# Patient Record
Sex: FEMALE | Race: BLACK OR AFRICAN AMERICAN
[De-identification: names, ages, dates, MRNs, and addresses within clinical notes are randomized per-mention and may not be internally consistent; named-entity substitution may affect disease eponyms.]

---

## 2024-03-25 ENCOUNTER — TRANSCRIPTION ENCOUNTER (OUTPATIENT)
Age: 59
End: 2024-03-25

## 2024-03-26 ENCOUNTER — INPATIENT (INPATIENT)
Facility: HOSPITAL | Age: 59
LOS: 30 days | Discharge: NURSING, REHAB OR LONG-TERM CARE | DRG: 66 | End: 2024-04-26
Attending: NEUROLOGICAL SURGERY | Admitting: NEUROLOGICAL SURGERY
Payer: COMMERCIAL

## 2024-03-26 ENCOUNTER — RESULT REVIEW (OUTPATIENT)
Age: 59
End: 2024-03-26

## 2024-03-26 ENCOUNTER — APPOINTMENT (OUTPATIENT)
Dept: NEUROSURGERY | Facility: HOSPITAL | Age: 59
End: 2024-03-26

## 2024-03-26 VITALS
HEART RATE: 70 BPM | DIASTOLIC BLOOD PRESSURE: 92 MMHG | SYSTOLIC BLOOD PRESSURE: 118 MMHG | OXYGEN SATURATION: 100 % | RESPIRATION RATE: 20 BRPM

## 2024-03-26 DIAGNOSIS — I60.9 NONTRAUMATIC SUBARACHNOID HEMORRHAGE, UNSPECIFIED: ICD-10-CM

## 2024-03-26 DIAGNOSIS — I42.9 CARDIOMYOPATHY, UNSPECIFIED: ICD-10-CM

## 2024-03-26 LAB
A1C WITH ESTIMATED AVERAGE GLUCOSE RESULT: 5.5 % — SIGNIFICANT CHANGE UP (ref 4–5.6)
A1C WITH ESTIMATED AVERAGE GLUCOSE RESULT: 5.5 % — SIGNIFICANT CHANGE UP (ref 4–5.6)
ADD ON TEST-SPECIMEN IN LAB: SIGNIFICANT CHANGE UP
ALBUMIN SERPL ELPH-MCNC: 3.8 G/DL — SIGNIFICANT CHANGE UP (ref 3.3–5)
ALP SERPL-CCNC: 109 U/L — SIGNIFICANT CHANGE UP (ref 40–120)
ALT FLD-CCNC: 20 U/L — SIGNIFICANT CHANGE UP (ref 10–45)
ANION GAP SERPL CALC-SCNC: 13 MMOL/L — SIGNIFICANT CHANGE UP (ref 5–17)
ANION GAP SERPL CALC-SCNC: 14 MMOL/L — SIGNIFICANT CHANGE UP (ref 5–17)
ANION GAP SERPL CALC-SCNC: 14 MMOL/L — SIGNIFICANT CHANGE UP (ref 5–17)
ANION GAP SERPL CALC-SCNC: 15 MMOL/L — SIGNIFICANT CHANGE UP (ref 5–17)
APPEARANCE UR: CLEAR — SIGNIFICANT CHANGE UP
APTT BLD: 28 SEC — SIGNIFICANT CHANGE UP (ref 24.5–35.6)
AST SERPL-CCNC: 35 U/L — SIGNIFICANT CHANGE UP (ref 10–40)
BACTERIA # UR AUTO: NEGATIVE /HPF — SIGNIFICANT CHANGE UP
BILIRUB DIRECT SERPL-MCNC: <0.1 MG/DL — SIGNIFICANT CHANGE UP (ref 0–0.3)
BILIRUB INDIRECT FLD-MCNC: >0.2 MG/DL — SIGNIFICANT CHANGE UP (ref 0.2–1)
BILIRUB SERPL-MCNC: 0.3 MG/DL — SIGNIFICANT CHANGE UP (ref 0.2–1.2)
BILIRUB UR-MCNC: NEGATIVE — SIGNIFICANT CHANGE UP
BLD GP AB SCN SERPL QL: NEGATIVE — SIGNIFICANT CHANGE UP
BUN SERPL-MCNC: 10 MG/DL — SIGNIFICANT CHANGE UP (ref 7–23)
BUN SERPL-MCNC: 10 MG/DL — SIGNIFICANT CHANGE UP (ref 7–23)
BUN SERPL-MCNC: 14 MG/DL — SIGNIFICANT CHANGE UP (ref 7–23)
BUN SERPL-MCNC: 19 MG/DL — SIGNIFICANT CHANGE UP (ref 7–23)
CALCIUM SERPL-MCNC: 8.1 MG/DL — LOW (ref 8.4–10.5)
CALCIUM SERPL-MCNC: 9.1 MG/DL — SIGNIFICANT CHANGE UP (ref 8.4–10.5)
CALCIUM SERPL-MCNC: 9.3 MG/DL — SIGNIFICANT CHANGE UP (ref 8.4–10.5)
CALCIUM SERPL-MCNC: 9.8 MG/DL — SIGNIFICANT CHANGE UP (ref 8.4–10.5)
CAST: 2 /LPF — SIGNIFICANT CHANGE UP (ref 0–4)
CHLORIDE SERPL-SCNC: 102 MMOL/L — SIGNIFICANT CHANGE UP (ref 96–108)
CHLORIDE SERPL-SCNC: 106 MMOL/L — SIGNIFICANT CHANGE UP (ref 96–108)
CHLORIDE SERPL-SCNC: 109 MMOL/L — HIGH (ref 96–108)
CHLORIDE SERPL-SCNC: 111 MMOL/L — HIGH (ref 96–108)
CHOLEST SERPL-MCNC: 174 MG/DL — SIGNIFICANT CHANGE UP
CHOLEST SERPL-MCNC: 198 MG/DL — SIGNIFICANT CHANGE UP
CHOLEST SERPL-MCNC: 200 MG/DL — HIGH
CO2 SERPL-SCNC: 17 MMOL/L — LOW (ref 22–31)
CO2 SERPL-SCNC: 18 MMOL/L — LOW (ref 22–31)
COLOR SPEC: YELLOW — SIGNIFICANT CHANGE UP
CREAT SERPL-MCNC: 0.5 MG/DL — SIGNIFICANT CHANGE UP (ref 0.5–1.3)
CREAT SERPL-MCNC: 0.53 MG/DL — SIGNIFICANT CHANGE UP (ref 0.5–1.3)
CREAT SERPL-MCNC: 0.54 MG/DL — SIGNIFICANT CHANGE UP (ref 0.5–1.3)
CREAT SERPL-MCNC: 0.57 MG/DL — SIGNIFICANT CHANGE UP (ref 0.5–1.3)
DIFF PNL FLD: ABNORMAL
EGFR: 106 ML/MIN/1.73M2 — SIGNIFICANT CHANGE UP
EGFR: 107 ML/MIN/1.73M2 — SIGNIFICANT CHANGE UP
EGFR: 108 ML/MIN/1.73M2 — SIGNIFICANT CHANGE UP
EGFR: 109 ML/MIN/1.73M2 — SIGNIFICANT CHANGE UP
ESTIMATED AVERAGE GLUCOSE: 111 MG/DL — SIGNIFICANT CHANGE UP (ref 68–114)
ESTIMATED AVERAGE GLUCOSE: 111 MG/DL — SIGNIFICANT CHANGE UP (ref 68–114)
GAS PNL BLDA: SIGNIFICANT CHANGE UP
GAS PNL BLDA: SIGNIFICANT CHANGE UP
GLUCOSE BLDC GLUCOMTR-MCNC: 128 MG/DL — HIGH (ref 70–99)
GLUCOSE BLDC GLUCOMTR-MCNC: 152 MG/DL — HIGH (ref 70–99)
GLUCOSE SERPL-MCNC: 145 MG/DL — HIGH (ref 70–99)
GLUCOSE SERPL-MCNC: 173 MG/DL — HIGH (ref 70–99)
GLUCOSE SERPL-MCNC: 174 MG/DL — HIGH (ref 70–99)
GLUCOSE SERPL-MCNC: 191 MG/DL — HIGH (ref 70–99)
GLUCOSE UR QL: NEGATIVE MG/DL — SIGNIFICANT CHANGE UP
HCG SERPL-ACNC: 2.4 MIU/ML — SIGNIFICANT CHANGE UP
HCT VFR BLD CALC: 35 % — SIGNIFICANT CHANGE UP (ref 34.5–45)
HCT VFR BLD CALC: 37.2 % — SIGNIFICANT CHANGE UP (ref 34.5–45)
HDLC SERPL-MCNC: 51 MG/DL — SIGNIFICANT CHANGE UP
HDLC SERPL-MCNC: 53 MG/DL — SIGNIFICANT CHANGE UP
HDLC SERPL-MCNC: 54 MG/DL — SIGNIFICANT CHANGE UP
HGB BLD-MCNC: 11.4 G/DL — LOW (ref 11.5–15.5)
HGB BLD-MCNC: 12.3 G/DL — SIGNIFICANT CHANGE UP (ref 11.5–15.5)
INR BLD: 1.13 RATIO — SIGNIFICANT CHANGE UP (ref 0.85–1.18)
KETONES UR-MCNC: NEGATIVE MG/DL — SIGNIFICANT CHANGE UP
LEUKOCYTE ESTERASE UR-ACNC: NEGATIVE — SIGNIFICANT CHANGE UP
LIPID PNL WITH DIRECT LDL SERPL: 105 MG/DL — HIGH
LIPID PNL WITH DIRECT LDL SERPL: 120 MG/DL — HIGH
LIPID PNL WITH DIRECT LDL SERPL: 127 MG/DL — HIGH
MAGNESIUM SERPL-MCNC: 1.7 MG/DL — SIGNIFICANT CHANGE UP (ref 1.6–2.6)
MAGNESIUM SERPL-MCNC: 2 MG/DL — SIGNIFICANT CHANGE UP (ref 1.6–2.6)
MAGNESIUM SERPL-MCNC: 2.1 MG/DL — SIGNIFICANT CHANGE UP (ref 1.6–2.6)
MAGNESIUM SERPL-MCNC: 2.4 MG/DL — SIGNIFICANT CHANGE UP (ref 1.6–2.6)
MCHC RBC-ENTMCNC: 28.3 PG — SIGNIFICANT CHANGE UP (ref 27–34)
MCHC RBC-ENTMCNC: 28.6 PG — SIGNIFICANT CHANGE UP (ref 27–34)
MCHC RBC-ENTMCNC: 32.6 GM/DL — SIGNIFICANT CHANGE UP (ref 32–36)
MCHC RBC-ENTMCNC: 33.1 GM/DL — SIGNIFICANT CHANGE UP (ref 32–36)
MCV RBC AUTO: 85.5 FL — SIGNIFICANT CHANGE UP (ref 80–100)
MCV RBC AUTO: 87.9 FL — SIGNIFICANT CHANGE UP (ref 80–100)
MRSA PCR RESULT.: SIGNIFICANT CHANGE UP
NITRITE UR-MCNC: NEGATIVE — SIGNIFICANT CHANGE UP
NON HDL CHOLESTEROL: 121 MG/DL — SIGNIFICANT CHANGE UP
NON HDL CHOLESTEROL: 146 MG/DL — HIGH
NON HDL CHOLESTEROL: 147 MG/DL — HIGH
NRBC # BLD: 0 /100 WBCS — SIGNIFICANT CHANGE UP (ref 0–0)
NRBC # BLD: 0 /100 WBCS — SIGNIFICANT CHANGE UP (ref 0–0)
NT-PROBNP SERPL-SCNC: 2995 PG/ML — HIGH (ref 0–300)
PA ADP PRP-ACNC: 242 PRU — SIGNIFICANT CHANGE UP (ref 194–417)
PH UR: 5 — SIGNIFICANT CHANGE UP (ref 5–8)
PHOSPHATE SERPL-MCNC: 1.9 MG/DL — LOW (ref 2.5–4.5)
PHOSPHATE SERPL-MCNC: 1.9 MG/DL — LOW (ref 2.5–4.5)
PHOSPHATE SERPL-MCNC: 3.1 MG/DL — SIGNIFICANT CHANGE UP (ref 2.5–4.5)
PHOSPHATE SERPL-MCNC: 3.6 MG/DL — SIGNIFICANT CHANGE UP (ref 2.5–4.5)
PLATELET # BLD AUTO: 214 K/UL — SIGNIFICANT CHANGE UP (ref 150–400)
PLATELET # BLD AUTO: 275 K/UL — SIGNIFICANT CHANGE UP (ref 150–400)
PLATELET MAPPING ACTF MAX AMPLITUDE: 14.1 MM — SIGNIFICANT CHANGE UP (ref 2–19)
PLATELET MAPPING ADP MAXIMUM AMPLITUDE: 18.9 MM — LOW (ref 45–69)
PLATELET MAPPING ADP PERCENT INHIBITION: 91 % — HIGH (ref 0–17)
PLATELET MAPPING ARACHIDONIC ACID INHIBITION: 62.1 % — HIGH (ref 0–11)
PLATELET MAPPING HKH MAXIMUM AMPLITUDE: 67.4 MM — SIGNIFICANT CHANGE UP (ref 53–68)
PLATELET RESPONSE ASPIRIN RESULT: 658 ARU — SIGNIFICANT CHANGE UP
POTASSIUM SERPL-MCNC: 3.1 MMOL/L — LOW (ref 3.5–5.3)
POTASSIUM SERPL-MCNC: 3.5 MMOL/L — SIGNIFICANT CHANGE UP (ref 3.5–5.3)
POTASSIUM SERPL-MCNC: 3.7 MMOL/L — SIGNIFICANT CHANGE UP (ref 3.5–5.3)
POTASSIUM SERPL-MCNC: 4.1 MMOL/L — SIGNIFICANT CHANGE UP (ref 3.5–5.3)
POTASSIUM SERPL-SCNC: 3.1 MMOL/L — LOW (ref 3.5–5.3)
POTASSIUM SERPL-SCNC: 3.5 MMOL/L — SIGNIFICANT CHANGE UP (ref 3.5–5.3)
POTASSIUM SERPL-SCNC: 3.7 MMOL/L — SIGNIFICANT CHANGE UP (ref 3.5–5.3)
POTASSIUM SERPL-SCNC: 4.1 MMOL/L — SIGNIFICANT CHANGE UP (ref 3.5–5.3)
PROT SERPL-MCNC: 6.7 G/DL — SIGNIFICANT CHANGE UP (ref 6–8.3)
PROT UR-MCNC: NEGATIVE MG/DL — SIGNIFICANT CHANGE UP
PROTHROM AB SERPL-ACNC: 12.4 SEC — SIGNIFICANT CHANGE UP (ref 9.5–13)
RBC # BLD: 3.98 M/UL — SIGNIFICANT CHANGE UP (ref 3.8–5.2)
RBC # BLD: 4.35 M/UL — SIGNIFICANT CHANGE UP (ref 3.8–5.2)
RBC # FLD: 13.2 % — SIGNIFICANT CHANGE UP (ref 10.3–14.5)
RBC # FLD: 13.9 % — SIGNIFICANT CHANGE UP (ref 10.3–14.5)
RBC CASTS # UR COMP ASSIST: 4 /HPF — SIGNIFICANT CHANGE UP (ref 0–4)
REVIEW: SIGNIFICANT CHANGE UP
RH IG SCN BLD-IMP: POSITIVE — SIGNIFICANT CHANGE UP
S AUREUS DNA NOSE QL NAA+PROBE: SIGNIFICANT CHANGE UP
SODIUM SERPL-SCNC: 135 MMOL/L — SIGNIFICANT CHANGE UP (ref 135–145)
SODIUM SERPL-SCNC: 137 MMOL/L — SIGNIFICANT CHANGE UP (ref 135–145)
SODIUM SERPL-SCNC: 141 MMOL/L — SIGNIFICANT CHANGE UP (ref 135–145)
SODIUM SERPL-SCNC: 142 MMOL/L — SIGNIFICANT CHANGE UP (ref 135–145)
SP GR SPEC: >1.03 — HIGH (ref 1–1.03)
SQUAMOUS # UR AUTO: 3 /HPF — SIGNIFICANT CHANGE UP (ref 0–5)
T4 FREE SERPL-MCNC: 1.1 NG/DL — SIGNIFICANT CHANGE UP (ref 0.9–1.8)
TRIGL SERPL-MCNC: 109 MG/DL — SIGNIFICANT CHANGE UP
TRIGL SERPL-MCNC: 150 MG/DL — HIGH
TRIGL SERPL-MCNC: 82 MG/DL — SIGNIFICANT CHANGE UP
TROPONIN T, HIGH SENSITIVITY RESULT: 284 NG/L — HIGH (ref 0–51)
TROPONIN T, HIGH SENSITIVITY RESULT: 704 NG/L — HIGH (ref 0–51)
TSH SERPL-MCNC: 1.62 UIU/ML — SIGNIFICANT CHANGE UP (ref 0.27–4.2)
TSH SERPL-MCNC: 1.87 UIU/ML — SIGNIFICANT CHANGE UP (ref 0.27–4.2)
TSH SERPL-MCNC: 2.4 UIU/ML — SIGNIFICANT CHANGE UP (ref 0.27–4.2)
UROBILINOGEN FLD QL: 0.2 MG/DL — SIGNIFICANT CHANGE UP (ref 0.2–1)
WBC # BLD: 15.24 K/UL — HIGH (ref 3.8–10.5)
WBC # BLD: 16.1 K/UL — HIGH (ref 3.8–10.5)
WBC # FLD AUTO: 15.24 K/UL — HIGH (ref 3.8–10.5)
WBC # FLD AUTO: 16.1 K/UL — HIGH (ref 3.8–10.5)
WBC UR QL: 3 /HPF — SIGNIFICANT CHANGE UP (ref 0–5)

## 2024-03-26 PROCEDURE — 99223 1ST HOSP IP/OBS HIGH 75: CPT | Mod: 57

## 2024-03-26 PROCEDURE — 36620 INSERTION CATHETER ARTERY: CPT

## 2024-03-26 PROCEDURE — 99291 CRITICAL CARE FIRST HOUR: CPT

## 2024-03-26 PROCEDURE — 93010 ELECTROCARDIOGRAM REPORT: CPT

## 2024-03-26 PROCEDURE — 70450 CT HEAD/BRAIN W/O DYE: CPT | Mod: 26,77,59

## 2024-03-26 PROCEDURE — 92240 ICG ANGIOGRAPHY I&R UNI/BI: CPT | Mod: 26

## 2024-03-26 PROCEDURE — 76937 US GUIDE VASCULAR ACCESS: CPT | Mod: 26

## 2024-03-26 PROCEDURE — 70496 CT ANGIOGRAPHY HEAD: CPT | Mod: 26

## 2024-03-26 PROCEDURE — 99255 IP/OBS CONSLTJ NEW/EST HI 80: CPT | Mod: 57

## 2024-03-26 PROCEDURE — 70450 CT HEAD/BRAIN W/O DYE: CPT | Mod: 26

## 2024-03-26 PROCEDURE — 71045 X-RAY EXAM CHEST 1 VIEW: CPT | Mod: 26

## 2024-03-26 PROCEDURE — 61697 BRAIN ANEURYSM REPR COMPLX: CPT

## 2024-03-26 PROCEDURE — 93306 TTE W/DOPPLER COMPLETE: CPT | Mod: 26

## 2024-03-26 PROCEDURE — 70498 CT ANGIOGRAPHY NECK: CPT | Mod: 26

## 2024-03-26 PROCEDURE — 93888 INTRACRANIAL LIMITED STUDY: CPT | Mod: 26

## 2024-03-26 PROCEDURE — 70450 CT HEAD/BRAIN W/O DYE: CPT | Mod: 26,77,76

## 2024-03-26 PROCEDURE — 69990 MICROSURGERY ADD-ON: CPT

## 2024-03-26 PROCEDURE — 70450 CT HEAD/BRAIN W/O DYE: CPT | Mod: 26,77

## 2024-03-26 DEVICE — PLATE BONE MICRO 10MM 2H: Type: IMPLANTABLE DEVICE | Site: RIGHT | Status: FUNCTIONAL

## 2024-03-26 DEVICE — PLATE COVER BURRHOLE UN3 W/TAB 10MM: Type: IMPLANTABLE DEVICE | Site: RIGHT | Status: FUNCTIONAL

## 2024-03-26 DEVICE — SURGIFLO MATRIX WITH THROMBIN KIT: Type: IMPLANTABLE DEVICE | Site: RIGHT | Status: FUNCTIONAL

## 2024-03-26 DEVICE — SCREW UN3 AXS SELF DRILL 1.5X4MM: Type: IMPLANTABLE DEVICE | Site: RIGHT | Status: FUNCTIONAL

## 2024-03-26 DEVICE — PLATE UN3 W/TAB 7MM: Type: IMPLANTABLE DEVICE | Site: RIGHT | Status: FUNCTIONAL

## 2024-03-26 DEVICE — SURGIFOAM PAD 8CM X 12.5CM X 10MM (100): Type: IMPLANTABLE DEVICE | Site: RIGHT | Status: FUNCTIONAL

## 2024-03-26 DEVICE — GRAFT DURAGEN PLUS 3X3IN: Type: IMPLANTABLE DEVICE | Site: RIGHT | Status: FUNCTIONAL

## 2024-03-26 DEVICE — TACHOSIL 9.5 X 4.8CM: Type: IMPLANTABLE DEVICE | Site: RIGHT | Status: FUNCTIONAL

## 2024-03-26 DEVICE — IMPLANTABLE DEVICE: Type: IMPLANTABLE DEVICE | Site: RIGHT | Status: FUNCTIONAL

## 2024-03-26 DEVICE — CLIP ANEUR TII TEMP 10.5X10 STR: Type: IMPLANTABLE DEVICE | Site: RIGHT | Status: FUNCTIONAL

## 2024-03-26 RX ORDER — FENTANYL CITRATE 50 UG/ML
50 INJECTION INTRAVENOUS ONCE
Refills: 0 | Status: DISCONTINUED | OUTPATIENT
Start: 2024-03-26 | End: 2024-03-26

## 2024-03-26 RX ORDER — ACETAMINOPHEN 500 MG
650 TABLET ORAL EVERY 6 HOURS
Refills: 0 | Status: DISCONTINUED | OUTPATIENT
Start: 2024-03-26 | End: 2024-03-26

## 2024-03-26 RX ORDER — PROPOFOL 10 MG/ML
10 INJECTION, EMULSION INTRAVENOUS
Qty: 500 | Refills: 0 | Status: DISCONTINUED | OUTPATIENT
Start: 2024-03-26 | End: 2024-03-26

## 2024-03-26 RX ORDER — OXYCODONE HYDROCHLORIDE 5 MG/1
5 TABLET ORAL EVERY 4 HOURS
Refills: 0 | Status: DISCONTINUED | OUTPATIENT
Start: 2024-03-26 | End: 2024-03-26

## 2024-03-26 RX ORDER — SODIUM CHLORIDE 9 MG/ML
1000 INJECTION INTRAMUSCULAR; INTRAVENOUS; SUBCUTANEOUS
Refills: 0 | Status: DISCONTINUED | OUTPATIENT
Start: 2024-03-26 | End: 2024-03-26

## 2024-03-26 RX ORDER — LEVETIRACETAM 250 MG/1
500 TABLET, FILM COATED ORAL EVERY 12 HOURS
Refills: 0 | Status: DISCONTINUED | OUTPATIENT
Start: 2024-03-26 | End: 2024-03-26

## 2024-03-26 RX ORDER — CEFAZOLIN SODIUM 1 G
2000 VIAL (EA) INJECTION EVERY 8 HOURS
Refills: 0 | Status: COMPLETED | OUTPATIENT
Start: 2024-03-26 | End: 2024-03-27

## 2024-03-26 RX ORDER — POLYETHYLENE GLYCOL 3350 17 G/17G
17 POWDER, FOR SOLUTION ORAL AT BEDTIME
Refills: 0 | Status: DISCONTINUED | OUTPATIENT
Start: 2024-03-26 | End: 2024-03-26

## 2024-03-26 RX ORDER — INSULIN LISPRO 100/ML
VIAL (ML) SUBCUTANEOUS
Refills: 0 | Status: DISCONTINUED | OUTPATIENT
Start: 2024-03-26 | End: 2024-03-26

## 2024-03-26 RX ORDER — SODIUM CHLORIDE 9 MG/ML
1000 INJECTION INTRAMUSCULAR; INTRAVENOUS; SUBCUTANEOUS ONCE
Refills: 0 | Status: COMPLETED | OUTPATIENT
Start: 2024-03-26 | End: 2024-03-26

## 2024-03-26 RX ORDER — NIMODIPINE 60 MG/10ML
60 SOLUTION ORAL EVERY 4 HOURS
Refills: 0 | Status: DISCONTINUED | OUTPATIENT
Start: 2024-03-26 | End: 2024-03-26

## 2024-03-26 RX ORDER — POTASSIUM CHLORIDE 20 MEQ
40 PACKET (EA) ORAL EVERY 4 HOURS
Refills: 0 | Status: DISCONTINUED | OUTPATIENT
Start: 2024-03-26 | End: 2024-03-26

## 2024-03-26 RX ORDER — LEVETIRACETAM 250 MG/1
500 TABLET, FILM COATED ORAL
Refills: 0 | Status: DISCONTINUED | OUTPATIENT
Start: 2024-03-26 | End: 2024-03-27

## 2024-03-26 RX ORDER — SODIUM CHLORIDE 5 G/100ML
1000 INJECTION, SOLUTION INTRAVENOUS
Refills: 0 | Status: DISCONTINUED | OUTPATIENT
Start: 2024-03-26 | End: 2024-03-26

## 2024-03-26 RX ORDER — NOREPINEPHRINE BITARTRATE/D5W 8 MG/250ML
0.05 PLASTIC BAG, INJECTION (ML) INTRAVENOUS
Qty: 8 | Refills: 0 | Status: DISCONTINUED | OUTPATIENT
Start: 2024-03-26 | End: 2024-03-26

## 2024-03-26 RX ORDER — POTASSIUM CHLORIDE 20 MEQ
40 PACKET (EA) ORAL ONCE
Refills: 0 | Status: DISCONTINUED | OUTPATIENT
Start: 2024-03-26 | End: 2024-03-27

## 2024-03-26 RX ORDER — SODIUM,POTASSIUM PHOSPHATES 278-250MG
2 POWDER IN PACKET (EA) ORAL ONCE
Refills: 0 | Status: DISCONTINUED | OUTPATIENT
Start: 2024-03-26 | End: 2024-03-27

## 2024-03-26 RX ORDER — CHLORHEXIDINE GLUCONATE 213 G/1000ML
15 SOLUTION TOPICAL EVERY 12 HOURS
Refills: 0 | Status: DISCONTINUED | OUTPATIENT
Start: 2024-03-26 | End: 2024-04-03

## 2024-03-26 RX ORDER — ACETAMINOPHEN 500 MG
650 TABLET ORAL EVERY 6 HOURS
Refills: 0 | Status: DISCONTINUED | OUTPATIENT
Start: 2024-03-26 | End: 2024-04-23

## 2024-03-26 RX ORDER — MAGNESIUM SULFATE 500 MG/ML
1 VIAL (ML) INJECTION ONCE
Refills: 0 | Status: COMPLETED | OUTPATIENT
Start: 2024-03-26 | End: 2024-03-26

## 2024-03-26 RX ORDER — NIMODIPINE 60 MG/10ML
60 SOLUTION ORAL EVERY 4 HOURS
Refills: 0 | Status: DISCONTINUED | OUTPATIENT
Start: 2024-03-26 | End: 2024-03-29

## 2024-03-26 RX ORDER — POLYETHYLENE GLYCOL 3350 17 G/17G
17 POWDER, FOR SOLUTION ORAL AT BEDTIME
Refills: 0 | Status: DISCONTINUED | OUTPATIENT
Start: 2024-03-26 | End: 2024-04-01

## 2024-03-26 RX ORDER — PHENYLEPHRINE HYDROCHLORIDE 10 MG/ML
0.2 INJECTION INTRAVENOUS
Qty: 40 | Refills: 0 | Status: DISCONTINUED | OUTPATIENT
Start: 2024-03-26 | End: 2024-03-26

## 2024-03-26 RX ORDER — DEXAMETHASONE 0.5 MG/5ML
4 ELIXIR ORAL EVERY 6 HOURS
Refills: 0 | Status: DISCONTINUED | OUTPATIENT
Start: 2024-03-26 | End: 2024-03-27

## 2024-03-26 RX ORDER — OXYCODONE HYDROCHLORIDE 5 MG/1
10 TABLET ORAL EVERY 4 HOURS
Refills: 0 | Status: DISCONTINUED | OUTPATIENT
Start: 2024-03-26 | End: 2024-03-26

## 2024-03-26 RX ORDER — PANTOPRAZOLE SODIUM 20 MG/1
40 TABLET, DELAYED RELEASE ORAL DAILY
Refills: 0 | Status: DISCONTINUED | OUTPATIENT
Start: 2024-03-26 | End: 2024-04-04

## 2024-03-26 RX ORDER — POTASSIUM CHLORIDE 20 MEQ
10 PACKET (EA) ORAL
Refills: 0 | Status: DISCONTINUED | OUTPATIENT
Start: 2024-03-26 | End: 2024-03-28

## 2024-03-26 RX ORDER — DEXMEDETOMIDINE HYDROCHLORIDE IN 0.9% SODIUM CHLORIDE 4 UG/ML
0.2 INJECTION INTRAVENOUS
Qty: 200 | Refills: 0 | Status: DISCONTINUED | OUTPATIENT
Start: 2024-03-26 | End: 2024-03-27

## 2024-03-26 RX ORDER — PROPOFOL 10 MG/ML
10 INJECTION, EMULSION INTRAVENOUS
Qty: 1000 | Refills: 0 | Status: DISCONTINUED | OUTPATIENT
Start: 2024-03-26 | End: 2024-03-26

## 2024-03-26 RX ORDER — CEFAZOLIN SODIUM 1 G
2000 VIAL (EA) INJECTION ONCE
Refills: 0 | Status: COMPLETED | OUTPATIENT
Start: 2024-03-26 | End: 2024-03-26

## 2024-03-26 RX ORDER — PROPOFOL 10 MG/ML
10 INJECTION, EMULSION INTRAVENOUS
Qty: 1000 | Refills: 0 | Status: DISCONTINUED | OUTPATIENT
Start: 2024-03-26 | End: 2024-03-27

## 2024-03-26 RX ORDER — FENTANYL CITRATE 50 UG/ML
25 INJECTION INTRAVENOUS ONCE
Refills: 0 | Status: DISCONTINUED | OUTPATIENT
Start: 2024-03-26 | End: 2024-03-26

## 2024-03-26 RX ORDER — POTASSIUM PHOSPHATE, MONOBASIC POTASSIUM PHOSPHATE, DIBASIC 236; 224 MG/ML; MG/ML
30 INJECTION, SOLUTION INTRAVENOUS ONCE
Refills: 0 | Status: COMPLETED | OUTPATIENT
Start: 2024-03-26 | End: 2024-03-26

## 2024-03-26 RX ORDER — ACETAMINOPHEN 500 MG
1000 TABLET ORAL EVERY 6 HOURS
Refills: 0 | Status: COMPLETED | OUTPATIENT
Start: 2024-03-26 | End: 2024-03-30

## 2024-03-26 RX ORDER — SENNA PLUS 8.6 MG/1
2 TABLET ORAL AT BEDTIME
Refills: 0 | Status: DISCONTINUED | OUTPATIENT
Start: 2024-03-26 | End: 2024-03-26

## 2024-03-26 RX ORDER — OXYCODONE HYDROCHLORIDE 5 MG/1
5 TABLET ORAL EVERY 4 HOURS
Refills: 0 | Status: DISCONTINUED | OUTPATIENT
Start: 2024-03-26 | End: 2024-04-02

## 2024-03-26 RX ORDER — POTASSIUM CHLORIDE 20 MEQ
40 PACKET (EA) ORAL ONCE
Refills: 0 | Status: COMPLETED | OUTPATIENT
Start: 2024-03-26 | End: 2024-03-26

## 2024-03-26 RX ORDER — SODIUM,POTASSIUM PHOSPHATES 278-250MG
2 POWDER IN PACKET (EA) ORAL ONCE
Refills: 0 | Status: COMPLETED | OUTPATIENT
Start: 2024-03-26 | End: 2024-03-26

## 2024-03-26 RX ORDER — FOSAPREPITANT DIMEGLUMINE 150 MG/5ML
150 INJECTION, POWDER, LYOPHILIZED, FOR SOLUTION INTRAVENOUS ONCE
Refills: 0 | Status: DISCONTINUED | OUTPATIENT
Start: 2024-03-26 | End: 2024-03-26

## 2024-03-26 RX ORDER — NICARDIPINE HYDROCHLORIDE 30 MG/1
5 CAPSULE, EXTENDED RELEASE ORAL
Qty: 40 | Refills: 0 | Status: DISCONTINUED | OUTPATIENT
Start: 2024-03-26 | End: 2024-03-27

## 2024-03-26 RX ORDER — INSULIN LISPRO 100/ML
VIAL (ML) SUBCUTANEOUS
Refills: 0 | Status: DISCONTINUED | OUTPATIENT
Start: 2024-03-26 | End: 2024-03-27

## 2024-03-26 RX ORDER — NICARDIPINE HYDROCHLORIDE 30 MG/1
5 CAPSULE, EXTENDED RELEASE ORAL
Qty: 40 | Refills: 0 | Status: DISCONTINUED | OUTPATIENT
Start: 2024-03-26 | End: 2024-03-26

## 2024-03-26 RX ORDER — DEXMEDETOMIDINE HYDROCHLORIDE IN 0.9% SODIUM CHLORIDE 4 UG/ML
0.2 INJECTION INTRAVENOUS
Qty: 200 | Refills: 0 | Status: DISCONTINUED | OUTPATIENT
Start: 2024-03-26 | End: 2024-03-26

## 2024-03-26 RX ORDER — ONDANSETRON 8 MG/1
4 TABLET, FILM COATED ORAL EVERY 6 HOURS
Refills: 0 | Status: DISCONTINUED | OUTPATIENT
Start: 2024-03-26 | End: 2024-03-26

## 2024-03-26 RX ORDER — SENNA PLUS 8.6 MG/1
2 TABLET ORAL AT BEDTIME
Refills: 0 | Status: DISCONTINUED | OUTPATIENT
Start: 2024-03-26 | End: 2024-04-12

## 2024-03-26 RX ORDER — CHLORHEXIDINE GLUCONATE 213 G/1000ML
15 SOLUTION TOPICAL EVERY 12 HOURS
Refills: 0 | Status: DISCONTINUED | OUTPATIENT
Start: 2024-03-26 | End: 2024-03-26

## 2024-03-26 RX ADMIN — Medication 100 MILLIEQUIVALENT(S): at 05:30

## 2024-03-26 RX ADMIN — Medication 2: at 22:19

## 2024-03-26 RX ADMIN — NICARDIPINE HYDROCHLORIDE 25 MG/HR: 30 CAPSULE, EXTENDED RELEASE ORAL at 15:29

## 2024-03-26 RX ADMIN — Medication 100 MILLIGRAM(S): at 01:47

## 2024-03-26 RX ADMIN — PROPOFOL 4.27 MICROGRAM(S)/KG/MIN: 10 INJECTION, EMULSION INTRAVENOUS at 17:03

## 2024-03-26 RX ADMIN — PROPOFOL 4.27 MICROGRAM(S)/KG/MIN: 10 INJECTION, EMULSION INTRAVENOUS at 17:28

## 2024-03-26 RX ADMIN — DEXMEDETOMIDINE HYDROCHLORIDE IN 0.9% SODIUM CHLORIDE 3.56 MICROGRAM(S)/KG/HR: 4 INJECTION INTRAVENOUS at 06:14

## 2024-03-26 RX ADMIN — Medication 40 MILLIEQUIVALENT(S): at 07:45

## 2024-03-26 RX ADMIN — FENTANYL CITRATE 50 MICROGRAM(S): 50 INJECTION INTRAVENOUS at 05:45

## 2024-03-26 RX ADMIN — NIMODIPINE 60 MILLIGRAM(S): 60 SOLUTION ORAL at 22:04

## 2024-03-26 RX ADMIN — Medication 100 MILLIGRAM(S): at 22:05

## 2024-03-26 RX ADMIN — Medication 100 MILLIEQUIVALENT(S): at 06:30

## 2024-03-26 RX ADMIN — SODIUM CHLORIDE 75 MILLILITER(S): 5 INJECTION, SOLUTION INTRAVENOUS at 19:12

## 2024-03-26 RX ADMIN — SENNA PLUS 2 TABLET(S): 8.6 TABLET ORAL at 22:04

## 2024-03-26 RX ADMIN — ONDANSETRON 4 MILLIGRAM(S): 8 TABLET, FILM COATED ORAL at 05:30

## 2024-03-26 RX ADMIN — POTASSIUM PHOSPHATE, MONOBASIC POTASSIUM PHOSPHATE, DIBASIC 83.33 MILLIMOLE(S): 236; 224 INJECTION, SOLUTION INTRAVENOUS at 17:38

## 2024-03-26 RX ADMIN — PROPOFOL 4.27 MICROGRAM(S)/KG/MIN: 10 INJECTION, EMULSION INTRAVENOUS at 19:28

## 2024-03-26 RX ADMIN — LEVETIRACETAM 500 MILLIGRAM(S): 250 TABLET, FILM COATED ORAL at 06:25

## 2024-03-26 RX ADMIN — Medication 4 MILLIGRAM(S): at 17:26

## 2024-03-26 RX ADMIN — SODIUM CHLORIDE 75 MILLILITER(S): 5 INJECTION, SOLUTION INTRAVENOUS at 19:27

## 2024-03-26 RX ADMIN — POLYETHYLENE GLYCOL 3350 17 GRAM(S): 17 POWDER, FOR SOLUTION ORAL at 22:03

## 2024-03-26 RX ADMIN — FENTANYL CITRATE 50 MICROGRAM(S): 50 INJECTION INTRAVENOUS at 02:15

## 2024-03-26 RX ADMIN — Medication 2 PACKET(S): at 17:36

## 2024-03-26 RX ADMIN — Medication 102 GRAM(S): at 06:14

## 2024-03-26 RX ADMIN — FENTANYL CITRATE 50 MICROGRAM(S): 50 INJECTION INTRAVENOUS at 05:30

## 2024-03-26 RX ADMIN — FENTANYL CITRATE 50 MICROGRAM(S): 50 INJECTION INTRAVENOUS at 02:45

## 2024-03-26 RX ADMIN — SODIUM CHLORIDE 1000 MILLILITER(S): 9 INJECTION INTRAMUSCULAR; INTRAVENOUS; SUBCUTANEOUS at 03:13

## 2024-03-26 RX ADMIN — Medication 6.68 MICROGRAM(S)/KG/MIN: at 08:26

## 2024-03-26 RX ADMIN — Medication 1000 MILLIGRAM(S): at 15:20

## 2024-03-26 RX ADMIN — CHLORHEXIDINE GLUCONATE 15 MILLILITER(S): 213 SOLUTION TOPICAL at 06:28

## 2024-03-26 RX ADMIN — NICARDIPINE HYDROCHLORIDE 25 MG/HR: 30 CAPSULE, EXTENDED RELEASE ORAL at 19:28

## 2024-03-26 RX ADMIN — CHLORHEXIDINE GLUCONATE 15 MILLILITER(S): 213 SOLUTION TOPICAL at 17:27

## 2024-03-26 RX ADMIN — NIMODIPINE 60 MILLIGRAM(S): 60 SOLUTION ORAL at 17:27

## 2024-03-26 RX ADMIN — DEXMEDETOMIDINE HYDROCHLORIDE IN 0.9% SODIUM CHLORIDE 3.56 MICROGRAM(S)/KG/HR: 4 INJECTION INTRAVENOUS at 23:07

## 2024-03-26 RX ADMIN — SODIUM CHLORIDE 100 MILLILITER(S): 9 INJECTION INTRAMUSCULAR; INTRAVENOUS; SUBCUTANEOUS at 15:14

## 2024-03-26 RX ADMIN — NIMODIPINE 60 MILLIGRAM(S): 60 SOLUTION ORAL at 06:26

## 2024-03-26 RX ADMIN — Medication 40 MILLIEQUIVALENT(S): at 17:35

## 2024-03-26 RX ADMIN — PROPOFOL 4.2 MICROGRAM(S)/KG/MIN: 10 INJECTION, EMULSION INTRAVENOUS at 00:53

## 2024-03-26 RX ADMIN — FENTANYL CITRATE 50 MICROGRAM(S): 50 INJECTION INTRAVENOUS at 01:50

## 2024-03-26 RX ADMIN — Medication 400 MILLIGRAM(S): at 15:50

## 2024-03-26 RX ADMIN — LEVETIRACETAM 400 MILLIGRAM(S): 250 TABLET, FILM COATED ORAL at 17:26

## 2024-03-26 RX ADMIN — FENTANYL CITRATE 50 MICROGRAM(S): 50 INJECTION INTRAVENOUS at 02:20

## 2024-03-26 NOTE — OCCUPATIONAL THERAPY INITIAL EVALUATION ADULT - ADDITIONAL COMMENTS
Pt lives with her son in an apartment w/ ramp entrance and elevator access. Pt has a tub shower. Pt has no AE/DME; PTA was independent with all ADLs/mobility. +driving +working

## 2024-03-26 NOTE — ED PROVIDER NOTE - OBJECTIVE STATEMENT
59yo F with pmh migraine transferred from [place] for atraumatic subarachnoid head bleed. Pt arrives intubated, no ROS possible. Per hx pt was feeling unwell and was lying down with her son with her when she became unresponsive. Intubated for low mental status at [place] and found to have subarachnoid. EMS reports that pt had no falls or other specific complaints prior to event. Neurosx at bedside.

## 2024-03-26 NOTE — ED PROVIDER NOTE - PHYSICAL EXAMINATION
CONSTITUTIONAL: intubated, on propofol on arrival via R arm IV  EYES: 2mm b/l reactive to light briskly  ENT: ETT in place with hold, confirmed with glidescope thru vocal cords.   CARD: RRR  RESP: lungs CTA b/l   ABD: S/NT no R/G    Rest of neuro exam limited by pt condition - no posturing.

## 2024-03-26 NOTE — CONSULT NOTE ADULT - SUBJECTIVE AND OBJECTIVE BOX
CHIEF COMPLAINT:    HISTORY OF PRESENT ILLNESS: 58F, on ASA81 for pain relief? 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 Was initially found unresponsive at 21:00, intubated at 23:00 at OSH, then txfrd. Given ddavp at OSH. On arrival patient w/ PERRL, +cough/gag, 2/5 spontaneous movement in LUE, o/w no movement. 1 unit PLTs given and R frontal EVD placed-high pressure. PLTs/Coags wnl.     Intubated sedated in NSCU   Echo with abnormal findings of severe cardiomyopathy and RWMA    PAST MEDICAL & SURGICAL HISTORY:          MEDICATIONS:  niCARdipine Infusion 5 mG/Hr IV Continuous <Continuous>  niMODipine Oral Solution 60 milliGRAM(s) Oral every 4 hours    ceFAZolin   IVPB 2000 milliGRAM(s) IV Intermittent every 8 hours      acetaminophen     Tablet .. 650 milliGRAM(s) Oral every 6 hours PRN  acetaminophen   IVPB .. 1000 milliGRAM(s) IV Intermittent every 6 hours PRN  levETIRAcetam  IVPB 500 milliGRAM(s) IV Intermittent two times a day  oxyCODONE    IR 5 milliGRAM(s) Oral every 4 hours PRN  propofol Infusion 10 MICROgram(s)/kG/Min IV Continuous <Continuous>    pantoprazole  Injectable 40 milliGRAM(s) IV Push daily  polyethylene glycol 3350 17 Gram(s) Oral at bedtime  senna 2 Tablet(s) Oral at bedtime    dexAMETHasone     Tablet 4 milliGRAM(s) Oral every 6 hours  insulin lispro (ADMELOG) corrective regimen sliding scale   SubCutaneous Before meals and at bedtime    chlorhexidine 0.12% Liquid 15 milliLiter(s) Oral Mucosa every 12 hours  potassium chloride   Solution 40 milliEquivalent(s) Oral once  potassium chloride  10 mEq/100 mL IVPB 10 milliEquivalent(s) IV Intermittent every 1 hour  potassium phosphate / sodium phosphate Powder (PHOS-NaK) 2 Packet(s) Oral once  potassium phosphate IVPB 30 milliMole(s) IV Intermittent once  sodium chloride 2% + sodium acetate 50:50 1000 milliLiter(s) IV Continuous <Continuous>      FAMILY HISTORY:      SOCIAL HISTORY:    [ ] Non-smoker  [ ] Smoker  [ ] Alcohol    Allergies    No Known Allergies    Intolerances    	    REVIEW OF SYSTEMS:  CONSTITUTIONAL: No fever, weight loss, or fatigue  EYES: No eye pain, visual disturbances, or discharge  ENMT:  No difficulty hearing, tinnitus, vertigo; No sinus or throat pain  NECK: No pain or stiffness  RESPIRATORY: No cough, wheezing, chills or hemoptysis; No Shortness of Breath  CARDIOVASCULAR: No chest pain, palpitations, passing out, dizziness, or leg swelling  GASTROINTESTINAL: No abdominal or epigastric pain. No nausea, vomiting, or hematemesis; No diarrhea or constipation. No melena or hematochezia.  GENITOURINARY: No dysuria, frequency, hematuria, or incontinence  NEUROLOGICAL: No headaches, memory loss, loss of strength, numbness, or tremors  SKIN: No itching, burning, rashes, or lesions   LYMPH Nodes: No enlarged glands  ENDOCRINE: No heat or cold intolerance; No hair loss  MUSCULOSKELETAL: No joint pain or swelling; No muscle, back, or extremity pain  PSYCHIATRIC: No depression, anxiety, mood swings, or difficulty sleeping  HEME/LYMPH: No easy bruising, or bleeding gums  ALLERY AND IMMUNOLOGIC: No hives or eczema	    [ ] All others negative	  [ ] Unable to obtain    PHYSICAL EXAM:  T(C): 36.1 (03-26-24 @ 15:00), Max: 37.2 (03-26-24 @ 01:15)  HR: 69 (03-26-24 @ 15:15) (55 - 98)  BP: 75/47 (03-26-24 @ 07:54) (75/47 - 149/90)  RR: 14 (03-26-24 @ 14:30) (0 - 37)  SpO2: 97% (03-26-24 @ 15:15) (96% - 100%)  Wt(kg): --  I&O's Summary    25 Mar 2024 07:01  -  26 Mar 2024 07:00  --------------------------------------------------------  IN: 1666.8 mL / OUT: 427 mL / NET: 1239.8 mL    26 Mar 2024 07:01  -  26 Mar 2024 17:10  --------------------------------------------------------  IN: 102.7 mL / OUT: 15 mL / NET: 87.7 mL        Appearance: Normal	  HEENT:   Normal oral mucosa, PERRL, EOMI	  Lymphatic: No lymphadenopathy  Cardiovascular: Normal S1 S2, No JVD, No murmurs, No edema  Respiratory: Lungs clear to auscultation	  Psychiatry: A & O x 3, Mood & affect appropriate  Gastrointestinal:  Soft, Non-tender, + BS	  Skin: No rashes, No ecchymoses, No cyanosis	  Neurologic: Non-focal  Extremities: Normal range of motion, No clubbing, cyanosis or edema  Vascular: Peripheral pulses palpable 2+ bilaterally    TELEMETRY: 	    ECG:  	  RADIOLOGY:  < from: CT Head No Cont (03.26.24 @ 05:15) >    ACC: [id number] EXAM:  CT BRAIN   ORDERED BY: TOMMY HEWITT     PROCEDURE DATE:  03/26/2024          INTERPRETATION:  .    CLINICAL INFORMATION: Subarachnoid hemorrhage status post   extraventricular drain placement.    TECHNIQUE: Multiple axial CT images of the head were obtained without   contrast. Sagittal and coronal reconstructed images were acquired from   the source data.    COMPARISON: Most recent prior CT study of the head from 3/26/2024 at 3:02   AM.    FINDINGS: Again seen is a right frontal approach ventriculostomy catheter   with the catheter position unchanged in comparison to the recent CT   study. Ventricular size and configuration is also grossly unchanged and   remains mildly dilated.    There is redemonstration of diffuse subarachnoid hemorrhage within the   basal cisterns and the surrounding fissures. Intraventricular extension   of hemorrhage is again seen. The overall degree of hemorrhage appears   similar in comparison to the recent CT examination.    There is no shift of the midline structures or herniation.    The paranasal sinuses and tympanomastoid cavities are clear. The orbits   appear unremarkable.    IMPRESSION: Stable follow-up CT exam.    --- End of Report ---            KAYLEY BLAKE MD; Attending Radiologist  This document has been electronically signed. Mar 26 2024  7:44AM    < end of copied text >  < from: CT Angio Neck w/ IV Cont (03.26.24 @ 00:57) >    ACC: [id number] EXAM:  CT ANGIO BRAIN (W)AW IC   ORDERED BY:  ABHISHEK RIOJAS     ACC: [id number] EXAM:  CT ANGIO NECK (W)AW IC   ORDERED BY:  ABHISHEK RIOJAS     ACC: [id number] EXAM:  CT BRAIN   ORDERED BY:  ABHISHEK RIOJAS     PROCEDURE DATE:  03/26/2024          INTERPRETATION:  CLINICAL INFORMATION: Atraumatic subarachnoid hemorrhage   status post transfer from outside hospital..    COMPARISON: None.    CONTRAST:  IV Contrast: NONE (accession [id number]), IV contrast documented in   unlinked concurrent exam (accession [id number]), Omnipaque 300 (accession   [id number])  70 cc administered  30 cc discarded  Complications: None reported at time of study completion    TECHNIQUE:  CT BRAIN: Serial axial images were obtained from the skull base to the   vertex without the use of contrast.    CTA NECK/HEAD: After the intravenous power injection of non-ionic   contrast material, serial thin sections were obtained through the   cervical and intracranial circulation on a multislice CT scanner. Axial,   Coronal and Sagittal MIP reformatted images were obtained. 3D   reconstruction was also performed.    FINDINGS:    CT BRAIN:    VENTRICLES AND SULCI: Normal in size and configuration.  INTRA-AXIAL: No intracranial mass, acute hemorrhage, or midline shift.  EXTRA-AXIAL: Extensive subarachnoid hemorrhage throughout the basilar   cisterns, bilateral sylvian fissures, and along the bilateral medial   frontal lobes, right greater than left.    VISUALIZED SINUSES:  Clear.  TYMPANOMASTOID CAVITIES:  Clear.  VISUALIZED ORBITS: Normal.  CALVARIUM: Intact.    MISCELLANEOUS: Partially visualized endotracheal tube.      CTA NECK:    AORTIC ARCH AND VISUALIZED GREAT VESSELS: Within normal limits.    RIGHT:  COMMON CAROTID ARTERY: Normal in course and caliber tothe carotid   bifurcation.  INTERNAL CAROTID ARTERY: No significant stenosis based on NASCET   criteria. Normal course and caliber to the intracranial circulation.  VERTEBRAL ARTERY: Originates from the right subclavian artery. Normal in   course andcaliber to the intracranial circulation.    LEFT:  COMMON CAROTID ARTERY: Normal in course and caliber to the carotid   bifurcation.  INTERNAL CAROTID ARTERY: No significant stenosis based on NASCET   criteria. Normal course and caliber to the intracranial circulation.  VERTEBRAL ARTERY: Originates from the left subclavian artery. Normal in   course and caliber to the intracranial circulation.    VISUALIZED LUNGS: Partially visualized left upper and lower lobe   opacities.    MISCELLANEOUS: ET tube with tip in the trachea above the norm. 2.5 cm   heterogeneous left thyroid nodule.    CAROTID STENOSIS REFERENCE: (NASCET = 100x1-(N/D)). N=greatest narrowing.   D=normal distal diameter - MILD = <50% stenosis. - MODERATE = 50-69%   stenosis. - SEVERE = 70-89% stenosis. - HAIRLINE/CRITICAL = 90-99%   stenosis. - OCCLUDED = 100% stenosis.      CTA HEAD:    INTERNAL CAROTID ARTERIES: Bilateral petrous, precavernous, cavernous,   and supraclinoid regions are patent without significant stenosis.  Irregular outpouching projecting posterolaterally from the supraclinoid   segment of the brain ICA (3:380-387) has approximate measurements of 2.9   x 2.4 x 2.6 mm (transverse by AP by craniocaudad) with a narrow neck   measuring about 2 mm.  No additional aneurysms identified.    ANTERIOR CEREBRAL ARTERIES: No significant stenosis or occlusion. No   evidence of aneurysm.  MIDDLE CEREBRAL ARTERIES: No significant stenosis or occlusion. No   evidence of aneurysm.  POSTERIOR CEREBRAL ARTERIES: Nosignificant stenosis or occlusion. No   evidence of aneurysm.    DISTAL VERTEBRAL / BASILAR ARTERIES: No significant stenosis or occlusion.    VENOUS STRUCTURES: Visualized superficial and deep venous structures   appear patent.    MISCELLANEOUS: No other vascular abnormality is seen. No evidence of   vasospasm at this time.      IMPRESSION:    CT HEAD:  Extensive subarachnoid hemorrhage throughout the basilar cisterns,   bilateral sylvian fissures, and along the bilateral medial frontal lobes,   right greater than left. Prominent temporal horns of the lateral   ventricles.    CTA HEAD:  Irregular posterolaterally projecting 2.9 x 2.4 x 2.6 mm right   supraclinoid ICA aneurysm, likely ruptured. No additional aneurysms   identified. No vessel occlusion, proximal stenosis or evidence for   vasospasm at this time.    CTA NECK:  No evidence of significant stenosis or occlusion. No evidence of   dissection.    2.5 cm heterogeneous left thyroid nodule. This can be followed up with   nonemergentthyroid ultrasound for further characterization.    Findings were discussed by Dr. Chamberlain with PEDRO Husain on 3/26/2024 at 1:35   AM with read back confirmation.    --- End of Report ---           FIGUEROA CHAMBERLAIN MD; Resident Radiologist  This documenthas been electronically signed.  CASA HARRISON MD; Attending Radiologist  This document has been    < end of copied text >  < from: TTE W or WO Ultrasound Enhancing Agent (03.26.24 @ 06:22) >    TRANSTHORACIC ECHOCARDIOGRAM REPORT  ________________________________________________________________________________                                      _______       Pt. Name:       JOSE MCLEAN    Study Date:    3/26/2024  MRN:            [id number]        YOB: 1965  Accession #:    [id number]         Age:           58 years  Account#:       [id number]      Gender:        F  Heart Rate:     57 bpm            Height:        62.00 in (157.48 cm)  Rhythm:         sinus bradycardia Weight:        157.00 lb (71.22 kg)  Blood Pressure: 102/62 mmHg       BSA/BMI:       1.72 m² / 28.72 kg/m²  ________________________________________________________________________________________  Referring Physician:    [id number] Perlita Varela  Interpreting Physician: Roger Alford M.D.  Primary Sonographer:    Aliza Grande [facility]    CPT:                ECHO TTE WITH CON COMP W DOPP - .m;DEFINITY ECHO                      CONTRAST PER ML - .m;DEFINITY ECHO CONTRAST PER ML                     WASTED - .m  Indication(s):      Nontraumatic subarachnoid hemorrhage, unspecified - I60.9  Procedure:          Transthoracic echocardiogram with 2-D, M-mode and complete                      spectral and color flow Doppler.  [place]  [facility] [place] Status:   [place]  Contrast Injection: Verbal consent was obtained for injection of Ultrasonic                      Enhancing Agent following a discussion of risks and                      benefits.        Endocardial visualization enhanced with 2 ml of Definity                      Ultrasound enhancing agent (Lot#:[id number] Exp.Date:01/2025                      Discarded Dose:8ml).  UEA Reaction:       Patient had no adverse reaction after injection of                      Ultrasound Enhancing Agent.  Study Information:  Image quality for this study is fair.    _______________________________________________________________________________________     CONCLUSIONS:      1. Left ventricular systolic function is moderately to severely decreased with an ejection fraction of 36 % by Mcgee's method of disks. Regional wall motion abnormalities consistent with ischemic heart disease.   2. Multiple segmental abnormalities exist. See findings.   3. There is moderate (grade 2) left ventricular diastolic dysfunction, with elevated filling pressure.   4. Normal right ventricular cavity size, with normal wall thickness, and normal systolic function. Tricuspid annular plane systolic excursion (TAPSE) is 1.7 cm (normal >=1.7 cm).   5. Normal left and right atrial size.   6. No significant valvular disease.   7. Estimated pulmonary artery systolic pressure is 41 mmHg.   8. The inferior vena cava is normal in size measuring 1.98 cm in diameter, (normal <2.1cm) with abnormal inspiratory collapse (abnormal <50%) consistent with mildly elevated right atrial pressure (~8, range 5-10mmHg).   9. No pericardial effusion seen.  10. No prior echocardiogram is available for comparison.  11. There is no evidence of a left ventricular thrombus.  12. There is increased LV mass and concentric hypertrophy.    ________________________________________________________________________________________  FINDINGS:     Left Ventricle:  Left ventricular systolic function is moderately to severely decreased with a calculated ejection fraction of 36 % by the Mcgee's biplane method of disks. There are regional wall motion abnormalities consistent with ischemic heart disease. There is moderate (grade 2) left ventricular diastolic dysfunction, with elevated filling pressure. There is increased LV mass and concentric hypertrophy. There is no evidence of a left ventricular thrombus.  LV Wall Scoring: The mid anteroseptal segment is aneurysmal. The mid and apical  anterior wall, mid and apical inferior septum, mid inferolateral segment, mid  anterolateral segment, apical lateral segment, and apical inferior segment are  akinetic. The basal anteroseptal segment, basal inferoseptal segment, and mid  inferior segment are hypokinetic. All remaining scored segments are normal.            < end of copied text >    OTHER: 	  	  LABS:	 	    CARDIAC MARKERS:                                  12.3   15.24 )-----------( 275      ( 26 Mar 2024 16:20 )             37.2     03-26    141  |  109<H>  |  10  ----------------------------<  173<H>  3.7   |  18<L>  |  0.54    Ca    9.8      26 Mar 2024 16:20  Phos  1.9     03-26  Mg     2.1     03-26    TPro  6.7  /  Alb  3.8  /  TBili  0.3  /  DBili  <0.1  /  AST  35  /  ALT  20  /  AlkPhos  109  03-26    proBNP:   Lipid Profile:   HgA1c:   TSH: Thyroid Stimulating Hormone, Serum: 1.62 uIU/mL (03-26 @ 07:53)  Thyroid Stimulating Hormone, Serum: 1.87 uIU/mL (03-26 @ 04:29)  Thyroid Stimulating Hormone, Serum: 2.40 uIU/mL (03-26 @ 01:40)             CHIEF COMPLAINT:    HISTORY OF PRESENT ILLNESS: 58F, on ASA81 for pain relief? 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 Was initially found unresponsive at 21:00, intubated at 23:00 at OSH, then txfrd. Given ddavp at OSH. On arrival patient w/ PERRL, +cough/gag, 2/5 spontaneous movement in LUE, o/w no movement. 1 unit PLTs given and R frontal EVD placed-high pressure. PLTs/Coags wnl.     Intubated sedated in NSCU   Echo with abnormal findings of severe cardiomyopathy and RWMA    PAST MEDICAL & SURGICAL HISTORY:          MEDICATIONS:  niCARdipine Infusion 5 mG/Hr IV Continuous <Continuous>  niMODipine Oral Solution 60 milliGRAM(s) Oral every 4 hours    ceFAZolin   IVPB 2000 milliGRAM(s) IV Intermittent every 8 hours      acetaminophen     Tablet .. 650 milliGRAM(s) Oral every 6 hours PRN  acetaminophen   IVPB .. 1000 milliGRAM(s) IV Intermittent every 6 hours PRN  levETIRAcetam  IVPB 500 milliGRAM(s) IV Intermittent two times a day  oxyCODONE    IR 5 milliGRAM(s) Oral every 4 hours PRN  propofol Infusion 10 MICROgram(s)/kG/Min IV Continuous <Continuous>    pantoprazole  Injectable 40 milliGRAM(s) IV Push daily  polyethylene glycol 3350 17 Gram(s) Oral at bedtime  senna 2 Tablet(s) Oral at bedtime    dexAMETHasone     Tablet 4 milliGRAM(s) Oral every 6 hours  insulin lispro (ADMELOG) corrective regimen sliding scale   SubCutaneous Before meals and at bedtime    chlorhexidine 0.12% Liquid 15 milliLiter(s) Oral Mucosa every 12 hours  potassium chloride   Solution 40 milliEquivalent(s) Oral once  potassium chloride  10 mEq/100 mL IVPB 10 milliEquivalent(s) IV Intermittent every 1 hour  potassium phosphate / sodium phosphate Powder (PHOS-NaK) 2 Packet(s) Oral once  potassium phosphate IVPB 30 milliMole(s) IV Intermittent once  sodium chloride 2% + sodium acetate 50:50 1000 milliLiter(s) IV Continuous <Continuous>      FAMILY HISTORY:      SOCIAL HISTORY:    [ ] Non-smoker  [ ] Smoker  [ ] Alcohol    Allergies    No Known Allergies    Intolerances    	    REVIEW OF SYSTEMS:    [XX ] All others negative	  [ ] Unable to obtain    PHYSICAL EXAM:  T(C): 36.1 (03-26-24 @ 15:00), Max: 37.2 (03-26-24 @ 01:15)  HR: 69 (03-26-24 @ 15:15) (55 - 98)  BP: 75/47 (03-26-24 @ 07:54) (75/47 - 149/90)  RR: 14 (03-26-24 @ 14:30) (0 - 37)  SpO2: 97% (03-26-24 @ 15:15) (96% - 100%)  Wt(kg): --  I&O's Summary    25 Mar 2024 07:01  -  26 Mar 2024 07:00  --------------------------------------------------------  IN: 1666.8 mL / OUT: 427 mL / NET: 1239.8 mL    26 Mar 2024 07:01  -  26 Mar 2024 17:10  --------------------------------------------------------  IN: 102.7 mL / OUT: 15 mL / NET: 87.7 mL        Appearance: Intubated  R frontal EVD  HEENT:  dry oral mucosa, PERRL, EOMI	  Lymphatic: No lymphadenopathy  Cardiovascular: Normal S1 S2, No JVD, No murmurs, No edema  Respiratory: ventilated   Psychiatry: A & O x 0  Gastrointestinal:  Soft, Non-tender, + BS	  Skin: No rashes, No ecchymoses, No cyanosis	  Neurologic: perrl, BUE localized, LE wd bilateral   Extremities: Normal range of motion, No clubbing, cyanosis or edema  Vascular: Peripheral pulses palpable 2+ bilaterally    TELEMETRY: 	SR     ECG:  	NSR Anterolateral TWI  RADIOLOGY:  < from: CT Head No Cont (03.26.24 @ 05:15) >    ACC: [id number] EXAM:  CT BRAIN   ORDERED BY: TOMMY HEWITT     PROCEDURE DATE:  03/26/2024          INTERPRETATION:  .    CLINICAL INFORMATION: Subarachnoid hemorrhage status post   extraventricular drain placement.    TECHNIQUE: Multiple axial CT images of the head were obtained without   contrast. Sagittal and coronal reconstructed images were acquired from   the source data.    COMPARISON: Most recent prior CT study of the head from 3/26/2024 at 3:02   AM.    FINDINGS: Again seen is a right frontal approach ventriculostomy catheter   with the catheter position unchanged in comparison to the recent CT   study. Ventricular size and configuration is also grossly unchanged and   remains mildly dilated.    There is redemonstration of diffuse subarachnoid hemorrhage within the   basal cisterns and the surrounding fissures. Intraventricular extension   of hemorrhage is again seen. The overall degree of hemorrhage appears   similar in comparison to the recent CT examination.    There is no shift of the midline structures or herniation.    The paranasal sinuses and tympanomastoid cavities are clear. The orbits   appear unremarkable.    IMPRESSION: Stable follow-up CT exam.    --- End of Report ---            KAYLEY BLAKE MD; Attending Radiologist  This document has been electronically signed. Mar 26 2024  7:44AM    < end of copied text >  < from: CT Angio Neck w/ IV Cont (03.26.24 @ 00:57) >    ACC: [id number] EXAM:  CT ANGIO BRAIN (W)AW IC   ORDERED BY:  ABHISHEK RIOJAS     ACC: [id number] EXAM:  CT ANGIO NECK (W)AW IC   ORDERED BY:  ABHISHEK RIOJAS     ACC: [id number] EXAM:  CT BRAIN   ORDERED BY:  ABHISHEK RIOJAS     PROCEDURE DATE:  03/26/2024          INTERPRETATION:  CLINICAL INFORMATION: Atraumatic subarachnoid hemorrhage   status post transfer from outside hospital..    COMPARISON: None.    CONTRAST:  IV Contrast: NONE (accession [id number]), IV contrast documented in   unlinked concurrent exam (accession [id number]), Omnipaque 300 (accession   [id number])  70 cc administered  30 cc discarded  Complications: None reported at time of study completion    TECHNIQUE:  CT BRAIN: Serial axial images were obtained from the skull base to the   vertex without the use of contrast.    CTA NECK/HEAD: After the intravenous power injection of non-ionic   contrast material, serial thin sections were obtained through the   cervical and intracranial circulation on a multislice CT scanner. Axial,   Coronal and Sagittal MIP reformatted images were obtained. 3D   reconstruction was also performed.    FINDINGS:    CT BRAIN:    VENTRICLES AND SULCI: Normal in size and configuration.  INTRA-AXIAL: No intracranial mass, acute hemorrhage, or midline shift.  EXTRA-AXIAL: Extensive subarachnoid hemorrhage throughout the basilar   cisterns, bilateral sylvian fissures, and along the bilateral medial   frontal lobes, right greater than left.    VISUALIZED SINUSES:  Clear.  TYMPANOMASTOID CAVITIES:  Clear.  VISUALIZED ORBITS: Normal.  CALVARIUM: Intact.    MISCELLANEOUS: Partially visualized endotracheal tube.      CTA NECK:    AORTIC ARCH AND VISUALIZED GREAT VESSELS: Within normal limits.    RIGHT:  COMMON CAROTID ARTERY: Normal in course and caliber tothe carotid   bifurcation.  INTERNAL CAROTID ARTERY: No significant stenosis based on NASCET   criteria. Normal course and caliber to the intracranial circulation.  VERTEBRAL ARTERY: Originates from the right subclavian artery. Normal in   course andcaliber to the intracranial circulation.    LEFT:  COMMON CAROTID ARTERY: Normal in course and caliber to the carotid   bifurcation.  INTERNAL CAROTID ARTERY: No significant stenosis based on NASCET   criteria. Normal course and caliber to the intracranial circulation.  VERTEBRAL ARTERY: Originates from the left subclavian artery. Normal in   course and caliber to the intracranial circulation.    VISUALIZED LUNGS: Partially visualized left upper and lower lobe   opacities.    MISCELLANEOUS: ET tube with tip in the trachea above the norm. 2.5 cm   heterogeneous left thyroid nodule.    CAROTID STENOSIS REFERENCE: (NASCET = 100x1-(N/D)). N=greatest narrowing.   D=normal distal diameter - MILD = <50% stenosis. - MODERATE = 50-69%   stenosis. - SEVERE = 70-89% stenosis. - HAIRLINE/CRITICAL = 90-99%   stenosis. - OCCLUDED = 100% stenosis.      CTA HEAD:    INTERNAL CAROTID ARTERIES: Bilateral petrous, precavernous, cavernous,   and supraclinoid regions are patent without significant stenosis.  Irregular outpouching projecting posterolaterally from the supraclinoid   segment of the brain ICA (3:380-387) has approximate measurements of 2.9   x 2.4 x 2.6 mm (transverse by AP by craniocaudad) with a narrow neck   measuring about 2 mm.  No additional aneurysms identified.    ANTERIOR CEREBRAL ARTERIES: No significant stenosis or occlusion. No   evidence of aneurysm.  MIDDLE CEREBRAL ARTERIES: No significant stenosis or occlusion. No   evidence of aneurysm.  POSTERIOR CEREBRAL ARTERIES: Nosignificant stenosis or occlusion. No   evidence of aneurysm.    DISTAL VERTEBRAL / BASILAR ARTERIES: No significant stenosis or occlusion.    VENOUS STRUCTURES: Visualized superficial and deep venous structures   appear patent.    MISCELLANEOUS: No other vascular abnormality is seen. No evidence of   vasospasm at this time.      IMPRESSION:    CT HEAD:  Extensive subarachnoid hemorrhage throughout the basilar cisterns,   bilateral sylvian fissures, and along the bilateral medial frontal lobes,   right greater than left. Prominent temporal horns of the lateral   ventricles.    CTA HEAD:  Irregular posterolaterally projecting 2.9 x 2.4 x 2.6 mm right   supraclinoid ICA aneurysm, likely ruptured. No additional aneurysms   identified. No vessel occlusion, proximal stenosis or evidence for   vasospasm at this time.    CTA NECK:  No evidence of significant stenosis or occlusion. No evidence of   dissection.    2.5 cm heterogeneous left thyroid nodule. This can be followed up with   nonemergentthyroid ultrasound for further characterization.    Findings were discussed by Dr. Chamberlain with PEDRO Husian on 3/26/2024 at 1:35   AM with read back confirmation.    --- End of Report ---           FIGUEROA CHAMBERLAIN MD; Resident Radiologist  This documenthas been electronically signed.  CASA HARRISON MD; Attending Radiologist  This document has been    < end of copied text >  < from: TTE W or WO Ultrasound Enhancing Agent (03.26.24 @ 06:22) >    TRANSTHORACIC ECHOCARDIOGRAM REPORT  ________________________________________________________________________________                                      _______       Pt. Name:       JOSE MCLEAN    Study Date:    3/26/2024  MRN:            [id number]        YOB: 1965  Accession #:    [id number]         Age:           58 years  Account#:       [id number]      Gender:        F  Heart Rate:     57 bpm            Height:        62.00 in (157.48 cm)  Rhythm:         sinus bradycardia Weight:        157.00 lb (71.22 kg)  Blood Pressure: 102/62 mmHg       BSA/BMI:       1.72 m² / 28.72 kg/m²  ________________________________________________________________________________________  Referring Physician:    [id number] Perlita Varela  Interpreting Physician: Roger Alford M.D.  Primary Sonographer:    Aliza Grande JADA    CPT:                ECHO TTE WITH CON COMP W DOPP - .m;DEFINITY ECHO                      CONTRAST PER ML - .m;DEFINITY ECHO CONTRAST PER ML                     WASTED - .m  Indication(s):      Nontraumatic subarachnoid hemorrhage, unspecified - I60.9  Procedure:          Transthoracic echocardiogram with 2-D, M-mode and complete                      spectral and color flow Doppler.  [place]  [facility]  [place]mission Status:   Inpatient  Contrast Injection: Verbal consent was obtained for injection of Ultrasonic                      Enhancing Agent following a discussion of risks and                      benefits.        Endocardial visualization enhanced with 2 ml of Definity                      Ultrasound enhancing agent (Lot#:[id number] Exp.Date:01/2025                      Discarded Dose:8ml).  UEA Reaction:       Patient had no adverse reaction after injection of                      Ultrasound Enhancing Agent.  Study Information:  Image quality for this study is fair.    _______________________________________________________________________________________     CONCLUSIONS:      1. Left ventricular systolic function is moderately to severely decreased with an ejection fraction of 36 % by Mcgee's method of disks. Regional wall motion abnormalities consistent with ischemic heart disease.   2. Multiple segmental abnormalities exist. See findings.   3. There is moderate (grade 2) left ventricular diastolic dysfunction, with elevated filling pressure.   4. Normal right ventricular cavity size, with normal wall thickness, and normal systolic function. Tricuspid annular plane systolic excursion (TAPSE) is 1.7 cm (normal >=1.7 cm).   5. Normal left and right atrial size.   6. No significant valvular disease.   7. Estimated pulmonary artery systolic pressure is 41 mmHg.   8. The inferior vena cava is normal in size measuring 1.98 cm in diameter, (normal <2.1cm) with abnormal inspiratory collapse (abnormal <50%) consistent with mildly elevated right atrial pressure (~8, range 5-10mmHg).   9. No pericardial effusion seen.  10. No prior echocardiogram is available for comparison.  11. There is no evidence of a left ventricular thrombus.  12. There is increased LV mass and concentric hypertrophy.    ________________________________________________________________________________________  FINDINGS:     Left Ventricle:  Left ventricular systolic function is moderately to severely decreased with a calculated ejection fraction of 36 % by the Mcgee's biplane method of disks. There are regional wall motion abnormalities consistent with ischemic heart disease. There is moderate (grade 2) left ventricular diastolic dysfunction, with elevated filling pressure. There is increased LV mass and concentric hypertrophy. There is no evidence of a left ventricular thrombus.  LV Wall Scoring: The mid anteroseptal segment is aneurysmal. The mid and apical  anterior wall, mid and apical inferior septum, mid inferolateral segment, mid  anterolateral segment, apical lateral segment, and apical inferior segment are  akinetic. The basal anteroseptal segment, basal inferoseptal segment, and mid  inferior segment are hypokinetic. All remaining scored segments are normal.            < end of copied text >    OTHER: 	  	  LABS:	 	    CARDIAC MARKERS:                                  12.3   15.24 )-----------( 275      ( 26 Mar 2024 16:20 )             37.2     03-26    141  |  109<H>  |  10  ----------------------------<  173<H>  3.7   |  18<L>  |  0.54    Ca    9.8      26 Mar 2024 16:20  Phos  1.9     03-26  Mg     2.1     03-26    TPro  6.7  /  Alb  3.8  /  TBili  0.3  /  DBili  <0.1  /  AST  35  /  ALT  20  /  AlkPhos  109  03-26    proBNP:   Lipid Profile:   HgA1c:   TSH: Thyroid Stimulating Hormone, Serum: 1.62 uIU/mL (03-26 @ 07:53)  Thyroid Stimulating Hormone, Serum: 1.87 uIU/mL (03-26 @ 04:29)  Thyroid Stimulating Hormone, Serum: 2.40 uIU/mL (03-26 @ 01:40)

## 2024-03-26 NOTE — ED PROVIDER NOTE - ATTENDING CONTRIBUTION TO CARE
I, Dr. Lisa Junior, have personally performed a face to face medical and diagnostic evaluation of the patient. I have discussed with and reviewed the Resident's and/or ACP's and/or Medical/PA/NP student's note and agree with the History, ROS, Physical Exam and MDM unless otherwise indicated. A brief summary of my personal evaluation and impression can be found below.     HPI: 59yo F with pmh migraine transferred from [place] for atraumatic subarachnoid head bleed. Pt arrives intubated, no ROS possible. Per hx pt was feeling unwell and was lying down with her son with her when she became unresponsive. Intubated for low mental status at [place] and found to have subarachnoid. EMS reports that pt had no falls or other specific complaints prior to event. Neurosx at bedside.     Patient on propofol for sedation. Per EMS was given 400 mcg Fentanyl prior for unclear reason.     PE: Intubation, pupils pinpoint but reactive, sedated, bilateral breath sounds, normal heart sounds. ETT confirmed with direct visualization.     MDM: Patient with spontaneous SAH, intubated for airway protection in the setting of poor mentation. Neurosurgery at bedside, recommending repeat CTH and adding CT angio head/neck. Will take up to NSICU directly from imaging for possible EVD. Will bypass labs to expedite intervention.

## 2024-03-26 NOTE — CHART NOTE - NSCHARTNOTEFT_GEN_A_CORE
:  Zuleyka Lebron    DATE/TIME: 03/26/24. 6pm    INDICATION:    [x] Shock    [x] Acute Hypoxic Respiratory failure    [x ] Other: SAH      PROCEDURE:    [x] LIMITED ECHO    [x] LIMITED CHEST    [ ] LIMITED ABDOMINAL        FINDINGS:  Cardiac:   LV: Grossly normal LV function. Global LV hypomotily LV. LVOT VTI 23cm  RV: Normal RV size. Hyperdynamic   Pericardium: No pericardial effusion.      Pulmonary: B lines throughout L midline and basal. Scattered basal B lines on R. No pleural Effusion     IVC 1.5 cm with respiratory variation      INTERPRETATION:  Grossly Normal cardiac function with LV hypomotility   Scattered B lines mostly on L  Fluid responsive     Images stored on PACS/QPATH

## 2024-03-26 NOTE — PROGRESS NOTE ADULT - CRITICAL CARE ATTENDING COMMENT
patient reexamined off sedation--EO to voice, PERRL, AG/LC BUE no FC with son at bedside, BLE AG  plan as above

## 2024-03-26 NOTE — BRIEF OPERATIVE NOTE - NSICDXBRIEFPROCEDURE_GEN_ALL_CORE_FT
PROCEDURES:  Craniotomy with clip ligation of intracranial aneurysm by petrosal approach 26-Mar-2024 14:56:58  Kathie Box

## 2024-03-26 NOTE — H&P ADULT - HISTORY OF PRESENT ILLNESS
DeliaJie  58F, on ASA81 for pain relief? 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 Was initially found unresponsive at 21:00, intubated at 23:00 at OSH, then txfrd. Given ddavp at OSH. On arrival patient w/ PERRL, +cough/gag, 2/5 spontaneous movement in LUE, o/w no movement. 1 unit PLTs given and R frontal EVD placed-high pressure. PLTs/Coags wnl.       DeilaJie  58F, on ASA81 for pain relief? (fell a month ago), presents as txfr from [place] for diffuse SAH w/small IVH, 2/2 ruptured posterolaterally projecting 2.9x2.4x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 Was initially found unresponsive at 21:00, intubated at 23:00 at OSH, then txfrd. Given ddavp at OSH. On arrival patient w/ PERRL, +cough/gag, 2/5 spontaneous movement in LUE, o/w no movement. 1 unit PLTs given and R frontal EVD placed-high pressure. PLTs/Coags wnl.

## 2024-03-26 NOTE — BRIEF OPERATIVE NOTE - OPERATION/FINDINGS
Pt was taken for urgent clipping of R PComm aneurysm s/p rupture. There were no issues in the case. Patency of non-clipped vessels were confirmed via ICG and doppler. Pt remained intubated and [facility]

## 2024-03-26 NOTE — ED PROVIDER NOTE - CLINICAL SUMMARY MEDICAL DECISION MAKING FREE TEXT BOX
Gualberto PGY3: 59yo F with pmh migraine transferred from [place] for atraumatic subarachnoid head bleed brought on propofol after receiving etomidate/fentanyl per EMS report. Repeat HCT, admit to neurosx.
no

## 2024-03-26 NOTE — PROGRESS NOTE ADULT - SUBJECTIVE AND OBJECTIVE BOX
NSCU ATTENDING -- ADDITIONAL PROGRESS NOTE    Nighttime rounds were performed -- please refer to earlier Progress Note for HPI details.    T(C): 37.9 (03-26-24 @ 19:00), Max: 37.9 (03-26-24 @ 19:00)  HR: 99 (03-26-24 @ 21:21) (55 - 99)  BP: 75/47 (03-26-24 @ 07:54) (75/47 - 149/90)  RR: 25 (03-26-24 @ 20:45) (0 - 37)  SpO2: 98% (03-26-24 @ 21:21) (96% - 100%)  Wt(kg): --    Relevant labwork and imaging reviewed.    MEDICATIONS  (STANDING):  ceFAZolin   IVPB 2000 milliGRAM(s) IV Intermittent every 8 hours  chlorhexidine 0.12% Liquid 15 milliLiter(s) Oral Mucosa every 12 hours  dexAMETHasone     Tablet 4 milliGRAM(s) Oral every 6 hours  insulin lispro (ADMELOG) corrective regimen sliding scale   SubCutaneous Before meals and at bedtime  levETIRAcetam  IVPB 500 milliGRAM(s) IV Intermittent two times a day  niCARdipine Infusion 5 mG/Hr (25 mL/Hr) IV Continuous <Continuous>  niMODipine Oral Solution 60 milliGRAM(s) Oral every 4 hours  pantoprazole  Injectable 40 milliGRAM(s) IV Push daily  polyethylene glycol 3350 17 Gram(s) Oral at bedtime  potassium chloride  10 mEq/100 mL IVPB 10 milliEquivalent(s) IV Intermittent every 1 hour  propofol Infusion 10 MICROgram(s)/kG/Min (4.27 mL/Hr) IV Continuous <Continuous>  senna 2 Tablet(s) Oral at bedtime  sodium chloride 2% + sodium acetate 50:50 1000 milliLiter(s) (75 mL/Hr) IV Continuous <Continuous>    MEDICATIONS  (PRN):  acetaminophen     Tablet .. 650 milliGRAM(s) Oral every 6 hours PRN Temp greater or equal to 38.5C (101.3F), Mild Pain (1 - 3)  acetaminophen   IVPB .. 1000 milliGRAM(s) IV Intermittent every 6 hours PRN Severe Pain (7 - 10)  oxyCODONE    IR 5 milliGRAM(s) Oral every 4 hours PRN Moderate Pain (4 - 6)      CHIEF COMPLAINT:    [A/P] PBD 1 HH4 MF4 SAH, POD 0 craniotomy for R pcomm clip  post op exam LUE ext, LLE TF; RUE LC/AG, RLE spont  can't place vEEG fresh post op due to head wrap  cont keppra for now  CT CTA in am   EVD keep ICP<22, monitor output   -140, nicardipine 7.5  tylenol/oxy prn   transition prop to precedex  monitor for fevers--culture if febrile   d/c 2%, goal normonatremia   tube feeding  LBM PTA, cont bowel regimen   CPAP as tolerated       ATTENDING STATEMENT:    Patient is critically ill, requiring critical care services.     Attending: I have personally and independently provided 30 minutes of critical care services.  This excludes any time spent on separate procedures or teaching.   NSCU ATTENDING -- ADDITIONAL PROGRESS NOTE    Nighttime rounds were performed -- please refer to earlier Progress Note for HPI details.    T(C): 37.9 (03-26-24 @ 19:00), Max: 37.9 (03-26-24 @ 19:00)  HR: 99 (03-26-24 @ 21:21) (55 - 99)  BP: 75/47 (03-26-24 @ 07:54) (75/47 - 149/90)  RR: 25 (03-26-24 @ 20:45) (0 - 37)  SpO2: 98% (03-26-24 @ 21:21) (96% - 100%)  Wt(kg): --    Relevant labwork and imaging reviewed.    MEDICATIONS  (STANDING):  ceFAZolin   IVPB 2000 milliGRAM(s) IV Intermittent every 8 hours  chlorhexidine 0.12% Liquid 15 milliLiter(s) Oral Mucosa every 12 hours  dexAMETHasone     Tablet 4 milliGRAM(s) Oral every 6 hours  insulin lispro (ADMELOG) corrective regimen sliding scale   SubCutaneous Before meals and at bedtime  levETIRAcetam  IVPB 500 milliGRAM(s) IV Intermittent two times a day  niCARdipine Infusion 5 mG/Hr (25 mL/Hr) IV Continuous <Continuous>  niMODipine Oral Solution 60 milliGRAM(s) Oral every 4 hours  pantoprazole  Injectable 40 milliGRAM(s) IV Push daily  polyethylene glycol 3350 17 Gram(s) Oral at bedtime  potassium chloride  10 mEq/100 mL IVPB 10 milliEquivalent(s) IV Intermittent every 1 hour  propofol Infusion 10 MICROgram(s)/kG/Min (4.27 mL/Hr) IV Continuous <Continuous>  senna 2 Tablet(s) Oral at bedtime  sodium chloride 2% + sodium acetate 50:50 1000 milliLiter(s) (75 mL/Hr) IV Continuous <Continuous>    MEDICATIONS  (PRN):  acetaminophen     Tablet .. 650 milliGRAM(s) Oral every 6 hours PRN Temp greater or equal to 38.5C (101.3F), Mild Pain (1 - 3)  acetaminophen   IVPB .. 1000 milliGRAM(s) IV Intermittent every 6 hours PRN Severe Pain (7 - 10)  oxyCODONE    IR 5 milliGRAM(s) Oral every 4 hours PRN Moderate Pain (4 - 6)      CHIEF COMPLAINT:    [A/P] PBD 1 HH4 MF4 SAH, POD 0 craniotomy for R pcomm clip  post op exam LUE ext, LLE TF; RUE LC/AG, RLE spont  can't place vEEG fresh post op due to head wrap  cont keppra for now  CT CTA in am   EVD keep ICP<22, monitor output   -140, nicardipine 7.5  tylenol/oxy prn   transition prop to precedex  monitor for fevers--culture if febrile   d/c 2%, goal normonatremia   tube feeding  LBM PTA, cont bowel regimen   CPAP as tolerated   TTE  1. Left ventricular systolic function is moderately to severely decreased with an ejection fraction of 36 % by Mcgee's method of disks. Regional wall motion abnormalities consistent with ischemic heart disease.   2. Multiple segmental abnormalities exist. See findings.   3. There is moderate (grade 2) left ventricular diastolic dysfunction, with elevated filling pressure.   4. Normal right ventricular cavity size, with normal wall thickness, and normal systolic function. Tricuspid annular plane systolic excursion (TAPSE) is 1.7 cm (normal >=1.7 cm).   5. Normal left and right atrial size.   6. No significant valvular disease.   7. Estimated pulmonary artery systolic pressure is 41 mmHg.   8. The inferior vena cava is normal in size measuring 1.98 cm in diameter, (normal <2.1cm) with abnormal inspiratory collapse (abnormal <50%) consistent with mildly elevated right atrial pressure (~8, range 5-10mmHg).   9. No pericardial effusion seen.  10. No prior echocardiogram is available for comparison.  11. There is no evidence of a left ventricular thrombus.  12. There is increased LV mass and concentric hypertrophy.      ATTENDING STATEMENT:    Patient is critically ill, requiring critical care services.     Attending: I have personally and independently provided 30 minutes of critical care services.  This excludes any time spent on separate procedures or teaching.   NSCU ATTENDING -- ADDITIONAL PROGRESS NOTE    Nighttime rounds were performed -- please refer to earlier Progress Note for HPI details.    T(C): 37.9 (03-26-24 @ 19:00), Max: 37.9 (03-26-24 @ 19:00)  HR: 99 (03-26-24 @ 21:21) (55 - 99)  BP: 75/47 (03-26-24 @ 07:54) (75/47 - 149/90)  RR: 25 (03-26-24 @ 20:45) (0 - 37)  SpO2: 98% (03-26-24 @ 21:21) (96% - 100%)  Wt(kg): --    Relevant labwork and imaging reviewed.    MEDICATIONS  (STANDING):  ceFAZolin   IVPB 2000 milliGRAM(s) IV Intermittent every 8 hours  chlorhexidine 0.12% Liquid 15 milliLiter(s) Oral Mucosa every 12 hours  dexAMETHasone     Tablet 4 milliGRAM(s) Oral every 6 hours  insulin lispro (ADMELOG) corrective regimen sliding scale   SubCutaneous Before meals and at bedtime  levETIRAcetam  IVPB 500 milliGRAM(s) IV Intermittent two times a day  niCARdipine Infusion 5 mG/Hr (25 mL/Hr) IV Continuous <Continuous>  niMODipine Oral Solution 60 milliGRAM(s) Oral every 4 hours  pantoprazole  Injectable 40 milliGRAM(s) IV Push daily  polyethylene glycol 3350 17 Gram(s) Oral at bedtime  potassium chloride  10 mEq/100 mL IVPB 10 milliEquivalent(s) IV Intermittent every 1 hour  propofol Infusion 10 MICROgram(s)/kG/Min (4.27 mL/Hr) IV Continuous <Continuous>  senna 2 Tablet(s) Oral at bedtime  sodium chloride 2% + sodium acetate 50:50 1000 milliLiter(s) (75 mL/Hr) IV Continuous <Continuous>    MEDICATIONS  (PRN):  acetaminophen     Tablet .. 650 milliGRAM(s) Oral every 6 hours PRN Temp greater or equal to 38.5C (101.3F), Mild Pain (1 - 3)  acetaminophen   IVPB .. 1000 milliGRAM(s) IV Intermittent every 6 hours PRN Severe Pain (7 - 10)  oxyCODONE    IR 5 milliGRAM(s) Oral every 4 hours PRN Moderate Pain (4 - 6)      CHIEF COMPLAINT:    [A/P] PBD 1 HH4 MF4 SAH, POD 0 craniotomy for R pcomm clip  post op exam LUE ext, LLE TF; RUE LC/AG, RLE spont  can't place vEEG fresh post op due to head wrap  cont keppra for now  CT CTA in am   EVD keep ICP<22, monitor output   -140, nicardipine 7.5  tylenol/oxy prn   transition prop to precedex  monitor for fevers--culture if febrile   d/c 2%, goal normonatremia   tube feeding--glucerna   VERONICA maintain euglycemia   LBM PTA, cont bowel regimen   CPAP as tolerated   TTE  1. Left ventricular systolic function is moderately to severely decreased with an ejection fraction of 36 % by Mcgee's method of disks. Regional wall motion abnormalities consistent with ischemic heart disease.   2. Multiple segmental abnormalities exist. See findings.   3. There is moderate (grade 2) left ventricular diastolic dysfunction, with elevated filling pressure.   4. Normal right ventricular cavity size, with normal wall thickness, and normal systolic function. Tricuspid annular plane systolic excursion (TAPSE) is 1.7 cm (normal >=1.7 cm).   5. Normal left and right atrial size.   6. No significant valvular disease.   7. Estimated pulmonary artery systolic pressure is 41 mmHg.   8. The inferior vena cava is normal in size measuring 1.98 cm in diameter, (normal <2.1cm) with abnormal inspiratory collapse (abnormal <50%) consistent with mildly elevated right atrial pressure (~8, range 5-10mmHg).   9. No pericardial effusion seen.  10. No prior echocardiogram is available for comparison.  11. There is no evidence of a left ventricular thrombus.  12. There is increased LV mass and concentric hypertrophy.      ATTENDING STATEMENT:    Patient is critically ill, requiring critical care services.     Attending: I have personally and independently provided 30 minutes of critical care services.  This excludes any time spent on separate procedures or teaching.

## 2024-03-26 NOTE — PROGRESS NOTE ADULT - SUBJECTIVE AND OBJECTIVE BOX
HPI   58F, on ASA81,fell a month ago, transferred from [place] for SAH HH4 WNFS 5 mF4 ruptured posterolaterally projecting 2.9x2.4x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 Was initially found unresponsive at 21:00, intubated at 23:00 at OSH, then txfrd. Given ddavp at OSH. On arrival patient w/ PERRL, +cough/gag, 2/5 spontaneous movement in LUE, o/w no movement. 1 unit PLTs given and R frontal EVD placed-high pressure. PLTs/Coags wnl.     Admission scores:   SAH R pcomm ruptured @ HH4 WNFS 5 mF4, GCS <7    24h events   admitted to nsicu, sp evd placement, hypotensive received 2L ivf with CM and EF 30%, now at OR     Exam   intubated, perrl, BUE localized, LE wd bilateral     VITALS:   Vital Signs Last 24 Hrs  T(C): 37.2 (26 Mar 2024 07:54), Max: 37.2 (26 Mar 2024 01:15)  T(F): 98.9 (26 Mar 2024 03:00), Max: 99 (26 Mar 2024 01:15)  HR: 70 (26 Mar 2024 08:45) (55 - 98)  BP: 75/47 (26 Mar 2024 07:54) (75/47 - 149/90)  BP(mean): 57 (26 Mar 2024 07:54) (57 - 114)  RR: 20 (26 Mar 2024 08:45) (0 - 37)  SpO2: 100% (26 Mar 2024 08:45) (97% - 100%)    Parameters below as of 26 Mar 2024 01:02  Patient On (Oxygen Delivery Method): ventilator      Mode: standby,Pt went to the OR     CAPILLARY BLOOD GLUCOSE      POCT Blood Glucose.: 128 mg/dL (26 Mar 2024 06:37)    I&O's Summary    25 Mar 2024 07:01  -  26 Mar 2024 07:00  --------------------------------------------------------  IN: 1666.8 mL / OUT: 427 mL / NET: 1239.8 mL    26 Mar 2024 07:01  -  26 Mar 2024 09:22  --------------------------------------------------------  IN: 102.7 mL / OUT: 15 mL / NET: 87.7 mL        Respiratory:  Mode: standby,Pt went to the OR     ABG - ( 26 Mar 2024 04:01 )  pH, Arterial: 7.36  pH, Blood: x     /  pCO2: 39    /  pO2: 207   / HCO3: 22    / Base Excess: -3.2  /  SaO2: 99.1                LABS:                        11.4   16.10 )-----------( 214      ( 26 Mar 2024 01:34 )             35.0     03-26    137  |  106  |  14  ----------------------------<  145<H>  4.1   |  17<L>  |  0.50        MEDICATION LEVELS:     IVF FLUIDS/MEDICATIONS:   MEDICATIONS  (STANDING):  chlorhexidine 0.12% Liquid 15 milliLiter(s) Oral Mucosa every 12 hours  fosaprepitant IVPB 150 milliGRAM(s) IV Intermittent once  insulin lispro (ADMELOG) corrective regimen sliding scale   SubCutaneous Before meals and at bedtime  levETIRAcetam   Injectable 500 milliGRAM(s) IV Push every 12 hours  niMODipine Oral Solution 60 milliGRAM(s) Oral every 4 hours  norepinephrine Infusion 0.05 MICROgram(s)/kG/Min (6.68 mL/Hr) IV Continuous <Continuous>  polyethylene glycol 3350 17 Gram(s) Oral at bedtime  potassium chloride   Solution 40 milliEquivalent(s) Oral every 4 hours  potassium chloride  10 mEq/100 mL IVPB 10 milliEquivalent(s) IV Intermittent every 1 hour  propofol Infusion 10 MICROgram(s)/kG/Min (4.2 mL/Hr) IV Continuous <Continuous>  senna 2 Tablet(s) Oral at bedtime  sodium chloride 2% + sodium acetate 50:50 1000 milliLiter(s) (75 mL/Hr) IV Continuous <Continuous>    MEDICATIONS  (PRN):  acetaminophen     Tablet .. 650 milliGRAM(s) Oral every 6 hours PRN Temp greater or equal to 38.5C (101.3F), Mild Pain (1 - 3)  ondansetron Injectable 4 milliGRAM(s) IV Push every 6 hours PRN Nausea and/or Vomiting  oxyCODONE    IR 5 milliGRAM(s) Oral every 4 hours PRN Moderate Pain (4 - 6)  oxyCODONE    IR 10 milliGRAM(s) Oral every 4 hours PRN Severe Pain (7 - 10)

## 2024-03-26 NOTE — CHART NOTE - NSCHARTNOTEFT_GEN_A_CORE
:  Zuleyka Lebron    DATE/TIME: 03/26/24, 6pm    INDICATION:    [x] ICP monitoring    [x] SAH     [ ] TBI    [ ] Other:       PROCEDURE:    [x] LIMITED OPHTHALMIC     [ ] OTHER      FINDINGS:  OS ONSD (cm)  0.68  0.51  Papilledema     OD ONSD (cm)   0.65  Papilledema     INTERPRETATION:  Baseline ONSD high with bilateral papilledema.   EVD not functioning at the time of the study.     Images stored on PACS/And[facility] :  Zuleyka Lebron    DATE/TIME: 03/26/24, 6pm    INDICATION:    [x] ICP monitoring    [x] SAH     [ ] TBI    [ ] Other:       PROCEDURE:    [x] LIMITED OPHTHALMIC     [ ] OTHER      FINDINGS:  OS ONSD (cm)  0.68  0.51  mean 0.6  Papilledema     OD ONSD (cm)   0.65  Papilledema     INTERPRETATION:  Baseline ONSD high with bilateral papilledema consistent with possibility of high ICP.   EVD not functioning at the time of the study.     Images stored on PACS/[employer]

## 2024-03-26 NOTE — ED ADULT NURSE NOTE - NSFALLHARMRISKINTERV_ED_ALL_ED
Assistance OOB with selected safe patient handling equipment if applicable/Communicate risk of Fall with Harm to all staff, patient, and family/Encourage patient to sit up slowly, dangle for a short time, stand at bedside before walking/Provide visual cue: red socks, yellow wristband, yellow gown, etc/Reinforce activity limits and safety measures with patient and family/Review medications for side effects contributing to fall risk/Toileting schedule using arm’s reach rule for commode and bathroom/Bed in lowest position, wheels locked, appropriate side rails in place/Call bell, personal items and telephone in reach/Instruct patient to call for assistance before getting out of bed/chair/stretcher/Non-slip footwear applied when patient is off stretcher/[place] to call system/Physically safe environment - no spills, clutter or unnecessary equipment/Purposeful Proactive Rounding/Room/bathroom lighting operational, light cord in reach

## 2024-03-26 NOTE — PROGRESS NOTE ADULT - ASSESSMENT
ASSESSMENT/PLAN: 59y/o F S/P HH MF SAH, post-bleed day 0    NEURO:  Neuro checks q1hrs/ vitals q1 hrs  CT Head- diffuse SAH  CTA Head- Irregular posterolaterally projecting 2.9 x 2.4 x 2.6 mm right supraclinoid ICA aneurysm,  Treatment: OR vs. IR for aneurysm treatment per Dr. Marley  Seizure Prophylaxis: Levetiracetam 500 BID x 7 days  Delayed Cerebral Ischemia Management: Serial screening TCDs, euvolemia, nimodipine 60mg q 4hrs  Hydrocephalus: EVD at [id number] ICP stable  Pain: PRN analgesics  Sedation: Propofol  Activity: [] OOB as tolerated [x] Bedrest [] PT [] OT [] PMNR  Stroke Core Measures:  A1c- p  lipid- p      PULM:  16/450/60/7  Mode: AC/ CMV (Assist Control/ Continuous Mandatory Ventilation)      CV:  SBP goal 100-140   on monitor   TTE-p    RENAL:  Fluids: NS @75  Normonatremic  Euvolemic    GI:  Diet: NGT- NPO  GI prophylaxis [] not indicated [x] PPI [] other:  Bowel regimen [x] senna [x] Miralax:  Last BM: PTA    ENDO:   Goal euglycemia (-180)    HEME/ONC:  VTE prophylaxis: [x] SCDs [] chemoprophylaxis [x] hold chemoprophylaxis due to: Post bleed day 0 [] high risk of DVT/PE on admission due to:  LED-p    ID:  afebrile    MISC:    SOCIAL/FAMILY:  [] awaiting [x] updated at bedside [] family meeting    CODE STATUS:  [x] Full Code [] DNR [] DNI [] Palliative/Comfort Care    DISPOSITION:  [x] ICU [] Stroke Unit [] Floor [] EMU [] RCU [] PCU    [x] Patient is at high risk of neurologic deterioration/death due to:  ASSESSMENT/PLAN: 57y/o F S/P HH4 MF4 SAH, post-bleed day 0    NEURO:  Neuro checks q1hrs/ vitals q1 hrs  CT Head- diffuse SAH  CTA Head- Irregular posterolaterally projecting 2.9 x 2.4 x 2.6 mm right supraclinoid ICA aneurysm,  Treatment: OR vs. IR for aneurysm treatment per Dr. Varela  Seizure Prophylaxis: Levetiracetam 500 BID x 7 days  Delayed Cerebral Ischemia Management: Serial screening TCDs, euvolemia, nimodipine 60mg q 4hrs  Hydrocephalus: EVD at [id number] ICP stable  Pain: PRN analgesics  Sedation: Propofol  Activity: [] OOB as tolerated [x] Bedrest [] PT [] OT [] PMNR  Stroke Core Measures:  A1c- p  lipid- p      PULM:  16/450/40/5  Mode: AC/ CMV (Assist Control/ Continuous Mandatory Ventilation)      CV:  SBP goal 100-140   on monitor   TTE-p    RENAL:  Fluids: NS @75  Normonatremic  Euvolemic    GI:  Diet: NGT- NPO  GI prophylaxis [] not indicated [x] PPI [] other:  Bowel regimen [x] senna [x] Miralax:  Last BM: PTA    ENDO:   Goal euglycemia (-180)    HEME/ONC:  VTE prophylaxis: [x] SCDs [] chemoprophylaxis [x] hold chemoprophylaxis due to: Post bleed day 0 [] high risk of DVT/PE on admission due to:  LED-p    ID:  afebrile    MISC:    SOCIAL/FAMILY:  [] awaiting [x] updated at bedside [] family meeting    CODE STATUS:  [x] Full Code [] DNR [] DNI [] Palliative/Comfort Care    DISPOSITION:  [x] ICU [] Stroke Unit [] Floor [] EMU [] RCU [] PCU    [x] Patient is at high risk of neurologic deterioration/death due to:  ASSESSMENT/PLAN: 57y/o F S/P HH4 MF4 SAH, post-bleed day 1    NEURO:  Neuro checks q1hrs/ vitals q1 hrs  CT Head- diffuse SAH  CTA Head- Irregular posterolaterally projecting 2.9 x 2.4 x 2.6 mm right supraclinoid ICA aneurysm  Treatment: OR vs. IR for aneurysm treatment   Seizure Prophylaxis: Levetiracetam 500 BID till aneurysm treated, no seizures reported pre admission  Delayed Cerebral Ischemia Management: Serial screening TCDs, euvolemia, nimodipine 60mg q 4hrs  Hydrocephalus: EVD at [id number] ICP keep <22, monitor output  Pain: PRN analgesics  Sedation: Propofol transition to precedex  Activity: [] OOB as tolerated [x] Bedrest [] PT [] OT [] PMNR  Stroke Core Measures:  A1c- p  lipid- p      PULM:  16/450/40/5  Mode: AC/ CMV (Assist Control/ Continuous Mandatory Ventilation)      CV:  SBP goal 100-140   on monitor   EKG  TTE-p    RENAL:  Fluids: NS @75  Normonatremic  Euvolemic    GI:  Diet: NGT- NPO  GI prophylaxis [] not indicated [x] PPI [] other:  Bowel regimen [x] senna [x] Miralax:  Last BM: PTA    ENDO:   Goal euglycemia (-180)    HEME/ONC:  VTE prophylaxis: [x] SCDs [] chemoprophylaxis [x] hold chemoprophylaxis due to: ruptured aneurysm [] high risk of DVT/PE on admission due to:  LED-p    ID:  afebrile    MISC:    SOCIAL/FAMILY:  [] awaiting [x] updated at bedside [] family meeting    CODE STATUS:  [x] Full Code [] DNR [] DNI [] Palliative/Comfort Care    DISPOSITION:  [x] ICU [] Stroke Unit [] Floor [] EMU [] RCU [] PCU    [x] Patient is at high risk of neurologic deterioration/death due to: ruptured aneurysm, hydrocephalus, respiratory failure requiring intubation

## 2024-03-26 NOTE — PRE-ANESTHESIA EVALUATION ADULT - NSANTHPMHFT_GEN_ALL_CORE
58F presented unresponsive found to have SAH from R ICA aneurysm. Does not follow commands, opens eyes.   Son denies any known cardiac or pulmonary disease.  Intubated.  No official TTE at time of surgery.

## 2024-03-26 NOTE — CHART NOTE - NSCHARTNOTEFT_GEN_A_CORE
CAPRINI SCORE [CLOT] Score on Admission for     AGE RELATED RISK FACTORS                                                       MOBILITY RELATED FACTORS  [ x] Age 41-60 years                                            (1 Point)                  [ ] Bed rest                                                        (1 Point)  [ ] Age: 61-74 years                                           (2 Points)                 [ ] Plaster cast                                                   (2 Points)  [ ] Age= 75 years                                              (3 Points)                 [ ] Bed bound for more than 72 hours                 (2 Points)    DISEASE RELATED RISK FACTORS                                               GENDER SPECIFIC FACTORS  [ ] Edema in the lower extremities                       (1 Point)                  [ ] Pregnancy                                                     (1 Point)  [ ] Varicose veins                                               (1 Point)                  [ ] Post-partum < 6 weeks                                   (1 Point)             [x ] BMI > 25 Kg/m2                                            (1 Point)                  [ ] Hormonal therapy  or oral contraception          (1 Point)                 [ ] Sepsis (in the previous month)                        (1 Point)                  [ ] History of pregnancy complications                 (1 point)  [ ] Pneumonia or serious lung disease                                               [ ] Unexplained or recurrent                     (1 Point)           (in the previous month)                               (1 Point)  [ ] Abnormal pulmonary function test                     (1 Point)                 SURGERY RELATED RISK FACTORS (include planned surgeries)  [ ] Acute myocardial infarction                              (1 Point)                 [ ]  Section                                             (1 Point)  [ ] Congestive heart failure (in the previous month)  (1 Point)         [ ] Minor surgery                                                  (1 Point)   [ ] Inflammatory bowel disease                             (1 Point)                 [ ] Arthroscopic surgery                                        (2 Points)  [ ] Central venous access                                      (2 Points)                [ ] General surgery lasting more than 45 minutes   (2 Points)       [ ] Stroke (in the previous month)                          (5 Points)               [ ] Elective arthroplasty                                         (5 Points)            [ ] current or past malignancy                              (2 Points)                                                                                                       HEMATOLOGY RELATED FACTORS                                                 TRAUMA RELATED RISK FACTORS  [ ] Prior episodes of VTE                                     (3 Points)                [ ] Fracture of the hip, pelvis, or leg                       (5 Points)  [ ] Positive family history for VTE                         (3 Points)                 [ ] Acute spinal cord injury (in the previous month)  (5 Points)  [ ] Prothrombin 27033 A                                     (3 Points)                 [ ] Paralysis  (less than 1 month)                             (5 Points)  [ ] Factor V Leiden                                             (3 Points)                  [ ] Multiple Trauma within 1 month                        (5 Points)  [ ] Lupus anticoagulants                                     (3 Points)                                                           [ ] Anticardiolipin antibodies                               (3 Points)                                                       [ ] High homocysteine in the blood                      (3 Points)                                             [ ] Other congenital or acquired thrombophilia      (3 Points)                                                [ ] Heparin induced thrombocytopenia                  (3 Points)                                          Total Score [    2      ]    Risk:  Very low 0   Low 1 to 2   Moderate 3 to 4   High =5       VTE Prophylasix Recommednations:  [x ] mechanical pneumatic compression devices                                      [ ] contraindicated: _____________________  [ ] chemo prophylasix                                                                                   [x ] contraindicated _____________________    **** HIGH LIKELIHOOD DVT PRESENT ON ADMISSION  [ ] (please order LE dopplers within 24 hours of admission)

## 2024-03-26 NOTE — OCCUPATIONAL THERAPY INITIAL EVALUATION ADULT - GENERAL OBSERVATIONS, REHAB EVAL
Pt received supine in bed +ETT (orally intubated) +EVD +a line +IV +ICU monitoring +johnson +B/l wrist restraint

## 2024-03-26 NOTE — SWALLOW BEDSIDE ASSESSMENT ADULT - SWALLOW EVAL: DIAGNOSIS
Bedside swallow ordered received. Pt actively in OR for angiogram, intubated. Will continue to actively follow and assess when clinically appropriate and available.

## 2024-03-26 NOTE — ED ADULT NURSE NOTE - OBJECTIVE STATEMENT
57y/o F coming to the ED tx from [facility] d/t atraumatic SDH. Pt arrived intubated 7.5, 21@ lip with propofol @ 40mcg/her/kg. As per EMS, son states pt was feeling unwell while lying down with her son and became unresponsive. EMS states not falls or events prior to event. Neurosurgery @ bedside, pt transported to CTscan then NSCU.

## 2024-03-26 NOTE — H&P ADULT - ASSESSMENT
DeliaJie  58F, on ASA81 for pain relief? (fell a month ago), presents as txfr from [place] for diffuse SAH, 2/2 ruptured posterolaterally projecting 2.9x2.4x2.6 mm R supraclinoid ICA aneurysm (HH4, MF3). Was initially found unresponsive at 21:00, intubated at 23:00 at OSH, then txfrd. Given ddavp at OSH. On arrival patient w/ PERRL, +cough/gag, 2/5 spontaneous movement in LUE, o/w no movement. 1 unit PLTs given and R frontal EVD placed-high pressure. PLTs/Coags wnl. Exam improved: intubated, PERRL, BUE localize, b/l wd       -Pre-op for possible angio/embo vs clipping (added-on)  -SAH protocol   -EVD at 10  -ARU/PRU       DeliaJie  58F, on ASA81 for pain relief? (fell a month ago), presents as txfr from [place] for diffuse SAH w/small IVH, 2/2 ruptured posterolaterally projecting 2.9x2.4x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 Was initially found unresponsive at 21:00, intubated at 23:00 at OSH, then txfrd. Given ddavp at OSH. On arrival patient w/ PERRL, +cough/gag, 2/5 spontaneous movement in LUE, o/w no movement. 1 unit PLTs given and R frontal EVD placed-high pressure. PLTs/Coags wnl. Exam improved: intubated, PERRL, BUE localize, b/l wd       -Pre-op for possible angio/embo vs clipping (added-on)  -SAH protocol   -EVD at 10  -ARU/PRU

## 2024-03-26 NOTE — PROGRESS NOTE ADULT - SUBJECTIVE AND OBJECTIVE BOX
HPI:    HOSPITAL COURSE:      Admission Scores  GCS:   HH:   MF:   NIHSS:   RASS:    CAM-ICU:   ICH:      Allergies    No Known Allergies    Intolerances        REVIEW OF SYSTEMS: [ x] Unable to Assess due to neurologic exam   [ ] All ROS addressed below are non-contributory, except:  Neuro: [ ] Headache [ ] Back pain [ ] Numbness [ ] Weakness [ ] Ataxia [ ] Dizziness [ ] Aphasia [ ] Dysarthria [ ] Visual disturbance  Resp: [ ] Shortness of breath/dyspnea, [ ] Orthopnea [ ] Cough  CV: [ ] Chest pain [ ] Palpitation [ ] Lightheadedness [ ] Syncope  Renal: [ ] Thirst [ ] Edema  GI: [ ] Nausea [ ] Emesis [ ] Abdominal pain [ ] Constipation [ ] Diarrhea  Hem: [ ] Hematemesis [ ] bright red blood per rectum  ID: [ ] Fever [ ] Chills [ ] Dysuria  ENT: [ ] Rhinorrhea      DEVICES:   [ ] Restraints [ x] ET tube [ ] central line [x ] arterial line [ ] johnson [x ] NGT/OGT [ x] EVD [ ] LD [ ] MAK/HMV [ ] Trach [ ] PEG [ ] Chest Tube     VITALS:   Vital Signs Last 24 Hrs  T(C): 37.2 (26 Mar 2024 01:15), Max: 37.2 (26 Mar 2024 01:15)  T(F): 99 (26 Mar 2024 01:15), Max: 99 (26 Mar 2024 01:15)  HR: 79 (26 Mar 2024 03:12) (59 - 98)  BP: 112/66 (26 Mar 2024 02:30) (112/66 - 149/90)  BP(mean): 84 (26 Mar 2024 02:30) (84 - 114)  RR: 17 (26 Mar 2024 02:45) (17 - 22)  SpO2: 100% (26 Mar 2024 03:12) (99% - 100%)    Parameters below as of 26 Mar 2024 01:02  Patient On (Oxygen Delivery Method): ventilator      CAPILLARY BLOOD GLUCOSE        I&O's Summary      Respiratory:  Mode: AC/ CMV (Assist Control/ Continuous Mandatory Ventilation)  RR (machine): 16  TV (machine): 450  FiO2: 60  PEEP: 7  ITime: 1  MAP: 10  PIP: 17        LABS:                        11.4   16.10 )-----------( 214      ( 26 Mar 2024 01:34 )             35.0     03-26    135  |  102  |  19  ----------------------------<  191<H>  3.1<L>   |  18<L>  |  0.57    MEDICATIONS  (STANDING):  chlorhexidine 0.12% Liquid 15 milliLiter(s) Oral Mucosa every 12 hours  fentaNYL    Injectable 50 MICROGram(s) IV Push once  fosaprepitant IVPB 150 milliGRAM(s) IV Intermittent once  levETIRAcetam   Injectable 500 milliGRAM(s) IV Push every 12 hours  magnesium sulfate  IVPB 1 Gram(s) IV Intermittent once  niMODipine 60 milliGRAM(s) Oral every 4 hours  polyethylene glycol 3350 17 Gram(s) Oral at bedtime  potassium chloride  10 mEq/100 mL IVPB 10 milliEquivalent(s) IV Intermittent every 1 hour  propofol Infusion 10 MICROgram(s)/kG/Min (4.2 mL/Hr) IV Continuous <Continuous>  senna 2 Tablet(s) Oral at bedtime  sodium chloride 0.9% Bolus 1000 milliLiter(s) IV Bolus once  sodium chloride 0.9%. 1000 milliLiter(s) (50 mL/Hr) IV Continuous <Continuous>    MEDICATIONS  (PRN):      IMAGING: Reviewed      EXAMINATION:  PHYSICAL EXAM:    Constitutional: No Acute Distress     Neurological: Sedated, no EO, PERRL, +cough/gag/overbreathing, not FC, BUE localize, RLE TF, LLE WDs    Pulmonary: Intubated, Clear to Auscultation, No rales, No rhonchi, No wheezes     Cardiovascular: S1, S2, Regular rate and rhythm     Gastrointestinal: Soft, Non-tender, Non-distended     Extremities: No calf tenderness     Incision: EVD site c/d/i   HPI:  57 y/o F with Past medical history of Fell on ice about month ago (ASA prn pain?); transfer from [place] c/o headache at home and then became unresponsive. Intubated at [place] and CTH showed diffuse SAH (imaging unavailable from OSH). Transferred to [facility] showed   HOSPITAL COURSE:      Admission Scores  GCS:   HH:4   MF:4   NIHSS:   RASS:    CAM-ICU:   ICH:      Allergies    No Known Allergies    Intolerances        REVIEW OF SYSTEMS: [ x] Unable to Assess due to neurologic exam   [ ] All ROS addressed below are non-contributory, except:  Neuro: [ ] Headache [ ] Back pain [ ] Numbness [ ] Weakness [ ] Ataxia [ ] Dizziness [ ] Aphasia [ ] Dysarthria [ ] Visual disturbance  Resp: [ ] Shortness of breath/dyspnea, [ ] Orthopnea [ ] Cough  CV: [ ] Chest pain [ ] Palpitation [ ] Lightheadedness [ ] Syncope  Renal: [ ] Thirst [ ] Edema  GI: [ ] Nausea [ ] Emesis [ ] Abdominal pain [ ] Constipation [ ] Diarrhea  Hem: [ ] Hematemesis [ ] bright red blood per rectum  ID: [ ] Fever [ ] Chills [ ] Dysuria  ENT: [ ] Rhinorrhea      DEVICES:   [ ] Restraints [ x] ET tube [ ] central line [x ] arterial line [ ] johnson [x ] NGT/OGT [ x] EVD [ ] LD [ ] MAK/HMV [ ] Trach [ ] PEG [ ] Chest Tube     VITALS:   Vital Signs Last 24 Hrs  T(C): 37.2 (26 Mar 2024 01:15), Max: 37.2 (26 Mar 2024 01:15)  T(F): 99 (26 Mar 2024 01:15), Max: 99 (26 Mar 2024 01:15)  HR: 79 (26 Mar 2024 03:12) (59 - 98)  BP: 112/66 (26 Mar 2024 02:30) (112/66 - 149/90)  BP(mean): 84 (26 Mar 2024 02:30) (84 - 114)  RR: 17 (26 Mar 2024 02:45) (17 - 22)  SpO2: 100% (26 Mar 2024 03:12) (99% - 100%)    Parameters below as of 26 Mar 2024 01:02  Patient On (Oxygen Delivery Method): ventilator      CAPILLARY BLOOD GLUCOSE        I&O's Summary      Respiratory:  Mode: AC/ CMV (Assist Control/ Continuous Mandatory Ventilation)  RR (machine): 16  TV (machine): 450  FiO2: 60  PEEP: 7  ITime: 1  MAP: 10  PIP: 17        LABS:                        11.4   16.10 )-----------( 214      ( 26 Mar 2024 01:34 )             35.0     03-26    135  |  102  |  19  ----------------------------<  191<H>  3.1<L>   |  18<L>  |  0.57    MEDICATIONS  (STANDING):  chlorhexidine 0.12% Liquid 15 milliLiter(s) Oral Mucosa every 12 hours  fentaNYL    Injectable 50 MICROGram(s) IV Push once  fosaprepitant IVPB 150 milliGRAM(s) IV Intermittent once  levETIRAcetam   Injectable 500 milliGRAM(s) IV Push every 12 hours  magnesium sulfate  IVPB 1 Gram(s) IV Intermittent once  niMODipine 60 milliGRAM(s) Oral every 4 hours  polyethylene glycol 3350 17 Gram(s) Oral at bedtime  potassium chloride  10 mEq/100 mL IVPB 10 milliEquivalent(s) IV Intermittent every 1 hour  propofol Infusion 10 MICROgram(s)/kG/Min (4.2 mL/Hr) IV Continuous <Continuous>  senna 2 Tablet(s) Oral at bedtime  sodium chloride 0.9% Bolus 1000 milliLiter(s) IV Bolus once  sodium chloride 0.9%. 1000 milliLiter(s) (50 mL/Hr) IV Continuous <Continuous>    MEDICATIONS  (PRN):      IMAGING: Reviewed      EXAMINATION:  PHYSICAL EXAM:    Constitutional: No Acute Distress     Neurological: Sedated, no EO, PERRL, +cough/gag/overbreathing, not FC, BUE localize, RLE TF, LLE WDs    Pulmonary: Intubated, Clear to Auscultation, No rales, No rhonchi, No wheezes     Cardiovascular: S1, S2, Regular rate and rhythm     Gastrointestinal: Soft, Non-tender, Non-distended     Extremities: No calf tenderness     Incision: EVD site c/d/i   HPI:  57 y/o F with past medical history of Fell on ice about month ago (ASA prn pain?); transfer from [place] c/o headache at home and then became unresponsive. Intubated at [place] and CTH showed diffuse SAH (imaging unavailable from OSH). Transferred to [facility] CTH showed     HOSPITAL COURSE:  3/26 EVD placed and opened at [id number]    Admission Scores  GCS:   HH:4   MF:4   NIHSS:   RASS:    CAM-ICU:   ICH:      Allergies    No Known Allergies    Intolerances        REVIEW OF SYSTEMS: [ x] Unable to Assess due to neurologic exam   [ ] All ROS addressed below are non-contributory, except:  Neuro: [ ] Headache [ ] Back pain [ ] Numbness [ ] Weakness [ ] Ataxia [ ] Dizziness [ ] Aphasia [ ] Dysarthria [ ] Visual disturbance  Resp: [ ] Shortness of breath/dyspnea, [ ] Orthopnea [ ] Cough  CV: [ ] Chest pain [ ] Palpitation [ ] Lightheadedness [ ] Syncope  Renal: [ ] Thirst [ ] Edema  GI: [ ] Nausea [ ] Emesis [ ] Abdominal pain [ ] Constipation [ ] Diarrhea  Hem: [ ] Hematemesis [ ] bright red blood per rectum  ID: [ ] Fever [ ] Chills [ ] Dysuria  ENT: [ ] Rhinorrhea      DEVICES:   [ ] Restraints [ x] ET tube [ ] central line [x ] arterial line [ ] johnson [x ] NGT/OGT [ x] EVD [ ] LD [ ] MAK/HMV [ ] Trach [ ] PEG [ ] Chest Tube     VITALS:   Vital Signs Last 24 Hrs  T(C): 37.2 (26 Mar 2024 01:15), Max: 37.2 (26 Mar 2024 01:15)  T(F): 99 (26 Mar 2024 01:15), Max: 99 (26 Mar 2024 01:15)  HR: 79 (26 Mar 2024 03:12) (59 - 98)  BP: 112/66 (26 Mar 2024 02:30) (112/66 - 149/90)  BP(mean): 84 (26 Mar 2024 02:30) (84 - 114)  RR: 17 (26 Mar 2024 02:45) (17 - 22)  SpO2: 100% (26 Mar 2024 03:12) (99% - 100%)    Parameters below as of 26 Mar 2024 01:02  Patient On (Oxygen Delivery Method): ventilator      CAPILLARY BLOOD GLUCOSE        I&O's Summary      Respiratory:  Mode: AC/ CMV (Assist Control/ Continuous Mandatory Ventilation)  RR (machine): 16  TV (machine): 450  FiO2: 60  PEEP: 7  ITime: 1  MAP: 10  PIP: 17        LABS:                        11.4   16.10 )-----------( 214      ( 26 Mar 2024 01:34 )             35.0     03-26    135  |  102  |  19  ----------------------------<  191<H>  3.1<L>   |  18<L>  |  0.57    MEDICATIONS  (STANDING):  chlorhexidine 0.12% Liquid 15 milliLiter(s) Oral Mucosa every 12 hours  fentaNYL    Injectable 50 MICROGram(s) IV Push once  fosaprepitant IVPB 150 milliGRAM(s) IV Intermittent once  levETIRAcetam   Injectable 500 milliGRAM(s) IV Push every 12 hours  magnesium sulfate  IVPB 1 Gram(s) IV Intermittent once  niMODipine 60 milliGRAM(s) Oral every 4 hours  polyethylene glycol 3350 17 Gram(s) Oral at bedtime  potassium chloride  10 mEq/100 mL IVPB 10 milliEquivalent(s) IV Intermittent every 1 hour  propofol Infusion 10 MICROgram(s)/kG/Min (4.2 mL/Hr) IV Continuous <Continuous>  senna 2 Tablet(s) Oral at bedtime  sodium chloride 0.9% Bolus 1000 milliLiter(s) IV Bolus once  sodium chloride 0.9%. 1000 milliLiter(s) (50 mL/Hr) IV Continuous <Continuous>    MEDICATIONS  (PRN):      IMAGING: Reviewed      EXAMINATION:  PHYSICAL EXAM:    Constitutional: No Acute Distress     Neurological: Sedated, no EO, PERRL, +cough/gag/overbreathing, not FC, BUE localize, BLE trace WDs    Pulmonary: Intubated, Clear to Auscultation, No rales, No rhonchi, No wheezes     Cardiovascular: S1, S2, Regular rate and rhythm     Gastrointestinal: Soft, Non-tender, Non-distended     Extremities: No calf tenderness     Incision: EVD site c/d/i   HPI:  57 y/o F with past medical history of Fell on ice about month ago (ASA prn pain?); transfer from [place] c/o headache at home and then became unresponsive. Intubated at [place] and CTH showed diffuse SAH (imaging unavailable from OSH). Transferred to [facility] CTH showed     HOSPITAL COURSE:  3/26 EVD placed and opened at [id number]    Admission Scores  GCS: 9T  HH:4   MF:4   NIHSS:   RASS:    CAM-ICU:   ICH:      Allergies    No Known Allergies    Intolerances        REVIEW OF SYSTEMS: [ x] Unable to Assess due to neurologic exam   [ ] All ROS addressed below are non-contributory, except:  Neuro: [ ] Headache [ ] Back pain [ ] Numbness [ ] Weakness [ ] Ataxia [ ] Dizziness [ ] Aphasia [ ] Dysarthria [ ] Visual disturbance  Resp: [ ] Shortness of breath/dyspnea, [ ] Orthopnea [ ] Cough  CV: [ ] Chest pain [ ] Palpitation [ ] Lightheadedness [ ] Syncope  Renal: [ ] Thirst [ ] Edema  GI: [ ] Nausea [ ] Emesis [ ] Abdominal pain [ ] Constipation [ ] Diarrhea  Hem: [ ] Hematemesis [ ] bright red blood per rectum  ID: [ ] Fever [ ] Chills [ ] Dysuria  ENT: [ ] Rhinorrhea      DEVICES:   [ ] Restraints [ x] ET tube [ ] central line [x ] arterial line--R radial 3/26 [ ] johnson [x ] NGT/OGT [ x] EVD [ ] LD [ ] MAK/HMV [ ] Trach [ ] PEG [ ] Chest Tube     VITALS:   Vital Signs Last 24 Hrs  T(C): 37.2 (26 Mar 2024 01:15), Max: 37.2 (26 Mar 2024 01:15)  T(F): 99 (26 Mar 2024 01:15), Max: 99 (26 Mar 2024 01:15)  HR: 79 (26 Mar 2024 03:12) (59 - 98)  BP: 112/66 (26 Mar 2024 02:30) (112/66 - 149/90)  BP(mean): 84 (26 Mar 2024 02:30) (84 - 114)  RR: 17 (26 Mar 2024 02:45) (17 - 22)  SpO2: 100% (26 Mar 2024 03:12) (99% - 100%)    Parameters below as of 26 Mar 2024 01:02  Patient On (Oxygen Delivery Method): ventilator    CAPILLARY BLOOD GLUCOSE    I&O's Summary    Respiratory:  Mode: AC/ CMV (Assist Control/ Continuous Mandatory Ventilation)  RR (machine): 16  TV (machine): 450  FiO2: 60  PEEP: 7  ITime: 1  MAP: 10  PIP: 17      LABS:                        11.4   16.10 )-----------( 214      ( 26 Mar 2024 01:34 )             35.0     03-26    135  |  102  |  19  ----------------------------<  191<H>  3.1<L>   |  18<L>  |  0.57    MEDICATIONS  (STANDING):  chlorhexidine 0.12% Liquid 15 milliLiter(s) Oral Mucosa every 12 hours  fentaNYL    Injectable 50 MICROGram(s) IV Push once  fosaprepitant IVPB 150 milliGRAM(s) IV Intermittent once  levETIRAcetam   Injectable 500 milliGRAM(s) IV Push every 12 hours  magnesium sulfate  IVPB 1 Gram(s) IV Intermittent once  niMODipine 60 milliGRAM(s) Oral every 4 hours  polyethylene glycol 3350 17 Gram(s) Oral at bedtime  potassium chloride  10 mEq/100 mL IVPB 10 milliEquivalent(s) IV Intermittent every 1 hour  propofol Infusion 10 MICROgram(s)/kG/Min (4.2 mL/Hr) IV Continuous <Continuous>  senna 2 Tablet(s) Oral at bedtime  sodium chloride 0.9% Bolus 1000 milliLiter(s) IV Bolus once  sodium chloride 0.9%. 1000 milliLiter(s) (50 mL/Hr) IV Continuous <Continuous>    MEDICATIONS  (PRN):    IMAGING: Reviewed      PHYSICAL EXAM:    Constitutional: No Acute Distress     Neurological: Sedated, no EO, PERRL, +cough/gag/overbreathing, not FC, BUE localize, BLE trace WDs    Pulmonary: Intubated, Clear to Auscultation, No rales, No rhonchi, No wheezes     Cardiovascular: S1, S2, Regular rate and rhythm     Gastrointestinal: Soft, Non-tender, Non-distended     Extremities: No calf tenderness     Incision: EVD site c/d/i

## 2024-03-26 NOTE — PATIENT PROFILE ADULT - FUNCTIONAL SCREEN CURRENT LEVEL: COMMUNICATION, MLM
3 = unable to speak (not related to language barrier) pt is intubated/3 = unable to speak (not related to language barrier)

## 2024-03-26 NOTE — PROGRESS NOTE ADULT - ASSESSMENT
ASSESSMENT/PLAN: FRANCES  WFNS subarachnoid hemorrhage   Post-SAH day     N   Pcomm @ at OR for clip   Seizure Prophylaxis: Levetiracetam until aneurysm treated  Delayed Cerebral Ischemia Management: TCDs, euvolemia, nimodipine  Hydrocephalus: R EVD in place   Pain: PRN analgesics  Activity: [] OOB as tolerated [x] Bedrest [] PT [] OT [] PMNR    CV   SBP goal 100-140 preop and postop 100-210  TTE decreased systolic cardiac function     P   intubated   Mode: standby,Pt went to the OR    R   Goal euvolemic   I&o    GI   Diet: npo   PPI   Senna miralax  BM PTA     E   Goal euglycemia (-180)  A1c 5.5     H   SCD   Chemoppx   LED     ID   Afebrile     CODE STATUS: FULL CODE    DISPOSITION: ICU     CRITICAL

## 2024-03-26 NOTE — PATIENT PROFILE ADULT - VISION (WITH CORRECTIVE LENSES IF THE PATIENT USUALLY WEARS THEM):
unable to assess Partially impaired: cannot see medication labels or newsprint, but can see obstacles in path, and the surrounding layout; can count fingers at arm's length

## 2024-03-27 PROBLEM — Z00.00 ENCOUNTER FOR PREVENTIVE HEALTH EXAMINATION: Status: ACTIVE | Noted: 2024-03-27

## 2024-03-27 LAB
APPEARANCE CSF: ABNORMAL
APPEARANCE SPUN FLD: ABNORMAL
APPEARANCE UR: CLEAR — SIGNIFICANT CHANGE UP
BACTERIA # UR AUTO: NEGATIVE /HPF — SIGNIFICANT CHANGE UP
BILIRUB UR-MCNC: NEGATIVE — SIGNIFICANT CHANGE UP
CAST: 2 /LPF — SIGNIFICANT CHANGE UP (ref 0–4)
COLOR CSF: ABNORMAL
COLOR SPEC: YELLOW — SIGNIFICANT CHANGE UP
DIFF PNL FLD: ABNORMAL
GLUCOSE BLDC GLUCOMTR-MCNC: 122 MG/DL — HIGH (ref 70–99)
GLUCOSE BLDC GLUCOMTR-MCNC: 123 MG/DL — HIGH (ref 70–99)
GLUCOSE BLDC GLUCOMTR-MCNC: 154 MG/DL — HIGH (ref 70–99)
GLUCOSE CSF-MCNC: 88 MG/DL — HIGH (ref 40–70)
GLUCOSE UR QL: 250 MG/DL
GRAM STN FLD: SIGNIFICANT CHANGE UP
KETONES UR-MCNC: NEGATIVE MG/DL — SIGNIFICANT CHANGE UP
LACTATE CSF-MCNC: 4 MMOL/L — HIGH (ref 1.1–2.4)
LEUKOCYTE ESTERASE UR-ACNC: NEGATIVE — SIGNIFICANT CHANGE UP
LYMPHOCYTES # CSF: 37 % — LOW (ref 40–80)
MONOS+MACROS NFR CSF: 7 % — LOW (ref 15–45)
NEUTROPHILS # CSF: 56 % — HIGH (ref 0–6)
NITRITE UR-MCNC: NEGATIVE — SIGNIFICANT CHANGE UP
NRBC NFR CSF: 178 /UL — HIGH (ref 0–5)
PH UR: 5.5 — SIGNIFICANT CHANGE UP (ref 5–8)
PROT CSF-MCNC: 246 MG/DL — HIGH (ref 15–45)
PROT UR-MCNC: NEGATIVE MG/DL — SIGNIFICANT CHANGE UP
RBC # CSF: HIGH /UL (ref 0–0)
RBC CASTS # UR COMP ASSIST: 21 /HPF — HIGH (ref 0–4)
SP GR SPEC: 1.02 — SIGNIFICANT CHANGE UP (ref 1–1.03)
SPECIMEN SOURCE: SIGNIFICANT CHANGE UP
SQUAMOUS # UR AUTO: 1 /HPF — SIGNIFICANT CHANGE UP (ref 0–5)
TUBE TYPE: SIGNIFICANT CHANGE UP
UROBILINOGEN FLD QL: 0.2 MG/DL — SIGNIFICANT CHANGE UP (ref 0.2–1)
WBC UR QL: 1 /HPF — SIGNIFICANT CHANGE UP (ref 0–5)

## 2024-03-27 PROCEDURE — 99291 CRITICAL CARE FIRST HOUR: CPT

## 2024-03-27 PROCEDURE — 95720 EEG PHY/QHP EA INCR W/VEEG: CPT

## 2024-03-27 PROCEDURE — 70496 CT ANGIOGRAPHY HEAD: CPT | Mod: 26

## 2024-03-27 PROCEDURE — 70450 CT HEAD/BRAIN W/O DYE: CPT | Mod: 26,59

## 2024-03-27 PROCEDURE — 36620 INSERTION CATHETER ARTERY: CPT

## 2024-03-27 PROCEDURE — 93010 ELECTROCARDIOGRAM REPORT: CPT

## 2024-03-27 PROCEDURE — 93886 INTRACRANIAL COMPLETE STUDY: CPT | Mod: 26

## 2024-03-27 PROCEDURE — 71045 X-RAY EXAM CHEST 1 VIEW: CPT | Mod: 26,76

## 2024-03-27 RX ORDER — INSULIN LISPRO 100/ML
VIAL (ML) SUBCUTANEOUS EVERY 6 HOURS
Refills: 0 | Status: DISCONTINUED | OUTPATIENT
Start: 2024-03-27 | End: 2024-03-28

## 2024-03-27 RX ORDER — FENTANYL CITRATE 50 UG/ML
25 INJECTION INTRAVENOUS ONCE
Refills: 0 | Status: DISCONTINUED | OUTPATIENT
Start: 2024-03-27 | End: 2024-03-27

## 2024-03-27 RX ORDER — SODIUM CHLORIDE 9 MG/ML
1000 INJECTION INTRAMUSCULAR; INTRAVENOUS; SUBCUTANEOUS
Refills: 0 | Status: DISCONTINUED | OUTPATIENT
Start: 2024-03-27 | End: 2024-03-29

## 2024-03-27 RX ORDER — DEXMEDETOMIDINE HYDROCHLORIDE IN 0.9% SODIUM CHLORIDE 4 UG/ML
0.2 INJECTION INTRAVENOUS
Qty: 200 | Refills: 0 | Status: DISCONTINUED | OUTPATIENT
Start: 2024-03-27 | End: 2024-03-27

## 2024-03-27 RX ORDER — LEVETIRACETAM 250 MG/1
500 TABLET, FILM COATED ORAL
Refills: 0 | Status: DISCONTINUED | OUTPATIENT
Start: 2024-03-27 | End: 2024-04-01

## 2024-03-27 RX ORDER — ACETAMINOPHEN 500 MG
1000 TABLET ORAL ONCE
Refills: 0 | Status: COMPLETED | OUTPATIENT
Start: 2024-03-27 | End: 2024-03-27

## 2024-03-27 RX ORDER — SODIUM CHLORIDE 9 MG/ML
500 INJECTION INTRAMUSCULAR; INTRAVENOUS; SUBCUTANEOUS ONCE
Refills: 0 | Status: DISCONTINUED | OUTPATIENT
Start: 2024-03-27 | End: 2024-03-27

## 2024-03-27 RX ADMIN — Medication 100 MILLIGRAM(S): at 05:03

## 2024-03-27 RX ADMIN — NIMODIPINE 60 MILLIGRAM(S): 60 SOLUTION ORAL at 14:35

## 2024-03-27 RX ADMIN — Medication 1000 MILLIGRAM(S): at 10:10

## 2024-03-27 RX ADMIN — CHLORHEXIDINE GLUCONATE 15 MILLILITER(S): 213 SOLUTION TOPICAL at 17:14

## 2024-03-27 RX ADMIN — OXYCODONE HYDROCHLORIDE 5 MILLIGRAM(S): 5 TABLET ORAL at 14:27

## 2024-03-27 RX ADMIN — Medication 400 MILLIGRAM(S): at 09:40

## 2024-03-27 RX ADMIN — SENNA PLUS 2 TABLET(S): 8.6 TABLET ORAL at 22:35

## 2024-03-27 RX ADMIN — LEVETIRACETAM 500 MILLIGRAM(S): 250 TABLET, FILM COATED ORAL at 17:16

## 2024-03-27 RX ADMIN — SODIUM CHLORIDE 60 MILLILITER(S): 9 INJECTION INTRAMUSCULAR; INTRAVENOUS; SUBCUTANEOUS at 23:14

## 2024-03-27 RX ADMIN — NIMODIPINE 60 MILLIGRAM(S): 60 SOLUTION ORAL at 18:27

## 2024-03-27 RX ADMIN — FENTANYL CITRATE 25 MICROGRAM(S): 50 INJECTION INTRAVENOUS at 15:56

## 2024-03-27 RX ADMIN — Medication 2: at 06:06

## 2024-03-27 RX ADMIN — Medication 4 MILLIGRAM(S): at 00:35

## 2024-03-27 RX ADMIN — CHLORHEXIDINE GLUCONATE 15 MILLILITER(S): 213 SOLUTION TOPICAL at 06:03

## 2024-03-27 RX ADMIN — LEVETIRACETAM 400 MILLIGRAM(S): 250 TABLET, FILM COATED ORAL at 06:05

## 2024-03-27 RX ADMIN — POLYETHYLENE GLYCOL 3350 17 GRAM(S): 17 POWDER, FOR SOLUTION ORAL at 22:35

## 2024-03-27 RX ADMIN — PANTOPRAZOLE SODIUM 40 MILLIGRAM(S): 20 TABLET, DELAYED RELEASE ORAL at 12:22

## 2024-03-27 RX ADMIN — OXYCODONE HYDROCHLORIDE 5 MILLIGRAM(S): 5 TABLET ORAL at 13:27

## 2024-03-27 RX ADMIN — Medication 400 MILLIGRAM(S): at 00:05

## 2024-03-27 RX ADMIN — Medication 4 MILLIGRAM(S): at 06:03

## 2024-03-27 RX ADMIN — NIMODIPINE 60 MILLIGRAM(S): 60 SOLUTION ORAL at 22:35

## 2024-03-27 NOTE — CHART NOTE - NSCHARTNOTEFT_GEN_A_CORE
:  Zuleyka Lebron    DATE/TIME: 03/27/24. 7:30am    INDICATION:    [x] Shock    [x] Acute Hypoxic Respiratory failure    [x ] Other: SAH      PROCEDURE:    [x] LIMITED ECHO    [x] LIMITED CHEST    [ ] LIMITED ABDOMINAL        FINDINGS:  Cardiac:   Bradycardia 44-50bpm  LV: Grossly normal LV function. Global LV hypomotily LV. LVOT VTI 21cm  RV: Normal RV size. Hyperdynamic   D sign, protrustion septum into LV   Pericardium: No pericardial effusion.      Pulmonary: A lines throughout. Scattered basal B lines bilateral. R static bronchogram. No pleural Effusion     IVC 2cm non collapsable     INTERPRETATION:  Grossly Normal cardiac function with LV hypomotility. D sign   Normal aeration pattern of the lung with atelectasis   IVC non collapsable     Images stored on [email or web address]

## 2024-03-27 NOTE — PROGRESS NOTE ADULT - SUBJECTIVE AND OBJECTIVE BOX
Subjective: Patient seen and examined. No new events except as noted.   remains in NSCU intubated   intermittently bradycardic 44   REVIEW OF SYSTEMS:  Unable to obtain     MEDICATIONS:  MEDICATIONS  (STANDING):  chlorhexidine 0.12% Liquid 15 milliLiter(s) Oral Mucosa every 12 hours  dexAMETHasone     Tablet 4 milliGRAM(s) Oral every 6 hours  dexMEDEtomidine Infusion 0.2 MICROgram(s)/kG/Hr (3.56 mL/Hr) IV Continuous <Continuous>  insulin lispro (ADMELOG) corrective regimen sliding scale   SubCutaneous every 6 hours  levETIRAcetam  IVPB 500 milliGRAM(s) IV Intermittent two times a day  niMODipine Oral Solution 60 milliGRAM(s) Oral every 4 hours  pantoprazole  Injectable 40 milliGRAM(s) IV Push daily  polyethylene glycol 3350 17 Gram(s) Oral at bedtime  potassium chloride  10 mEq/100 mL IVPB 10 milliEquivalent(s) IV Intermittent every 1 hour  senna 2 Tablet(s) Oral at bedtime      PHYSICAL EXAM:  T(C): 36.1 (03-27-24 @ 07:00), Max: 38.3 (03-26-24 @ 23:00)  HR: 51 (03-27-24 @ 08:39) (46 - 101)  BP: 117/60 (03-27-24 @ 08:00) (115/59 - 117/60)  RR: 13 (03-27-24 @ 08:00) (12 - 26)  SpO2: 100% (03-27-24 @ 08:39) (96% - 100%)  Wt(kg): --  I&O's Summary    26 Mar 2024 07:01  -  27 Mar 2024 07:00  --------------------------------------------------------  IN: 2179.5 mL / OUT: 1482 mL / NET: 697.5 mL    27 Mar 2024 07:01  -  27 Mar 2024 10:14  --------------------------------------------------------  IN: 60 mL / OUT: 16 mL / NET: 44 mL          Appearance: Intubated  R frontal EVD  HEENT:  dry oral mucosa, PERRL, EOMI	  Lymphatic: No lymphadenopathy  Cardiovascular: Normal S1 S2, No JVD, No murmurs, No edema  Respiratory: ventilated   Psychiatry: A & O x 0  Gastrointestinal:  Soft, Non-tender, + BS	  Skin: No rashes, No ecchymoses, No cyanosis	  Neurologic: perrl, BUE localized, LE wd bilateral   Extremities: Normal range of motion, No clubbing, cyanosis or edema  Vascular: Peripheral pulses palpable 2+ bilaterally      LABS:    CARDIAC MARKERS:                                12.3   15.24 )-----------( 275      ( 26 Mar 2024 16:20 )             37.2     03-26    142  |  111<H>  |  10  ----------------------------<  174<H>  3.5   |  18<L>  |  0.53    Ca    9.3      26 Mar 2024 17:29  Phos  1.9     03-26  Mg     2.0     03-26    TPro  6.7  /  Alb  3.8  /  TBili  0.3  /  DBili  <0.1  /  AST  35  /  ALT  20  /  AlkPhos  109  03-26          TELEMETRY: 	 SB Sr    ECG:  	  RADIOLOGY: < from: CT Head No Cont (03.26.24 @ 17:04) >    ACC: [id number] EXAM:  CT BRAIN   ORDERED BY: MALCOLM SPENCER     PROCEDURE DATE:  03/26/2024          INTERPRETATION:  EXAMINATION: CT HEAD    CLINICAL INDICATION: pneumoceph  TECHNIQUE: CT images of the head were obtained without contrast. Dose   reduction techniques were utilized including but not limited to automated   exposure control (AEC), iterative reconstruction technique, and/or mA   and/or kV dose adjustments based on patient size.  COMPARISON: Head CT 3/26/2024 at 8:00 AM. Head CTA 3/26/2024.      FINDINGS:    Portable technique was used, which limits this evaluation.    A right pterional craniotomy flap has been performed, with an aneurysm   clip at the right PCOM. There is mild pneumocephalus on a postoperative   basis. There is stable subarachnoid hemorrhage in both cerebral   hemispheres, without interval rebleeding. A right frontal EVD is   unchanged, without worsening hydrocephalus. There is stable mild layering   intraventricular blood at the occipital horns.    Cerebralvolume is within normal limits. No premature white matter   disease.    No acute intracranial hemorrhage. No midline shift or herniation. No CT   evidence of acute territorial infarction, although MRI with DWI would be   more sensitive.    An ETT andright NG are noted in place. There is fluid in the nasopharynx   and oral cavity dorsally. There is no fluid in the sinus cavities or   mastoids.    Limited views of the orbits and visualized soft tissues of the neck,   face, scalp, skull base, and calvarium are otherwise unremarkable.        IMPRESSION:    1.  Postsurgical changes without interval rebleeding.        Patient Name: JOSE MCLEAN  MRN: [id number], Accession: [id number]  DOS: 03/26/24 05:04 PM    --- End of Report ---            JORGE L HAMLIN MD; Attending Radiologist  This document has been electronically signed. Mar 26 2024  7:08PM    < end of copied text >    DIAGNOSTIC TESTING:  [ ] Echocardiogram:  [ ]  Catheterization:  [ ] Stress Test:    OTHER:

## 2024-03-27 NOTE — CHART NOTE - NSCHARTNOTEFT_GEN_A_CORE
Transcranial doppler exam #1 (3/27/24) 12pm  mean velocity  cm/sec                              Left         Right  CINDY                    x            x  MCA                   x            x    PCA                    P2-21      x   VERT                   33          37  BA                             x  Technically difficult study due to poor temporal windows bilaterally.  Official report to follow.  Kristin

## 2024-03-27 NOTE — PROGRESS NOTE ADULT - SUBJECTIVE AND OBJECTIVE BOX
HPI   58F, on ASA81, transferred from [place] for SAH HH4 WNFS 5 mF4 ruptured R pcomm aneurysm. Was initially found unresponsive on 3/25/24 at 21:00, intubated at 23:00 at OSH, then txfrd. Given ddavp at OSH. Here in ED 1 unit PLTs given and R frontal EVD placed-high pressure. Went for R pcom clip on 3/26/24, postop evd not functioning then drained on the floor.     Admission scores:   SAH R pcomm ruptured @ HH4 WNFS 5 mF4, GCS <7    24h events   sinus bradycardia    Exam   intubated, perrl, BUE localized, LE wd bilateral     TTE 3/26/24   CONCLUSIONS:   1. Left ventricular systolic function is moderately to severely decreased with an ejection fraction of 36 % by Mcgee's method of disks. Regional wall motion abnormalities consistent with ischemic heart disease.   2. Multiple segmental abnormalities exist. See findings.   3. There is moderate (grade 2) left ventricular diastolic dysfunction, with elevated filling pressure.   4. Normal right ventricular cavity size, with normal wall thickness, and normal systolic function. Tricuspid annular plane systolic excursion (TAPSE) is 1.7 cm (normal >=1.7 cm).   5. Normal left and right atrial size.   6. No significant valvular disease.   7. Estimated pulmonary artery systolic pressure is 41 mmHg.   8. The inferior vena cava is normal in size measuring 1.98 cm in diameter, (normal <2.1cm) with abnormal inspiratory collapse (abnormal <50%) consistent with mildly elevated right atrial pressure (~8, range 5-10mmHg).   9. No pericardial effusion seen.  10. No prior echocardiogram is available for comparison.  11. There is no evidence of a left ventricular thrombus.  12. There is increased LV mass and concentric hypertrophy.    VITALS:   Vital Signs Last 24 Hrs  T(C): 36.1 (27 Mar 2024 07:00), Max: 38.3 (26 Mar 2024 23:00)  T(F): 97 (27 Mar 2024 07:00), Max: 100.9 (26 Mar 2024 23:00)  HR: 51 (27 Mar 2024 10:00) (46 - 101)  BP: 127/67 (27 Mar 2024 10:00) (115/59 - 127/67)  BP(mean): 91 (27 Mar 2024 10:00) (81 - 91)  RR: 14 (27 Mar 2024 10:00) (12 - 26)  SpO2: 99% (27 Mar 2024 10:00) (96% - 100%)    Parameters below as of 27 Mar 2024 07:00  Patient On (Oxygen Delivery Method): ventilator  O2 Flow (L/min): 40    CAPILLARY BLOOD GLUCOSE      POCT Blood Glucose.: 154 mg/dL (27 Mar 2024 06:04)  POCT Blood Glucose.: 152 mg/dL (26 Mar 2024 22:04)    I&O's Summary    26 Mar 2024 07:01  -  27 Mar 2024 07:00  --------------------------------------------------------  IN: 2179.5 mL / OUT: 1482 mL / NET: 697.5 mL    27 Mar 2024 07:01  -  27 Mar 2024 10:30  --------------------------------------------------------  IN: 90 mL / OUT: 18 mL / NET: 72 mL        Respiratory:  Mode: CPAP with PS  FiO2: 40  PEEP: 5  PS: 10  MAP: 8  PIP: 16    ABG - ( 26 Mar 2024 10:17 )  pH, Arterial: 7.34  pH, Blood: x     /  pCO2: 41    /  pO2: 144   / HCO3: 22    / Base Excess: -3.5  /  SaO2: 99.8                LABS:                        12.3   15.24 )-----------( 275      ( 26 Mar 2024 16:20 )             37.2     03-26    142  |  111<H>  |  10  ----------------------------<  174<H>  3.5   |  18<L>  |  0.53        MEDICATION LEVELS:     IVF FLUIDS/MEDICATIONS:   MEDICATIONS  (STANDING):  chlorhexidine 0.12% Liquid 15 milliLiter(s) Oral Mucosa every 12 hours  dexAMETHasone     Tablet 4 milliGRAM(s) Oral every 6 hours  dexMEDEtomidine Infusion 0.2 MICROgram(s)/kG/Hr (3.56 mL/Hr) IV Continuous <Continuous>  insulin lispro (ADMELOG) corrective regimen sliding scale   SubCutaneous every 6 hours  levETIRAcetam  IVPB 500 milliGRAM(s) IV Intermittent two times a day  niMODipine Oral Solution 60 milliGRAM(s) Oral every 4 hours  pantoprazole  Injectable 40 milliGRAM(s) IV Push daily  polyethylene glycol 3350 17 Gram(s) Oral at bedtime  potassium chloride  10 mEq/100 mL IVPB 10 milliEquivalent(s) IV Intermittent every 1 hour  senna 2 Tablet(s) Oral at bedtime    MEDICATIONS  (PRN):  acetaminophen     Tablet .. 650 milliGRAM(s) Oral every 6 hours PRN Temp greater or equal to 38.5C (101.3F), Mild Pain (1 - 3)  acetaminophen   IVPB .. 1000 milliGRAM(s) IV Intermittent every 6 hours PRN Severe Pain (7 - 10)  oxyCODONE    IR 5 milliGRAM(s) Oral every 4 hours PRN Moderate Pain (4 - 6)

## 2024-03-27 NOTE — PROGRESS NOTE ADULT - ASSESSMENT
DeliaJie  58F, on ASA81 for pain relief? 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 Was initially found unresponsive at 21:00, intubated at 23:00 at OSH, then txfrd. Given ddavp at OSH. On arrival patient w/ PERRL, +cough/gag, 2/5 spontaneous movement in LUE, o/w no movement. 1 unit PLTs given and R frontal EVD placed-high pressure. PLTs/Coags wnl. Exam improved: intubated, PERRL, BUE localize, b/l wd       CTH/CTA in AM then can poss liberalize  TCDs daily  LED-pavel Rodríguez consulted

## 2024-03-27 NOTE — PROGRESS NOTE ADULT - SUBJECTIVE AND OBJECTIVE BOX
Patient seen and examined at bedside.    --Anticoagulation--    T(C): 38.3 (03-26-24 @ 23:00), Max: 38.3 (03-26-24 @ 23:00)  HR: 91 (03-26-24 @ 23:45) (55 - 101)  BP: 75/47 (03-26-24 @ 07:54) (75/47 - 149/90)  RR: 22 (03-26-24 @ 23:45) (0 - 37)  SpO2: 97% (03-26-24 @ 23:45) (96% - 100%)  Wt(kg): --    Exam: Intubated, no EO, [place] sluggish, +cough/gag/OB, LUE extends, LLE TF, R side spontaneous/localizes

## 2024-03-27 NOTE — PROGRESS NOTE ADULT - PROBLEM SELECTOR PLAN 2
suspect underlying CAD/Ischemic heart disease   will need to obtain preior records   findings are NOT compatible with Takotsubo cardiomyopathy.  Atropine for sustained HR < 30

## 2024-03-27 NOTE — PROGRESS NOTE ADULT - ASSESSMENT
58F, on ASA81 for pain relief? (fell a month ago), presents as txfr from [place] for diffuse SAH w/small IVH, 2/2 ruptured posterolaterally projecting 2.9x2.4x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 Was initially found unresponsive at 21:00, intubated at 23:00 at OSH, then txfrd. Given ddavp at OSH. On arrival patient w/ PERRL, +cough/gag, 2/5 spontaneous movement in LUE, o/w no movement. 1 unit PLTs given and R frontal EVD placed-high pressure. PLTs/Coags wnl.     Intubated sedated in NSCU   Echo with abnormal findings of severe cardiomyopathy and RWMA

## 2024-03-27 NOTE — CHART NOTE - NSCHARTNOTEFT_GEN_A_CORE
:  Zuleyka Lebron    DATE/TIME:     INDICATION:    [x] ICP monitoring    [x] SAH     [ ] TBI    [ ] Other:       PROCEDURE:    [x] LIMITED OPHTHALMIC     [ ] OTHER      FINDINGS:  OS:   ONSD (mm)   0.61/0.54/0.50  mean 0.55  Papilledema     OD:   ONSD (mm)   0.46/0.48/0.42  mean 0.45  Papilledema     INTERPRETATION:  Improved ONSD consistent with normal ICP with bilateral papilledema.   Compared with yesterday'study.     Images stored on PACS/[employer]

## 2024-03-27 NOTE — PROVIDER CONTACT NOTE (OTHER) - SITUATION
Prior assessment patient had drift on LUE. 13:00 assessment patient has no effort against gravity on LUE.

## 2024-03-27 NOTE — CHART NOTE - NSCHARTNOTEFT_GEN_A_CORE
Patient febrile.  External Ventricular device was accessed under sterile conditions for collection of CSF sample.  EVD was clamped distally and approximately 3cc of CSF was sterilely withdrawn and discarded.  Another 3cc of CSF was withdrawn and sent to lab in 2 separate vials for culture and analysis.  Access port was closed with disinfecting cap then EVD was unclamped.  ICPs monitored and patient tolerated procedure well.

## 2024-03-27 NOTE — PROGRESS NOTE ADULT - ASSESSMENT
ASSESSMENT/PLAN: [facility] WFNS subarachnoid hemorrhage   Post-SAH day 3  3/26/24 evd placed  3/26/24 pcomm clip    N   # @ Pcomm ruptured, s/p clip: CTH/CTA   #Seizure Prophylaxis: Levetiracetam for 7 days - can dc EEG   #Delayed Cerebral Ischemia Management: TCDs, euvolemia, nimodipine  #Hydrocephalus: R EVD in place 10cm H2O 72cc drained last 24h - non compliant wave   #Pain: PRN tylenol oxi  Activity: [] OOB as tolerated [x] Bedrest [] PT [] OT [] PMNR    CV   SBP goal 100-160   # R heart strain: POCUS with RV strain and non collapsable IVC, dysmotility of LV   TTE decreased systolic cardiac function EF 36% with decreased diastolic function    P   intubated   PS   oral secretions no in line     R   Goal euvolemic   I&o    GI   Diet: npo   PPI   Senna miralax  BM PTA     E   Goal euglycemia (-180)  A1c 5.5   ISS     H   SCD   Chemoppx tonight   LED pending     ID   Fever   fu blood culture     lines  R a line to exchange   ETT   pivs  ngtube     CODE STATUS: FULL CODE    DISPOSITION: ICU     CRITICAL  ASSESSMENT/PLAN: [facility] ERNS subarachnoid hemorrhage   Post-SAH day 3  3/26/24 evd placed  3/26/24 pcomm clip    N   # @ Pcomm ruptured, s/p clip: CTH/CTA   #Seizure Prophylaxis: Levetiracetam for 7 days - can dc EEG   #Delayed Cerebral Ischemia Management: TCDs, euvolemia, nimodipine  #Hydrocephalus: R EVD in place 10cm H2O 72cc drained last 24h - non compliant wave   #Pain: PRN tylenol oxi  Activity: [] OOB as tolerated [x] Bedrest [] PT [] OT [] PMNR    CV   SBP goal 100-160   # R heart strain: POCUS with RV strain and non collapsable IVC, dysmotility of LV   TTE decreased systolic cardiac function EF 36% with decreased diastolic function    P   intubated   PS   oral secretions no in line     R   Goal euvolemic   I&o    GI   #REE: indirect calorimetry today   Diet: npo   PPI   Senna miralax  BM PTA     E   Goal euglycemia (-180)  A1c 5.5   ISS     H   SCD   Chemoppx tonight   LED pending     ID   Fever   fu blood culture     lines  R a line to exchange   ETT   pivs  ngtube     CODE STATUS: FULL CODE    DISPOSITION: ICU     CRITICAL

## 2024-03-28 LAB
ANION GAP SERPL CALC-SCNC: 15 MMOL/L — SIGNIFICANT CHANGE UP (ref 5–17)
ANION GAP SERPL CALC-SCNC: 9 MMOL/L — SIGNIFICANT CHANGE UP (ref 5–17)
BUN SERPL-MCNC: 12 MG/DL — SIGNIFICANT CHANGE UP (ref 7–23)
BUN SERPL-MCNC: 17 MG/DL — SIGNIFICANT CHANGE UP (ref 7–23)
CALCIUM SERPL-MCNC: 8.5 MG/DL — SIGNIFICANT CHANGE UP (ref 8.4–10.5)
CALCIUM SERPL-MCNC: 9 MG/DL — SIGNIFICANT CHANGE UP (ref 8.4–10.5)
CHLORIDE SERPL-SCNC: 106 MMOL/L — SIGNIFICANT CHANGE UP (ref 96–108)
CHLORIDE SERPL-SCNC: 110 MMOL/L — HIGH (ref 96–108)
CO2 SERPL-SCNC: 21 MMOL/L — LOW (ref 22–31)
CO2 SERPL-SCNC: 26 MMOL/L — SIGNIFICANT CHANGE UP (ref 22–31)
CREAT SERPL-MCNC: 0.41 MG/DL — LOW (ref 0.5–1.3)
CREAT SERPL-MCNC: 0.48 MG/DL — LOW (ref 0.5–1.3)
EGFR: 110 ML/MIN/1.73M2 — SIGNIFICANT CHANGE UP
EGFR: 115 ML/MIN/1.73M2 — SIGNIFICANT CHANGE UP
GLUCOSE BLDC GLUCOMTR-MCNC: 126 MG/DL — HIGH (ref 70–99)
GLUCOSE BLDC GLUCOMTR-MCNC: 127 MG/DL — HIGH (ref 70–99)
GLUCOSE SERPL-MCNC: 135 MG/DL — HIGH (ref 70–99)
GLUCOSE SERPL-MCNC: 146 MG/DL — HIGH (ref 70–99)
HCT VFR BLD CALC: 32.1 % — LOW (ref 34.5–45)
HCT VFR BLD CALC: 33.3 % — LOW (ref 34.5–45)
HGB BLD-MCNC: 10.6 G/DL — LOW (ref 11.5–15.5)
HGB BLD-MCNC: 10.9 G/DL — LOW (ref 11.5–15.5)
MAGNESIUM SERPL-MCNC: 2 MG/DL — SIGNIFICANT CHANGE UP (ref 1.6–2.6)
MAGNESIUM SERPL-MCNC: 2.2 MG/DL — SIGNIFICANT CHANGE UP (ref 1.6–2.6)
MCHC RBC-ENTMCNC: 28.7 PG — SIGNIFICANT CHANGE UP (ref 27–34)
MCHC RBC-ENTMCNC: 29 PG — SIGNIFICANT CHANGE UP (ref 27–34)
MCHC RBC-ENTMCNC: 32.7 GM/DL — SIGNIFICANT CHANGE UP (ref 32–36)
MCHC RBC-ENTMCNC: 33 GM/DL — SIGNIFICANT CHANGE UP (ref 32–36)
MCV RBC AUTO: 87.6 FL — SIGNIFICANT CHANGE UP (ref 80–100)
MCV RBC AUTO: 87.9 FL — SIGNIFICANT CHANGE UP (ref 80–100)
NRBC # BLD: 0 /100 WBCS — SIGNIFICANT CHANGE UP (ref 0–0)
NRBC # BLD: 0 /100 WBCS — SIGNIFICANT CHANGE UP (ref 0–0)
PHOSPHATE SERPL-MCNC: 2.8 MG/DL — SIGNIFICANT CHANGE UP (ref 2.5–4.5)
PHOSPHATE SERPL-MCNC: 3 MG/DL — SIGNIFICANT CHANGE UP (ref 2.5–4.5)
PLATELET # BLD AUTO: 196 K/UL — SIGNIFICANT CHANGE UP (ref 150–400)
PLATELET # BLD AUTO: 231 K/UL — SIGNIFICANT CHANGE UP (ref 150–400)
PLATELET MAPPING ACTF MAX AMPLITUDE: 19.7 MM — HIGH (ref 2–19)
PLATELET MAPPING ACTF MAX AMPLITUDE: 21.6 MM — HIGH (ref 2–19)
PLATELET MAPPING ADP MAXIMUM AMPLITUDE: 51.4 MM — SIGNIFICANT CHANGE UP (ref 45–69)
PLATELET MAPPING ADP MAXIMUM AMPLITUDE: 63.9 MM — SIGNIFICANT CHANGE UP (ref 45–69)
PLATELET MAPPING ADP PERCENT INHIBITION: 11.9 % — SIGNIFICANT CHANGE UP (ref 0–17)
PLATELET MAPPING ADP PERCENT INHIBITION: 37.4 % — HIGH (ref 0–17)
PLATELET MAPPING ARACHIDONIC ACID INHIBITION: 0 % — SIGNIFICANT CHANGE UP (ref 0–11)
PLATELET MAPPING ARACHIDONIC ACID INHIBITION: 0 % — SIGNIFICANT CHANGE UP (ref 0–11)
PLATELET MAPPING HKH MAXIMUM AMPLITUDE: 69.6 MM — HIGH (ref 53–68)
PLATELET MAPPING HKH MAXIMUM AMPLITUDE: 70.3 MM — HIGH (ref 53–68)
POTASSIUM SERPL-MCNC: 3.6 MMOL/L — SIGNIFICANT CHANGE UP (ref 3.5–5.3)
POTASSIUM SERPL-MCNC: 3.8 MMOL/L — SIGNIFICANT CHANGE UP (ref 3.5–5.3)
POTASSIUM SERPL-SCNC: 3.6 MMOL/L — SIGNIFICANT CHANGE UP (ref 3.5–5.3)
POTASSIUM SERPL-SCNC: 3.8 MMOL/L — SIGNIFICANT CHANGE UP (ref 3.5–5.3)
RBC # BLD: 3.65 M/UL — LOW (ref 3.8–5.2)
RBC # BLD: 3.8 M/UL — SIGNIFICANT CHANGE UP (ref 3.8–5.2)
RBC # FLD: 14.2 % — SIGNIFICANT CHANGE UP (ref 10.3–14.5)
RBC # FLD: 14.6 % — HIGH (ref 10.3–14.5)
SODIUM SERPL-SCNC: 142 MMOL/L — SIGNIFICANT CHANGE UP (ref 135–145)
SODIUM SERPL-SCNC: 145 MMOL/L — SIGNIFICANT CHANGE UP (ref 135–145)
WBC # BLD: 15.47 K/UL — HIGH (ref 3.8–10.5)
WBC # BLD: 21.5 K/UL — HIGH (ref 3.8–10.5)
WBC # FLD AUTO: 15.47 K/UL — HIGH (ref 3.8–10.5)
WBC # FLD AUTO: 21.5 K/UL — HIGH (ref 3.8–10.5)

## 2024-03-28 PROCEDURE — 93970 EXTREMITY STUDY: CPT | Mod: 26

## 2024-03-28 PROCEDURE — 36620 INSERTION CATHETER ARTERY: CPT

## 2024-03-28 PROCEDURE — 99291 CRITICAL CARE FIRST HOUR: CPT

## 2024-03-28 PROCEDURE — 71045 X-RAY EXAM CHEST 1 VIEW: CPT | Mod: 26

## 2024-03-28 PROCEDURE — 95720 EEG PHY/QHP EA INCR W/VEEG: CPT

## 2024-03-28 RX ORDER — FENTANYL CITRATE 50 UG/ML
50 INJECTION INTRAVENOUS ONCE
Refills: 0 | Status: DISCONTINUED | OUTPATIENT
Start: 2024-03-28 | End: 2024-03-28

## 2024-03-28 RX ORDER — ENOXAPARIN SODIUM 100 MG/ML
40 INJECTION SUBCUTANEOUS
Refills: 0 | Status: DISCONTINUED | OUTPATIENT
Start: 2024-03-28 | End: 2024-03-29

## 2024-03-28 RX ORDER — CHLORHEXIDINE GLUCONATE 213 G/1000ML
1 SOLUTION TOPICAL AT BEDTIME
Refills: 0 | Status: DISCONTINUED | OUTPATIENT
Start: 2024-03-28 | End: 2024-04-09

## 2024-03-28 RX ORDER — SODIUM,POTASSIUM PHOSPHATES 278-250MG
1 POWDER IN PACKET (EA) ORAL ONCE
Refills: 0 | Status: COMPLETED | OUTPATIENT
Start: 2024-03-28 | End: 2024-03-29

## 2024-03-28 RX ORDER — ACETAMINOPHEN 500 MG
1000 TABLET ORAL ONCE
Refills: 0 | Status: COMPLETED | OUTPATIENT
Start: 2024-03-28 | End: 2024-03-28

## 2024-03-28 RX ORDER — POTASSIUM CHLORIDE 20 MEQ
40 PACKET (EA) ORAL ONCE
Refills: 0 | Status: COMPLETED | OUTPATIENT
Start: 2024-03-28 | End: 2024-03-29

## 2024-03-28 RX ORDER — NICARDIPINE HYDROCHLORIDE 30 MG/1
5 CAPSULE, EXTENDED RELEASE ORAL
Qty: 40 | Refills: 0 | Status: DISCONTINUED | OUTPATIENT
Start: 2024-03-28 | End: 2024-03-28

## 2024-03-28 RX ORDER — SODIUM CHLORIDE 9 MG/ML
250 INJECTION INTRAMUSCULAR; INTRAVENOUS; SUBCUTANEOUS ONCE
Refills: 0 | Status: DISCONTINUED | OUTPATIENT
Start: 2024-03-28 | End: 2024-03-28

## 2024-03-28 RX ORDER — POTASSIUM CHLORIDE 20 MEQ
40 PACKET (EA) ORAL ONCE
Refills: 0 | Status: COMPLETED | OUTPATIENT
Start: 2024-03-28 | End: 2024-03-28

## 2024-03-28 RX ORDER — FENTANYL CITRATE 50 UG/ML
25 INJECTION INTRAVENOUS ONCE
Refills: 0 | Status: DISCONTINUED | OUTPATIENT
Start: 2024-03-28 | End: 2024-03-28

## 2024-03-28 RX ORDER — DEXMEDETOMIDINE HYDROCHLORIDE IN 0.9% SODIUM CHLORIDE 4 UG/ML
0.2 INJECTION INTRAVENOUS
Qty: 200 | Refills: 0 | Status: DISCONTINUED | OUTPATIENT
Start: 2024-03-28 | End: 2024-03-28

## 2024-03-28 RX ORDER — SODIUM CHLORIDE 9 MG/ML
500 INJECTION INTRAMUSCULAR; INTRAVENOUS; SUBCUTANEOUS ONCE
Refills: 0 | Status: COMPLETED | OUTPATIENT
Start: 2024-03-28 | End: 2024-03-28

## 2024-03-28 RX ORDER — LABETALOL HCL 100 MG
5 TABLET ORAL ONCE
Refills: 0 | Status: COMPLETED | OUTPATIENT
Start: 2024-03-28 | End: 2024-03-28

## 2024-03-28 RX ADMIN — SODIUM CHLORIDE 60 MILLILITER(S): 9 INJECTION INTRAMUSCULAR; INTRAVENOUS; SUBCUTANEOUS at 19:29

## 2024-03-28 RX ADMIN — FENTANYL CITRATE 50 MICROGRAM(S): 50 INJECTION INTRAVENOUS at 03:26

## 2024-03-28 RX ADMIN — FENTANYL CITRATE 50 MICROGRAM(S): 50 INJECTION INTRAVENOUS at 03:45

## 2024-03-28 RX ADMIN — ENOXAPARIN SODIUM 40 MILLIGRAM(S): 100 INJECTION SUBCUTANEOUS at 17:51

## 2024-03-28 RX ADMIN — Medication 40 MILLIEQUIVALENT(S): at 02:21

## 2024-03-28 RX ADMIN — SODIUM CHLORIDE 60 MILLILITER(S): 9 INJECTION INTRAMUSCULAR; INTRAVENOUS; SUBCUTANEOUS at 09:11

## 2024-03-28 RX ADMIN — NIMODIPINE 60 MILLIGRAM(S): 60 SOLUTION ORAL at 02:21

## 2024-03-28 RX ADMIN — NIMODIPINE 60 MILLIGRAM(S): 60 SOLUTION ORAL at 17:51

## 2024-03-28 RX ADMIN — LEVETIRACETAM 500 MILLIGRAM(S): 250 TABLET, FILM COATED ORAL at 17:51

## 2024-03-28 RX ADMIN — CHLORHEXIDINE GLUCONATE 15 MILLILITER(S): 213 SOLUTION TOPICAL at 05:26

## 2024-03-28 RX ADMIN — Medication 400 MILLIGRAM(S): at 15:48

## 2024-03-28 RX ADMIN — CHLORHEXIDINE GLUCONATE 1 APPLICATION(S): 213 SOLUTION TOPICAL at 22:00

## 2024-03-28 RX ADMIN — SODIUM CHLORIDE 60 MILLILITER(S): 9 INJECTION INTRAMUSCULAR; INTRAVENOUS; SUBCUTANEOUS at 16:30

## 2024-03-28 RX ADMIN — NIMODIPINE 60 MILLIGRAM(S): 60 SOLUTION ORAL at 14:05

## 2024-03-28 RX ADMIN — SODIUM CHLORIDE 500 MILLILITER(S): 9 INJECTION INTRAMUSCULAR; INTRAVENOUS; SUBCUTANEOUS at 11:23

## 2024-03-28 RX ADMIN — Medication 5 MILLIGRAM(S): at 16:04

## 2024-03-28 RX ADMIN — Medication 10 MILLIGRAM(S): at 22:30

## 2024-03-28 RX ADMIN — LEVETIRACETAM 500 MILLIGRAM(S): 250 TABLET, FILM COATED ORAL at 05:26

## 2024-03-28 RX ADMIN — POLYETHYLENE GLYCOL 3350 17 GRAM(S): 17 POWDER, FOR SOLUTION ORAL at 22:30

## 2024-03-28 RX ADMIN — NIMODIPINE 60 MILLIGRAM(S): 60 SOLUTION ORAL at 22:30

## 2024-03-28 RX ADMIN — FENTANYL CITRATE 25 MICROGRAM(S): 50 INJECTION INTRAVENOUS at 14:35

## 2024-03-28 RX ADMIN — PANTOPRAZOLE SODIUM 40 MILLIGRAM(S): 20 TABLET, DELAYED RELEASE ORAL at 11:58

## 2024-03-28 RX ADMIN — Medication 1000 MILLIGRAM(S): at 16:18

## 2024-03-28 RX ADMIN — NIMODIPINE 60 MILLIGRAM(S): 60 SOLUTION ORAL at 05:26

## 2024-03-28 RX ADMIN — NICARDIPINE HYDROCHLORIDE 25 MG/HR: 30 CAPSULE, EXTENDED RELEASE ORAL at 16:05

## 2024-03-28 RX ADMIN — SENNA PLUS 2 TABLET(S): 8.6 TABLET ORAL at 22:30

## 2024-03-28 RX ADMIN — CHLORHEXIDINE GLUCONATE 15 MILLILITER(S): 213 SOLUTION TOPICAL at 17:51

## 2024-03-28 RX ADMIN — FENTANYL CITRATE 25 MICROGRAM(S): 50 INJECTION INTRAVENOUS at 14:20

## 2024-03-28 RX ADMIN — DEXMEDETOMIDINE HYDROCHLORIDE IN 0.9% SODIUM CHLORIDE 3.56 MICROGRAM(S)/KG/HR: 4 INJECTION INTRAVENOUS at 03:00

## 2024-03-28 NOTE — DIETITIAN INITIAL EVALUATION ADULT - PERTINENT MEDS FT
MEDICATIONS  (STANDING):  chlorhexidine 0.12% Liquid 15 milliLiter(s) Oral Mucosa every 12 hours  dexMEDEtomidine Infusion 0.2 MICROgram(s)/kG/Hr (3.56 mL/Hr) IV Continuous <Continuous>  insulin lispro (ADMELOG) corrective regimen sliding scale   SubCutaneous every 6 hours  levETIRAcetam  Solution 500 milliGRAM(s) Oral two times a day  niMODipine Oral Solution 60 milliGRAM(s) Oral every 4 hours  pantoprazole  Injectable 40 milliGRAM(s) IV Push daily  polyethylene glycol 3350 17 Gram(s) Oral at bedtime  potassium chloride  10 mEq/100 mL IVPB 10 milliEquivalent(s) IV Intermittent every 1 hour  senna 2 Tablet(s) Oral at bedtime  sodium chloride 0.9%. 1000 milliLiter(s) (60 mL/Hr) IV Continuous <Continuous>    MEDICATIONS  (PRN):  acetaminophen     Tablet .. 650 milliGRAM(s) Oral every 6 hours PRN Temp greater or equal to 38.5C (101.3F), Mild Pain (1 - 3)  acetaminophen   IVPB .. 1000 milliGRAM(s) IV Intermittent every 6 hours PRN Severe Pain (7 - 10)  oxyCODONE    IR 5 milliGRAM(s) Oral every 4 hours PRN Moderate Pain (4 - 6)

## 2024-03-28 NOTE — PROGRESS NOTE ADULT - ASSESSMENT
DeliaJie  58F, on ASA81 for pain relief? (fell a month ago), presents as txfr from [place] for diffuse SAH w/small IVH, 2/2 ruptured posterolaterally projecting 2.9x2.4x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 Was initially found unresponsive at 21:00, intubated at 23:00 at OSH, then txfrd. Given ddavp at OSH. On arrival patient w/ PERRL, +cough/gag, 2/5 spontaneous movement in LUE, o/w no movement. 1 unit PLTs given and R frontal EVD placed-high pressure. PLTs/Coags wnl. Exam improved: intubated, PERRL, BUE localize, b/l wd       vEEG in progress  TCDs daily  LED-pavel arroyo   repeat TTE 3/31

## 2024-03-28 NOTE — PROGRESS NOTE ADULT - ASSESSMENT
58F, on ASA81 for pain relief? (fell a month ago), presents as txfr from [place] for diffuse SAH w/small IVH, 2/2 ruptured posterolaterally projecting 2.9x2.4x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 Was initially found unresponsive at 21:00, intubated at 23:00 at OSH, then txfrd. Given ddavp at OSH. On arrival patient w/ PERRL, +cough/gag, 2/5 spontaneous movement in LUE, o/w no movement. 1 unit PLTs given and R frontal EVD placed-high pressure. PLTs/Coags wnl.     Intubated sedated in NSCU   Echo with abnormal findings of severe cardiomyopathy and RWMA

## 2024-03-28 NOTE — DIETITIAN INITIAL EVALUATION ADULT - FACTORS AFF FOOD INTAKE
Pt currently NPO. Was previously prescribed Glucerna 1.2 and Jevity 1.2 on 3/27. Feeds held since 4am on 3/28. Possible plan for trial of extubation. Receiving IVF NaCl 0.9% infusing at 60ml/hr for hydration and electrolyte support.

## 2024-03-28 NOTE — DIETITIAN INITIAL EVALUATION ADULT - ORAL INTAKE PTA/DIET HISTORY
-Pt intubated/sedated; unable to participate in nutrition evaluation. Baseline tolerance to chewing/swallowing and baseline provision of energy/nutrient intake unclear. No documented food allergies. No indication of prior vitamin/supplement use PTA.

## 2024-03-28 NOTE — PROGRESS NOTE ADULT - SUBJECTIVE AND OBJECTIVE BOX
Patient seen and examined at bedside.    --Anticoagulation--    T(C): 36.6 (03-27-24 @ 19:00), Max: 37.2 (03-27-24 @ 03:00)  HR: 89 (03-27-24 @ 23:00) (46 - 89)  BP: 142/67 (03-27-24 @ 15:00) (115/59 - 170/78)  RR: 19 (03-27-24 @ 23:00) (12 - 23)  SpO2: 99% (03-27-24 @ 23:00) (98% - 100%)  Wt(kg): --    Exam: Intubated, SAVI, PERRL, +cough/gag/OB, intermittently FC, B/L UE AG (R>L), LLE WDs, RLE AG

## 2024-03-28 NOTE — DIETITIAN INITIAL EVALUATION ADULT - ADD RECOMMEND
1. Obtain subjective wt/diet history from pt/family PRN.   2. If EN feeds to continue, monitor GI tolerance. RD to remain available to adjust EN formulary, volume/rate PRN.   3. Recommend multivitamin (if no medication contraindications) to aid in prevention of micronutrient deficiencies.   4. Antihyperglycemic regimen deferred to team.   5. Monitor wt trends/labs/skin integrity/hydration status/bowel regularity.   6. Indirect Calorimetry study as able/appropriate as per request of medical team.

## 2024-03-28 NOTE — PROGRESS NOTE ADULT - SUBJECTIVE AND OBJECTIVE BOX
Subjective: Patient seen and examined. No new events except as noted.   remains in NSCU   Sinus Terrence 40s     REVIEW OF SYSTEMS:    Unable to obtain    MEDICATIONS:  MEDICATIONS  (STANDING):  chlorhexidine 0.12% Liquid 15 milliLiter(s) Oral Mucosa every 12 hours  dexMEDEtomidine Infusion 0.2 MICROgram(s)/kG/Hr (3.56 mL/Hr) IV Continuous <Continuous>  insulin lispro (ADMELOG) corrective regimen sliding scale   SubCutaneous every 6 hours  levETIRAcetam  Solution 500 milliGRAM(s) Oral two times a day  niMODipine Oral Solution 60 milliGRAM(s) Oral every 4 hours  pantoprazole  Injectable 40 milliGRAM(s) IV Push daily  polyethylene glycol 3350 17 Gram(s) Oral at bedtime  potassium chloride  10 mEq/100 mL IVPB 10 milliEquivalent(s) IV Intermittent every 1 hour  senna 2 Tablet(s) Oral at bedtime  sodium chloride 0.9%. 1000 milliLiter(s) (60 mL/Hr) IV Continuous <Continuous>      PHYSICAL EXAM:  T(C): 37.8 (24 @ 07:00), Max: 38 (24 @ 23:00)  HR: 61 (24 @ 08:20) (51 - 90)  BP: 163/75 (24 @ 07:00) (138/63 - 170/78)  RR: 20 (24 @ 08:00) (15 - 23)  SpO2: 99% (24 @ 08:20) (98% - 100%)  Wt(kg): --  I&O's Summary    27 Mar 2024 07:01  -  28 Mar 2024 07:00  --------------------------------------------------------  IN: 1754 mL / OUT: 2353 mL / NET: -599 mL        Appearance: Intubated  R frontal EVD  HEENT:  dry oral mucosa, PERRL, EOMI	  Lymphatic: No lymphadenopathy  Cardiovascular: Normal S1 S2, No JVD, No murmurs, No edema  Respiratory: ventilated   Psychiatry: A & O x 0  Gastrointestinal:  Soft, Non-tender, + BS	  Skin: No rashes, No ecchymoses, No cyanosis	  Neurologic: perrl, BUE localized, LE wd bilateral   Extremities: Normal range of motion, No clubbing, cyanosis or edema  Vascular: Peripheral pulses palpable 2+ bilaterally          LABS:    CARDIAC MARKERS:                                10.9   21.50 )-----------( 231      ( 27 Mar 2024 23:32 )             33.3     03    145  |  110<H>  |  17  ----------------------------<  146<H>  3.8   |  26  |  0.48<L>    Ca    9.0      27 Mar 2024 23:32  Phos  3.0       Mg     2.2           TELEMETRY: 	  SB 40s, SR   ECG:  	  RADIOLOGY: Chart Note-TCD Technician [Charted Location: [place] [address]] [Authored: 27-Mar-2024 12:36]- for Visit: [phone], Complete, Entered, Signed in Full, Available to Patient      · Note Type	TCD      Transcranial doppler exam #1 (3/27/24) 12pm  mean velocity  cm/sec                              Left         Right  CINDY                    x            x  MCA                   x            x    PCA                    P2-21      x   VERT                   33          37  BA                             x  Technically difficult study due to poor temporal windows bilaterally.  Official report to follow.  Kristin.      Electronic Signatures:  Pushpa Dubois (Technician)  (Signed 27-Mar-2024 12:37)  	Authored: Note Type, Note      Last Updated: 27-Mar-2024 12:37 by Pushpa Dubois (Technician)        DIAGNOSTIC TESTING:  [ ] Echocardiogram:  [ ]  Catheterization:  [ ] Stress Test:    OTHER: 	  EEG REPORT:   EEG Report:  · EEG Report	  [facility]   REPORT OF CONTINUOUS VIDEO EEG     [facility]: [address] , [address], [place] [address], Ph#: [phone]  [facility]:  [address] [place] [address], Ph#: [phone]  [facility]: [address] [place] [address], Ph#: [phone]    Patient Name: JOSE MCLEAN  Age and : 58y (67)  MRN #: [id number]  Location: [place] [address]  Referring Physician: Perlita Varela    Start Time/Date: 163 on 3-  End Time/Date: 08:00 on 24  Duration: 15H 30min    _____________________________________________________________  STUDY INFORMATION    EEG Recording Technique:  The patient underwent continuous Video-EEG monitoring, using Telemetry System hardware on the XLTek Digital System. EEG and video data were stored on a computer hard drive with important events saved in digital archive files. The material was reviewed by a physician (electroencephalographer / epileptologist) on a daily basis. Vitor and seizure detection algorithms were utilized and reviewed. An EEG Technician attended to the patient, and was available throughout daytime work hours.  The epilepsy center neurologist was available in person or on call 24-hours per day.    EEG Placement and Labeling of Electrodes:  The EEG was performed utilizing 20 channel referential EEG connections (coronal over temporal over parasagittal montage) using all standard 10-20 electrode placements with EKG, with additional electrodes placed in the inferior temporal region using the modified 10-10 montage electrode placements for elective admissions, or if deemed necessary. Recording was at a sampling rate of 256 samples per second per channel. Time synchronized digital video recording was done simultaneously with EEG recording. A low light infrared camera was used for low light recording.     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 Intubated at outside hospital and transferred to [facility]. R frontal EVD placed. S/P PCOMM clip 3/26. Remains intubated. Noted with stress-induced cardiomyopathy. Cardiology following.      (28 Mar 2024 07:14)      PERTINENT MEDICATION:  MEDICATIONS  (STANDING):  chlorhexidine 0.12% Liquid 15 milliLiter(s) Oral Mucosa every 12 hours  dexMEDEtomidine Infusion 0.2 MICROgram(s)/kG/Hr (3.56 mL/Hr) IV Continuous <Continuous>  insulin lispro (ADMELOG) corrective regimen sliding scale   SubCutaneous every 6 hours  levETIRAcetam  Solution 500 milliGRAM(s) Oral two times a day  niMODipine Oral Solution 60 milliGRAM(s) Oral every 4 hours  pantoprazole  Injectable 40 milliGRAM(s) IV Push daily  polyethylene glycol 3350 17 Gram(s) Oral at bedtime  potassium chloride  10 mEq/100 mL IVPB 10 milliEquivalent(s) IV Intermittent every 1 hour  senna 2 Tablet(s) Oral at bedtime  sodium chloride 0.9%. 1000 milliLiter(s) (60 mL/Hr) IV Continuous <Continuous>    _____________________________________________________________  STUDY INTERPRETATION    Findings: The background was continuous, spontaneously variable and reactive.   Background predominantly consisted of theta, delta and faster activities, sharply contoured.    Focal Slowing:   Abundant bilateral frontal intermittent theta/delta slowing, left greater than right    Sleep Background:  Drowsiness and stage II sleep transients were not recorded.    Other Non-Epileptiform Findings:  Generalized periodic sharply contoured activity with triphasic appearance.    Interictal Epileptiform Activity:   Occasional to frequent intermixed right and left frontal sharp-wave discharges.    Events:  Clinical events: None recorded.  Seizures: None recorded.    Activation Procedures:   Hyperventilation was not performed.    Photic stimulation was not performed.     Artifacts:  Intermittent myogenic and movement artifacts were noted.    _____________________________________________________________  EEG SUMMARY/CLASSIFICATION

## 2024-03-28 NOTE — DIETITIAN INITIAL EVALUATION ADULT - NS FNS DIET ORDER
Diet, NPO:   NPO for Procedure/Test     NPO Start Date: 28-Mar-2024,   NPO Start Time: 04:00 (03-27-24 @ 16:27)  Diet, NPO with Tube Feed:   Tube Feeding Modality: Nasogastric  Jevity 1.2 Les (JEVITY1.2RTH)  Total Volume for 24 Hours (mL): 1200  Continuous  Until Goal Tube Feed Rate (mL per Hour): 50  Tube Feed Duration (in Hours): 24  Tube Feed Start Time: 23:00 (03-27-24 @ 16:27)

## 2024-03-28 NOTE — DIETITIAN INITIAL EVALUATION ADULT - NSFNSGIIOFT_GEN_A_CORE
-No documented BM's recorded thus far. On bowel regimen (senna, Miralax). Prescribed IV Protonix.   -Pt sedated; prescribed Precedex, infusing at 3.5ml/hr.   -Team trending electrolytes and providing repletion PRN. Currently prescribed KCl.   -Prescribed insulin lispro sliding scale to aid in management of BG. A1c 5.5%.

## 2024-03-28 NOTE — PROGRESS NOTE ADULT - PROBLEM SELECTOR PLAN 2
suspect underlying CAD/Ischemic heart disease   will need to obtain prior records   findings are NOT compatible with Takotsubo cardiomyopathy.  Atropine for sustained HR < 30

## 2024-03-28 NOTE — DIETITIAN INITIAL EVALUATION ADULT - REASON FOR ADMISSION
Pt is a 59 yo F transferred from [facility]SH for SAH HH4 mF4 ruptured R PCOMM aneurysm. Intubated at outside hospital and transferred to [facility]. R frontal EVD placed. S/P PCOMM clip 3/26. Remains intubated. Noted with stress-induced cardiomyopathy. Cardiology following.

## 2024-03-28 NOTE — DIETITIAN INITIAL EVALUATION ADULT - PERTINENT LABORATORY DATA
03-27    145  |  110<H>  |  17  ----------------------------<  146<H>  3.8   |  26  |  0.48<L>    Ca    9.0      27 Mar 2024 23:32  Phos  3.0     03-27  Mg     2.2     03-27    POCT Blood Glucose.: 127 mg/dL (03-28-24 @ 05:31)  A1C with Estimated Average Glucose Result: 5.5 % (03-26-24 @ 04:27)  A1C with Estimated Average Glucose Result: 5.5 % (03-26-24 @ 01:34)

## 2024-03-28 NOTE — PROGRESS NOTE ADULT - ATTENDING COMMENTS
59 yo woman with no PMH, admitted with unresponsiveness, found to have a HH4 mF4 SAH (bleed day 3/25) from R PComm aneurysm s/p clipping and R frontal EVD.   TTE with EF 37% with regional wall motion abnormalities.     Interval events:  VEEG with no seizures.   Afebrile.   EVD at 10cm H2O, output 153cc  Is and Os net neg 600cc.     On exam, opens eyes to voice, follows some simple commands including showing 2 fingers and thumbs up on the R, able to look R but not L, R arm AG, L arm AG to elbow, moves R leg spontaneously in plane of bed, minimal L leg withdrawal     PBD 3.     keppra 500 mg BID for 7 days from craniotomy   Nimodipine 60 mg q4h  TCDs  euvolemia   CPAP 5/5, 40%, get cuff leak, potential extubation later today   NPO for potential extubation, LBM PTA   500cc bolus   PPI    Patient seen and examined by attending on 3/28/2023.    Patient is critically ill due to SAH and at high risk for neurological deterioration or death due to: hydrocephalus requiring an EVD for CSF diversion, intubated for airway protection.

## 2024-03-28 NOTE — PROGRESS NOTE ADULT - ASSESSMENT
ASSESSMENT/PLAN: HH 4 mF 4 WFNS subarachnoid hemorrhage   3/26/24 evd placed  3/26/24 pcomm clip    N   # @ Pcomm ruptured, s/p clip: CTH/CTA   #Seizure Prophylaxis: Levetiracetam for 7 days - can dc EEG   #Delayed Cerebral Ischemia Management: TCDs, euvolemia, nimodipine  #Hydrocephalus: R EVD in place 10cm H2O 72cc drained last 24h - non compliant wave   #Pain: PRN tylenol oxi  Activity: [] OOB as tolerated [x] Bedrest [] PT [] OT [] PMNR    CV   SBP goal 100-160   Stress induced cardiaomyopathy  Follow up with cardiology  Cautious hydration  Maintain euvolemia    P   intubated   PS as of 4 AM for extubation trial  oral secretions no in line     R   Goal euvolemic   I&o    GI   Diet: Glucerna - NPO as of 4 AM  PPI   Senna miralax  BM PTA     E   Goal euglycemia (-180)  A1c 5.5   ISS     H   SCD   Chemoppx tonight   LED pending     ID   Fever, off Abx  fu blood culture

## 2024-03-28 NOTE — PROVIDER CONTACT NOTE (OTHER) - ACTION/TREATMENT ORDERED:
As per PEDRO Hercules hold 10 am nimodipine, no further interventions at this time as per PEDRO Hercules

## 2024-03-28 NOTE — PROGRESS NOTE ADULT - ASSESSMENT
[facility]   [facility] 3, SAH 3/25/2024  3/26/24 evd placed  3/26/24 pcomm clip    N   # @ Pcomm ruptured, s/p clip: CTH/CTA   #Seizure Prophylaxis: Levetiracetam for 7 days - can dc EEG   #Delayed Cerebral Ischemia Management: TCDs with poor temporal windows, follow up occipital windows, euvolemia, nimodipine/ net neg give a bolus 500cc NS  #Hydrocephalus: R EVD in place 10cm H2O 72cc drained last 24h - non compliant wave   #Pain: PRN tylenol oxi  Activity: [] OOB as tolerated [x] Bedrest [] PT [] OT [] PMNR    CV   SBP goal 100-160   # R heart strain: POCUS with RV strain and non collapsable IVC, dysmotility of LV   TTE decreased systolic cardiac function EF 36% with decreased diastolic function    P   intubated   AC 16/450/5/40% sat 100%   oral secretions, no in line     R   I&o    GI   #REE: D3 baseline indirect calorimetry today 3/28/24 11am  Diet: npo for extubation   PPI   Senna miralax  BM PTA     E   Goal euglycemia (-180)  A1c 5.5   ISS     H   SCD   Chemoppx   LED pending     ID   Fever   fu blood culture no growth     lines  R a line to exchange   ETT   pivs  ngtube     CODE STATUS: FULL CODE    DISPOSITION: ICU     CRITICAL

## 2024-03-28 NOTE — CHART NOTE - NSCHARTNOTEFT_GEN_A_CORE
Nutrition Note - Chart Note    Indirect Calorimetry Study completed on 3/28/24. Results as follows:     Time (min): 8:19  FiO2 (%): 40.09  REE (Kcal/day): 2175kcal  Pred (Kcal/day): 1371kcal   REE/Pred (%): 159%  RQ: 0.80    Estimated Energy Needs (per RD predictive equation), based on dosing wt 71.2kg:  (27-32kcal/kg): 0882-4584 kcal  (1.2-1.4g protein/kg): 85-100g protein     Per discussion with family at bedside,  lbs/70.5kg.     Pt currently NPO for possible plan for extubation. As able, consider EN feeds: Jevity 1.5 at GOAL rate 60ml/hr x 24 hrs. Based on dosing wt 71.2kg, provides: 1440ml, 2160kcal (30.3kcal/kg) and 92g protein (1.29g protein/kg). Results discussed with Zuleyka Lebron MD.     Marely Montoya RD, available via TEAMS

## 2024-03-28 NOTE — PROCEDURE NOTE - ADDITIONAL PROCEDURE DETAILS
unable to pass wire initially.  procedure completed without complication with pulsatile flow on pressurized line.  prior left radial johnny placed yesterday removed.
kasey's test performed prior to procedure with good flow to goyal arch noted.

## 2024-03-28 NOTE — PROGRESS NOTE ADULT - SUBJECTIVE AND OBJECTIVE BOX
HPI   58F, on ASA81, transferred from [place] for SAH HH4 WNFS 5 mF4 ruptured R pcomm aneurysm. Was initially found unresponsive on 3/25/24 at 21:00, intubated at 23:00 at OSH, then txfrd. Given ddavp at OSH. Here in ED 1 unit PLTs given and R frontal EVD placed-high pressure. Went for R pcom clip on 3/26/24, postop evd not functioning then drained on the floor.     Admission scores:   SAH R pcomm ruptured @ HH4 WNFS 5 mF4, GCS <7    24h events   sinus bradycardia    Exam   intubated, MILLI, not following commands, BUE localized R>L , LE wd bilateral     TTE 3/26/24   CONCLUSIONS:   1. Left ventricular systolic function is moderately to severely decreased with an ejection fraction of 36 % by Mcgee's method of disks. Regional wall motion abnormalities consistent with ischemic heart disease.   2. Multiple segmental abnormalities exist. See findings.   3. There is moderate (grade 2) left ventricular diastolic dysfunction, with elevated filling pressure.   4. Normal right ventricular cavity size, with normal wall thickness, and normal systolic function. Tricuspid annular plane systolic excursion (TAPSE) is 1.7 cm (normal >=1.7 cm).   5. Normal left and right atrial size.   6. No significant valvular disease.   7. Estimated pulmonary artery systolic pressure is 41 mmHg.   8. The inferior vena cava is normal in size measuring 1.98 cm in diameter, (normal <2.1cm) with abnormal inspiratory collapse (abnormal <50%) consistent with mildly elevated right atrial pressure (~8, range 5-10mmHg).   9. No pericardial effusion seen.  10. No prior echocardiogram is available for comparison.  11. There is no evidence of a left ventricular thrombus.  12. There is increased LV mass and concentric hypertrophy.    T(C): 38 (03-27-24 @ 23:00), Max: 38 (03-27-24 @ 23:00)  HR: 85 (03-28-24 @ 02:45) (46 - 90)  BP: 142/67 (03-27-24 @ 15:00) (115/59 - 170/78)  BP(mean): 97 (03-27-24 @ 15:00) (81 - 112)  RR: 21 (03-28-24 @ 02:00) (12 - 23)  SpO2: 99% (03-28-24 @ 02:45) (98% - 100%)    acetaminophen     Tablet .. 650 milliGRAM(s) Oral every 6 hours PRN  acetaminophen   IVPB .. 1000 milliGRAM(s) IV Intermittent every 6 hours PRN  chlorhexidine 0.12% Liquid 15 milliLiter(s) Oral Mucosa every 12 hours  insulin lispro (ADMELOG) corrective regimen sliding scale   SubCutaneous every 6 hours  levETIRAcetam  Solution 500 milliGRAM(s) Oral two times a day  niMODipine Oral Solution 60 milliGRAM(s) Oral every 4 hours  oxyCODONE    IR 5 milliGRAM(s) Oral every 4 hours PRN  pantoprazole  Injectable 40 milliGRAM(s) IV Push daily  polyethylene glycol 3350 17 Gram(s) Oral at bedtime  potassium chloride  10 mEq/100 mL IVPB 10 milliEquivalent(s) IV Intermittent every 1 hour  senna 2 Tablet(s) Oral at bedtime  sodium chloride 0.9%. 1000 milliLiter(s) IV Continuous <Continuous>        03-26-24 @ 07:01  -  03-27-24 @ 07:00  --------------------------------------------------------  IN: 2179.5 mL / OUT: 1482 mL / NET: 697.5 mL    03-27-24 @ 07:01  -  03-28-24 @ 02:48  --------------------------------------------------------  IN: 1180 mL / OUT: 1823 mL / NET: -643 mL                            10.9   21.50 )-----------( 231      ( 27 Mar 2024 23:32 )             33.3   03-27    145  |  110<H>  |  17  ----------------------------<  146<H>  3.8   |  26  |  0.48<L>

## 2024-03-28 NOTE — EEG REPORT - NS EEG TEXT BOX
[facility]   COMPREHENSIVE EPILEPSY CENTER   REPORT OF CONTINUOUS VIDEO EEG     [facility]: [address], [place] [address], Ph#: [phone]  [facility]:  [address] [place] [address], Ph#: [phone]  [facility]: [address] [place] [address], Ph#: [phone]    Patient Name: JOSE MCLEAN  Age and : 58y (67)  MRN #: [id number]  Location: [place] [address]  Referring Physician: Perlita Varela    Start Time/Date:  on 3-  End Time/Date: 08:00 on 24  Duration: 15H 30min    _____________________________________________________________  STUDY INFORMATION    EEG Recording Technique:  The patient underwent continuous Video-EEG monitoring, using Telemetry System hardware on the XLTek Digital System. EEG and video data were stored on a computer hard drive with important events saved in digital archive files. The material was reviewed by a physician (electroencephalographer / epileptologist) on a daily basis. Vitor and seizure detection algorithms were utilized and reviewed. An EEG Technician attended to the patient, and was available throughout daytime work hours.  The epilepsy center neurologist was available in person or on call 24-hours per day.    EEG Placement and Labeling of Electrodes:  The EEG was performed utilizing 20 channel referential EEG connections (coronal over temporal over parasagittal montage) using all standard 10-20 electrode placements with EKG, with additional electrodes placed in the inferior temporal region using the modified 10-10 montage electrode placements for elective admissions, or if deemed necessary. Recording was at a sampling rate of 256 samples per second per channel. Time synchronized digital video recording was done simultaneously with EEG recording. A low light infrared camera was used for low light recording.     _____________________________________________________________  HISTORY    Patient is a 58y old  Female who presents with a chief complaint of Pt is a 57 yo F transferred from OSH for SAH HH4 mF4 ruptured R PCOMM aneurysm. Intubated at outside hospital and transferred to [facility]. R frontal EVD placed. S/P PCOMM clip 3/26. Remains intubated. Noted with stress-induced cardiomyopathy. Cardiology following.      (28 Mar 2024 07:14)      PERTINENT MEDICATION:  MEDICATIONS  (STANDING):  chlorhexidine 0.12% Liquid 15 milliLiter(s) Oral Mucosa every 12 hours  dexMEDEtomidine Infusion 0.2 MICROgram(s)/kG/Hr (3.56 mL/Hr) IV Continuous <Continuous>  insulin lispro (ADMELOG) corrective regimen sliding scale   SubCutaneous every 6 hours  levETIRAcetam  Solution 500 milliGRAM(s) Oral two times a day  niMODipine Oral Solution 60 milliGRAM(s) Oral every 4 hours  pantoprazole  Injectable 40 milliGRAM(s) IV Push daily  polyethylene glycol 3350 17 Gram(s) Oral at bedtime  potassium chloride  10 mEq/100 mL IVPB 10 milliEquivalent(s) IV Intermittent every 1 hour  senna 2 Tablet(s) Oral at bedtime  sodium chloride 0.9%. 1000 milliLiter(s) (60 mL/Hr) IV Continuous <Continuous>    _____________________________________________________________  STUDY INTERPRETATION    Findings: The background was continuous, spontaneously variable and reactive.   Background predominantly consisted of theta, delta and faster activities, sharply contoured.    Focal Slowing:   Abundant bilateral frontal intermittent theta/delta slowing, left greater than right    Sleep Background:  Drowsiness and stage II sleep transients were not recorded.    Other Non-Epileptiform Findings:  Generalized periodic sharply contoured activity with triphasic appearance.    Interictal Epileptiform Activity:   Occasional to frequent intermixed right and left frontal sharp-wave discharges.    Events:  Clinical events: None recorded.  Seizures: None recorded.    Activation Procedures:   Hyperventilation was not performed.    Photic stimulation was not performed.     Artifacts:  Intermittent myogenic and movement artifacts were noted.    _____________________________________________________________  EEG SUMMARY/CLASSIFICATION    Abnormal EEG  - Independent bilateral frontal sharp-wave discharges  - GPDs with triphasic morphology.   - Bifrontal slowing, left greater than right.  - Moderate generalized slowing.    _____________________________________________________________  EEG IMPRESSION/CLINICAL CORRELATE    Abnormal EEG study.  Potential independent epileptogenic foci in the bilateral frontal regions.  Structural or functional abnormality in the bilateral frontal head regions, left greater than right.  Moderate nonspecific diffuse or multifocal cerebral dysfunction.   _____________________________________________________________    Pipo Tinoco MD   of Neurology  Epilepsy/EEG Attending

## 2024-03-28 NOTE — PROGRESS NOTE ADULT - SUBJECTIVE AND OBJECTIVE BOX
HPI   58F, on ASA81, transferred from [place] for SAH HH4 WNFS 5 mF4 ruptured R pcomm aneurysm. Was initially found unresponsive on 3/25/24 at 21:00, intubated at 23:00 at OSH, then txfrd. Given ddavp at OSH. Here in ED 1 unit PLTs given and R frontal EVD placed-high pressure. Went for R pcom clip on 3/26/24, postop evd not functioning then drained on the floor.     Admission scores:   SAH R pcomm ruptured @ HH4 WNFS 5 mF4, GCS <7    24h events   naeon     Exam   intubated, eyes opens to verbal stimulation briefly, gaze midline, perrl, RUE localized and AG, RUE AG at the elbow, in plan at the shoulder, RLE localized and trace localization LLE     EEG 3/28/24  EEG SUMMARY/CLASSIFICATION    Abnormal EEG  - Independent bilateral frontal sharp-wave discharges  - GPDs with triphasic morphology.   - Bifrontal slowing, left greater than right.  - Moderate generalized slowing.    _____________________________________________________________  EEG IMPRESSION/CLINICAL CORRELATE    Abnormal EEG study.  Potential independent epileptogenic foci in the bilateral frontal regions.  Structural or functional abnormality in the bilateral frontal head regions, left greater than right.  Moderate nonspecific diffuse or multifocal cerebral dysfunction.     TTE 3/26/24   CONCLUSIONS:   1. Left ventricular systolic function is moderately to severely decreased with an ejection fraction of 36 % by Mcgee's method of disks. Regional wall motion abnormalities consistent with ischemic heart disease.   2. Multiple segmental abnormalities exist. See findings.   3. There is moderate (grade 2) left ventricular diastolic dysfunction, with elevated filling pressure.   4. Normal right ventricular cavity size, with normal wall thickness, and normal systolic function. Tricuspid annular plane systolic excursion (TAPSE) is 1.7 cm (normal >=1.7 cm).   5. Normal left and right atrial size.   6. No significant valvular disease.   7. Estimated pulmonary artery systolic pressure is 41 mmHg.   8. The inferior vena cava is normal in size measuring 1.98 cm in diameter, (normal <2.1cm) with abnormal inspiratory collapse (abnormal <50%) consistent with mildly elevated right atrial pressure (~8, range 5-10mmHg).   9. No pericardial effusion seen.  10. No prior echocardiogram is available for comparison.  11. There is no evidence of a left ventricular thrombus.  12. There is increased LV mass and concentric hypertrophy.    VITALS:   Vital Signs Last 24 Hrs  T(C): 37.8 (28 Mar 2024 07:00), Max: 38 (27 Mar 2024 23:00)  T(F): 100 (28 Mar 2024 07:00), Max: 100.4 (27 Mar 2024 23:00)  HR: 53 (28 Mar 2024 10:00) (51 - 90)  BP: 160/72 (28 Mar 2024 10:00) (138/63 - 170/78)  BP(mean): 104 (28 Mar 2024 10:00) (94 - 112)  RR: 17 (28 Mar 2024 10:00) (15 - 23)  SpO2: 100% (28 Mar 2024 10:00) (98% - 100%)    Parameters below as of 28 Mar 2024 07:00  Patient On (Oxygen Delivery Method): ventilator  O2 Flow (L/min): 40    CAPILLARY BLOOD GLUCOSE      POCT Blood Glucose.: 127 mg/dL (28 Mar 2024 05:31)  POCT Blood Glucose.: 126 mg/dL (28 Mar 2024 00:12)  POCT Blood Glucose.: 122 mg/dL (27 Mar 2024 17:12)  POCT Blood Glucose.: 123 mg/dL (27 Mar 2024 12:15)    I&O's Summary    27 Mar 2024 07:01  -  28 Mar 2024 07:00  --------------------------------------------------------  IN: 1754 mL / OUT: 2353 mL / NET: -599 mL    28 Mar 2024 07:01  -  28 Mar 2024 10:49  --------------------------------------------------------  IN: 180 mL / OUT: 19 mL / NET: 161 mL        Respiratory:  Mode: AC/ CMV (Assist Control/ Continuous Mandatory Ventilation)  RR (machine): 16  TV (machine): 450  FiO2: 40  PEEP: 5  ITime: 1  MAP: 8  PIP: 14        LABS:                        10.9   21.50 )-----------( 231      ( 27 Mar 2024 23:32 )             33.3     03-27    145  |  110<H>  |  17  ----------------------------<  146<H>  3.8   |  26  |  0.48<L>        MEDICATION LEVELS:     IVF FLUIDS/MEDICATIONS:   MEDICATIONS  (STANDING):  bisacodyl Suppository 10 milliGRAM(s) Rectal at bedtime  chlorhexidine 0.12% Liquid 15 milliLiter(s) Oral Mucosa every 12 hours  chlorhexidine 4% Liquid 1 Application(s) Topical at bedtime  levETIRAcetam  Solution 500 milliGRAM(s) Oral two times a day  niMODipine Oral Solution 60 milliGRAM(s) Oral every 4 hours  pantoprazole  Injectable 40 milliGRAM(s) IV Push daily  polyethylene glycol 3350 17 Gram(s) Oral at bedtime  potassium chloride  10 mEq/100 mL IVPB 10 milliEquivalent(s) IV Intermittent every 1 hour  senna 2 Tablet(s) Oral at bedtime  sodium chloride 0.9% Bolus 500 milliLiter(s) IV Bolus once  sodium chloride 0.9%. 1000 milliLiter(s) (60 mL/Hr) IV Continuous <Continuous>    MEDICATIONS  (PRN):  acetaminophen     Tablet .. 650 milliGRAM(s) Oral every 6 hours PRN Temp greater or equal to 38.5C (101.3F), Mild Pain (1 - 3)  acetaminophen   IVPB .. 1000 milliGRAM(s) IV Intermittent every 6 hours PRN Severe Pain (7 - 10)  oxyCODONE    IR 5 milliGRAM(s) Oral every 4 hours PRN Moderate Pain (4 - 6)

## 2024-03-28 NOTE — DIETITIAN INITIAL EVALUATION ADULT - ENTERAL
As able, consider EN feeds: Jevity 1.5 at GOAL rate 55ml/hr x 24 hrs + No Carb Prosource TF Free 1x daily (+90kcal, +15g protein). Based on dosing wt 71.2kg, provides 1320ml, 2070kcal (29kcal/kg) and 99g protein (1.39g protein/kg).

## 2024-03-29 LAB
ANION GAP SERPL CALC-SCNC: 12 MMOL/L — SIGNIFICANT CHANGE UP (ref 5–17)
APPEARANCE CSF: ABNORMAL
APPEARANCE SPUN FLD: ABNORMAL
APPEARANCE UR: CLEAR — SIGNIFICANT CHANGE UP
BACTERIA # UR AUTO: NEGATIVE /HPF — SIGNIFICANT CHANGE UP
BILIRUB UR-MCNC: NEGATIVE — SIGNIFICANT CHANGE UP
BUN SERPL-MCNC: 15 MG/DL — SIGNIFICANT CHANGE UP (ref 7–23)
C NEOFORM RRNA SPEC NAA+PROBE-ACNC: SIGNIFICANT CHANGE UP
CALCIUM SERPL-MCNC: 8.8 MG/DL — SIGNIFICANT CHANGE UP (ref 8.4–10.5)
CAST: 1 /LPF — SIGNIFICANT CHANGE UP (ref 0–4)
CHLORIDE SERPL-SCNC: 109 MMOL/L — HIGH (ref 96–108)
CMV DNA CSF QL NAA+PROBE: SIGNIFICANT CHANGE UP
CO2 SERPL-SCNC: 24 MMOL/L — SIGNIFICANT CHANGE UP (ref 22–31)
COLOR CSF: ABNORMAL
COLOR SPEC: YELLOW — SIGNIFICANT CHANGE UP
CREAT SERPL-MCNC: 0.38 MG/DL — LOW (ref 0.5–1.3)
CSF PCR RESULT: SIGNIFICANT CHANGE UP
DIFF PNL FLD: ABNORMAL
E COLI K1 DNA CSF QL NAA+NON-PROBE: SIGNIFICANT CHANGE UP
EGFR: 117 ML/MIN/1.73M2 — SIGNIFICANT CHANGE UP
ESCHERICHIA COLI K1: SIGNIFICANT CHANGE UP
EV RNA CSF QL NAA+PROBE: SIGNIFICANT CHANGE UP
GLUCOSE CSF-MCNC: 65 MG/DL — SIGNIFICANT CHANGE UP (ref 40–70)
GLUCOSE SERPL-MCNC: 148 MG/DL — HIGH (ref 70–99)
GLUCOSE UR QL: NEGATIVE MG/DL — SIGNIFICANT CHANGE UP
GP B STREP DNA SPEC QL NAA+PROBE: SIGNIFICANT CHANGE UP
GRAM STN FLD: SIGNIFICANT CHANGE UP
GRAM STN FLD: SIGNIFICANT CHANGE UP
HAEM INFLU DNA SPEC QL NAA+PROBE: SIGNIFICANT CHANGE UP
HHV6 DNA CSF QL NAA+PROBE: SIGNIFICANT CHANGE UP
HSV1 DNA CSF QL NAA+PROBE: SIGNIFICANT CHANGE UP
HSV2 DNA CSF QL NAA+PROBE: SIGNIFICANT CHANGE UP
KETONES UR-MCNC: NEGATIVE MG/DL — SIGNIFICANT CHANGE UP
L MONOCYTOG DNA SPEC QL NAA+PROBE: SIGNIFICANT CHANGE UP
LACTATE CSF-MCNC: 2.9 MMOL/L — HIGH (ref 1.1–2.4)
LEUKOCYTE ESTERASE UR-ACNC: ABNORMAL
LYMPHOCYTES # CSF: 8 % — LOW (ref 40–80)
MAGNESIUM SERPL-MCNC: 2.2 MG/DL — SIGNIFICANT CHANGE UP (ref 1.6–2.6)
MONOS+MACROS NFR CSF: 1 % — LOW (ref 15–45)
N MEN DNA SPEC QL NAA+PROBE: SIGNIFICANT CHANGE UP
NEUTROPHILS # CSF: 91 % — HIGH (ref 0–6)
NITRITE UR-MCNC: NEGATIVE — SIGNIFICANT CHANGE UP
NRBC NFR CSF: 122 /UL — HIGH (ref 0–5)
OSMOLALITY SERPL: 300 MOSMOL/KG — SIGNIFICANT CHANGE UP (ref 275–300)
PARECHOVIRUS A RNA SPEC QL NAA+PROBE: SIGNIFICANT CHANGE UP
PH UR: 7 — SIGNIFICANT CHANGE UP (ref 5–8)
PHOSPHATE SERPL-MCNC: 3.8 MG/DL — SIGNIFICANT CHANGE UP (ref 2.5–4.5)
POTASSIUM SERPL-MCNC: 4.2 MMOL/L — SIGNIFICANT CHANGE UP (ref 3.5–5.3)
POTASSIUM SERPL-SCNC: 4.2 MMOL/L — SIGNIFICANT CHANGE UP (ref 3.5–5.3)
PROT CSF-MCNC: 174 MG/DL — HIGH (ref 15–45)
PROT UR-MCNC: NEGATIVE MG/DL — SIGNIFICANT CHANGE UP
RBC # CSF: 9900 /UL — HIGH (ref 0–0)
RBC CASTS # UR COMP ASSIST: 50 /HPF — HIGH (ref 0–4)
S PNEUM DNA SPEC QL NAA+PROBE: SIGNIFICANT CHANGE UP
SODIUM SERPL-SCNC: 145 MMOL/L — SIGNIFICANT CHANGE UP (ref 135–145)
SP GR SPEC: 1.01 — SIGNIFICANT CHANGE UP (ref 1–1.03)
SPECIMEN SOURCE: SIGNIFICANT CHANGE UP
SPECIMEN SOURCE: SIGNIFICANT CHANGE UP
SQUAMOUS # UR AUTO: 1 /HPF — SIGNIFICANT CHANGE UP (ref 0–5)
TUBE TYPE: SIGNIFICANT CHANGE UP
UROBILINOGEN FLD QL: 1 MG/DL — SIGNIFICANT CHANGE UP (ref 0.2–1)
VZV DNA CSF QL NAA+PROBE: SIGNIFICANT CHANGE UP
WBC UR QL: 5 /HPF — SIGNIFICANT CHANGE UP (ref 0–5)

## 2024-03-29 PROCEDURE — 70496 CT ANGIOGRAPHY HEAD: CPT | Mod: 26

## 2024-03-29 PROCEDURE — 70450 CT HEAD/BRAIN W/O DYE: CPT | Mod: 26,77

## 2024-03-29 PROCEDURE — 71045 X-RAY EXAM CHEST 1 VIEW: CPT | Mod: 26

## 2024-03-29 PROCEDURE — 95720 EEG PHY/QHP EA INCR W/VEEG: CPT

## 2024-03-29 PROCEDURE — 70450 CT HEAD/BRAIN W/O DYE: CPT | Mod: 26,59

## 2024-03-29 PROCEDURE — 99291 CRITICAL CARE FIRST HOUR: CPT

## 2024-03-29 RX ORDER — MANNITOL
35 POWDER (GRAM) MISCELLANEOUS ONCE
Refills: 0 | Status: COMPLETED | OUTPATIENT
Start: 2024-03-29 | End: 2024-03-29

## 2024-03-29 RX ORDER — NIMODIPINE 60 MG/10ML
30 SOLUTION ORAL
Refills: 0 | Status: DISCONTINUED | OUTPATIENT
Start: 2024-03-29 | End: 2024-03-31

## 2024-03-29 RX ORDER — DEXMEDETOMIDINE HYDROCHLORIDE IN 0.9% SODIUM CHLORIDE 4 UG/ML
0.2 INJECTION INTRAVENOUS
Qty: 200 | Refills: 0 | Status: DISCONTINUED | OUTPATIENT
Start: 2024-03-29 | End: 2024-03-30

## 2024-03-29 RX ORDER — ACETAMINOPHEN 500 MG
1000 TABLET ORAL ONCE
Refills: 0 | Status: COMPLETED | OUTPATIENT
Start: 2024-03-29 | End: 2024-03-29

## 2024-03-29 RX ORDER — CEFAZOLIN SODIUM 1 G
2000 VIAL (EA) INJECTION ONCE
Refills: 0 | Status: DISCONTINUED | OUTPATIENT
Start: 2024-03-29 | End: 2024-03-29

## 2024-03-29 RX ORDER — NICARDIPINE HYDROCHLORIDE 30 MG/1
5 CAPSULE, EXTENDED RELEASE ORAL
Qty: 40 | Refills: 0 | Status: DISCONTINUED | OUTPATIENT
Start: 2024-03-29 | End: 2024-03-29

## 2024-03-29 RX ORDER — DEXAMETHASONE 0.5 MG/5ML
8 ELIXIR ORAL EVERY 8 HOURS
Refills: 0 | Status: COMPLETED | OUTPATIENT
Start: 2024-03-29 | End: 2024-03-29

## 2024-03-29 RX ORDER — SODIUM CHLORIDE 9 MG/ML
250 INJECTION INTRAMUSCULAR; INTRAVENOUS; SUBCUTANEOUS ONCE
Refills: 0 | Status: COMPLETED | OUTPATIENT
Start: 2024-03-29 | End: 2024-03-29

## 2024-03-29 RX ORDER — FENTANYL CITRATE 50 UG/ML
50 INJECTION INTRAVENOUS
Refills: 0 | Status: DISCONTINUED | OUTPATIENT
Start: 2024-03-29 | End: 2024-03-30

## 2024-03-29 RX ORDER — SODIUM CHLORIDE 5 G/100ML
1000 INJECTION, SOLUTION INTRAVENOUS
Refills: 0 | Status: DISCONTINUED | OUTPATIENT
Start: 2024-03-29 | End: 2024-03-30

## 2024-03-29 RX ORDER — FENTANYL CITRATE 50 UG/ML
25 INJECTION INTRAVENOUS
Refills: 0 | Status: DISCONTINUED | OUTPATIENT
Start: 2024-03-29 | End: 2024-03-29

## 2024-03-29 RX ORDER — FENTANYL CITRATE 50 UG/ML
100 INJECTION INTRAVENOUS ONCE
Refills: 0 | Status: DISCONTINUED | OUTPATIENT
Start: 2024-03-29 | End: 2024-03-29

## 2024-03-29 RX ADMIN — CHLORHEXIDINE GLUCONATE 15 MILLILITER(S): 213 SOLUTION TOPICAL at 06:09

## 2024-03-29 RX ADMIN — PANTOPRAZOLE SODIUM 40 MILLIGRAM(S): 20 TABLET, DELAYED RELEASE ORAL at 17:24

## 2024-03-29 RX ADMIN — Medication 101.6 MILLIGRAM(S): at 10:35

## 2024-03-29 RX ADMIN — LEVETIRACETAM 500 MILLIGRAM(S): 250 TABLET, FILM COATED ORAL at 17:21

## 2024-03-29 RX ADMIN — Medication 10 MILLIGRAM(S): at 22:54

## 2024-03-29 RX ADMIN — NIMODIPINE 60 MILLIGRAM(S): 60 SOLUTION ORAL at 06:09

## 2024-03-29 RX ADMIN — SODIUM CHLORIDE 60 MILLILITER(S): 9 INJECTION INTRAMUSCULAR; INTRAVENOUS; SUBCUTANEOUS at 00:59

## 2024-03-29 RX ADMIN — SODIUM CHLORIDE 250 MILLILITER(S): 9 INJECTION INTRAMUSCULAR; INTRAVENOUS; SUBCUTANEOUS at 01:01

## 2024-03-29 RX ADMIN — FENTANYL CITRATE 25 MICROGRAM(S): 50 INJECTION INTRAVENOUS at 21:11

## 2024-03-29 RX ADMIN — SENNA PLUS 2 TABLET(S): 8.6 TABLET ORAL at 22:54

## 2024-03-29 RX ADMIN — FENTANYL CITRATE 100 MICROGRAM(S): 50 INJECTION INTRAVENOUS at 03:45

## 2024-03-29 RX ADMIN — FENTANYL CITRATE 25 MICROGRAM(S): 50 INJECTION INTRAVENOUS at 21:26

## 2024-03-29 RX ADMIN — FENTANYL CITRATE 25 MICROGRAM(S): 50 INJECTION INTRAVENOUS at 08:00

## 2024-03-29 RX ADMIN — Medication 700 GRAM(S): at 11:20

## 2024-03-29 RX ADMIN — FENTANYL CITRATE 25 MICROGRAM(S): 50 INJECTION INTRAVENOUS at 12:00

## 2024-03-29 RX ADMIN — FENTANYL CITRATE 25 MICROGRAM(S): 50 INJECTION INTRAVENOUS at 05:50

## 2024-03-29 RX ADMIN — LEVETIRACETAM 500 MILLIGRAM(S): 250 TABLET, FILM COATED ORAL at 06:09

## 2024-03-29 RX ADMIN — Medication 400 MILLIGRAM(S): at 00:58

## 2024-03-29 RX ADMIN — Medication 400 MILLIGRAM(S): at 08:00

## 2024-03-29 RX ADMIN — FENTANYL CITRATE 25 MICROGRAM(S): 50 INJECTION INTRAVENOUS at 19:25

## 2024-03-29 RX ADMIN — Medication 1000 MILLIGRAM(S): at 01:28

## 2024-03-29 RX ADMIN — FENTANYL CITRATE 25 MICROGRAM(S): 50 INJECTION INTRAVENOUS at 17:10

## 2024-03-29 RX ADMIN — Medication 1 PACKET(S): at 06:41

## 2024-03-29 RX ADMIN — Medication 101.6 MILLIGRAM(S): at 22:53

## 2024-03-29 RX ADMIN — NIMODIPINE 60 MILLIGRAM(S): 60 SOLUTION ORAL at 01:46

## 2024-03-29 RX ADMIN — FENTANYL CITRATE 25 MICROGRAM(S): 50 INJECTION INTRAVENOUS at 19:10

## 2024-03-29 RX ADMIN — POLYETHYLENE GLYCOL 3350 17 GRAM(S): 17 POWDER, FOR SOLUTION ORAL at 22:53

## 2024-03-29 RX ADMIN — CHLORHEXIDINE GLUCONATE 15 MILLILITER(S): 213 SOLUTION TOPICAL at 17:21

## 2024-03-29 RX ADMIN — FENTANYL CITRATE 25 MICROGRAM(S): 50 INJECTION INTRAVENOUS at 08:30

## 2024-03-29 RX ADMIN — Medication 101.6 MILLIGRAM(S): at 17:25

## 2024-03-29 RX ADMIN — Medication 40 MILLIEQUIVALENT(S): at 06:41

## 2024-03-29 RX ADMIN — NICARDIPINE HYDROCHLORIDE 25 MG/HR: 30 CAPSULE, EXTENDED RELEASE ORAL at 06:45

## 2024-03-29 RX ADMIN — Medication 1000 MILLIGRAM(S): at 08:30

## 2024-03-29 RX ADMIN — FENTANYL CITRATE 25 MICROGRAM(S): 50 INJECTION INTRAVENOUS at 17:40

## 2024-03-29 RX ADMIN — FENTANYL CITRATE 25 MICROGRAM(S): 50 INJECTION INTRAVENOUS at 05:20

## 2024-03-29 RX ADMIN — FENTANYL CITRATE 50 MICROGRAM(S): 50 INJECTION INTRAVENOUS at 23:04

## 2024-03-29 RX ADMIN — FENTANYL CITRATE 50 MICROGRAM(S): 50 INJECTION INTRAVENOUS at 23:19

## 2024-03-29 RX ADMIN — FENTANYL CITRATE 100 MICROGRAM(S): 50 INJECTION INTRAVENOUS at 03:15

## 2024-03-29 RX ADMIN — FENTANYL CITRATE 25 MICROGRAM(S): 50 INJECTION INTRAVENOUS at 12:30

## 2024-03-29 NOTE — PROGRESS NOTE ADULT - SUBJECTIVE AND OBJECTIVE BOX
Patient seen and examined at bedside.    --Anticoagulation--  enoxaparin Injectable 40 milliGRAM(s) SubCutaneous <User Schedule>    T(C): 36.6 (03-28-24 @ 15:00), Max: 38 (03-28-24 @ 03:00)  HR: 60 (03-28-24 @ 21:31) (53 - 87)  BP: 210/98 (03-28-24 @ 14:00) (138/63 - 210/98)  RR: 22 (03-28-24 @ 21:00) (16 - 22)  SpO2: 100% (03-28-24 @ 21:31) (96% - 100%)  Wt(kg): --    Exam: Intubated, SAVI, right periorbital edema, PERRL, +cough/gag/OB, FC, Right side spont AG, LUE wd, LLE wds

## 2024-03-29 NOTE — PROGRESS NOTE ADULT - ASSESSMENT
DeliaJie  58F, on ASA81 for pain relief? 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 Was initially found unresponsive at 21:00, intubated at 23:00 at OSH, then txfrd. Given ddavp at OSH. On arrival patient w/ PERRL, +cough/gag, 2/5 spontaneous movement in LUE, o/w no movement. 1 unit PLTs given and R frontal EVD placed-high pressure. PLTs/Coags wnl. Exam improved: intubated, PERRL, BUE localize, b/l wd       wean to extubate to bipap  d/c eeg  TCDs daily  repeat LED 4/2  repeat TTE 3/31  OT splints - p

## 2024-03-29 NOTE — EEG REPORT - NS EEG TEXT BOX
[facility]   COMPREHENSIVE EPILEPSY CENTER   REPORT OF CONTINUOUS VIDEO EEG     [facility]: [address] [place] [address] [address], [place] [address], Ph#: [phone]  [facility]:  [address] [place] [address], Ph#: [phone]  [facility]: [address] [place] [address], Ph#: [phone]    Patient Name: JOSE MCLEAN  Age and : 58y (67)  MRN #: [id number]  Location: [place] [address]  Referring Physician: Perlita Varela    Start Time/Date: 0800 on 3-  End Time/Date: 08:00 on 24  Duration: 24H   _____________________________________________________________  STUDY INFORMATION    EEG Recording Technique:  The patient underwent continuous Video-EEG monitoring, using Telemetry System hardware on the XLTek Digital System. EEG and video data were stored on a computer hard drive with important events saved in digital archive files. The material was reviewed by a physician (electroencephalographer / epileptologist) on a daily basis. Vitor and seizure detection algorithms were utilized and reviewed. An EEG Technician attended to the patient, and was available throughout daytime work hours.  The epilepsy center neurologist was available in person or on call 24-hours per day.    EEG Placement and Labeling of Electrodes:  The EEG was performed utilizing 20 channel referential EEG connections (coronal over temporal over parasagittal montage) using all standard 10-20 electrode placements with EKG, with additional electrodes placed in the inferior temporal region using the modified 10-10 montage electrode placements for elective admissions, or if deemed necessary. Recording was at a sampling rate of 256 samples per second per channel. Time synchronized digital video recording was done simultaneously with EEG recording. A low light infrared camera was used for low light recording.     _____________________________________________________________  HISTORY    Patient is a 58y old  Female who presents with a chief complaint of Pt is a 57 yo F transferred from OSH for SAH HH4 mF4 ruptured R PCOMM aneurysm. Intubated at outside hospital and transferred to [facility]. R frontal EVD placed. S/P PCOMM clip 3/26. Remains intubated. Noted with stress-induced cardiomyopathy. Cardiology following.      (28 Mar 2024 07:14)      PERTINENT MEDICATION:  MEDICATIONS  (STANDING):  chlorhexidine 0.12% Liquid 15 milliLiter(s) Oral Mucosa every 12 hours  dexMEDEtomidine Infusion 0.2 MICROgram(s)/kG/Hr (3.56 mL/Hr) IV Continuous <Continuous>  insulin lispro (ADMELOG) corrective regimen sliding scale   SubCutaneous every 6 hours  levETIRAcetam  Solution 500 milliGRAM(s) Oral two times a day  niMODipine Oral Solution 60 milliGRAM(s) Oral every 4 hours  pantoprazole  Injectable 40 milliGRAM(s) IV Push daily  polyethylene glycol 3350 17 Gram(s) Oral at bedtime  potassium chloride  10 mEq/100 mL IVPB 10 milliEquivalent(s) IV Intermittent every 1 hour  senna 2 Tablet(s) Oral at bedtime  sodium chloride 0.9%. 1000 milliLiter(s) (60 mL/Hr) IV Continuous <Continuous>    _____________________________________________________________  STUDY INTERPRETATION    Findings: The background was continuous, spontaneously variable and reactive.   Background predominantly consisted of theta, delta and faster activities, sharply contoured.    Focal Slowing:   Abundant bilateral frontal intermittent theta/delta slowing, left greater than right    Sleep Background:  Drowsiness and stage II sleep transients were not recorded.    Other Non-Epileptiform Findings:  Generalized periodic sharply contoured activity with triphasic appearance.    Interictal Epileptiform Activity:   Occasional to frequent intermixed right and left frontal sharp-wave discharges.    Events:  Clinical events: None recorded.  Seizures: None recorded.    Activation Procedures:   Hyperventilation was not performed.    Photic stimulation was not performed.     Artifacts:  Intermittent myogenic and movement artifacts were noted.    _____________________________________________________________  EEG SUMMARY/CLASSIFICATION    Abnormal EEG  - Independent bilateral frontal sharp-wave discharges  - GPDs with triphasic morphology.   - Bifrontal slowing, left greater than right.  - Moderate generalized slowing.    _____________________________________________________________  EEG IMPRESSION/CLINICAL CORRELATE    Abnormal EEG study.  Potential independent epileptogenic foci in the bilateral frontal regions.  Structural or functional abnormality in the bilateral frontal head regions, left greater than right.  Moderate nonspecific diffuse or multifocal cerebral dysfunction.     **In absence of additional clinical concerns, recommend consideration for discontinuation of current EEG study with reconnection in future if warranted.  _____________________________________________________________    Pipo Tinoco MD   of Neurology  Epilepsy/EEG Attending   [facility]   COMPREHENSIVE EPILEPSY CENTER   REPORT OF CONTINUOUS VIDEO EEG     [facility]: [address], [place] [address], Ph#: [phone]  [facility]: [id number], [place] [address], Ph#: [phone]  [facility]: [address] [place] [address], Ph#: [phone]    Patient Name: JOSE MCLEAN  Age and : 58y (67)  MRN #: [id number]  Location: [place] [address]  Referring Physician: Perlita Varela    Start Time/Date: 0800 on 3-  End Time/Date: 12:05 on 24  Duration: 28H   _____________________________________________________________  STUDY INFORMATION    EEG Recording Technique:  The patient underwent continuous Video-EEG monitoring, using Telemetry System hardware on the XLTek Digital System. EEG and video data were stored on a computer hard drive with important events saved in digital archive files. The material was reviewed by a physician (electroencephalographer / epileptologist) on a daily basis. Vitor and seizure detection algorithms were utilized and reviewed. An EEG Technician attended to the patient, and was available throughout daytime work hours.  The epilepsy center neurologist was available in person or on call 24-hours per day.    EEG Placement and Labeling of Electrodes:  The EEG was performed utilizing 20 channel referential EEG connections (coronal over temporal over parasagittal montage) using all standard 10-20 electrode placements with EKG, with additional electrodes placed in the inferior temporal region using the modified 10-10 montage electrode placements for elective admissions, or if deemed necessary. Recording was at a sampling rate of 256 samples per second per channel. Time synchronized digital video recording was done simultaneously with EEG recording. A low light infrared camera was used for low light recording.     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 Intubated at outside hospital and transferred to [facility]. R frontal EVD placed. S/P PCOMM clip 3/26. Remains intubated. Noted with stress-induced cardiomyopathy. Cardiology following.      (28 Mar 2024 07:14)      PERTINENT MEDICATION:  MEDICATIONS  (STANDING):  chlorhexidine 0.12% Liquid 15 milliLiter(s) Oral Mucosa every 12 hours  dexMEDEtomidine Infusion 0.2 MICROgram(s)/kG/Hr (3.56 mL/Hr) IV Continuous <Continuous>  insulin lispro (ADMELOG) corrective regimen sliding scale   SubCutaneous every 6 hours  levETIRAcetam  Solution 500 milliGRAM(s) Oral two times a day  niMODipine Oral Solution 60 milliGRAM(s) Oral every 4 hours  pantoprazole  Injectable 40 milliGRAM(s) IV Push daily  polyethylene glycol 3350 17 Gram(s) Oral at bedtime  potassium chloride  10 mEq/100 mL IVPB 10 milliEquivalent(s) IV Intermittent every 1 hour  senna 2 Tablet(s) Oral at bedtime  sodium chloride 0.9%. 1000 milliLiter(s) (60 mL/Hr) IV Continuous <Continuous>    _____________________________________________________________  STUDY INTERPRETATION    Findings: The background was continuous, spontaneously variable and reactive.   Background predominantly consisted of theta, delta and faster activities, sharply contoured.    Focal Slowing:   Abundant bilateral frontal intermittent theta/delta slowing, left greater than right    Sleep Background:  Drowsiness and stage II sleep transients were not recorded.    Other Non-Epileptiform Findings:  Generalized periodic sharply contoured activity with triphasic appearance.    Interictal Epileptiform Activity:   Occasional to frequent intermixed right and left frontal sharp-wave discharges.    Events:  Clinical events: None recorded.  Seizures: None recorded.    Activation Procedures:   Hyperventilation was not performed.    Photic stimulation was not performed.     Artifacts:  Intermittent myogenic and movement artifacts were noted.    _____________________________________________________________  EEG SUMMARY/CLASSIFICATION    Abnormal EEG  - Independent bilateral frontal sharp-wave discharges  - GPDs with triphasic morphology.   - Bifrontal slowing, left greater than right.  - Moderate generalized slowing.    _____________________________________________________________  EEG IMPRESSION/CLINICAL CORRELATE    Abnormal EEG study.  Potential independent epileptogenic foci in the bilateral frontal regions.  Structural or functional abnormality in the bilateral frontal head regions, left greater than right.  Moderate nonspecific diffuse or multifocal cerebral dysfunction.     **In absence of additional clinical concerns, recommend consideration for discontinuation of current EEG study with reconnection in future if warranted.  _____________________________________________________________    Pipo Tinoco MD   of Neurology  Epilepsy/EEG Attending

## 2024-03-29 NOTE — PROGRESS NOTE ADULT - ATTENDING COMMENTS
57 yo woman with no PMH, admitted with unresponsiveness, found to have a HH4 mF4 SAH (bleed day 3/25) from R PComm aneurysm s/p clipping and R frontal EVD.   TTE with EF 37% with regional wall motion abnormalities.     Interval events:  Overnight EVD stopped working, repositioned, repeat CTH with a tract hemorrhage in the R BG/corona radiata, also with concern for b/l uncal herniation.    Febrile to 38.5.   EVD at 10cm H2O, output 136cc, dampened wave form   Is and Os net pos 500cc.     On exam, opens eyes briefly to voice, does not attend, follows some simple commands (showing 2 fingers on the R), R pupil 2mm larger than L, R arm AG and localizing, L arm AG to elbow, moves R leg spontaneously in plane of bed, minimal L leg withdrawal     PBD 4.     CTH/CTA for anisocoria and patient not attending on exam  mannitol for anisocoria and CTH concerning for uncal herniation   minimize sedation, on low dose Precedex   keppra 500 mg BID for 7 days from craniotomy   Nimodipine 60 mg q4h  TCDs  euvolemia (of note with low EF 37%)  ACVC, no cuff leak, start dex 10 mg q8h for 3 doses   SBP goal 120-160, with elevated QTc   Na 145-155, start 3% HTS at 50cc/hr  NPO until CTH, LBM PTA   PPI  hold Lov ppx for new tract hemorrhage, with L soleal DVT    Patient seen and examined by attending on 3/29/2023.    Patient is critically ill due to SAH and at high risk for neurological deterioration or death due to: hydrocephalus requiring an EVD for CSF diversion, intubated for airway protection.

## 2024-03-29 NOTE — CHART NOTE - NSCHARTNOTEFT_GEN_A_CORE
: Zuleyka Lebron     DATE/TIME: 03/29/24, 8am    Complete []   Incomplete [x]     PROCEDURE:   TCCD [x]   TCD []   ROBOTIC TCD []     INDICATIONS:   Estimation for high risk ICP [x]   Perfusion limiting/driven ICP crisis []   SAH [x]   TBI []   Neuromonitoring post cardiac arrest, liver failure, ECMO []  Cerebral autoregulation []     DATA:                         10.6   15.47 )-----------( 196      ( 28 Mar 2024 22:51 )             32.1       FINDINGS:     Depth (cm)/Mean flow velocity (cm/s)/ Pulsality Index     Occipital windows     RIGHT:   VA  mid: 6/-26±5/1.58    LEFT:   VA   prox: 5/-22±4/1.59   mid: 6/-26±5/1.46   dist: 7/-23±5/1.43     Basilar 7/-33±7/1.18     Normal PI range 0.6-1.2    INTERPRETATION:   High resistance with sharp upstroke, low EDV, normal bilateral vertebral arteries and basilar artery velocities, high PI.     Images stored on PACS/[facility]

## 2024-03-29 NOTE — CHART NOTE - NSCHARTNOTEFT_GEN_A_CORE
:  Zuleyka Lebron    DATE/TIME: 03/28/24, 10:45AM    INDICATION:    [x] ICP monitoring    [x] SAH     [ ] TBI    [ ] Other:       PROCEDURE:    [x] LIMITED OPHTHALMIC     [ ] OTHER      FINDINGS:  OS:   ONSD 0.66/0.62 (mm)   Papilledema     OD:   ONSD 0.50 (mm)   Papilledema     INTERPRETATION:   Increased ONSD with bilateral papilledema.   Consistent with repeat CTH of this morning     Images stored on PACS/QPATH :  Zuleyka Lebron    DATE/TIME: 03/28/24, 10:45AM    INDICATION:    [x] ICP monitoring    [x] SAH     [ ] TBI    [ ] Other:       PROCEDURE:    [x] LIMITED OPHTHALMIC     [ ] OTHER      FINDINGS:  OS:   ONSD 0.66/0.62 (mm)   Papilledema     OD:   ONSD 0.50/0.52 (mm)   Papilledema     INTERPRETATION:   Increased ONSD with bilateral papilledema.   Consistent with repeat CTH of this morning     Images stored on PACS/QPATH

## 2024-03-29 NOTE — PROGRESS NOTE ADULT - ASSESSMENT
[facility]   Post-SAH day 3, SAH 3/25/2024  3/26/24 evd placed  3/26/24 pcomm clip    N   # @ Pcomm ruptured, s/p clip: CTH/CTA   #EVD tract acute hemorrhage   #Hydrocephalus/ICP crisis: R EVD in place 10cm H2O 136 last 24h - no readable wave - mannitol 0.5 before CTH/CTA   #Delayed Cerebral Ischemia Management: TCDs with poor temporal windows, follow up occipital windows, euvolemia, nimodipine/ net neg give a bolus 500cc NS  #Seizure Prophylaxis: Levetiracetam for 7 days  #Pain: PRN tylenol oxi  #Sedation precedex   Activity: [] OOB as tolerated [x] Bedrest [] PT [] OT [] PMNR    CV   SBP goal 120-160 if vasospasm on CTA can liberalize - started cardene   #R heart strain: POCUS with RV strain and non collapsable IVC, dysmotility of LV   TTE decreased systolic cardiac function EF 36% with decreased diastolic function    P   #respiratory failure  intubated   AC 16/450/5/40% sat 100%   oral secretions, no in line   dexamethasone     R   I&o    GI   #REE: IC  Diet: npo   PPI   Senna miralax  BM PTA     E   Goal euglycemia (-180)  A1c 5.5   ISS     H   SCD   Chemoppx held for ICH  LED R below knee evd    ID   Fever   fu blood culture no growth     lines  R evd  a line  ETT   pivs  ngtube     CODE STATUS: FULL CODE    DISPOSITION: ICU     [facility]   Post-SAH day 3, SAH 3/25/2024  3/26/24 evd placed  3/26/24 pcomm clip    N   # @ Pcomm ruptured, s/p clip: CTH/CTA   #EVD tract acute hemorrhage   #Hydrocephalus/ICP crisis: R EVD in place 10cm H2O 136 last 24h - no readable wave - mannitol 0.5 before CTH/CTA, added 3% 50cc/h Na goal 145-155   #Delayed Cerebral Ischemia Management: TCDs with poor temporal windows, follow up occipital windows, euvolemia, nimodipine/ net neg give a bolus 500cc NS  #Seizure Prophylaxis: Levetiracetam for 7 days  #Pain: PRN tylenol oxi  #Sedation precedex   Activity: [] OOB as tolerated [x] Bedrest [] PT [] OT [] PMNR    CV   SBP goal 120-160 if vasospasm on CTA can liberalize - started cardene   #R heart strain: POCUS with RV strain and non collapsable IVC, dysmotility of LV   TTE decreased systolic cardiac function EF 36% with decreased diastolic function    P   #respiratory failure  intubated   AC 16/450/5/40% sat 100%   oral secretions, no in line   dexamethasone     R   I&o    GI   #REE: IC  Diet: npo   PPI   Senna miralax  BM PTA     E   Goal euglycemia (-180)  A1c 5.5   ISS     H   SCD   Chemoppx held for ICH  LED R below knee evd    ID   Fever   fu blood culture no growth     lines  R evd  a line  ETT   pivs  ngtube     CODE STATUS: FULL CODE    DISPOSITION: ICU     CRITICAL

## 2024-03-29 NOTE — PROGRESS NOTE ADULT - ASSESSMENT
58F, on ASA81 for pain relief? 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 Was initially found unresponsive at 21:00, intubated at 23:00 at OSH, then txfrd. Given ddavp at OSH. On arrival patient w/ PERRL, +cough/gag, 2/5 spontaneous movement in LUE, o/w no movement. 1 unit PLTs given and R frontal EVD placed-high pressure. PLTs/Coags wnl.     Intubated sedated in NSCU   Echo with abnormal findings of severe cardiomyopathy and RWMA

## 2024-03-29 NOTE — PROGRESS NOTE ADULT - ASSESSMENT
[facility]   Post-SAH day 3, SAH 3/25/2024  3/26/24 evd placed  3/26/24 pcomm clip    N   # @ Pcomm ruptured, s/p clip: CTH/CTA   #Seizure Prophylaxis: Levetiracetam for 7 days - can dc EEG   #Delayed Cerebral Ischemia Management: TCDs with poor temporal windows, follow up occipital windows, euvolemia, nimodipine/ net neg give a bolus 250cc NS  #Hydrocephalus: R EVD in place 10cm H2O 72cc drained last 24h - non compliant wave   #Pain: PRN tylenol oxi  Activity: [] OOB as tolerated [x] Bedrest [] PT [] OT [] PMNR    CV   SBP goal 100-160   # R heart strain: POCUS with RV strain and non collapsable IVC, dysmotility of LV   TTE decreased systolic cardiac function EF 36% with decreased diastolic function    P   intubated   AC 16/450/5/40% sat 100%   oral secretions, no in line   CPAP trial as of 4 AM    R   I&o  normonatremic - keep euvolemic    GI   Diet: Jevity 1.2. npo at 4AM for extubation trial  PPI   Senna miralax  BM PTA     E   Goal euglycemia (-180)  A1c 5.5   ISS     H   SCD   Chemoppx   LED pending     ID   Low grade fever   fu blood culture no growth     lines  R axillary line  ETT   pivs  ngtube

## 2024-03-29 NOTE — PROGRESS NOTE ADULT - SUBJECTIVE AND OBJECTIVE BOX
HPI   58F, on ASA81, transferred from [place] for SAH HH4 WNFS 5 mF4 ruptured R pcomm aneurysm. Was initially found unresponsive on 3/25/24 at 21:00, intubated at 23:00 at OSH, then txfrd. Given ddavp at OSH. Here in ED 1 unit PLTs given and R frontal EVD placed-high pressure. Went for R pcom clip on 3/26/24, postop evd not functioning then drained on the floor.     Admission scores:   SAH R pcomm ruptured @ HH4 WNFS 5 mF4, GCS <7    24h events   overnight evd displaced and replaced in situ, complicated with IPH and stroke at the previous location of the evd  fever  started cardene for sbp goal 120-140     Now worsening ICP, evd not reliable but draining, right eye reactive midriasis    Exam   intubated, eyes opens to verbal stimulation briefly, gaze midline, anisocoria, OS reactive to lightt 2mm, OD 4mm reactive to light, RUE localized and AG, RUE AG at the elbow, in plan at the shoulder, RLE localized and trace localization LLE     LED 3/28   IMPRESSION:  Acute left lower extremity DVT below the knee, involving the left soleal   vein. Edema of the superficial soft tissues of the left calf. Correlate   clinically.  No acute DVT of the right lower extremity.    EEG 3/28/24  EEG SUMMARY/CLASSIFICATION    Abnormal EEG  - Independent bilateral frontal sharp-wave discharges  - GPDs with triphasic morphology.   - Bifrontal slowing, left greater than right.  - Moderate generalized slowing.    _____________________________________________________________  EEG IMPRESSION/CLINICAL CORRELATE    Abnormal EEG study.  Potential independent epileptogenic foci in the bilateral frontal regions.  Structural or functional abnormality in the bilateral frontal head regions, left greater than right.  Moderate nonspecific diffuse or multifocal cerebral dysfunction.     TTE 3/26/24   CONCLUSIONS:   1. Left ventricular systolic function is moderately to severely decreased with an ejection fraction of 36 % by Mcgee's method of disks. Regional wall motion abnormalities consistent with ischemic heart disease.   2. Multiple segmental abnormalities exist. See findings.   3. There is moderate (grade 2) left ventricular diastolic dysfunction, with elevated filling pressure.   4. Normal right ventricular cavity size, with normal wall thickness, and normal systolic function. Tricuspid annular plane systolic excursion (TAPSE) is 1.7 cm (normal >=1.7 cm).   5. Normal left and right atrial size.   6. No significant valvular disease.   7. Estimated pulmonary artery systolic pressure is 41 mmHg.   8. The inferior vena cava is normal in size measuring 1.98 cm in diameter, (normal <2.1cm) with abnormal inspiratory collapse (abnormal <50%) consistent with mildly elevated right atrial pressure (~8, range 5-10mmHg).   9. No pericardial effusion seen.  10. No prior echocardiogram is available for comparison.  11. There is no evidence of a left ventricular thrombus.  12. There is increased LV mass and concentric hypertrophy.    VITALS:   Vital Signs Last 24 Hrs  T(C): 37.8 (28 Mar 2024 07:00), Max: 38 (27 Mar 2024 23:00)  T(F): 100 (28 Mar 2024 07:00), Max: 100.4 (27 Mar 2024 23:00)  HR: 53 (28 Mar 2024 10:00) (51 - 90)  BP: 160/72 (28 Mar 2024 10:00) (138/63 - 170/78)  BP(mean): 104 (28 Mar 2024 10:00) (94 - 112)  RR: 17 (28 Mar 2024 10:00) (15 - 23)  SpO2: 100% (28 Mar 2024 10:00) (98% - 100%)    Parameters below as of 28 Mar 2024 07:00  Patient On (Oxygen Delivery Method): ventilator  O2 Flow (L/min): 40    CAPILLARY BLOOD GLUCOSE      POCT Blood Glucose.: 127 mg/dL (28 Mar 2024 05:31)  POCT Blood Glucose.: 126 mg/dL (28 Mar 2024 00:12)  POCT Blood Glucose.: 122 mg/dL (27 Mar 2024 17:12)  POCT Blood Glucose.: 123 mg/dL (27 Mar 2024 12:15)    I&O's Summary    27 Mar 2024 07:01  -  28 Mar 2024 07:00  --------------------------------------------------------  IN: 1754 mL / OUT: 2353 mL / NET: -599 mL    28 Mar 2024 07:01  -  28 Mar 2024 10:49  --------------------------------------------------------  IN: 180 mL / OUT: 19 mL / NET: 161 mL        Respiratory:  Mode: AC/ CMV (Assist Control/ Continuous Mandatory Ventilation)  RR (machine): 16  TV (machine): 450  FiO2: 40  PEEP: 5  ITime: 1  MAP: 8  PIP: 14        LABS:                        10.9   21.50 )-----------( 231      ( 27 Mar 2024 23:32 )             33.3     03-27    145  |  110<H>  |  17  ----------------------------<  146<H>  3.8   |  26  |  0.48<L>        MEDICATION LEVELS:     IVF FLUIDS/MEDICATIONS:   MEDICATIONS  (STANDING):  bisacodyl Suppository 10 milliGRAM(s) Rectal at bedtime  chlorhexidine 0.12% Liquid 15 milliLiter(s) Oral Mucosa every 12 hours  chlorhexidine 4% Liquid 1 Application(s) Topical at bedtime  levETIRAcetam  Solution 500 milliGRAM(s) Oral two times a day  niMODipine Oral Solution 60 milliGRAM(s) Oral every 4 hours  pantoprazole  Injectable 40 milliGRAM(s) IV Push daily  polyethylene glycol 3350 17 Gram(s) Oral at bedtime  potassium chloride  10 mEq/100 mL IVPB 10 milliEquivalent(s) IV Intermittent every 1 hour  senna 2 Tablet(s) Oral at bedtime  sodium chloride 0.9% Bolus 500 milliLiter(s) IV Bolus once  sodium chloride 0.9%. 1000 milliLiter(s) (60 mL/Hr) IV Continuous <Continuous>    MEDICATIONS  (PRN):  acetaminophen     Tablet .. 650 milliGRAM(s) Oral every 6 hours PRN Temp greater or equal to 38.5C (101.3F), Mild Pain (1 - 3)  acetaminophen   IVPB .. 1000 milliGRAM(s) IV Intermittent every 6 hours PRN Severe Pain (7 - 10)  oxyCODONE    IR 5 milliGRAM(s) Oral every 4 hours PRN Moderate Pain (4 - 6)

## 2024-03-29 NOTE — PROGRESS NOTE ADULT - SUBJECTIVE AND OBJECTIVE BOX
Subjective: Patient seen and examined. No new events except as noted.   remains in NSCU       REVIEW OF SYSTEMS:    CONSTITUTIONAL: + weakness, fevers or chills  EYES/ENT: No visual changes;  No vertigo or throat pain   NECK: No pain or stiffness  RESPIRATORY: No cough, wheezing, hemoptysis; No shortness of breath  CARDIOVASCULAR: No chest pain or palpitations  GASTROINTESTINAL: No abdominal or epigastric pain. No nausea, vomiting, or hematemesis; No diarrhea or constipation. No melena or hematochezia.  GENITOURINARY: No dysuria, frequency or hematuria  NEUROLOGICAL: No numbness or weakness  SKIN: No itching, burning, rashes, or lesions   All other review of systems is negative unless indicated above.    MEDICATIONS:  MEDICATIONS  (STANDING):  bisacodyl Suppository 10 milliGRAM(s) Rectal at bedtime  chlorhexidine 0.12% Liquid 15 milliLiter(s) Oral Mucosa every 12 hours  chlorhexidine 4% Liquid 1 Application(s) Topical at bedtime  dexAMETHasone  IVPB 8 milliGRAM(s) IV Intermittent every 8 hours  dexMEDEtomidine Infusion 0.2 MICROgram(s)/kG/Hr (3.56 mL/Hr) IV Continuous <Continuous>  levETIRAcetam  Solution 500 milliGRAM(s) Oral two times a day  niCARdipine Infusion 5 mG/Hr (25 mL/Hr) IV Continuous <Continuous>  niMODipine Oral Solution 60 milliGRAM(s) Oral every 4 hours  pantoprazole  Injectable 40 milliGRAM(s) IV Push daily  polyethylene glycol 3350 17 Gram(s) Oral at bedtime  senna 2 Tablet(s) Oral at bedtime  sodium chloride 0.9%. 1000 milliLiter(s) (60 mL/Hr) IV Continuous <Continuous>      PHYSICAL EXAM:  T(C): 38.5 (24 @ 08:00), Max: 38.5 (24 @ 08:00)  HR: 60 (24 @ 09:30) (57 - 92)  BP: 210/98 (24 @ 14:00) (159/73 - 210/98)  RR: 24 (24 @ 09:30) (16 - 26)  SpO2: 100% (24 @ 09:30) (96% - 100%)  Wt(kg): --  I&O's Summary    28 Mar 2024 07:01  -  29 Mar 2024 07:00  --------------------------------------------------------  IN: 2957.5 mL / OUT: 2436 mL / NET: 521.5 mL              Appearance: Intubated  R frontal EVD  HEENT:  dry oral mucosa, PERRL, EOMI	  Lymphatic: No lymphadenopathy  Cardiovascular: Normal S1 S2, No JVD, No murmurs, No edema  Respiratory: ventilated   Psychiatry: A & O x 0  Gastrointestinal:  Soft, Non-tender, + BS	  Skin: No rashes, No ecchymoses, No cyanosis	  Neurologic: perrl, BUE localized, LE wd bilateral   Extremities: Normal range of motion, No clubbing, cyanosis or edema  Vascular: Peripheral pulses palpable 2+ bilaterally            LABS:    CARDIAC MARKERS:                                10.6   15.47 )-----------( 196      ( 28 Mar 2024 22:51 )             32.1         142  |  106  |  12  ----------------------------<  135<H>  3.6   |  21<L>  |  0.41<L>    Ca    8.5      28 Mar 2024 22:51  Phos  2.8       Mg     2.0         TELEMETRY: 	SR    ECG:  	  RADIOLOGY: Chart Note-TCCD Fellow [Charted Location: [place] [address]] [Authored: 29-Mar-2024 09:25]- for Visit: [id number], Complete, Entered, Signed in Full, Available to Patient      · Note Type	TCCD      : Zuleyka Lebron     DATE/TIME: 24, 8am    Complete []   Incomplete [x]     PROCEDURE:   TCCD [x]   TCD []   ROBOTIC TCD []     INDICATIONS:   Estimation for high risk ICP [x]   Perfusion limiting/driven ICP crisis []   SAH [x]   TBI []   Neuromonitoring post cardiac arrest, liver failure, ECMO []  Cerebral autoregulation []     DATA:                         10.6   15.47 )-----------( 196      ( 28 Mar 2024 22:51 )             32.1       FINDINGS:     Depth (cm)/Mean flow velocity (cm/s)/ Pulsality Index     Occipital windows     RIGHT:   VA  mid: 6/-26±5/1.58    LEFT:   VA   prox: 5/-22±4/1.59   mid: 6/-26±5/1.46   dist: 7/-23±5/1.43     Basilar 7/-33±7/1.18     Normal PI range 0.6-1.2    INTERPRETATION:   High resistance with sharp upstroke, low EDV, normal bilateral vertebral arteries and basilar artery velocities, high PI.     Images stored on PACS/QPATH.      Electronic Signatures:  Zuleyka Lebron)  (Signed 29-Mar-2024 09:41)  	Authored: Note Type, Note      Last Updated: 29-Mar-2024 09:41 by uZleyka Lebron (MD)      DIAGNOSTIC TESTING:  [ ] Echocardiogram:  [ ]  Catheterization:  [ ] Stress Test:    OTHER: 	    eeEEG REPORT:   EEG Report:  · EEG Report	  [facility] EPILEPSY [place]   REPORT OF CONTINUOUS VIDEO EEG     [facility]: [address] [place] [address], [address] [address], Ph#: [phone]  [facility]: [address] [place] [address], Ph#: [phone]  [facility]: [address] [place] [address], Ph#: [phone]    Patient Name: JOSE MCLEAN  Age and : 58y (67)  MRN #: [id number]  Location: [place] [address]  Referring Physician: Perlita Varela    Start Time/Date: 0800 on 3-  End Time/Date: 08:00 on 24  Duration: 24H   _____________________________________________________________  STUDY INFORMATION    EEG Recording Technique:  The patient underwent continuous Video-EEG monitoring, using Telemetry System hardware on the XLTek Digital System. EEG and video data were stored on a computer hard drive with important events saved in digital archive files. The material was reviewed by a physician (electroencephalographer / epileptologist) on a daily basis. Vitor and seizure detection algorithms were utilized and reviewed. An EEG Technician attended to the patient, and was available throughout daytime work hours.  The epilepsy center neurologist was available in person or on call 24-hours per day.    EEG Placement and Labeling of Electrodes:  The EEG was performed utilizing 20 channel referential EEG connections (coronal over temporal over parasagittal montage) using all standard 10-20 electrode placements with EKG, with additional electrodes placed in the inferior temporal region using the modified 10-10 montage electrode placements for elective admissions, or if deemed necessary. Recording was at a sampling rate of 256 samples per second per channel. Time synchronized digital video recording was done simultaneously with EEG recording. A low light infrared camera was used for low light recording.     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 Intubated at outside hospital and transferred to [facility]. R frontal EVD placed. S/P PCOMM clip 3/26. Remains intubated. Noted with stress-induced cardiomyopathy. Cardiology following.      (28 Mar 2024 07:14)      PERTINENT MEDICATION:  MEDICATIONS  (STANDING):  chlorhexidine 0.12% Liquid 15 milliLiter(s) Oral Mucosa every 12 hours  dexMEDEtomidine Infusion 0.2 MICROgram(s)/kG/Hr (3.56 mL/Hr) IV Continuous <Continuous>  insulin lispro (ADMELOG) corrective regimen sliding scale   SubCutaneous every 6 hours  levETIRAcetam  Solution 500 milliGRAM(s) Oral two times a day  niMODipine Oral Solution 60 milliGRAM(s) Oral every 4 hours  pantoprazole  Injectable 40 milliGRAM(s) IV Push daily  polyethylene glycol 3350 17 Gram(s) Oral at bedtime  potassium chloride  10 mEq/100 mL IVPB 10 milliEquivalent(s) IV Intermittent every 1 hour  senna 2 Tablet(s) Oral at bedtime  sodium chloride 0.9%. 1000 milliLiter(s) (60 mL/Hr) IV Continuous <Continuous>    _____________________________________________________________  STUDY INTERPRETATION    Findings: The background was continuous, spontaneously variable and reactive.   Background predominantly consisted of theta, delta and faster activities, sharply contoured.    Focal Slowing:   Abundant bilateral frontal intermittent theta/delta slowing, left greater than right    Sleep Background:  Drowsiness and stage II sleep transients were not recorded.    Other Non-Epileptiform Findings:  Generalized periodic sharply contoured activity with triphasic appearance.    Interictal Epileptiform Activity:   Occasional to frequent intermixed right and left frontal sharp-wave discharges.    Events:  Clinical events: None recorded.  Seizures: None recorded.    Activation Procedures:   Hyperventilation was not performed.    Photic stimulation was not performed.     Artifacts:  Intermittent myogenic and movement artifacts were noted.    _____________________________________________________________  EEG SUMMARY/CLASSIFICATION    Abnormal EEG  - Independent bilateral frontal sharp-wave discharges  - GPDs with triphasic morphology.   - Bifrontal slowing, left greater than right.  - Moderate generalized slowing.    _____________________________________________________________  EEG IMPRESSION/CLINICAL CORRELATE    Abnormal EEG study.  Potential independent epileptogenic foci in the bilateral frontal regions.  Structural or functional abnormality in the bilateral frontal head regions, left greater than right.  Moderate nonspecific diffuse or multifocal cerebral dysfunction.     **In absence of additional clinical concerns, recommend consideration for discontinuation of current EEG study with reconnection in future if warranted.  _____________________________________________________________    Pipo Tinoco MD   of Neurology  Epilepsy/EEG Attending          Electronic Signatures:  Pipo Tinoco)  (Signed 29-Mar-2024 08:49)  	Authored: EEG REPORT      Last Updated: 29-Mar-2024 08:49 by Pipo Tinoco)

## 2024-03-29 NOTE — PROGRESS NOTE ADULT - SUBJECTIVE AND OBJECTIVE BOX
HPI   58F, on ASA81, transferred from [place] for SAH HH4 WNFS 5 mF4 ruptured R pcomm aneurysm. Was initially found unresponsive on 3/25/24 at 21:00, intubated at 23:00 at OSH, then txfrd. Given ddavp at OSH. Here in ED 1 unit PLTs given and R frontal EVD placed-high pressure. Went for R pcom clip on 3/26/24, postop evd not functioning then drained on the floor.     Admission scores:   SAH R pcomm ruptured @ HH4 WNFS 5 mF4, GCS <7      Exam   intubated, eyes opens to verbal stimulation briefly, gaze midline, perrl, RUE AG and follows simple commands, RLE localized, moves LLE spontaneously in the plain of bed     EEG 3/28/24  EEG SUMMARY/CLASSIFICATION    Abnormal EEG  - Independent bilateral frontal sharp-wave discharges  - GPDs with triphasic morphology.   - Bifrontal slowing, left greater than right.  - Moderate generalized slowing.    _____________________________________________________________  EEG IMPRESSION/CLINICAL CORRELATE    Abnormal EEG study.  Potential independent epileptogenic foci in the bilateral frontal regions.  Structural or functional abnormality in the bilateral frontal head regions, left greater than right.  Moderate nonspecific diffuse or multifocal cerebral dysfunction.     T(C): 36.6 (03-28-24 @ 15:00), Max: 38 (03-28-24 @ 03:00)  HR: 60 (03-28-24 @ 21:31) (53 - 87)  BP: 210/98 (03-28-24 @ 14:00) (138/63 - 210/98)  BP(mean): 141 (03-28-24 @ 14:00) (94 - 141)  RR: 22 (03-28-24 @ 21:00) (16 - 22)  SpO2: 100% (03-28-24 @ 21:31) (96% - 100%)    acetaminophen     Tablet .. 650 milliGRAM(s) Oral every 6 hours PRN  acetaminophen   IVPB .. 1000 milliGRAM(s) IV Intermittent every 6 hours PRN  bisacodyl Suppository 10 milliGRAM(s) Rectal at bedtime  chlorhexidine 0.12% Liquid 15 milliLiter(s) Oral Mucosa every 12 hours  chlorhexidine 4% Liquid 1 Application(s) Topical at bedtime  enoxaparin Injectable 40 milliGRAM(s) SubCutaneous <User Schedule>  levETIRAcetam  Solution 500 milliGRAM(s) Oral two times a day  niMODipine Oral Solution 60 milliGRAM(s) Oral every 4 hours  oxyCODONE    IR 5 milliGRAM(s) Oral every 4 hours PRN  pantoprazole  Injectable 40 milliGRAM(s) IV Push daily  polyethylene glycol 3350 17 Gram(s) Oral at bedtime  potassium chloride   Solution 40 milliEquivalent(s) Oral once  potassium phosphate / sodium phosphate Powder (PHOS-NaK) 1 Packet(s) Oral once  senna 2 Tablet(s) Oral at bedtime  sodium chloride 0.9%. 1000 milliLiter(s) IV Continuous <Continuous>        03-27-24 @ 07:01  -  03-28-24 @ 07:00  --------------------------------------------------------  IN: 1754 mL / OUT: 2353 mL / NET: -599 mL    03-28-24 @ 07:01  - 03-29-24 @ 00:28  --------------------------------------------------------  IN: 1360 mL / OUT: 1277 mL / NET: 83 mL                            10.6   15.47 )-----------( 196      ( 28 Mar 2024 22:51 )             32.1   03-28    142  |  106  |  12  ----------------------------<  135<H>  3.6   |  21<L>  |  0.41<L>

## 2024-03-30 DIAGNOSIS — J96.90 RESPIRATORY FAILURE, UNSPECIFIED, UNSPECIFIED WHETHER WITH HYPOXIA OR HYPERCAPNIA: ICD-10-CM

## 2024-03-30 LAB
ANION GAP SERPL CALC-SCNC: 10 MMOL/L — SIGNIFICANT CHANGE UP (ref 5–17)
ANION GAP SERPL CALC-SCNC: 13 MMOL/L — SIGNIFICANT CHANGE UP (ref 5–17)
ANION GAP SERPL CALC-SCNC: 15 MMOL/L — SIGNIFICANT CHANGE UP (ref 5–17)
BUN SERPL-MCNC: 17 MG/DL — SIGNIFICANT CHANGE UP (ref 7–23)
BUN SERPL-MCNC: 24 MG/DL — HIGH (ref 7–23)
BUN SERPL-MCNC: 29 MG/DL — HIGH (ref 7–23)
CALCIUM SERPL-MCNC: 9.1 MG/DL — SIGNIFICANT CHANGE UP (ref 8.4–10.5)
CALCIUM SERPL-MCNC: 9.2 MG/DL — SIGNIFICANT CHANGE UP (ref 8.4–10.5)
CALCIUM SERPL-MCNC: 9.2 MG/DL — SIGNIFICANT CHANGE UP (ref 8.4–10.5)
CHLORIDE SERPL-SCNC: 109 MMOL/L — HIGH (ref 96–108)
CHLORIDE SERPL-SCNC: 111 MMOL/L — HIGH (ref 96–108)
CHLORIDE SERPL-SCNC: 114 MMOL/L — HIGH (ref 96–108)
CO2 SERPL-SCNC: 24 MMOL/L — SIGNIFICANT CHANGE UP (ref 22–31)
CO2 SERPL-SCNC: 25 MMOL/L — SIGNIFICANT CHANGE UP (ref 22–31)
CO2 SERPL-SCNC: 29 MMOL/L — SIGNIFICANT CHANGE UP (ref 22–31)
CREAT SERPL-MCNC: 0.34 MG/DL — LOW (ref 0.5–1.3)
CREAT SERPL-MCNC: 0.38 MG/DL — LOW (ref 0.5–1.3)
CREAT SERPL-MCNC: 0.46 MG/DL — LOW (ref 0.5–1.3)
CULTURE RESULTS: SIGNIFICANT CHANGE UP
EGFR: 112 ML/MIN/1.73M2 — SIGNIFICANT CHANGE UP
EGFR: 117 ML/MIN/1.73M2 — SIGNIFICANT CHANGE UP
EGFR: 120 ML/MIN/1.73M2 — SIGNIFICANT CHANGE UP
GLUCOSE SERPL-MCNC: 128 MG/DL — HIGH (ref 70–99)
GLUCOSE SERPL-MCNC: 147 MG/DL — HIGH (ref 70–99)
GLUCOSE SERPL-MCNC: 194 MG/DL — HIGH (ref 70–99)
HCT VFR BLD CALC: 32.6 % — LOW (ref 34.5–45)
HGB BLD-MCNC: 10.4 G/DL — LOW (ref 11.5–15.5)
MAGNESIUM SERPL-MCNC: 2.3 MG/DL — SIGNIFICANT CHANGE UP (ref 1.6–2.6)
MAGNESIUM SERPL-MCNC: 2.3 MG/DL — SIGNIFICANT CHANGE UP (ref 1.6–2.6)
MCHC RBC-ENTMCNC: 28.3 PG — SIGNIFICANT CHANGE UP (ref 27–34)
MCHC RBC-ENTMCNC: 31.9 GM/DL — LOW (ref 32–36)
MCV RBC AUTO: 88.6 FL — SIGNIFICANT CHANGE UP (ref 80–100)
NRBC # BLD: 0 /100 WBCS — SIGNIFICANT CHANGE UP (ref 0–0)
PHOSPHATE SERPL-MCNC: 3.3 MG/DL — SIGNIFICANT CHANGE UP (ref 2.5–4.5)
PHOSPHATE SERPL-MCNC: 3.4 MG/DL — SIGNIFICANT CHANGE UP (ref 2.5–4.5)
PLATELET # BLD AUTO: 215 K/UL — SIGNIFICANT CHANGE UP (ref 150–400)
POTASSIUM SERPL-MCNC: 3.5 MMOL/L — SIGNIFICANT CHANGE UP (ref 3.5–5.3)
POTASSIUM SERPL-MCNC: 3.8 MMOL/L — SIGNIFICANT CHANGE UP (ref 3.5–5.3)
POTASSIUM SERPL-MCNC: 4.1 MMOL/L — SIGNIFICANT CHANGE UP (ref 3.5–5.3)
POTASSIUM SERPL-SCNC: 3.5 MMOL/L — SIGNIFICANT CHANGE UP (ref 3.5–5.3)
POTASSIUM SERPL-SCNC: 3.8 MMOL/L — SIGNIFICANT CHANGE UP (ref 3.5–5.3)
POTASSIUM SERPL-SCNC: 4.1 MMOL/L — SIGNIFICANT CHANGE UP (ref 3.5–5.3)
RBC # BLD: 3.68 M/UL — LOW (ref 3.8–5.2)
RBC # FLD: 13.8 % — SIGNIFICANT CHANGE UP (ref 10.3–14.5)
SODIUM SERPL-SCNC: 148 MMOL/L — HIGH (ref 135–145)
SODIUM SERPL-SCNC: 149 MMOL/L — HIGH (ref 135–145)
SODIUM SERPL-SCNC: 153 MMOL/L — HIGH (ref 135–145)
SPECIMEN SOURCE: SIGNIFICANT CHANGE UP
TROPONIN T, HIGH SENSITIVITY RESULT: 35 NG/L — SIGNIFICANT CHANGE UP (ref 0–51)
WBC # BLD: 11.29 K/UL — HIGH (ref 3.8–10.5)
WBC # FLD AUTO: 11.29 K/UL — HIGH (ref 3.8–10.5)

## 2024-03-30 PROCEDURE — 71045 X-RAY EXAM CHEST 1 VIEW: CPT | Mod: 26

## 2024-03-30 PROCEDURE — 93010 ELECTROCARDIOGRAM REPORT: CPT

## 2024-03-30 PROCEDURE — 95720 EEG PHY/QHP EA INCR W/VEEG: CPT

## 2024-03-30 PROCEDURE — 99291 CRITICAL CARE FIRST HOUR: CPT

## 2024-03-30 PROCEDURE — 70450 CT HEAD/BRAIN W/O DYE: CPT | Mod: 26

## 2024-03-30 RX ORDER — ENOXAPARIN SODIUM 100 MG/ML
40 INJECTION SUBCUTANEOUS
Refills: 0 | Status: DISCONTINUED | OUTPATIENT
Start: 2024-03-30 | End: 2024-04-15

## 2024-03-30 RX ORDER — FENTANYL CITRATE 50 UG/ML
25 INJECTION INTRAVENOUS ONCE
Refills: 0 | Status: DISCONTINUED | OUTPATIENT
Start: 2024-03-30 | End: 2024-03-30

## 2024-03-30 RX ORDER — POTASSIUM CHLORIDE 20 MEQ
40 PACKET (EA) ORAL ONCE
Refills: 0 | Status: DISCONTINUED | OUTPATIENT
Start: 2024-03-30 | End: 2024-03-30

## 2024-03-30 RX ORDER — DEXAMETHASONE 0.5 MG/5ML
8 ELIXIR ORAL EVERY 8 HOURS
Refills: 0 | Status: COMPLETED | OUTPATIENT
Start: 2024-03-30 | End: 2024-03-31

## 2024-03-30 RX ORDER — EPINEPHRINE 11.25MG/ML
0.5 SOLUTION, NON-ORAL INHALATION ONCE
Refills: 0 | Status: DISCONTINUED | OUTPATIENT
Start: 2024-03-30 | End: 2024-03-30

## 2024-03-30 RX ORDER — OXYCODONE HYDROCHLORIDE 5 MG/1
10 TABLET ORAL EVERY 4 HOURS
Refills: 0 | Status: DISCONTINUED | OUTPATIENT
Start: 2024-03-30 | End: 2024-04-02

## 2024-03-30 RX ORDER — POTASSIUM CHLORIDE 20 MEQ
20 PACKET (EA) ORAL
Refills: 0 | Status: COMPLETED | OUTPATIENT
Start: 2024-03-30 | End: 2024-03-31

## 2024-03-30 RX ORDER — FENTANYL CITRATE 50 UG/ML
25 INJECTION INTRAVENOUS
Refills: 0 | Status: DISCONTINUED | OUTPATIENT
Start: 2024-03-30 | End: 2024-03-31

## 2024-03-30 RX ADMIN — OXYCODONE HYDROCHLORIDE 5 MILLIGRAM(S): 5 TABLET ORAL at 17:06

## 2024-03-30 RX ADMIN — CHLORHEXIDINE GLUCONATE 15 MILLILITER(S): 213 SOLUTION TOPICAL at 05:44

## 2024-03-30 RX ADMIN — Medication 101.6 MILLIGRAM(S): at 22:14

## 2024-03-30 RX ADMIN — Medication 20 MILLIEQUIVALENT(S): at 17:06

## 2024-03-30 RX ADMIN — FENTANYL CITRATE 50 MICROGRAM(S): 50 INJECTION INTRAVENOUS at 05:41

## 2024-03-30 RX ADMIN — OXYCODONE HYDROCHLORIDE 10 MILLIGRAM(S): 5 TABLET ORAL at 21:59

## 2024-03-30 RX ADMIN — FENTANYL CITRATE 50 MICROGRAM(S): 50 INJECTION INTRAVENOUS at 03:46

## 2024-03-30 RX ADMIN — DEXMEDETOMIDINE HYDROCHLORIDE IN 0.9% SODIUM CHLORIDE 3.56 MICROGRAM(S)/KG/HR: 4 INJECTION INTRAVENOUS at 23:00

## 2024-03-30 RX ADMIN — LEVETIRACETAM 500 MILLIGRAM(S): 250 TABLET, FILM COATED ORAL at 17:06

## 2024-03-30 RX ADMIN — FENTANYL CITRATE 25 MICROGRAM(S): 50 INJECTION INTRAVENOUS at 19:20

## 2024-03-30 RX ADMIN — PANTOPRAZOLE SODIUM 40 MILLIGRAM(S): 20 TABLET, DELAYED RELEASE ORAL at 13:57

## 2024-03-30 RX ADMIN — LEVETIRACETAM 500 MILLIGRAM(S): 250 TABLET, FILM COATED ORAL at 05:44

## 2024-03-30 RX ADMIN — CHLORHEXIDINE GLUCONATE 1 APPLICATION(S): 213 SOLUTION TOPICAL at 05:45

## 2024-03-30 RX ADMIN — OXYCODONE HYDROCHLORIDE 5 MILLIGRAM(S): 5 TABLET ORAL at 11:05

## 2024-03-30 RX ADMIN — Medication 20 MILLIEQUIVALENT(S): at 21:28

## 2024-03-30 RX ADMIN — FENTANYL CITRATE 50 MICROGRAM(S): 50 INJECTION INTRAVENOUS at 06:00

## 2024-03-30 RX ADMIN — OXYCODONE HYDROCHLORIDE 10 MILLIGRAM(S): 5 TABLET ORAL at 21:29

## 2024-03-30 RX ADMIN — CHLORHEXIDINE GLUCONATE 1 APPLICATION(S): 213 SOLUTION TOPICAL at 21:30

## 2024-03-30 RX ADMIN — FENTANYL CITRATE 50 MICROGRAM(S): 50 INJECTION INTRAVENOUS at 07:45

## 2024-03-30 RX ADMIN — FENTANYL CITRATE 50 MICROGRAM(S): 50 INJECTION INTRAVENOUS at 03:31

## 2024-03-30 RX ADMIN — FENTANYL CITRATE 50 MICROGRAM(S): 50 INJECTION INTRAVENOUS at 07:15

## 2024-03-30 RX ADMIN — Medication 1000 MILLIGRAM(S): at 18:00

## 2024-03-30 RX ADMIN — SENNA PLUS 2 TABLET(S): 8.6 TABLET ORAL at 21:36

## 2024-03-30 RX ADMIN — OXYCODONE HYDROCHLORIDE 5 MILLIGRAM(S): 5 TABLET ORAL at 11:30

## 2024-03-30 RX ADMIN — Medication 400 MILLIGRAM(S): at 17:35

## 2024-03-30 RX ADMIN — OXYCODONE HYDROCHLORIDE 5 MILLIGRAM(S): 5 TABLET ORAL at 17:30

## 2024-03-30 RX ADMIN — ENOXAPARIN SODIUM 40 MILLIGRAM(S): 100 INJECTION SUBCUTANEOUS at 17:36

## 2024-03-30 RX ADMIN — FENTANYL CITRATE 50 MICROGRAM(S): 50 INJECTION INTRAVENOUS at 01:31

## 2024-03-30 RX ADMIN — Medication 101.6 MILLIGRAM(S): at 16:22

## 2024-03-30 RX ADMIN — FENTANYL CITRATE 50 MICROGRAM(S): 50 INJECTION INTRAVENOUS at 15:28

## 2024-03-30 RX ADMIN — POLYETHYLENE GLYCOL 3350 17 GRAM(S): 17 POWDER, FOR SOLUTION ORAL at 21:29

## 2024-03-30 RX ADMIN — CHLORHEXIDINE GLUCONATE 15 MILLILITER(S): 213 SOLUTION TOPICAL at 17:06

## 2024-03-30 RX ADMIN — FENTANYL CITRATE 50 MICROGRAM(S): 50 INJECTION INTRAVENOUS at 02:00

## 2024-03-30 RX ADMIN — FENTANYL CITRATE 25 MICROGRAM(S): 50 INJECTION INTRAVENOUS at 19:35

## 2024-03-30 NOTE — PROGRESS NOTE ADULT - ASSESSMENT
58F, on ASA81 for pain relief? 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 Was initially found unresponsive at 21:00, intubated at 23:00 at OSH, then txfrd. Given ddavp at OSH. On arrival patient w/ PERRL, +cough/gag, 2/5 spontaneous movement in LUE, o/w no movement. 1 unit PLTs given and R frontal EVD placed-high pressure. PLTs/Coags wnl.     Intubated sedated in NSCU   Echo with abnormal findings of severe cardiomyopathy and RWMA

## 2024-03-30 NOTE — PROGRESS NOTE ADULT - SUBJECTIVE AND OBJECTIVE BOX
Subjective: Patient seen and examined. No new events except as noted.   remains in NSCU     REVIEW OF SYSTEMS:    CONSTITUTIONAL: No weakness, fevers or chills  EYES/ENT: No visual changes;  No vertigo or throat pain   NECK: No pain or stiffness  RESPIRATORY: No cough, wheezing, hemoptysis; No shortness of breath  CARDIOVASCULAR: No chest pain or palpitations  GASTROINTESTINAL: No abdominal or epigastric pain. No nausea, vomiting, or hematemesis; No diarrhea or constipation. No melena or hematochezia.  GENITOURINARY: No dysuria, frequency or hematuria  NEUROLOGICAL: No numbness or weakness  SKIN: No itching, burning, rashes, or lesions   All other review of systems is negative unless indicated above.    MEDICATIONS:  MEDICATIONS  (STANDING):  bisacodyl Suppository 10 milliGRAM(s) Rectal at bedtime  chlorhexidine 0.12% Liquid 15 milliLiter(s) Oral Mucosa every 12 hours  chlorhexidine 4% Liquid 1 Application(s) Topical at bedtime  dexAMETHasone  IVPB 8 milliGRAM(s) IV Intermittent every 8 hours  enoxaparin Injectable 40 milliGRAM(s) SubCutaneous <User Schedule>  levETIRAcetam  Solution 500 milliGRAM(s) Oral two times a day  niMODipine Oral Solution 30 milliGRAM(s) Oral every 2 hours  pantoprazole  Injectable 40 milliGRAM(s) IV Push daily  polyethylene glycol 3350 17 Gram(s) Oral at bedtime  potassium chloride   Solution 20 milliEquivalent(s) Oral every 2 hours  senna 2 Tablet(s) Oral at bedtime      PHYSICAL EXAM:  T(C): 37.6 (03-30-24 @ 19:00), Max: 38.3 (03-30-24 @ 17:00)  HR: 48 (03-30-24 @ 20:00) (46 - 85)  BP: --  RR: 21 (03-30-24 @ 20:00) (15 - 30)  SpO2: 100% (03-30-24 @ 20:00) (94% - 100%)  Wt(kg): --  I&O's Summary    29 Mar 2024 07:01  -  30 Mar 2024 07:00  --------------------------------------------------------  IN: 2675.1 mL / OUT: 1369 mL / NET: 1306.1 mL    30 Mar 2024 07:01  -  30 Mar 2024 20:56  --------------------------------------------------------  IN: 893.8 mL / OUT: 468 mL / NET: 425.8 mL              Appearance: Intubated   HEENT:  dry oral mucosa, PERRL, EOMI	  Lymphatic: No lymphadenopathy  Cardiovascular: Normal S1 S2, No JVD, No murmurs, No edema  Respiratory: ventilated   Psychiatry: A & O x 0  Gastrointestinal:  Soft, Non-tender, + BS	  Skin: No rashes, No ecchymoses, No cyanosis	  Neurologic: perrl, BUE localized, LE wd bilateral   Extremities: Normal range of motion, No clubbing, cyanosis or edema  Vascular: Peripheral pulses palpable 2+ bilaterally      LABS:    CARDIAC MARKERS:                                10.4   11.29 )-----------( 215      ( 30 Mar 2024 00:24 )             32.6     03-30    153<H>  |  114<H>  |  29<H>  ----------------------------<  128<H>  3.5   |  29  |  0.46<L>    Ca    9.1      30 Mar 2024 15:23  Phos  3.4     03-30  Mg     2.3     03-30        TELEMETRY: 	SR     ECG:  	  RADIOLOGY:  < from: CT Head No Cont (03.30.24 @ 10:06) >    ACC: [id number] EXAM:  CT BRAIN   ORDERED BY: ARASH CASTILLO     PROCEDURE DATE:  03/30/2024          INTERPRETATION:  CLINICAL INFORMATION: Follow-up intracranial hemorrhage    TECHNIQUE: Portable CT head.    COMPARISON: CT head 3/29/2024    FINDINGS:    A right pterional craniotomy is noted, with an aneurysm clip in the right   paraclinoid region. There is mild extra-axial lucency and increased   density beneath the flap, likely sealant material. There is scattered   subarachnoid blood in both cerebral hemispheres, collecting   preferentially at the right insula and interhemispheric fissure. All of   these findings appear stable, without interval rebleeding.    There is a right frontal EVD in place, terminating near the left foramen   of [place].There is mild pneumocephalus at the right frontal horn on a   postoperative basis, which has not progressed. There is a parenchymal   hematoma along the trajectory of the drain, not significantly changed.   There is edema surrounding the clot and extending inferiorly, down to the   level of the right hypothalamus. There is mild intraventricular blood in   the right lateral ventricle. These findings also appear unchanged,   accounting for differences in technique. There is no hydrocephalus.    Cerebral volume is within normal limits. No premature white matter   disease.    No midline shift or herniation. No CT evidence of acute territorial   infarction, although MRI with DWI would be more sensitive.    The visualized sinuses and mastoids are clear.    A right NG and ETT are noted in place. Soft tissue swelling is noted   overlying the craniotomy flap. Limited views of the orbits and visualized   soft tissues of the neck, face, scalp, skull base, and calvarium are   otherwise unremarkable.        IMPRESSION:    1.  No significant change, without interval rebleeding.    --- End of Report ---           SNEHAL CASTELLANO MD; Resident Radiologist  This document has been electronically signed.  JORGE L HAMLIN MD; Attending Radiologist  This document has been electronically signed. Mar 30 2024 11:00AM    < end of copied text >    DIAGNOSTIC TESTING:  [ ] Echocardiogram:  [ ]  Catheterization:  [ ] Stress Test:    OTHER:

## 2024-03-30 NOTE — PROGRESS NOTE ADULT - ATTENDING COMMENTS
57 yo woman with no PMH, admitted with unresponsiveness, found to have a HH4 mF4 SAH (bleed day 3/25) from R PComm aneurysm s/p clipping and R frontal EVD.   TTE with EF 37% with regional wall motion abnormalities.     Interval events:  Received fentanyl pushes overnight.   CTH stable this AM.   Afebrile.   EVD at 10cm H2O, output 69cc, dampened wave form   Is and Os net pos 1.3L.   Minimal secretions. No cuff leak s/p 3 doses of dex 10 mg.     On exam, opens eyes briefly to voice, does not attend, follows some simple commands (showing 2 fingers on the R), PERRL, able to look R and slightly L, R arm AG and localizing, L arm no movement, moves R leg spontaneously in plane of bed, L leg withdraws to noxious     PBD 5. Monitoring for worsening brain edema and vasospasm.     on low dose Precedex   keppra 500 mg BID for 7 days from craniotomy   Nimodipine 60 mg q4h  TCDs  euvolemia (of note with low EF 37%)  CPAP 5/5, no cuff leak, c/w dex 10 mg q8h for another day, call ENT to scope for concern for airway edema   SBP goal 100-180, with elevated QTc   Na 145-155, decrease 3% HTS to 30cc/hr  restart tube feeds, LBM 3/30  PPI  restart Lov ppx, with L soleal DVT    straight caths     Patient seen and examined by attending on 3/30/2023.    Patient is critically ill due to SAH and at high risk for neurological deterioration or death due to: hydrocephalus requiring an EVD for CSF diversion, intubated for airway protection.

## 2024-03-30 NOTE — EEG REPORT - NS EEG TEXT BOX
JOSE MCLEAN [facility]-[id number]     Study Date: 	03-29-24 1423 0800	03-30-24  Duration x Hours: 17 h 36 m  --------------------------------------------------------------------------------------------------  History:  CC/ HPI Patient is a 58y old  Female who presents with a chief complaint of SAH (30 Mar 2024 03:57)    MEDICATIONS  (STANDING):  bisacodyl Suppository 10 milliGRAM(s) Rectal at bedtime  chlorhexidine 0.12% Liquid 15 milliLiter(s) Oral Mucosa every 12 hours  chlorhexidine 4% Liquid 1 Application(s) Topical at bedtime  dexMEDEtomidine Infusion 0.2 MICROgram(s)/kG/Hr (3.56 mL/Hr) IV Continuous <Continuous>  dexMEDEtomidine Infusion 0.2 MICROgram(s)/kG/Hr (3.56 mL/Hr) IV Continuous <Continuous>  levETIRAcetam  Solution 500 milliGRAM(s) Oral two times a day  niMODipine Oral Solution 30 milliGRAM(s) Oral every 2 hours  pantoprazole  Injectable 40 milliGRAM(s) IV Push daily  polyethylene glycol 3350 17 Gram(s) Oral at bedtime  senna 2 Tablet(s) Oral at bedtime  sodium chloride 3% + sodium acetate 50:50 1000 milliLiter(s) (50 mL/Hr) IV Continuous <Continuous>    --------------------------------------------------------------------------------------------------  Study Interpretation:    [[[Abbreviation Key:  PDR=alpha rhythm/posterior dominant rhythm. A-P=anterior posterior.  Amplitude: ‘very low’:<20; ‘low’:20-49; ‘medium’:; ‘high’:>150uV.  Persistence for periodic/rhythmic patterns (% of epoch) ‘rare’:<1%; ‘occasional’:1-10%; ‘frequent’:10-50%; ‘abundant’:50-90%; ‘continuous’:>90%.  Persistence for sporadic discharges: ‘rare’:<1/hr; ‘occasional’:1/min-1/hr; ‘frequent’:>1/min; ‘abundant’:>1/10 sec.  RPP=rhythmic and periodic patterns; GRDA=generalized rhythmic delta activity; FIRDA=frontal intermittent GRDA; LRDA=lateralized rhythmic delta activity; TIRDA=temporal intermittent rhythmic delta activity;  LPD=PLED=lateralized periodic discharges; GPD=generalized periodic discharges; BIPDs =bilateral independent periodic discharges; Mf=multifocal; SIRPDs=stimulus induced rhythmic, periodic, or ictal appearing discharges; BIRDs=brief potentially ictal rhythmic discharges >4 Hz, lasting .5-10s; PFA (paroxysmal bursts >13 Hz or =8 Hz <10s).  Modifiers: +F=with fast component; +S=with spike component; +R=with rhythmic component.  S-B=burst suppression pattern.  Max=maximal. N1-drowsy; N2-stage II sleep; N3-slow wave sleep. SSS/BETS=small sharp spikes/benign epileptiform transients of sleep. HV=hyperventilation; PS=photic stimulation]]]    Daily EEG Visual Analysis    FINDINGS:      Background:  Continuity: continuous  Symmetry: asymmetric  PDR: none  Reactivity: present  Voltage: normal, mostly 20-150uV  Anterior Posterior Gradient: absent  Other background findings: none  Breach: Higher amplitude, sharply contoured activity  in the right frontal region.    Background Slowing:  Generalized slowing: Diffuse polymorphic delta/theta activity  Focal slowing: Right frontal max focal slowing.     State Changes:   -Drowsiness noted with increased slowing, attenuation of fast activity.  -N2 sleep transients were not recorded.    Sporadic Epileptiform Discharges:    None    Rhythmic and Periodic Patterns (RPPs):  Right frontal sharp waves, F4 maximum, LPDs at 1hz frequency.   LRDA left frontal max at 1.5hz    Electrographic and Electroclinical seizures:  None    Other Clinical Events:  None    Activation Procedures:   -Hyperventilation was not performed.    -Photic stimulation was not performed.    Artifacts:  Intermittent myogenic and movement artifacts were noted.    ECG:  The heart rate on single channel ECG was predominantly between 60 to 80 BPM.    EEG Classification / Summary:  Abnormal  EEG in the awake / drowsy / asleep state(s).  - Right frontal sharp waves, LPDs at 1hz frequency.   - LRDA left frontal max at 1.5hz  - Right frontal max focal slowing.   - Right frontal breach artifact.   - Moderate diffuse focal slowing.     Clinical Impression:  - Risk for focal seizures from the right frontal region.   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    This is fellow preliminary read, pending attending review.  -------------------------------------------------------------------------------------------------------  [facility] EEG Reading Room Ph#: [phone]  Epilepsy Answering Service after 5PM and before 8:30AM: Ph#: [phone]    WM Chan  Epilepsy Fellow   JOSE MCLEAN [facility]-[id number]     Study Date: 	03-29-24 1423 0800	03-30-24  Duration x Hours: 17 h 36 m  --------------------------------------------------------------------------------------------------  History:  CC/ HPI Patient is a 58y old  Female who presents with a chief complaint of SAH (30 Mar 2024 03:57)    MEDICATIONS  (STANDING):  bisacodyl Suppository 10 milliGRAM(s) Rectal at bedtime  chlorhexidine 0.12% Liquid 15 milliLiter(s) Oral Mucosa every 12 hours  chlorhexidine 4% Liquid 1 Application(s) Topical at bedtime  dexMEDEtomidine Infusion 0.2 MICROgram(s)/kG/Hr (3.56 mL/Hr) IV Continuous <Continuous>  dexMEDEtomidine Infusion 0.2 MICROgram(s)/kG/Hr (3.56 mL/Hr) IV Continuous <Continuous>  levETIRAcetam  Solution 500 milliGRAM(s) Oral two times a day  niMODipine Oral Solution 30 milliGRAM(s) Oral every 2 hours  pantoprazole  Injectable 40 milliGRAM(s) IV Push daily  polyethylene glycol 3350 17 Gram(s) Oral at bedtime  senna 2 Tablet(s) Oral at bedtime  sodium chloride 3% + sodium acetate 50:50 1000 milliLiter(s) (50 mL/Hr) IV Continuous <Continuous>    --------------------------------------------------------------------------------------------------  Study Interpretation:    [[[Abbreviation Key:  PDR=alpha rhythm/posterior dominant rhythm. A-P=anterior posterior.  Amplitude: ‘very low’:<20; ‘low’:20-49; ‘medium’:; ‘high’:>150uV.  Persistence for periodic/rhythmic patterns (% of epoch) ‘rare’:<1%; ‘occasional’:1-10%; ‘frequent’:10-50%; ‘abundant’:50-90%; ‘continuous’:>90%.  Persistence for sporadic discharges: ‘rare’:<1/hr; ‘occasional’:1/min-1/hr; ‘frequent’:>1/min; ‘abundant’:>1/10 sec.  RPP=rhythmic and periodic patterns; GRDA=generalized rhythmic delta activity; FIRDA=frontal intermittent GRDA; LRDA=lateralized rhythmic delta activity; TIRDA=temporal intermittent rhythmic delta activity;  LPD=PLED=lateralized periodic discharges; GPD=generalized periodic discharges; BIPDs =bilateral independent periodic discharges; Mf=multifocal; SIRPDs=stimulus induced rhythmic, periodic, or ictal appearing discharges; BIRDs=brief potentially ictal rhythmic discharges >4 Hz, lasting .5-10s; PFA (paroxysmal bursts >13 Hz or =8 Hz <10s).  Modifiers: +F=with fast component; +S=with spike component; +R=with rhythmic component.  S-B=burst suppression pattern.  Max=maximal. N1-drowsy; N2-stage II sleep; N3-slow wave sleep. SSS/BETS=small sharp spikes/benign epileptiform transients of sleep. HV=hyperventilation; PS=photic stimulation]]]    Daily EEG Visual Analysis    FINDINGS:      Background:  Continuity: continuous  Symmetry: asymmetric  PDR: none  Reactivity: present  Voltage: normal, mostly 20-150uV  Anterior Posterior Gradient: absent  Other background findings: none  Breach: Higher amplitude, sharply contoured activity  in the right frontal region.    Background Slowing:  Generalized slowing: Diffuse polymorphic delta/theta activity  Focal slowing: Abundant bilateral frontal intermittent theta/delta slowing,    State Changes:   -Drowsiness noted with increased slowing, attenuation of fast activity.  -N2 sleep transients were not recorded.    Sporadic Epileptiform Discharges:    None    Rhythmic and Periodic Patterns (RPPs):  Right frontal sharp waves, F4 maximum, LPDs at 1hz frequency.   LRDA left frontal max at 1.5hz    Electrographic and Electroclinical seizures:  None    Other Clinical Events:  None    Activation Procedures:   -Hyperventilation was not performed.    -Photic stimulation was not performed.    Artifacts:  Intermittent myogenic and movement artifacts were noted.    ECG:  The heart rate on single channel ECG was predominantly between 60 to 80 BPM.    EEG Classification / Summary:  Abnormal  EEG in the awake / drowsy / asleep state(s).  - Right frontal sharp waves, LPDs at 1hz frequency.   - LRDA left frontal max at 1.5hz  - bilateral frontal independent focal slowing   - Right frontal breach artifact.   - Moderate diffuse focal slowing.     Clinical Impression:  - Independent risk for focal seizures from the bilateral frontal region  - Independent bilateral frontal focal cerebral dysfunction.   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    This is fellow preliminary read, pending attending review.  -------------------------------------------------------------------------------------------------------  [facility] EEG Reading Room Ph#: [phone]  Epilepsy Answering Service after 5PM and before 8:30AM: Ph#: [phone]    WM Chan  Epilepsy Fellow   JOSE MCLEAN [facility]-[id number]     Study Date: 	03-29-24 1423 0800	03-30-24  Duration x Hours: 17 h 36 m  --------------------------------------------------------------------------------------------------  History:  CC/ HPI Patient is a 58y old  Female who presents with a chief complaint of SAH (30 Mar 2024 03:57)    MEDICATIONS  (STANDING):  bisacodyl Suppository 10 milliGRAM(s) Rectal at bedtime  chlorhexidine 0.12% Liquid 15 milliLiter(s) Oral Mucosa every 12 hours  chlorhexidine 4% Liquid 1 Application(s) Topical at bedtime  dexMEDEtomidine Infusion 0.2 MICROgram(s)/kG/Hr (3.56 mL/Hr) IV Continuous <Continuous>  dexMEDEtomidine Infusion 0.2 MICROgram(s)/kG/Hr (3.56 mL/Hr) IV Continuous <Continuous>  levETIRAcetam  Solution 500 milliGRAM(s) Oral two times a day  niMODipine Oral Solution 30 milliGRAM(s) Oral every 2 hours  pantoprazole  Injectable 40 milliGRAM(s) IV Push daily  polyethylene glycol 3350 17 Gram(s) Oral at bedtime  senna 2 Tablet(s) Oral at bedtime  sodium chloride 3% + sodium acetate 50:50 1000 milliLiter(s) (50 mL/Hr) IV Continuous <Continuous>    --------------------------------------------------------------------------------------------------  Study Interpretation:    [[[Abbreviation Key:  PDR=alpha rhythm/posterior dominant rhythm. A-P=anterior posterior.  Amplitude: ‘very low’:<20; ‘low’:20-49; ‘medium’:; ‘high’:>150uV.  Persistence for periodic/rhythmic patterns (% of epoch) ‘rare’:<1%; ‘occasional’:1-10%; ‘frequent’:10-50%; ‘abundant’:50-90%; ‘continuous’:>90%.  Persistence for sporadic discharges: ‘rare’:<1/hr; ‘occasional’:1/min-1/hr; ‘frequent’:>1/min; ‘abundant’:>1/10 sec.  RPP=rhythmic and periodic patterns; GRDA=generalized rhythmic delta activity; FIRDA=frontal intermittent GRDA; LRDA=lateralized rhythmic delta activity; TIRDA=temporal intermittent rhythmic delta activity;  LPD=PLED=lateralized periodic discharges; GPD=generalized periodic discharges; BIPDs =bilateral independent periodic discharges; Mf=multifocal; SIRPDs=stimulus induced rhythmic, periodic, or ictal appearing discharges; BIRDs=brief potentially ictal rhythmic discharges >4 Hz, lasting .5-10s; PFA (paroxysmal bursts >13 Hz or =8 Hz <10s).  Modifiers: +F=with fast component; +S=with spike component; +R=with rhythmic component.  S-B=burst suppression pattern.  Max=maximal. N1-drowsy; N2-stage II sleep; N3-slow wave sleep. SSS/BETS=small sharp spikes/benign epileptiform transients of sleep. HV=hyperventilation; PS=photic stimulation]]]    Daily EEG Visual Analysis    FINDINGS:      Background:  Continuity: continuous  Symmetry: asymmetric  PDR: none  Reactivity: present  Voltage: normal, mostly 20-150uV  Anterior Posterior Gradient: absent  Other background findings: none  Breach: Higher amplitude, sharply contoured activity  in the right frontal region.    Background Slowing:  Generalized slowing: Diffuse polymorphic delta/theta activity  Focal slowing: Abundant bilateral frontal intermittent theta/delta slowing,    State Changes:   -Drowsiness noted with increased slowing, attenuation of fast activity.  -N2 sleep transients were not recorded.    Sporadic Epileptiform Discharges:    None    Rhythmic and Periodic Patterns (RPPs):  Right frontal sharp waves, F4 maximum, LPDs at 1hz frequency.   LRDA left frontal max at 1.5hz    Electrographic and Electroclinical seizures:  None    Other Clinical Events:  None    Activation Procedures:   -Hyperventilation was not performed.    -Photic stimulation was not performed.    Artifacts:  Intermittent myogenic and movement artifacts were noted.    ECG:  The heart rate on single channel ECG was predominantly between 60 to 80 BPM.    EEG Classification / Summary:  Abnormal  EEG in the awake / drowsy / asleep state(s).  - Right frontal sharp waves, LPDs at 1hz frequency.   - LRDA left frontal max at 1.5hz  - bilateral frontal independent focal slowing   - Right frontal breach artifact.   - Moderate diffuse focal slowing.     Clinical Impression:  - Independent risk for focal seizures from the bilateral frontal region independently.  - Independent bilateral frontal focal cerebral dysfunction.   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  No seizures    -------------------------------------------------------------------------------------------------------  [facility] EEG Reading Room Ph#: [phone]  Epilepsy Answering Service after 5PM and before 8:30AM: Ph#: [phone]    WM Chan  Epilepsy Fellow   JOSE MCLEAN [facility]-[id number]     Study Date: 	03-29-24 1423 0800	03-30-24  Duration x Hours: 17 h 36 m  --------------------------------------------------------------------------------------------------  History:  CC/ HPI Patient is a 58y old  Female who presents with a chief complaint of SAH (30 Mar 2024 03:57)    MEDICATIONS  (STANDING):  bisacodyl Suppository 10 milliGRAM(s) Rectal at bedtime  chlorhexidine 0.12% Liquid 15 milliLiter(s) Oral Mucosa every 12 hours  chlorhexidine 4% Liquid 1 Application(s) Topical at bedtime  dexMEDEtomidine Infusion 0.2 MICROgram(s)/kG/Hr (3.56 mL/Hr) IV Continuous <Continuous>  dexMEDEtomidine Infusion 0.2 MICROgram(s)/kG/Hr (3.56 mL/Hr) IV Continuous <Continuous>  levETIRAcetam  Solution 500 milliGRAM(s) Oral two times a day  niMODipine Oral Solution 30 milliGRAM(s) Oral every 2 hours  pantoprazole  Injectable 40 milliGRAM(s) IV Push daily  polyethylene glycol 3350 17 Gram(s) Oral at bedtime  senna 2 Tablet(s) Oral at bedtime  sodium chloride 3% + sodium acetate 50:50 1000 milliLiter(s) (50 mL/Hr) IV Continuous <Continuous>    --------------------------------------------------------------------------------------------------  Study Interpretation:    [[[Abbreviation Key:  PDR=alpha rhythm/posterior dominant rhythm. A-P=anterior posterior.  Amplitude: ‘very low’:<20; ‘low’:20-49; ‘medium’:; ‘high’:>150uV.  Persistence for periodic/rhythmic patterns (% of epoch) ‘rare’:<1%; ‘occasional’:1-10%; ‘frequent’:10-50%; ‘abundant’:50-90%; ‘continuous’:>90%.  Persistence for sporadic discharges: ‘rare’:<1/hr; ‘occasional’:1/min-1/hr; ‘frequent’:>1/min; ‘abundant’:>1/10 sec.  RPP=rhythmic and periodic patterns; GRDA=generalized rhythmic delta activity; FIRDA=frontal intermittent GRDA; LRDA=lateralized rhythmic delta activity; TIRDA=temporal intermittent rhythmic delta activity;  LPD=PLED=lateralized periodic discharges; GPD=generalized periodic discharges; BIPDs =bilateral independent periodic discharges; Mf=multifocal; SIRPDs=stimulus induced rhythmic, periodic, or ictal appearing discharges; BIRDs=brief potentially ictal rhythmic discharges >4 Hz, lasting .5-10s; PFA (paroxysmal bursts >13 Hz or =8 Hz <10s).  Modifiers: +F=with fast component; +S=with spike component; +R=with rhythmic component.  S-B=burst suppression pattern.  Max=maximal. N1-drowsy; N2-stage II sleep; N3-slow wave sleep. SSS/BETS=small sharp spikes/benign epileptiform transients of sleep. HV=hyperventilation; PS=photic stimulation]]]    Daily EEG Visual Analysis    FINDINGS:      Background:  Continuity: continuous  Symmetry: asymmetric  PDR: none  Reactivity: present  Voltage: normal, mostly 20-150uV  Anterior Posterior Gradient: absent  Other background findings: none  Breach: Higher amplitude, sharply contoured activity  in the right frontal region.    Background Slowing:  Generalized slowing: Diffuse polymorphic delta/theta activity  Focal slowing: Abundant bilateral frontal intermittent theta/delta slowing,    State Changes:   -Drowsiness noted with increased slowing, attenuation of fast activity.  -N2 sleep transients were not recorded.    Sporadic Epileptiform Discharges:    None    Rhythmic and Periodic Patterns (RPPs):  Right frontal sharp waves, F4 maximum, LPDs at 1hz frequency.   LRDA left frontal max at 1.5hz    Electrographic and Electroclinical seizures:  None    Other Clinical Events:  None    Activation Procedures:   -Hyperventilation was not performed.    -Photic stimulation was not performed.    Artifacts:  Intermittent myogenic and movement artifacts were noted.    ECG:  The heart rate on single channel ECG was predominantly between 50 to 70 BPM.    EEG Classification / Summary:  Abnormal  EEG in the awake / drowsy / asleep state(s).  - Right frontal sharp waves, LPDs at 1hz frequency.   - LRDA left frontal max at 1.5hz  - bilateral frontal independent focal slowing   - Right frontal breach artifact.   - Moderate diffuse focal slowing.     Clinical Impression:  - Independent risk for focal seizures from the bilateral frontal region independently.  - Independent bilateral frontal focal cerebral dysfunction.   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  No seizures    -------------------------------------------------------------------------------------------------------  [facility] EEG Reading Room Ph#: [phone]  Epilepsy Answering Service after 5PM and before 8:30AM: Ph#: [phone]    WM Chan  Epilepsy Fellow

## 2024-03-30 NOTE — PROGRESS NOTE ADULT - SUBJECTIVE AND OBJECTIVE BOX
HPI   58F, on ASA81, transferred from [place] for SAH HH4 WNFS 5 mF4 ruptured R pcomm aneurysm. Was initially found unresponsive on 3/25/24 at 21:00, intubated at 23:00 at OSH, then txfrd. Given ddavp at OSH. Here in ED 1 unit PLTs given and R frontal EVD placed-high pressure. Went for R pcom clip on 3/26/24, postop evd not functioning then drained on the floor.     Admission scores:   SAH R pcomm ruptured @ HH4 WNFS 5 mF4, GCS <7    Exam   intubated, eyes opens to verbal stimulation briefly, gaze midline, anisocoria, bilaterally reactive to lightt 2mm, RUE localized and AG, RUE AG at the elbow, in plan at the shoulder, RLE localized and trace localization LLE     LED 3/28   IMPRESSION:  Acute left lower extremity DVT below the knee, involving the left soleal   vein. Edema of the superficial soft tissues of the left calf. Correlate   clinically.  No acute DVT of the right lower extremity.    EEG 3/28/24  EEG SUMMARY/CLASSIFICATION    Abnormal EEG  - Independent bilateral frontal sharp-wave discharges  - GPDs with triphasic morphology.   - Bifrontal slowing, left greater than right.  - Moderate generalized slowing.    _____________________________________________________________  EEG IMPRESSION/CLINICAL CORRELATE    Abnormal EEG study.  Potential independent epileptogenic foci in the bilateral frontal regions.  Structural or functional abnormality in the bilateral frontal head regions, left greater than right.  Moderate nonspecific diffuse or multifocal cerebral dysfunction.     TTE 3/26/24   CONCLUSIONS:   1. Left ventricular systolic function is moderately to severely decreased with an ejection fraction of 36 % by Mcgee's method of disks. Regional wall motion abnormalities consistent with ischemic heart disease.   2. Multiple segmental abnormalities exist. See findings.   3. There is moderate (grade 2) left ventricular diastolic dysfunction, with elevated filling pressure.   4. Normal right ventricular cavity size, with normal wall thickness, and normal systolic function. Tricuspid annular plane systolic excursion (TAPSE) is 1.7 cm (normal >=1.7 cm).   5. Normal left and right atrial size.   6. No significant valvular disease.   7. Estimated pulmonary artery systolic pressure is 41 mmHg.   8. The inferior vena cava is normal in size measuring 1.98 cm in diameter, (normal <2.1cm) with abnormal inspiratory collapse (abnormal <50%) consistent with mildly elevated right atrial pressure (~8, range 5-10mmHg).   9. No pericardial effusion seen.  10. No prior echocardiogram is available for comparison.  11. There is no evidence of a left ventricular thrombus.  12. There is increased LV mass and concentric hypertrophy.    T(C): 37.2 (03-30-24 @ 02:00), Max: 38.5 (03-29-24 @ 08:00)  HR: 51 (03-30-24 @ 02:00) (40 - 92)  BP: --  BP(mean): --  RR: 18 (03-30-24 @ 02:00) (16 - 38)  SpO2: 100% (03-30-24 @ 02:00) (97% - 100%)    acetaminophen     Tablet .. 650 milliGRAM(s) Oral every 6 hours PRN  acetaminophen   IVPB .. 1000 milliGRAM(s) IV Intermittent every 6 hours PRN  bisacodyl Suppository 10 milliGRAM(s) Rectal at bedtime  chlorhexidine 0.12% Liquid 15 milliLiter(s) Oral Mucosa every 12 hours  chlorhexidine 4% Liquid 1 Application(s) Topical at bedtime  dexMEDEtomidine Infusion 0.2 MICROgram(s)/kG/Hr IV Continuous <Continuous>  dexMEDEtomidine Infusion 0.2 MICROgram(s)/kG/Hr IV Continuous <Continuous>  fentaNYL    Injectable 50 MICROGram(s) IV Push every 2 hours PRN  levETIRAcetam  Solution 500 milliGRAM(s) Oral two times a day  niMODipine Oral Solution 30 milliGRAM(s) Oral every 2 hours  oxyCODONE    IR 5 milliGRAM(s) Oral every 4 hours PRN  pantoprazole  Injectable 40 milliGRAM(s) IV Push daily  polyethylene glycol 3350 17 Gram(s) Oral at bedtime  senna 2 Tablet(s) Oral at bedtime  sodium chloride 3% + sodium acetate 50:50 1000 milliLiter(s) IV Continuous <Continuous>        03-28-24 @ 07:01  -  03-29-24 @ 07:00  --------------------------------------------------------  IN: 2957.5 mL / OUT: 2436 mL / NET: 521.5 mL    03-29-24 @ 07:01  -  03-30-24 @ 03:58  --------------------------------------------------------  IN: 2137.1 mL / OUT: 856 mL / NET: 1281.1 mL                          10.4   11.29 )-----------( 215      ( 30 Mar 2024 00:24 )             32.6   03-30    149<H>  |  109<H>  |  17  ----------------------------<  147<H>  4.1   |  25  |  0.38<L>

## 2024-03-30 NOTE — PROGRESS NOTE ADULT - SUBJECTIVE AND OBJECTIVE BOX
HPI   58F, on ASA81, transferred from [place] for SAH HH4 WNFS 5 mF4 ruptured R pcomm aneurysm. Was initially found unresponsive on 3/25/24 at 21:00, intubated at 23:00 at OSH, then txfrd. Given ddavp at OSH. Here in ED 1 unit PLTs given and R frontal EVD placed-high pressure. Went for R pcom clip on 3/26/24, postop evd not functioning then drained on the floor.     Admission scores:   SAH R pcomm ruptured @ HH4 WNFS 5 mF4, GCS <7    24h events   PS 5/5     Exam   intubated, eyes opens to verbal stimulation briefly, gaze midline,peerl, RUE localized and AG follows simple commands, LUE plegic, RLE AG, moves toes     LED 3/28   IMPRESSION:  Acute left lower extremity DVT below the knee, involving the left soleal   vein. Edema of the superficial soft tissues of the left calf. Correlate   clinically.  No acute DVT of the right lower extremity.    EEG 3/28/24  EEG SUMMARY/CLASSIFICATION    Abnormal EEG  - Independent bilateral frontal sharp-wave discharges  - GPDs with triphasic morphology.   - Bifrontal slowing, left greater than right.  - Moderate generalized slowing.    _____________________________________________________________  EEG IMPRESSION/CLINICAL CORRELATE    Abnormal EEG study.  Potential independent epileptogenic foci in the bilateral frontal regions.  Structural or functional abnormality in the bilateral frontal head regions, left greater than right.  Moderate nonspecific diffuse or multifocal cerebral dysfunction.     TTE 3/26/24   CONCLUSIONS:   1. Left ventricular systolic function is moderately to severely decreased with an ejection fraction of 36 % by Mcgee's method of disks. Regional wall motion abnormalities consistent with ischemic heart disease.   2. Multiple segmental abnormalities exist. See findings.   3. There is moderate (grade 2) left ventricular diastolic dysfunction, with elevated filling pressure.   4. Normal right ventricular cavity size, with normal wall thickness, and normal systolic function. Tricuspid annular plane systolic excursion (TAPSE) is 1.7 cm (normal >=1.7 cm).   5. Normal left and right atrial size.   6. No significant valvular disease.   7. Estimated pulmonary artery systolic pressure is 41 mmHg.   8. The inferior vena cava is normal in size measuring 1.98 cm in diameter, (normal <2.1cm) with abnormal inspiratory collapse (abnormal <50%) consistent with mildly elevated right atrial pressure (~8, range 5-10mmHg).   9. No pericardial effusion seen.  10. No prior echocardiogram is available for comparison.  11. There is no evidence of a left ventricular thrombus.  12. There is increased LV mass and concentric hypertrophy.    VITALS:   Vital Signs Last 24 Hrs  T(C): 37.4 (30 Mar 2024 07:00), Max: 38.1 (29 Mar 2024 16:00)  T(F): 99.3 (30 Mar 2024 07:00), Max: 100.6 (29 Mar 2024 16:00)  HR: 48 (30 Mar 2024 09:15) (40 - 85)  BP: --  BP(mean): --  RR: 16 (30 Mar 2024 09:00) (16 - 50)  SpO2: 100% (30 Mar 2024 09:15) (97% - 100%)    Parameters below as of 29 Mar 2024 19:00  Patient On (Oxygen Delivery Method): ventilator    O2 Concentration (%): 40  CAPILLARY BLOOD GLUCOSE        I&O's Summary    29 Mar 2024 07:01  -  30 Mar 2024 07:00  --------------------------------------------------------  IN: 2675.1 mL / OUT: 1369 mL / NET: 1306.1 mL    30 Mar 2024 07:01  -  30 Mar 2024 11:33  --------------------------------------------------------  IN: 190.8 mL / OUT: 3 mL / NET: 187.8 mL        Respiratory:  Mode: CPAP with PS  FiO2: 40  PEEP: 5  PS: 5  MAP: 7  PIP: 11        LABS:                        10.4   11.29 )-----------( 215      ( 30 Mar 2024 00:24 )             32.6     03-30    148<H>  |  111<H>  |  24<H>  ----------------------------<  194<H>  3.8   |  24  |  0.34<L>        MEDICATION LEVELS:     IVF FLUIDS/MEDICATIONS:   MEDICATIONS  (STANDING):  bisacodyl Suppository 10 milliGRAM(s) Rectal at bedtime  chlorhexidine 0.12% Liquid 15 milliLiter(s) Oral Mucosa every 12 hours  chlorhexidine 4% Liquid 1 Application(s) Topical at bedtime  dexMEDEtomidine Infusion 0.2 MICROgram(s)/kG/Hr (3.56 mL/Hr) IV Continuous <Continuous>  dexMEDEtomidine Infusion 0.2 MICROgram(s)/kG/Hr (3.56 mL/Hr) IV Continuous <Continuous>  levETIRAcetam  Solution 500 milliGRAM(s) Oral two times a day  niMODipine Oral Solution 30 milliGRAM(s) Oral every 2 hours  pantoprazole  Injectable 40 milliGRAM(s) IV Push daily  polyethylene glycol 3350 17 Gram(s) Oral at bedtime  potassium chloride   Solution 40 milliEquivalent(s) Oral once  racepinephrine  2.25% Inhalation 0.5 milliLiter(s) Inhalation once  senna 2 Tablet(s) Oral at bedtime  sodium chloride 3% + sodium acetate 50:50 1000 milliLiter(s) (30 mL/Hr) IV Continuous <Continuous>    MEDICATIONS  (PRN):  acetaminophen     Tablet .. 650 milliGRAM(s) Oral every 6 hours PRN Temp greater or equal to 38.5C (101.3F), Mild Pain (1 - 3)  acetaminophen   IVPB .. 1000 milliGRAM(s) IV Intermittent every 6 hours PRN Severe Pain (7 - 10)  fentaNYL    Injectable 50 MICROGram(s) IV Push every 2 hours PRN Severe Pain (7 - 10)  oxyCODONE    IR 5 milliGRAM(s) Oral every 4 hours PRN Moderate Pain (4 - 6)

## 2024-03-30 NOTE — CHART NOTE - NSCHARTNOTEFT_GEN_A_CORE
: Zuleyka Lebron     DATE/TIME: 03/30/24, 8am     Complete []   Incomplete [x]     PROCEDURE:   TCCD [x]   TCD []   ROBOTIC TCD []     INDICATIONS:   Estimation for high risk ICP []   Perfusion limiting/driven ICP crisis []   SAH [x]   TBI []   Neuromonitoring post cardiac arrest, liver failure, ECMO []  Cerebral autoregulation []     FINDINGS:     Depth (cm)/Mean flow velocity (cm/s)/ Pulsality Index     Left occipital window    RIGHT:   VA     LEFT:   VA     Basilar artery     Normal PI range 0.6-1.2    INTERPRETATION:   Post-stenotic with decreased upslope, low peak velocity (tardus parvus), decreased PI.       Images stored on PACS/[employer] : Zuleyka Lebron     DATE/TIME: 03/30/24, 8am     Complete []   Incomplete [x]     PROCEDURE:   TCCD [x]   TCD []   ROBOTIC TCD []     INDICATIONS:   Estimation for high risk ICP []   Perfusion limiting/driven ICP crisis []   SAH [x]   TBI []   Neuromonitoring post cardiac arrest, liver failure, ECMO []  Cerebral autoregulation []     FINDINGS:     Depth (cm)/Mean flow velocity (cm/s)/ Pulsality Index     Left occipital window    RIGHT:   VA Prox 6/-33/1.21   VA 6/-32/1.06    LEFT:   VA 6/-42/0.88     Basilar artery 8/-28/0.88     Normal PI range 0.6-1.2    INTERPRETATION:   Post-stenotic with decreased upslope, low peak velocity (tardus parvus), normal MFV of the vertebral arteries, R dominant vertebral artery, normal MFV in the basilar artery with normal PIs consistent with normal ICP and ONSD.     Images stored on PACS/[employer]

## 2024-03-30 NOTE — PROGRESS NOTE ADULT - ASSESSMENT
DeliaJie  58F, on ASA81 for pain relief? 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 Was initially found unresponsive at 21:00, intubated at 23:00 at OSH, then txfrd. Given ddavp at OSH. On arrival patient w/ PERRL, +cough/gag, 2/5 spontaneous movement in LUE, o/w no movement. 1 unit PLTs given and R frontal EVD placed-high pressure. PLTs/Coags wnl. Exam improved: intubated, PERRL, BUE localize, b/l wd       Possible repeat CT in AM  CPAP as tolerated, check for cuff leak in AM   NPO @ 4AM  TCDs daily  Repeat LED 4/2  Repeat TTE 3/31

## 2024-03-30 NOTE — PROGRESS NOTE ADULT - ASSESSMENT
[facility]   Post-SAH day 3, SAH 3/25/2024  3/26/24 evd placed  3/26/24 pcomm clip    N   # @ Pcomm ruptured, s/p clip: CTH/CTA   #EVD tract acute hemorrhage   #Hydrocephalus/ICP crisis: R EVD in place 10cm H2O 136 last 24h - no readable wave - mannitol 0.5 before CTH/CTA, added 3% 50cc/h Na goal 145-155   #Delayed Cerebral Ischemia Management: TCDs with poor temporal windows, follow up occipital windows, euvolemia, nimodipine/ net neg give a bolus 500cc NS  #Seizure Prophylaxis: Levetiracetam for 7 days  #Pain: PRN tylenol oxi  #Sedation precedex   Activity: [] OOB as tolerated [x] Bedrest [] PT [] OT [] PMNR    CV   SBP goal 120-160 if vasospasm on CTA can liberalize - started cardene   #R heart strain: POCUS with RV strain and non collapsable IVC, dysmotility of LV   TTE decreased systolic cardiac function EF 36% with decreased diastolic function    P   intubated   AC 16/450/5/40% sat 100%   oral secretions, no in line   dexamethasone for negative cuff leak  Tolerated cpap 5/5 yesterday    R   I&o    GI   #REE: IC  Diet: npo after 4 am for possible extubaiton  PPI   Senna miralax  BM PTA     E   Goal euglycemia (-180)  A1c 5.5   ISS     H   SCD   Chemoppx held for ICH  LED R below knee evd    ID   Fever   fu blood culture no growth    [facility]   Post-SAH day 3, SAH 3/25/2024  3/26/24 evd placed  3/26/24 pcomm clip    N   # @ Pcomm ruptured, s/p clip: CTH/CTA   #EVD tract acute hemorrhage   #Hydrocephalus/ICP crisis: R EVD in place 10cm H2O 136 last 24h - no readable wave - mannitol 0.5 before CTH/CTA, added 3% 50cc/h Na goal 145-155   #Delayed Cerebral Ischemia Management: TCDs with poor temporal windows, follow up occipital windows, euvolemia, nimodipine/ net neg give a bolus 500cc NS  #Seizure Prophylaxis: Levetiracetam for 7 days  #Pain: PRN tylenol oxi  #Sedation precedex   Activity: [] OOB as tolerated [x] Bedrest [] PT [] OT [] PMNR    CV   SBP goal 120-160 if vasospasm on CTA can liberalize - started cardene   #R heart strain: POCUS with RV strain and non collapsable IVC, dysmotility of LV   TTE decreased systolic cardiac function EF 36% with decreased diastolic function    P   intubated   AC 16/450/5/40% sat 100%   oral secretions, no in line   dexamethasone for negative cuff leak  Tolerated cpap 5/5 yesterday    R   I&o  on 3% @ 50  Na target 145 - 155    GI   #REE: IC  Diet: npo after 4 am for possible extubaiton  PPI   Senna miralax  BM PTA     E   Goal euglycemia (-180)  A1c 5.5   ISS     H   SCD   Chemoppx held for ICH  LED R below knee evd    ID   Fever   fu blood culture no growth

## 2024-03-30 NOTE — PROGRESS NOTE ADULT - ASSESSMENT
ASSESSMENT/PLAN: [facility]   Post-SAH day 3, SAH 3/25/2024  3/26/24 evd placed  3/26/24 pcomm clip    N   # @ Pcomm ruptured, s/p clip: CTH/CTA   #EVD tract acute hemorrhage   #Hydrocephalus/ICP crisis: R EVD in place 10cm H2O 69 last 24h - no readable wave - 3% 50cc/h Na goal 145-155 - ONDS normal this morning   #Delayed Cerebral Ischemia Management: TCDs with poor temporal windows, follow up occipital windows low vertebral and basilar MFV, euvolemia, nimodipine/ net neg give a bolus 500cc NS  #Seizure Prophylaxis: Levetiracetam for 7 days  #Pain: PRN tylenol oxi  #Sedation precedex   Activity: [] OOB as tolerated [x] Bedrest [] PT [] OT [] PMNR    CV   SBP goal 120-160 if vasospasm on CTA can liberalize - started cardene   #R heart strain: POCUS with RV strain and non collapsable IVC, dysmotility of LV   TTE decreased systolic cardiac function EF 36% with decreased diastolic function    P   #respiratory failure  intubated   PS 5/5  anesthesia to extubate   oral secretions, no in line   sp dexamethasone     R   I&o    GI   #REE: IC  Diet: npo   PPI   Senna miralax  BM PTA     E   Goal euglycemia (-180)  A1c 5.5   ISS     H   SCD   Chemoppx held for ICH  LED R below knee evd    ID   Fever   fu blood culture no growth     lines  R evd  a line  ETT   pivs  ngtube     CODE STATUS: FULL CODE    DISPOSITION: ICU     CRITICAL  ADMIT

## 2024-03-30 NOTE — CONSULT NOTE ADULT - SUBJECTIVE AND OBJECTIVE BOX
CC: airway eval    HPI: 58F, on ASA81, transferred from [place] for SAH HH4 WNFS 5 mF4 ruptured R pcomm aneurysm. Was initially found unresponsive on 3/25/24 at 21:00, intubated at 23:00 at OSH, then txfrd. Given ddavp at OSH. Here in ED 1 unit PLTs given and R frontal EVD placed-high pressure. Went for R pcom clip on 3/26/24, postop evd not functioning then drained on the floor. ENT called for airway eval given lack of cuff leak. Unable to obtain further history due to pts clinical condition.        PAST MEDICAL & SURGICAL HISTORY:    Allergies    No Known Allergies    Intolerances      MEDICATIONS  (STANDING):  bisacodyl Suppository 10 milliGRAM(s) Rectal at bedtime  chlorhexidine 0.12% Liquid 15 milliLiter(s) Oral Mucosa every 12 hours  chlorhexidine 4% Liquid 1 Application(s) Topical at bedtime  dexAMETHasone  IVPB 8 milliGRAM(s) IV Intermittent every 8 hours  dexMEDEtomidine Infusion 0.2 MICROgram(s)/kG/Hr (3.56 mL/Hr) IV Continuous <Continuous>  dexMEDEtomidine Infusion 0.2 MICROgram(s)/kG/Hr (3.56 mL/Hr) IV Continuous <Continuous>  enoxaparin Injectable 40 milliGRAM(s) SubCutaneous <User Schedule>  levETIRAcetam  Solution 500 milliGRAM(s) Oral two times a day  niMODipine Oral Solution 30 milliGRAM(s) Oral every 2 hours  pantoprazole  Injectable 40 milliGRAM(s) IV Push daily  polyethylene glycol 3350 17 Gram(s) Oral at bedtime  potassium chloride   Solution 40 milliEquivalent(s) Oral once  racepinephrine  2.25% Inhalation 0.5 milliLiter(s) Inhalation once  senna 2 Tablet(s) Oral at bedtime  sodium chloride 3% + sodium acetate 50:50 1000 milliLiter(s) (30 mL/Hr) IV Continuous <Continuous>    MEDICATIONS  (PRN):  acetaminophen     Tablet .. 650 milliGRAM(s) Oral every 6 hours PRN Temp greater or equal to 38.5C (101.3F), Mild Pain (1 - 3)  acetaminophen   IVPB .. 1000 milliGRAM(s) IV Intermittent every 6 hours PRN Severe Pain (7 - 10)  fentaNYL    Injectable 50 MICROGram(s) IV Push every 2 hours PRN Severe Pain (7 - 10)  oxyCODONE    IR 5 milliGRAM(s) Oral every 4 hours PRN Moderate Pain (4 - 6)      Social History unable to obtain due to pts clinical condition     Family history: unable to obtain due to pts clinical condition     ROS:   unable to obtain due to pts clinical condition     Vital Signs Last 24 Hrs  T(C): 37.4 (30 Mar 2024 07:00), Max: 38.1 (29 Mar 2024 16:00)  T(F): 99.3 (30 Mar 2024 07:00), Max: 100.6 (29 Mar 2024 16:00)  HR: 46 (30 Mar 2024 13:29) (40 - 85)  BP: --  BP(mean): --  RR: 29 (30 Mar 2024 13:29) (16 - 50)  SpO2: 100% (30 Mar 2024 13:29) (97% - 100%)    Parameters below as of 29 Mar 2024 19:00  Patient On (Oxygen Delivery Method): ventilator    O2 Concentration (%): 40                          10.4   11.29 )-----------( 215      ( 30 Mar 2024 00:24 )             32.6    03-30    148<H>  |  111<H>  |  24<H>  ----------------------------<  194<H>  3.8   |  24  |  0.34<L>    Ca    9.2      30 Mar 2024 04:40  Phos  3.3     03-30  Mg     2.3     03-30         PHYSICAL EXAM:  Gen: sedated   Skin: No rashes, bruises, or lesions  Head: Normocephalic, Atraumatic  Face: no edema, erythema, or fluctuance. Parotid glands soft without mass  Eyes: no scleral injection  Nose: Nares bilaterally patent, no discharge  Mouth: ETT in place. Mucosa moist, tongue/uvula midline, oropharynx clear  Neck: Flat, supple, no lymphadenopathy, trachea midline, no masses  Lymphatic: No lymphadenopathy  Resp: on vent   CV: no peripheral edema/cyanosis  GI: nondistended   Peripheral vascular: no JVD or edema  Neuro: unable to evaluate due to pts clinical condition       Fiberoptic Indirect laryngoscopy:  (Scope #2 used)  Reason for Laryngoscopy: airway eval     Patient was unable to cooperate with mirror.  Unable to identify most anatomy 2/2 ET tube, mild b/l arytenoid edema noted, pooling of secretions at ET tube, no cuff leak visualized.

## 2024-03-30 NOTE — CHART NOTE - NSCHARTNOTEFT_GEN_A_CORE
:  Zuleyka Lebron    DATE/TIME: 03/30/24, 8am     INDICATION:    [x] ICP monitoring    [x] SAH     [ ] TBI    [ ] Other:       PROCEDURE:    [x] LIMITED OPHTHALMIC     [ ] OTHER      FINDINGS:  OS:   ONSD (mm)   Papilledema     OD:   ONSD (mm)   0.48/0.47/0.48  Papilledema     INTERPRETATION:  Baseline ONSD with bilateral papilledema.     Images stored on PACS/[employer] :  Zuleyka Lebron    DATE/TIME: 03/30/24, 8am     INDICATION:    [x] ICP monitoring    [x] SAH     [ ] TBI    [ ] Other:       PROCEDURE:    [x] LIMITED OPHTHALMIC     [ ] OTHER      FINDINGS:  OS:   ONSD 0.53/0.54/0.50 (mm)   Papilledema     OD:   ONSD (mm)   0.48/0.47  Decreased papilledema     INTERPRETATION:  Decreased ONSD improvement or right papilledema, left still present, consistent with improved ICP.      Images stored on [email or web address]

## 2024-03-31 ENCOUNTER — RESULT REVIEW (OUTPATIENT)
Age: 59
End: 2024-03-31

## 2024-03-31 LAB
ANION GAP SERPL CALC-SCNC: 11 MMOL/L — SIGNIFICANT CHANGE UP (ref 5–17)
APTT BLD: 26.5 SEC — SIGNIFICANT CHANGE UP (ref 24.5–35.6)
BLD GP AB SCN SERPL QL: NEGATIVE — SIGNIFICANT CHANGE UP
BUN SERPL-MCNC: 31 MG/DL — HIGH (ref 7–23)
CALCIUM SERPL-MCNC: 9.4 MG/DL — SIGNIFICANT CHANGE UP (ref 8.4–10.5)
CHLORIDE SERPL-SCNC: 115 MMOL/L — HIGH (ref 96–108)
CO2 SERPL-SCNC: 25 MMOL/L — SIGNIFICANT CHANGE UP (ref 22–31)
CREAT SERPL-MCNC: 0.44 MG/DL — LOW (ref 0.5–1.3)
EGFR: 113 ML/MIN/1.73M2 — SIGNIFICANT CHANGE UP
GLUCOSE SERPL-MCNC: 166 MG/DL — HIGH (ref 70–99)
HCT VFR BLD CALC: 29.1 % — LOW (ref 34.5–45)
HCT VFR BLD CALC: 29.9 % — LOW (ref 34.5–45)
HGB BLD-MCNC: 9.3 G/DL — LOW (ref 11.5–15.5)
HGB BLD-MCNC: 9.3 G/DL — LOW (ref 11.5–15.5)
INR BLD: 1.1 RATIO — SIGNIFICANT CHANGE UP (ref 0.85–1.18)
MAGNESIUM SERPL-MCNC: 2.5 MG/DL — SIGNIFICANT CHANGE UP (ref 1.6–2.6)
MCHC RBC-ENTMCNC: 28.8 PG — SIGNIFICANT CHANGE UP (ref 27–34)
MCHC RBC-ENTMCNC: 28.9 PG — SIGNIFICANT CHANGE UP (ref 27–34)
MCHC RBC-ENTMCNC: 31.1 GM/DL — LOW (ref 32–36)
MCHC RBC-ENTMCNC: 32 GM/DL — SIGNIFICANT CHANGE UP (ref 32–36)
MCV RBC AUTO: 90.4 FL — SIGNIFICANT CHANGE UP (ref 80–100)
MCV RBC AUTO: 92.6 FL — SIGNIFICANT CHANGE UP (ref 80–100)
NRBC # BLD: 0 /100 WBCS — SIGNIFICANT CHANGE UP (ref 0–0)
NRBC # BLD: 0 /100 WBCS — SIGNIFICANT CHANGE UP (ref 0–0)
PHOSPHATE SERPL-MCNC: 3.6 MG/DL — SIGNIFICANT CHANGE UP (ref 2.5–4.5)
PLATELET # BLD AUTO: 184 K/UL — SIGNIFICANT CHANGE UP (ref 150–400)
PLATELET # BLD AUTO: 193 K/UL — SIGNIFICANT CHANGE UP (ref 150–400)
POTASSIUM SERPL-MCNC: 4.8 MMOL/L — SIGNIFICANT CHANGE UP (ref 3.5–5.3)
POTASSIUM SERPL-SCNC: 4.8 MMOL/L — SIGNIFICANT CHANGE UP (ref 3.5–5.3)
PROTHROM AB SERPL-ACNC: 12.1 SEC — SIGNIFICANT CHANGE UP (ref 9.5–13)
RBC # BLD: 3.22 M/UL — LOW (ref 3.8–5.2)
RBC # BLD: 3.23 M/UL — LOW (ref 3.8–5.2)
RBC # FLD: 13.8 % — SIGNIFICANT CHANGE UP (ref 10.3–14.5)
RBC # FLD: 14 % — SIGNIFICANT CHANGE UP (ref 10.3–14.5)
RH IG SCN BLD-IMP: POSITIVE — SIGNIFICANT CHANGE UP
SODIUM SERPL-SCNC: 151 MMOL/L — HIGH (ref 135–145)
WBC # BLD: 10.58 K/UL — HIGH (ref 3.8–10.5)
WBC # BLD: 12.75 K/UL — HIGH (ref 3.8–10.5)
WBC # FLD AUTO: 10.58 K/UL — HIGH (ref 3.8–10.5)
WBC # FLD AUTO: 12.75 K/UL — HIGH (ref 3.8–10.5)

## 2024-03-31 PROCEDURE — 95720 EEG PHY/QHP EA INCR W/VEEG: CPT

## 2024-03-31 PROCEDURE — 93308 TTE F-UP OR LMTD: CPT | Mod: 26

## 2024-03-31 PROCEDURE — 71045 X-RAY EXAM CHEST 1 VIEW: CPT | Mod: 26

## 2024-03-31 PROCEDURE — 93321 DOPPLER ECHO F-UP/LMTD STD: CPT | Mod: 26

## 2024-03-31 PROCEDURE — 99291 CRITICAL CARE FIRST HOUR: CPT

## 2024-03-31 RX ORDER — ATROPINE SULFATE 0.1 MG/ML
0.5 SYRINGE (ML) INJECTION ONCE
Refills: 0 | Status: COMPLETED | OUTPATIENT
Start: 2024-03-31 | End: 2024-03-31

## 2024-03-31 RX ORDER — MANNITOL
70 POWDER (GRAM) MISCELLANEOUS ONCE
Refills: 0 | Status: DISCONTINUED | OUTPATIENT
Start: 2024-03-31 | End: 2024-03-31

## 2024-03-31 RX ORDER — FENTANYL CITRATE 50 UG/ML
0.5 INJECTION INTRAVENOUS
Qty: 2500 | Refills: 0 | Status: DISCONTINUED | OUTPATIENT
Start: 2024-03-31 | End: 2024-03-31

## 2024-03-31 RX ORDER — PROPOFOL 10 MG/ML
5 INJECTION, EMULSION INTRAVENOUS
Qty: 1000 | Refills: 0 | Status: DISCONTINUED | OUTPATIENT
Start: 2024-03-31 | End: 2024-04-03

## 2024-03-31 RX ORDER — FENTANYL CITRATE 50 UG/ML
0.5 INJECTION INTRAVENOUS
Qty: 5000 | Refills: 0 | Status: DISCONTINUED | OUTPATIENT
Start: 2024-03-31 | End: 2024-04-03

## 2024-03-31 RX ORDER — SODIUM CHLORIDE 9 MG/ML
1000 INJECTION, SOLUTION INTRAVENOUS
Refills: 0 | Status: DISCONTINUED | OUTPATIENT
Start: 2024-03-31 | End: 2024-04-02

## 2024-03-31 RX ORDER — FENTANYL CITRATE 50 UG/ML
50 INJECTION INTRAVENOUS ONCE
Refills: 0 | Status: DISCONTINUED | OUTPATIENT
Start: 2024-03-31 | End: 2024-03-31

## 2024-03-31 RX ORDER — FENTANYL CITRATE 50 UG/ML
25 INJECTION INTRAVENOUS ONCE
Refills: 0 | Status: DISCONTINUED | OUTPATIENT
Start: 2024-03-31 | End: 2024-03-31

## 2024-03-31 RX ORDER — PROPOFOL 10 MG/ML
5 INJECTION, EMULSION INTRAVENOUS
Qty: 500 | Refills: 0 | Status: DISCONTINUED | OUTPATIENT
Start: 2024-03-31 | End: 2024-03-31

## 2024-03-31 RX ORDER — THEOPHYLLINE ANHYDROUS 200 MG
100 TABLET, EXTENDED RELEASE 12 HR ORAL DAILY
Refills: 0 | Status: DISCONTINUED | OUTPATIENT
Start: 2024-03-31 | End: 2024-03-31

## 2024-03-31 RX ORDER — THEOPHYLLINE ANHYDROUS 200 MG
50 TABLET, EXTENDED RELEASE 12 HR ORAL EVERY 12 HOURS
Refills: 0 | Status: DISCONTINUED | OUTPATIENT
Start: 2024-03-31 | End: 2024-04-13

## 2024-03-31 RX ADMIN — OXYCODONE HYDROCHLORIDE 10 MILLIGRAM(S): 5 TABLET ORAL at 11:06

## 2024-03-31 RX ADMIN — FENTANYL CITRATE 25 MICROGRAM(S): 50 INJECTION INTRAVENOUS at 18:25

## 2024-03-31 RX ADMIN — FENTANYL CITRATE 1.78 MICROGRAM(S)/KG/HR: 50 INJECTION INTRAVENOUS at 18:00

## 2024-03-31 RX ADMIN — Medication 0.5 MILLIGRAM(S): at 06:40

## 2024-03-31 RX ADMIN — OXYCODONE HYDROCHLORIDE 10 MILLIGRAM(S): 5 TABLET ORAL at 12:00

## 2024-03-31 RX ADMIN — FENTANYL CITRATE 25 MICROGRAM(S): 50 INJECTION INTRAVENOUS at 01:00

## 2024-03-31 RX ADMIN — Medication 101.6 MILLIGRAM(S): at 06:57

## 2024-03-31 RX ADMIN — OXYCODONE HYDROCHLORIDE 10 MILLIGRAM(S): 5 TABLET ORAL at 16:18

## 2024-03-31 RX ADMIN — CHLORHEXIDINE GLUCONATE 15 MILLILITER(S): 213 SOLUTION TOPICAL at 17:41

## 2024-03-31 RX ADMIN — FENTANYL CITRATE 50 MICROGRAM(S): 50 INJECTION INTRAVENOUS at 07:15

## 2024-03-31 RX ADMIN — FENTANYL CITRATE 25 MICROGRAM(S): 50 INJECTION INTRAVENOUS at 05:15

## 2024-03-31 RX ADMIN — Medication 10 MILLIGRAM(S): at 21:08

## 2024-03-31 RX ADMIN — LEVETIRACETAM 500 MILLIGRAM(S): 250 TABLET, FILM COATED ORAL at 05:39

## 2024-03-31 RX ADMIN — FENTANYL CITRATE 25 MICROGRAM(S): 50 INJECTION INTRAVENOUS at 14:28

## 2024-03-31 RX ADMIN — FENTANYL CITRATE 50 MICROGRAM(S): 50 INJECTION INTRAVENOUS at 07:30

## 2024-03-31 RX ADMIN — FENTANYL CITRATE 25 MICROGRAM(S): 50 INJECTION INTRAVENOUS at 12:28

## 2024-03-31 RX ADMIN — POLYETHYLENE GLYCOL 3350 17 GRAM(S): 17 POWDER, FOR SOLUTION ORAL at 21:09

## 2024-03-31 RX ADMIN — Medication 20 MILLIEQUIVALENT(S): at 01:02

## 2024-03-31 RX ADMIN — FENTANYL CITRATE 25 MICROGRAM(S): 50 INJECTION INTRAVENOUS at 05:30

## 2024-03-31 RX ADMIN — OXYCODONE HYDROCHLORIDE 10 MILLIGRAM(S): 5 TABLET ORAL at 15:18

## 2024-03-31 RX ADMIN — FENTANYL CITRATE 50 MICROGRAM(S): 50 INJECTION INTRAVENOUS at 22:25

## 2024-03-31 RX ADMIN — FENTANYL CITRATE 25 MICROGRAM(S): 50 INJECTION INTRAVENOUS at 12:13

## 2024-03-31 RX ADMIN — SENNA PLUS 2 TABLET(S): 8.6 TABLET ORAL at 21:08

## 2024-03-31 RX ADMIN — FENTANYL CITRATE 1.78 MICROGRAM(S)/KG/HR: 50 INJECTION INTRAVENOUS at 19:28

## 2024-03-31 RX ADMIN — ENOXAPARIN SODIUM 40 MILLIGRAM(S): 100 INJECTION SUBCUTANEOUS at 17:40

## 2024-03-31 RX ADMIN — FENTANYL CITRATE 25 MICROGRAM(S): 50 INJECTION INTRAVENOUS at 10:03

## 2024-03-31 RX ADMIN — FENTANYL CITRATE 25 MICROGRAM(S): 50 INJECTION INTRAVENOUS at 15:33

## 2024-03-31 RX ADMIN — LEVETIRACETAM 500 MILLIGRAM(S): 250 TABLET, FILM COATED ORAL at 17:40

## 2024-03-31 RX ADMIN — FENTANYL CITRATE 25 MICROGRAM(S): 50 INJECTION INTRAVENOUS at 01:30

## 2024-03-31 RX ADMIN — Medication 0.5 MILLIGRAM(S): at 06:50

## 2024-03-31 RX ADMIN — FENTANYL CITRATE 25 MICROGRAM(S): 50 INJECTION INTRAVENOUS at 14:13

## 2024-03-31 RX ADMIN — NIMODIPINE 30 MILLIGRAM(S): 60 SOLUTION ORAL at 11:06

## 2024-03-31 RX ADMIN — OXYCODONE HYDROCHLORIDE 10 MILLIGRAM(S): 5 TABLET ORAL at 03:40

## 2024-03-31 RX ADMIN — FENTANYL CITRATE 50 MICROGRAM(S): 50 INJECTION INTRAVENOUS at 22:10

## 2024-03-31 RX ADMIN — CHLORHEXIDINE GLUCONATE 1 APPLICATION(S): 213 SOLUTION TOPICAL at 21:16

## 2024-03-31 RX ADMIN — OXYCODONE HYDROCHLORIDE 10 MILLIGRAM(S): 5 TABLET ORAL at 03:10

## 2024-03-31 RX ADMIN — PROPOFOL 2.14 MICROGRAM(S)/KG/MIN: 10 INJECTION, EMULSION INTRAVENOUS at 23:56

## 2024-03-31 RX ADMIN — FENTANYL CITRATE 25 MICROGRAM(S): 50 INJECTION INTRAVENOUS at 18:40

## 2024-03-31 RX ADMIN — PANTOPRAZOLE SODIUM 40 MILLIGRAM(S): 20 TABLET, DELAYED RELEASE ORAL at 11:46

## 2024-03-31 RX ADMIN — FENTANYL CITRATE 25 MICROGRAM(S): 50 INJECTION INTRAVENOUS at 15:18

## 2024-03-31 RX ADMIN — Medication 50 MILLIGRAM(S): at 17:41

## 2024-03-31 RX ADMIN — FENTANYL CITRATE 25 MICROGRAM(S): 50 INJECTION INTRAVENOUS at 10:18

## 2024-03-31 RX ADMIN — CHLORHEXIDINE GLUCONATE 15 MILLILITER(S): 213 SOLUTION TOPICAL at 05:39

## 2024-03-31 NOTE — PROVIDER CONTACT NOTE (OTHER) - SITUATION
Patient's HR noted Sinus Terrence 40's - 50's . Lowest noted 38 , not sustaining. Patient's HR noted Sinus Terrence 40's - 50's . Lowest noted 38 , not sustaining. Due for nimodipine at 8 pm

## 2024-03-31 NOTE — PROGRESS NOTE ADULT - SUBJECTIVE AND OBJECTIVE BOX
Patient seen and examined at bedside.    --Anticoagulation--  enoxaparin Injectable 40 milliGRAM(s) SubCutaneous <User Schedule>    T(C): 37.6 (03-31-24 @ 03:00), Max: 38.3 (03-30-24 @ 17:00)  HR: 45 (03-31-24 @ 04:00) (41 - 75)  BP: --  RR: 23 (03-31-24 @ 04:00) (15 - 30)  SpO2: 100% (03-31-24 @ 04:00) (94% - 100%)  Wt(kg): --    Exam:    Exam: Intubated, SAVI, right periorbital edema improving, PERRL, +cough/gag/OB, FC, Right side spont AG, SEANE wd, MARCELLAE wds

## 2024-03-31 NOTE — PROGRESS NOTE ADULT - CRITICAL CARE ATTENDING COMMENT
I have personally provided the above noted minutes of critical care time including review of laboratory values, imaging, interdisciplinary care coordination, and frequent monitoring for decompensation.    Based on my personal evaluation, this patient has a high probability of imminent or life-threatening deterioration due to the presence of: SAH, cerebral edema, aneurysm s/p clipping, respiratory failure, airway edema -   which required my direct attention, intervention, and personal management. Other billable procedures, if performed, are documented separately. I have personally provided the above noted minutes of critical care time including review of laboratory values, imaging, interdisciplinary care coordination, and frequent monitoring for decompensation.    Based on my personal evaluation, this patient has a high probability of imminent or life-threatening deterioration due to the presence of: SAH, cerebral edema, aneurysm s/p clipping, bradycardia, respiratory failure, airway edema -   which required my direct attention, intervention, and personal management. Other billable procedures, if performed, are documented separately.

## 2024-03-31 NOTE — PROGRESS NOTE ADULT - SUBJECTIVE AND OBJECTIVE BOX
NSICU Night Intensivist Note    Historical Review:   58F, on ASA81 for pain relief? 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 Was initially found unresponsive at 21:00, intubated at 23:00 at OSH, then txfrd. Given ddavp at OSH. On arrival patient w/ PERRL, +cough/gag, 2/5 spontaneous movement in LUE, o/w no movement. 1 unit PLTs given and R frontal EVD placed-high pressure. PLTs/Coags wnl.    (26 Mar 2024 04:21)    12hr EVENTS:  - EF improved to 55%  - remains intubated for airway swelling    ROS: [x]  Unable to assess due to mental status/intubation     ----------------------------------------------------------------------------------------------------  PHYSICAL EXAM:  General: intubated  HEENT: MMM  Neuro:  -Mental status- eyes open to voice  -CN- PERRL 3mm, gaze midline, face symmetric  -SensoriMotor- RUE localizes antigravity, RLE spont in plane of bed, LUE plegic, LLE wd    CV: regular rate  Pulm: no accessory muscle use  Abd: soft, nontender, nondistended, []TF  Skin: warm, dry, [] incontinence associated skin breakdown    -----------------------------------------------------------------------------------------------------  ICU Vital Signs Last 24 Hrs  T(C): 37.6 (31 Mar 2024 17:00), Max: 37.6 (31 Mar 2024 03:00)  T(F): 99.7 (31 Mar 2024 17:00), Max: 99.7 (31 Mar 2024 03:00)  HR: 59 (31 Mar 2024 18:15) (34 - 95)  BP: --  BP(mean): --  ABP: 197/87 (31 Mar 2024 18:15) (137/81 - 214/99)  ABP(mean): 124 (31 Mar 2024 18:15) (102 - 158)  RR: 30 (31 Mar 2024 18:15) (11 - 54)  SpO2: 100% (31 Mar 2024 18:15) (100% - 100%)    O2 Parameters below as of 31 Mar 2024 15:00  Patient On (Oxygen Delivery Method): ventilator    O2 Concentration (%): 40        I&O's Summary    30 Mar 2024 07:01  -  31 Mar 2024 07:00  --------------------------------------------------------  IN: 1473.8 mL / OUT: 1080 mL / NET: 393.8 mL    31 Mar 2024 07:01  -  31 Mar 2024 19:04  --------------------------------------------------------  IN: 391.8 mL / OUT: 365 mL / NET: 26.8 mL        MEDICATIONS  (STANDING):  bisacodyl Suppository 10 milliGRAM(s) Rectal at bedtime  chlorhexidine 0.12% Liquid 15 milliLiter(s) Oral Mucosa every 12 hours  chlorhexidine 4% Liquid 1 Application(s) Topical at bedtime  enoxaparin Injectable 40 milliGRAM(s) SubCutaneous <User Schedule>  fentaNYL    Injectable 50 MICROGram(s) IV Push once  fentaNYL   Infusion... 0.5 MICROgram(s)/kG/Hr (1.78 mL/Hr) IV Continuous <Continuous>  lactated ringers. 1000 milliLiter(s) (50 mL/Hr) IV Continuous <Continuous>  levETIRAcetam  Solution 500 milliGRAM(s) Oral two times a day  niMODipine Oral Solution 30 milliGRAM(s) Oral every 2 hours  pantoprazole  Injectable 40 milliGRAM(s) IV Push daily  polyethylene glycol 3350 17 Gram(s) Oral at bedtime  senna 2 Tablet(s) Oral at bedtime  theophylline Syrup 50 milliGRAM(s) Oral every 12 hours      RESPIRATORY:  Mode: CPAP with PS  FiO2: 40  PEEP: 5  PS: 5  MAP: 6  PIP: 10        IMAGING:   Recent imaging studies were reviewed.    LAB RESULTS:                          9.3    10.58 )-----------( 193      ( 31 Mar 2024 03:30 )             29.9           03-31    151<H>  |  115<H>  |  31<H>  ----------------------------<  166<H>  4.8   |  25  |  0.44<L>    Ca    9.4      31 Mar 2024 03:30  Phos  3.6     03-31  Mg     2.5     03-31      -----------------------------------------------------------------------------------------------------------------------------------------------------------------------------------               NSICU Night Intensivist Note    Historical Review:   58F, on ASA81 for pain relief? (fell a month ago), presents as txfr from [place] for diffuse SAH w/small IVH, 2/2 ruptured posterolaterally projecting 2.9x2.4x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 Was initially found unresponsive at 21:00, intubated at 23:00 at OSH, then txfrd. Given ddavp at OSH. On arrival patient w/ PERRL, +cough/gag, 2/5 spontaneous movement in LUE, o/w no movement. 1 unit PLTs given and R frontal EVD placed-high pressure. PLTs/Coags wnl.    (26 Mar 2024 04:21)    12hr EVENTS:  - given atropine in AM for bradycardia -> theophylline started per cardiology  - EF improved to 55%  - remains intubated for airway swelling  - agitated, started on fentanyl requiring uptitration, precedex limited by bradycardia -> propofol    ROS: [x]  Unable to assess due to mental status/intubation     ----------------------------------------------------------------------------------------------------  fentanyl @2  propofol uptitrating    PHYSICAL EXAM:  General: intubated  HEENT: MMM  Neuro:  -Mental status- eyes open to voice  -CN- PERRL 3mm, EOMI, face symmetric  -SensoriMotor- RUE localizes antigravity, RLE spont in plane of bed, LUE plegic, LLE wd    CV: regular rate and rhythm  Pulm: no accessory muscle use  Abd: soft, nontender, nondistended, +TF  Skin: warm, dry    -----------------------------------------------------------------------------------------------------  ICU Vital Signs Last 24 Hrs  T(C): 37.6 (31 Mar 2024 17:00), Max: 37.6 (31 Mar 2024 03:00)  T(F): 99.7 (31 Mar 2024 17:00), Max: 99.7 (31 Mar 2024 03:00)  HR: 59 (31 Mar 2024 18:15) (34 - 95)  ABP: 197/87 (31 Mar 2024 18:15) (137/81 - 214/99)  ABP(mean): 124 (31 Mar 2024 18:15) (102 - 158)  RR: 30 (31 Mar 2024 18:15) (11 - 54)  SpO2: 100% (31 Mar 2024 18:15) (100% - 100%)    O2 Parameters below as of 31 Mar 2024 15:00  Patient On (Oxygen Delivery Method): ventilator    O2 Concentration (%): 40        I&O's Summary    30 Mar 2024 07:01  -  31 Mar 2024 07:00  --------------------------------------------------------  IN: 1473.8 mL / OUT: 1080 mL / NET: 393.8 mL    31 Mar 2024 07:01  -  31 Mar 2024 19:04  --------------------------------------------------------  IN: 391.8 mL / OUT: 365 mL / NET: 26.8 mL        MEDICATIONS  (STANDING):  bisacodyl Suppository 10 milliGRAM(s) Rectal at bedtime  chlorhexidine 0.12% Liquid 15 milliLiter(s) Oral Mucosa every 12 hours  chlorhexidine 4% Liquid 1 Application(s) Topical at bedtime  enoxaparin Injectable 40 milliGRAM(s) SubCutaneous <User Schedule>  fentaNYL    Injectable 50 MICROGram(s) IV Push once  fentaNYL   Infusion... 0.5 MICROgram(s)/kG/Hr (1.78 mL/Hr) IV Continuous <Continuous>  lactated ringers. 1000 milliLiter(s) (50 mL/Hr) IV Continuous <Continuous>  levETIRAcetam  Solution 500 milliGRAM(s) Oral two times a day  niMODipine Oral Solution 30 milliGRAM(s) Oral every 2 hours  pantoprazole  Injectable 40 milliGRAM(s) IV Push daily  polyethylene glycol 3350 17 Gram(s) Oral at bedtime  senna 2 Tablet(s) Oral at bedtime  theophylline Syrup 50 milliGRAM(s) Oral every 12 hours      RESPIRATORY:  Mode: CPAP with PS  FiO2: 40  PEEP: 5, PS: 5    IMAGING:   Recent imaging studies were reviewed.    LAB RESULTS:                   9.3    10.58 )-----------( 193      ( 31 Mar 2024 03:30 )             29.9     03-31    151<H>  |  115<H>  |  31<H>  ----------------------------<  166<H>  4.8   |  25  |  0.44<L>    Ca    9.4      31 Mar 2024 03:30  Phos  3.6     03-31  Mg     2.5     03-31      -----------------------------------------------------------------------------------------------------------------------------------------------------------------------------------

## 2024-03-31 NOTE — EEG REPORT - NS EEG TEXT BOX
JOSE MCLEAN [facility]-[id number]     Study Date: 	03-30-24 0800  0800	03-31-24  Duration x Hours: 24 h    --------------------------------------------------------------------------------------------------  History:  CC/ HPI Patient is a 58y old  Female who presents with a chief complaint of SAH (30 Mar 2024 03:57)    MEDICATIONS  (STANDING):  bisacodyl Suppository 10 milliGRAM(s) Rectal at bedtime  chlorhexidine 0.12% Liquid 15 milliLiter(s) Oral Mucosa every 12 hours  chlorhexidine 4% Liquid 1 Application(s) Topical at bedtime  dexMEDEtomidine Infusion 0.2 MICROgram(s)/kG/Hr (3.56 mL/Hr) IV Continuous <Continuous>  dexMEDEtomidine Infusion 0.2 MICROgram(s)/kG/Hr (3.56 mL/Hr) IV Continuous <Continuous>  levETIRAcetam  Solution 500 milliGRAM(s) Oral two times a day  niMODipine Oral Solution 30 milliGRAM(s) Oral every 2 hours  pantoprazole  Injectable 40 milliGRAM(s) IV Push daily  polyethylene glycol 3350 17 Gram(s) Oral at bedtime  senna 2 Tablet(s) Oral at bedtime  sodium chloride 3% + sodium acetate 50:50 1000 milliLiter(s) (50 mL/Hr) IV Continuous <Continuous>    --------------------------------------------------------------------------------------------------  Study Interpretation:    [[[Abbreviation Key:  PDR=alpha rhythm/posterior dominant rhythm. A-P=anterior posterior.  Amplitude: ‘very low’:<20; ‘low’:20-49; ‘medium’:; ‘high’:>150uV.  Persistence for periodic/rhythmic patterns (% of epoch) ‘rare’:<1%; ‘occasional’:1-10%; ‘frequent’:10-50%; ‘abundant’:50-90%; ‘continuous’:>90%.  Persistence for sporadic discharges: ‘rare’:<1/hr; ‘occasional’:1/min-1/hr; ‘frequent’:>1/min; ‘abundant’:>1/10 sec.  RPP=rhythmic and periodic patterns; GRDA=generalized rhythmic delta activity; FIRDA=frontal intermittent GRDA; LRDA=lateralized rhythmic delta activity; TIRDA=temporal intermittent rhythmic delta activity;  LPD=PLED=lateralized periodic discharges; GPD=generalized periodic discharges; BIPDs =bilateral independent periodic discharges; Mf=multifocal; SIRPDs=stimulus induced rhythmic, periodic, or ictal appearing discharges; BIRDs=brief potentially ictal rhythmic discharges >4 Hz, lasting .5-10s; PFA (paroxysmal bursts >13 Hz or =8 Hz <10s).  Modifiers: +F=with fast component; +S=with spike component; +R=with rhythmic component.  S-B=burst suppression pattern.  Max=maximal. N1-drowsy; N2-stage II sleep; N3-slow wave sleep. SSS/BETS=small sharp spikes/benign epileptiform transients of sleep. HV=hyperventilation; PS=photic stimulation]]]    Daily EEG Visual Analysis    FINDINGS:      Background:  Continuity: continuous  Symmetry: asymmetric  PDR: none  Reactivity: present  Voltage: normal, mostly 20-150uV  Anterior Posterior Gradient: absent  Other background findings: none  Breach: Higher amplitude, sharply contoured activity  in the right frontal region.    Background Slowing:  Generalized slowing: Diffuse polymorphic delta/theta activity  Focal slowing: Abundant bilateral frontal intermittent theta/delta slowing,    State Changes:   -Drowsiness noted with increased slowing, attenuation of fast activity.  -N2 sleep transients were not recorded.    Sporadic Epileptiform Discharges:    None    Rhythmic and Periodic Patterns (RPPs):  Occasional Right frontal sharp waves, F4 maximum, at times periodic at 1hz frequency.   Occasional left frontal sharp waves Fp1/F3 max  LRDA left frontal max at 1.5hz    Electrographic and Electroclinical seizures:  None    Other Clinical Events:  None    Activation Procedures:   -Hyperventilation was not performed.    -Photic stimulation was not performed.    Artifacts:  Intermittent myogenic and movement artifacts were noted.    ECG:  The heart rate on single channel ECG was predominantly between 50 to 70 BPM.    EEG Classification / Summary:  Abnormal  EEG in the awake / drowsy / asleep state(s).  - Occasional Right frontal sharp waves at times periodic at 1hz frequency.   - Occasional left frontal sharp waves   - LRDA left frontal max at 1.5hz  - bilateral frontal independent focal slowing   - Right frontal breach artifact.   - Moderate diffuse focal slowing.     Clinical Impression:  - Independent risk for focal seizures from the bilateral frontal region independently.  - Independent bilateral frontal focal cerebral dysfunction.   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  No seizures x 4days.    This is fellow preliminary read, pending attending review.    -------------------------------------------------------------------------------------------------------  [facility] EEG Reading Room Ph#: [phone]  Epilepsy Answering Service after 5PM and before 8:30AM: Ph#: [phone]    WM Chan  Epilepsy Fellow   JOSE MCLEAN [facility]-[id number]     Study Date: 	03-30-24 0800  0800	03-31-24  Duration x Hours: 24 h    --------------------------------------------------------------------------------------------------  History:  CC/ HPI Patient is a 58y old  Female who presents with a chief complaint of SAH (30 Mar 2024 03:57)    MEDICATIONS  (STANDING):  bisacodyl Suppository 10 milliGRAM(s) Rectal at bedtime  chlorhexidine 0.12% Liquid 15 milliLiter(s) Oral Mucosa every 12 hours  chlorhexidine 4% Liquid 1 Application(s) Topical at bedtime  dexMEDEtomidine Infusion 0.2 MICROgram(s)/kG/Hr (3.56 mL/Hr) IV Continuous <Continuous>  dexMEDEtomidine Infusion 0.2 MICROgram(s)/kG/Hr (3.56 mL/Hr) IV Continuous <Continuous>  levETIRAcetam  Solution 500 milliGRAM(s) Oral two times a day  niMODipine Oral Solution 30 milliGRAM(s) Oral every 2 hours  pantoprazole  Injectable 40 milliGRAM(s) IV Push daily  polyethylene glycol 3350 17 Gram(s) Oral at bedtime  senna 2 Tablet(s) Oral at bedtime  sodium chloride 3% + sodium acetate 50:50 1000 milliLiter(s) (50 mL/Hr) IV Continuous <Continuous>    --------------------------------------------------------------------------------------------------  Study Interpretation:    [[[Abbreviation Key:  PDR=alpha rhythm/posterior dominant rhythm. A-P=anterior posterior.  Amplitude: ‘very low’:<20; ‘low’:20-49; ‘medium’:; ‘high’:>150uV.  Persistence for periodic/rhythmic patterns (% of epoch) ‘rare’:<1%; ‘occasional’:1-10%; ‘frequent’:10-50%; ‘abundant’:50-90%; ‘continuous’:>90%.  Persistence for sporadic discharges: ‘rare’:<1/hr; ‘occasional’:1/min-1/hr; ‘frequent’:>1/min; ‘abundant’:>1/10 sec.  RPP=rhythmic and periodic patterns; GRDA=generalized rhythmic delta activity; FIRDA=frontal intermittent GRDA; LRDA=lateralized rhythmic delta activity; TIRDA=temporal intermittent rhythmic delta activity;  LPD=PLED=lateralized periodic discharges; GPD=generalized periodic discharges; BIPDs =bilateral independent periodic discharges; Mf=multifocal; SIRPDs=stimulus induced rhythmic, periodic, or ictal appearing discharges; BIRDs=brief potentially ictal rhythmic discharges >4 Hz, lasting .5-10s; PFA (paroxysmal bursts >13 Hz or =8 Hz <10s).  Modifiers: +F=with fast component; +S=with spike component; +R=with rhythmic component.  S-B=burst suppression pattern.  Max=maximal. N1-drowsy; N2-stage II sleep; N3-slow wave sleep. SSS/BETS=small sharp spikes/benign epileptiform transients of sleep. HV=hyperventilation; PS=photic stimulation]]]    Daily EEG Visual Analysis    FINDINGS:      Background:  Continuity: continuous  Symmetry: asymmetric  PDR: none  Reactivity: present  Voltage: normal, mostly 20-150uV  Anterior Posterior Gradient: absent  Other background findings: none  Breach: Higher amplitude, sharply contoured activity  in the right frontal region.    Background Slowing:  Generalized slowing: Diffuse polymorphic delta/theta activity  Focal slowing: Abundant bilateral frontal intermittent theta/delta slowing,    State Changes:   -Drowsiness noted with increased slowing, attenuation of fast activity.  -N2 sleep transients were not recorded.    Sporadic Epileptiform Discharges:    None    Rhythmic and Periodic Patterns (RPPs):  Occasional right frontal sharp waves, F4 maximum, at times periodic at 1hz frequency.   Occasional left frontal sharp waves Fp1/F3 max  Intermittent LRDA left frontal max at 1.5hz    Electrographic and Electroclinical seizures:  None    Other Clinical Events:  None    Activation Procedures:   -Hyperventilation was not performed.    -Photic stimulation was not performed.    Artifacts:  Intermittent myogenic and movement artifacts were noted.    ECG:  The heart rate on single channel ECG was predominantly between 50 to 70 BPM.    EEG Classification / Summary:  Abnormal  EEG in the awake / drowsy / asleep state(s).  - Occasional Right frontal sharp waves at times periodic near 1hz frequency.   - Occasional Left frontal sharp waves   - Intermittent LRDA left frontal max at 1.5hz  - bilateral frontal independent focal slowing   - Right frontal breach artifact.   - Moderate diffuse focal slowing.     Clinical Impression:  - Independent risk for focal seizures from the bilateral frontal regions independently.  - Independent bilateral frontal focal cerebral dysfunction.   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  - No seizures x 4days.  - Bradycardia noted    -------------------------------------------------------------------------------------------------------  [facility] EEG Reading Room Ph#: [phone]  Epilepsy Answering Service after 5PM and before 8:30AM: Ph#: [phone]    WM Chan  Epilepsy Fellow

## 2024-03-31 NOTE — PROVIDER CONTACT NOTE (OTHER) - RECOMMENDATIONS
Continue to monitor patient. Keep Atropine at bedside. No new interventions at this time. Continue to monitor patient. Keep Atropine at bedside. Hold nimodipine.  No new interventions at this time.

## 2024-03-31 NOTE — PROGRESS NOTE ADULT - ASSESSMENT
DeliaJie  58F, on ASA81 for pain relief? (fell a month ago), presents as txfr from [place] for diffuse SAH w/small IVH, 2/2 ruptured posterolaterally projecting 2.9x2.4x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 Was initially found unresponsive at 21:00, intubated at 23:00 at OSH, then txfrd. Given ddavp at OSH. On arrival patient w/ PERRL, +cough/gag, 2/5 spontaneous movement in LUE, o/w no movement. 1 unit PLTs given and R frontal EVD placed-high pressure. PLTs/Coags wnl. Exam improved: intubated, PERRL, BUE localize, b/l wd       ENT will re-scope on Monday    CPAP as tolerated    TCDs daily    Repeat LED 4/2    Repeat TTE 3/31

## 2024-03-31 NOTE — PROGRESS NOTE ADULT - ASSESSMENT
ASSESSMENT/PLAN: [facility] WFNS subarachnoid hemorrhage   Post-SAH day 3, SAH 3/25/2024  3/26/24 evd placed  3/26/24 pcomm clip    N   # @ Pcomm ruptured, s/p clip: CTH/CTA   #EVD tract acute hemorrhage   #Hydrocephalus/ICP crisis: R EVD in place 10cm H2O 69 last 24h - no readable wave - 3% 50cc/h Na goal 145-155 - ONDS normal this morning   #Delayed Cerebral Ischemia Management: TCDs with poor temporal windows, follow up occipital windows low vertebral and basilar MFV, euvolemia, nimodipine/ net neg give a bolus 500cc NS  #Seizure Prophylaxis: Levetiracetam for 7 days  #Pain: PRN tylenol oxi  #Sedation precedex   Activity: [] OOB as tolerated [x] Bedrest [] PT [] OT [] PMNR    CV   SBP goal 120-180   #R heart strain: POCUS with RV strain and non collapsable IVC, dysmotility of LV   TTE decreased systolic cardiac function EF 36% with decreased diastolic function    P   #respiratory failure  intubated   PS 5/5  anesthesia to extubate   oral secretions, no in line   On dexamethasone for cuff leak    R   I&o    GI   #REE: IC  Diet: npo   PPI   Senna miralax  BM PTA     E   Goal euglycemia (-180)  A1c 5.5   ISS     H   SCD   Chemoppx held for ICH  LED R below knee evd    ID   Fever   fu blood culture no growth     lines  R evd  a line  ETT   pivs  ngtube     CODE STATUS: FULL CODE    DISPOSITION: ICU     CRITICAL  ASSESSMENT/PLAN: [facility] WFNS subarachnoid hemorrhage   Post-SAH day 3, SAH 3/25/2024  3/26/24 evd placed  3/26/24 pcomm clip    N   # @ Pcomm ruptured, s/p clip: CTH/CTA   #EVD tract acute hemorrhage   #Hydrocephalus/ICP crisis: R EVD in place 10cm H2O 69 last 24h - no readable wave - 3% 50cc/h Na goal 145-155 - ONDS normal this morning   #Delayed Cerebral Ischemia Management: TCDs with poor temporal windows, follow up occipital windows low vertebral and basilar MFV, euvolemia, nimodipine/ net neg give a bolus 500cc NS  #Seizure Prophylaxis: Levetiracetam for 7 days  #Pain: PRN tylenol oxi  #Sedation precedex   Activity: [] OOB as tolerated [x] Bedrest [] PT [] OT [] PMNR    CV   SBP goal 120-180   #R heart strain: POCUS with RV strain and non collapsable IVC, dysmotility of LV   Repeat TTE improved systolic cardiac function   Bradycardic, down to 33 this morning, good response to 1mg of atropin    P   #respiratory failure  intubated   PS 5/5  anesthesia to extubate   oral secretions, no in line   On dexamethasone for cuff leak    R   I&o    GI   #REE: IC  Diet: npo   PPI   Senna miralax  BM PTA     E   Goal euglycemia (-180)  A1c 5.5   ISS     H   SCD   Chemoppx held for ICH  LED R below knee evd    ID   Fever   fu blood culture no growth     lines  R evd  a line  ETT   pivs  ngtube     CODE STATUS: FULL CODE    DISPOSITION: ICU     CRITICAL  ASSESSMENT/PLAN: [facility] WFNS subarachnoid hemorrhage   Post-SAH day 3, SAH 3/25/2024  3/26/24 evd placed  3/26/24 pcomm clip    N   # @ Pcomm ruptured, s/p clip: CTH/CTA   #EVD tract acute hemorrhage   #Hydrocephalus/ICP crisis: R EVD in place 10cm H2O 69 last 24h - no readable wave - 3% 50cc/h Na goal 145-155 - ONDS normal this morning   #Delayed Cerebral Ischemia Management: TCDs with poor temporal windows, follow up occipital windows low vertebral and basilar MFV, euvolemia, nimodipine/ net neg give a bolus 500cc NS  #Seizure Prophylaxis: Levetiracetam for 7 days  #Pain: PRN tylenol oxi  #Sedation precedex   Activity: [] OOB as tolerated [x] Bedrest [] PT [] OT [] PMNR    CV   SBP goal 120-180   #R heart strain: POCUS with RV strain and non collapsable IVC, dysmotility of LV   Repeat TTE improved systolic cardiac function   Bradycardic, down to 33 this morning, good response to 1mg of atropin    P   #respiratory failure  intubated   PS 5/5  anesthesia to extubate   oral secretions, no in line   On dexamethasone for cuff leak    R   I&o    GI   #REE: IC  Diet: npo   PPI   Senna miralax  BM PTA     E   Goal euglycemia (-180)  A1c 5.5   ISS     H   SCD   Chemoppx resumed after ICH  LED R below knee evd    ID   Fever   fu blood culture no growth     lines  R evd  a line  ETT   pivs  ngtube     CODE STATUS: FULL CODE    DISPOSITION: ICU     CRITICAL

## 2024-03-31 NOTE — PROGRESS NOTE ADULT - SUBJECTIVE AND OBJECTIVE BOX
HPI   58F, on ASA81, transferred from [place] for SAH HH4 WNFS 5 mF4 ruptured R pcomm aneurysm. Was initially found unresponsive on 3/25/24 at 21:00, intubated at 23:00 at OSH, then txfrd. Given ddavp at OSH. Here in ED 1 unit PLTs given and R frontal EVD placed-high pressure. Went for R pcom clip on 3/26/24, postop evd not functioning then drained on the floor.     Admission scores:   SAH R pcomm ruptured @ HH4 WNFS 5 mF4, GCS <7    24h events   PS 5/5     Exam   intubated, eyes opens to verbal stimulation briefly, gaze midline, alexia, RUE localized and AG follows simple commands, LUE plegic, RLE AG, moves toes     LED 3/28   IMPRESSION:  Acute left lower extremity DVT below the knee, involving the left soleal   vein. Edema of the superficial soft tissues of the left calf. Correlate   clinically.  No acute DVT of the right lower extremity.    EEG 3/28/24  EEG SUMMARY/CLASSIFICATION    Abnormal EEG  - Independent bilateral frontal sharp-wave discharges  - GPDs with triphasic morphology.   - Bifrontal slowing, left greater than right.  - Moderate generalized slowing.    _____________________________________________________________  EEG IMPRESSION/CLINICAL CORRELATE    Abnormal EEG study.  Potential independent epileptogenic foci in the bilateral frontal regions.  Structural or functional abnormality in the bilateral frontal head regions, left greater than right.  Moderate nonspecific diffuse or multifocal cerebral dysfunction.     TTE 3/26/24   CONCLUSIONS:   1. Left ventricular systolic function is moderately to severely decreased with an ejection fraction of 36 % by Mcgee's method of disks. Regional wall motion abnormalities consistent with ischemic heart disease.   2. Multiple segmental abnormalities exist. See findings.   3. There is moderate (grade 2) left ventricular diastolic dysfunction, with elevated filling pressure.   4. Normal right ventricular cavity size, with normal wall thickness, and normal systolic function. Tricuspid annular plane systolic excursion (TAPSE) is 1.7 cm (normal >=1.7 cm).   5. Normal left and right atrial size.   6. No significant valvular disease.   7. Estimated pulmonary artery systolic pressure is 41 mmHg.   8. The inferior vena cava is normal in size measuring 1.98 cm in diameter, (normal <2.1cm) with abnormal inspiratory collapse (abnormal <50%) consistent with mildly elevated right atrial pressure (~8, range 5-10mmHg).   9. No pericardial effusion seen.  10. No prior echocardiogram is available for comparison.  11. There is no evidence of a left ventricular thrombus.  12. There is increased LV mass and concentric hypertrophy.    T(C): 37 (03-30-24 @ 23:00), Max: 38.3 (03-30-24 @ 17:00)  HR: 41 (03-30-24 @ 23:00) (41 - 75)  BP: --  BP(mean): --  RR: 16 (03-30-24 @ 23:00) (15 - 30)  SpO2: 100% (03-30-24 @ 23:00) (94% - 100%)    acetaminophen     Tablet .. 650 milliGRAM(s) Oral every 6 hours PRN  bisacodyl Suppository 10 milliGRAM(s) Rectal at bedtime  chlorhexidine 0.12% Liquid 15 milliLiter(s) Oral Mucosa every 12 hours  chlorhexidine 4% Liquid 1 Application(s) Topical at bedtime  dexAMETHasone  IVPB 8 milliGRAM(s) IV Intermittent every 8 hours  enoxaparin Injectable 40 milliGRAM(s) SubCutaneous <User Schedule>  fentaNYL    Injectable 25 MICROGram(s) IV Push every 2 hours PRN  levETIRAcetam  Solution 500 milliGRAM(s) Oral two times a day  niMODipine Oral Solution 30 milliGRAM(s) Oral every 2 hours  oxyCODONE    IR 5 milliGRAM(s) Oral every 4 hours PRN  oxyCODONE    IR 10 milliGRAM(s) Oral every 4 hours PRN  pantoprazole  Injectable 40 milliGRAM(s) IV Push daily  polyethylene glycol 3350 17 Gram(s) Oral at bedtime  senna 2 Tablet(s) Oral at bedtime        03-29-24 @ 07:01  -  03-30-24 @ 07:00  --------------------------------------------------------  IN: 2675.1 mL / OUT: 1369 mL / NET: 1306.1 mL    03-30-24 @ 07:01  -  03-31-24 @ 02:23  --------------------------------------------------------  IN: 1103.8 mL / OUT: 478 mL / NET: 625.8 mL                            10.4   11.29 )-----------( 215      ( 30 Mar 2024 00:24 )             32.6     03-30    153<H>  |  114<H>  |  29<H>  ----------------------------<  128<H>  3.5   |  29  |  0.46<L>

## 2024-03-31 NOTE — PROGRESS NOTE ADULT - ASSESSMENT
58F, on ASA81 for pain relief? 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 Was initially found unresponsive at 21:00, intubated at 23:00 at OSH, then txfrd. Given ddavp at OSH. On arrival patient w/ PERRL, +cough/gag, 2/5 spontaneous movement in LUE, o/w no movement. 1 unit PLTs given and R frontal EVD placed-high pressure. PLTs/Coags wnl.     Intubated sedated in NSCU   Echo with abnormal findings of severe cardiomyopathy and RWMA

## 2024-03-31 NOTE — PROGRESS NOTE ADULT - ATTENDING COMMENTS
59 yo woman with no PMH, admitted with unresponsiveness, found to have a HH4 mF4 SAH (bleed day 3/25) from R PComm aneurysm s/p clipping and R frontal EVD. Course c/b EVD tract hemorrhage and R BG stroke.   TTE with EF 37% with regional wall motion abnormalities.     Interval events:  With bradycardia overnight to 30s, requiring atropine x2.   Febrile to 38.3.   Received fentanyl pushes overnight.   EVD at 10cm H2O, output 55cc, dampened wave form   Is and Os net pos 400cc.     On exam, opens eyes briefly to voice, does not attend, follows some simple commands (showing 2 fingers on the R), PERRL, able to look R and slightly L, R arm AG and localizing, L arm no movement, moves R leg spontaneously in plane of bed, L leg withdraws to noxious     PBD 6. Monitoring for worsening brain edema and vasospasm.     possible angiogram tomorrow   oxycodone and fentanyl pushes PRN, unable to use Precedex or propofol for bradycardia   keppra 500 mg BID for 7 days from craniotomy   Nimodipine 60 mg q4h  TCDs  euvolemia (of note with low EF 37%), f/u repeat TTE   CPAP 5/5, s/p 48 hours of dex, ENT to rescope on Monday for airway edema   SBP goal 100-200, with elevated QTc   Na 145-155, IVL  restart tube feeds, NPO after MN, LBM 3/30  PPI  Lov ppx, with L soleal DVT    straight caths     Patient seen and examined by attending on 3/31/2023.    Patient is critically ill due to SAH and at high risk for neurological deterioration or death due to: hydrocephalus requiring an EVD for CSF diversion, intubated for airway protection.

## 2024-03-31 NOTE — PROVIDER CONTACT NOTE (OTHER) - ACTION/TREATMENT ORDERED:
Atropine at bedside. Plan of care continued as per order. Atropine at bedside. Hold nimodipine if HR below ordered parameters.  Plan of care continued as per order.

## 2024-03-31 NOTE — PROVIDER CONTACT NOTE (OTHER) - SITUATION
Patient's HR 40's - 50's Sinus Terrence. Neuro checks unchanged. Pacer pads on. Defibrillator in room.

## 2024-03-31 NOTE — PROGRESS NOTE ADULT - ASSESSMENT
58F with SAH HH4 mF4 from R PComm aneurysm s/p clipping with R frontal EVD.  #EVD tract hemorrhage  #R BG stroke  #HF improving    Plan:  angio tmrw for possible vasospasm  analagosedation: oxycodone, fentanyl PRN  keppra 500mg x7d  nimodipine, euvolemia  PS as tolerated  SBP liberalized 120-200  Na goal 145-155  NPO @MN, PPI, bowel regimen  SCDs, SQL    See day attending note for add'l details 58F with SAH HH4 mF4 from R PComm aneurysm s/p clipping with R frontal EVD.  #EVD tract hemorrhage  #R BG stroke  #HF improving  #bradycardia    Plan:  angio tmrw for possible vasospasm  sedation: propofol, fentanyl  keppra 500mg x7d  euvolemia, holding nimodipine for bradycardia  PS as tolerated  SBP liberalized 120-200  theophylline for bradycardia  Na goal 145-155  NPO @MN, PPI, bowel regimen  SCDs, SQL    See day attending note for add'l details

## 2024-03-31 NOTE — PROGRESS NOTE ADULT - SUBJECTIVE AND OBJECTIVE BOX
Subjective: Patient seen and examined. No new events except as noted.   remains in NSCU       REVIEW OF SYSTEMS:    Unable to obtain       MEDICATIONS:  MEDICATIONS  (STANDING):  bisacodyl Suppository 10 milliGRAM(s) Rectal at bedtime  chlorhexidine 0.12% Liquid 15 milliLiter(s) Oral Mucosa every 12 hours  chlorhexidine 4% Liquid 1 Application(s) Topical at bedtime  enoxaparin Injectable 40 milliGRAM(s) SubCutaneous <User Schedule>  levETIRAcetam  Solution 500 milliGRAM(s) Oral two times a day  niMODipine Oral Solution 30 milliGRAM(s) Oral every 2 hours  pantoprazole  Injectable 40 milliGRAM(s) IV Push daily  polyethylene glycol 3350 17 Gram(s) Oral at bedtime  senna 2 Tablet(s) Oral at bedtime      PHYSICAL EXAM:  T(C): 36.9 (03-31-24 @ 09:00), Max: 38.3 (03-30-24 @ 17:00)  HR: 48 (03-31-24 @ 09:00) (34 - 95)  BP: --  RR: 16 (03-31-24 @ 09:00) (15 - 54)  SpO2: 100% (03-31-24 @ 09:00) (94% - 100%)  Wt(kg): --  I&O's Summary    30 Mar 2024 07:01  -  31 Mar 2024 07:00  --------------------------------------------------------  IN: 1473.8 mL / OUT: 1080 mL / NET: 393.8 mL    31 Mar 2024 07:01  -  31 Mar 2024 09:08  --------------------------------------------------------  IN: 0 mL / OUT: 5 mL / NET: -5 mL          Appearance: Intubated   HEENT:  dry oral mucosa, PERRL, EOMI	  Lymphatic: No lymphadenopathy  Cardiovascular: Normal S1 S2, No JVD, No murmurs, No edema  Respiratory: ventilated   Psychiatry: A & O x 0  Gastrointestinal:  Soft, Non-tender, + BS	  Skin: No rashes, No ecchymoses, No cyanosis	  Neurologic: perrl, BUE localized, LE wd bilateral   Extremities: Normal range of motion, No clubbing, cyanosis or edema  Vascular: Peripheral pulses palpable 2+ bilaterally          LABS:    CARDIAC MARKERS:                                9.3    10.58 )-----------( 193      ( 31 Mar 2024 03:30 )             29.9     03-31    151<H>  |  115<H>  |  31<H>  ----------------------------<  166<H>  4.8   |  25  |  0.44<L>    Ca    9.4      31 Mar 2024 03:30  Phos  3.6     03-31  Mg     2.5     03-3      TELEMETRY: SR	    ECG:  	  RADIOLOGY:   DIAGNOSTIC TESTING:  [ ] Echocardiogram:  [ ]  Catheterization:  [ ] Stress Test:    OTHER: 	    < from: CT Head No Cont (03.30.24 @ 10:06) >  ACC: [id number] EXAM:  CT BRAIN   ORDERED BY: ARASH CASTILLO     PROCEDURE DATE:  03/30/2024          INTERPRETATION:  CLINICAL INFORMATION: Follow-up intracranial hemorrhage    TECHNIQUE: Portable CT head.    COMPARISON: CT head 3/29/2024    FINDINGS:    A right pterional craniotomy is noted, with an aneurysm clip in the right   paraclinoid region. There is mild extra-axial lucency and increased   density beneath the flap, likely sealant material. There is scattered   subarachnoid blood in both cerebral hemispheres, collecting   preferentially at the right insula and interhemispheric fissure. All of   these findings appear stable, without interval rebleeding.    There is a right frontal EVD in place, terminating near the left foramen   of [place].There is mild pneumocephalus at the right frontal horn on a   postoperative basis, which has not progressed. There is a parenchymal   hematoma along the trajectory of the drain, not significantly changed.   There is edema surrounding the clot and extending inferiorly, down to the   level of the right hypothalamus. There is mild intraventricular blood in   the right lateral ventricle. These findings also appear unchanged,   accounting for differences in technique. There is no hydrocephalus.    Cerebral volume is within normal limits. No premature white matter   disease.    No midline shift or herniation. No CT evidence of acute territorial   infarction, although MRI with DWI would be more sensitive.    The visualized sinuses and mastoids are clear.    A right NG and ETT are noted in place. Soft tissue swelling is noted   overlying the craniotomy flap. Limited views of the orbits and visualized   soft tissues of the neck, face, scalp, skull base, and calvarium are   otherwise unremarkable.        IMPRESSION:    1.  No significant change, without interval rebleeding.    --- End of Report ---           SNEHAL CASTELLANO MD; Resident Radiologist  This document has been electronically signed.  JORGE L HAMLIN MD; Attending Radiologist  This document has been electronically signed. Mar 30 2024 11:00AM    < end of copied text >

## 2024-03-31 NOTE — PROVIDER CONTACT NOTE (OTHER) - ACTION/TREATMENT ORDERED:
Atropine 0.5 mg given twice as per order. Patient placed on paced pads. Defibrillator at bedside. Plan of care continued as per order.

## 2024-04-01 ENCOUNTER — APPOINTMENT (OUTPATIENT)
Dept: NEUROSURGERY | Facility: HOSPITAL | Age: 59
End: 2024-04-01

## 2024-04-01 DIAGNOSIS — R00.1 BRADYCARDIA, UNSPECIFIED: ICD-10-CM

## 2024-04-01 DIAGNOSIS — I82.409 ACUTE EMBOLISM AND THROMBOSIS OF UNSPECIFIED DEEP VEINS OF UNSPECIFIED LOWER EXTREMITY: ICD-10-CM

## 2024-04-01 LAB
ANION GAP SERPL CALC-SCNC: 11 MMOL/L — SIGNIFICANT CHANGE UP (ref 5–17)
ANION GAP SERPL CALC-SCNC: 14 MMOL/L — SIGNIFICANT CHANGE UP (ref 5–17)
BUN SERPL-MCNC: 24 MG/DL — HIGH (ref 7–23)
BUN SERPL-MCNC: 29 MG/DL — HIGH (ref 7–23)
CALCIUM SERPL-MCNC: 9 MG/DL — SIGNIFICANT CHANGE UP (ref 8.4–10.5)
CALCIUM SERPL-MCNC: 9 MG/DL — SIGNIFICANT CHANGE UP (ref 8.4–10.5)
CHLORIDE SERPL-SCNC: 108 MMOL/L — SIGNIFICANT CHANGE UP (ref 96–108)
CHLORIDE SERPL-SCNC: 110 MMOL/L — HIGH (ref 96–108)
CO2 SERPL-SCNC: 25 MMOL/L — SIGNIFICANT CHANGE UP (ref 22–31)
CO2 SERPL-SCNC: 26 MMOL/L — SIGNIFICANT CHANGE UP (ref 22–31)
CREAT SERPL-MCNC: 0.45 MG/DL — LOW (ref 0.5–1.3)
CREAT SERPL-MCNC: 0.47 MG/DL — LOW (ref 0.5–1.3)
CULTURE RESULTS: SIGNIFICANT CHANGE UP
CULTURE RESULTS: SIGNIFICANT CHANGE UP
EGFR: 111 ML/MIN/1.73M2 — SIGNIFICANT CHANGE UP
EGFR: 112 ML/MIN/1.73M2 — SIGNIFICANT CHANGE UP
GLUCOSE SERPL-MCNC: 118 MG/DL — HIGH (ref 70–99)
GLUCOSE SERPL-MCNC: 151 MG/DL — HIGH (ref 70–99)
MAGNESIUM SERPL-MCNC: 2.1 MG/DL — SIGNIFICANT CHANGE UP (ref 1.6–2.6)
MAGNESIUM SERPL-MCNC: 2.4 MG/DL — SIGNIFICANT CHANGE UP (ref 1.6–2.6)
PHOSPHATE SERPL-MCNC: 3.1 MG/DL — SIGNIFICANT CHANGE UP (ref 2.5–4.5)
PHOSPHATE SERPL-MCNC: 3.2 MG/DL — SIGNIFICANT CHANGE UP (ref 2.5–4.5)
POTASSIUM SERPL-MCNC: 3.6 MMOL/L — SIGNIFICANT CHANGE UP (ref 3.5–5.3)
POTASSIUM SERPL-MCNC: 4.1 MMOL/L — SIGNIFICANT CHANGE UP (ref 3.5–5.3)
POTASSIUM SERPL-SCNC: 3.6 MMOL/L — SIGNIFICANT CHANGE UP (ref 3.5–5.3)
POTASSIUM SERPL-SCNC: 4.1 MMOL/L — SIGNIFICANT CHANGE UP (ref 3.5–5.3)
SODIUM SERPL-SCNC: 145 MMOL/L — SIGNIFICANT CHANGE UP (ref 135–145)
SODIUM SERPL-SCNC: 149 MMOL/L — HIGH (ref 135–145)
SPECIMEN SOURCE: SIGNIFICANT CHANGE UP
SPECIMEN SOURCE: SIGNIFICANT CHANGE UP

## 2024-04-01 PROCEDURE — 36224 PLACE CATH CAROTD ART: CPT | Mod: 50,58

## 2024-04-01 PROCEDURE — 93888 INTRACRANIAL LIMITED STUDY: CPT | Mod: 26

## 2024-04-01 PROCEDURE — 95718 EEG PHYS/QHP 2-12 HR W/VEEG: CPT

## 2024-04-01 PROCEDURE — 36226 PLACE CATH VERTEBRAL ART: CPT | Mod: 50,58

## 2024-04-01 PROCEDURE — 71045 X-RAY EXAM CHEST 1 VIEW: CPT | Mod: 26

## 2024-04-01 PROCEDURE — 31575 DIAGNOSTIC LARYNGOSCOPY: CPT

## 2024-04-01 PROCEDURE — 99223 1ST HOSP IP/OBS HIGH 75: CPT

## 2024-04-01 PROCEDURE — 93971 EXTREMITY STUDY: CPT | Mod: 26,LT

## 2024-04-01 PROCEDURE — 99291 CRITICAL CARE FIRST HOUR: CPT

## 2024-04-01 RX ORDER — DEXAMETHASONE 0.5 MG/5ML
8 ELIXIR ORAL EVERY 8 HOURS
Refills: 0 | Status: COMPLETED | OUTPATIENT
Start: 2024-04-01 | End: 2024-04-02

## 2024-04-01 RX ORDER — POLYETHYLENE GLYCOL 3350 17 G/17G
17 POWDER, FOR SOLUTION ORAL EVERY 12 HOURS
Refills: 0 | Status: DISCONTINUED | OUTPATIENT
Start: 2024-04-01 | End: 2024-04-11

## 2024-04-01 RX ORDER — DEXAMETHASONE 0.5 MG/5ML
8 ELIXIR ORAL EVERY 8 HOURS
Refills: 0 | Status: DISCONTINUED | OUTPATIENT
Start: 2024-04-01 | End: 2024-04-01

## 2024-04-01 RX ORDER — PROPOFOL 10 MG/ML
50 INJECTION, EMULSION INTRAVENOUS ONCE
Refills: 0 | Status: COMPLETED | OUTPATIENT
Start: 2024-04-01 | End: 2024-04-01

## 2024-04-01 RX ORDER — SODIUM CHLORIDE 9 MG/ML
500 INJECTION INTRAMUSCULAR; INTRAVENOUS; SUBCUTANEOUS ONCE
Refills: 0 | Status: COMPLETED | OUTPATIENT
Start: 2024-04-01 | End: 2024-04-01

## 2024-04-01 RX ORDER — NALOXEGOL OXALATE 12.5 MG/1
25 TABLET, FILM COATED ORAL DAILY
Refills: 0 | Status: DISCONTINUED | OUTPATIENT
Start: 2024-04-01 | End: 2024-04-04

## 2024-04-01 RX ADMIN — SENNA PLUS 2 TABLET(S): 8.6 TABLET ORAL at 21:32

## 2024-04-01 RX ADMIN — PROPOFOL 2.14 MICROGRAM(S)/KG/MIN: 10 INJECTION, EMULSION INTRAVENOUS at 07:28

## 2024-04-01 RX ADMIN — FENTANYL CITRATE 1.78 MICROGRAM(S)/KG/HR: 50 INJECTION INTRAVENOUS at 19:30

## 2024-04-01 RX ADMIN — PROPOFOL 2.14 MICROGRAM(S)/KG/MIN: 10 INJECTION, EMULSION INTRAVENOUS at 15:20

## 2024-04-01 RX ADMIN — ENOXAPARIN SODIUM 40 MILLIGRAM(S): 100 INJECTION SUBCUTANEOUS at 18:07

## 2024-04-01 RX ADMIN — Medication 101.6 MILLIGRAM(S): at 18:06

## 2024-04-01 RX ADMIN — PROPOFOL 2.14 MICROGRAM(S)/KG/MIN: 10 INJECTION, EMULSION INTRAVENOUS at 19:30

## 2024-04-01 RX ADMIN — SODIUM CHLORIDE 50 MILLILITER(S): 9 INJECTION, SOLUTION INTRAVENOUS at 00:53

## 2024-04-01 RX ADMIN — SODIUM CHLORIDE 50 MILLILITER(S): 9 INJECTION, SOLUTION INTRAVENOUS at 07:29

## 2024-04-01 RX ADMIN — CHLORHEXIDINE GLUCONATE 1 APPLICATION(S): 213 SOLUTION TOPICAL at 21:31

## 2024-04-01 RX ADMIN — CHLORHEXIDINE GLUCONATE 15 MILLILITER(S): 213 SOLUTION TOPICAL at 18:07

## 2024-04-01 RX ADMIN — PANTOPRAZOLE SODIUM 40 MILLIGRAM(S): 20 TABLET, DELAYED RELEASE ORAL at 11:54

## 2024-04-01 RX ADMIN — POLYETHYLENE GLYCOL 3350 17 GRAM(S): 17 POWDER, FOR SOLUTION ORAL at 21:31

## 2024-04-01 RX ADMIN — PROPOFOL 50 MILLIGRAM(S): 10 INJECTION, EMULSION INTRAVENOUS at 14:47

## 2024-04-01 RX ADMIN — LEVETIRACETAM 500 MILLIGRAM(S): 250 TABLET, FILM COATED ORAL at 05:40

## 2024-04-01 RX ADMIN — SODIUM CHLORIDE 50 MILLILITER(S): 9 INJECTION, SOLUTION INTRAVENOUS at 19:31

## 2024-04-01 RX ADMIN — Medication 50 MILLIGRAM(S): at 18:19

## 2024-04-01 RX ADMIN — CHLORHEXIDINE GLUCONATE 15 MILLILITER(S): 213 SOLUTION TOPICAL at 05:40

## 2024-04-01 RX ADMIN — Medication 50 MILLIGRAM(S): at 06:24

## 2024-04-01 RX ADMIN — Medication 101.6 MILLIGRAM(S): at 11:54

## 2024-04-01 RX ADMIN — SODIUM CHLORIDE 500 MILLILITER(S): 9 INJECTION INTRAMUSCULAR; INTRAVENOUS; SUBCUTANEOUS at 18:50

## 2024-04-01 RX ADMIN — NALOXEGOL OXALATE 25 MILLIGRAM(S): 12.5 TABLET, FILM COATED ORAL at 11:55

## 2024-04-01 RX ADMIN — FENTANYL CITRATE 1.78 MICROGRAM(S)/KG/HR: 50 INJECTION INTRAVENOUS at 07:28

## 2024-04-01 NOTE — CHART NOTE - NSCHARTNOTEFT_GEN_A_CORE
Nutrition Note - Chart Note    Indirect Calorimetry Study completed on 4/1/24. Results as follows:     Time (min): 12:03  FiO2 (%): 39.10  REE (Kcal/day): 2253kcal  Pred (Kcal/day): 1371kcal  REE/Pred (%): 164%  RQ: 0.85    To note, pt not in steady state throughout duration of IC study, with son present at bedside holding hand. On 4.3ml/hr of Propofol. Pending angio this afternoon. Noted with laryngeal edema, on Decadron. See RD note 4/1 for additional details.     Recommendations: Jevity 1.5 at GOAL rate 60ml/hr x 24 hrs. Based on dosing wt 71.2kg, provides: 1440ml, 2160kcal (30.3kcal/kg) and 92g protein (1.29g protein/kg). Results discussed with medical team.     Marely Montoya RD, available via TEAMS

## 2024-04-01 NOTE — ADVANCED PRACTICE NURSE CONSULT - ASSESSMENT
Midline Catheter Insertion Note    Catheter type: 4F  : Bard  Power injectable: Yes  LOT#  [id number]                                                                                                                                                                                                                     Procedure assisted by: NICHOLE Echevarria RN  Time out was preformed, confirming the patient's first and last name, date of birth, procedure, and correct site prior to state of procedure.    Patient was placed with HOB 30 degrees. Patient placement site was prepped with chlorhexidine solution, then draped using maximum sterile barrier protection. Using the Bard Site Rite 8, the catheter was placed using the Modified Seldinger Technique. Strict adherence to outline aseptic technique including handwashing, glove and gown, utilizing mask and cap, plus draping the patient with a sterile drape was observed. Upon completion of line placement, the insertion site was covered with a sterile occlusive CHG dressing. Pt tolerated procedure well.     All materials used for catheter insertion, including the intact guide wires, were accounted for at the end of the procedure.  Number of attempts: 1  Complications/Comments: None    Emergency Placement: No  Site: New  Anatomical Site of insertion: Right  Basilic  Catheter size/length: 4F, 20cm  US guided Bard single lumen power midline placed

## 2024-04-01 NOTE — PROGRESS NOTE ADULT - ASSESSMENT
ASSESSMENT/PLAN: [facility] subarachnoid hemorrhage   Post-SAH day 3, SAH 3/25/2024  3/26/24 evd placed  3/26/24 pcomm clip    N   # @ Pcomm ruptured, s/p clip: CTH/CTA   #EVD tract acute hemorrhage   #Hydrocephalus/ICP crisis: R EVD in place 10cm H2O 69 last 24h - no readable wave  #Delayed Cerebral Ischemia Management: TCDs with poor temporal windows, follow up occipital windows low vertebral and basilar MFV, euvolemia, nimodipine/ net neg give a bolus 500cc NS  #Seizure Prophylaxis: Levetiracetam for 7 days  #Pain: PRN tylenol oxi fentanyl   #Sedation prop  Activity: [] OOB as tolerated [x] Bedrest [] PT [] OT [] PMNR    CV   SBP goal 120-200 if vasospasm   #R heart strain: POCUS with RV strain and non collapsable IVC, dysmotility of LV   TTE decreased systolic cardiac function EF 36% with decreased diastolic function -rTTE EF 55%   #bradycardia theophyllin     P   #respiratory failure  intubated   oral secretions, no in line   #laryngeal edema dexamethasone     R   I&o    GI   #REE: IC  Diet: npo   PPI   Senna miralax  BM 3/30      E   Goal euglycemia (-180)  A1c 5.5   ISS     H   SCD   Chemoppx   LED R below knee dvt     ID   Fever   fu blood culture no growth     lines  R evd  L axillary a line  ETT   pivs  ngtube     CODE STATUS: FULL CODE    DISPOSITION: ICU     CRITICAL

## 2024-04-01 NOTE — EEG REPORT - NS EEG TEXT BOX
JOSE MCLEAN [facility]-[id number]     Study Date: 	03-31-24 0800  0800	04-1-24  Duration x Hours: 24 h    --------------------------------------------------------------------------------------------------  History:  CC/ HPI Patient is a 58y old  Female who presents with a chief complaint of SAH (30 Mar 2024 03:57)    --------------------------------------------------------------------------------------------------  Study Interpretation:    [[[Abbreviation Key:  PDR=alpha rhythm/posterior dominant rhythm. A-P=anterior posterior.  Amplitude: ‘very low’:<20; ‘low’:20-49; ‘medium’:; ‘high’:>150uV.  Persistence for periodic/rhythmic patterns (% of epoch) ‘rare’:<1%; ‘occasional’:1-10%; ‘frequent’:10-50%; ‘abundant’:50-90%; ‘continuous’:>90%.  Persistence for sporadic discharges: ‘rare’:<1/hr; ‘occasional’:1/min-1/hr; ‘frequent’:>1/min; ‘abundant’:>1/10 sec.  RPP=rhythmic and periodic patterns; GRDA=generalized rhythmic delta activity; FIRDA=frontal intermittent GRDA; LRDA=lateralized rhythmic delta activity; TIRDA=temporal intermittent rhythmic delta activity;  LPD=PLED=lateralized periodic discharges; GPD=generalized periodic discharges; BIPDs =bilateral independent periodic discharges; Mf=multifocal; SIRPDs=stimulus induced rhythmic, periodic, or ictal appearing discharges; BIRDs=brief potentially ictal rhythmic discharges >4 Hz, lasting .5-10s; PFA (paroxysmal bursts >13 Hz or =8 Hz <10s).  Modifiers: +F=with fast component; +S=with spike component; +R=with rhythmic component.  S-B=burst suppression pattern.  Max=maximal. N1-drowsy; N2-stage II sleep; N3-slow wave sleep. SSS/BETS=small sharp spikes/benign epileptiform transients of sleep. HV=hyperventilation; PS=photic stimulation]]]    Daily EEG Visual Analysis    FINDINGS:      Background:  Continuity: continuous  Symmetry: asymmetric  PDR: none  Reactivity: present  Voltage: normal, mostly 20-150uV  Anterior Posterior Gradient: absent  Other background findings: none  Breach: Higher amplitude, sharply contoured activity  in the right frontal region.    Background Slowing:  Generalized slowing: Diffuse polymorphic delta/theta activity  Focal slowing: Abundant bilateral frontal intermittent theta/delta slowing,    State Changes:   -Drowsiness noted with increased slowing, attenuation of fast activity.  -N2 sleep transients were not recorded.    Sporadic Epileptiform Discharges:    None    Rhythmic and Periodic Patterns (RPPs):  Occasional right frontal sharp waves, F4 maximum.   Occasional left frontal sharp waves Fp1/F3 max  Intermittent LRDA left frontal max at 1.5hz    Electrographic and Electroclinical seizures:  None    Other Clinical Events:  None    Activation Procedures:   -Hyperventilation was not performed.    -Photic stimulation was not performed.    Artifacts:  Intermittent myogenic and movement artifacts were noted.    ECG:  The heart rate on single channel ECG was predominantly between 50 to 70 BPM.    EEG Classification / Summary:  Abnormal  EEG in the awake / drowsy / asleep state(s).  - Occasional Right frontal sharp waves.   - Occasional Left frontal sharp waves   - Intermittent LRDA left frontal max at 1.5hz  - bilateral frontal independent focal slowing   - Right frontal breach artifact.   - Moderate diffuse focal slowing.     Clinical Impression:  - Independent risk for focal seizures from the bilateral frontal regions independently.  - Independent bilateral frontal focal cerebral dysfunction.   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    Pipo Tinoco MD  EEG/Epilepsy Attending  JOSE MCLEAN [id number]     Study Date: 	03-31-24 0800  1430	04-1-24  Duration x Hours: 31 h    --------------------------------------------------------------------------------------------------  History:  CC/ HPI Patient is a 58y old  Female who presents with a chief complaint of SAH (30 Mar 2024 03:57)    --------------------------------------------------------------------------------------------------  Study Interpretation:    [[[Abbreviation Key:  PDR=alpha rhythm/posterior dominant rhythm. A-P=anterior posterior.  Amplitude: ‘very low’:<20; ‘low’:20-49; ‘medium’:; ‘high’:>150uV.  Persistence for periodic/rhythmic patterns (% of epoch) ‘rare’:<1%; ‘occasional’:1-10%; ‘frequent’:10-50%; ‘abundant’:50-90%; ‘continuous’:>90%.  Persistence for sporadic discharges: ‘rare’:<1/hr; ‘occasional’:1/min-1/hr; ‘frequent’:>1/min; ‘abundant’:>1/10 sec.  RPP=rhythmic and periodic patterns; GRDA=generalized rhythmic delta activity; FIRDA=frontal intermittent GRDA; LRDA=lateralized rhythmic delta activity; TIRDA=temporal intermittent rhythmic delta activity;  LPD=PLED=lateralized periodic discharges; GPD=generalized periodic discharges; BIPDs =bilateral independent periodic discharges; Mf=multifocal; SIRPDs=stimulus induced rhythmic, periodic, or ictal appearing discharges; BIRDs=brief potentially ictal rhythmic discharges >4 Hz, lasting .5-10s; PFA (paroxysmal bursts >13 Hz or =8 Hz <10s).  Modifiers: +F=with fast component; +S=with spike component; +R=with rhythmic component.  S-B=burst suppression pattern.  Max=maximal. N1-drowsy; N2-stage II sleep; N3-slow wave sleep. SSS/BETS=small sharp spikes/benign epileptiform transients of sleep. HV=hyperventilation; PS=photic stimulation]]]    Daily EEG Visual Analysis    FINDINGS:      Background:  Continuity: continuous  Symmetry: asymmetric  PDR: none  Reactivity: present  Voltage: normal, mostly 20-150uV  Anterior Posterior Gradient: absent  Other background findings: none  Breach: Higher amplitude, sharply contoured activity  in the right frontal region.    Background Slowing:  Generalized slowing: Diffuse polymorphic delta/theta activity  Focal slowing: Abundant bilateral frontal intermittent theta/delta slowing,    State Changes:   -Drowsiness noted with increased slowing, attenuation of fast activity.  -N2 sleep transients were not recorded.    Sporadic Epileptiform Discharges:    None    Rhythmic and Periodic Patterns (RPPs):  Occasional right frontal sharp waves, F4 maximum.   Occasional left frontal sharp waves Fp1/F3 max  Intermittent LRDA left frontal max at 1.5hz    Electrographic and Electroclinical seizures:  None    Other Clinical Events:  None    Activation Procedures:   -Hyperventilation was not performed.    -Photic stimulation was not performed.    Artifacts:  Intermittent myogenic and movement artifacts were noted.    ECG:  The heart rate on single channel ECG was predominantly between 50 to 70 BPM.    EEG Classification / Summary:  Abnormal  EEG in the awake / drowsy / asleep state(s).  - Occasional Right frontal sharp waves.   - Occasional Left frontal sharp waves   - Intermittent LRDA left frontal max at 1.5hz  - bilateral frontal independent focal slowing   - Right frontal breach artifact.   - Moderate diffuse focal slowing.     Clinical Impression:  - Independent risk for focal seizures from the bilateral frontal regions independently.  - Independent bilateral frontal focal cerebral dysfunction.   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    Pipo Tinoco MD  EEG/Epilepsy Attending

## 2024-04-01 NOTE — PROGRESS NOTE ADULT - SUBJECTIVE AND OBJECTIVE BOX
ENT ISSUE/POD: airway eval    HPI: 59 y/o F, on ASA81, transferred from [place] for SAH HH4 WNFS 5 mF4 ruptured R pcomm aneurysm. Was initially found unresponsive on 3/25/24 at 21:00, intubated at 23:00 at OSH, then transferred. Given ddavp at OSH. Here in ED, 1 unit PLTs given and R frontal EVD placed-high pressure. Went for R pcom clip on 3/26/24, postop evd not functioning then drained on the floor. ENT called for airway eval given lack of cuff leak. Unable to obtain further history due to pts clinical condition.          PAST MEDICAL & SURGICAL HISTORY:    Allergies    No Known Allergies    Intolerances      MEDICATIONS  (STANDING):  bisacodyl Suppository 10 milliGRAM(s) Rectal at bedtime  chlorhexidine 0.12% Liquid 15 milliLiter(s) Oral Mucosa every 12 hours  chlorhexidine 4% Liquid 1 Application(s) Topical at bedtime  dexAMETHasone  IVPB 8 milliGRAM(s) IV Intermittent every 8 hours  enoxaparin Injectable 40 milliGRAM(s) SubCutaneous <User Schedule>  fentaNYL   Infusion... 0.5 MICROgram(s)/kG/Hr (1.78 mL/Hr) IV Continuous <Continuous>  lactated ringers. 1000 milliLiter(s) (50 mL/Hr) IV Continuous <Continuous>  naloxegol 25 milliGRAM(s) Oral daily  pantoprazole  Injectable 40 milliGRAM(s) IV Push daily  polyethylene glycol 3350 17 Gram(s) Oral at bedtime  propofol Infusion 5 MICROgram(s)/kG/Min (2.14 mL/Hr) IV Continuous <Continuous>  senna 2 Tablet(s) Oral at bedtime  theophylline Syrup 50 milliGRAM(s) Oral every 12 hours    MEDICATIONS  (PRN):  acetaminophen     Tablet .. 650 milliGRAM(s) Oral every 6 hours PRN Temp greater or equal to 38.5C (101.3F), Mild Pain (1 - 3)  oxyCODONE    IR 5 milliGRAM(s) Oral every 4 hours PRN Moderate Pain (4 - 6)  oxyCODONE    IR 10 milliGRAM(s) Oral every 4 hours PRN Severe Pain (7 - 10)      Social History: see consult    Family history: see consult    ROS:   Unable to obtain further history due to pts clinical condition.       Vital Signs Last 24 Hrs  T(C): 37 (01 Apr 2024 07:00), Max: 37.7 (31 Mar 2024 19:00)  T(F): 98.6 (01 Apr 2024 07:00), Max: 99.9 (31 Mar 2024 19:00)  HR: 47 (01 Apr 2024 10:00) (43 - 59)  BP: --  BP(mean): --  RR: 16 (01 Apr 2024 10:00) (11 - 35)  SpO2: 100% (01 Apr 2024 10:00) (99% - 100%)    Parameters below as of 01 Apr 2024 07:00  Patient On (Oxygen Delivery Method): ventilator    O2 Concentration (%): 40                          9.3    12.75 )-----------( 184      ( 31 Mar 2024 19:19 )             29.1    04-01    149<H>  |  110<H>  |  29<H>  ----------------------------<  151<H>  4.1   |  25  |  0.45<L>    Ca    9.0      01 Apr 2024 00:11  Phos  3.2     04-01  Mg     2.4     04-01     PT/INR - ( 31 Mar 2024 19:19 )   PT: 12.1 sec;   INR: 1.10 ratio         PTT - ( 31 Mar 2024 19:19 )  PTT:26.5 sec    PHYSICAL EXAM:  Gen: sedated   Skin: No rashes, bruises, or lesions  Head: Normocephalic, Atraumatic  Face: no edema, erythema, or fluctuance. Parotid glands soft without mass  Eyes: no scleral injection  Nose: Nares bilaterally patent, no discharge  Mouth: ETT in place. Mucosa moist, tongue/uvula midline, oropharynx clear  Neck: Flat, supple, no lymphadenopathy, trachea midline, no masses  Lymphatic: No lymphadenopathy  Resp: on vent   Neuro: unable to evaluate due to pts clinical condition       Fiberoptic Indirect laryngoscopy:  (Scope #2 used)  Reason for Laryngoscopy: airway eval     Patient was unable to cooperate with mirror.  Unable to identify most anatomy 2/2 ET tube, mild b/l aryepiglottic fold and false VC edema noted, pooling of secretions at ET tube, no cuff leak visualized.

## 2024-04-01 NOTE — DIETITIAN NUTRITION RISK NOTIFICATION - MALNUTRITION EVALUATION AS DEMONSTRATED BY (ADULTS > 20 YEARS OF AGE)
Inadequate energy intake.../Fluid accumulation... PHYSICAL EXAM/DISPOSITION/HISTORY OF PRESENT ILLNESS/REVIEW OF SYSTEMS/PAST MEDICAL/SURGICAL/SOCIAL HISTORY

## 2024-04-01 NOTE — PROGRESS NOTE ADULT - NS ATTEND AMEND GEN_ALL_CORE FT
Slight improvement in edema compared to scope from 2 days prior.  C/w decadron. There was a slight cuff leak on the ventilator upon attempt at the end of the day.    endotracheal tube is quite large for her size (7.5) so may not be getting full cuff leak even once swelling has resolved  will recheck tomorrow.

## 2024-04-01 NOTE — CHART NOTE - NSCHARTNOTEFT_GEN_A_CORE
Nutrition Follow Up Note  Patient seen for: Nutrition Follow Up     Chart reviewed, events noted. Pt is a 57 yo F; presented as transfer from [facility]SH for diffuse SAH with small IVH 2/2 ruptured posterolaterally projecting 2.9x2.4x2.6cmm R supraclinoid ICA aneurysm (HH4, MF4). Intubated. EVD placed. Pending angio for possible vasospasm and treatment.     Source: [] Patient       [x] EMR        [x] RN        [] Family at bedside       [x] Other: interdisciplinary medical team    -If unable to interview patient: [x] Trach/Vent/BiPAP  [x] Disoriented/confused/inappropriate to interview    Diet Order:   Diet, NPO after Midnight:      NPO Start Date: 31-Mar-2024,   NPO Start Time: 23:59  Except Medications (03-31-24)  Diet, NPO with Tube Feed:   Tube Feeding Modality: Nasogastric  Jevity 1.2 Les (JEVITY1.2RTH)  Total Volume for 24 Hours (mL): 1200  Continuous  Until Goal Tube Feed Rate (mL per Hour): 50  Tube Feed Duration (in Hours): 24  Tube Feed Start Time: 23:00 (03-27-24)    EN Order Provides: 1200ml, 1440kcal and 67g protein     EN Provision (per nursing flow sheet):   (4/1): feeds held for pending angio   (3/31): 63% of goal; feeds held midday  (3/30): 42% of goal; feeds held midday  (3/29): 38% of goal; feeds held midday    Is current diet order appropriate/adequate? see below for updated diet recommendations    Nutrition-related concerns:  -Pt intermittently NPO with feeds held with possible trial of extubation, pending angio. Pt has now received on average <75% of calories/protein x 7 days. Currently NPO for pending angio planned for later this afternoon per medical team, RN.   -S/P indirect calorimetry on 3/28. REE: 2175kcal. Planned for repeat study this AM with RT.   -Last  BM (3/30): x 1. On bowel regimen (Dulcolax, senna, Miralax). Prescribed IV Protonix.   -Continues on LR infusing at 50ml/hr at time of RD visit for hydration.   -Prescribed Propofol, infusing at 4.3ml/hr. If to continue x 24 hrs, will provide ~114kcal daily.   -Prescribed IV Decadron; at risk for elevated FSBG. Not on apparent antihyperglycemic regimen. A1c 5.5%.   -Sodium goal (determined by neurosurgical team): 145-155. Current Na 149 (4/1).   -EVD remains in place. See nursing flow sheet for output documentation.     Weights:   Dosing wt (3/26): 71.2kg.   UBW: 70.5kg per family  BMI 28.7    MEDICATIONS  (STANDING):  bisacodyl Suppository  dexAMETHasone  Injectable  lactated ringers.  pantoprazole  Injectable  polyethylene glycol 3350  senna    Pertinent Labs: 04-01 @ 00:11: Na 149<H>, BUN 29<H>, Cr 0.45<L>, <H>, K+ 4.1, Phos 3.2, Mg 2.4, Alk Phos --, ALT/SGPT --, AST/SGOT --, HbA1c --    A1C with Estimated Average Glucose Result: 5.5 % (03-26-24 @ 04:27)  A1C with Estimated Average Glucose Result: 5.5 % (03-26-24 @ 01:34)    Finger Sticks:    Triglycerides, Serum: 82 mg/dL (03-26-24 @ 07:53)  Triglycerides, Serum: 109 mg/dL (03-26-24 @ 04:32)  Triglycerides, Serum: 150 mg/dL (03-26-24 @ 01:34)    Skin per nursing documentation: no documented pressure injuries noted   Edema: 3+ generalized; left arm; right arm    Estimated Energy Needs (per RD predictive equation), based on dosing wt 71.2kg:  (27-32kcal/kg): 4751-8128 kcal  (1.2-1.4g protein/kg): 85-100g protein   IC study (3/28): 2175 kcal     Previous Nutrition Diagnosis: Increased nutrient needs  Nutrition Diagnosis is: [x] ongoing  [] resolved [] not applicable     New Nutrition Diagnosis: Acute severe protein calorie malnutrition   Related To: Inadequate energy protein intake in setting of complex clinical course  As Evidenced By: Energy intake <50% x >/=5 days, 3+ edema     Nutrition Care Plan:  [x] In Progress  [] Achieved  [] Not applicable    Nutrition Interventions:     Education Provided:       [] Yes:  [x] No:        Recommendations:      1. As able, recommend change tube feeds to Jevity 1.5 at GOAL rate 60ml/hr x 24 hrs. Based on dosing wt 71.2kg, provides: 1440ml, 2160kcal (30.3kcal/kg) and 92g protein (1.29g protein/kg).   2. Plan for repeat IC study at 11am 4/1/24.   3. Monitor GI tolerance. RD to remain available to adjust EN formulary, volume/rate PRN. Trend provision of Propofol.   4. Recommend multivitamin (if no medication contraindications) to aid in prevention of micronutrient deficiencies.   5. Monitor wt trends/labs/skin integrity/hydration status/bowel regularity.   6. Malnutrition sticker placed.     Monitoring and Evaluation:   Continue to monitor nutritional intake, tolerance to diet prescription, weights, labs, skin integrity    RD remains available upon request and will follow up per protocol    Marely Montoya RD, available via TEAMS

## 2024-04-01 NOTE — CONSULT NOTE ADULT - SUBJECTIVE AND OBJECTIVE BOX
Vascular Cardiology Consult Note     DIRECT PROVIDER NUMBER: [phone]  / Available on TEAMS    CC:  SAH    HPI:  57 y/o F transferred from [facility] for SAH with ruptured Posterior Communicating Artery Aneurysm, s/p R frontal EVD, s/p R Posterior Communicating Artery Aneurysm Clip on 3/26. LE venous duplex 3/28 showed left soleal vein DVT. TTE 3/26 showed normal RV size and function. LVEF improved from 36% to 55% on repeat TTE. No LE edema. Her son is at bedside. No noted prior history of VTE. Vascular Cardiology consulted.     Allergies  No Known Allergies  	  MEDICATIONS:  enoxaparin Injectable 40 milliGRAM(s) SubCutaneous <User Schedule>  theophylline Syrup 50 milliGRAM(s) Oral every 12 hours  acetaminophen     Tablet .. 650 milliGRAM(s) Oral every 6 hours PRN  fentaNYL   Infusion... 0.5 MICROgram(s)/kG/Hr IV Continuous <Continuous>  oxyCODONE    IR 5 milliGRAM(s) Oral every 4 hours PRN  oxyCODONE    IR 10 milliGRAM(s) Oral every 4 hours PRN  propofol Infusion 5 MICROgram(s)/kG/Min IV Continuous <Continuous>  bisacodyl Suppository 10 milliGRAM(s) Rectal at bedtime  naloxegol 25 milliGRAM(s) Oral daily  pantoprazole  Injectable 40 milliGRAM(s) IV Push daily  polyethylene glycol 3350 17 Gram(s) Oral at bedtime  senna 2 Tablet(s) Oral at bedtime  dexAMETHasone  IVPB 8 milliGRAM(s) IV Intermittent every 8 hours  chlorhexidine 0.12% Liquid 15 milliLiter(s) Oral Mucosa every 12 hours  chlorhexidine 4% Liquid 1 Application(s) Topical at bedtime  lactated ringers. 1000 milliLiter(s) IV Continuous <Continuous>    PAST MEDICAL & SURGICAL HISTORY: see HPI    FAMILY HISTORY: see HPI    SOCIAL HISTORY:  unchanged    REVIEW OF SYSTEMS:  [X] Unable to obtain    PHYSICAL EXAM:  T(C): 37.9 (04-01-24 @ 15:00), Max: 37.9 (04-01-24 @ 15:00)  HR: 64 (04-01-24 @ 15:00) (43 - 64)  BP: --  RR: 25 (04-01-24 @ 15:00) (11 - 34)  SpO2: 99% (04-01-24 @ 15:00) (99% - 100%)  Wt(kg): --  I&O's Summary    31 Mar 2024 07:01  -  01 Apr 2024 07:00  --------------------------------------------------------  IN: 1236.6 mL / OUT: 1067 mL / NET: 169.6 mL    01 Apr 2024 07:01  -  01 Apr 2024 15:08  --------------------------------------------------------  IN: 581.1 mL / OUT: 280 mL / NET: 301.1 mL    Appearance: Intunated  	  Cardiovascular: RRR, S1 and S2  Respiratory: CTA B/L  Gastrointestinal:  Soft, Non-tender, + BS	  Skin: No cyanosis	  Extremities: No LE edema, bilateral calves soft  Vascular Pulse Exam: Palpable DP / PT bilaterally  Foot Exam: No foot wounds    LABS:	 	    CBC Full  -  ( 31 Mar 2024 19:19 )  WBC Count : 12.75 K/uL  Hemoglobin : 9.3 g/dL  Hematocrit : 29.1 %  Platelet Count - Automated : 184 K/uL  Mean Cell Volume : 90.4 fl  Mean Cell Hemoglobin : 28.9 pg  Mean Cell Hemoglobin Concentration : 32.0 gm/dL  Auto Neutrophil # : x  Auto Lymphocyte # : x  Auto Monocyte # : x  Auto Eosinophil # : x  Auto Basophil # : x  Auto Neutrophil % : x  Auto Lymphocyte % : x  Auto Monocyte % : x  Auto Eosinophil % : x  Auto Basophil % : x    04-01    145  |  108  |  24<H>  ----------------------------<  118<H>  3.6   |  26  |  0.47<L>  04-01    149<H>  |  110<H>  |  29<H>  ----------------------------<  151<H>  4.1   |  25  |  0.45<L>    Ca    9.0      01 Apr 2024 14:20  Ca    9.0      01 Apr 2024 00:11  Phos  3.1     04-01  Phos  3.2     04-01  Mg     2.1     04-01  Mg     2.4     04-01      Assessment:  1. L Soleal Below the Knee DVT  2. 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 Reduced EF, Improved on Last TTE       Plan:  1. Recommend to continue Lovenox 40 mg daily for below the knee DVT in setting of recent SAH and EVD / aneurysm clipping / cerebral angiogram.  2. Repeat surveillance LE venous duplex on 4/2.  3. Pneumatic compression to right lower extremity.   4. Appreciate General Cardiology.  5. Appreciate excellent Neurosurgical care.        Thank you  PEDRO Cm, MS, [facility]  Vascular Cardiology Service    Please call with any questions:   DIRECT SERVICE NUMBER: [phone] / Available on TEAMS Vascular Cardiology Consult Note     DIRECT PROVIDER NUMBER: [phone]  / Available on TEAMS    CC:  SAH    HPI:  59 y/o F transferred from [facility] for SAH with ruptured Posterior Communicating Artery Aneurysm, s/p R frontal EVD, s/p R Posterior Communicating Artery Aneurysm Clip on 3/26. LE venous duplex 3/28 showed left soleal vein DVT. TTE 3/26 showed normal RV size and function. LVEF improved from 36% to 55% on repeat TTE. No LE edema. Her son is at bedside. No noted prior history of VTE. Vascular Cardiology consulted.     Allergies  No Known Allergies  	  MEDICATIONS:  enoxaparin Injectable 40 milliGRAM(s) SubCutaneous <User Schedule>  theophylline Syrup 50 milliGRAM(s) Oral every 12 hours  acetaminophen     Tablet .. 650 milliGRAM(s) Oral every 6 hours PRN  fentaNYL   Infusion... 0.5 MICROgram(s)/kG/Hr IV Continuous <Continuous>  oxyCODONE    IR 5 milliGRAM(s) Oral every 4 hours PRN  oxyCODONE    IR 10 milliGRAM(s) Oral every 4 hours PRN  propofol Infusion 5 MICROgram(s)/kG/Min IV Continuous <Continuous>  bisacodyl Suppository 10 milliGRAM(s) Rectal at bedtime  naloxegol 25 milliGRAM(s) Oral daily  pantoprazole  Injectable 40 milliGRAM(s) IV Push daily  polyethylene glycol 3350 17 Gram(s) Oral at bedtime  senna 2 Tablet(s) Oral at bedtime  dexAMETHasone  IVPB 8 milliGRAM(s) IV Intermittent every 8 hours  chlorhexidine 0.12% Liquid 15 milliLiter(s) Oral Mucosa every 12 hours  chlorhexidine 4% Liquid 1 Application(s) Topical at bedtime  lactated ringers. 1000 milliLiter(s) IV Continuous <Continuous>    PAST MEDICAL & SURGICAL HISTORY: see HPI    FAMILY HISTORY: see HPI    SOCIAL HISTORY:  unchanged    REVIEW OF SYSTEMS:  [X] Unable to obtain    PHYSICAL EXAM:  T(C): 37.9 (04-01-24 @ 15:00), Max: 37.9 (04-01-24 @ 15:00)  HR: 64 (04-01-24 @ 15:00) (43 - 64)  BP: --  RR: 25 (04-01-24 @ 15:00) (11 - 34)  SpO2: 99% (04-01-24 @ 15:00) (99% - 100%)  Wt(kg): --  I&O's Summary    31 Mar 2024 07:01  -  01 Apr 2024 07:00  --------------------------------------------------------  IN: 1236.6 mL / OUT: 1067 mL / NET: 169.6 mL    01 Apr 2024 07:01  -  01 Apr 2024 15:08  --------------------------------------------------------  IN: 581.1 mL / OUT: 280 mL / NET: 301.1 mL    Appearance: Intubated  	  Cardiovascular: RRR, S1 and S2  Respiratory: Mechanical BS  Gastrointestinal:  Soft, Non-tender, + BS	  Skin: No cyanosis	  Extremities: No LE edema, bilateral calves soft  Vascular Pulse Exam: Palpable DP / PT bilaterally  Foot Exam: No foot wounds    LABS:	 	    CBC Full  -  ( 31 Mar 2024 19:19 )  WBC Count : 12.75 K/uL  Hemoglobin : 9.3 g/dL  Hematocrit : 29.1 %  Platelet Count - Automated : 184 K/uL  Mean Cell Volume : 90.4 fl  Mean Cell Hemoglobin : 28.9 pg  Mean Cell Hemoglobin Concentration : 32.0 gm/dL  Auto Neutrophil # : x  Auto Lymphocyte # : x  Auto Monocyte # : x  Auto Eosinophil # : x  Auto Basophil # : x  Auto Neutrophil % : x  Auto Lymphocyte % : x  Auto Monocyte % : x  Auto Eosinophil % : x  Auto Basophil % : x    04-01    145  |  108  |  24<H>  ----------------------------<  118<H>  3.6   |  26  |  0.47<L>  04-01    149<H>  |  110<H>  |  29<H>  ----------------------------<  151<H>  4.1   |  25  |  0.45<L>    Ca    9.0      01 Apr 2024 14:20  Ca    9.0      01 Apr 2024 00:11  Phos  3.1     04-01  Phos  3.2     04-01  Mg     2.1     04-01  Mg     2.4     04-01      Assessment:  1. L Soleal Below the Knee DVT  2. 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 Reduced EF, Improved on Last TTE       Plan:  1. Recommend to continue Lovenox 40 mg daily for below the knee DVT in setting of recent SAH and EVD / aneurysm clipping / cerebral angiogram.  2. Repeat surveillance LE venous duplex on 4/2.  3. Pneumatic compression to right lower extremity.   4. Appreciate General Cardiology.  5. Appreciate excellent Neurosurgical care.        Thank you  PEDRO Cm, MS, [facility]  Vascular Cardiology Service    Please call with any questions:   DIRECT SERVICE NUMBER: [phone] / Available on TEAMS

## 2024-04-01 NOTE — CHART NOTE - NSCHARTNOTEFT_GEN_A_CORE
Interventional Neuro- Radiology   Procedure Note      Procedure: Selective Catheter cerebral angiography   Pre- Procedure Diagnosis: suspected vasospasm,  s/p raniotomy and clipping of ruptured posterior communicating artery aneurysm on 3/26/2024  Post- Procedure Diagnosis: no vasospasm was appreciated    : Dr. Nelda Jim  Fellow: Dr. Kristal Quevedo, Dr. Edinson Dillon  Physician Assistant: Beka Meza PA-C  Resident: Dr. Nick Ramirez     RN: Jae Stoddard   Tech: Garrett Santana    Anesthesia: (general anesthesia) Dr. Elizabeth Guo     I/Os:  Fluids: 300 cc   [place]: DTV  Contrast: 42 cc  Estimated Blood Loss: <10cc    Preliminary Report:  Under general anesthesia, using a 5 Fr short sheath to the right femoral artery examination of left vertebral artery/ left internal carotid artery/ right subclavian artery/ right internal carotid artery via selective cerebral angiography demonstrates no vasospasm bilaterally. The posterior communicating artery aneurysm is fully secure. Dictated report to follow.    Patient tolerated procedure well, vital signs stable, hemodynamically stable, no change in neurological status compared to baseline. Results discussed with neurosurgery. Groin sheath d/c'ed, manual compression held to hemostasis, no active bleeding, no hematoma, Vascade device applied, quick clot and safeguard balloon dressing applied at 17:00 hours. Patient transferred to NSCU for further care/ monitoring.

## 2024-04-01 NOTE — DIETITIAN NUTRITION RISK NOTIFICATION - TREATMENT: THE FOLLOWING DIET HAS BEEN RECOMMENDED
Diet, NPO after Midnight:      NPO Start Date: 31-Mar-2024,   NPO Start Time: 23:59  Except Medications (03-31-24 @ 18:36) [Active]  Diet, NPO with Tube Feed:   Tube Feeding Modality: Nasogastric  Jevity 1.2 Les (JEVITY1.2RTH)  Total Volume for 24 Hours (mL): 1200  Continuous  Until Goal Tube Feed Rate (mL per Hour): 50  Tube Feed Duration (in Hours): 24  Tube Feed Start Time: 23:00 (03-27-24 @ 16:27) [Active]

## 2024-04-01 NOTE — CONSULT NOTE ADULT - NS ATTEND AMEND GEN_ALL_CORE FT
Events that transpired over the last 24 hours were reviewed.  He is currently sitting at the edge of his bed.  The patient denies any cardiopulmonary complaints.  Denies any chest pain/tightness or discomfort, shortness of breath, lighted sensation, dizzy or palpitations.  Denies any pain in his legs.  Agree with 14 point review of systems and physical examination as noted above.    Explained to the patient's son Pierre who was called on the phone and son Wilfredo who is at the patient's bedside findings on ultrasound imaging that demonstrated left soleal deep venous thrombosis.  Explained what is a deep venous thrombosis.  The potential associated signs/symptoms of a deep venous thrombosis and complications were gone over.      The patient is status post subarachnoid hemorrhage with ruptured posterior communicating artery aneurysm status post right frontal EVD and right posterior communicating artery aneurysm clip on 3/26.  The need for close surveillance of his deep venous thrombosis was gone over.    Recommend repeat lower extremity repeat venous duplex study to be performed on 4/2/2024.  Continue Lovenox 40 mg subcu daily.  Pneumatic compression to right lower extremity.      Continue telemetry monitoring.    All questions and concerns of the Pierre and Wilfredo the patient's sons were addressed.    Findings and plan were discussed with neurosurgery team.     Time-based billing (NON-critical care).   65 minutes spent on total encounter. The necessity of the time spent during the encounter on this date of service was due to: education, assessment and coordination of care. Events that transpired over the last 24 hours were reviewed.  Agree with 14 point review of systems and physical examination as noted above.    Explained to the patient's son Pierre who was called on the phone and son Wilfredo who is at the patient's bedside findings on ultrasound imaging that demonstrated left soleal deep venous thrombosis.  Explained what is a deep venous thrombosis.  The potential associated signs/symptoms of a deep venous thrombosis and complications were gone over.      The patient is status post subarachnoid hemorrhage with ruptured posterior communicating artery aneurysm status post right frontal EVD and right posterior communicating artery aneurysm clip on 3/26.  The need for close surveillance of his deep venous thrombosis was gone over.    Recommend repeat lower extremity repeat venous duplex study to be performed on 4/2/2024.  Continue Lovenox 40 mg subcu daily.  Pneumatic compression to right lower extremity.      Continue telemetry monitoring.    All questions and concerns of the Pierre and Wilfredo the patient's sons were addressed.    Findings and plan were discussed with neurosurgery team.     Time-based billing (NON-critical care).   65 minutes spent on total encounter. The necessity of the time spent during the encounter on this date of service was due to: education, assessment and coordination of care.

## 2024-04-01 NOTE — PROGRESS NOTE ADULT - ASSESSMENT
58F, on ASA81 for pain relief? (fell a month ago), presents as txfr from [place] for diffuse SAH w/small IVH, 2/2 ruptured posterolaterally projecting 2.9x2.4x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 Was initially found unresponsive at 21:00, intubated at 23:00 at OSH, then txfrd. Given ddavp at OSH. On arrival patient w/ PERRL, +cough/gag, 2/5 spontaneous movement in LUE, o/w no movement. 1 unit PLTs given and R frontal EVD placed-high pressure. PLTs/Coags wnl.     Intubated sedated in NSCU   Echo with abnormal findings of severe cardiomyopathy and RWMA

## 2024-04-01 NOTE — PROGRESS NOTE ADULT - ATTENDING COMMENTS
58-year-old woman post-bleed day 6 from HH4mF4 subarachnoid hemorrhage status post right craniotomy for clipping of p-comm aneurysm on 3/26/24. Course notable for airway edema status post steroids.     ENT re-scoped and noted persistent edema without cuff leak.     Improving exam, follows commands briskly on the right in preferred language of [place].     TCDs  EVD in place  d/c levetiracetam - no seizures, +agitation at times  Theophylline for bradycardia  Nimodipine held due to bradycardia  Continue steroids for airway edema  Add Movantiq as on fentanyl gtt  4/2 repeat lower ext dopplers  LUE doppler due to edema; d/c left arm a-line and replace on right    At risk of death/neuro decline due to: delayed cerebral ischemia, acute resp distress/airway edema, hydrocephalus     Updated son at bedside

## 2024-04-01 NOTE — PROGRESS NOTE ADULT - SUBJECTIVE AND OBJECTIVE BOX
NSICU Night Intensivist Note    Historical Review:   58F, on ASA81 for pain relief? 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 Was initially found unresponsive at 21:00, intubated at 23:00 at OSH, then txfrd. Given ddavp at OSH. On arrival patient w/ PERRL, +cough/gag, 2/5 spontaneous movement in LUE, o/w no movement. 1 unit PLTs given and R frontal EVD placed-high pressure. PLTs/Coags wnl.    (26 Mar 2024 04:21)    12hr EVENTS:  - s/p angiogram, no vasospasm  - briefly elevated ICP  - less responsive post-angio, improved with holding sedation  - continued airway edema, on steroids    ROS: [x]  Unable to assess due to mental status/intubation     ----------------------------------------------------------------------------------------------------  fentanyl @1  propofol @10    PHYSICAL EXAM: (sedation briefly held)  General: intubated  HEENT: MMM  Neuro:  -Mental status- eyes open to voice, intermittent following simple commands  -CN- PERRL 3mm, EOMI, face symmetric  -SensoriMotor- RUE localizes antigravity, RLE spont in plane of bed, LUE plegic, LLE wd    CV: regular rate and rhythm  Pulm: no accessory muscle use  Abd: soft, nontender, nondistended, +TF  Skin: warm, dry    -----------------------------------------------------------------------------------------------------  ICU Vital Signs Last 24 Hrs  T(C): 38 (01 Apr 2024 23:00), Max: 38 (01 Apr 2024 23:00)  T(F): 100.4 (01 Apr 2024 23:00), Max: 100.4 (01 Apr 2024 23:00)  HR: 50 (02 Apr 2024 01:00) (43 - 73)  BP: 132/65 (01 Apr 2024 15:08) (132/65 - 132/65)  BP(mean): 141 (01 Apr 2024 13:45) (141 - 141)  ABP: 110/49 (02 Apr 2024 01:00) (101/44 - 248/118)  ABP(mean): 69 (02 Apr 2024 01:00) (63 - 173)  RR: 16 (02 Apr 2024 01:00) (16 - 32)  SpO2: 100% (02 Apr 2024 01:00) (91% - 100%)    O2 Parameters below as of 01 Apr 2024 19:00  Patient On (Oxygen Delivery Method): ventilator    O2 Concentration (%): 40        I&O's Summary    31 Mar 2024 07:01  -  01 Apr 2024 07:00  --------------------------------------------------------  IN: 1236.6 mL / OUT: 1067 mL / NET: 169.6 mL    01 Apr 2024 07:01  -  02 Apr 2024 01:16  --------------------------------------------------------  IN: 1722.2 mL / OUT: 1524 mL / NET: 198.2 mL        MEDICATIONS  (STANDING):  bisacodyl 5 milliGRAM(s) Oral at bedtime  chlorhexidine 0.12% Liquid 15 milliLiter(s) Oral Mucosa every 12 hours  chlorhexidine 4% Liquid 1 Application(s) Topical at bedtime  dexAMETHasone  IVPB 8 milliGRAM(s) IV Intermittent every 8 hours  enoxaparin Injectable 40 milliGRAM(s) SubCutaneous <User Schedule>  fentaNYL   Infusion... 0.5 MICROgram(s)/kG/Hr (1.78 mL/Hr) IV Continuous <Continuous>  lactated ringers. 1000 milliLiter(s) (50 mL/Hr) IV Continuous <Continuous>  naloxegol 25 milliGRAM(s) Oral daily  pantoprazole  Injectable 40 milliGRAM(s) IV Push daily  polyethylene glycol 3350 17 Gram(s) Oral every 12 hours  propofol Infusion 5 MICROgram(s)/kG/Min (2.14 mL/Hr) IV Continuous <Continuous>  senna 2 Tablet(s) Oral at bedtime  theophylline Syrup 50 milliGRAM(s) Oral every 12 hours      RESPIRATORY:  Mode: AC/ CMV (Assist Control/ Continuous Mandatory Ventilation)  RR (machine): 16  TV (machine): 450  FiO2: 40, PEEP: 5          IMAGING:   Recent imaging studies were reviewed.    LAB RESULTS:                          9.3    11.85 )-----------( 193      ( 02 Apr 2024 00:14 )             28.8       PT/INR - ( 31 Mar 2024 19:19 )   PT: 12.1 sec;   INR: 1.10 ratio         PTT - ( 31 Mar 2024 19:19 )  PTT:26.5 sec    04-02    142  |  105  |  19  ----------------------------<  129<H>  4.0   |  22  |  0.42<L>    Ca    8.7      02 Apr 2024 00:14  Phos  3.6     04-02  Mg     2.1     04-02    -----------------------------------------------------------------------------------------------------------------------------------------------------------------------------------

## 2024-04-01 NOTE — DIETITIAN NUTRITION RISK NOTIFICATION - OTHER RISK FACTORS
Not applicable
Eyes with no visual disturbances.  Ears clean and dry and no hearing difficulties. Nose with pink mucosa and no drainage.  Mouth mucous membranes moist and pink.  No tenderness or swelling to throat or neck.
Eyes with no visual disturbances.  Ears clean and dry and no hearing difficulties. Nose with pink mucosa and no drainage.  Mouth mucous membranes moist and pink.  No tenderness or swelling to throat or neck.

## 2024-04-01 NOTE — CHART NOTE - NSCHARTNOTEFT_GEN_A_CORE
Interventional Neuro Radiology  Pre-Procedure Note    HPI:  Jie iLn  57 y/o F, on ASA81 for pain relief? (fell a month ago), presents as txfr from [place] for diffuse SAH w/small IVH, 2/2 ruptured posterolaterally projecting 2.9 x 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 Pt is s/p Right pterional craniotomy and clipping of complex ruptured posterior communicating artery aneurysm on 3/26/2024. Pt now with vasospasm and is planned for cerebral angiogram with vasospasm treatment with NEURO IR.    Neuro Exam: intubated    Allergies: No Known Allergies    Current Medications: acetaminophen     Tablet .. 650 milliGRAM(s) Oral every 6 hours PRN  bisacodyl Suppository 10 milliGRAM(s) Rectal at bedtime  chlorhexidine 0.12% Liquid 15 milliLiter(s) Oral Mucosa every 12 hours  chlorhexidine 4% Liquid 1 Application(s) Topical at bedtime  dexAMETHasone  IVPB 8 milliGRAM(s) IV Intermittent every 8 hours  enoxaparin Injectable 40 milliGRAM(s) SubCutaneous <User Schedule>  fentaNYL   Infusion... 0.5 MICROgram(s)/kG/Hr IV Continuous <Continuous>  lactated ringers. 1000 milliLiter(s) IV Continuous <Continuous>  naloxegol 25 milliGRAM(s) Oral daily  oxyCODONE    IR 5 milliGRAM(s) Oral every 4 hours PRN  oxyCODONE    IR 10 milliGRAM(s) Oral every 4 hours PRN  pantoprazole  Injectable 40 milliGRAM(s) IV Push daily  polyethylene glycol 3350 17 Gram(s) Oral at bedtime  propofol Infusion 5 MICROgram(s)/kG/Min IV Continuous <Continuous>  senna 2 Tablet(s) Oral at bedtime  theophylline Syrup 50 milliGRAM(s) Oral every 12 hours      Labs:                         9.3    12.75 )-----------( 184      ( 31 Mar 2024 19:19 )             29.1       04-01    149<H>  |  110<H>  |  29<H>  ----------------------------<  151<H>  4.1   |  25  |  0.45<L>    Ca    9.0      01 Apr 2024 00:11  Phos  3.2     04-01  Mg     2.4     04-01    TPro  6.7  /  Alb  3.8  /  TBili  0.3  /  DBili  <0.1  /  AST  35  /  ALT  20  /  AlkPhos  109  03-26  Blood Bank: 03-31-24  O  --  Positive      Assessment/Plan:   This is a 57 y/o Female  presents with vasospasm. Patient presents to neuro-IR for selective cerebral angiography. Procedure/ risks/ benefits/ goals/ alternatives were explained. Risks include but are not limited to stroke/ vessel injury/ hemorrhage/ groin hematoma. All questions answered. Informed consent obtained and placed in chart. Interventional Neuro Radiology  Pre-Procedure Note    HPI:  Jie Lin  59 y/o F, on ASA81 for pain relief? (fell a month ago), presents as txfr from [place] for diffuse SAH w/small IVH, 2/2 ruptured posterolaterally projecting 2.9 x 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 Pt is s/p Right pterional craniotomy and clipping of complex ruptured posterior communicating artery aneurysm on 3/26/2024. Pt now with vasospasm and is planned for cerebral angiogram with vasospasm treatment with NEURO IR.    Neuro Exam: intubated, sedated    Allergies: No Known Allergies    Current Medications: acetaminophen     Tablet .. 650 milliGRAM(s) Oral every 6 hours PRN  bisacodyl Suppository 10 milliGRAM(s) Rectal at bedtime  chlorhexidine 0.12% Liquid 15 milliLiter(s) Oral Mucosa every 12 hours  chlorhexidine 4% Liquid 1 Application(s) Topical at bedtime  dexAMETHasone  IVPB 8 milliGRAM(s) IV Intermittent every 8 hours  enoxaparin Injectable 40 milliGRAM(s) SubCutaneous <User Schedule>  fentaNYL   Infusion... 0.5 MICROgram(s)/kG/Hr IV Continuous <Continuous>  lactated ringers. 1000 milliLiter(s) IV Continuous <Continuous>  naloxegol 25 milliGRAM(s) Oral daily  oxyCODONE    IR 5 milliGRAM(s) Oral every 4 hours PRN  oxyCODONE    IR 10 milliGRAM(s) Oral every 4 hours PRN  pantoprazole  Injectable 40 milliGRAM(s) IV Push daily  polyethylene glycol 3350 17 Gram(s) Oral at bedtime  propofol Infusion 5 MICROgram(s)/kG/Min IV Continuous <Continuous>  senna 2 Tablet(s) Oral at bedtime  theophylline Syrup 50 milliGRAM(s) Oral every 12 hours      Labs:                         9.3    12.75 )-----------( 184      ( 31 Mar 2024 19:19 )             29.1       04-01    149<H>  |  110<H>  |  29<H>  ----------------------------<  151<H>  4.1   |  25  |  0.45<L>    Ca    9.0      01 Apr 2024 00:11  Phos  3.2     04-01  Mg     2.4     04-01    TPro  6.7  /  Alb  3.8  /  TBili  0.3  /  DBili  <0.1  /  AST  35  /  ALT  20  /  AlkPhos  109  03-26  Blood Bank: 03-31-24  O  --  Positive      Assessment/Plan:   This is a 59 y/o Female  presents with vasospasm. Patient presents to neuro-IR for selective cerebral angiography. Procedure/ risks/ benefits/ goals/ alternatives were explained. Risks include but are not limited to stroke/ vessel injury/ hemorrhage/ groin hematoma. All questions answered. Informed consent obtained and placed in chart. Interventional Neuro Radiology  Pre-Procedure Note    HPI:  59 y/o F, on ASA81 for pain relief? (fell a month ago), presents as txfr from [place] for diffuse SAH w/small IVH, 2/2 ruptured posterolaterally projecting 2.9 x 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 Pt is s/p Right pterional craniotomy and clipping of complex ruptured posterior communicating artery aneurysm on 3/26/2024. Pt now with suspected vasospasm and is planned for cerebral angiogram with vasospasm treatment with NEURO IR.    Neuro Exam: intubated, sedated    Allergies: No Known Allergies    Current Medications: acetaminophen     Tablet .. 650 milliGRAM(s) Oral every 6 hours PRN  bisacodyl Suppository 10 milliGRAM(s) Rectal at bedtime  chlorhexidine 0.12% Liquid 15 milliLiter(s) Oral Mucosa every 12 hours  chlorhexidine 4% Liquid 1 Application(s) Topical at bedtime  dexAMETHasone  IVPB 8 milliGRAM(s) IV Intermittent every 8 hours  enoxaparin Injectable 40 milliGRAM(s) SubCutaneous <User Schedule>  fentaNYL   Infusion... 0.5 MICROgram(s)/kG/Hr IV Continuous <Continuous>  lactated ringers. 1000 milliLiter(s) IV Continuous <Continuous>  naloxegol 25 milliGRAM(s) Oral daily  oxyCODONE    IR 5 milliGRAM(s) Oral every 4 hours PRN  oxyCODONE    IR 10 milliGRAM(s) Oral every 4 hours PRN  pantoprazole  Injectable 40 milliGRAM(s) IV Push daily  polyethylene glycol 3350 17 Gram(s) Oral at bedtime  propofol Infusion 5 MICROgram(s)/kG/Min IV Continuous <Continuous>  senna 2 Tablet(s) Oral at bedtime  theophylline Syrup 50 milliGRAM(s) Oral every 12 hours      Labs:                         9.3    12.75 )-----------( 184      ( 31 Mar 2024 19:19 )             29.1       04-01    149<H>  |  110<H>  |  29<H>  ----------------------------<  151<H>  4.1   |  25  |  0.45<L>    Ca    9.0      01 Apr 2024 00:11  Phos  3.2     04-01  Mg     2.4     04-01    TPro  6.7  /  Alb  3.8  /  TBili  0.3  /  DBili  <0.1  /  AST  35  /  ALT  20  /  AlkPhos  109  03-26  Blood Bank: 03-31-24  O  --  Positive      Assessment/Plan:   This is a 59 y/o Female  presents with suspected vasospasm. Patient presents to neuro-IR for catheter cerebral angiography and vasospasm treatment. Procedure/ risks/ benefits/ goals/ alternatives were explained. Risks include but are not limited to stroke/ vessel injury/ hemorrhage/ groin hematoma. All questions answered. Informed consent obtained and placed in chart.

## 2024-04-01 NOTE — PROGRESS NOTE ADULT - ASSESSMENT
58F with SAH HH4 mF4 from R PComm aneurysm s/p clipping with R frontal EVD.  #EVD tract hemorrhage  #R BG stroke  #HF improving  #bradycardia    Plan:  EVD @10, monitor ICP  sedation: wean propofol, fentanyl gtt as able  keppra 500mg x7d  euvolemia, holding nimodipine for bradycardia  PS as tolerated  SBP liberalized 100-200  theophylline for bradycardia  Na goal 145-155  TF until 4AM -> then hold for possible extubation, PPI, bowel regimen  SCDs, SQL    See day attending note for add'l details

## 2024-04-01 NOTE — PROGRESS NOTE ADULT - ASSESSMENT
58F w/ SAH, ruptured R pcomm aneurysm, currently intubated. ENT called for airway eval given lack of cuff leak. On exam, no cuff leak heard upon deflating cuff. Larynx suctioned w/ flexible suction catheter and indirect laryngoscopy performed, showed mild b/l aryepiglottic fold and false VC edema noted, pooling of secretions at ET tube, no cuff leak visualized. Repeat indirect laryngoscopy showed improvement in edema compared to initial scope results.

## 2024-04-01 NOTE — PROGRESS NOTE ADULT - CRITICAL CARE ATTENDING COMMENT
I have personally provided the above noted minutes of critical care time including review of laboratory values, imaging, interdisciplinary care coordination, and frequent monitoring for decompensation.    Based on my personal evaluation, this patient has a high probability of imminent or life-threatening deterioration due to the presence of: SAH, cerebral edema, aneurysm s/p clipping, bradycardia, respiratory failure, airway edema -   which required my direct attention, intervention, and personal management. Other billable procedures, if performed, are documented separately.

## 2024-04-01 NOTE — CHART NOTE - NSCHARTNOTEFT_GEN_A_CORE
Transcranial doppler exam #1 (3/27/24) 12pm  mean velocity  cm/sec                              Left         Right  CINDY                    x            x  MCA                   x            x    PCA                    P2-21      x   VERT                   33          37  BA                             x  Technically difficult study due to poor temporal windows bilaterally.  Official report to follow.  Kristin    Transcranial doppler exam #2 (4/1/24) 12pm  mean velocity  cm/sec                              Left         Right  CINDY                    x            x  MCA                   x            x    PCA                    x             x   VERT                   28          18  BA                             x  Technically difficult study due to poor temporal windows bilaterally.  Official report to follow.  Kristin

## 2024-04-01 NOTE — PROGRESS NOTE ADULT - SUBJECTIVE AND OBJECTIVE BOX
Subjective: Patient seen and examined. No new events except as noted.   remains in NSCU   preop for cerebral angiogram for spasm checkREVIEW OF SYSTEMS:  Unable to obtain       MEDICATIONS:  MEDICATIONS  (STANDING):  bisacodyl Suppository 10 milliGRAM(s) Rectal at bedtime  chlorhexidine 0.12% Liquid 15 milliLiter(s) Oral Mucosa every 12 hours  chlorhexidine 4% Liquid 1 Application(s) Topical at bedtime  dexAMETHasone  IVPB 8 milliGRAM(s) IV Intermittent every 8 hours  enoxaparin Injectable 40 milliGRAM(s) SubCutaneous <User Schedule>  fentaNYL   Infusion... 0.5 MICROgram(s)/kG/Hr (1.78 mL/Hr) IV Continuous <Continuous>  lactated ringers. 1000 milliLiter(s) (50 mL/Hr) IV Continuous <Continuous>  naloxegol 25 milliGRAM(s) Oral daily  pantoprazole  Injectable 40 milliGRAM(s) IV Push daily  polyethylene glycol 3350 17 Gram(s) Oral at bedtime  propofol Infusion 5 MICROgram(s)/kG/Min (2.14 mL/Hr) IV Continuous <Continuous>  senna 2 Tablet(s) Oral at bedtime  sodium chloride 0.9% Bolus 500 milliLiter(s) IV Bolus once  theophylline Syrup 50 milliGRAM(s) Oral every 12 hours      PHYSICAL EXAM:  T(C): 37.9 (04-01-24 @ 15:08), Max: 37.9 (04-01-24 @ 15:00)  HR: 48 (04-01-24 @ 18:30) (43 - 73)  BP: 132/65 (04-01-24 @ 15:08) (132/65 - 132/65)  RR: 23 (04-01-24 @ 18:30) (16 - 32)  SpO2: 99% (04-01-24 @ 18:30) (91% - 100%)  Wt(kg): --  I&O's Summary    31 Mar 2024 07:01  -  01 Apr 2024 07:00  --------------------------------------------------------  IN: 1236.6 mL / OUT: 1067 mL / NET: 169.6 mL    01 Apr 2024 07:01  -  01 Apr 2024 19:11  --------------------------------------------------------  IN: 581.1 mL / OUT: 280 mL / NET: 301.1 mL      Height (cm): 157.5 (04-01 @ 15:08)  Weight (kg): 71.2 (04-01 @ 15:08)  BMI (kg/m2): 28.7 (04-01 @ 15:08)  BSA (m2): 1.72 (04-01 @ 15:08)      Appearance: Intubated   HEENT:  dry oral mucosa, PERRL, EOMI	  Lymphatic: No lymphadenopathy  Cardiovascular: Normal S1 S2, No JVD, No murmurs, No edema  Respiratory: ventilated   Psychiatry: A & O x 0  Gastrointestinal:  Soft, Non-tender, + BS	  Skin: No rashes, No ecchymoses, No cyanosis	  Neurologic: perrl, BUE localized, LE wd bilateral   Extremities: Normal range of motion, No clubbing, cyanosis or edema  Vascular: Peripheral pulses palpable 2+ bilaterally          LABS:    CARDIAC MARKERS:                          9.3    12.75 )-----------( 184      ( 31 Mar 2024 19:19 )             29.1     04-01    145  |  108  |  24<H>  ----------------------------<  118<H>  3.6   |  26  |  0.47<L>    Ca    9.0      01 Apr 2024 14:20  Phos  3.1     04-01  Mg     2.1     04-01    TELEMETRY:  SR SB  	    ECG:  	  RADIOLOGY: < from: CT Head No Cont (03.30.24 @ 10:06) >    ACC: [id number] EXAM:  CT BRAIN   ORDERED BY: ARASH CASTILLO     PROCEDURE DATE:  03/30/2024          INTERPRETATION:  CLINICAL INFORMATION: Follow-up intracranial hemorrhage    TECHNIQUE: Portable CT head.    COMPARISON: CT head 3/29/2024    FINDINGS:    A right pterional craniotomy is noted, with an aneurysm clip in the right   paraclinoid region. There is mild extra-axial lucency and increased   density beneath the flap, likely sealant material. There is scattered   subarachnoid blood in both cerebral hemispheres, collecting   preferentially at the right insula and interhemispheric fissure. All of   these findings appear stable, without interval rebleeding.    There is a right frontal EVD in place, terminating near the left foramen   of [place].There is mild pneumocephalus at the right frontal horn on a   postoperative basis, which has not progressed. There is a parenchymal   hematoma along the trajectory of the drain, not significantly changed.   There is edema surrounding the clot and extending inferiorly, down to the   level of the right hypothalamus. There is mild intraventricular blood in   the right lateral ventricle. These findings also appear unchanged,   accounting for differences in technique. There is no hydrocephalus.    Cerebral volume is within normal limits. No premature white matter   disease.    No midline shift or herniation. No CT evidence of acute territorial   infarction, although MRI with DWI would be more sensitive.    The visualized sinuses and mastoids are clear.    A right NG and ETT are noted in place. Soft tissue swelling is noted   overlying the craniotomy flap. Limited views of the orbits and visualized   soft tissues of the neck, face, scalp, skull base, and calvarium are   otherwise unremarkable.        IMPRESSION:    1.  No significant change, without interval rebleeding.    --- End of Report ---           SNEHAL CASTELLANO MD; Resident Radiologist  This document has been electronically signed.  JORGE L HAMLIN MD; Attending Radiologist  This document has been electronically si    < end of copied text >  < from: [facility] Duplex Upper Ext Vein Scan, Left (04.01.24 @ 12:01) >    ACC: [id number] EXAM:  DUPLEX EXT VEINS UPPER LT   ORDERED BY: CHACORTA GURROLA     PROCEDURE DATE:  04/01/2024          INTERPRETATION:  CLINICAL INFORMATION: Left upper extremity swelling    COMPARISON: None available.    TECHNIQUE: Duplex sonography of the LEFT UPPER extremity veins with color   and spectral Doppler, with and without compression.    FINDINGS: There is edema within the superficial soft tissues of the left   upper extremity.    The left internal jugular, subclavian, axillary and brachial veins are   patent and free of clot.    The left basilic vein,a superficial vein, was not diagnostically imaged.    The left cephalic vein, another superficial vein, is patent and free of   clot.    IMPRESSION: No left upper extremity DVT is identified.    DIAGNOSTIC TESTING:  [ ] Echocardiogram:  [ ]  Catheterization:  [ ] Stress Test:    OTHER: 	    TECH INFORMATION:   This is a Continuous Video EEG.     Recording Technique: The patient underwent continuous video-EEG monitoring, using Telemetry System hardware on the [employer].  EEG and video data were stored on a computer hard drive with important events saved in digital archive files. The material was reviewed by a physician (electroencephalographer/epileptologist) on a daily basis.  Vitor and seizure detection algorithms were utilized and reviewed.  An EEG Technician attended to the patient for 8 to 10 hours per day. The patient was observed by the epilepsy nursing staff 24 hours per day. The epilepsy center neurologist was available in person or on call 24 hours per day..     Placement and Labeling of Electrodes: The EEG was performed utilizing at least 20 channel referential EEG connections (coronal over temporal over parasagittal montage) with inferior temporal electrodes when indicting and using all standard 10-20 electrode placements with EKG, with additional electrodes placed in the inferior temporal region using the modified 10-10 montage electrode placements for elective admissions, or if deemed necessary.  Recording was at  a sampling rate of 256 samples per second per channel. Time synchronized digital video recording was done simultaneously with EEG recording.  A low light infrared camera was used for low light recording..    EEG REPORT:   EEG Report:  · EEG Report	  JOSE MCLEAN [id number]     Study Date: 	03-31-24 0800  0800	04-1-24  Duration x Hours: 24 h    --------------------------------------------------------------------------------------------------  History:  CC/ HPI Patient is a 58y old  Female who presents with a chief complaint of SAH (30 Mar 2024 03:57)    --------------------------------------------------------------------------------------------------  Study Interpretation:    [[[Abbreviation Key:  PDR=alpha rhythm/posterior dominant rhythm. A-P=anterior posterior.  Amplitude: ‘very low’:<20; ‘low’:20-49; ‘medium’:; ‘high’:>150uV.  Persistence for periodic/rhythmic patterns (% of epoch) ‘rare’:<1%; ‘occasional’:1-10%; ‘frequent’:10-50%; ‘abundant’:50-90%; ‘continuous’:>90%.  Persistence for sporadic discharges: ‘rare’:<1/hr; ‘occasional’:1/min-1/hr; ‘frequent’:>1/min; ‘abundant’:>1/10 sec.  RPP=rhythmic and periodic patterns; GRDA=generalized rhythmic delta activity; FIRDA=frontal intermittent GRDA; LRDA=lateralized rhythmic delta activity; TIRDA=temporal intermittent rhythmic delta activity;  LPD=PLED=lateralized periodic discharges; GPD=generalized periodic discharges; BIPDs =bilateral independent periodic discharges; Mf=multifocal; SIRPDs=stimulus induced rhythmic, periodic, or ictal appearing discharges; BIRDs=brief potentially ictal rhythmic discharges >4 Hz, lasting .5-10s; PFA (paroxysmal bursts >13 Hz or =8 Hz <10s).  Modifiers: +F=with fast component; +S=with spike component; +R=with rhythmic component.  S-B=burst suppression pattern.  Max=maximal. N1-drowsy; N2-stage II sleep; N3-slow wave sleep. SSS/BETS=small sharp spikes/benign epileptiform transients of sleep. HV=hyperventilation; PS=photic stimulation]]]    Daily EEG Visual Analysis    FINDINGS:      Background:  Continuity: continuous  Symmetry: asymmetric  PDR: none  Reactivity: present  Voltage: normal, mostly 20-150uV  Anterior Posterior Gradient: absent  Other background findings: none  Breach: Higher amplitude, sharply contoured activity  in the right frontal region.    Background Slowing:  Generalized slowing: Diffuse polymorphic delta/theta activity  Focal slowing: Abundant bilateral frontal intermittent theta/delta slowing,    State Changes:   -Drowsiness noted with increased slowing, attenuation of fast activity.  -N2 sleep transients were not recorded.    Sporadic Epileptiform Discharges:    None    Rhythmic and Periodic Patterns (RPPs):  Occasional right frontal sharp waves, F4 maximum.   Occasional left frontal sharp waves Fp1/F3 max  Intermittent LRDA left frontal max at 1.5hz    Electrographic and Electroclinical seizures:  None    Other Clinical Events:  None    Activation Procedures:   -Hyperventilation was not performed.    -Photic stimulation was not performed.    Artifacts:  Intermittent myogenic and movement artifacts were noted.    ECG:  The heart rate on single channel ECG was predominantly between 50 to 70 BPM.    EEG Classification / Summary:  Abnormal  EEG in the awake / drowsy / asleep state(s).  - Occasional Right frontal sharp waves.   - Occasional Left frontal sharp waves   - Intermittent LRDA left frontal max at 1.5hz  - bilateral frontal independent focal slowing   - Right frontal breach artifact.   - Moderate diffuse focal slowing.     Clinical Impression:  - Independent risk for focal seizures from the bilateral frontal regions independently.  - Independent bilateral frontal focal cerebral dysfunction.   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    Pipo Tinoco MD  EEG/Epilepsy Attending         Electronic Signatures:  Pipo Tinoco)  (Signed 01-Apr-2024 08:25)  	Authored: TECH INFORMATION, EEG REPORT      Last Updated: 01-Apr-2024 08:25 by Pipo Tinoco)

## 2024-04-01 NOTE — PROGRESS NOTE ADULT - SUBJECTIVE AND OBJECTIVE BOX
HPI   58F, on ASA81, transferred from [place] for SAH HH4 WNFS 5 mF4 ruptured R pcomm aneurysm. Was initially found unresponsive on 3/25/24 at 21:00, intubated at 23:00 at OSH, then txfrd. Given ddavp at OSH. Here in ED 1 unit PLTs given and R frontal EVD placed-high pressure. Went for R pcom clip on 3/26/24, postop evd not functioning then drained on the floor.     Admission scores:   SAH R pcomm ruptured @ HH4 WNFS 5 mF4, GCS <7    24h events   PS 5/5     Exam   intubated, eyes opens to verbal stimulation briefly, gaze midline,peerl, RUE localized and AG follows simple commands, LUE plegic, RLE AG, moves toes     LED 3/28   IMPRESSION:  Acute left lower extremity DVT below the knee, involving the left soleal   vein. Edema of the superficial soft tissues of the left calf. Correlate   clinically.  No acute DVT of the right lower extremity.    EEG 3/28/24  EEG SUMMARY/CLASSIFICATION    Abnormal EEG  - Independent bilateral frontal sharp-wave discharges  - GPDs with triphasic morphology.   - Bifrontal slowing, left greater than right.  - Moderate generalized slowing.    _____________________________________________________________  EEG IMPRESSION/CLINICAL CORRELATE    Abnormal EEG study.  Potential independent epileptogenic foci in the bilateral frontal regions.  Structural or functional abnormality in the bilateral frontal head regions, left greater than right.  Moderate nonspecific diffuse or multifocal cerebral dysfunction.     TTE 3/26/24   CONCLUSIONS:   1. Left ventricular systolic function is moderately to severely decreased with an ejection fraction of 36 % by Mcgee's method of disks. Regional wall motion abnormalities consistent with ischemic heart disease.   2. Multiple segmental abnormalities exist. See findings.   3. There is moderate (grade 2) left ventricular diastolic dysfunction, with elevated filling pressure.   4. Normal right ventricular cavity size, with normal wall thickness, and normal systolic function. Tricuspid annular plane systolic excursion (TAPSE) is 1.7 cm (normal >=1.7 cm).   5. Normal left and right atrial size.   6. No significant valvular disease.   7. Estimated pulmonary artery systolic pressure is 41 mmHg.   8. The inferior vena cava is normal in size measuring 1.98 cm in diameter, (normal <2.1cm) with abnormal inspiratory collapse (abnormal <50%) consistent with mildly elevated right atrial pressure (~8, range 5-10mmHg).   9. No pericardial effusion seen.  10. No prior echocardiogram is available for comparison.  11. There is no evidence of a left ventricular thrombus.  12. There is increased LV mass and concentric hypertrophy.    VITALS:   Vital Signs Last 24 Hrs  T(C): 37 (01 Apr 2024 07:00), Max: 37.7 (31 Mar 2024 19:00)  T(F): 98.6 (01 Apr 2024 07:00), Max: 99.9 (31 Mar 2024 19:00)  HR: 48 (01 Apr 2024 09:39) (43 - 61)  BP: --  BP(mean): --  RR: 16 (01 Apr 2024 07:00) (11 - 35)  SpO2: 100% (01 Apr 2024 09:39) (99% - 100%)    Parameters below as of 01 Apr 2024 07:00  Patient On (Oxygen Delivery Method): ventilator    O2 Concentration (%): 40  CAPILLARY BLOOD GLUCOSE        I&O's Summary    31 Mar 2024 07:01  -  01 Apr 2024 07:00  --------------------------------------------------------  IN: 1236.6 mL / OUT: 1067 mL / NET: 169.6 mL    01 Apr 2024 07:01  -  01 Apr 2024 10:00  --------------------------------------------------------  IN: 111.5 mL / OUT: 0 mL / NET: 111.5 mL        Respiratory:  Mode: AC/ CMV (Assist Control/ Continuous Mandatory Ventilation)  RR (machine): 16  TV (machine): 450  FiO2: 40  PEEP: 5  ITime: 1  MAP: 6  PIP: 16        LABS:                        9.3    12.75 )-----------( 184      ( 31 Mar 2024 19:19 )             29.1     04-01    149<H>  |  110<H>  |  29<H>  ----------------------------<  151<H>  4.1   |  25  |  0.45<L>        MEDICATION LEVELS:     IVF FLUIDS/MEDICATIONS:   MEDICATIONS  (STANDING):  bisacodyl Suppository 10 milliGRAM(s) Rectal at bedtime  chlorhexidine 0.12% Liquid 15 milliLiter(s) Oral Mucosa every 12 hours  chlorhexidine 4% Liquid 1 Application(s) Topical at bedtime  dexAMETHasone  IVPB 8 milliGRAM(s) IV Intermittent every 8 hours  enoxaparin Injectable 40 milliGRAM(s) SubCutaneous <User Schedule>  fentaNYL   Infusion... 0.5 MICROgram(s)/kG/Hr (1.78 mL/Hr) IV Continuous <Continuous>  lactated ringers. 1000 milliLiter(s) (50 mL/Hr) IV Continuous <Continuous>  levETIRAcetam  Solution 500 milliGRAM(s) Oral two times a day  pantoprazole  Injectable 40 milliGRAM(s) IV Push daily  polyethylene glycol 3350 17 Gram(s) Oral at bedtime  propofol Infusion 5 MICROgram(s)/kG/Min (2.14 mL/Hr) IV Continuous <Continuous>  senna 2 Tablet(s) Oral at bedtime  theophylline Syrup 50 milliGRAM(s) Oral every 12 hours    MEDICATIONS  (PRN):  acetaminophen     Tablet .. 650 milliGRAM(s) Oral every 6 hours PRN Temp greater or equal to 38.5C (101.3F), Mild Pain (1 - 3)  oxyCODONE    IR 5 milliGRAM(s) Oral every 4 hours PRN Moderate Pain (4 - 6)  oxyCODONE    IR 10 milliGRAM(s) Oral every 4 hours PRN Severe Pain (7 - 10)

## 2024-04-02 LAB
ANION GAP SERPL CALC-SCNC: 13 MMOL/L — SIGNIFICANT CHANGE UP (ref 5–17)
ANION GAP SERPL CALC-SCNC: 15 MMOL/L — SIGNIFICANT CHANGE UP (ref 5–17)
BUN SERPL-MCNC: 19 MG/DL — SIGNIFICANT CHANGE UP (ref 7–23)
BUN SERPL-MCNC: 20 MG/DL — SIGNIFICANT CHANGE UP (ref 7–23)
CALCIUM SERPL-MCNC: 8.7 MG/DL — SIGNIFICANT CHANGE UP (ref 8.4–10.5)
CALCIUM SERPL-MCNC: 9.1 MG/DL — SIGNIFICANT CHANGE UP (ref 8.4–10.5)
CHLORIDE SERPL-SCNC: 105 MMOL/L — SIGNIFICANT CHANGE UP (ref 96–108)
CHLORIDE SERPL-SCNC: 105 MMOL/L — SIGNIFICANT CHANGE UP (ref 96–108)
CO2 SERPL-SCNC: 22 MMOL/L — SIGNIFICANT CHANGE UP (ref 22–31)
CO2 SERPL-SCNC: 24 MMOL/L — SIGNIFICANT CHANGE UP (ref 22–31)
CREAT SERPL-MCNC: 0.42 MG/DL — LOW (ref 0.5–1.3)
CREAT SERPL-MCNC: 0.43 MG/DL — LOW (ref 0.5–1.3)
EGFR: 113 ML/MIN/1.73M2 — SIGNIFICANT CHANGE UP
EGFR: 114 ML/MIN/1.73M2 — SIGNIFICANT CHANGE UP
GLUCOSE SERPL-MCNC: 110 MG/DL — HIGH (ref 70–99)
GLUCOSE SERPL-MCNC: 129 MG/DL — HIGH (ref 70–99)
HCT VFR BLD CALC: 28.8 % — LOW (ref 34.5–45)
HGB BLD-MCNC: 9.3 G/DL — LOW (ref 11.5–15.5)
MAGNESIUM SERPL-MCNC: 2.1 MG/DL — SIGNIFICANT CHANGE UP (ref 1.6–2.6)
MAGNESIUM SERPL-MCNC: 2.4 MG/DL — SIGNIFICANT CHANGE UP (ref 1.6–2.6)
MCHC RBC-ENTMCNC: 29.1 PG — SIGNIFICANT CHANGE UP (ref 27–34)
MCHC RBC-ENTMCNC: 32.3 GM/DL — SIGNIFICANT CHANGE UP (ref 32–36)
MCV RBC AUTO: 90 FL — SIGNIFICANT CHANGE UP (ref 80–100)
NRBC # BLD: 0 /100 WBCS — SIGNIFICANT CHANGE UP (ref 0–0)
PHOSPHATE SERPL-MCNC: 3.4 MG/DL — SIGNIFICANT CHANGE UP (ref 2.5–4.5)
PHOSPHATE SERPL-MCNC: 3.6 MG/DL — SIGNIFICANT CHANGE UP (ref 2.5–4.5)
PLATELET # BLD AUTO: 193 K/UL — SIGNIFICANT CHANGE UP (ref 150–400)
POTASSIUM SERPL-MCNC: 3.9 MMOL/L — SIGNIFICANT CHANGE UP (ref 3.5–5.3)
POTASSIUM SERPL-MCNC: 4 MMOL/L — SIGNIFICANT CHANGE UP (ref 3.5–5.3)
POTASSIUM SERPL-SCNC: 3.9 MMOL/L — SIGNIFICANT CHANGE UP (ref 3.5–5.3)
POTASSIUM SERPL-SCNC: 4 MMOL/L — SIGNIFICANT CHANGE UP (ref 3.5–5.3)
RBC # BLD: 3.2 M/UL — LOW (ref 3.8–5.2)
RBC # FLD: 13.4 % — SIGNIFICANT CHANGE UP (ref 10.3–14.5)
SODIUM SERPL-SCNC: 142 MMOL/L — SIGNIFICANT CHANGE UP (ref 135–145)
SODIUM SERPL-SCNC: 142 MMOL/L — SIGNIFICANT CHANGE UP (ref 135–145)
WBC # BLD: 11.85 K/UL — HIGH (ref 3.8–10.5)
WBC # FLD AUTO: 11.85 K/UL — HIGH (ref 3.8–10.5)

## 2024-04-02 PROCEDURE — 31575 DIAGNOSTIC LARYNGOSCOPY: CPT

## 2024-04-02 PROCEDURE — 99291 CRITICAL CARE FIRST HOUR: CPT

## 2024-04-02 PROCEDURE — 71045 X-RAY EXAM CHEST 1 VIEW: CPT | Mod: 26

## 2024-04-02 PROCEDURE — 70450 CT HEAD/BRAIN W/O DYE: CPT | Mod: 26

## 2024-04-02 RX ORDER — OXYCODONE HYDROCHLORIDE 5 MG/1
10 TABLET ORAL EVERY 4 HOURS
Refills: 0 | Status: DISCONTINUED | OUTPATIENT
Start: 2024-04-02 | End: 2024-04-07

## 2024-04-02 RX ORDER — MULTIVIT WITH MIN/MFOLATE/K2 340-15/3 G
296 POWDER (GRAM) ORAL ONCE
Refills: 0 | Status: COMPLETED | OUTPATIENT
Start: 2024-04-02 | End: 2024-04-02

## 2024-04-02 RX ORDER — POTASSIUM CHLORIDE 20 MEQ
20 PACKET (EA) ORAL ONCE
Refills: 0 | Status: COMPLETED | OUTPATIENT
Start: 2024-04-02 | End: 2024-04-02

## 2024-04-02 RX ORDER — ACETAMINOPHEN 500 MG
1000 TABLET ORAL ONCE
Refills: 0 | Status: COMPLETED | OUTPATIENT
Start: 2024-04-02 | End: 2024-04-02

## 2024-04-02 RX ORDER — OXYCODONE HYDROCHLORIDE 5 MG/1
5 TABLET ORAL EVERY 4 HOURS
Refills: 0 | Status: DISCONTINUED | OUTPATIENT
Start: 2024-04-02 | End: 2024-04-02

## 2024-04-02 RX ADMIN — NALOXEGOL OXALATE 25 MILLIGRAM(S): 12.5 TABLET, FILM COATED ORAL at 12:10

## 2024-04-02 RX ADMIN — PROPOFOL 2.14 MICROGRAM(S)/KG/MIN: 10 INJECTION, EMULSION INTRAVENOUS at 07:20

## 2024-04-02 RX ADMIN — FENTANYL CITRATE 1.78 MICROGRAM(S)/KG/HR: 50 INJECTION INTRAVENOUS at 07:21

## 2024-04-02 RX ADMIN — CHLORHEXIDINE GLUCONATE 15 MILLILITER(S): 213 SOLUTION TOPICAL at 17:46

## 2024-04-02 RX ADMIN — PANTOPRAZOLE SODIUM 40 MILLIGRAM(S): 20 TABLET, DELAYED RELEASE ORAL at 12:02

## 2024-04-02 RX ADMIN — FENTANYL CITRATE 1.78 MICROGRAM(S)/KG/HR: 50 INJECTION INTRAVENOUS at 19:35

## 2024-04-02 RX ADMIN — PROPOFOL 2.14 MICROGRAM(S)/KG/MIN: 10 INJECTION, EMULSION INTRAVENOUS at 19:36

## 2024-04-02 RX ADMIN — ENOXAPARIN SODIUM 40 MILLIGRAM(S): 100 INJECTION SUBCUTANEOUS at 17:46

## 2024-04-02 RX ADMIN — Medication 400 MILLIGRAM(S): at 20:32

## 2024-04-02 RX ADMIN — Medication 296 MILLILITER(S): at 12:03

## 2024-04-02 RX ADMIN — Medication 20 MILLIEQUIVALENT(S): at 18:28

## 2024-04-02 RX ADMIN — Medication 1000 MILLIGRAM(S): at 20:46

## 2024-04-02 RX ADMIN — POLYETHYLENE GLYCOL 3350 17 GRAM(S): 17 POWDER, FOR SOLUTION ORAL at 05:22

## 2024-04-02 RX ADMIN — Medication 50 MILLIGRAM(S): at 19:45

## 2024-04-02 RX ADMIN — SENNA PLUS 2 TABLET(S): 8.6 TABLET ORAL at 22:26

## 2024-04-02 RX ADMIN — POLYETHYLENE GLYCOL 3350 17 GRAM(S): 17 POWDER, FOR SOLUTION ORAL at 17:46

## 2024-04-02 RX ADMIN — Medication 50 MILLIGRAM(S): at 05:22

## 2024-04-02 RX ADMIN — CHLORHEXIDINE GLUCONATE 15 MILLILITER(S): 213 SOLUTION TOPICAL at 05:22

## 2024-04-02 RX ADMIN — Medication 101.6 MILLIGRAM(S): at 02:35

## 2024-04-02 RX ADMIN — CHLORHEXIDINE GLUCONATE 1 APPLICATION(S): 213 SOLUTION TOPICAL at 22:26

## 2024-04-02 NOTE — PROGRESS NOTE ADULT - SUBJECTIVE AND OBJECTIVE BOX
Patient seen and examined at bedside.    --Anticoagulation--  enoxaparin Injectable 40 milliGRAM(s) SubCutaneous <User Schedule>    T(C): 37.6 (03-31-24 @ 03:00), Max: 38.3 (03-30-24 @ 17:00)  HR: 45 (03-31-24 @ 04:00) (41 - 75)  BP: --  RR: 23 (03-31-24 @ 04:00) (15 - 30)  SpO2: 100% (03-31-24 @ 04:00) (94% - 100%)  Wt(kg): --    Exam: Intubated, SAVI, right periorbital edema improving, PERRL, +cough/gag/OB, int FC, Right side spont AG, LUE LOC, LLE wds

## 2024-04-02 NOTE — PROGRESS NOTE ADULT - ASSESSMENT
DeliaJie  58F, on ASA81 for pain relief? (fell a month ago), presents as txfr from [place] for diffuse SAH w/small IVH, 2/2 ruptured posterolaterally projecting 2.9x2.4x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 Was initially found unresponsive at 21:00, intubated at 23:00 at OSH, then txfrd. Given ddavp at OSH. On arrival patient w/ PERRL, +cough/gag, 2/5 spontaneous movement in LUE, o/w no movement. 1 unit PLTs given and R frontal EVD placed-high pressure. PLTs/Coags wnl. Exam improved: intubated, PERRL, BUE localize, b/l wd       CPAP as gurdeep  Poss EVD challenge in AM  TCDs/SAH protocol  ENT fiberoptic scope in AM

## 2024-04-02 NOTE — PROGRESS NOTE ADULT - ASSESSMENT
58F with SAH HH4 mF4 from R PComm aneurysm s/p clipping with R frontal EVD.  #EVD tract hemorrhage  #R BG stroke  #HF improving  #bradycardia    Plan: ***  EVD @10, monitor ICP  sedation: wean propofol, fentanyl gtt as able  keppra 500mg x7d  euvolemia, holding nimodipine for bradycardia  PS as tolerated  SBP liberalized 100-200  theophylline for bradycardia  Na goal 145-155  TF until 4AM -> then hold for possible extubation, PPI, bowel regimen  SCDs, SQL    See day attending note for add'l details 58F with SAH HH4 mF4 from R PComm aneurysm s/p clipping with R frontal EVD.  #EVD tract hemorrhage  #R BG stroke  #HF improving  #bradycardia    Plan:   neurochecks q1h  EVD @15, monitor ICP  sedation: propofol, fentanyl gtt   euvolemia, holding nimodipine for bradycardia  PS as tolerated  SBP liberalized 100-200, theophylline for bradycardia  TF until 4AM -> then hold for possible extubation, PPI, bowel regimen  SCDs, SQL    See day attending note for add'l details

## 2024-04-02 NOTE — PROGRESS NOTE ADULT - SUBJECTIVE AND OBJECTIVE BOX
HPI   58F, on ASA81, transferred from [place] for SAH HH4 WNFS 5 mF4 ruptured R pcomm aneurysm. Was initially found unresponsive on 3/25/24 at 21:00, intubated at 23:00 at OSH, then txfrd. Given ddavp at OSH. Here in ED 1 unit PLTs given and R frontal EVD placed-high pressure. Went for R pcom clip on 3/26/24, postop evd not functioning then drained on the floor.     Admission scores:   SAH R pcomm ruptured @ HH4 WNFS 5 mF4, GCS <7    24h events   PS 5/5     Exam   intubated, eyes opens to verbal stimulation briefly, gaze midline,peerl, RUE localized and AG follows simple commands, LUE plegic, RLE AG, moves toes     LED 3/28   IMPRESSION:  Acute left lower extremity DVT below the knee, involving the left soleal   vein. Edema of the superficial soft tissues of the left calf. Correlate   clinically.  No acute DVT of the right lower extremity.    EEG 3/28/24  EEG SUMMARY/CLASSIFICATION    Abnormal EEG  - Independent bilateral frontal sharp-wave discharges  - GPDs with triphasic morphology.   - Bifrontal slowing, left greater than right.  - Moderate generalized slowing.    _____________________________________________________________  EEG IMPRESSION/CLINICAL CORRELATE    Abnormal EEG study.  Potential independent epileptogenic foci in the bilateral frontal regions.  Structural or functional abnormality in the bilateral frontal head regions, left greater than right.  Moderate nonspecific diffuse or multifocal cerebral dysfunction.     TTE 3/26/24   CONCLUSIONS:   1. Left ventricular systolic function is moderately to severely decreased with an ejection fraction of 36 % by Mcgee's method of disks. Regional wall motion abnormalities consistent with ischemic heart disease.   2. Multiple segmental abnormalities exist. See findings.   3. There is moderate (grade 2) left ventricular diastolic dysfunction, with elevated filling pressure.   4. Normal right ventricular cavity size, with normal wall thickness, and normal systolic function. Tricuspid annular plane systolic excursion (TAPSE) is 1.7 cm (normal >=1.7 cm).   5. Normal left and right atrial size.   6. No significant valvular disease.   7. Estimated pulmonary artery systolic pressure is 41 mmHg.   8. The inferior vena cava is normal in size measuring 1.98 cm in diameter, (normal <2.1cm) with abnormal inspiratory collapse (abnormal <50%) consistent with mildly elevated right atrial pressure (~8, range 5-10mmHg).   9. No pericardial effusion seen.  10. No prior echocardiogram is available for comparison.  11. There is no evidence of a left ventricular thrombus.  12. There is increased LV mass and concentric hypertrophy.    VITALS:   Vital Signs Last 24 Hrs  T(C): 37 (01 Apr 2024 07:00), Max: 37.7 (31 Mar 2024 19:00)  T(F): 98.6 (01 Apr 2024 07:00), Max: 99.9 (31 Mar 2024 19:00)  HR: 48 (01 Apr 2024 09:39) (43 - 61)  BP: --  BP(mean): --  RR: 16 (01 Apr 2024 07:00) (11 - 35)  SpO2: 100% (01 Apr 2024 09:39) (99% - 100%)    Parameters below as of 01 Apr 2024 07:00  Patient On (Oxygen Delivery Method): ventilator    O2 Concentration (%): 40  CAPILLARY BLOOD GLUCOSE        I&O's Summary    31 Mar 2024 07:01  -  01 Apr 2024 07:00  --------------------------------------------------------  IN: 1236.6 mL / OUT: 1067 mL / NET: 169.6 mL    01 Apr 2024 07:01  -  01 Apr 2024 10:00  --------------------------------------------------------  IN: 111.5 mL / OUT: 0 mL / NET: 111.5 mL        Respiratory:  Mode: AC/ CMV (Assist Control/ Continuous Mandatory Ventilation)  RR (machine): 16  TV (machine): 450  FiO2: 40  PEEP: 5  ITime: 1  MAP: 6  PIP: 16        LABS:                        9.3    12.75 )-----------( 184      ( 31 Mar 2024 19:19 )             29.1     04-01    149<H>  |  110<H>  |  29<H>  ----------------------------<  151<H>  4.1   |  25  |  0.45<L>        MEDICATION LEVELS:     IVF FLUIDS/MEDICATIONS:   MEDICATIONS  (STANDING):  bisacodyl Suppository 10 milliGRAM(s) Rectal at bedtime  chlorhexidine 0.12% Liquid 15 milliLiter(s) Oral Mucosa every 12 hours  chlorhexidine 4% Liquid 1 Application(s) Topical at bedtime  dexAMETHasone  IVPB 8 milliGRAM(s) IV Intermittent every 8 hours  enoxaparin Injectable 40 milliGRAM(s) SubCutaneous <User Schedule>  fentaNYL   Infusion... 0.5 MICROgram(s)/kG/Hr (1.78 mL/Hr) IV Continuous <Continuous>  lactated ringers. 1000 milliLiter(s) (50 mL/Hr) IV Continuous <Continuous>  levETIRAcetam  Solution 500 milliGRAM(s) Oral two times a day  pantoprazole  Injectable 40 milliGRAM(s) IV Push daily  polyethylene glycol 3350 17 Gram(s) Oral at bedtime  propofol Infusion 5 MICROgram(s)/kG/Min (2.14 mL/Hr) IV Continuous <Continuous>  senna 2 Tablet(s) Oral at bedtime  theophylline Syrup 50 milliGRAM(s) Oral every 12 hours    MEDICATIONS  (PRN):  acetaminophen     Tablet .. 650 milliGRAM(s) Oral every 6 hours PRN Temp greater or equal to 38.5C (101.3F), Mild Pain (1 - 3)  oxyCODONE    IR 5 milliGRAM(s) Oral every 4 hours PRN Moderate Pain (4 - 6)  oxyCODONE    IR 10 milliGRAM(s) Oral every 4 hours PRN Severe Pain (7 - 10)

## 2024-04-02 NOTE — PROGRESS NOTE ADULT - SUBJECTIVE AND OBJECTIVE BOX
NSICU Night Intensivist Note    Historical Review:   58F, on ASA81 for pain relief? 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 Was initially found unresponsive at 21:00, intubated at 23:00 at OSH, then txfrd. Given ddavp at OSH. On arrival patient w/ PERRL, +cough/gag, 2/5 spontaneous movement in LUE, o/w no movement. 1 unit PLTs given and R frontal EVD placed-high pressure. PLTs/Coags wnl.    (26 Mar 2024 04:21)    12hr EVENTS:  - ***    ROS: [x]  Unable to assess due to mental status/intubation     ----------------------------------------------------------------------------------------------------  fentanyl @1  propofol @10    PHYSICAL EXAM: (sedation briefly held)  General: intubated  HEENT: MMM  Neuro:  -Mental status- eyes open to voice, intermittent following simple commands  -CN- PERRL 3mm, EOMI, face symmetric  -SensoriMotor- RUE localizes antigravity, RLE spont in plane of bed, LUE plegic, LLE wd    CV: regular rate and rhythm  Pulm: no accessory muscle use  Abd: soft, nontender, nondistended, +TF  Skin: warm, dry     NSICU Night Intensivist Note    Historical Review:   58F, on ASA81 for pain relief? 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 Was initially found unresponsive at 21:00, intubated at 23:00 at OSH, then txfrd. Given ddavp at OSH. On arrival patient w/ PERRL, +cough/gag, 2/5 spontaneous movement in LUE, o/w no movement. 1 unit PLTs given and R frontal EVD placed-high pressure. PLTs/Coags wnl.    (26 Mar 2024 04:21)    12hr EVENTS:  - reporting pain -> gave IV tylenol  - minimal output from EVD  - improved airway edema, +cuff leak -> plan for extubation in AM     ROS: [x]  Unable to assess due to mental status/intubation     ----------------------------------------------------------------------------------------------------  fentanyl @1  propofol @10    PHYSICAL EXAM: (sedation briefly held)  General: intubated  HEENT: MMM  Neuro: EVD @15  -Mental status- eyes open to voice, intermittent following simple commands  -CN- PERRL 3mm, EOMI, face symmetric  -SensoriMotor- RUE localizes antigravity, RLE spont in plane of bed, LUE plegic, LLE wd    CV: regular rate and rhythm  Pulm: no accessory muscle use, thin clear secretions  Abd: soft, nontender, nondistended, +TF  Skin: warm, dry    -----------------------------------------------------------------------------------------------------  ICU Vital Signs Last 24 Hrs  T(C): 36.4 (03 Apr 2024 00:00), Max: 37.5 (02 Apr 2024 03:00)  T(F): 97.6 (03 Apr 2024 00:00), Max: 99.5 (02 Apr 2024 03:00)  HR: 59 (03 Apr 2024 00:00) (42 - 60)  BP: 106/55 (03 Apr 2024 00:00) (106/55 - 193/81)  BP(mean): 79 (03 Apr 2024 00:00) (79 - 116)  ABP: 115/61 (02 Apr 2024 12:00) (104/53 - 153/57)  ABP(mean): 79 (02 Apr 2024 12:00) (69 - 89)  RR: 20 (03 Apr 2024 00:00) (12 - 26)  SpO2: 99% (03 Apr 2024 00:00) (99% - 100%)    O2 Parameters below as of 02 Apr 2024 19:00  Patient On (Oxygen Delivery Method): ventilator    O2 Concentration (%): 40        I&O's Summary    01 Apr 2024 07:01  -  02 Apr 2024 07:00  --------------------------------------------------------  IN: 2311.7 mL / OUT: 1543 mL / NET: 768.7 mL    02 Apr 2024 07:01  -  03 Apr 2024 00:36  --------------------------------------------------------  IN: 1064.3 mL / OUT: 805 mL / NET: 259.3 mL        MEDICATIONS  (STANDING):  chlorhexidine 0.12% Liquid 15 milliLiter(s) Oral Mucosa every 12 hours  chlorhexidine 4% Liquid 1 Application(s) Topical at bedtime  enoxaparin Injectable 40 milliGRAM(s) SubCutaneous <User Schedule>  fentaNYL   Infusion... 0.5 MICROgram(s)/kG/Hr (1.78 mL/Hr) IV Continuous <Continuous>  naloxegol 25 milliGRAM(s) Oral daily  pantoprazole  Injectable 40 milliGRAM(s) IV Push daily  polyethylene glycol 3350 17 Gram(s) Oral every 12 hours  propofol Infusion 5 MICROgram(s)/kG/Min (2.14 mL/Hr) IV Continuous <Continuous>  senna 2 Tablet(s) Oral at bedtime  theophylline Syrup 50 milliGRAM(s) Oral every 12 hours      RESPIRATORY:  Mode: AC/ CMV (Assist Control/ Continuous Mandatory Ventilation)  RR (machine): 16  TV (machine): 450  FiO2: 40, PEEP: 5        IMAGING:   Recent imaging studies were reviewed.    LAB RESULTS:                          9.3    11.85 )-----------( 193      ( 02 Apr 2024 00:14 )             28.8     04-02    142  |  105  |  20  ----------------------------<  110<H>  3.9   |  24  |  0.43<L>    Ca    9.1      02 Apr 2024 14:59  Phos  3.4     04-02  Mg     2.4     04-02      -----------------------------------------------------------------------------------------------------------------------------------------------------------------------------------

## 2024-04-02 NOTE — PROGRESS NOTE ADULT - CRITICAL CARE ATTENDING COMMENT
I have personally provided the above noted minutes of critical care time including review of laboratory values, imaging, interdisciplinary care coordination, and frequent monitoring for decompensation.    Based on my personal evaluation, this patient has a high probability of imminent or life-threatening deterioration due to the presence of: SAH, cerebral edema, aneurysm s/p clipping, bradycardia, respiratory failure, airway edema -   which required my direct attention, intervention, and personal management. Other billable procedures, if performed, are documented separately. I have personally provided the above noted minutes of critical care time including review of laboratory values, imaging, interdisciplinary care coordination, and frequent monitoring for decompensation.    Based on my personal evaluation, this patient has a high probability of imminent or life-threatening deterioration due to the presence of: SAH, cerebral edema, aneurysm s/p clipping, hydrocephalus, bradycardia, respiratory failure -   which required my direct attention, intervention, and personal management. Other billable procedures, if performed, are documented separately.

## 2024-04-02 NOTE — PROGRESS NOTE ADULT - NS ATTEND AMEND GEN_ALL_CORE FT
I have made amendments to the documentation where necessary. Additional comments:     Events that transpired over the last 24 hours were reviewed.  The patient underwent cerebral angiogram yesterday.  Agree with 14 point review of systems and physical examination as noted above.    Explained to the patient's son Pierre previously who was called on the phone and son Wilfredo who is at the patient's bedside findings on ultrasound imaging that demonstrated left soleal deep venous thrombosis.  Explained what is a deep venous thrombosis.  The potential associated signs/symptoms of a deep venous thrombosis and complications were gone over.      The patient is status post subarachnoid hemorrhage with ruptured posterior communicating artery aneurysm status post right frontal EVD and right posterior communicating artery aneurysm clip on 3/26.  The need for close surveillance of his deep venous thrombosis was gone over.    The patient's son Wilfredo was at the patient's bedside.  Recommend repeat lower extremity repeat venous duplex study to be performed today.  Continue Lovenox 40 mg subcu daily.  Pneumatic compression to right lower extremity.      Continue telemetry monitoring.    All questions and concerns of the the patient's son were addressed.    Findings and plan were discussed with neurosurgery team.     Time-based billing (NON-critical care).   35 minutes spent on total encounter. The necessity of the time spent during the encounter on this date of service was due to: education, assessment and coordination of care.

## 2024-04-02 NOTE — PROGRESS NOTE ADULT - ATTENDING COMMENTS
58-year-old woman post-bleed day 6 from HH4mF4 subarachnoid hemorrhage status post right craniotomy for clipping of p-comm aneurysm on 3/26/24. Course notable for airway edema status post steroids.     ENT re-scoped - now with cuff leak.     Improving exam, follows commands briskly on the right in preferred language of [place]oese.     TCDs  EVD in place  Theophylline for bradycardia  Nimodipine held due to bradycardia  Wean to extubate  Aggressive bowel regimen as on fentayl gtt    At risk of death/neuro decline due to: delayed cerebral ischemia, acute resp distress/airway edema, hydrocephalus     Updated son at bedside

## 2024-04-02 NOTE — PROGRESS NOTE ADULT - ASSESSMENT
ASSESSMENT/PLAN: [facility] subarachnoid hemorrhage   Post-SAH day 3, SAH 3/25/2024  3/26/24 evd placed  3/26/24 pcomm clip    N   # @ Pcomm ruptured, s/p clip: CTH/CTA   #EVD tract acute hemorrhage   #Hydrocephalus/ICP crisis: R EVD in place 10cm H2O up to 15 78 last 24h - no readable wave  #Delayed Cerebral Ischemia Management: TCDs with poor temporal windows, follow up occipital windows low vertebral and basilar MFV, euvolemia, nimodipine/ net neg give a bolus 500cc NS  #04/01/24 angio no spasme  #Pain: PRN tylenol oxi fentanyl   #Sedation prop  Activity: [] OOB as tolerated [x] Bedrest [] PT [] OT [] PMNR    CV   SBP goal liberalize 100-200  #R heart strain: POCUS with RV strain and non collapsable IVC, dysmotility of LV   TTE decreased systolic cardiac function EF 36% with decreased diastolic function -rTTE EF 55%   #bradycardia theophyllin     P   #respiratory failure  intubated   oral secretions, no in line   #laryngeal edema dexamethasone     R   I&o    GI   #REE: indirect calorimetry daily  Diet: npo pending ENT scope  PPI   Senna miralax  BM 3/30    E   Goal euglycemia (-180)  A1c 5.5   ISS     H   SCD   Chemoppx   LED R below knee dvt     ID   fu blood culture no growth     lines  R evd  L axillary a line  ETT   pivs  ngtube     CODE STATUS: FULL CODE    DISPOSITION: ICU     CRITICAL

## 2024-04-02 NOTE — PROGRESS NOTE ADULT - SUBJECTIVE AND OBJECTIVE BOX
ENT ISSUE/POD: airway eval    HPI: 59 y/o F, on ASA81, transferred from [place] for SAH HH4 WNFS 5 mF4 ruptured R pcomm aneurysm. Was initially found unresponsive on 3/25/24 at 21:00, intubated at 23:00 at OSH, then transferred. Given ddavp at OSH. Here in ED, 1 unit PLTs given and R frontal EVD placed-high pressure. Went for R pcom clip on 3/26/24, postop evd not functioning then drained on the floor. ENT called for airway eval given lack of cuff leak. Unable to obtain further history due to pts clinical condition.          PAST MEDICAL & SURGICAL HISTORY:    Allergies    No Known Allergies    Intolerances      MEDICATIONS  (STANDING):  chlorhexidine 0.12% Liquid 15 milliLiter(s) Oral Mucosa every 12 hours  chlorhexidine 4% Liquid 1 Application(s) Topical at bedtime  enoxaparin Injectable 40 milliGRAM(s) SubCutaneous <User Schedule>  fentaNYL   Infusion... 0.5 MICROgram(s)/kG/Hr (1.78 mL/Hr) IV Continuous <Continuous>  naloxegol 25 milliGRAM(s) Oral daily  pantoprazole  Injectable 40 milliGRAM(s) IV Push daily  polyethylene glycol 3350 17 Gram(s) Oral every 12 hours  propofol Infusion 5 MICROgram(s)/kG/Min (2.14 mL/Hr) IV Continuous <Continuous>  senna 2 Tablet(s) Oral at bedtime  theophylline Syrup 50 milliGRAM(s) Oral every 12 hours    MEDICATIONS  (PRN):  acetaminophen     Tablet .. 650 milliGRAM(s) Oral every 6 hours PRN Temp greater or equal to 38.5C (101.3F), Mild Pain (1 - 3)  oxyCODONE    IR 10 milliGRAM(s) Oral every 4 hours PRN Severe Pain (7 - 10)  oxyCODONE    IR 5 milliGRAM(s) Oral every 4 hours PRN Moderate Pain (4 - 6)      Social History: see consult    Family history: see consult    ROS:   Unable to obtain further history due to pts clinical condition.       Vital Signs Last 24 Hrs  T(C): 36.8 (02 Apr 2024 11:00), Max: 38 (01 Apr 2024 23:00)  T(F): 98.2 (02 Apr 2024 11:00), Max: 100.4 (01 Apr 2024 23:00)  HR: 46 (02 Apr 2024 12:00) (42 - 73)  BP: 132/65 (01 Apr 2024 15:08) (132/65 - 132/65)  BP(mean): 141 (01 Apr 2024 13:45) (141 - 141)  RR: 12 (02 Apr 2024 12:00) (12 - 32)  SpO2: 99% (02 Apr 2024 12:00) (91% - 100%)    Parameters below as of 02 Apr 2024 07:00  Patient On (Oxygen Delivery Method): ventilator                              9.3    11.85 )-----------( 193      ( 02 Apr 2024 00:14 )             28.8    04-02    142  |  105  |  19  ----------------------------<  129<H>  4.0   |  22  |  0.42<L>    Ca    8.7      02 Apr 2024 00:14  Phos  3.6     04-02  Mg     2.1     04-02     PT/INR - ( 31 Mar 2024 19:19 )   PT: 12.1 sec;   INR: 1.10 ratio         PTT - ( 31 Mar 2024 19:19 )  PTT:26.5 sec    PHYSICAL EXAM:  Gen: sedated   Skin: No rashes, bruises, or lesions  Head: Normocephalic, Atraumatic  Face: no edema, erythema, or fluctuance. Parotid glands soft without mass  Eyes: no scleral injection  Nose: Nares bilaterally patent, no discharge  Mouth: ETT in place. Mucosa moist, tongue/uvula midline, oropharynx clear  Neck: Flat, supple, no lymphadenopathy, trachea midline, no masses  Lymphatic: No lymphadenopathy  Resp: on vent   Neuro: unable to evaluate due to pts clinical condition     Fiberoptic Indirect laryngoscopy:  (Scope #2 used)  Reason for Laryngoscopy: airway eval     Patient was unable to cooperate with mirror.  Unable to identify most anatomy 2/2 ET tube, mild b/l aryepiglottic fold and false VC edema noted, pooling of secretions at ET tube.        ENT ISSUE/POD: airway eval    HPI: 59 y/o F, on ASA81, transferred from [place] for SAH HH4 WNFS 5 mF4 ruptured R pcomm aneurysm. Was initially found unresponsive on 3/25/24 at 21:00, intubated at 23:00 at OSH, then transferred. Given ddavp at OSH. Here in ED, 1 unit PLTs given and R frontal EVD placed-high pressure. Went for R pcom clip on 3/26/24, postop evd not functioning then drained on the floor. ENT called for airway eval given lack of cuff leak. Unable to obtain further history due to pts clinical condition.          PAST MEDICAL & SURGICAL HISTORY:    Allergies    No Known Allergies    Intolerances      MEDICATIONS  (STANDING):  chlorhexidine 0.12% Liquid 15 milliLiter(s) Oral Mucosa every 12 hours  chlorhexidine 4% Liquid 1 Application(s) Topical at bedtime  enoxaparin Injectable 40 milliGRAM(s) SubCutaneous <User Schedule>  fentaNYL   Infusion... 0.5 MICROgram(s)/kG/Hr (1.78 mL/Hr) IV Continuous <Continuous>  naloxegol 25 milliGRAM(s) Oral daily  pantoprazole  Injectable 40 milliGRAM(s) IV Push daily  polyethylene glycol 3350 17 Gram(s) Oral every 12 hours  propofol Infusion 5 MICROgram(s)/kG/Min (2.14 mL/Hr) IV Continuous <Continuous>  senna 2 Tablet(s) Oral at bedtime  theophylline Syrup 50 milliGRAM(s) Oral every 12 hours    MEDICATIONS  (PRN):  acetaminophen     Tablet .. 650 milliGRAM(s) Oral every 6 hours PRN Temp greater or equal to 38.5C (101.3F), Mild Pain (1 - 3)  oxyCODONE    IR 10 milliGRAM(s) Oral every 4 hours PRN Severe Pain (7 - 10)  oxyCODONE    IR 5 milliGRAM(s) Oral every 4 hours PRN Moderate Pain (4 - 6)      Social History: see consult    Family history: see consult    ROS:   Unable to obtain further history due to pts clinical condition.       Vital Signs Last 24 Hrs  T(C): 36.8 (02 Apr 2024 11:00), Max: 38 (01 Apr 2024 23:00)  T(F): 98.2 (02 Apr 2024 11:00), Max: 100.4 (01 Apr 2024 23:00)  HR: 46 (02 Apr 2024 12:00) (42 - 73)  BP: 132/65 (01 Apr 2024 15:08) (132/65 - 132/65)  BP(mean): 141 (01 Apr 2024 13:45) (141 - 141)  RR: 12 (02 Apr 2024 12:00) (12 - 32)  SpO2: 99% (02 Apr 2024 12:00) (91% - 100%)    Parameters below as of 02 Apr 2024 07:00  Patient On (Oxygen Delivery Method): ventilator                              9.3    11.85 )-----------( 193      ( 02 Apr 2024 00:14 )             28.8    04-02    142  |  105  |  19  ----------------------------<  129<H>  4.0   |  22  |  0.42<L>    Ca    8.7      02 Apr 2024 00:14  Phos  3.6     04-02  Mg     2.1     04-02     PT/INR - ( 31 Mar 2024 19:19 )   PT: 12.1 sec;   INR: 1.10 ratio         PTT - ( 31 Mar 2024 19:19 )  PTT:26.5 sec    PHYSICAL EXAM:  Gen: sedated   Skin: No rashes, bruises, or lesions  Head: Normocephalic, Atraumatic  Face: no edema, erythema, or fluctuance. Parotid glands soft without mass  Eyes: no scleral injection  Nose: Nares bilaterally patent, no discharge  Mouth: ETT in place. Mucosa moist, tongue/uvula midline, oropharynx clear  Neck: Flat, supple, no lymphadenopathy, trachea midline, no masses  Lymphatic: No lymphadenopathy  Resp: on vent   Neuro: unable to evaluate due to pts clinical condition     Fiberoptic Indirect laryngoscopy:  (Scope #2 used)  Reason for Laryngoscopy: airway eval     Patient was unable to cooperate with mirror.  Unable to identify most anatomy 2/2 ET tube, mild b/l aryepiglottic fold and false VC edema noted, pooling of secretions at ET tube. Cuff leak noted when balloon was deflated.       ENT ISSUE/POD: airway eval    HPI: 57 y/o F, on ASA81, transferred from [place] for SAH HH4 WNFS 5 mF4 ruptured R pcomm aneurysm. Was initially found unresponsive on 3/25/24 at 21:00, intubated at 23:00 at OSH, then transferred. Given ddavp at OSH. Here in ED, 1 unit PLTs given and R frontal EVD placed-high pressure. Went for R pcom clip on 3/26/24, postop evd not functioning then drained on the floor. ENT called for airway eval given lack of cuff leak. Unable to obtain further history due to pts clinical condition.          PAST MEDICAL & SURGICAL HISTORY:    Allergies    No Known Allergies    Intolerances      MEDICATIONS  (STANDING):  chlorhexidine 0.12% Liquid 15 milliLiter(s) Oral Mucosa every 12 hours  chlorhexidine 4% Liquid 1 Application(s) Topical at bedtime  enoxaparin Injectable 40 milliGRAM(s) SubCutaneous <User Schedule>  fentaNYL   Infusion... 0.5 MICROgram(s)/kG/Hr (1.78 mL/Hr) IV Continuous <Continuous>  naloxegol 25 milliGRAM(s) Oral daily  pantoprazole  Injectable 40 milliGRAM(s) IV Push daily  polyethylene glycol 3350 17 Gram(s) Oral every 12 hours  propofol Infusion 5 MICROgram(s)/kG/Min (2.14 mL/Hr) IV Continuous <Continuous>  senna 2 Tablet(s) Oral at bedtime  theophylline Syrup 50 milliGRAM(s) Oral every 12 hours    MEDICATIONS  (PRN):  acetaminophen     Tablet .. 650 milliGRAM(s) Oral every 6 hours PRN Temp greater or equal to 38.5C (101.3F), Mild Pain (1 - 3)  oxyCODONE    IR 10 milliGRAM(s) Oral every 4 hours PRN Severe Pain (7 - 10)  oxyCODONE    IR 5 milliGRAM(s) Oral every 4 hours PRN Moderate Pain (4 - 6)      Social History: see consult    Family history: see consult    ROS:   Unable to obtain further history due to pts clinical condition.       Vital Signs Last 24 Hrs  T(C): 36.8 (02 Apr 2024 11:00), Max: 38 (01 Apr 2024 23:00)  T(F): 98.2 (02 Apr 2024 11:00), Max: 100.4 (01 Apr 2024 23:00)  HR: 46 (02 Apr 2024 12:00) (42 - 73)  BP: 132/65 (01 Apr 2024 15:08) (132/65 - 132/65)  BP(mean): 141 (01 Apr 2024 13:45) (141 - 141)  RR: 12 (02 Apr 2024 12:00) (12 - 32)  SpO2: 99% (02 Apr 2024 12:00) (91% - 100%)    Parameters below as of 02 Apr 2024 07:00  Patient On (Oxygen Delivery Method): ventilator                              9.3    11.85 )-----------( 193      ( 02 Apr 2024 00:14 )             28.8    04-02    142  |  105  |  19  ----------------------------<  129<H>  4.0   |  22  |  0.42<L>    Ca    8.7      02 Apr 2024 00:14  Phos  3.6     04-02  Mg     2.1     04-02     PT/INR - ( 31 Mar 2024 19:19 )   PT: 12.1 sec;   INR: 1.10 ratio         PTT - ( 31 Mar 2024 19:19 )  PTT:26.5 sec    PHYSICAL EXAM:  Gen: sedated   Skin: No rashes, bruises, or lesions  Head: Normocephalic, Atraumatic  Face: no edema, erythema, or fluctuance. Parotid glands soft without mass  Eyes: no scleral injection  Nose: Nares bilaterally patent, no discharge  Mouth: ETT in place. Mucosa moist, tongue/uvula midline, oropharynx clear  Neck: Flat, supple, no lymphadenopathy, trachea midline, no masses  Lymphatic: No lymphadenopathy  Resp: on vent   Neuro: unable to evaluate due to pts clinical condition       Fiberoptic Indirect laryngoscopy:  (Scope #2 used)  Reason for Laryngoscopy: airway eval     Patient was unable to cooperate with mirror.  Unable to identify most anatomy 2/2 ET tube, mild b/l aryepiglottic fold and false VC edema noted, pooling of secretions at ET tube. Cuff leak noted when balloon was deflated.       ENT ISSUE/POD: airway eval    HPI: 57 y/o F, on ASA81, transferred from [place] for SAH HH4 WNFS 5 mF4 ruptured R pcomm aneurysm. Was initially found unresponsive on 3/25/24 at 21:00, intubated at 23:00 at OSH, then transferred. Given ddavp at OSH. Here in ED, 1 unit PLTs given and R frontal EVD placed-high pressure. Went for R pcom clip on 3/26/24, postop evd not functioning then drained on the floor. ENT called for airway eval given lack of cuff leak. Unable to obtain further history due to pts clinical condition.          PAST MEDICAL & SURGICAL HISTORY:    Allergies    No Known Allergies    Intolerances      MEDICATIONS  (STANDING):  chlorhexidine 0.12% Liquid 15 milliLiter(s) Oral Mucosa every 12 hours  chlorhexidine 4% Liquid 1 Application(s) Topical at bedtime  enoxaparin Injectable 40 milliGRAM(s) SubCutaneous <User Schedule>  fentaNYL   Infusion... 0.5 MICROgram(s)/kG/Hr (1.78 mL/Hr) IV Continuous <Continuous>  naloxegol 25 milliGRAM(s) Oral daily  pantoprazole  Injectable 40 milliGRAM(s) IV Push daily  polyethylene glycol 3350 17 Gram(s) Oral every 12 hours  propofol Infusion 5 MICROgram(s)/kG/Min (2.14 mL/Hr) IV Continuous <Continuous>  senna 2 Tablet(s) Oral at bedtime  theophylline Syrup 50 milliGRAM(s) Oral every 12 hours    MEDICATIONS  (PRN):  acetaminophen     Tablet .. 650 milliGRAM(s) Oral every 6 hours PRN Temp greater or equal to 38.5C (101.3F), Mild Pain (1 - 3)  oxyCODONE    IR 10 milliGRAM(s) Oral every 4 hours PRN Severe Pain (7 - 10)  oxyCODONE    IR 5 milliGRAM(s) Oral every 4 hours PRN Moderate Pain (4 - 6)      Social History: see consult    Family history: see consult    ROS:   Unable to obtain further history due to pts clinical condition.       Vital Signs Last 24 Hrs  T(C): 36.8 (02 Apr 2024 11:00), Max: 38 (01 Apr 2024 23:00)  T(F): 98.2 (02 Apr 2024 11:00), Max: 100.4 (01 Apr 2024 23:00)  HR: 46 (02 Apr 2024 12:00) (42 - 73)  BP: 132/65 (01 Apr 2024 15:08) (132/65 - 132/65)  BP(mean): 141 (01 Apr 2024 13:45) (141 - 141)  RR: 12 (02 Apr 2024 12:00) (12 - 32)  SpO2: 99% (02 Apr 2024 12:00) (91% - 100%)    Parameters below as of 02 Apr 2024 07:00  Patient On (Oxygen Delivery Method): ventilator                              9.3    11.85 )-----------( 193      ( 02 Apr 2024 00:14 )             28.8    04-02    142  |  105  |  19  ----------------------------<  129<H>  4.0   |  22  |  0.42<L>    Ca    8.7      02 Apr 2024 00:14  Phos  3.6     04-02  Mg     2.1     04-02     PT/INR - ( 31 Mar 2024 19:19 )   PT: 12.1 sec;   INR: 1.10 ratio         PTT - ( 31 Mar 2024 19:19 )  PTT:26.5 sec    PHYSICAL EXAM:  Gen: sedated   Skin: No rashes, bruises, or lesions  Head: Normocephalic, Atraumatic  Face: no edema, erythema, or fluctuance. Parotid glands soft without mass  Eyes: no scleral injection  Nose: Nares bilaterally patent, no discharge  Mouth: ETT in place. Mucosa moist, tongue/uvula midline, oropharynx clear  Neck: Flat, supple, no lymphadenopathy, trachea midline, no masses  Lymphatic: No lymphadenopathy  Resp: on vent   Neuro: unable to evaluate due to pts clinical condition       Fiberoptic Indirect laryngoscopy:  (Scope #2 used)  Reason for Laryngoscopy: airway eval     Patient was unable to cooperate with mirror.  Unable to identify most anatomy 2/2 ET tube, mild b/l aryepiglottic fold and false VC edema noted improved from yesterday, pooling of secretions at ET tube. Cuff leak noted when balloon was deflated.

## 2024-04-02 NOTE — PROGRESS NOTE ADULT - NS ATTEND AMEND GEN_ALL_CORE FT
discrete improvement in scope from yesterday,  cuff leak present today   cleared for extubation from airway standpoint, defer to team when cleared from pulmonary standpoint   call with any  questions or concerns, should be reintubatable in the event she fails extubation discrete improvement in scope from yesterday,  cuff leak present today   cleared for extubation from airway standpoint, defer to team when cleared from pulmonary standpoint   call with any  questions or concerns, should be reintubatable in the event she fails extubation, but would recommend with smaller tube as 7.5 appears quite large for her and may have been the initial source of the edema.

## 2024-04-02 NOTE — PROGRESS NOTE ADULT - SUBJECTIVE AND OBJECTIVE BOX
Vascular Cardiology Consult Note     DIRECT PROVIDER NUMBER: [phone]  / Available on TEAMS    CC:  SAH    Interval Events:  No LE edema.   L UE venous duplex yesterday showed no DVT.  S/p cerebral angiogram yesterday.    Allergies  No Known Allergies  	  MEDICATIONS  (STANDING):  chlorhexidine 0.12% Liquid 15 milliLiter(s) Oral Mucosa every 12 hours  chlorhexidine 4% Liquid 1 Application(s) Topical at bedtime  enoxaparin Injectable 40 milliGRAM(s) SubCutaneous <User Schedule>  fentaNYL   Infusion... 0.5 MICROgram(s)/kG/Hr (1.78 mL/Hr) IV Continuous <Continuous>  magnesium citrate Oral Solution 296 milliLiter(s) Oral once  naloxegol 25 milliGRAM(s) Oral daily  pantoprazole  Injectable 40 milliGRAM(s) IV Push daily  polyethylene glycol 3350 17 Gram(s) Oral every 12 hours  propofol Infusion 5 MICROgram(s)/kG/Min (2.14 mL/Hr) IV Continuous <Continuous>  senna 2 Tablet(s) Oral at bedtime  theophylline Syrup 50 milliGRAM(s) Oral every 12 hours    PAST MEDICAL & SURGICAL HISTORY: see HPI    FAMILY HISTORY: see HPI    SOCIAL HISTORY:  unchanged    REVIEW OF SYSTEMS:  [X] Unable to obtain    PHYSICAL EXAM:  Vital Signs Last 24 Hrs  T(C): 36.7 (02 Apr 2024 07:00), Max: 38 (01 Apr 2024 23:00)  T(F): 98.1 (02 Apr 2024 07:00), Max: 100.4 (01 Apr 2024 23:00)  HR: 45 (02 Apr 2024 10:00) (42 - 73)  BP: 132/65 (01 Apr 2024 15:08) (132/65 - 132/65)  BP(mean): 141 (01 Apr 2024 13:45) (141 - 141)  RR: 21 (02 Apr 2024 10:00) (12 - 32)  SpO2: 100% (02 Apr 2024 10:00) (91% - 100%)    Parameters below as of 02 Apr 2024 07:00  Patient On (Oxygen Delivery Method): ventilator        Appearance: Intubated  	  Cardiovascular: Bradycardia, S1 and S2  Respiratory: Mechanical BS  Gastrointestinal:  Soft, Non-tender, + BS	  Skin: No cyanosis	  Extremities: No LE edema, bilateral calves soft  Vascular Pulse Exam: Palpable DP / PT bilaterally  Foot Exam: No foot wounds    LABS:	 	                        9.3    11.85 )-----------( 193      ( 02 Apr 2024 00:14 )             28.8     04-02    142  |  105  |  19  ----------------------------<  129<H>  4.0   |  22  |  0.42<L>    Ca    8.7      02 Apr 2024 00:14  Phos  3.6     04-02  Mg     2.1     04-02      PT/INR - ( 31 Mar 2024 19:19 )   PT: 12.1 sec;   INR: 1.10 ratio         PTT - ( 31 Mar 2024 19:19 )  PTT:26.5 sec      Urinalysis Basic - ( 02 Apr 2024 00:14 )    Color: x / Appearance: x / SG: x / pH: x  Gluc: 129 mg/dL / Ketone: x  / Bili: x / Urobili: x   Blood: x / Protein: x / Nitrite: x   Leuk Esterase: x / RBC: x / WBC x   Sq Epi: x / Non Sq Epi: x / Bacteria: x        Assessment:  1. L Soleal Below the Knee DVT      Normal RV size and function   2. 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 Reduced EF, Improved on Last TTE  4. Bradycardia     Plan:  1. Recommend to continue Lovenox 40 mg daily for below the knee DVT in setting of recent SAH and EVD / aneurysm clipping / cerebral angiogram.  2. Repeat surveillance LE venous duplex on 4/2.  3. Pneumatic compression to right lower extremity.   4. Appreciate General Cardiology.  5. Appreciate excellent Neurosurgical Care.        Thank you  PEDRO Cm, MS, [facility]  Vascular Cardiology Service    Please call with any questions:   DIRECT SERVICE NUMBER: [phone] / Available on TEAMS

## 2024-04-02 NOTE — PROGRESS NOTE ADULT - ASSESSMENT
58F w/ SAH, ruptured R pcomm aneurysm, currently intubated. ENT called for airway eval given lack of cuff leak.  58F w/ SAH, ruptured R pcomm aneurysm, currently intubated. ENT called for airway eval given lack of cuff leak. Physical exam and indirect fiberoptic laryngoscopy showed mild b/l aryepiglottic fold and false VC edema noted, pooling of secretions at ET tube. Cuff leak noted when balloon was deflated. 58F w/ SAH, ruptured R pcomm aneurysm, currently intubated. ENT called for airway eval given lack of cuff leak. Physical exam and indirect fiberoptic laryngoscopy showed mild b/l aryepiglottic fold and false VC edema noted, pooling of secretions at ET tube. Cuff leak noted when balloon was deflated.

## 2024-04-02 NOTE — PROGRESS NOTE ADULT - SUBJECTIVE AND OBJECTIVE BOX
Subjective: Patient seen and examined. No new events except as noted.   remains in NSCU   Sinus priti 40s     REVIEW OF SYSTEMS:  Unable to obtain   MEDICATIONS:  MEDICATIONS  (STANDING):  chlorhexidine 0.12% Liquid 15 milliLiter(s) Oral Mucosa every 12 hours  chlorhexidine 4% Liquid 1 Application(s) Topical at bedtime  enoxaparin Injectable 40 milliGRAM(s) SubCutaneous <User Schedule>  fentaNYL   Infusion... 0.5 MICROgram(s)/kG/Hr (1.78 mL/Hr) IV Continuous <Continuous>  lactated ringers. 1000 milliLiter(s) (50 mL/Hr) IV Continuous <Continuous>  naloxegol 25 milliGRAM(s) Oral daily  pantoprazole  Injectable 40 milliGRAM(s) IV Push daily  polyethylene glycol 3350 17 Gram(s) Oral every 12 hours  propofol Infusion 5 MICROgram(s)/kG/Min (2.14 mL/Hr) IV Continuous <Continuous>  senna 2 Tablet(s) Oral at bedtime  theophylline Syrup 50 milliGRAM(s) Oral every 12 hours      PHYSICAL EXAM:  T(C): 36.7 (04-02-24 @ 07:00), Max: 38 (04-01-24 @ 23:00)  HR: 46 (04-02-24 @ 08:00) (42 - 73)  BP: 132/65 (04-01-24 @ 15:08) (132/65 - 132/65)  RR: 20 (04-02-24 @ 08:00) (12 - 32)  SpO2: 99% (04-02-24 @ 08:00) (91% - 100%)  Wt(kg): --  I&O's Summary    01 Apr 2024 07:01  -  02 Apr 2024 07:00  --------------------------------------------------------  IN: 2311.7 mL / OUT: 1538 mL / NET: 773.7 mL      Height (cm): 157.5 (04-01 @ 15:08)  Weight (kg): 71.2 (04-01 @ 15:08)  BMI (kg/m2): 28.7 (04-01 @ 15:08)  BSA (m2): 1.72 (04-01 @ 15:08)        Appearance: Intubated   HEENT:  dry oral mucosa, PERRL, EOMI	  Lymphatic: No lymphadenopathy  Cardiovascular: Normal S1 S2, No JVD, No murmurs, No edema  Respiratory: ventilated   Psychiatry: A & O x 0  Gastrointestinal:  Soft, Non-tender, + BS	  Skin: No rashes, No ecchymoses, No cyanosis	  Neurologic: perrl, BUE localized, LE wd bilateral   Extremities: Normal range of motion, No clubbing, cyanosis or edema  Vascular: Peripheral pulses palpable 2+ bilaterally          LABS:    CARDIAC MARKERS:                                9.3    11.85 )-----------( 193      ( 02 Apr 2024 00:14 )             28.8     04-02    142  |  105  |  19  ----------------------------<  129<H>  4.0   |  22  |  0.42<L>    Ca    8.7      02 Apr 2024 00:14  Phos  3.6     04-02  Mg     2.1     04-02            TELEMETRY: SR SB 40s  	    ECG:  	  RADIOLOGY:   DIAGNOSTIC TESTING:  [ ] Echocardiogram:  [ ]  Catheterization:  [ ] Stress Test:    OTHER:

## 2024-04-02 NOTE — PROGRESS NOTE ADULT - ASSESSMENT
58F, on ASA81 for pain relief? 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 Was initially found unresponsive at 21:00, intubated at 23:00 at OSH, then txfrd. Given ddavp at OSH. On arrival patient w/ PERRL, +cough/gag, 2/5 spontaneous movement in LUE, o/w no movement. 1 unit PLTs given and R frontal EVD placed-high pressure. PLTs/Coags wnl.     Intubated sedated in NSCU   Echo with abnormal findings of severe cardiomyopathy and RWMA

## 2024-04-03 LAB
ANION GAP SERPL CALC-SCNC: 14 MMOL/L — SIGNIFICANT CHANGE UP (ref 5–17)
ANION GAP SERPL CALC-SCNC: 9 MMOL/L — SIGNIFICANT CHANGE UP (ref 5–17)
BUN SERPL-MCNC: 17 MG/DL — SIGNIFICANT CHANGE UP (ref 7–23)
BUN SERPL-MCNC: 25 MG/DL — HIGH (ref 7–23)
CALCIUM SERPL-MCNC: 8.4 MG/DL — SIGNIFICANT CHANGE UP (ref 8.4–10.5)
CALCIUM SERPL-MCNC: 8.5 MG/DL — SIGNIFICANT CHANGE UP (ref 8.4–10.5)
CHLORIDE SERPL-SCNC: 102 MMOL/L — SIGNIFICANT CHANGE UP (ref 96–108)
CHLORIDE SERPL-SCNC: 105 MMOL/L — SIGNIFICANT CHANGE UP (ref 96–108)
CO2 SERPL-SCNC: 26 MMOL/L — SIGNIFICANT CHANGE UP (ref 22–31)
CO2 SERPL-SCNC: 29 MMOL/L — SIGNIFICANT CHANGE UP (ref 22–31)
CREAT SERPL-MCNC: 0.46 MG/DL — LOW (ref 0.5–1.3)
CREAT SERPL-MCNC: 0.5 MG/DL — SIGNIFICANT CHANGE UP (ref 0.5–1.3)
CULTURE RESULTS: SIGNIFICANT CHANGE UP
CULTURE RESULTS: SIGNIFICANT CHANGE UP
EGFR: 109 ML/MIN/1.73M2 — SIGNIFICANT CHANGE UP
EGFR: 112 ML/MIN/1.73M2 — SIGNIFICANT CHANGE UP
GLUCOSE SERPL-MCNC: 103 MG/DL — HIGH (ref 70–99)
GLUCOSE SERPL-MCNC: 93 MG/DL — SIGNIFICANT CHANGE UP (ref 70–99)
HCT VFR BLD CALC: 31.1 % — LOW (ref 34.5–45)
HGB BLD-MCNC: 10 G/DL — LOW (ref 11.5–15.5)
MAGNESIUM SERPL-MCNC: 2.2 MG/DL — SIGNIFICANT CHANGE UP (ref 1.6–2.6)
MAGNESIUM SERPL-MCNC: 2.4 MG/DL — SIGNIFICANT CHANGE UP (ref 1.6–2.6)
MCHC RBC-ENTMCNC: 28.8 PG — SIGNIFICANT CHANGE UP (ref 27–34)
MCHC RBC-ENTMCNC: 32.2 GM/DL — SIGNIFICANT CHANGE UP (ref 32–36)
MCV RBC AUTO: 89.6 FL — SIGNIFICANT CHANGE UP (ref 80–100)
NRBC # BLD: 0 /100 WBCS — SIGNIFICANT CHANGE UP (ref 0–0)
PHOSPHATE SERPL-MCNC: 2.6 MG/DL — SIGNIFICANT CHANGE UP (ref 2.5–4.5)
PHOSPHATE SERPL-MCNC: 2.7 MG/DL — SIGNIFICANT CHANGE UP (ref 2.5–4.5)
PLATELET # BLD AUTO: 213 K/UL — SIGNIFICANT CHANGE UP (ref 150–400)
POTASSIUM SERPL-MCNC: 3.4 MMOL/L — LOW (ref 3.5–5.3)
POTASSIUM SERPL-MCNC: 3.5 MMOL/L — SIGNIFICANT CHANGE UP (ref 3.5–5.3)
POTASSIUM SERPL-SCNC: 3.4 MMOL/L — LOW (ref 3.5–5.3)
POTASSIUM SERPL-SCNC: 3.5 MMOL/L — SIGNIFICANT CHANGE UP (ref 3.5–5.3)
RBC # BLD: 3.47 M/UL — LOW (ref 3.8–5.2)
RBC # FLD: 13 % — SIGNIFICANT CHANGE UP (ref 10.3–14.5)
SODIUM SERPL-SCNC: 140 MMOL/L — SIGNIFICANT CHANGE UP (ref 135–145)
SODIUM SERPL-SCNC: 145 MMOL/L — SIGNIFICANT CHANGE UP (ref 135–145)
SPECIMEN SOURCE: SIGNIFICANT CHANGE UP
SPECIMEN SOURCE: SIGNIFICANT CHANGE UP
WBC # BLD: 10.3 K/UL — SIGNIFICANT CHANGE UP (ref 3.8–10.5)
WBC # FLD AUTO: 10.3 K/UL — SIGNIFICANT CHANGE UP (ref 3.8–10.5)

## 2024-04-03 PROCEDURE — 71045 X-RAY EXAM CHEST 1 VIEW: CPT | Mod: 26

## 2024-04-03 PROCEDURE — 99291 CRITICAL CARE FIRST HOUR: CPT

## 2024-04-03 PROCEDURE — 93970 EXTREMITY STUDY: CPT | Mod: 26

## 2024-04-03 PROCEDURE — 93971 EXTREMITY STUDY: CPT | Mod: 26,RT,59

## 2024-04-03 RX ORDER — EPINEPHRINE 11.25MG/ML
0.5 SOLUTION, NON-ORAL INHALATION ONCE
Refills: 0 | Status: COMPLETED | OUTPATIENT
Start: 2024-04-03 | End: 2024-04-03

## 2024-04-03 RX ORDER — QUETIAPINE FUMARATE 200 MG/1
12.5 TABLET, FILM COATED ORAL EVERY 12 HOURS
Refills: 0 | Status: DISCONTINUED | OUTPATIENT
Start: 2024-04-03 | End: 2024-04-04

## 2024-04-03 RX ORDER — POTASSIUM CHLORIDE 20 MEQ
40 PACKET (EA) ORAL EVERY 4 HOURS
Refills: 0 | Status: COMPLETED | OUTPATIENT
Start: 2024-04-03 | End: 2024-04-04

## 2024-04-03 RX ORDER — SODIUM,POTASSIUM PHOSPHATES 278-250MG
1 POWDER IN PACKET (EA) ORAL ONCE
Refills: 0 | Status: COMPLETED | OUTPATIENT
Start: 2024-04-03 | End: 2024-04-03

## 2024-04-03 RX ADMIN — QUETIAPINE FUMARATE 12.5 MILLIGRAM(S): 200 TABLET, FILM COATED ORAL at 15:54

## 2024-04-03 RX ADMIN — CHLORHEXIDINE GLUCONATE 1 APPLICATION(S): 213 SOLUTION TOPICAL at 21:00

## 2024-04-03 RX ADMIN — POLYETHYLENE GLYCOL 3350 17 GRAM(S): 17 POWDER, FOR SOLUTION ORAL at 17:08

## 2024-04-03 RX ADMIN — Medication 40 MILLIEQUIVALENT(S): at 21:09

## 2024-04-03 RX ADMIN — ENOXAPARIN SODIUM 40 MILLIGRAM(S): 100 INJECTION SUBCUTANEOUS at 17:05

## 2024-04-03 RX ADMIN — SENNA PLUS 2 TABLET(S): 8.6 TABLET ORAL at 21:09

## 2024-04-03 RX ADMIN — Medication 1 PACKET(S): at 18:27

## 2024-04-03 RX ADMIN — POLYETHYLENE GLYCOL 3350 17 GRAM(S): 17 POWDER, FOR SOLUTION ORAL at 05:59

## 2024-04-03 RX ADMIN — NALOXEGOL OXALATE 25 MILLIGRAM(S): 12.5 TABLET, FILM COATED ORAL at 11:06

## 2024-04-03 RX ADMIN — Medication 650 MILLIGRAM(S): at 09:49

## 2024-04-03 RX ADMIN — PANTOPRAZOLE SODIUM 40 MILLIGRAM(S): 20 TABLET, DELAYED RELEASE ORAL at 11:06

## 2024-04-03 RX ADMIN — Medication 650 MILLIGRAM(S): at 10:39

## 2024-04-03 RX ADMIN — Medication 50 MILLIGRAM(S): at 06:00

## 2024-04-03 RX ADMIN — Medication 0.5 MILLILITER(S): at 13:14

## 2024-04-03 RX ADMIN — FENTANYL CITRATE 1.78 MICROGRAM(S)/KG/HR: 50 INJECTION INTRAVENOUS at 07:23

## 2024-04-03 RX ADMIN — CHLORHEXIDINE GLUCONATE 15 MILLILITER(S): 213 SOLUTION TOPICAL at 05:59

## 2024-04-03 RX ADMIN — Medication 50 MILLIGRAM(S): at 17:05

## 2024-04-03 RX ADMIN — PROPOFOL 2.14 MICROGRAM(S)/KG/MIN: 10 INJECTION, EMULSION INTRAVENOUS at 07:17

## 2024-04-03 NOTE — PROGRESS NOTE ADULT - ASSESSMENT
58F, on ASA81 for pain relief? 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 Was initially found unresponsive at 21:00, intubated at 23:00 at OSH, then txfrd. Given ddavp at OSH. On arrival patient w/ PERRL, +cough/gag, 2/5 spontaneous movement in LUE, o/w no movement. 1 unit PLTs given and R frontal EVD placed-high pressure. PLTs/Coags wnl.     Intubated sedated in NSCU   Echo with abnormal findings of severe cardiomyopathy and RWMA

## 2024-04-03 NOTE — PROGRESS NOTE ADULT - SUBJECTIVE AND OBJECTIVE BOX
HPI   58F, on ASA81, transferred from [place] for SAH HH4 WNFS 5 mF4 ruptured R pcomm aneurysm. Was initially found unresponsive on 3/25/24 at 21:00, intubated at 23:00 at OSH, then txfrd. Given ddavp at OSH. Here in ED 1 unit PLTs given and R frontal EVD placed-high pressure. Went for R pcom clip on 3/26/24, postop evd not functioning then drained on the floor.     Admission scores:   SAH R pcomm ruptured @ HH4 WNFS 5 mF4, GCS <7    24h events   PS 5/5     Exam   intubated, eyes opens to verbal stimulation briefly, gaze midline,peerl, RUE localized and AG follows simple commands, LUE plegic, RLE AG, moves toes     LED 3/28   IMPRESSION:  Acute left lower extremity DVT below the knee, involving the left soleal   vein. Edema of the superficial soft tissues of the left calf. Correlate   clinically.  No acute DVT of the right lower extremity.    EEG 3/28/24  EEG SUMMARY/CLASSIFICATION    Abnormal EEG  - Independent bilateral frontal sharp-wave discharges  - GPDs with triphasic morphology.   - Bifrontal slowing, left greater than right.  - Moderate generalized slowing.    _____________________________________________________________  EEG IMPRESSION/CLINICAL CORRELATE    Abnormal EEG study.  Potential independent epileptogenic foci in the bilateral frontal regions.  Structural or functional abnormality in the bilateral frontal head regions, left greater than right.  Moderate nonspecific diffuse or multifocal cerebral dysfunction.     TTE 3/26/24   CONCLUSIONS:   1. Left ventricular systolic function is moderately to severely decreased with an ejection fraction of 36 % by Mcgee's method of disks. Regional wall motion abnormalities consistent with ischemic heart disease.   2. Multiple segmental abnormalities exist. See findings.   3. There is moderate (grade 2) left ventricular diastolic dysfunction, with elevated filling pressure.   4. Normal right ventricular cavity size, with normal wall thickness, and normal systolic function. Tricuspid annular plane systolic excursion (TAPSE) is 1.7 cm (normal >=1.7 cm).   5. Normal left and right atrial size.   6. No significant valvular disease.   7. Estimated pulmonary artery systolic pressure is 41 mmHg.   8. The inferior vena cava is normal in size measuring 1.98 cm in diameter, (normal <2.1cm) with abnormal inspiratory collapse (abnormal <50%) consistent with mildly elevated right atrial pressure (~8, range 5-10mmHg).   9. No pericardial effusion seen.  10. No prior echocardiogram is available for comparison.  11. There is no evidence of a left ventricular thrombus.  12. There is increased LV mass and concentric hypertrophy.    VITALS:   Vital Signs Last 24 Hrs  T(C): 37.1 (03 Apr 2024 07:00), Max: 37.1 (03 Apr 2024 07:00)  T(F): 98.7 (03 Apr 2024 07:00), Max: 98.7 (03 Apr 2024 07:00)  HR: 55 (03 Apr 2024 09:00) (45 - 67)  BP: 168/70 (03 Apr 2024 09:00) (106/55 - 193/81)  BP(mean): 100 (03 Apr 2024 09:00) (79 - 116)  RR: 12 (03 Apr 2024 09:00) (12 - 26)  SpO2: 100% (03 Apr 2024 09:00) (99% - 100%)    Parameters below as of 03 Apr 2024 07:00  Patient On (Oxygen Delivery Method): ventilator      CAPILLARY BLOOD GLUCOSE        I&O's Summary    02 Apr 2024 07:01  -  03 Apr 2024 07:00  --------------------------------------------------------  IN: 1299.6 mL / OUT: 829 mL / NET: 470.6 mL    03 Apr 2024 07:01  -  03 Apr 2024 10:00  --------------------------------------------------------  IN: 15.8 mL / OUT: 8 mL / NET: 7.8 mL        Respiratory:  Mode: CPAP with PS  FiO2: 40  PEEP: 5  PS: 5  MAP: 6  PIP: 10        LABS:                        10.0   10.30 )-----------( 213      ( 03 Apr 2024 09:15 )             31.1     04-03    145  |  105  |  25<H>  ----------------------------<  93  3.5   |  26  |  0.46<L>        MEDICATION LEVELS:     IVF FLUIDS/MEDICATIONS:   MEDICATIONS  (STANDING):  chlorhexidine 0.12% Liquid 15 milliLiter(s) Oral Mucosa every 12 hours  chlorhexidine 4% Liquid 1 Application(s) Topical at bedtime  enoxaparin Injectable 40 milliGRAM(s) SubCutaneous <User Schedule>  fentaNYL   Infusion... 0.5 MICROgram(s)/kG/Hr (1.78 mL/Hr) IV Continuous <Continuous>  naloxegol 25 milliGRAM(s) Oral daily  pantoprazole  Injectable 40 milliGRAM(s) IV Push daily  polyethylene glycol 3350 17 Gram(s) Oral every 12 hours  propofol Infusion 5 MICROgram(s)/kG/Min (2.14 mL/Hr) IV Continuous <Continuous>  senna 2 Tablet(s) Oral at bedtime  theophylline Syrup 50 milliGRAM(s) Oral every 12 hours    MEDICATIONS  (PRN):  acetaminophen     Tablet .. 650 milliGRAM(s) Oral every 6 hours PRN Temp greater or equal to 38.5C (101.3F), Mild Pain (1 - 3)  oxyCODONE    IR 5 milliGRAM(s) Oral every 4 hours PRN Moderate Pain (4 - 6)  oxyCODONE    IR 10 milliGRAM(s) Oral every 4 hours PRN Severe Pain (7 - 10)

## 2024-04-03 NOTE — PROGRESS NOTE ADULT - SUBJECTIVE AND OBJECTIVE BOX
Patient seen and examined at bedside.    --Anticoagulation--  enoxaparin Injectable 40 milliGRAM(s) SubCutaneous <User Schedule>    T(C): 37.6 (03-31-24 @ 03:00), Max: 38.3 (03-30-24 @ 17:00)  HR: 45 (03-31-24 @ 04:00) (41 - 75)  BP: --  RR: 23 (03-31-24 @ 04:00) (15 - 30)  SpO2: 100% (03-31-24 @ 04:00) (94% - 100%)  Wt(kg): --    Exam: Intubated, EOV, right periorbital edema improving, PERRL, +cough/gag/OB, FC on R, R side AG, LUE LOC, LLE wds

## 2024-04-03 NOTE — PROGRESS NOTE ADULT - ASSESSMENT
DeliaJie  58F, on ASA81 for pain relief? 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 Was initially found unresponsive at 21:00, intubated at 23:00 at OSH, then txfrd. Given ddavp at OSH. On arrival patient w/ PERRL, +cough/gag, 2/5 spontaneous movement in LUE, o/w no movement. 1 unit PLTs given and R frontal EVD placed-high pressure. PLTs/Coags wnl. Exam improved: intubated, PERRL, BUE localize, b/l wd       CPAP as gurdeep  EVD@15 further challenge in AM  TCDs/SAH protocol  ENT fiberoptic scope in AM

## 2024-04-03 NOTE — PHYSICAL THERAPY INITIAL EVALUATION ADULT - PERTINENT HX OF CURRENT PROBLEM, REHAB EVAL
58F, on ASA81 for pain relief? (fell a month ago), presents as txfr from [place] for diffuse SAH w/small IVH, 2/2 ruptured posterolaterally projecting 2.9x2.4x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 Was initially found unresponsive at 21:00, intubated at 23:00 at OSH, then txfrd. Given ddavp at OSH. On arrival patient w/ PERRL, +cough/gag, 2/5 spontaneous movement in LUE, o/w no movement. 1 unit PLTs given and R frontal EVD placed-high pressure. PLTs/Coags wnl.  CT Head 3/26: Interval placement of a right frontal approach ventriculostomy catheter without significant change in size or configuration of the ventricular system. Similar-appearing diffuse subarachnoid hemorrhage, as discussed.  CT Angio HEAD:Extensive subarachnoid hemorrhage throughout the basilar cisterns, bilateral sylvian fissures, and along the bilateral medial frontal lobes, right greater than left. Prominent temporal horns of the lateral ventricles. CTA HEAD:Irregular posterolaterally projecting 2.9 x 2.4 x 2.6 mm right supraclinoid ICA aneurysm, likely ruptured. No additional aneurysms identified. No vessel occlusion, proximal stenosis or evidence for vasospasm at this time. CTA NECK:No evidence of significant stenosis or occlusion. No evidence of dissection. 2.5 cm heterogeneous left thyroid nodule. This can be followed up with nonemergent thyroid ultrasound for further characterization.

## 2024-04-03 NOTE — PROGRESS NOTE ADULT - ASSESSMENT
[facility]   Post-SAH day 3, SAH 3/25/2024  3/26/24 evd placed  3/26/24 pcomm clip    N   # @ Pcomm ruptured, s/p clip: CTH/CTA   #EVD tract acute hemorrhage   #Hydrocephalus/ICP crisis: R EVD in place 15cm H2O 79 last 24h   #Delayed Cerebral Ischemia Management: TCDs with poor temporal windows, follow up occipital windows low vertebral and basilar MFV, euvolemia, nimodipine/ net neg give a bolus 500cc NS  #04/01/24 angio no spasme  #Pain: PRN tylenol oxi fentanyl   # prop   Activity: [] OOB as tolerated [x] Bedrest [] PT [] OT [] PMNR    CV   SBP goal liberalize 100-200  #R heart strain: POCUS with RV strain and non collapsable IVC, dysmotility of LV   TTE decreased systolic cardiac function EF 36% with decreased diastolic function -rTTE EF 55%   #bradycardia theophyllin     P   #respiratory failure  intubated PS   oral secretions, no in line   #laryngeal edema dexamethasone - improved per ENT     R   I&o    GI   #REE: indirect calorimetry daily  Diet: npo   PPI   Senna miralax  BM 3/30    E   Goal euglycemia (-180)  A1c 5.5     H   SCD   Chemoppx   LED R below knee dvt     ID   afebrile    lines  R evd  L axillary a line  ETT   pivs  ngtube     CODE STATUS: FULL CODE    DISPOSITION: ICU     CRITICAL

## 2024-04-03 NOTE — PROGRESS NOTE ADULT - ATTENDING COMMENTS
58-year-old woman post-bleed day 6 from HH4mF4 subarachnoid hemorrhage status post right craniotomy for clipping of p-comm aneurysm on 3/26/24. Course notable for airway edema status post steroids.     ENT re-scoped 4/2/24 - now with cuff leak.   Extubated with post-extubation stridor status post racemic epi. Moving air well.     Agitated, but follows commands briskly on the right in preferred language of [place].     TCDs  EVD in place  Theophylline for bradycardia  Nimodipine held due to bradycardia  Monitor for need for re-intubation - tolerating for now  Aggressive bowel regimen - had BM after enema today  Agitation: unable to use Precedex due to braycardia; trial of low dose quetiapine; check Qtc    At risk of death/neuro decline due to: delayed cerebral ischemia, acute resp distress/airway edema, hydrocephalus     Updated son at bedside

## 2024-04-03 NOTE — PROGRESS NOTE ADULT - SUBJECTIVE AND OBJECTIVE BOX
Subjective: Patient seen and examined. No new events except as noted.   [facility]       REVIEW OF SYSTEMS:    Unable to obtain         MEDICATIONS:  MEDICATIONS  (STANDING):  chlorhexidine 4% Liquid 1 Application(s) Topical at bedtime  enoxaparin Injectable 40 milliGRAM(s) SubCutaneous <User Schedule>  naloxegol 25 milliGRAM(s) Oral daily  pantoprazole  Injectable 40 milliGRAM(s) IV Push daily  polyethylene glycol 3350 17 Gram(s) Oral every 12 hours  potassium chloride   Solution 40 milliEquivalent(s) Oral every 4 hours  QUEtiapine 12.5 milliGRAM(s) Oral every 12 hours  senna 2 Tablet(s) Oral at bedtime  theophylline Syrup 50 milliGRAM(s) Oral every 12 hours      PHYSICAL EXAM:  T(C): 37.2 (04-03-24 @ 11:00), Max: 37.2 (04-03-24 @ 11:00)  HR: 65 (04-03-24 @ 17:00) (52 - 73)  BP: 169/72 (04-03-24 @ 17:00) (106/55 - 195/79)  RR: 16 (04-03-24 @ 17:00) (12 - 44)  SpO2: 100% (04-03-24 @ 17:00) (99% - 100%)  Wt(kg): --  I&O's Summary    02 Apr 2024 07:01  -  03 Apr 2024 07:00  --------------------------------------------------------  IN: 1299.6 mL / OUT: 829 mL / NET: 470.6 mL    03 Apr 2024 07:01  -  03 Apr 2024 18:33  --------------------------------------------------------  IN: 227.4 mL / OUT: 392 mL / NET: -164.6 mL            Appearance: Intubated   HEENT:  dry oral mucosa, PERRL, EOMI	  Lymphatic: No lymphadenopathy  Cardiovascular: Normal S1 S2, No JVD, No murmurs, No edema  Respiratory: ventilated   Psychiatry: A & O x 0  Gastrointestinal:  Soft, Non-tender, + BS	  Skin: No rashes, No ecchymoses, No cyanosis	  Neurologic: perrl, BUE localized, LE wd bilateral   Extremities: Normal range of motion, No clubbing, cyanosis or edema  Vascular: Peripheral pulses palpable 2+ bilaterally            LABS:    CARDIAC MARKERS:                                10.0   10.30 )-----------( 213      ( 03 Apr 2024 09:15 )             31.1     04-03    140  |  102  |  17  ----------------------------<  103<H>  3.4<L>   |  29  |  0.50    Ca    8.5      03 Apr 2024 17:29  Phos  2.7     04-03  Mg     2.2     04-03        TELEMETRY: 	  SR  ECG:  	  RADIOLOGY:   DIAGNOSTIC TESTING:  [ ] Echocardiogram:  [ ]  Catheterization:  [ ] Stress Test:    OTHER:

## 2024-04-04 LAB
APPEARANCE UR: CLEAR — SIGNIFICANT CHANGE UP
BACTERIA # UR AUTO: ABNORMAL /HPF
BILIRUB UR-MCNC: NEGATIVE — SIGNIFICANT CHANGE UP
CAST: 2 /LPF — SIGNIFICANT CHANGE UP (ref 0–4)
COLOR SPEC: YELLOW — SIGNIFICANT CHANGE UP
DIFF PNL FLD: ABNORMAL
GLUCOSE UR QL: NEGATIVE MG/DL — SIGNIFICANT CHANGE UP
KETONES UR-MCNC: NEGATIVE MG/DL — SIGNIFICANT CHANGE UP
LEUKOCYTE ESTERASE UR-ACNC: ABNORMAL
LMWH PPP CHRO-ACNC: 0.32 IU/ML — LOW (ref 0.5–1.1)
NITRITE UR-MCNC: NEGATIVE — SIGNIFICANT CHANGE UP
PH UR: 7 — SIGNIFICANT CHANGE UP (ref 5–8)
PROT UR-MCNC: NEGATIVE MG/DL — SIGNIFICANT CHANGE UP
RBC CASTS # UR COMP ASSIST: 11 /HPF — HIGH (ref 0–4)
SP GR SPEC: 1.01 — SIGNIFICANT CHANGE UP (ref 1–1.03)
SQUAMOUS # UR AUTO: 2 /HPF — SIGNIFICANT CHANGE UP (ref 0–5)
UROBILINOGEN FLD QL: 2 MG/DL (ref 0.2–1)
WBC UR QL: 72 /HPF — HIGH (ref 0–5)

## 2024-04-04 PROCEDURE — 71045 X-RAY EXAM CHEST 1 VIEW: CPT | Mod: 26

## 2024-04-04 PROCEDURE — 99291 CRITICAL CARE FIRST HOUR: CPT

## 2024-04-04 RX ORDER — QUETIAPINE FUMARATE 200 MG/1
25 TABLET, FILM COATED ORAL
Refills: 0 | Status: DISCONTINUED | OUTPATIENT
Start: 2024-04-04 | End: 2024-04-06

## 2024-04-04 RX ORDER — SODIUM CHLORIDE 9 MG/ML
500 INJECTION INTRAMUSCULAR; INTRAVENOUS; SUBCUTANEOUS ONCE
Refills: 0 | Status: COMPLETED | OUTPATIENT
Start: 2024-04-04 | End: 2024-04-04

## 2024-04-04 RX ORDER — CEFTRIAXONE 500 MG/1
1000 INJECTION, POWDER, FOR SOLUTION INTRAMUSCULAR; INTRAVENOUS EVERY 24 HOURS
Refills: 0 | Status: COMPLETED | OUTPATIENT
Start: 2024-04-04 | End: 2024-04-07

## 2024-04-04 RX ORDER — QUETIAPINE FUMARATE 200 MG/1
25 TABLET, FILM COATED ORAL AT BEDTIME
Refills: 0 | Status: DISCONTINUED | OUTPATIENT
Start: 2024-04-04 | End: 2024-04-08

## 2024-04-04 RX ORDER — ACETAMINOPHEN 500 MG
1000 TABLET ORAL ONCE
Refills: 0 | Status: COMPLETED | OUTPATIENT
Start: 2024-04-04 | End: 2024-04-04

## 2024-04-04 RX ORDER — SODIUM CHLORIDE 9 MG/ML
1000 INJECTION INTRAMUSCULAR; INTRAVENOUS; SUBCUTANEOUS ONCE
Refills: 0 | Status: DISCONTINUED | OUTPATIENT
Start: 2024-04-04 | End: 2024-04-04

## 2024-04-04 RX ORDER — QUETIAPINE FUMARATE 200 MG/1
12.5 TABLET, FILM COATED ORAL ONCE
Refills: 0 | Status: COMPLETED | OUTPATIENT
Start: 2024-04-04 | End: 2024-04-04

## 2024-04-04 RX ORDER — POTASSIUM PHOSPHATE, MONOBASIC POTASSIUM PHOSPHATE, DIBASIC 236; 224 MG/ML; MG/ML
30 INJECTION, SOLUTION INTRAVENOUS ONCE
Refills: 0 | Status: COMPLETED | OUTPATIENT
Start: 2024-04-04 | End: 2024-04-04

## 2024-04-04 RX ADMIN — CHLORHEXIDINE GLUCONATE 1 APPLICATION(S): 213 SOLUTION TOPICAL at 21:47

## 2024-04-04 RX ADMIN — Medication 50 MILLIGRAM(S): at 18:03

## 2024-04-04 RX ADMIN — ENOXAPARIN SODIUM 40 MILLIGRAM(S): 100 INJECTION SUBCUTANEOUS at 18:03

## 2024-04-04 RX ADMIN — SENNA PLUS 2 TABLET(S): 8.6 TABLET ORAL at 21:46

## 2024-04-04 RX ADMIN — QUETIAPINE FUMARATE 25 MILLIGRAM(S): 200 TABLET, FILM COATED ORAL at 21:46

## 2024-04-04 RX ADMIN — POLYETHYLENE GLYCOL 3350 17 GRAM(S): 17 POWDER, FOR SOLUTION ORAL at 05:34

## 2024-04-04 RX ADMIN — OXYCODONE HYDROCHLORIDE 10 MILLIGRAM(S): 5 TABLET ORAL at 18:32

## 2024-04-04 RX ADMIN — Medication 1000 MILLIGRAM(S): at 22:15

## 2024-04-04 RX ADMIN — SODIUM CHLORIDE 500 MILLILITER(S): 9 INJECTION INTRAMUSCULAR; INTRAVENOUS; SUBCUTANEOUS at 12:50

## 2024-04-04 RX ADMIN — Medication 40 MILLIEQUIVALENT(S): at 01:01

## 2024-04-04 RX ADMIN — OXYCODONE HYDROCHLORIDE 10 MILLIGRAM(S): 5 TABLET ORAL at 18:02

## 2024-04-04 RX ADMIN — QUETIAPINE FUMARATE 12.5 MILLIGRAM(S): 200 TABLET, FILM COATED ORAL at 05:35

## 2024-04-04 RX ADMIN — Medication 50 MILLIGRAM(S): at 05:35

## 2024-04-04 RX ADMIN — QUETIAPINE FUMARATE 25 MILLIGRAM(S): 200 TABLET, FILM COATED ORAL at 18:03

## 2024-04-04 RX ADMIN — POLYETHYLENE GLYCOL 3350 17 GRAM(S): 17 POWDER, FOR SOLUTION ORAL at 18:03

## 2024-04-04 RX ADMIN — POTASSIUM PHOSPHATE, MONOBASIC POTASSIUM PHOSPHATE, DIBASIC 83.33 MILLIMOLE(S): 236; 224 INJECTION, SOLUTION INTRAVENOUS at 13:01

## 2024-04-04 RX ADMIN — Medication 400 MILLIGRAM(S): at 21:46

## 2024-04-04 RX ADMIN — QUETIAPINE FUMARATE 12.5 MILLIGRAM(S): 200 TABLET, FILM COATED ORAL at 01:00

## 2024-04-04 NOTE — PROGRESS NOTE ADULT - SUBJECTIVE AND OBJECTIVE BOX
Subjective: Patient seen and examined. No new events except as noted.   remains in NSCU       REVIEW OF SYSTEMS:  Unable to obtain       MEDICATIONS:  MEDICATIONS  (STANDING):  acetaminophen   IVPB .. 1000 milliGRAM(s) IV Intermittent once  chlorhexidine 4% Liquid 1 Application(s) Topical at bedtime  enoxaparin Injectable 40 milliGRAM(s) SubCutaneous <User Schedule>  polyethylene glycol 3350 17 Gram(s) Oral every 12 hours  QUEtiapine 25 milliGRAM(s) Oral at bedtime  QUEtiapine 25 milliGRAM(s) Oral two times a day  senna 2 Tablet(s) Oral at bedtime  theophylline Syrup 50 milliGRAM(s) Oral every 12 hours      PHYSICAL EXAM:  T(C): 37.2 (04-04-24 @ 15:00), Max: 37.2 (04-04-24 @ 04:00)  HR: 70 (04-04-24 @ 18:00) (58 - 82)  BP: 169/101 (04-04-24 @ 18:00) (146/70 - 197/82)  RR: 30 (04-04-24 @ 18:00) (19 - 44)  SpO2: 97% (04-04-24 @ 18:00) (97% - 100%)  Wt(kg): --  I&O's Summary    03 Apr 2024 07:01  -  04 Apr 2024 07:00  --------------------------------------------------------  IN: 1067.4 mL / OUT: 1983 mL / NET: -915.6 mL    04 Apr 2024 07:01  -  04 Apr 2024 19:12  --------------------------------------------------------  IN: 1719.8 mL / OUT: 933 mL / NET: 786.8 mL          Appearance:Extubated,   HEENT:  dry oral mucosa, PERRL, EOMI	  Lymphatic: No lymphadenopathy  Cardiovascular: Normal S1 S2, No JVD, No murmurs, No edema  Respiratory: decreased bs    Psychiatry: A & O x 0  Gastrointestinal:  Soft, Non-tender, + BS	  Skin: No rashes, No ecchymoses, No cyanosis	  Neurologic: Extubated, eyes opens spontaneously, gaze midline, alexia, RUE spont AG follows simple commands, LUE localizes, RLE AG, moves toes   Extremities: Normal range of motion, No clubbing, cyanosis or edema  Vascular: Peripheral pulses palpable 2+ bilaterally              LABS:    CARDIAC MARKERS:                                10.0   10.30 )-----------( 213      ( 03 Apr 2024 09:15 )             31.1     04-03    140  |  102  |  17  ----------------------------<  103<H>  3.4<L>   |  29  |  0.50    Ca    8.5      03 Apr 2024 17:29  Phos  2.7     04-03  Mg     2.2     04-03            TELEMETRY: 	 SR   ECG:  	  RADIOLOGY:   DIAGNOSTIC TESTING:  [ ] Echocardiogram:  [ ]  Catheterization:  [ ] Stress Test:    OTHER:

## 2024-04-04 NOTE — PROGRESS NOTE ADULT - SUBJECTIVE AND OBJECTIVE BOX
HPI   58F, on ASA81, transferred from [place] for SAH HH4 WNFS 5 mF4 ruptured R pcomm aneurysm. Was initially found unresponsive on 3/25/24 at 21:00, intubated at 23:00 at OSH, then txfrd. Given ddavp at OSH. Here in ED 1 unit PLTs given and R frontal EVD placed-high pressure. Went for R pcom clip on 3/26/24, postop evd not functioning then drained on the floor.     Admission scores:   SAH R pcomm ruptured @ HH4 WNFS 5 mF4, GCS <7    24h events   PS 5/5     Exam   Extubated, eyes opens spontaneously, gaze midline, alexia, RUE spont AG follows simple commands, LUE localizes, RLE AG, moves toes     VITALS:   T(C): 36.7 (04-03-24 @ 23:00), Max: 37.2 (04-03-24 @ 11:00)  HR: 71 (04-04-24 @ 00:00) (55 - 73)  BP: 186/71 (04-04-24 @ 00:00) (114/58 - 195/79)  BP(mean): 102 (04-04-24 @ 00:00) (82 - 116)  RR: 44 (04-04-24 @ 00:00) (12 - 44)  SpO2: 100% (04-04-24 @ 00:00) (99% - 100%)    acetaminophen     Tablet .. 650 milliGRAM(s) Oral every 6 hours PRN  chlorhexidine 4% Liquid 1 Application(s) Topical at bedtime  enoxaparin Injectable 40 milliGRAM(s) SubCutaneous <User Schedule>  naloxegol 25 milliGRAM(s) Oral daily  oxyCODONE    IR 5 milliGRAM(s) Oral every 4 hours PRN  oxyCODONE    IR 10 milliGRAM(s) Oral every 4 hours PRN  pantoprazole  Injectable 40 milliGRAM(s) IV Push daily  polyethylene glycol 3350 17 Gram(s) Oral every 12 hours  potassium chloride   Solution 40 milliEquivalent(s) Oral every 4 hours  QUEtiapine 12.5 milliGRAM(s) Oral every 12 hours  senna 2 Tablet(s) Oral at bedtime  theophylline Syrup 50 milliGRAM(s) Oral every 12 hours        04-02-24 @ 07:01  -  04-03-24 @ 07:00  --------------------------------------------------------  IN: 1299.6 mL / OUT: 829 mL / NET: 470.6 mL    04-03-24 @ 07:01  -  04-04-24 @ 00:40  --------------------------------------------------------  IN: 647.4 mL / OUT: 716 mL / NET: -68.6 mL                            10.0   10.30 )-----------( 213 ( 03 Apr 2024 09:15 )             31.1       04-03    140  |  102  |  17  ----------------------------<  103<H>  3.4<L>   |  29  |  0.50

## 2024-04-04 NOTE — CHART NOTE - NSCHARTNOTEFT_GEN_A_CORE
Indirect Calorimetry study no longer indicated as pt now extubated. RD/RT to remain available to conduct study when/if applicable as requested by medical team.     Marely Montoya RD, available via TEAMS

## 2024-04-04 NOTE — PROGRESS NOTE ADULT - SUBJECTIVE AND OBJECTIVE BOX
HPI   58F, on ASA81, transferred from [place] for SAH HH4 WNFS 5 mF4 ruptured R pcomm aneurysm. Was initially found unresponsive on 3/25/24 at 21:00, intubated at 23:00 at OSH, then txfrd. Given ddavp at OSH. Here in ED 1 unit PLTs given and R frontal EVD placed-high pressure. Went for R pcom clip on 3/26/24, postop evd not functioning then drained on the floor.     Admission scores:   SAH R pcomm ruptured @ HH4 WNFS 5 mF4, GCS <7    24h events   naeon    Exam   alert awake, tracts, follows simple commands, gaze midline, peerl, RUE spontaneous AG,  LUE plegic, RLE AG, moves toes     LED 3/28   IMPRESSION:  Acute left lower extremity DVT below the knee, involving the left soleal   vein. Edema of the superficial soft tissues of the left calf. Correlate   clinically.  No acute DVT of the right lower extremity.    EEG 3/28/24  EEG SUMMARY/CLASSIFICATION    Abnormal EEG  - Independent bilateral frontal sharp-wave discharges  - GPDs with triphasic morphology.   - Bifrontal slowing, left greater than right.  - Moderate generalized slowing.    _____________________________________________________________  EEG IMPRESSION/CLINICAL CORRELATE    Abnormal EEG study.  Potential independent epileptogenic foci in the bilateral frontal regions.  Structural or functional abnormality in the bilateral frontal head regions, left greater than right.  Moderate nonspecific diffuse or multifocal cerebral dysfunction.     TTE 3/26/24   CONCLUSIONS:   1. Left ventricular systolic function is moderately to severely decreased with an ejection fraction of 36 % by Mcgee's method of disks. Regional wall motion abnormalities consistent with ischemic heart disease.   2. Multiple segmental abnormalities exist. See findings.   3. There is moderate (grade 2) left ventricular diastolic dysfunction, with elevated filling pressure.   4. Normal right ventricular cavity size, with normal wall thickness, and normal systolic function. Tricuspid annular plane systolic excursion (TAPSE) is 1.7 cm (normal >=1.7 cm).   5. Normal left and right atrial size.   6. No significant valvular disease.   7. Estimated pulmonary artery systolic pressure is 41 mmHg.   8. The inferior vena cava is normal in size measuring 1.98 cm in diameter, (normal <2.1cm) with abnormal inspiratory collapse (abnormal <50%) consistent with mildly elevated right atrial pressure (~8, range 5-10mmHg).   9. No pericardial effusion seen.  10. No prior echocardiogram is available for comparison.  11. There is no evidence of a left ventricular thrombus.  12. There is increased LV mass and concentric hypertrophy.    VITALS:   Vital Signs Last 24 Hrs  T(C): 37.1 (04 Apr 2024 07:00), Max: 37.2 (03 Apr 2024 11:00)  T(F): 98.8 (04 Apr 2024 07:00), Max: 98.9 (03 Apr 2024 11:00)  HR: 77 (04 Apr 2024 10:00) (55 - 80)  BP: 167/84 (04 Apr 2024 10:00) (136/64 - 197/82)  BP(mean): 109 (04 Apr 2024 10:00) (92 - 118)  RR: 32 (04 Apr 2024 10:00) (14 - 44)  SpO2: 97% (04 Apr 2024 10:00) (97% - 100%)    Parameters below as of 04 Apr 2024 07:00  Patient On (Oxygen Delivery Method): nasal cannula  O2 Flow (L/min): 3    CAPILLARY BLOOD GLUCOSE        I&O's Summary    03 Apr 2024 07:01  -  04 Apr 2024 07:00  --------------------------------------------------------  IN: 1067.4 mL / OUT: 1983 mL / NET: -915.6 mL    04 Apr 2024 07:01  -  04 Apr 2024 10:34  --------------------------------------------------------  IN: 180 mL / OUT: 9 mL / NET: 171 mL        Respiratory:        LABS:                        10.0   10.30 )-----------( 213      ( 03 Apr 2024 09:15 )             31.1     04-03    140  |  102  |  17  ----------------------------<  103<H>  3.4<L>   |  29  |  0.50        MEDICATION LEVELS:     IVF FLUIDS/MEDICATIONS:   MEDICATIONS  (STANDING):  chlorhexidine 4% Liquid 1 Application(s) Topical at bedtime  enoxaparin Injectable 40 milliGRAM(s) SubCutaneous <User Schedule>  naloxegol 25 milliGRAM(s) Oral daily  pantoprazole  Injectable 40 milliGRAM(s) IV Push daily  polyethylene glycol 3350 17 Gram(s) Oral every 12 hours  QUEtiapine 12.5 milliGRAM(s) Oral every 12 hours  senna 2 Tablet(s) Oral at bedtime  theophylline Syrup 50 milliGRAM(s) Oral every 12 hours    MEDICATIONS  (PRN):  acetaminophen     Tablet .. 650 milliGRAM(s) Oral every 6 hours PRN Temp greater or equal to 38.5C (101.3F), Mild Pain (1 - 3)  oxyCODONE    IR 5 milliGRAM(s) Oral every 4 hours PRN Moderate Pain (4 - 6)  oxyCODONE    IR 10 milliGRAM(s) Oral every 4 hours PRN Severe Pain (7 - 10)

## 2024-04-04 NOTE — SWALLOW BEDSIDE ASSESSMENT ADULT - SWALLOW EVAL: DIAGNOSIS
Pt p/w oropharyngeal dysphagia, likely acute i/s/o CVA and prolonged intubation. Of note, pt vocal quality significantly dysphonic to mostly aphonic, suggestive of vocal cord involvement. Swallow characterized by reduced oral management, delayed initiation of swallow, and delayed throat clearing on one trial of 1/4 teaspoon of moderately thick liquids. Pt is at increased risk of aspiration with p.o.. Recommend objective swallow testing to further determine least restrictive diet when pt mentation is optimized. 58F, transferred from [place] for SAH i/s/o ruptured R pcomm aneurysm s/p R pcom clip w/ R EVD, extubated on 4/3 (9 days). Pt p/w oropharyngeal dysphagia, likely acute i/s/o CVA and prolonged intubation. Of note, pt vocal quality significantly dysphonic to mostly aphonic, suggestive of vocal cord involvement. Swallow characterized by reduced oral management, delayed initiation of swallow, and delayed throat clearing on one trial of 1/4 teaspoon of moderately thick liquids. Pt is at increased risk of aspiration with p.o.. Recommend objective swallow testing to further determine least restrictive diet when pt mentation is optimized.

## 2024-04-04 NOTE — PROGRESS NOTE ADULT - ASSESSMENT
58F, on ASA81 for pain relief? (fell a month ago), presents as txfr from [place] for diffuse SAH w/small IVH, 2/2 ruptured posterolaterally projecting 2.9x2.4x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 Was initially found unresponsive at 21:00, intubated at 23:00 at OSH, then txfrd. Given ddavp at OSH. On arrival patient w/ PERRL, +cough/gag, 2/5 spontaneous movement in LUE, o/w no movement. 1 unit PLTs given and R frontal EVD placed-high pressure. PLTs/Coags wnl.     Intubated sedated in NSCU   Echo with abnormal findings of severe cardiomyopathy and RWMA

## 2024-04-04 NOTE — SWALLOW BEDSIDE ASSESSMENT ADULT - ADDITIONAL RECOMMENDATIONS
Swallow 1. pt will participate in objective swallow testing Swallow 1. pt will participate in objective swallow testing    Would recommend official speech language pathology exam; Primary team to place order.

## 2024-04-04 NOTE — PROGRESS NOTE ADULT - ASSESSMENT
ASSESSMENT/PLAN: [facility] WFNS subarachnoid hemorrhage   Post-SAH day 3, SAH 3/25/2024  3/26/24 evd placed  3/26/24 pcomm clip    N   # @ Pcomm ruptured, s/p clip   #EVD tract acute hemorrhage   #Hydrocephalus/ICP crisis: R EVD in place 15cm H2O 83 last 24h   #Delayed Cerebral Ischemia Management: TCDs with poor temporal windows, follow up occipital windows low vertebral and basilar MFV, euvolemia, nimodipine  #04/01/24 angio no spasme  #Pain: PRN tylenol oxi fentanyl   #agitations: precedex   Activity: [] OOB as tolerated [x] Bedrest [] PT [] OT [] PMNR    CV   SBP goal liberalize 100-200  #R heart strain: POCUS with RV strain and non collapsable IVC, dysmotility of LV   TTE decreased systolic cardiac function EF 36% with decreased diastolic function -rTTE EF 55%   #bradycardia theophyllin     P   #respiratory failure  intubated PS   oral secretions, no in line   #laryngeal edema dexamethasone - improved per ENT     R   I&o    GI   #REE: indirect calorimetry daily  Diet: npo   PPI   Senna miralax  BM 3/30    E   Goal euglycemia (-180)  A1c 5.5     H   SCD   Chemoppx   LED R below knee dvt     ID   afebrile    lines  R evd  L axillary a line  ETT   pivs  ngtube     CODE STATUS: FULL CODE    DISPOSITION: ICU     CRITICAL

## 2024-04-04 NOTE — PROGRESS NOTE ADULT - SUBJECTIVE AND OBJECTIVE BOX
Patient seen and examined at bedside.    --Anticoagulation--  enoxaparin Injectable 40 milliGRAM(s) SubCutaneous <User Schedule>    T(C): 37.6 (03-31-24 @ 03:00), Max: 38.3 (03-30-24 @ 17:00)  HR: 45 (03-31-24 @ 04:00) (41 - 75)  BP: --  RR: 23 (03-31-24 @ 04:00) (15 - 30)  SpO2: 100% (03-31-24 @ 04:00) (94% - 100%)  Wt(kg): --    Exam: Extubated, EOV, hypophonic, right periorbital edema improving, PERRL,  FC on R, R side AG, LUE LOC, LLE wds

## 2024-04-04 NOTE — CHART NOTE - NSCHARTNOTEFT_GEN_A_CORE
Nutrition Follow Up Note  Patient seen for: Nutrition Follow Up     Chart reviewed, events noted. Pt is 57 yo F with PMH: SAH HH4, mF4 ruptured R PCOMM aneurysm. Unresponsive on 3/25/24, intubated at OSH. R frontal EVD placed. S/P PCOM clip on 3/26/24. Extubated .     Source: [] Patient       [x] EMR        [x] RN        [x] Family at bedside       [x] Other: interdisciplinary medical team    -If unable to interview patient:   [x] Disoriented/confused/inappropriate to interview    Diet Order:   Diet, NPO with Tube Feed:   Tube Feeding Modality: Nasogastric  Jevity 1.5 Les (JEVITY1.5RTH)  Total Volume for 24 Hours (mL): 1440  Continuous  Until Goal Tube Feed Rate (mL per Hour): 60  Tube Feed Duration (in Hours): 24  Tube Feed Start Time: 23:00 (24)    EN Order Provides: 1440ml, 2160kcal and 92g protein     Nutrition-related concerns:  -Feeds infusing at goal rate. Intermittently held for procedures/extubation. Diagnosed with severe protein calorie malnutrition on  in setting of inadequate energy intake.   -S/P swallow evaluation ; recommendations for pt to continue NPO status, pending FEES.   -Last BM (4/3): x 1. Continue on Miralax and Senna.     Weights:   Daily Weight in k.4 () Dosing wt (3/26): 71.2kg.   UBW: 70.5kg per family  BMI 28.7    MEDICATIONS  (STANDING):  polyethylene glycol 3350  senna    Pertinent Labs:  @ 17:29: Na 140, BUN 17, Cr 0.50, <H>, K+ 3.4<L>, Phos 2.7, Mg 2.2, Alk Phos --, ALT/SGPT --, AST/SGOT --, HbA1c --    A1C with Estimated Average Glucose Result: 5.5 % (24 @ 04:27)  A1C with Estimated Average Glucose Result: 5.5 % (24 @ 01:34)    Finger Sticks:    Triglycerides, Serum: 82 mg/dL (24 @ 07:53)  Triglycerides, Serum: 109 mg/dL (24 @ 04:32)  Triglycerides, Serum: 150 mg/dL (24 @ 01:34)    Skin per nursing documentation: no documented pressure injuries   Edema: 1+ generalized     Estimated Energy Needs (per RD predictive equation), based on dosing wt 71.2kg:  (27-32kcal/kg): 3113-6391 kcal  (1.2-1.4g protein/kg): 85-100g protein   IC study (3/28): 2175 kcal     Previous Nutrition Diagnosis: Increased nutrient needs; Acute severe protein calorie malnutrition  Nutrition Diagnosis is: [x] ongoing  [] resolved [] not applicable     New Nutrition Diagnosis: [x] Not applicable    Nutrition Care Plan:  [x] In Progress  [] Achieved  [] Not applicable       Recommendations:      1. Continue Jevity 1.5 at GOAL rate 60ml/hr x 24 hrs. Based on dosing wt 71.2kg, provides: 1440ml, 2160kcal (30.3kcal/kg) and 92g protein (1.29g protein/kg).   2. Monitor GI tolerance. RD to remain available to adjust EN formulary, volume/rate PRN. Trend provision of Propofol.   3. Recommend multivitamin (if no medication contraindications) to aid in prevention of micronutrient deficiencies.   4. Monitor wt trends/labs/skin integrity/hydration status/bowel regularity.     Monitoring and Evaluation:   Continue to monitor nutritional intake, tolerance to diet prescription, weights, labs, skin integrity    RD remains available upon request and will follow up per protocol    Marely Montoya RD, available via TEAMS

## 2024-04-04 NOTE — PROGRESS NOTE ADULT - ASSESSMENT
DeliaJie  58F, on ASA81 for pain relief? (fell a month ago), presents as txfr from [place] for diffuse SAH w/small IVH, 2/2 ruptured posterolaterally projecting 2.9x2.4x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 Was initially found unresponsive at 21:00, intubated at 23:00 at OSH, then txfrd. Given ddavp at OSH. On arrival patient w/ PERRL, +cough/gag, 2/5 spontaneous movement in LUE, o/w no movement. 1 unit PLTs given and R frontal EVD placed-high pressure. PLTs/Coags wnl. Exam improved: intubated, PERRL, BUE localize, b/l wd         EVD@15 further challenge in AM  TCDs/SAH protocol

## 2024-04-04 NOTE — PROGRESS NOTE ADULT - ASSESSMENT
[facility]   [facility] day 3, SAH 3/25/2024  3/26/24 evd placed  3/26/24 pcomm clip    N   # @ Pcomm ruptured, s/p clip: CTH/CTA   #EVD tract acute hemorrhage   #Hydrocephalus/ICP crisis: R EVD in place 15cm H2O  #Delayed Cerebral Ischemia Management: TCDs with poor temporal windows, follow up occipital windows low vertebral and basilar MFV, euvolemia, nimodipine/ net neg give a bolus 500cc NS  #04/01/24 angio no spasme  #Pain: PRN tylenol oxi fentanyl   # prop   Activity: [] OOB as tolerated [x] Bedrest [] PT [] OT [] PMNR    CV   SBP goal liberalize 100-200  #R heart strain: POCUS with RV strain and non collapsable IVC, dysmotility of LV   TTE decreased systolic cardiac function EF 36% with decreased diastolic function -rTTE EF 55%   #bradycardia theophyllin     P   #respiratory failure  intubated PS   oral secretions, no in line   #laryngeal edema dexamethasone - improved per ENT     R   I&o    GI   #REE: indirect calorimetry daily  Diet: npo   PPI   Senna miralax  BM 3/30    E   Goal euglycemia (-180)  A1c 5.5     H   SCD   Chemoppx   LED R below knee dvt     ID   afebrile    lines  R evd  L axillary a line  ETT   pivs  ngtube     CODE STATUS: FULL CODE    DISPOSITION: ICU     CRITICAL  ASSESSMENT/PLAN: [facility] subarachnoid hemorrhage   Post-SAH day 3, SAH 3/25/2024  3/26/24 evd placed  3/26/24 pcomm clip    N   # @ Pcomm ruptured, s/p clip: CTH/CTA   #EVD tract acute hemorrhage   #Hydrocephalus/ICP crisis: R EVD in place 15cm H2O  #Delayed Cerebral Ischemia Management: TCDs with poor temporal windows, follow up occipital windows low vertebral and basilar MFV, euvolemia, nimodipine/ net neg give a bolus 500cc NS  #04/01/24 angio no spasme  #Pain: PRN tylenol oxi fentanyl   # prop   Activity: [] OOB as tolerated [x] Bedrest [] PT [] OT [] PMNR    CV   SBP goal liberalize 100-200  #R heart strain: POCUS with RV strain and non collapsable IVC, dysmotility of LV   TTE decreased systolic cardiac function EF 36% with decreased diastolic function -rTTE EF 55%   #bradycardia theophyllin     P   extubated   chest physiotherapy    R   I&o    GI   #REE: indirect calorimetry daily  Diet: npo   PPI   Senna miralax  BM 3/30    E   Goal euglycemia (-180)  A1c 5.5     H   SCD   Chemoppx   LED R below knee dvt     ID   afebrile    lines  R evd  L axillary a line  ETT   pivs  ngtube     CODE STATUS: FULL CODE    DISPOSITION: ICU     CRITICAL

## 2024-04-05 LAB
ANION GAP SERPL CALC-SCNC: 13 MMOL/L — SIGNIFICANT CHANGE UP (ref 5–17)
BUN SERPL-MCNC: 12 MG/DL — SIGNIFICANT CHANGE UP (ref 7–23)
CALCIUM SERPL-MCNC: 9 MG/DL — SIGNIFICANT CHANGE UP (ref 8.4–10.5)
CHLORIDE SERPL-SCNC: 102 MMOL/L — SIGNIFICANT CHANGE UP (ref 96–108)
CO2 SERPL-SCNC: 22 MMOL/L — SIGNIFICANT CHANGE UP (ref 22–31)
CREAT SERPL-MCNC: 0.42 MG/DL — LOW (ref 0.5–1.3)
EGFR: 114 ML/MIN/1.73M2 — SIGNIFICANT CHANGE UP
GLUCOSE SERPL-MCNC: 109 MG/DL — HIGH (ref 70–99)
HCT VFR BLD CALC: 34.2 % — LOW (ref 34.5–45)
HGB BLD-MCNC: 11 G/DL — LOW (ref 11.5–15.5)
MAGNESIUM SERPL-MCNC: 2.1 MG/DL — SIGNIFICANT CHANGE UP (ref 1.6–2.6)
MCHC RBC-ENTMCNC: 28.8 PG — SIGNIFICANT CHANGE UP (ref 27–34)
MCHC RBC-ENTMCNC: 32.2 GM/DL — SIGNIFICANT CHANGE UP (ref 32–36)
MCV RBC AUTO: 89.5 FL — SIGNIFICANT CHANGE UP (ref 80–100)
NRBC # BLD: 0 /100 WBCS — SIGNIFICANT CHANGE UP (ref 0–0)
PHOSPHATE SERPL-MCNC: 3.9 MG/DL — SIGNIFICANT CHANGE UP (ref 2.5–4.5)
PLATELET # BLD AUTO: 246 K/UL — SIGNIFICANT CHANGE UP (ref 150–400)
POTASSIUM SERPL-MCNC: 4.2 MMOL/L — SIGNIFICANT CHANGE UP (ref 3.5–5.3)
POTASSIUM SERPL-SCNC: 4.2 MMOL/L — SIGNIFICANT CHANGE UP (ref 3.5–5.3)
RBC # BLD: 3.82 M/UL — SIGNIFICANT CHANGE UP (ref 3.8–5.2)
RBC # FLD: 13.2 % — SIGNIFICANT CHANGE UP (ref 10.3–14.5)
SODIUM SERPL-SCNC: 137 MMOL/L — SIGNIFICANT CHANGE UP (ref 135–145)
WBC # BLD: 14.59 K/UL — HIGH (ref 3.8–10.5)
WBC # FLD AUTO: 14.59 K/UL — HIGH (ref 3.8–10.5)

## 2024-04-05 PROCEDURE — 99291 CRITICAL CARE FIRST HOUR: CPT

## 2024-04-05 RX ADMIN — OXYCODONE HYDROCHLORIDE 10 MILLIGRAM(S): 5 TABLET ORAL at 05:50

## 2024-04-05 RX ADMIN — QUETIAPINE FUMARATE 25 MILLIGRAM(S): 200 TABLET, FILM COATED ORAL at 17:56

## 2024-04-05 RX ADMIN — Medication 650 MILLIGRAM(S): at 22:20

## 2024-04-05 RX ADMIN — POLYETHYLENE GLYCOL 3350 17 GRAM(S): 17 POWDER, FOR SOLUTION ORAL at 17:57

## 2024-04-05 RX ADMIN — OXYCODONE HYDROCHLORIDE 10 MILLIGRAM(S): 5 TABLET ORAL at 02:30

## 2024-04-05 RX ADMIN — OXYCODONE HYDROCHLORIDE 10 MILLIGRAM(S): 5 TABLET ORAL at 06:20

## 2024-04-05 RX ADMIN — Medication 50 MILLIGRAM(S): at 17:57

## 2024-04-05 RX ADMIN — CEFTRIAXONE 100 MILLIGRAM(S): 500 INJECTION, POWDER, FOR SOLUTION INTRAMUSCULAR; INTRAVENOUS at 02:04

## 2024-04-05 RX ADMIN — ENOXAPARIN SODIUM 40 MILLIGRAM(S): 100 INJECTION SUBCUTANEOUS at 17:55

## 2024-04-05 RX ADMIN — SENNA PLUS 2 TABLET(S): 8.6 TABLET ORAL at 22:18

## 2024-04-05 RX ADMIN — Medication 650 MILLIGRAM(S): at 22:50

## 2024-04-05 RX ADMIN — CHLORHEXIDINE GLUCONATE 1 APPLICATION(S): 213 SOLUTION TOPICAL at 22:19

## 2024-04-05 RX ADMIN — QUETIAPINE FUMARATE 25 MILLIGRAM(S): 200 TABLET, FILM COATED ORAL at 22:18

## 2024-04-05 RX ADMIN — OXYCODONE HYDROCHLORIDE 10 MILLIGRAM(S): 5 TABLET ORAL at 02:00

## 2024-04-05 RX ADMIN — POLYETHYLENE GLYCOL 3350 17 GRAM(S): 17 POWDER, FOR SOLUTION ORAL at 05:54

## 2024-04-05 RX ADMIN — QUETIAPINE FUMARATE 25 MILLIGRAM(S): 200 TABLET, FILM COATED ORAL at 05:53

## 2024-04-05 NOTE — PROGRESS NOTE ADULT - SUBJECTIVE AND OBJECTIVE BOX
HPI   58F, on ASA81, transferred from [place] for SAH HH4 WNFS 5 mF4 ruptured R pcomm aneurysm. Was initially found unresponsive on 3/25/24 at 21:00, intubated at 23:00 at OSH, then txfrd. Given ddavp at OSH. Here in ED 1 unit PLTs given and R frontal EVD placed-high pressure. Went for R pcom clip on 3/26/24, postop evd not functioning then drained on the floor.     Admission scores:   SAH R pcomm ruptured @ HH4 WNFS 5 mF4, GCS<7    24h events   naeon    Exam   alert awake, tracts, follows simple commands, gaze midline, peerl, RUE spontaneous AG,  LUE plegic, RLE AG, moves toes     LED 3/28   IMPRESSION:  Acute left lower extremity DVT below the knee, involving the left soleal   vein. Edema of the superficial soft tissues of the left calf. Correlate   clinically.  No acute DVT of the right lower extremity.    EEG 3/28/24  EEG SUMMARY/CLASSIFICATION    Abnormal EEG  - Independent bilateral frontal sharp-wave discharges  - GPDs with triphasic morphology.   - Bifrontal slowing, left greater than right.  - Moderate generalized slowing.    _____________________________________________________________  EEG IMPRESSION/CLINICAL CORRELATE    Abnormal EEG study.  Potential independent epileptogenic foci in the bilateral frontal regions.  Structural or functional abnormality in the bilateral frontal head regions, left greater than right.  Moderate nonspecific diffuse or multifocal cerebral dysfunction.     TTE 3/26/24   CONCLUSIONS:   1. Left ventricular systolic function is moderately to severely decreased with an ejection fraction of 36 % by Mcgee's method of disks. Regional wall motion abnormalities consistent with ischemic heart disease.   2. Multiple segmental abnormalities exist. See findings.   3. There is moderate (grade 2) left ventricular diastolic dysfunction, with elevated filling pressure.   4. Normal right ventricular cavity size, with normal wall thickness, and normal systolic function. Tricuspid annular plane systolic excursion (TAPSE) is 1.7 cm (normal >=1.7 cm).   5. Normal left and right atrial size.   6. No significant valvular disease.   7. Estimated pulmonary artery systolic pressure is 41 mmHg.   8. The inferior vena cava is normal in size measuring 1.98 cm in diameter, (normal <2.1cm) with abnormal inspiratory collapse (abnormal <50%) consistent with mildly elevated right atrial pressure (~8, range 5-10mmHg).   9. No pericardial effusion seen.  10. No prior echocardiogram is available for comparison.  11. There is no evidence of a left ventricular thrombus.  12. There is increased LV mass and concentric hypertrophy.    VITALS:   Vital Signs Last 24 Hrs  T(C): 37 (05 Apr 2024 09:00), Max: 38.8 (04 Apr 2024 19:00)  T(F): 39.2 (05 Apr 2024 09:00), Max: 101.8 (04 Apr 2024 19:00)  HR: 71 (05 Apr 2024 09:00) (61 - 84)  BP: 124/80 (05 Apr 2024 09:00) (120/85 - 175/74)  BP(mean): 94 (05 Apr 2024 09:00) (91 - 133)  RR: 20 (05 Apr 2024 09:00) (15 - 42)  SpO2: 100% (05 Apr 2024 09:00) (94% - 100%)    Parameters below as of 05 Apr 2024 07:00  Patient On (Oxygen Delivery Method): nasal cannula  O2 Flow (L/min): 3    CAPILLARY BLOOD GLUCOSE        I&O's Summary    04 Apr 2024 07:01  -  05 Apr 2024 07:00  --------------------------------------------------------  IN: 2609.8 mL / OUT: 1760 mL / NET: 849.8 mL    05 Apr 2024 07:01  -  05 Apr 2024 10:13  --------------------------------------------------------  IN: 180 mL / OUT: 17 mL / NET: 163 mL        Respiratory:        LABS:                        11.0   14.59 )-----------( 246      ( 05 Apr 2024 04:25 )             34.2     04-05    137  |  102  |  12  ----------------------------<  109<H>  4.2   |  22  |  0.42<L>        MEDICATION LEVELS:     IVF FLUIDS/MEDICATIONS:   MEDICATIONS  (STANDING):  cefTRIAXone   IVPB 1000 milliGRAM(s) IV Intermittent every 24 hours  chlorhexidine 4% Liquid 1 Application(s) Topical at bedtime  enoxaparin Injectable 40 milliGRAM(s) SubCutaneous <User Schedule>  polyethylene glycol 3350 17 Gram(s) Oral every 12 hours  QUEtiapine 25 milliGRAM(s) Oral at bedtime  QUEtiapine 25 milliGRAM(s) Oral two times a day  senna 2 Tablet(s) Oral at bedtime  theophylline Syrup 50 milliGRAM(s) Oral every 12 hours    MEDICATIONS  (PRN):  acetaminophen     Tablet .. 650 milliGRAM(s) Oral every 6 hours PRN Temp greater or equal to 38.5C (101.3F), Mild Pain (1 - 3)  oxyCODONE    IR 10 milliGRAM(s) Oral every 4 hours PRN Severe Pain (7 - 10)  oxyCODONE    IR 5 milliGRAM(s) Oral every 4 hours PRN Moderate Pain (4 - 6)       HPI   58F, on ASA81, transferred from [place] for SAH HH4 WNFS 5 mF4 ruptured R pcomm aneurysm. Was initially found unresponsive on 3/25/24 at 21:00, intubated at 23:00 at OSH, then txfrd. Given ddavp at OSH. Here in ED 1 unit PLTs given and R frontal EVD placed-high pressure. Went for R pcom clip on 3/26/24, postop evd not functioning then drained on the floor.     Admission scores:   SAH R pcomm ruptured @ HH4 WNFS 5 mF4, GCS<7    24h events   naeon    Exam   alert awake, tracts, follows simple commands, gaze midline, peerl, RUE spontaneous AG,  LUE withdraws, RLE AG, moves toes     VITALS:   T(C): 37 (04-05-24 @ 20:00), Max: 42 (04-05-24 @ 12:00)  HR: 92 (04-05-24 @ 21:00) (61 - 92)  BP: 159/68 (04-05-24 @ 21:00) (120/85 - 174/78)  BP(mean): 98 (04-05-24 @ 21:00) (88 - 133)  RR: 17 (04-05-24 @ 21:00) (15 - 42)  SpO2: 100% (04-05-24 @ 21:00) (94% - 100%)    acetaminophen     Tablet .. 650 milliGRAM(s) Oral every 6 hours PRN  cefTRIAXone   IVPB 1000 milliGRAM(s) IV Intermittent every 24 hours  chlorhexidine 4% Liquid 1 Application(s) Topical at bedtime  enoxaparin Injectable 40 milliGRAM(s) SubCutaneous <User Schedule>  oxyCODONE    IR 5 milliGRAM(s) Oral every 4 hours PRN  oxyCODONE    IR 10 milliGRAM(s) Oral every 4 hours PRN  polyethylene glycol 3350 17 Gram(s) Oral every 12 hours  QUEtiapine 25 milliGRAM(s) Oral at bedtime  QUEtiapine 25 milliGRAM(s) Oral two times a day  senna 2 Tablet(s) Oral at bedtime  theophylline Syrup 50 milliGRAM(s) Oral every 12 hours        04-04-24 @ 07:01  -  04-05-24 @ 07:00  --------------------------------------------------------  IN: 2609.8 mL / OUT: 1760 mL / NET: 849.8 mL    04-05-24 @ 07:01  -  04-05-24 @ 21:52  --------------------------------------------------------  IN: 900 mL / OUT: 390 mL / NET: 510 mL                            11.0   14.59 )-----------( 246 ( 05 Apr 2024 04:25 )             34.2   04-05    137  |  102  |  12  ----------------------------<  109<H>  4.2   |  22  |  0.42<L>

## 2024-04-05 NOTE — PROGRESS NOTE ADULT - ASSESSMENT
ASSESSMENT/PLAN: [facility] WFNS subarachnoid hemorrhage   Post-SAH day 3, SAH 3/25/2024  3/26/24 evd placed  3/26/24 pcomm clip  4/3/24 extubated    N   # @ Pcomm ruptured, s/p clip   #EVD tract acute hemorrhage   #Hydrocephalus/ICP crisis: R EVD in place 15cm   #Delayed Cerebral Ischemia Management: TCDs with poor temporal windows, follow up occipital windows low vertebral and basilar MFV, euvolemia, nimodipine  #04/01/24 angio no spasme  #Pain: PRN tylenol oxi fentanyl   #agitations: precedex   Activity: [] OOB as tolerated [x] Bedrest [] PT [] OT [] PMNR    CV   SBP goal liberalize 100-200  rTTE EF 55%   #bradycardia theophyllin     P   #respiratory failure  extubated   oral secretions, no in line     R   I&o    GI   #REE: indirect calorimetry daily  Diet: Jevity  PPI   Senna miralax  BM 3/30    E   Goal euglycemia (-180)  A1c 5.5     H   SCD   Chemoppx   LED R below knee dvt     ID   afebrile    lines  R evd  L axillary a line  ETT   pivs  ngtube

## 2024-04-05 NOTE — PROGRESS NOTE ADULT - ASSESSMENT
58F, on ASA81 for pain relief? 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 Was initially found unresponsive at 21:00, intubated at 23:00 at OSH, then txfrd. Given ddavp at OSH. On arrival patient w/ PERRL, +cough/gag, 2/5 spontaneous movement in LUE, o/w no movement. 1 unit PLTs given and R frontal EVD placed-high pressure. PLTs/Coags wnl.     Intubated sedated in NSCU   Echo with abnormal findings of severe cardiomyopathy and RWMA

## 2024-04-05 NOTE — PROGRESS NOTE ADULT - SUBJECTIVE AND OBJECTIVE BOX
Subjective: Patient seen and examined. No new events except as noted.   remains in NSCU       REVIEW OF SYSTEMS:    CONSTITUTIONAL: + weakness, fevers or chills  EYES/ENT: No visual changes;  No vertigo or throat pain   NECK: No pain or stiffness  RESPIRATORY: No cough, wheezing, hemoptysis; No shortness of breath  CARDIOVASCULAR: No chest pain or palpitations  GASTROINTESTINAL: No abdominal or epigastric pain. No nausea, vomiting, or hematemesis; No diarrhea or constipation. No melena or hematochezia.  GENITOURINARY: No dysuria, frequency or hematuria  NEUROLOGICAL: No numbness or weakness  SKIN: No itching, burning, rashes, or lesions   All other review of systems is negative unless indicated above.    MEDICATIONS:  MEDICATIONS  (STANDING):  cefTRIAXone   IVPB 1000 milliGRAM(s) IV Intermittent every 24 hours  chlorhexidine 4% Liquid 1 Application(s) Topical at bedtime  enoxaparin Injectable 40 milliGRAM(s) SubCutaneous <User Schedule>  polyethylene glycol 3350 17 Gram(s) Oral every 12 hours  QUEtiapine 25 milliGRAM(s) Oral at bedtime  QUEtiapine 25 milliGRAM(s) Oral two times a day  senna 2 Tablet(s) Oral at bedtime  theophylline Syrup 50 milliGRAM(s) Oral every 12 hours      PHYSICAL EXAM:  T(C): 37 (04-05-24 @ 09:00), Max: 38.8 (04-04-24 @ 19:00)  HR: 71 (04-05-24 @ 09:00) (61 - 84)  BP: 124/80 (04-05-24 @ 09:00) (120/85 - 175/74)  RR: 20 (04-05-24 @ 09:00) (15 - 42)  SpO2: 100% (04-05-24 @ 09:00) (94% - 100%)  Wt(kg): --  I&O's Summary    04 Apr 2024 07:01  -  05 Apr 2024 07:00  --------------------------------------------------------  IN: 2609.8 mL / OUT: 1760 mL / NET: 849.8 mL    05 Apr 2024 07:01  -  05 Apr 2024 10:44  --------------------------------------------------------  IN: 180 mL / OUT: 17 mL / NET: 163 mL              Appearance: NAD   HEENT:  dry oral mucosa, PERRL, EOMI	  Lymphatic: No lymphadenopathy  Cardiovascular: Normal S1 S2, No JVD, No murmurs, No edema  Respiratory: decreased bs    Psychiatry: A & O x 0  Gastrointestinal:  Soft, Non-tender, + BS	  Skin: No rashes, No ecchymoses, No cyanosis	  Neurologic: Extubated, eyes opens spontaneously, gaze midline, alexia, RUE spont AG follows simple commands, LUE localizes, RLE AG, moves toes   Extremities: Normal range of motion, No clubbing, cyanosis or edema  Vascular: Peripheral pulses palpable 2+ bilaterally        LABS:    CARDIAC MARKERS:                                11.0   14.59 )-----------( 246      ( 05 Apr 2024 04:25 )             34.2     04-05    137  |  102  |  12  ----------------------------<  109<H>  4.2   |  22  |  0.42<L>    Ca    9.0      05 Apr 2024 04:25  Phos  3.9     04-05  Mg     2.1     04-05          TELEMETRY: 	SR     ECG:  	  RADIOLOGY:   DIAGNOSTIC TESTING:  [ ] Echocardiogram:  [ ]  Catheterization:  [ ] Stress Test:    OTHER:

## 2024-04-05 NOTE — PROGRESS NOTE ADULT - ASSESSMENT
ASSESSMENT/PLAN: [facility] WFNS subarachnoid hemorrhage   Post-SAH day 3, SAH 3/25/2024  3/26/24 evd placed  3/26/24 pcomm clip    N   # @ Pcomm ruptured, s/p clip   #EVD tract acute hemorrhage   #Hydrocephalus/ICP crisis: R EVD in place 15cm H2O 60cc last 24h   #Delayed Cerebral Ischemia Management: TCDs with poor temporal windows, follow up occipital windows low vertebral and basilar MFV, euvolemia, nimodipine  #04/01/24 angio no spasme  #Pain: PRN tylenol oxi fentanyl   #agitations: precedex   Activity: [x] OOB as tolerated    CV   SBP goal liberalize 100-200  #R heart strain: POCUS with RV strain and non collapsable IVC, dysmotility of LV   TTE decreased systolic cardiac function EF 36% with decreased diastolic function -rTTE EF 55%   #bradycardia theophyllin     P   RA  incentive spirometry    R   I&o    GI   Diet: fees   PPI   Senna miralax  BM 3/30    E   Goal euglycemia (-180)  A1c 5.5     H   SCD   Chemoppx   LED R below knee dvt     ID   afebrile    lines  R evd  L axillary a line  ETT   pivs  ngtube     CODE STATUS: FULL CODE    DISPOSITION: ICU     CRITICAL  ASSESSMENT/PLAN: [facility] WFNS subarachnoid hemorrhage   Post-SAH day 3, SAH 3/25/2024  3/26/24 evd placed  3/26/24 pcomm clip  4/5 raise EVD to 20    N   # @ Pcomm ruptured, s/p clip   #EVD @ 20  #Delayed Cerebral Ischemia Management: TCDs with poor temporal windows, follow up occipital windows low vertebral and basilar MFV, euvolemia, nimodipine  #04/01/24 angio no spasm  #Pain: PRN tylenol oxi fentanyl   #agitations: precedex   Activity: [x] OOB as tolerated    CV   SBP goal liberalize 100-200  #R heart strain: POCUS with RV strain and non collapsable IVC, dysmotility of LV   TTE decreased systolic cardiac function EF 36% with decreased diastolic function -rTTE EF 55%   #bradycardia theophyllin     P   RA  incentive spirometry    R   I&o    GI   Diet: fees Monday   NGtube Jevity @ 60  PPI   Senna miralax  BM 3/30    E   Goal euglycemia (-180)  A1c 5.5     H   SCD   Chemoppx   LED R below knee dvt     ID   Low grade fever    lines  R evd  L axillary a line  ETT   pivs  ngtube

## 2024-04-05 NOTE — PROGRESS NOTE ADULT - ASSESSMENT
ASSESSMENT/PLAN: [facility] WFNS subarachnoid hemorrhage   Post-SAH day 3, SAH 3/25/2024  3/26/24 evd placed  3/26/24 pcomm clip    N   # @ Pcomm ruptured, s/p clip   #EVD tract acute hemorrhage   #Hydrocephalus/ICP crisis: R EVD in place 15cm H2O 60cc last 24h   #Delayed Cerebral Ischemia Management: TCDs with poor temporal windows, follow up occipital windows low vertebral and basilar MFV, euvolemia, nimodipine  #04/01/24 angio no spasme  #Pain: PRN tylenol oxi fentanyl   #agitations: precedex   Activity: [x] OOB as tolerated    CV   SBP goal liberalize 100-200  #R heart strain: POCUS with RV strain and non collapsable IVC, dysmotility of LV   TTE decreased systolic cardiac function EF 36% with decreased diastolic function -rTTE EF 55%   #bradycardia theophyllin     P   RA  incentive spirometry    R   I&o    GI   Diet: fees   PPI   Senna miralax  BM 3/30    E   Goal euglycemia (-180)  A1c 5.5     H   SCD   Chemoppx   LED R below knee dvt     ID   afebrile    lines  R evd  L axillary a line  ETT   pivs  ngtube     CODE STATUS: FULL CODE    DISPOSITION: ICU     CRITICAL

## 2024-04-05 NOTE — PROGRESS NOTE ADULT - SUBJECTIVE AND OBJECTIVE BOX
HPI   58F, on ASA81, transferred from [place] for SAH HH4 WNFS 5 mF4 ruptured R pcomm aneurysm. Was initially found unresponsive on 3/25/24 at 21:00, intubated at 23:00 at OSH, then txfrd. Given ddavp at OSH. Here in ED 1 unit PLTs given and R frontal EVD placed-high pressure. Went for R pcom clip on 3/26/24, postop evd not functioning then drained on the floor.     Admission scores:   SAH R pcomm ruptured @ HH4 WNFS 5 mF4, GCS<7    24h events   naeon    Exam   alert awake, tracts, follows simple commands, gaze midline, peerl, RUE spontaneous AG,  LUE plegic, RLE AG, moves toes     LED 3/28   IMPRESSION:  Acute left lower extremity DVT below the knee, involving the left soleal   vein. Edema of the superficial soft tissues of the left calf. Correlate   clinically.  No acute DVT of the right lower extremity.    EEG 3/28/24  EEG SUMMARY/CLASSIFICATION    Abnormal EEG  - Independent bilateral frontal sharp-wave discharges  - GPDs with triphasic morphology.   - Bifrontal slowing, left greater than right.  - Moderate generalized slowing.    _____________________________________________________________  EEG IMPRESSION/CLINICAL CORRELATE    Abnormal EEG study.  Potential independent epileptogenic foci in the bilateral frontal regions.  Structural or functional abnormality in the bilateral frontal head regions, left greater than right.  Moderate nonspecific diffuse or multifocal cerebral dysfunction.     TTE 3/26/24   CONCLUSIONS:   1. Left ventricular systolic function is moderately to severely decreased with an ejection fraction of 36 % by Mcgee's method of disks. Regional wall motion abnormalities consistent with ischemic heart disease.   2. Multiple segmental abnormalities exist. See findings.   3. There is moderate (grade 2) left ventricular diastolic dysfunction, with elevated filling pressure.   4. Normal right ventricular cavity size, with normal wall thickness, and normal systolic function. Tricuspid annular plane systolic excursion (TAPSE) is 1.7 cm (normal >=1.7 cm).   5. Normal left and right atrial size.   6. No significant valvular disease.   7. Estimated pulmonary artery systolic pressure is 41 mmHg.   8. The inferior vena cava is normal in size measuring 1.98 cm in diameter, (normal <2.1cm) with abnormal inspiratory collapse (abnormal <50%) consistent with mildly elevated right atrial pressure (~8, range 5-10mmHg).   9. No pericardial effusion seen.  10. No prior echocardiogram is available for comparison.  11. There is no evidence of a left ventricular thrombus.  12. There is increased LV mass and concentric hypertrophy.    VITALS:   Vital Signs Last 24 Hrs  T(C): 37 (05 Apr 2024 09:00), Max: 38.8 (04 Apr 2024 19:00)  T(F): 39.2 (05 Apr 2024 09:00), Max: 101.8 (04 Apr 2024 19:00)  HR: 71 (05 Apr 2024 09:00) (61 - 84)  BP: 124/80 (05 Apr 2024 09:00) (120/85 - 175/74)  BP(mean): 94 (05 Apr 2024 09:00) (91 - 133)  RR: 20 (05 Apr 2024 09:00) (15 - 42)  SpO2: 100% (05 Apr 2024 09:00) (94% - 100%)    Parameters below as of 05 Apr 2024 07:00  Patient On (Oxygen Delivery Method): nasal cannula  O2 Flow (L/min): 3    CAPILLARY BLOOD GLUCOSE        I&O's Summary    04 Apr 2024 07:01  -  05 Apr 2024 07:00  --------------------------------------------------------  IN: 2609.8 mL / OUT: 1760 mL / NET: 849.8 mL    05 Apr 2024 07:01  -  05 Apr 2024 10:13  --------------------------------------------------------  IN: 180 mL / OUT: 17 mL / NET: 163 mL        Respiratory:        LABS:                        11.0   14.59 )-----------( 246      ( 05 Apr 2024 04:25 )             34.2     04-05    137  |  102  |  12  ----------------------------<  109<H>  4.2   |  22  |  0.42<L>        MEDICATION LEVELS:     IVF FLUIDS/MEDICATIONS:   MEDICATIONS  (STANDING):  cefTRIAXone   IVPB 1000 milliGRAM(s) IV Intermittent every 24 hours  chlorhexidine 4% Liquid 1 Application(s) Topical at bedtime  enoxaparin Injectable 40 milliGRAM(s) SubCutaneous <User Schedule>  polyethylene glycol 3350 17 Gram(s) Oral every 12 hours  QUEtiapine 25 milliGRAM(s) Oral at bedtime  QUEtiapine 25 milliGRAM(s) Oral two times a day  senna 2 Tablet(s) Oral at bedtime  theophylline Syrup 50 milliGRAM(s) Oral every 12 hours    MEDICATIONS  (PRN):  acetaminophen     Tablet .. 650 milliGRAM(s) Oral every 6 hours PRN Temp greater or equal to 38.5C (101.3F), Mild Pain (1 - 3)  oxyCODONE    IR 10 milliGRAM(s) Oral every 4 hours PRN Severe Pain (7 - 10)  oxyCODONE    IR 5 milliGRAM(s) Oral every 4 hours PRN Moderate Pain (4 - 6)

## 2024-04-05 NOTE — PROGRESS NOTE ADULT - SUBJECTIVE AND OBJECTIVE BOX
HPI   58F, on ASA81, transferred from [place] for SAH HH4 WNFS 5 mF4 ruptured R pcomm aneurysm. Was initially found unresponsive on 3/25/24 at 21:00, intubated at 23:00 at OSH, then txfrd. Given ddavp at OSH. Here in ED 1 unit PLTs given and R frontal EVD placed-high pressure. Went for R pcom clip on 3/26/24, postop evd not functioning then drained on the floor.     Admission scores:   SAH R pcomm ruptured @ HH4 WNFS 5 mF4, GCS <7    24h events   naeon    Exam   alert awake, tracts, follows simple commands, gaze midline, peerl, RUE spontaneous AG,  LUE plegic, RLE AG, moves toes       VITALS:   T(C): 37.9 (04-04-24 @ 23:00), Max: 38.8 (04-04-24 @ 19:00)  HR: 77 (04-04-24 @ 22:00) (70 - 82)  BP: 149/66 (04-04-24 @ 22:00) (135/63 - 197/82)  BP(mean): 95 (04-04-24 @ 22:00) (91 - 124)  RR: 19 (04-04-24 @ 22:00) (19 - 37)  SpO2: 99% (04-04-24 @ 22:00) (97% - 100%)    acetaminophen     Tablet .. 650 milliGRAM(s) Oral every 6 hours PRN  cefTRIAXone   IVPB 1000 milliGRAM(s) IV Intermittent every 24 hours  chlorhexidine 4% Liquid 1 Application(s) Topical at bedtime  enoxaparin Injectable 40 milliGRAM(s) SubCutaneous <User Schedule>  oxyCODONE    IR 5 milliGRAM(s) Oral every 4 hours PRN  oxyCODONE    IR 10 milliGRAM(s) Oral every 4 hours PRN  polyethylene glycol 3350 17 Gram(s) Oral every 12 hours  QUEtiapine 25 milliGRAM(s) Oral at bedtime  QUEtiapine 25 milliGRAM(s) Oral two times a day  senna 2 Tablet(s) Oral at bedtime  theophylline Syrup 50 milliGRAM(s) Oral every 12 hours        04-03-24 @ 07:01  -  04-04-24 @ 07:00  --------------------------------------------------------  IN: 1067.4 mL / OUT: 1983 mL / NET: -915.6 mL    04-04-24 @ 07:01  -  04-05-24 @ 02:33  --------------------------------------------------------  IN: 2079.8 mL / OUT: 952 mL / NET: 1127.8 mL                            10.0   10.30 )-----------( 213 ( 03 Apr 2024 09:15 )             31.1     04-03    140  |  102  |  17  ----------------------------<  103<H>  3.4<L>   |  29  |  0.50

## 2024-04-05 NOTE — PROGRESS NOTE ADULT - ASSESSMENT
DeliaJie  58F, on ASA81 for pain relief? (fell a month ago), presents as txfr from [place] for diffuse SAH w/small IVH, 2/2 ruptured posterolaterally projecting 2.9x2.4x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 Was initially found unresponsive at 21:00, intubated at 23:00 at OSH, then txfrd. Given ddavp at OSH. On arrival patient w/ PERRL, +cough/gag, 2/5 spontaneous movement in LUE, o/w no movement. 1 unit PLTs given and R frontal EVD placed-high pressure. PLTs/Coags wnl. Exam improved: intubated, PERRL, BUE localize, b/l wd         EVD@15 further challenge in AM  TCDs/SAH protocol

## 2024-04-06 ENCOUNTER — APPOINTMENT (OUTPATIENT)
Dept: NEUROSURGERY | Facility: HOSPITAL | Age: 59
End: 2024-04-06

## 2024-04-06 LAB
ANION GAP SERPL CALC-SCNC: 13 MMOL/L — SIGNIFICANT CHANGE UP (ref 5–17)
APPEARANCE CSF: CLEAR — SIGNIFICANT CHANGE UP
BUN SERPL-MCNC: 18 MG/DL — SIGNIFICANT CHANGE UP (ref 7–23)
CALCIUM SERPL-MCNC: 8.9 MG/DL — SIGNIFICANT CHANGE UP (ref 8.4–10.5)
CHLORIDE SERPL-SCNC: 98 MMOL/L — SIGNIFICANT CHANGE UP (ref 96–108)
CO2 SERPL-SCNC: 24 MMOL/L — SIGNIFICANT CHANGE UP (ref 22–31)
COLOR CSF: ABNORMAL
CREAT SERPL-MCNC: 0.52 MG/DL — SIGNIFICANT CHANGE UP (ref 0.5–1.3)
EGFR: 108 ML/MIN/1.73M2 — SIGNIFICANT CHANGE UP
GLUCOSE CSF-MCNC: 72 MG/DL — HIGH (ref 40–70)
GLUCOSE SERPL-MCNC: 133 MG/DL — HIGH (ref 70–99)
GRAM STN FLD: SIGNIFICANT CHANGE UP
HCT VFR BLD CALC: 33.1 % — LOW (ref 34.5–45)
HGB BLD-MCNC: 10.8 G/DL — LOW (ref 11.5–15.5)
LACTATE CSF-MCNC: 2.7 MMOL/L — HIGH (ref 1.1–2.4)
LYMPHOCYTES # CSF: 85 % — HIGH (ref 40–80)
MAGNESIUM SERPL-MCNC: 2 MG/DL — SIGNIFICANT CHANGE UP (ref 1.6–2.6)
MCHC RBC-ENTMCNC: 29.2 PG — SIGNIFICANT CHANGE UP (ref 27–34)
MCHC RBC-ENTMCNC: 32.6 GM/DL — SIGNIFICANT CHANGE UP (ref 32–36)
MCV RBC AUTO: 89.5 FL — SIGNIFICANT CHANGE UP (ref 80–100)
MONOS+MACROS NFR CSF: 4 % — LOW (ref 15–45)
NEUTROPHILS # CSF: 11 % — HIGH (ref 0–6)
NRBC # BLD: 0 /100 WBCS — SIGNIFICANT CHANGE UP (ref 0–0)
NRBC NFR CSF: 3 /UL — SIGNIFICANT CHANGE UP (ref 0–5)
PHOSPHATE SERPL-MCNC: 3.9 MG/DL — SIGNIFICANT CHANGE UP (ref 2.5–4.5)
PLATELET # BLD AUTO: 253 K/UL — SIGNIFICANT CHANGE UP (ref 150–400)
POTASSIUM SERPL-MCNC: 3.9 MMOL/L — SIGNIFICANT CHANGE UP (ref 3.5–5.3)
POTASSIUM SERPL-SCNC: 3.9 MMOL/L — SIGNIFICANT CHANGE UP (ref 3.5–5.3)
PROT CSF-MCNC: 18 MG/DL — SIGNIFICANT CHANGE UP (ref 15–45)
RBC # BLD: 3.7 M/UL — LOW (ref 3.8–5.2)
RBC # CSF: 910 /UL — HIGH (ref 0–0)
RBC # FLD: 13.2 % — SIGNIFICANT CHANGE UP (ref 10.3–14.5)
SODIUM SERPL-SCNC: 135 MMOL/L — SIGNIFICANT CHANGE UP (ref 135–145)
SPECIMEN SOURCE: SIGNIFICANT CHANGE UP
TUBE TYPE: SIGNIFICANT CHANGE UP
WBC # BLD: 17.13 K/UL — HIGH (ref 3.8–10.5)
WBC # FLD AUTO: 17.13 K/UL — HIGH (ref 3.8–10.5)

## 2024-04-06 PROCEDURE — 61650 EVASC PRLNG ADMN RX AGNT 1ST: CPT | Mod: 58

## 2024-04-06 PROCEDURE — 70450 CT HEAD/BRAIN W/O DYE: CPT | Mod: 26

## 2024-04-06 PROCEDURE — 95813 EEG EXTND MNTR 61-119 MIN: CPT | Mod: 26

## 2024-04-06 PROCEDURE — 0042T: CPT

## 2024-04-06 PROCEDURE — 70496 CT ANGIOGRAPHY HEAD: CPT | Mod: 26

## 2024-04-06 PROCEDURE — 99291 CRITICAL CARE FIRST HOUR: CPT

## 2024-04-06 PROCEDURE — 70498 CT ANGIOGRAPHY NECK: CPT | Mod: 26

## 2024-04-06 PROCEDURE — 61651 EVASC PRLNG ADMN RX AGNT ADD: CPT | Mod: 58

## 2024-04-06 PROCEDURE — 71045 X-RAY EXAM CHEST 1 VIEW: CPT | Mod: 26

## 2024-04-06 PROCEDURE — 36620 INSERTION CATHETER ARTERY: CPT

## 2024-04-06 RX ORDER — PHENYLEPHRINE HYDROCHLORIDE 10 MG/ML
0.1 INJECTION INTRAVENOUS
Qty: 40 | Refills: 0 | Status: DISCONTINUED | OUTPATIENT
Start: 2024-04-06 | End: 2024-04-07

## 2024-04-06 RX ORDER — SODIUM CHLORIDE 9 MG/ML
1000 INJECTION, SOLUTION INTRAVENOUS
Refills: 0 | Status: DISCONTINUED | OUTPATIENT
Start: 2024-04-06 | End: 2024-04-07

## 2024-04-06 RX ORDER — FENTANYL CITRATE 50 UG/ML
25 INJECTION INTRAVENOUS ONCE
Refills: 0 | Status: DISCONTINUED | OUTPATIENT
Start: 2024-04-06 | End: 2024-04-06

## 2024-04-06 RX ORDER — POTASSIUM CHLORIDE 20 MEQ
40 PACKET (EA) ORAL ONCE
Refills: 0 | Status: COMPLETED | OUTPATIENT
Start: 2024-04-06 | End: 2024-04-06

## 2024-04-06 RX ORDER — SODIUM CHLORIDE 9 MG/ML
500 INJECTION, SOLUTION INTRAVENOUS ONCE
Refills: 0 | Status: COMPLETED | OUTPATIENT
Start: 2024-04-06 | End: 2024-04-06

## 2024-04-06 RX ORDER — DEXMEDETOMIDINE HYDROCHLORIDE IN 0.9% SODIUM CHLORIDE 4 UG/ML
0.2 INJECTION INTRAVENOUS
Qty: 200 | Refills: 0 | Status: DISCONTINUED | OUTPATIENT
Start: 2024-04-06 | End: 2024-04-13

## 2024-04-06 RX ORDER — QUETIAPINE FUMARATE 200 MG/1
12.5 TABLET, FILM COATED ORAL
Refills: 0 | Status: DISCONTINUED | OUTPATIENT
Start: 2024-04-06 | End: 2024-04-08

## 2024-04-06 RX ADMIN — SODIUM CHLORIDE 500 MILLILITER(S): 9 INJECTION, SOLUTION INTRAVENOUS at 01:13

## 2024-04-06 RX ADMIN — FENTANYL CITRATE 25 MICROGRAM(S): 50 INJECTION INTRAVENOUS at 08:12

## 2024-04-06 RX ADMIN — Medication 50 MILLIGRAM(S): at 22:11

## 2024-04-06 RX ADMIN — FENTANYL CITRATE 25 MICROGRAM(S): 50 INJECTION INTRAVENOUS at 08:30

## 2024-04-06 RX ADMIN — CEFTRIAXONE 100 MILLIGRAM(S): 500 INJECTION, POWDER, FOR SOLUTION INTRAMUSCULAR; INTRAVENOUS at 02:27

## 2024-04-06 RX ADMIN — CHLORHEXIDINE GLUCONATE 1 APPLICATION(S): 213 SOLUTION TOPICAL at 22:11

## 2024-04-06 RX ADMIN — Medication 50 MILLIGRAM(S): at 05:49

## 2024-04-06 RX ADMIN — POLYETHYLENE GLYCOL 3350 17 GRAM(S): 17 POWDER, FOR SOLUTION ORAL at 05:48

## 2024-04-06 RX ADMIN — QUETIAPINE FUMARATE 25 MILLIGRAM(S): 200 TABLET, FILM COATED ORAL at 22:11

## 2024-04-06 RX ADMIN — SODIUM CHLORIDE 75 MILLILITER(S): 9 INJECTION, SOLUTION INTRAVENOUS at 19:28

## 2024-04-06 RX ADMIN — ENOXAPARIN SODIUM 40 MILLIGRAM(S): 100 INJECTION SUBCUTANEOUS at 18:19

## 2024-04-06 RX ADMIN — PHENYLEPHRINE HYDROCHLORIDE 2.67 MICROGRAM(S)/KG/MIN: 10 INJECTION INTRAVENOUS at 19:28

## 2024-04-06 RX ADMIN — Medication 40 MILLIEQUIVALENT(S): at 05:48

## 2024-04-06 NOTE — PROGRESS NOTE ADULT - ASSESSMENT
DeliaJie  58F, on ASA81 for pain relief? (fell a month ago), presents as txfr from [place] for diffuse SAH w/small IVH, 2/2 ruptured posterolaterally projecting 2.9x2.4x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 Was initially found unresponsive at 21:00, intubated at 23:00 at OSH, then txfrd. Given ddavp at OSH. On arrival patient w/ PERRL, +cough/gag, 2/5 spontaneous movement in LUE, o/w no movement. 1 unit PLTs given and R frontal EVD placed-high pressure. PLTs/Coags wnl. Exam improved: intubated, PERRL, BUE localize, b/l wd         EVD@20 further challenge in AM  TCDs/SAH protocol

## 2024-04-06 NOTE — CHART NOTE - NSCHARTNOTEFT_GEN_A_CORE
EEG preliminary read (not final) on the initial recording = x 50 mins     No seizures recorded.  Occasional left frontal sharp waves.  Moderate slowing noted, nonspecific.    Final report to follow tomorrow morning after completion of study.    [facility] EEG Reading Room Ph#: [phone]  Epilepsy Answering Service after 5PM and before 8:30AM: Ph#: [phone]

## 2024-04-06 NOTE — PROGRESS NOTE ADULT - SUBJECTIVE AND OBJECTIVE BOX
HPI   58F, on ASA81, transferred from [place] for SAH HH4 WNFS 5 mF4 ruptured R pcomm aneurysm. Was initially found unresponsive on 3/25/24 at 21:00, intubated at 23:00 at OSH, then txfrd. Given ddavp at OSH. Here in ED 1 unit PLTs given and R frontal EVD placed-high pressure. Went for R pcom clip on 3/26/24, postop evd not functioning then drained on the floor.     Admission scores:   SAH R pcomm ruptured @ HH4 WNFS 5 mF4, GCS<7    24h events   evd was clamped complicated with patient becoming more sleepy, evd reopened     Exam   alert awake, tracts, follows simple commands, gaze midline, peerl, RUE spontaneous AG,  LUE plegic, RLE AG, moves toes     LED 3/28   IMPRESSION:  Acute left lower extremity DVT below the knee, involving the left soleal   vein. Edema of the superficial soft tissues of the left calf. Correlate   clinically.  No acute DVT of the right lower extremity.    EEG 3/28/24  EEG SUMMARY/CLASSIFICATION    Abnormal EEG  - Independent bilateral frontal sharp-wave discharges  - GPDs with triphasic morphology.   - Bifrontal slowing, left greater than right.  - Moderate generalized slowing.    _____________________________________________________________  EEG IMPRESSION/CLINICAL CORRELATE    Abnormal EEG study.  Potential independent epileptogenic foci in the bilateral frontal regions.  Structural or functional abnormality in the bilateral frontal head regions, left greater than right.  Moderate nonspecific diffuse or multifocal cerebral dysfunction.     TTE 3/26/24   CONCLUSIONS:   1. Left ventricular systolic function is moderately to severely decreased with an ejection fraction of 36 % by Mcgee's method of disks. Regional wall motion abnormalities consistent with ischemic heart disease.   2. Multiple segmental abnormalities exist. See findings.   3. There is moderate (grade 2) left ventricular diastolic dysfunction, with elevated filling pressure.   4. Normal right ventricular cavity size, with normal wall thickness, and normal systolic function. Tricuspid annular plane systolic excursion (TAPSE) is 1.7 cm (normal >=1.7 cm).   5. Normal left and right atrial size.   6. No significant valvular disease.   7. Estimated pulmonary artery systolic pressure is 41 mmHg.   8. The inferior vena cava is normal in size measuring 1.98 cm in diameter, (normal <2.1cm) with abnormal inspiratory collapse (abnormal <50%) consistent with mildly elevated right atrial pressure (~8, range 5-10mmHg).   9. No pericardial effusion seen.  10. No prior echocardiogram is available for comparison.  11. There is no evidence of a left ventricular thrombus.  12. There is increased LV mass and concentric hypertrophy.    VITALS:   Vital Signs Last 24 Hrs  T(C): 37.3 (06 Apr 2024 07:00), Max: 42 (05 Apr 2024 12:00)  T(F): 99.1 (06 Apr 2024 07:00), Max: 107.6 (05 Apr 2024 12:00)  HR: 94 (06 Apr 2024 10:00) (69 - 99)  BP: 141/69 (06 Apr 2024 10:00) (93/50 - 185/74)  BP(mean): 94 (06 Apr 2024 10:00) (61 - 117)  RR: 20 (06 Apr 2024 10:00) (16 - 30)  SpO2: 99% (06 Apr 2024 10:00) (94% - 100%)    Parameters below as of 05 Apr 2024 19:00  Patient On (Oxygen Delivery Method): nasal cannula  O2 Flow (L/min): 3    CAPILLARY BLOOD GLUCOSE        I&O's Summary    05 Apr 2024 07:01  -  06 Apr 2024 07:00  --------------------------------------------------------  IN: 1640 mL / OUT: 1101 mL / NET: 539 mL    06 Apr 2024 07:01  -  06 Apr 2024 10:47  --------------------------------------------------------  IN: 120 mL / OUT: 0 mL / NET: 120 mL        Respiratory:        LABS:                        10.8   17.13 )-----------( 253      ( 06 Apr 2024 01:18 )             33.1     04-06    135  |  98  |  18  ----------------------------<  133<H>  3.9   |  24  |  0.52        MEDICATION LEVELS:     IVF FLUIDS/MEDICATIONS:   MEDICATIONS  (STANDING):  cefTRIAXone   IVPB 1000 milliGRAM(s) IV Intermittent every 24 hours  chlorhexidine 4% Liquid 1 Application(s) Topical at bedtime  enoxaparin Injectable 40 milliGRAM(s) SubCutaneous <User Schedule>  phenylephrine    Infusion 0.1 MICROgram(s)/kG/Min (2.67 mL/Hr) IV Continuous <Continuous>  polyethylene glycol 3350 17 Gram(s) Oral every 12 hours  QUEtiapine 12.5 milliGRAM(s) Oral <User Schedule>  QUEtiapine 25 milliGRAM(s) Oral at bedtime  senna 2 Tablet(s) Oral at bedtime  theophylline Syrup 50 milliGRAM(s) Oral every 12 hours    MEDICATIONS  (PRN):  acetaminophen     Tablet .. 650 milliGRAM(s) Oral every 6 hours PRN Temp greater or equal to 38.5C (101.3F), Mild Pain (1 - 3)  oxyCODONE    IR 10 milliGRAM(s) Oral every 4 hours PRN Severe Pain (7 - 10)  oxyCODONE    IR 5 milliGRAM(s) Oral every 4 hours PRN Moderate Pain (4 - 6)

## 2024-04-06 NOTE — PROGRESS NOTE ADULT - ASSESSMENT
ASSESSMENT/PLAN: HH4 WNFS 5 mF4 subarachnoid hemorrhage   Post-SAH day 12, SAH 3/25/2024  3/26/24 evd placed  3/26/24 pcomm clip    N   # @ Pcomm ruptured, s/p clip   #EVD tract acute hemorrhage/not tolerating clamp - CTA/CTP to follow up, hypertensive goal 160-200   #Hydrocephalus/ICP crisis: R EVD in place 15cm H2O 51cc last 24h   #Delayed Cerebral Ischemia Management: TCDs with poor temporal windows, follow up occipital windows low vertebral and basilar MFV, euvolemia, nimodipine  #04/01/24 angio no spasme  #Pain: PRN tylenol oxi fentanyl   #agitations: precedex   Activity: [x] OOB as tolerated    CV   SBP goal 160-200 (not tolerating clamp trial)   #R heart strain: TTE decreased systolic cardiac function EF 36% with decreased diastolic function -rTTE EF 55%   #bradycardia theophyllin     P   RA  incentive spirometry    R   I&o    GI   Diet: fees   PPI   Senna miralax  BM 3/30    E   Goal euglycemia (-180)  A1c 5.5     H   SCD   Chemoppx   LED R below knee dvt     ID   afebrile    lines  R evd  L axillary a line  ETT   pivs  ngtube     CODE STATUS: FULL CODE    DISPOSITION: ICU     CRITICAL

## 2024-04-06 NOTE — CHART NOTE - NSCHARTNOTEFT_GEN_A_CORE
Interventional Neuro Radiology  Pre-Procedure Note    This is a 58F, on ASA81 for pain relief? 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 Was initially found unresponsive at 21:00, intubated at 23:00 at OSH, then txfrd. Given ddavp at OSH. On arrival patient w/ PERRL, +cough/gag, 2/5 spontaneous movement in LUE, o/w no movement. 1 unit PLTs given and R frontal EVD placed-high pressure. Post-SAH day 12, SAH 3/25/2024, 3/26/24 evd placed, 3/26/24 right pcomm clip.     Neuro Exam: alert awake, tracts, follows simple commands, gaze midline, peerl, RUE spontaneous AG,  LUE plegic, RLE AG, moves toes     Social History:   Denies tobacco use    FAMILY HISTORY:  No pertinent family history    Allergies:   No Known Allergies      Current Medications:   acetaminophen     Tablet .. 650 milliGRAM(s) Oral every 6 hours PRN  cefTRIAXone   IVPB 1000 milliGRAM(s) IV Intermittent every 24 hours  chlorhexidine 4% Liquid 1 Application(s) Topical at bedtime  dexMEDEtomidine Infusion 0.2 MICROgram(s)/kG/Hr IV Continuous <Continuous>  enoxaparin Injectable 40 milliGRAM(s) SubCutaneous <User Schedule>  multiple electrolytes Injection Type 1 1000 milliLiter(s) IV Continuous <Continuous>  oxyCODONE    IR 5 milliGRAM(s) Oral every 4 hours PRN  oxyCODONE    IR 10 milliGRAM(s) Oral every 4 hours PRN  phenylephrine    Infusion 0.1 MICROgram(s)/kG/Min IV Continuous <Continuous>  polyethylene glycol 3350 17 Gram(s) Oral every 12 hours  QUEtiapine 12.5 milliGRAM(s) Oral <User Schedule>  QUEtiapine 25 milliGRAM(s) Oral at bedtime  senna 2 Tablet(s) Oral at bedtime  theophylline Syrup 50 milliGRAM(s) Oral every 12 hours      Labs:                         10.8   17.13 )-----------( 253      ( 06 Apr 2024 01:18 )             33.1       04-06    135  |  98  |  18  ----------------------------<  133<H>  3.9   |  24  |  0.52    Ca    8.9      06 Apr 2024 01:18  Phos  3.9     04-06  Mg     2.0     04-06        Assessment/Plan:   This is a 57yo female  presents with SAH, s/p right pcomm artery aneurysm s/p clip, now with possible vasospasm. Patient presents to neuro-IR for selective cerebral angiography and vasospasm treatment . Procedure/ risks/ benefits/ goals/ alternatives were explained. Risks include but are not limited to stroke/ vessel injury/ hemorrhage/ groin hematoma. All questions answered. Informed content obtained from patient's son. Consent placed in chart.    Tamiko Jarrell PA-C  [phone]

## 2024-04-06 NOTE — CHART NOTE - NSCHARTNOTEFT_GEN_A_CORE
Interventional Neuro- Radiology   Procedure Note    Procedure: Selective Cerebral Angiography and vasospasm tx  Pre- Procedure Diagnosis: SAH, right pcomm aneurysm, spasm   Post- Procedure Diagnosis:    : Dr. Lowell MD  Fellow: Dr. Andrés MD   Physician Assistant: Tamiko Jarrell PA-C    RN: Sandeep Lee: Armin/ Garrett     Anesthesia: Dr. Yeboah (MAC)      I/Os:  Fluids:  [place]: DTV   Contrast:  Estimated Blood Loss: <10cc      Preliminary Report:  Under MAC, using a ___Fr short/long sheath to the right femoral artery examination of left vertebral artery/ left internal carotid artery/ left external carotid artery/ right vertebral artery/ right internal carotid artery/ right external carotid artery via selective cerebral angiography demonstrates ________. ( Official note to follow).    Patient tolerated procedure well, vital signs stable, hemodynamically stable, no change in neurological status compared to baseline. Results discussed with patient and their family. Groin sheath d/c'ed, manual compression held to hemostasis, no active bleeding, no hematoma, Vascade applied, quick clot and safeguard balloon dressing applied at _____h. Patient transferred to NSCU for further care/ monitoring. Interventional Neuro- Radiology   Procedure Note    Procedure: Selective Cerebral Angiography and vasospasm tx  Pre- Procedure Diagnosis: SAH, right pcomm aneurysm, spasm   Post- Procedure Diagnosis: vasospasm s/p IA treatment     : Dr. Lowell MD  Fellow: Dr. Andrés MD   Physician Assistant: Tamiko Jarrell PA-C    RN: Sandepe  Tech: Armin/ Garrett     Anesthesia: Dr. Yeboah (MAC)      I/Os:  Fluids: 700cc   [place]: DTV   Contrast: 62cc  EVD: 20cc   Estimated Blood Loss: <10cc      Preliminary Report:  Under MAC, using a 5Fr short sheath to the right femoral artery examination of left vertebral artery/ left internal carotid artery/ right vertebral artery/ right internal carotid artery via selective cerebral angiography demonstrates vasospasm: right ICA 4mg milrinone and 2mg verapamil, left ICA 6mg milrinone and 2mg verapamil and right vert. 3mg milrinone all with angiographic improvement, . ( Official note to follow).    Patient tolerated procedure well, vital signs stable, hemodynamically stable, no change in neurological status compared to baseline. Results discussed with patient's family. Groin sheath d/c'ed, manual compression held to hemostasis, no active bleeding, no hematoma, Vascade applied, quick clot and safeguard balloon dressing applied at 1745h. Patient transferred to NSCU for further care/ monitoring.

## 2024-04-06 NOTE — CHART NOTE - NSCHARTNOTEFT_GEN_A_CORE
Nutrition Note - Chart Note    Indirect Calorimetry study no longer indicated as pt now extubated. RD/RT to remain available to conduct study when/if applicable as requested by medical team.     Marely Montoya RD, available via TEAMS.

## 2024-04-07 LAB
ANION GAP SERPL CALC-SCNC: 13 MMOL/L — SIGNIFICANT CHANGE UP (ref 5–17)
APPEARANCE UR: CLEAR — SIGNIFICANT CHANGE UP
BACTERIA # UR AUTO: NEGATIVE /HPF — SIGNIFICANT CHANGE UP
BILIRUB UR-MCNC: NEGATIVE — SIGNIFICANT CHANGE UP
BUN SERPL-MCNC: 11 MG/DL — SIGNIFICANT CHANGE UP (ref 7–23)
CALCIUM SERPL-MCNC: 8.8 MG/DL — SIGNIFICANT CHANGE UP (ref 8.4–10.5)
CAST: 1 /LPF — SIGNIFICANT CHANGE UP (ref 0–4)
CHLORIDE SERPL-SCNC: 102 MMOL/L — SIGNIFICANT CHANGE UP (ref 96–108)
CO2 SERPL-SCNC: 23 MMOL/L — SIGNIFICANT CHANGE UP (ref 22–31)
COLOR SPEC: YELLOW — SIGNIFICANT CHANGE UP
CREAT SERPL-MCNC: 0.38 MG/DL — LOW (ref 0.5–1.3)
DIFF PNL FLD: ABNORMAL
EGFR: 117 ML/MIN/1.73M2 — SIGNIFICANT CHANGE UP
GLUCOSE SERPL-MCNC: 105 MG/DL — HIGH (ref 70–99)
GLUCOSE UR QL: NEGATIVE MG/DL — SIGNIFICANT CHANGE UP
HCT VFR BLD CALC: 30.5 % — LOW (ref 34.5–45)
HGB BLD-MCNC: 10.1 G/DL — LOW (ref 11.5–15.5)
KETONES UR-MCNC: NEGATIVE MG/DL — SIGNIFICANT CHANGE UP
LEUKOCYTE ESTERASE UR-ACNC: ABNORMAL
MAGNESIUM SERPL-MCNC: 2 MG/DL — SIGNIFICANT CHANGE UP (ref 1.6–2.6)
MCHC RBC-ENTMCNC: 29 PG — SIGNIFICANT CHANGE UP (ref 27–34)
MCHC RBC-ENTMCNC: 33.1 GM/DL — SIGNIFICANT CHANGE UP (ref 32–36)
MCV RBC AUTO: 87.6 FL — SIGNIFICANT CHANGE UP (ref 80–100)
NITRITE UR-MCNC: NEGATIVE — SIGNIFICANT CHANGE UP
NRBC # BLD: 0 /100 WBCS — SIGNIFICANT CHANGE UP (ref 0–0)
PH UR: 7.5 — SIGNIFICANT CHANGE UP (ref 5–8)
PHOSPHATE SERPL-MCNC: 2.6 MG/DL — SIGNIFICANT CHANGE UP (ref 2.5–4.5)
PLATELET # BLD AUTO: 295 K/UL — SIGNIFICANT CHANGE UP (ref 150–400)
POTASSIUM SERPL-MCNC: 3.8 MMOL/L — SIGNIFICANT CHANGE UP (ref 3.5–5.3)
POTASSIUM SERPL-SCNC: 3.8 MMOL/L — SIGNIFICANT CHANGE UP (ref 3.5–5.3)
PROT UR-MCNC: SIGNIFICANT CHANGE UP MG/DL
RBC # BLD: 3.48 M/UL — LOW (ref 3.8–5.2)
RBC # FLD: 13.3 % — SIGNIFICANT CHANGE UP (ref 10.3–14.5)
RBC CASTS # UR COMP ASSIST: 28 /HPF — HIGH (ref 0–4)
SODIUM SERPL-SCNC: 138 MMOL/L — SIGNIFICANT CHANGE UP (ref 135–145)
SP GR SPEC: 1.01 — SIGNIFICANT CHANGE UP (ref 1–1.03)
SQUAMOUS # UR AUTO: 3 /HPF — SIGNIFICANT CHANGE UP (ref 0–5)
UROBILINOGEN FLD QL: 2 MG/DL (ref 0.2–1)
WBC # BLD: 15.59 K/UL — HIGH (ref 3.8–10.5)
WBC # FLD AUTO: 15.59 K/UL — HIGH (ref 3.8–10.5)
WBC UR QL: 20 /HPF — HIGH (ref 0–5)

## 2024-04-07 PROCEDURE — 95720 EEG PHY/QHP EA INCR W/VEEG: CPT

## 2024-04-07 PROCEDURE — 99291 CRITICAL CARE FIRST HOUR: CPT

## 2024-04-07 PROCEDURE — 70450 CT HEAD/BRAIN W/O DYE: CPT | Mod: 26

## 2024-04-07 RX ORDER — SODIUM,POTASSIUM PHOSPHATES 278-250MG
2 POWDER IN PACKET (EA) ORAL ONCE
Refills: 0 | Status: COMPLETED | OUTPATIENT
Start: 2024-04-07 | End: 2024-04-07

## 2024-04-07 RX ORDER — ACETAMINOPHEN 500 MG
1000 TABLET ORAL ONCE
Refills: 0 | Status: COMPLETED | OUTPATIENT
Start: 2024-04-07 | End: 2024-04-07

## 2024-04-07 RX ORDER — PHENYLEPHRINE HYDROCHLORIDE 10 MG/ML
0.1 INJECTION INTRAVENOUS
Qty: 40 | Refills: 0 | Status: DISCONTINUED | OUTPATIENT
Start: 2024-04-07 | End: 2024-04-08

## 2024-04-07 RX ORDER — POTASSIUM CHLORIDE 20 MEQ
20 PACKET (EA) ORAL ONCE
Refills: 0 | Status: COMPLETED | OUTPATIENT
Start: 2024-04-07 | End: 2024-04-07

## 2024-04-07 RX ORDER — NOREPINEPHRINE BITARTRATE/D5W 8 MG/250ML
0.1 PLASTIC BAG, INJECTION (ML) INTRAVENOUS
Qty: 8 | Refills: 0 | Status: DISCONTINUED | OUTPATIENT
Start: 2024-04-07 | End: 2024-04-11

## 2024-04-07 RX ORDER — BROMOCRIPTINE MESYLATE 5 MG/1
5 CAPSULE ORAL EVERY 8 HOURS
Refills: 0 | Status: DISCONTINUED | OUTPATIENT
Start: 2024-04-07 | End: 2024-04-07

## 2024-04-07 RX ORDER — SODIUM CHLORIDE 9 MG/ML
1000 INJECTION INTRAMUSCULAR; INTRAVENOUS; SUBCUTANEOUS ONCE
Refills: 0 | Status: COMPLETED | OUTPATIENT
Start: 2024-04-07 | End: 2024-04-07

## 2024-04-07 RX ADMIN — Medication 400 MILLIGRAM(S): at 21:12

## 2024-04-07 RX ADMIN — Medication 2 PACKET(S): at 05:47

## 2024-04-07 RX ADMIN — ENOXAPARIN SODIUM 40 MILLIGRAM(S): 100 INJECTION SUBCUTANEOUS at 18:03

## 2024-04-07 RX ADMIN — Medication 20 MILLIEQUIVALENT(S): at 05:47

## 2024-04-07 RX ADMIN — POLYETHYLENE GLYCOL 3350 17 GRAM(S): 17 POWDER, FOR SOLUTION ORAL at 05:47

## 2024-04-07 RX ADMIN — QUETIAPINE FUMARATE 12.5 MILLIGRAM(S): 200 TABLET, FILM COATED ORAL at 06:25

## 2024-04-07 RX ADMIN — OXYCODONE HYDROCHLORIDE 10 MILLIGRAM(S): 5 TABLET ORAL at 02:24

## 2024-04-07 RX ADMIN — Medication 50 MILLIGRAM(S): at 18:47

## 2024-04-07 RX ADMIN — Medication 50 MILLIGRAM(S): at 05:48

## 2024-04-07 RX ADMIN — PHENYLEPHRINE HYDROCHLORIDE 2.67 MICROGRAM(S)/KG/MIN: 10 INJECTION INTRAVENOUS at 19:01

## 2024-04-07 RX ADMIN — SENNA PLUS 2 TABLET(S): 8.6 TABLET ORAL at 21:13

## 2024-04-07 RX ADMIN — Medication 13.4 MICROGRAM(S)/KG/MIN: at 22:00

## 2024-04-07 RX ADMIN — CEFTRIAXONE 100 MILLIGRAM(S): 500 INJECTION, POWDER, FOR SOLUTION INTRAMUSCULAR; INTRAVENOUS at 02:24

## 2024-04-07 RX ADMIN — OXYCODONE HYDROCHLORIDE 10 MILLIGRAM(S): 5 TABLET ORAL at 03:24

## 2024-04-07 RX ADMIN — SODIUM CHLORIDE 1000 MILLILITER(S): 9 INJECTION INTRAMUSCULAR; INTRAVENOUS; SUBCUTANEOUS at 22:00

## 2024-04-07 RX ADMIN — CHLORHEXIDINE GLUCONATE 1 APPLICATION(S): 213 SOLUTION TOPICAL at 21:13

## 2024-04-07 RX ADMIN — QUETIAPINE FUMARATE 25 MILLIGRAM(S): 200 TABLET, FILM COATED ORAL at 21:13

## 2024-04-07 RX ADMIN — POLYETHYLENE GLYCOL 3350 17 GRAM(S): 17 POWDER, FOR SOLUTION ORAL at 18:09

## 2024-04-07 NOTE — PROGRESS NOTE ADULT - SUBJECTIVE AND OBJECTIVE BOX
Patient seen and examined at bedside.    --Anticoagulation--    T(C): 36.9 (04-07-24 @ 03:00), Max: 37.7 (04-06-24 @ 18:00)  HR: 60 (04-07-24 @ 03:00) (56 - 96)  BP: 175/70 (04-06-24 @ 15:30) (107/67 - 185/74)  RR: 19 (04-07-24 @ 03:00) (16 - 48)  SpO2: 96% (04-07-24 @ 03:00) (90% - 100%)  Wt(kg): --    Exam:  Ox0, nonverbal, EOS, PERRL, no FC, R side spontaneous strong, L side 3/5

## 2024-04-07 NOTE — PROGRESS NOTE ADULT - SUBJECTIVE AND OBJECTIVE BOX
EVENING ATTENDING PROGRESS NOTE    58F transferred from [place] for SAH HH4 WNFS 5 mF4 ruptured R pcomm aneurysm sp RF EVD & R pcom clip     EVENING EVENTS  - Had been pressing pt 180 - 220, sudden drop of SBP on previously stable dose of Philip. POCUS with collapsed IVC and mildly decreased EF    Exam   Not following commands  gaze midline, peerl,   RUE spontaneous AG,    Labs, Imaging, Vitals reviewed

## 2024-04-07 NOTE — PROGRESS NOTE ADULT - ASSESSMENT
ASSESSMENT/PLAN: HH4 WNFS 5 mF4 subarachnoid hemorrhage   Post-SAH day 12, SAH 3/25/2024  3/26/24 evd placed  3/26/24 pcomm clip  4/6 Intra arterial treatment for vasospasm    N   # @ Pcomm ruptured, s/p clip   #EVD tract acute hemorrhage/not tolerating clamp - CTA/CTP to follow up, hypertensive goal 160-200   #Hydrocephalus/ICP crisis: R EVD in place 15cm H2O 51cc last 24h   #Delayed Cerebral Ischemia Management: angiogram with intraarterial treatment -  - 220, euvolemia, nimodipine  #Pain: PRN tylenol oxi fentanyl   #agitations: precedex   Activity: [x] OOB as tolerated    CV   SBP goal 180 - 220  #R heart strain: TTE decreased systolic cardiac function EF 36% with decreased diastolic function -rTTE EF 55%   #bradycardia theophyllin   Philip for BP augmentation    P   RA  incentive spirometry    R   I&o    GI   Diet: fees   PPI   Senna miralax  BM 3/30    E   Goal euglycemia (-180)  A1c 5.5     H   SCD   Chemoppx   LED R below knee dvt     ID   afebrile    lines  R evd  L axillary a line  ETT   pivs  ngtube  ASSESSMENT/PLAN: HH4 WNFS 5 mF4 subarachnoid hemorrhage   Post-SAH day 12, SAH 3/25/2024  3/26/24 evd placed  3/26/24 pcomm clip  4/6 Intra arterial treatment for vasospasm    N   # @ Pcomm ruptured, s/p clip   #EVD tract acute hemorrhage/not tolerating clamp - CTA/CTP to follow up, hypertensive goal 160-200   #Hydrocephalus/ICP crisis: R EVD in place 15cm H2O 51cc last 24h   #Delayed Cerebral Ischemia Management: angiogram with intraarterial treatment -  - 220, euvolemia, nimodipine  #Pain: PRN tylenol oxi fentanyl   #agitations: precedex   Activity: [x] OOB as tolerated    CV   SBP goal 180 - 220  #R heart strain: TTE decreased systolic cardiac function EF 36% with decreased diastolic function -rTTE EF 55%   #bradycardia theophyllin   Philip for BP augmentation    P   RA  incentive spirometry    R   I&o    GI   Diet: fees   PPI   Senna miralax  BM 3/30    E   Goal euglycemia (-180)  A1c 5.5     H   SCD   Chemoppx   LED R below knee dvt     ID   afebrile  E.Coli UTI s/p 3 days course of ceftriaxone    lines  R evd  L axillary a line  ETT   pivs  ngtube

## 2024-04-07 NOTE — PROGRESS NOTE ADULT - ASSESSMENT
58F, on ASA81 for pain relief? 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 Was initially found unresponsive at 21:00, intubated at 23:00 at OSH, then txfrd. Given ddavp at OSH. On arrival patient w/ PERRL, +cough/gag, 2/5 spontaneous movement in LUE, o/w no movement. 1 unit PLTs given and R frontal EVD placed-high pressure. PLTs/Coags wnl.     Intubated sedated in NSCU   Echo with abnormal findings of severe cardiomyopathy and RWMA

## 2024-04-07 NOTE — PROGRESS NOTE ADULT - SUBJECTIVE AND OBJECTIVE BOX
HPI   58F, on ASA81, transferred from [place] for SAH HH4 WNFS 5 mF4 ruptured R pcomm aneurysm. Was initially found unresponsive on 3/25/24 at 21:00, intubated at 23:00 at OSH, then txfrd. Given ddavp at OSH. Here in ED 1 unit PLTs given and R frontal EVD placed-high pressure. Went for R pcom clip on 3/26/24, postop evd not functioning then drained on the floor.     Admission scores:   SAH R pcomm ruptured @ HH4 WNFS 5 mF4, GCS<7    24h events   evd was clamped complicated with patient becoming more sleepy, evd reopened     Exam   alert awake, tracts, follows simple commands, gaze midline, peerl, RUE spontaneous AG,  LUE plegic, RLE AG, moves toes     VITALS:   T(C): 36.9 (04-07-24 @ 03:00), Max: 37.7 (04-06-24 @ 18:00)  HR: 60 (04-07-24 @ 03:00) (56 - 96)  BP: 175/70 (04-06-24 @ 15:30) (107/67 - 185/74)  BP(mean): 112 (04-06-24 @ 15:30) (80 - 112)  RR: 19 (04-07-24 @ 03:00) (16 - 48)  SpO2: 96% (04-07-24 @ 03:00) (90% - 100%)    acetaminophen     Tablet .. 650 milliGRAM(s) Oral every 6 hours PRN  chlorhexidine 4% Liquid 1 Application(s) Topical at bedtime  dexMEDEtomidine Infusion 0.2 MICROgram(s)/kG/Hr IV Continuous <Continuous>  enoxaparin Injectable 40 milliGRAM(s) SubCutaneous <User Schedule>  multiple electrolytes Injection Type 1 1000 milliLiter(s) IV Continuous <Continuous>  oxyCODONE    IR 5 milliGRAM(s) Oral every 4 hours PRN  oxyCODONE    IR 10 milliGRAM(s) Oral every 4 hours PRN  phenylephrine    Infusion 0.1 MICROgram(s)/kG/Min IV Continuous <Continuous>  polyethylene glycol 3350 17 Gram(s) Oral every 12 hours  potassium chloride   Solution 20 milliEquivalent(s) Oral once  potassium phosphate / sodium phosphate Powder (PHOS-NaK) 2 Packet(s) Oral once  QUEtiapine 12.5 milliGRAM(s) Oral <User Schedule>  QUEtiapine 25 milliGRAM(s) Oral at bedtime  senna 2 Tablet(s) Oral at bedtime  theophylline Syrup 50 milliGRAM(s) Oral every 12 hours        04-05-24 @ 07:01  -  04-06-24 @ 07:00  --------------------------------------------------------  IN: 1640 mL / OUT: 1101 mL / NET: 539 mL    04-06-24 @ 07:01  -  04-07-24 @ 03:49  --------------------------------------------------------  IN: 1823.3 mL / OUT: 1495 mL / NET: 328.3 mL                            10.1   15.59 )-----------( 295      ( 06 Apr 2024 23:50 )             30.5   04-06    138  |  102  |  11  ----------------------------<  105<H>  3.8   |  23  |  0.38<L>

## 2024-04-07 NOTE — PROGRESS NOTE ADULT - ASSESSMENT
keep SBP>160  groin check  cont SAH protocol  EVD at 20  monitor for spasm, possible angio again this week

## 2024-04-07 NOTE — PROGRESS NOTE ADULT - ASSESSMENT
ASSESSMENT/PLAN: HH4 WNFS 5 mF4 subarachnoid hemorrhage c/b vasospasm    N   - DSA tomorrow  -  - 220 given severe vasospasm  - May benefit from increased preload & iontropy, Philip -> Levo  - euvolemia to positive, nimodipine  - theophyllin   - RA SpO2 > 94%  - Diet: fees   - DVT Ppx: SCD, Chemoppx   - L&R below knee dvt    My full attention was spent providing medically necessary critical care to the patient with details documented in my note above.   Critical care time spent examining patient, reviewing vitals, labs, medications, imaging and discussing with the team goals of care   The combined critical care time provided to the patient was 60 minutes  This time does not include bedside procedures that are documented separately.

## 2024-04-07 NOTE — PROGRESS NOTE ADULT - SUBJECTIVE AND OBJECTIVE BOX
Subjective: Patient seen and examined. No new events except as noted.   remains in NSCU       REVIEW OF SYSTEMS:    CONSTITUTIONAL: + weakness, fevers or chills  EYES/ENT: No visual changes;  No vertigo or throat pain   NECK: No pain or stiffness  RESPIRATORY: No cough, wheezing, hemoptysis; No shortness of breath  CARDIOVASCULAR: No chest pain or palpitations  GASTROINTESTINAL: No abdominal or epigastric pain. No nausea, vomiting, or hematemesis; No diarrhea or constipation. No melena or hematochezia.  GENITOURINARY: No dysuria, frequency or hematuria  NEUROLOGICAL: No numbness or weakness  SKIN: No itching, burning, rashes, or lesions   All other review of systems is negative unless indicated above.    MEDICATIONS:  MEDICATIONS  (STANDING):  chlorhexidine 4% Liquid 1 Application(s) Topical at bedtime  dexMEDEtomidine Infusion 0.2 MICROgram(s)/kG/Hr (3.56 mL/Hr) IV Continuous <Continuous>  enoxaparin Injectable 40 milliGRAM(s) SubCutaneous <User Schedule>  phenylephrine    Infusion 0.1 MICROgram(s)/kG/Min (2.67 mL/Hr) IV Continuous <Continuous>  polyethylene glycol 3350 17 Gram(s) Oral every 12 hours  QUEtiapine 12.5 milliGRAM(s) Oral <User Schedule>  QUEtiapine 25 milliGRAM(s) Oral at bedtime  senna 2 Tablet(s) Oral at bedtime  theophylline Syrup 50 milliGRAM(s) Oral every 12 hours      PHYSICAL EXAM:  T(C): 37.1 (04-07-24 @ 07:00), Max: 37.7 (04-06-24 @ 18:00)  HR: 48 (04-07-24 @ 09:15) (48 - 95)  BP: 175/70 (04-06-24 @ 15:30) (141/69 - 185/74)  RR: 20 (04-07-24 @ 09:15) (16 - 48)  SpO2: 96% (04-07-24 @ 09:15) (90% - 100%)  Wt(kg): --  I&O's Summary    06 Apr 2024 07:01  -  07 Apr 2024 07:00  --------------------------------------------------------  IN: 2725.3 mL / OUT: 2503 mL / NET: 222.3 mL    07 Apr 2024 07:01  -  07 Apr 2024 09:41  --------------------------------------------------------  IN: 135.7 mL / OUT: 0 mL / NET: 135.7 mL      Height (cm): 157.5 (04-06 @ 15:30)  Weight (kg): 71.2 (04-06 @ 15:30)  BMI (kg/m2): 28.7 (04-06 @ 15:30)  BSA (m2): 1.72 (04-06 @ 15:30)        Appearance: NAD   HEENT:  dry oral mucosa, PERRL, EOMI	  Lymphatic: No lymphadenopathy  Cardiovascular: Normal S1 S2, No JVD, No murmurs, No edema  Respiratory: decreased bs    Psychiatry: A & O x 0  Gastrointestinal:  Soft, Non-tender, + BS	  Skin: No rashes, No ecchymoses, No cyanosis	  Neurologic: Extubated, eyes opens spontaneously, gaze midline, alexia, RUE spont AG follows simple commands, LUE localizes, RLE AG, moves toes   Extremities: Normal range of motion, No clubbing, cyanosis or edema  Vascular: Peripheral pulses palpable 2+ bilaterally        LABS:    CARDIAC MARKERS:                                10.1   15.59 )-----------( 295      ( 06 Apr 2024 23:50 )             30.5     04-06    138  |  102  |  11  ----------------------------<  105<H>  3.8   |  23  |  0.38<L>    Ca    8.8      06 Apr 2024 23:50  Phos  2.6     04-06  Mg     2.0     04-06      proBNP:   Lipid Profile:   HgA1c:   TSH:     Negative          TELEMETRY: 	    ECG:  	  RADIOLOGY:   DIAGNOSTIC TESTING:  [ ] Echocardiogram:  [ ]  Catheterization:  [ ] Stress Test:    OTHER:

## 2024-04-07 NOTE — EEG REPORT - NS EEG TEXT BOX
[facility]   COMPREHENSIVE EPILEPSY CENTER   REPORT OF CONTINUOUS VIDEO EEG     [facility]: [address], [place] [address], Ph#: [phone]  [facility]:  [address], [place] [address], Ph#: [phone]  [facility]: [address] [place] [address], Ph#: [phone]    Patient Name: JOSE MCLEAN  Age and : 58y (67)  MRN #: [id number]  Location: [place] [address]  Referring Physician: Perlita Varela    Start Time/Date: 2024  Duration: 2H 30min    _____________________________________________________________  STUDY INFORMATION    EEG Recording Technique:  The patient underwent continuous Video-EEG monitoring, using Telemetry System hardware on the XLTek Digital System. EEG and video data were stored on a computer hard drive with important events saved in digital archive files. The material was reviewed by a physician (electroencephalographer / epileptologist) on a daily basis. Vitor and seizure detection algorithms were utilized and reviewed. An EEG Technician attended to the patient, and was available throughout daytime work hours.  The epilepsy center neurologist was available in person or on call 24-hours per day.    EEG Placement and Labeling of Electrodes:  The EEG was performed utilizing 20 channel referential EEG connections (coronal over temporal over parasagittal montage) using all standard 10-20 electrode placements with EKG, with additional electrodes placed in the inferior temporal region using the modified 10-10 montage electrode placements for elective admissions, or if deemed necessary. Recording was at a sampling rate of 256 samples per second per channel. Time synchronized digital video recording was done simultaneously with EEG recording. A low light infrared camera was used for low light recording.     _____________________________________________________________  HISTORY    Patient is a 58y old  Female who presents with a chief complaint of SAH (2024 09:41)      PERTINENT MEDICATION:  MEDICATIONS  (STANDING):  chlorhexidine 4% Liquid 1 Application(s) Topical at bedtime  dexMEDEtomidine Infusion 0.2 MICROgram(s)/kG/Hr (3.56 mL/Hr) IV Continuous <Continuous>  enoxaparin Injectable 40 milliGRAM(s) SubCutaneous <User Schedule>  phenylephrine    Infusion 0.1 MICROgram(s)/kG/Min (2.67 mL/Hr) IV Continuous <Continuous>  polyethylene glycol 3350 17 Gram(s) Oral every 12 hours  QUEtiapine 12.5 milliGRAM(s) Oral <User Schedule>  QUEtiapine 25 milliGRAM(s) Oral at bedtime  senna 2 Tablet(s) Oral at bedtime  theophylline Syrup 50 milliGRAM(s) Oral every 12 hours    _____________________________________________________________  STUDY INTERPRETATION    Findings: The background was continuous, spontaneously variable and reactive.   No posterior dominant rhythm seen.  Background predominantly consisted of theta, delta and faster activities.    Focal Slowing:   Paucity of faster frequencies over the right anterior head region.    Sleep Background:  Drowsiness and stage II sleep transients were not recorded.    Other Non-Epileptiform Findings:  None were present.    Interictal Epileptiform Activity:   Occasional left frontal sharp-wave discharges (max Fp1 F3).    Events:  Clinical events: None recorded.  Seizures: None recorded.    Activation Procedures:   Hyperventilation was not performed.    Photic stimulation was not performed.     Artifacts:  Intermittent myogenic and movement artifacts were noted.    _____________________________________________________________  EEG SUMMARY/CLASSIFICATION    Abnormal EEG  - Occasional left frontal sharp-wave discharges  - Right anterior slowing.  - Moderate generalized slowing.    _____________________________________________________________  EEG IMPRESSION/CLINICAL CORRELATE    Abnormal EEG study.  Potential epileptogenic focus in the left frontal region.  Structural or functional abnormality in the right anterior head region.  Moderate nonspecific diffuse or multifocal cerebral dysfunction.   No seizure seen.    Pipo Tinoco MD   of Neurology  Epilepsy/EEG Attending

## 2024-04-08 ENCOUNTER — APPOINTMENT (OUTPATIENT)
Dept: NEUROSURGERY | Facility: HOSPITAL | Age: 59
End: 2024-04-08

## 2024-04-08 LAB
-  AMOXICILLIN/CLAVULANIC ACID: SIGNIFICANT CHANGE UP
-  AMPICILLIN/SULBACTAM: SIGNIFICANT CHANGE UP
-  AMPICILLIN: SIGNIFICANT CHANGE UP
-  AZTREONAM: SIGNIFICANT CHANGE UP
-  CEFAZOLIN: SIGNIFICANT CHANGE UP
-  CEFEPIME: SIGNIFICANT CHANGE UP
-  CEFOXITIN: SIGNIFICANT CHANGE UP
-  CEFTRIAXONE: SIGNIFICANT CHANGE UP
-  CEFUROXIME: SIGNIFICANT CHANGE UP
-  CIPROFLOXACIN: SIGNIFICANT CHANGE UP
-  ERTAPENEM: SIGNIFICANT CHANGE UP
-  GENTAMICIN: SIGNIFICANT CHANGE UP
-  IMIPENEM: SIGNIFICANT CHANGE UP
-  LEVOFLOXACIN: SIGNIFICANT CHANGE UP
-  MEROPENEM: SIGNIFICANT CHANGE UP
-  NITROFURANTOIN: SIGNIFICANT CHANGE UP
-  PIPERACILLIN/TAZOBACTAM: SIGNIFICANT CHANGE UP
-  TOBRAMYCIN: SIGNIFICANT CHANGE UP
-  TRIMETHOPRIM/SULFAMETHOXAZOLE: SIGNIFICANT CHANGE UP
ANION GAP SERPL CALC-SCNC: 17 MMOL/L — SIGNIFICANT CHANGE UP (ref 5–17)
APTT BLD: 35.3 SEC — SIGNIFICANT CHANGE UP (ref 24.5–35.6)
BASOPHILS # BLD AUTO: 0 K/UL — SIGNIFICANT CHANGE UP (ref 0–0.2)
BASOPHILS # BLD AUTO: 0.02 K/UL — SIGNIFICANT CHANGE UP (ref 0–0.2)
BASOPHILS NFR BLD AUTO: 0 % — SIGNIFICANT CHANGE UP (ref 0–2)
BASOPHILS NFR BLD AUTO: 0.1 % — SIGNIFICANT CHANGE UP (ref 0–2)
BUN SERPL-MCNC: 12 MG/DL — SIGNIFICANT CHANGE UP (ref 7–23)
CALCIUM SERPL-MCNC: 9 MG/DL — SIGNIFICANT CHANGE UP (ref 8.4–10.5)
CHLORIDE SERPL-SCNC: 104 MMOL/L — SIGNIFICANT CHANGE UP (ref 96–108)
CO2 SERPL-SCNC: 18 MMOL/L — LOW (ref 22–31)
CREAT SERPL-MCNC: 0.53 MG/DL — SIGNIFICANT CHANGE UP (ref 0.5–1.3)
CULTURE RESULTS: ABNORMAL
EGFR: 108 ML/MIN/1.73M2 — SIGNIFICANT CHANGE UP
EOSINOPHIL # BLD AUTO: 0 K/UL — SIGNIFICANT CHANGE UP (ref 0–0.5)
EOSINOPHIL # BLD AUTO: 0.15 K/UL — SIGNIFICANT CHANGE UP (ref 0–0.5)
EOSINOPHIL NFR BLD AUTO: 0 % — SIGNIFICANT CHANGE UP (ref 0–6)
EOSINOPHIL NFR BLD AUTO: 0.8 % — SIGNIFICANT CHANGE UP (ref 0–6)
GIANT PLATELETS BLD QL SMEAR: PRESENT — SIGNIFICANT CHANGE UP
GLUCOSE SERPL-MCNC: 154 MG/DL — HIGH (ref 70–99)
HCT VFR BLD CALC: 30.3 % — LOW (ref 34.5–45)
HCT VFR BLD CALC: 33.2 % — LOW (ref 34.5–45)
HGB BLD-MCNC: 10.1 G/DL — LOW (ref 11.5–15.5)
HGB BLD-MCNC: 10.7 G/DL — LOW (ref 11.5–15.5)
IMM GRANULOCYTES NFR BLD AUTO: 1 % — HIGH (ref 0–0.9)
INR BLD: 1.22 RATIO — HIGH (ref 0.85–1.18)
LYMPHOCYTES # BLD AUTO: 0.33 K/UL — LOW (ref 1–3.3)
LYMPHOCYTES # BLD AUTO: 1.9 % — LOW (ref 13–44)
LYMPHOCYTES # BLD AUTO: 12.1 % — LOW (ref 13–44)
LYMPHOCYTES # BLD AUTO: 2.23 K/UL — SIGNIFICANT CHANGE UP (ref 1–3.3)
MAGNESIUM SERPL-MCNC: 1.9 MG/DL — SIGNIFICANT CHANGE UP (ref 1.6–2.6)
MANUAL SMEAR VERIFICATION: SIGNIFICANT CHANGE UP
MCHC RBC-ENTMCNC: 28.2 PG — SIGNIFICANT CHANGE UP (ref 27–34)
MCHC RBC-ENTMCNC: 29.3 PG — SIGNIFICANT CHANGE UP (ref 27–34)
MCHC RBC-ENTMCNC: 32.2 GM/DL — SIGNIFICANT CHANGE UP (ref 32–36)
MCHC RBC-ENTMCNC: 33.3 GM/DL — SIGNIFICANT CHANGE UP (ref 32–36)
MCV RBC AUTO: 87.6 FL — SIGNIFICANT CHANGE UP (ref 80–100)
MCV RBC AUTO: 87.8 FL — SIGNIFICANT CHANGE UP (ref 80–100)
METHOD TYPE: SIGNIFICANT CHANGE UP
MICROCYTES BLD QL: SLIGHT — SIGNIFICANT CHANGE UP
MONOCYTES # BLD AUTO: 0.17 K/UL — SIGNIFICANT CHANGE UP (ref 0–0.9)
MONOCYTES # BLD AUTO: 0.79 K/UL — SIGNIFICANT CHANGE UP (ref 0–0.9)
MONOCYTES NFR BLD AUTO: 1 % — LOW (ref 2–14)
MONOCYTES NFR BLD AUTO: 4.3 % — SIGNIFICANT CHANGE UP (ref 2–14)
NEUTROPHILS # BLD AUTO: 14.32 K/UL — HIGH (ref 1.8–7.4)
NEUTROPHILS # BLD AUTO: 16.85 K/UL — HIGH (ref 1.8–7.4)
NEUTROPHILS NFR BLD AUTO: 77.6 % — HIGH (ref 43–77)
NEUTROPHILS NFR BLD AUTO: 96 % — HIGH (ref 43–77)
NRBC # BLD: 0 /100 WBCS — SIGNIFICANT CHANGE UP (ref 0–0)
ORGANISM # SPEC MICROSCOPIC CNT: ABNORMAL
ORGANISM # SPEC MICROSCOPIC CNT: ABNORMAL
OVALOCYTES BLD QL SMEAR: SLIGHT — SIGNIFICANT CHANGE UP
PHOSPHATE SERPL-MCNC: 3.5 MG/DL — SIGNIFICANT CHANGE UP (ref 2.5–4.5)
PLAT MORPH BLD: NORMAL — SIGNIFICANT CHANGE UP
PLATELET # BLD AUTO: 314 K/UL — SIGNIFICANT CHANGE UP (ref 150–400)
PLATELET # BLD AUTO: 344 K/UL — SIGNIFICANT CHANGE UP (ref 150–400)
PLATELET COUNT - ESTIMATE: NORMAL — SIGNIFICANT CHANGE UP
POTASSIUM SERPL-MCNC: 3.3 MMOL/L — LOW (ref 3.5–5.3)
POTASSIUM SERPL-SCNC: 3.3 MMOL/L — LOW (ref 3.5–5.3)
PROTHROM AB SERPL-ACNC: 13.3 SEC — HIGH (ref 9.5–13)
RBC # BLD: 3.45 M/UL — LOW (ref 3.8–5.2)
RBC # BLD: 3.79 M/UL — LOW (ref 3.8–5.2)
RBC # FLD: 13.9 % — SIGNIFICANT CHANGE UP (ref 10.3–14.5)
RBC # FLD: 14.1 % — SIGNIFICANT CHANGE UP (ref 10.3–14.5)
RBC BLD AUTO: ABNORMAL
SODIUM SERPL-SCNC: 139 MMOL/L — SIGNIFICANT CHANGE UP (ref 135–145)
SPECIMEN SOURCE: SIGNIFICANT CHANGE UP
SPHEROCYTES BLD QL SMEAR: SLIGHT — SIGNIFICANT CHANGE UP
VARIANT LYMPHS # BLD: 5.2 % — SIGNIFICANT CHANGE UP (ref 0–6)
WBC # BLD: 17.54 K/UL — HIGH (ref 3.8–10.5)
WBC # BLD: 18.45 K/UL — HIGH (ref 3.8–10.5)
WBC # FLD AUTO: 17.54 K/UL — HIGH (ref 3.8–10.5)
WBC # FLD AUTO: 18.45 K/UL — HIGH (ref 3.8–10.5)

## 2024-04-08 PROCEDURE — 61651 EVASC PRLNG ADMN RX AGNT ADD: CPT | Mod: 58

## 2024-04-08 PROCEDURE — 71045 X-RAY EXAM CHEST 1 VIEW: CPT | Mod: 26

## 2024-04-08 PROCEDURE — 99291 CRITICAL CARE FIRST HOUR: CPT

## 2024-04-08 PROCEDURE — 70551 MRI BRAIN STEM W/O DYE: CPT | Mod: 26

## 2024-04-08 PROCEDURE — 36226 PLACE CATH VERTEBRAL ART: CPT | Mod: 58,59,LT

## 2024-04-08 PROCEDURE — 95720 EEG PHY/QHP EA INCR W/VEEG: CPT

## 2024-04-08 PROCEDURE — 61650 EVASC PRLNG ADMN RX AGNT 1ST: CPT | Mod: 58

## 2024-04-08 PROCEDURE — 36556 INSERT NON-TUNNEL CV CATH: CPT

## 2024-04-08 RX ORDER — SODIUM CHLORIDE 9 MG/ML
10 INJECTION INTRAMUSCULAR; INTRAVENOUS; SUBCUTANEOUS
Refills: 0 | Status: DISCONTINUED | OUTPATIENT
Start: 2024-04-08 | End: 2024-04-08

## 2024-04-08 RX ORDER — MAGNESIUM SULFATE 500 MG/ML
1 VIAL (ML) INJECTION ONCE
Refills: 0 | Status: COMPLETED | OUTPATIENT
Start: 2024-04-08 | End: 2024-04-08

## 2024-04-08 RX ORDER — POTASSIUM CHLORIDE 20 MEQ
40 PACKET (EA) ORAL EVERY 4 HOURS
Refills: 0 | Status: COMPLETED | OUTPATIENT
Start: 2024-04-08 | End: 2024-04-08

## 2024-04-08 RX ORDER — CHLORHEXIDINE GLUCONATE 213 G/1000ML
15 SOLUTION TOPICAL EVERY 12 HOURS
Refills: 0 | Status: DISCONTINUED | OUTPATIENT
Start: 2024-04-08 | End: 2024-04-09

## 2024-04-08 RX ORDER — CHLORHEXIDINE GLUCONATE 213 G/1000ML
1 SOLUTION TOPICAL
Refills: 0 | Status: DISCONTINUED | OUTPATIENT
Start: 2024-04-08 | End: 2024-04-26

## 2024-04-08 RX ORDER — MULTIVIT-MIN/FERROUS GLUCONATE 9 MG/15 ML
15 LIQUID (ML) ORAL DAILY
Refills: 0 | Status: DISCONTINUED | OUTPATIENT
Start: 2024-04-08 | End: 2024-04-23

## 2024-04-08 RX ORDER — FENTANYL CITRATE 50 UG/ML
25 INJECTION INTRAVENOUS
Refills: 0 | Status: DISCONTINUED | OUTPATIENT
Start: 2024-04-08 | End: 2024-04-10

## 2024-04-08 RX ADMIN — ENOXAPARIN SODIUM 40 MILLIGRAM(S): 100 INJECTION SUBCUTANEOUS at 18:35

## 2024-04-08 RX ADMIN — QUETIAPINE FUMARATE 12.5 MILLIGRAM(S): 200 TABLET, FILM COATED ORAL at 06:15

## 2024-04-08 RX ADMIN — Medication 50 MILLIGRAM(S): at 20:53

## 2024-04-08 RX ADMIN — Medication 100 GRAM(S): at 05:46

## 2024-04-08 RX ADMIN — DEXMEDETOMIDINE HYDROCHLORIDE IN 0.9% SODIUM CHLORIDE 3.56 MICROGRAM(S)/KG/HR: 4 INJECTION INTRAVENOUS at 08:47

## 2024-04-08 RX ADMIN — CHLORHEXIDINE GLUCONATE 15 MILLILITER(S): 213 SOLUTION TOPICAL at 18:37

## 2024-04-08 RX ADMIN — Medication 40 MILLIEQUIVALENT(S): at 12:42

## 2024-04-08 RX ADMIN — CHLORHEXIDINE GLUCONATE 1 APPLICATION(S): 213 SOLUTION TOPICAL at 21:39

## 2024-04-08 RX ADMIN — DEXMEDETOMIDINE HYDROCHLORIDE IN 0.9% SODIUM CHLORIDE 3.56 MICROGRAM(S)/KG/HR: 4 INJECTION INTRAVENOUS at 19:25

## 2024-04-08 RX ADMIN — Medication 13.4 MICROGRAM(S)/KG/MIN: at 19:25

## 2024-04-08 RX ADMIN — Medication 13.4 MICROGRAM(S)/KG/MIN: at 08:48

## 2024-04-08 RX ADMIN — Medication 15 MILLILITER(S): at 18:34

## 2024-04-08 RX ADMIN — Medication 50 MILLIGRAM(S): at 05:24

## 2024-04-08 RX ADMIN — FENTANYL CITRATE 25 MICROGRAM(S): 50 INJECTION INTRAVENOUS at 18:30

## 2024-04-08 RX ADMIN — Medication 40 MILLIEQUIVALENT(S): at 05:23

## 2024-04-08 RX ADMIN — FENTANYL CITRATE 25 MICROGRAM(S): 50 INJECTION INTRAVENOUS at 18:00

## 2024-04-08 RX ADMIN — POLYETHYLENE GLYCOL 3350 17 GRAM(S): 17 POWDER, FOR SOLUTION ORAL at 05:24

## 2024-04-08 RX ADMIN — QUETIAPINE FUMARATE 12.5 MILLIGRAM(S): 200 TABLET, FILM COATED ORAL at 13:04

## 2024-04-08 NOTE — PROGRESS NOTE ADULT - ASSESSMENT
58F, on ASA81 for pain relief? (fell a month ago), presents as txfr from [place] for diffuse SAH w/small IVH, 2/2 ruptured posterolaterally projecting 2.9x2.4x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 Was initially found unresponsive at 21:00, intubated at 23:00 at OSH, then txfrd. Given ddavp at OSH. On arrival patient w/ PERRL, +cough/gag, 2/5 spontaneous movement in LUE, o/w no movement. 1 unit PLTs given and R frontal EVD placed-high pressure. PLTs/Coags wnl.     Intubated sedated in NSCU   Echo with abnormal findings of severe cardiomyopathy and RWMA

## 2024-04-08 NOTE — PRE-ANESTHESIA EVALUATION ADULT - NSANTHPMHFT_GEN_ALL_CORE
Medical History discussed with patient's son. All concerns were addressed. 58F, on ASA81 for pain relief? 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 Was initially found unresponsive at 21:00, intubated at 23:00 at OSH, then txfrd. Given ddavp at OSH. On arrival patient w/ PERRL, +cough/gag, 2/5 spontaneous movement in LUE, o/w no movement. 1 unit PLTs given and R frontal EVD placed-high pressure.   SAH 3/25/2024, 3/26/24 evd placed, 3/26/24 right pcomm clip.  Pt with previously vasospasm tx by IR on 4/6/24 now with recurrent vasospasm.

## 2024-04-08 NOTE — AIRWAY PLACEMENT NOTE ADULT - AIRWAY COMMENTS:
Pt. arrived from the ER intubated.
pt received intubated by anesthesia
pt transferred from [facility] intubated

## 2024-04-08 NOTE — PROGRESS NOTE ADULT - SUBJECTIVE AND OBJECTIVE BOX
EVENING ATTENDING PROGRESS NOTE    58F transferred from [place] for SAH HH4 WNFS 5 mF4 ruptured R pcomm aneurysm sp RF EVD & R pcom clip     24h EVENTS  - MRI with DCI  - Intubated for airway protection  - DSA with moderate vasospasm bl ICA sp treatment  - L IJ CVC placed    Exam   Not following commands  gaze midline, peerl,   LUE spontaneous AG,    Labs, Imaging, Vitals reviewed

## 2024-04-08 NOTE — PROGRESS NOTE ADULT - ASSESSMENT
ASSESSMENT/PLAN: HH4 WNFS 5 mF4 subarachnoid hemorrhage c/b vasospasm    N   - RF EVD at 20  -  - 220 given severe vasospasm with Levo  - euvolemia to positive, nimodipine  - theophyllin   - CPAP as tolerated   - Diet: fees   - DVT Ppx: SCD, Chemoppx   - L&R below knee dvt    My full attention was spent providing medically necessary critical care to the patient with details documented in my note above.   Critical care time spent examining patient, reviewing vitals, labs, medications, imaging and discussing with the team goals of care   The combined critical care time provided to the patient was 60 minutes  This time does not include bedside procedures that are documented separately.

## 2024-04-08 NOTE — PROCEDURE NOTE - NSPROCDETAILS_GEN_ALL_CORE
location identified, draped/prepped, sterile technique used, needle inserted/introduced/positive blood return obtained via catheter/connected to a pressurized flush line/sutured in place/hemostasis with direct pressure, dressing applied/Seldinger technique/all materials/supplies accounted for at end of procedure
guidewire recovered/lumen(s) aspirated and flushed/sterile dressing applied/sterile technique, catheter placed/ultrasound guidance with use of sterile gel and probe cove
location identified, draped/prepped, sterile technique used, needle inserted/introduced/positive blood return obtained via catheter/connected to a pressurized flush line/sutured in place/hemostasis with direct pressure, dressing applied/Seldinger technique/all materials/supplies accounted for at end of procedure
location identified, draped/prepped, sterile technique used, needle inserted/introduced/positive blood return obtained via catheter/connected to a pressurized flush line/sutured in place/hemostasis with direct pressure, dressing applied/Seldinger technique/all materials/supplies accounted for at end of procedure

## 2024-04-08 NOTE — CHART NOTE - NSCHARTNOTEFT_GEN_A_CORE
Nutrition Follow Up Note  Patient seen for: follow up  Chart reviewed, events noted. Pt is 57 yo F with PMH: SAH HH4, mF4 ruptured R PCOMM aneurysm. Unresponsive on 3/25/24, intubated at OSH. R frontal EVD placed. S/P PCOM clip on 3/26/24. now extubated.     Source: [] Patient       [x] Medical Record        [x] Nursing        [] Family at bedside       [x] Other: team during interdisciplinary rounds     -If unable to interview patient: [] Trach/Vent/BiPAP  [x] Disoriented/confused/inappropriate to interview    Diet Order:   Diet, NPO with Tube Feed:   Tube Feeding Modality: Nasogastric  Jevity 1.5 Les (JEVITY1.5RTH)  Total Volume for 24 Hours (mL): 1440  Continuous  Until Goal Tube Feed Rate (mL per Hour): 60  Tube Feed Duration (in Hours): 24  Tube Feed Start Time: 23:00 (24)    EN order providing at 100% provision: 2160kcal (30kcal/kg) and 92g protein (1.3g/kg)    - Is current order appropriate/adequate? see below for recommendations     Nutrition-related concerns:  -4/4 Swallow Bedside Assessment Adult Recommendations: NPO, with non-oral nutrition/hydration/medications.  -Potassium low this morning, being replenished   -Levophed ordered  -Precedex ordered   -EVD, see flow sheets for output     GI:  Last BM 4/3 per flow sheets. Bowel Regimen? [x] Yes   [] No    Weights:   UBW: 70.5kg per family  Dosing weight 71.2kg   Daily Weight in k.4 ()  RD will continue to monitor trends.     Nutritionally Pertinent MEDICATIONS  (STANDING):  norepinephrine Infusion  polyethylene glycol 3350  potassium chloride   Solution  senna    Pertinent Labs:  @ 03:23: Na 139, BUN 12, Cr 0.53, <H>, K+ 3.3<L>, Phos 3.5, Mg 1.9, Alk Phos --, ALT/SGPT --, AST/SGOT --, HbA1c --    A1C with Estimated Average Glucose Result: 5.5 % (24 @ 04:27)  A1C with Estimated Average Glucose Result: 5.5 % (24 @ 01:34)    Skin per nursing documentation: EVD site, right head surgical incision, left side of head pin site   Edema per nursing documentation: none noted     Estimated Energy Needs (per RD predictive equation), based on dosing wt 71.2kg:  (27-32kcal/kg): 3042-0208 kcal  (1.2-1.4g protein/kg): 85-100g protein   IC study (3/28): 2175 kcal   Defer fluid needs to team     Previous Nutrition Diagnosis: Increased nutrient needs; Acute severe protein calorie malnutrition  Nutrition Diagnosis is: [x] ongoing  [] resolved [] not applicable     Nutrition Care Plan:  [x] In Progress  [] Achieved  [] Not applicable    New Nutrition Diagnosis: [x] Not applicable    Nutrition Interventions:     Education Provided   [] Yes:  [x] No:     Recommendations:      -Continue current EN regimen. See above for what it is providing pt with.   -Defer free water flushes to team   -Recommend multivitamin (if no medication contraindications) to aid in prevention of micronutrient deficiencies and incision healing   -Monitor wt trends/labs/skin integrity/hydration status/bowel regularity.     Monitoring and Evaluation:   Continue to monitor nutritional intake, tolerance to diet prescription, weights, labs, skin integrity    RD remains available upon request and will follow up per protocol  Maricel Meyers, MS, RD, CDN / Teams

## 2024-04-08 NOTE — CHART NOTE - NSCHARTNOTEFT_GEN_A_CORE
Interventional Neuro Radiology  Pre-Procedure Note    HPI:  58F, on ASA81 for pain relief? 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 Was initially found unresponsive at 21:00, intubated at 23:00 at OSH, then txfrd. Given ddavp at OSH. On arrival patient w/ PERRL, +cough/gag, 2/5 spontaneous movement in LUE, o/w no movement. 1 unit PLTs given and R frontal EVD placed-high pressure.   SAH 3/25/2024, 3/26/24 evd placed, 3/26/24 right pcomm clip.    Pt with previously vasospasm tx by IR on 4/6/24 now with recurrent vasospasm.     Allergies: No Known Allergies    Current Medications: acetaminophen     Tablet .. 650 milliGRAM(s) Oral every 6 hours PRN  chlorhexidine 4% Liquid 1 Application(s) Topical at bedtime  dexMEDEtomidine Infusion 0.2 MICROgram(s)/kG/Hr IV Continuous <Continuous>  enoxaparin Injectable 40 milliGRAM(s) SubCutaneous <User Schedule>  norepinephrine Infusion 0.1 MICROgram(s)/kG/Min IV Continuous <Continuous>  oxyCODONE    IR 10 milliGRAM(s) Oral every 4 hours PRN  oxyCODONE    IR 5 milliGRAM(s) Oral every 4 hours PRN  polyethylene glycol 3350 17 Gram(s) Oral every 12 hours  QUEtiapine 12.5 milliGRAM(s) Oral <User Schedule>  QUEtiapine 25 milliGRAM(s) Oral at bedtime  senna 2 Tablet(s) Oral at bedtime  theophylline Syrup 50 milliGRAM(s) Oral every 12 hours      Labs:                         10.7   18.45 )-----------( 344      ( 08 Apr 2024 03:23 )             33.2       04-08    139  |  104  |  12  ----------------------------<  154<H>  3.3<L>   |  18<L>  |  0.53    Ca    9.0      08 Apr 2024 03:23  Phos  3.5     04-08  Mg     1.9     04-08    Activated Partial Thromboplastin Time in AM (04.08.24 @ 03:23)    Activated Partial Thromboplastin Time: 35.3:   Prothrombin Time and INR, Plasma in AM (04.08.24 @ 03:23)    Prothrombin Time, Plasma: 13.3 sec    INR: 1.22:     Blood Bank: 04-08-24  O  --  Positive      Assessment/Plan:   This is a 58y Female  presents with vasospasm. Patient presents to neuro-IR for selective cerebral angiography and vasospasm treatment. Procedure/ risks/ benefits/ goals/ alternatives were explained. Risks include but are not limited to stroke/ vessel injury/ hemorrhage/ groin hematoma. All questions answered. Informed consent obtained and placed in chart.

## 2024-04-08 NOTE — PROCEDURE NOTE - NSINFORMCONSENT_GEN_A_CORE
This was an emergent procedure.
Benefits, risks, and possible complications of procedure explained to patient/caregiver who verbalized understanding and gave written consent.
Benefits, risks, and possible complications of procedure explained to patient/caregiver who verbalized understanding and gave written consent.
This was an emergent procedure.
Benefits, risks, and possible complications of procedure explained to patient/caregiver who verbalized understanding and gave written consent.
Benefits, risks, and possible complications of procedure explained to patient/caregiver who verbalized understanding and gave written consent.

## 2024-04-08 NOTE — PROCEDURE NOTE - NSPOSTCAREGUIDE_GEN_A_CORE
Verbal/written post procedure instructions were given to patient/caregiver/Instructed patient/caregiver to follow-up with primary care physician/Instructed patient/caregiver regarding signs and symptoms of infection/Keep the cast/splint/dressing clean and dry/Care for catheter as per unit/ICU protocols
Verbal/written post procedure instructions were given to patient/caregiver/Instructed patient/caregiver to follow-up with primary care physician/Instructed patient/caregiver regarding signs and symptoms of infection/Keep the cast/splint/dressing clean and dry/Care for catheter as per unit/ICU protocols
Care for catheter as per unit/ICU protocols
Verbal/written post procedure instructions were given to patient/caregiver/Instructed patient/caregiver to follow-up with primary care physician/Instructed patient/caregiver regarding signs and symptoms of infection/Keep the cast/splint/dressing clean and dry/Care for catheter as per unit/ICU protocols

## 2024-04-08 NOTE — PROGRESS NOTE ADULT - SUBJECTIVE AND OBJECTIVE BOX
HPI   58F, on ASA81, transferred from [place] for SAH HH4 WNFS 5 mF4 ruptured R pcomm aneurysm. Was initially found unresponsive on 3/25/24 at 21:00, intubated at 23:00 at OSH, then txfrd. Given ddavp at OSH. Here in ED 1 unit PLTs given and R frontal EVD placed-high pressure. Went for R pcom clip on 3/26/24, postop evd not functioning then drained on the floor.     Admission scores:   SAH R pcomm ruptured @ HH4 WNFS 5 mF4, GCS<7    24h events   evd was clamped complicated with patient becoming more sleepy, evd reopened     Exam   alert awake, tracts, follows simple commands, gaze midline, peerl, RUE spontaneous AG,  LUE plegic, RLE AG, moves toes     VITALS:   T(C): 36.9 (04-07-24 @ 03:00), Max: 37.7 (04-06-24 @ 18:00)  HR: 60 (04-07-24 @ 03:00) (56 - 96)  BP: 175/70 (04-06-24 @ 15:30) (107/67 - 185/74)  BP(mean): 112 (04-06-24 @ 15:30) (80 - 112)  RR: 19 (04-07-24 @ 03:00) (16 - 48)  SpO2: 96% (04-07-24 @ 03:00) (90% - 100%)    acetaminophen     Tablet .. 650 milliGRAM(s) Oral every 6 hours PRN  chlorhexidine 4% Liquid 1 Application(s) Topical at bedtime  dexMEDEtomidine Infusion 0.2 MICROgram(s)/kG/Hr IV Continuous <Continuous>  enoxaparin Injectable 40 milliGRAM(s) SubCutaneous <User Schedule>  multiple electrolytes Injection Type 1 1000 milliLiter(s) IV Continuous <Continuous>  oxyCODONE    IR 5 milliGRAM(s) Oral every 4 hours PRN  oxyCODONE    IR 10 milliGRAM(s) Oral every 4 hours PRN  phenylephrine    Infusion 0.1 MICROgram(s)/kG/Min IV Continuous <Continuous>  polyethylene glycol 3350 17 Gram(s) Oral every 12 hours  potassium chloride   Solution 20 milliEquivalent(s) Oral once  potassium phosphate / sodium phosphate Powder (PHOS-NaK) 2 Packet(s) Oral once  QUEtiapine 12.5 milliGRAM(s) Oral <User Schedule>  QUEtiapine 25 milliGRAM(s) Oral at bedtime  senna 2 Tablet(s) Oral at bedtime  theophylline Syrup 50 milliGRAM(s) Oral every 12 hours        04-05-24 @ 07:01  -  04-06-24 @ 07:00  --------------------------------------------------------  IN: 1640 mL / OUT: 1101 mL / NET: 539 mL    04-06-24 @ 07:01  -  04-07-24 @ 03:49  --------------------------------------------------------  IN: 1823.3 mL / OUT: 1495 mL / NET: 328.3 mL                            10.1   15.59 )-----------( 295      ( 06 Apr 2024 23:50 )             30.5   04-06    138  |  102  |  11  ----------------------------<  105<H>  3.8   |  23  |  0.38<L>                         HPI   58F, on ASA81, transferred from [place] for SAH HH4 WNFS 5 mF4 ruptured R pcomm aneurysm. Was initially found unresponsive on 3/25/24 at 21:00, intubated at 23:00 at OSH, then txfrd. Given ddavp at OSH. Here in ED 1 unit PLTs given and R frontal EVD placed-high pressure. Went for R pcom clip on 3/26/24, postop evd not functioning then drained on the floor.     Admission scores:   SAH R pcomm ruptured @ HH4 WNFS 5 mF4, GCS<7    24h events   evd was clamped complicated with patient becoming more sleepy, evd reopened   04/08: MRI with widespread ischemic stroke b/l. As per neuro IR no further intervention.    Exam   Ox0, nonverbal, EOS, PERRL, no FC, R side spontaneous strong, L side 3/5    VITALS:   T(C): 36.9 (04-07-24 @ 03:00), Max: 37.7 (04-06-24 @ 18:00)  HR: 60 (04-07-24 @ 03:00) (56 - 96)  BP: 175/70 (04-06-24 @ 15:30) (107/67 - 185/74)  BP(mean): 112 (04-06-24 @ 15:30) (80 - 112)  RR: 19 (04-07-24 @ 03:00) (16 - 48)  SpO2: 96% (04-07-24 @ 03:00) (90% - 100%)    acetaminophen     Tablet .. 650 milliGRAM(s) Oral every 6 hours PRN  chlorhexidine 4% Liquid 1 Application(s) Topical at bedtime  dexMEDEtomidine Infusion 0.2 MICROgram(s)/kG/Hr IV Continuous <Continuous>  enoxaparin Injectable 40 milliGRAM(s) SubCutaneous <User Schedule>  multiple electrolytes Injection Type 1 1000 milliLiter(s) IV Continuous <Continuous>  oxyCODONE    IR 5 milliGRAM(s) Oral every 4 hours PRN  oxyCODONE    IR 10 milliGRAM(s) Oral every 4 hours PRN  phenylephrine    Infusion 0.1 MICROgram(s)/kG/Min IV Continuous <Continuous>  polyethylene glycol 3350 17 Gram(s) Oral every 12 hours  potassium chloride   Solution 20 milliEquivalent(s) Oral once  potassium phosphate / sodium phosphate Powder (PHOS-NaK) 2 Packet(s) Oral once  QUEtiapine 12.5 milliGRAM(s) Oral <User Schedule>  QUEtiapine 25 milliGRAM(s) Oral at bedtime  senna 2 Tablet(s) Oral at bedtime  theophylline Syrup 50 milliGRAM(s) Oral every 12 hours        04-05-24 @ 07:01  -  04-06-24 @ 07:00  --------------------------------------------------------  IN: 1640 mL / OUT: 1101 mL / NET: 539 mL    04-06-24 @ 07:01  -  04-07-24 @ 03:49  --------------------------------------------------------  IN: 1823.3 mL / OUT: 1495 mL / NET: 328.3 mL                            10.1   15.59 )-----------( 295      ( 06 Apr 2024 23:50 )             30.5   04-06    138  |  102  |  11  ----------------------------<  105<H>  3.8   |  23  |  0.38<L>

## 2024-04-08 NOTE — PROCEDURE NOTE - NSPROCNAME_GEN_A_CORE
Arterial Puncture/Cannulation
Central Line Insertion
Arterial Puncture/Cannulation
External Ventricular Drain Insertion
Arterial Puncture/Cannulation

## 2024-04-08 NOTE — EEG REPORT - NS EEG TEXT BOX
EEG Report [Charted Location: [place] [address]] [Authored: 2024 10:42]- for Visit: [id number], Complete, Entered, Signed in Full, Available to Patient    EEG REPORT:    EEG Report:  · EEG Report	  [facility] EPILEPSY [place]   REPORT OF CONTINUOUS VIDEO EEG     [facility]: [address], [place] [address], Ph#: [phone]  [facility]: [id number] [place] [address], Ph#: [phone]  [facility]: [address] [place] [address], Ph#: [phone]    Patient Name: JOSE MCLEAN  Age and : 58y (67)  MRN #: [id number]  Location: [place] [address]  Referring Physician: Perlita Varela    Start Time/Date: 2024 10:11-08:00 2024  Duration: 21H 40min    _____________________________________________________________  STUDY INFORMATION    EEG Recording Technique:  The patient underwent continuous Video-EEG monitoring, using Telemetry System hardware on the XLTek Digital System. EEG and video data were stored on a computer hard drive with important events saved in digital archive files. The material was reviewed by a physician (electroencephalographer / epileptologist) on a daily basis. Vitor and seizure detection algorithms were utilized and reviewed. An EEG Technician attended to the patient, and was available throughout daytime work hours.  The epilepsy center neurologist was available in person or on call 24-hours per day.    EEG Placement and Labeling of Electrodes:  The EEG was performed utilizing 20 channel referential EEG connections (coronal over temporal over parasagittal montage) using all standard 10-20 electrode placements with EKG, with additional electrodes placed in the inferior temporal region using the modified 10-10 montage electrode placements for elective admissions, or if deemed necessary. Recording was at a sampling rate of 256 samples per second per channel. Time synchronized digital video recording was done simultaneously with EEG recording. A low light infrared camera was used for low light recording.     _____________________________________________________________  HISTORY    Patient is a 58y old  Female who presents with a chief complaint of SAH (2024 09:41)      PERTINENT MEDICATION:  MEDICATIONS  (STANDING):  chlorhexidine 4% Liquid 1 Application(s) Topical at bedtime  dexMEDEtomidine Infusion 0.2 MICROgram(s)/kG/Hr (3.56 mL/Hr) IV Continuous <Continuous>  enoxaparin Injectable 40 milliGRAM(s) SubCutaneous <User Schedule>  phenylephrine    Infusion 0.1 MICROgram(s)/kG/Min (2.67 mL/Hr) IV Continuous <Continuous>  polyethylene glycol 3350 17 Gram(s) Oral every 12 hours  QUEtiapine 12.5 milliGRAM(s) Oral <User Schedule>  QUEtiapine 25 milliGRAM(s) Oral at bedtime  senna 2 Tablet(s) Oral at bedtime  theophylline Syrup 50 milliGRAM(s) Oral every 12 hours    _____________________________________________________________  STUDY INTERPRETATION    Findings: The background was continuous, spontaneously variable and reactive.   No posterior dominant rhythm seen.  Background predominantly consisted of theta, delta and faster activities.    Focal Slowing:   Paucity of faster frequencies over the right anterior head region.    Sleep Background:  Drowsiness and stage II sleep transients were not recorded.    Other Non-Epileptiform Findings:  None were present.    Interictal Epileptiform Activity:   None during this recording    Events:  Clinical events: None recorded.  Seizures: None recorded.    Activation Procedures:   Hyperventilation was not performed.    Photic stimulation was not performed.     Artifacts:  Intermittent myogenic and movement artifacts were noted in addition to intermittent electrode artifact particularly in right anterior electrodes    _____________________________________________________________  EEG SUMMARY/CLASSIFICATION    Abnormal EEG  - Right anterior slowing.  - Moderate generalized slowing.    _____________________________________________________________  EEG IMPRESSION/CLINICAL CORRELATE    Abnormal EEG study.    Structural or functional abnormality in the right anterior head region.  Moderate nonspecific diffuse or multifocal cerebral dysfunction.     No clear epileptic discharges seen during this recording  No seizure seen.    WM Serrano  Attending Physician, [facility]ile[facility] [place]    ------------------------------------  EEG Reading Room: [phone]  On Call Service After Hours: [phone]

## 2024-04-08 NOTE — PROCEDURE NOTE - PRACTITIONER PERFORMING THE TIME OUT
Continue to dose Tylenol regularly as well as Tamiflu.  Follow-up with pediatrician today for repeat evaluation.  Return to the ER if patient has difficulty feeding, decreased urine output, not producing tears when she cries, difficult control vomiting, shortness of breath or per your best judgment.  
arian farley pa-c
Rodrigue PEARSON
rn
me

## 2024-04-08 NOTE — PRE-ANESTHESIA EVALUATION ADULT - NSANTHADDINFOFT_GEN_ALL_CORE
All r/b/a discussed with patient's son, all questions answered
Risks and benefits of anesthesia discussed with patient's son- including nausea/vomiting, sore throat, dental injury, and cardiopulmonary complications. All questions were answered. Informed of risk of aspiration given tube feeds and need for possible post operative ventilation.

## 2024-04-08 NOTE — PROGRESS NOTE ADULT - ASSESSMENT
keep -220  groin check  cont SAH protocol  EVD at 20  monitor for spasm, possible angio again this week  MRI-p

## 2024-04-08 NOTE — PROCEDURE NOTE - NSINDICATIONS_GEN_A_CORE
critical illness/emergency venous access/hypertonic/irritant infusion/venous access/volume resuscitation
critical patient
arterial puncture to obtain ABG's/blood sampling/cannulation purposes/critical patient/monitoring purposes
arterial puncture to obtain ABG's/blood sampling/cannulation purposes/critical patient/monitoring purposes
critical patient
arterial puncture to obtain ABG's/blood sampling/cannulation purposes/critical patient/monitoring purposes

## 2024-04-08 NOTE — PROGRESS NOTE ADULT - SUBJECTIVE AND OBJECTIVE BOX
Subjective: Patient seen and examined. No new events except as noted.   remains in NSCU   seen this am       REVIEW OF SYSTEMS:    Unable to obtain       MEDICATIONS:  MEDICATIONS  (STANDING):  chlorhexidine 0.12% Liquid 15 milliLiter(s) Oral Mucosa every 12 hours  chlorhexidine 4% Liquid 1 Application(s) Topical at bedtime  dexMEDEtomidine Infusion 0.2 MICROgram(s)/kG/Hr (3.56 mL/Hr) IV Continuous <Continuous>  enoxaparin Injectable 40 milliGRAM(s) SubCutaneous <User Schedule>  multivitamin/minerals/iron Oral Solution (CENTRUM) 15 milliLiter(s) Oral daily  norepinephrine Infusion 0.1 MICROgram(s)/kG/Min (13.4 mL/Hr) IV Continuous <Continuous>  polyethylene glycol 3350 17 Gram(s) Oral every 12 hours  QUEtiapine 12.5 milliGRAM(s) Oral <User Schedule>  QUEtiapine 25 milliGRAM(s) Oral at bedtime  senna 2 Tablet(s) Oral at bedtime  theophylline Syrup 50 milliGRAM(s) Oral every 12 hours      PHYSICAL EXAM:  T(C): 36.5 (24 @ 14:20), Max: 38 (24 @ 19:00)  HR: 72 (24 @ 17:45) (59 - 117)  BP: 175/70 (24 @ 14:20) (175/70 - 175/70)  RR: 14 (24 @ 17:45) (12 - 48)  SpO2: 100% (24 @ 17:45) (95% - 100%)  Wt(kg): --  I&O's Summary    2024 07:  -  2024 07:00  --------------------------------------------------------  IN: 3897.2 mL / OUT: 2751 mL / NET: 1146.2 mL    2024 07:01  -  2024 17:55  --------------------------------------------------------  IN: 610.8 mL / OUT: 635 mL / NET: -24.2 mL      Height (cm): 157.5 ( @ 14:20)  Weight (kg): 71.2 ( @ 14:20)  BMI (kg/m2): 28.7 ( @ 14:20)  BSA (m2): 1.72 ( @ :20)        Appearance: NAD   HEENT:  dry oral mucosa, PERRL, EOMI	  Lymphatic: No lymphadenopathy  Cardiovascular: Normal S1 S2, No JVD, No murmurs, No edema  Respiratory: decreased bs    Psychiatry: A & O x 0  Gastrointestinal:  Soft, Non-tender, + BS	  Skin: No rashes, No ecchymoses, No cyanosis	  Neurologic: Extubated, eyes opens spontaneously, gaze midline, alexia, RUE spont AG follows simple commands, LUE localizes, RLE AG, moves toes   Extremities: Normal range of motion, No clubbing, cyanosis or edema  Vascular: Peripheral pulses palpable 2+ bilaterally      LABS:    CARDIAC MARKERS:                                10.7   18.45 )-----------( 344      ( 2024 03:23 )             33.2     04-08    139  |  104  |  12  ----------------------------<  154<H>  3.3<L>   |  18<L>  |  0.53    Ca    9.0      2024 03:23  Phos  3.5     04-08  Mg     1.9     -08        TELEMETRY: 	  SR  ECG:  	  RADIOLOGY:   DIAGNOSTIC TESTING:  [ ] Echocardiogram:  [ ]  Catheterization:  [ ] Stress Test:    OTHER: 	  1TECH INFORMATION:   History:  This is a 58 year old Female patient with a history of language regression (e.g. Landau-Kleffner).    EEG REPORT:   EEG Report:  · EEG Report	    EEG Report [Charted Location: [address]] [Authored: 2024 10:42]- for Visit: [id number], Complete, Entered, Signed in Full, Available to Patient    EEG REPORT:    EEG Report:  · EEG Report	  [facility]PILEPS[facility] [place]   REPORT OF CONTINUOUS VIDEO EEG     [facility]: [address] [place] [address] [address], [place] [address], Ph#: [phone]  [facility]: [address], [place] [address], Ph#: [phone]  [facility]: [address] [place] [address], Ph#: [phone]    Patient Name: JOSE MCLEAN  Age and : 58y ([birth year])  MRN #: [id number]  Location: [place] [address]  Referring Physician: Perlita Varela    Start Time/Date: 2024 10:11-08:00 2024  Duration: 21H 40min    _____________________________________________________________  STUDY INFORMATION    EEG Recording Technique:  The patient underwent continuous Video-EEG monitoring, using Telemetry System hardware on the XLTek Digital System. EEG and video data were stored on a computer hard drive with important events saved in digital archive files. The material was reviewed by a physician (electroencephalographer / epileptologist) on a daily basis. Vitor and seizure detection algorithms were utilized and reviewed. An EEG Technician attended to the patient, and was available throughout daytime work hours.  The epilepsy center neurologist was available in person or on call 24-hours per day.    EEG Placement and Labeling of Electrodes:  The EEG was performed utilizing 20 channel referential EEG connections (coronal over temporal over parasagittal montage) using all standard 10-20 electrode placements with EKG, with additional electrodes placed in the inferior temporal region using the modified 10-10 montage electrode placements for elective admissions, or if deemed necessary. Recording was at a sampling rate of 256 samples per second per channel. Time synchronized digital video recording was done simultaneously with EEG recording. A low light infrared camera was used for low light recording.     _____________________________________________________________  HISTORY    Patient is a 58y old  Female who presents with a chief complaint of SAH (2024 09:41)      PERTINENT MEDICATION:  MEDICATIONS  (STANDING):  chlorhexidine 4% Liquid 1 Application(s) Topical at bedtime  dexMEDEtomidine Infusion 0.2 MICROgram(s)/kG/Hr (3.56 mL/Hr) IV Continuous <Continuous>  enoxaparin Injectable 40 milliGRAM(s) SubCutaneous <User Schedule>  phenylephrine    Infusion 0.1 MICROgram(s)/kG/Min (2.67 mL/Hr) IV Continuous <Continuous>  polyethylene glycol 3350 17 Gram(s) Oral every 12 hours  QUEtiapine 12.5 milliGRAM(s) Oral <User Schedule>  QUEtiapine 25 milliGRAM(s) Oral at bedtime  senna 2 Tablet(s) Oral at bedtime  theophylline Syrup 50 milliGRAM(s) Oral every 12 hours    _____________________________________________________________  STUDY INTERPRETATION    Findings: The background was continuous, spontaneously variable and reactive.   No posterior dominant rhythm seen.  Background predominantly consisted of theta, delta and faster activities.    Focal Slowing:   Paucity of faster frequencies over the right anterior head region.    Sleep Background:  Drowsiness and stage II sleep transients were not recorded.    Other Non-Epileptiform Findings:  None were present.    Interictal Epileptiform Activity:   None during this recording    Events:  Clinical events: None recorded.  Seizures: None recorded.    Activation Procedures:   Hyperventilation was not performed.    Photic stimulation was not performed.     Artifacts:  Intermittent myogenic and movement artifacts were noted in addition to intermittent electrode artifact particularly in right anterior electrodes    _____________________________________________________________  EEG SUMMARY/CLASSIFICATION    Abnormal EEG  - Right anterior slowing.  - Moderate generalized slowing.    _____________________________________________________________  EEG IMPRESSION/CLINICAL CORRELATE    Abnormal EEG study.    Structural or functional abnormality in the right anterior head region.  Moderate nonspecific diffuse or multifocal cerebral dysfunction.     No clear epileptic discharges seen during this recording  No seizure seen.    WM Serrano  Attending Physician, [facility] [place]    ------------------------------------  EEG Reading Room: [phone]  On Call Service After Hours: [phone]                Electronic Signatures:  Ada Patel)  (Signed 2024 09:20)  	Authored: TECH INFORMATION, EEG REPORT      Last Updated: 2024 09:20 by Ada Patel)

## 2024-04-08 NOTE — CHART NOTE - NSCHARTNOTEFT_GEN_A_CORE
Interventional Neuro- Radiology   Procedure Note    Procedure: Selective Cerebral Angiography with vasospasm treatment  Pre- Procedure Diagnosis: Vasospasm  Post- Procedure Diagnosis: Vasospasm s/p IA milrinone and verapamil    : Dr. Ag Etienne  Fellow: Dr. Kristal Quevedo  Physician Assistant: Beka Meza PA-C  Resident: Dr. Nick Ramirez    RN: Ag Contreras  Tech: Kevin Antonio Peter Denning    Anesthesia: (general anesthesia) Dr. Ellis, Dr. Beltran    I/Os:  Fluids: 250 cc  [place]: 450 cc  Contrast: 48 cc  EVD: 10 cc total drained  Estimated Blood Loss: <10cc      Preliminary Report:  Under general anesthesia, using a 5 Lao short sheath to the left femoral artery examination of left vertebral artery/ left internal carotid artery/ left external carotid artery/ right internal carotid artery/ right external carotid artery via selective cerebral angiography demonstrates moderate spasm to bilateral anterior cerebral circulation. IA milrinone and verapamil was given as described below.    Right ICA - Milrinone 4 mg and Verapamil 2 mg intra-arterially.  Left ICA - Milrinone 4 mg and Verapamil 2 mg intra-arterially    Official dictated report to follow.    Patient tolerated procedure well, vital signs stable, hemodynamically stable. Following the procedure it was decided by Anesthesia team to keep the pt intubated because the pt was not back to her baseline after anesthesia meds were held. Results discussed with NSCU team and the patient's family. Groin sheath d/c'ed, manual compression held to hemostasis, no active bleeding, no hematoma, Vascade applied, quick clot and safeguard balloon dressing applied at 16:10 hours. Patient transferred to NSCU for further care/ monitoring.

## 2024-04-09 ENCOUNTER — TRANSCRIPTION ENCOUNTER (OUTPATIENT)
Age: 59
End: 2024-04-09

## 2024-04-09 ENCOUNTER — RESULT REVIEW (OUTPATIENT)
Age: 59
End: 2024-04-09

## 2024-04-09 LAB
ANION GAP SERPL CALC-SCNC: 14 MMOL/L — SIGNIFICANT CHANGE UP (ref 5–17)
BUN SERPL-MCNC: 9 MG/DL — SIGNIFICANT CHANGE UP (ref 7–23)
CALCIUM SERPL-MCNC: 9.4 MG/DL — SIGNIFICANT CHANGE UP (ref 8.4–10.5)
CHLORIDE SERPL-SCNC: 106 MMOL/L — SIGNIFICANT CHANGE UP (ref 96–108)
CO2 SERPL-SCNC: 20 MMOL/L — LOW (ref 22–31)
CREAT SERPL-MCNC: 0.4 MG/DL — LOW (ref 0.5–1.3)
EGFR: 115 ML/MIN/1.73M2 — SIGNIFICANT CHANGE UP
GAS PNL BLDA: SIGNIFICANT CHANGE UP
GLUCOSE SERPL-MCNC: 167 MG/DL — HIGH (ref 70–99)
MAGNESIUM SERPL-MCNC: 2 MG/DL — SIGNIFICANT CHANGE UP (ref 1.6–2.6)
PHOSPHATE SERPL-MCNC: 3.8 MG/DL — SIGNIFICANT CHANGE UP (ref 2.5–4.5)
POTASSIUM SERPL-MCNC: 4.5 MMOL/L — SIGNIFICANT CHANGE UP (ref 3.5–5.3)
POTASSIUM SERPL-SCNC: 4.5 MMOL/L — SIGNIFICANT CHANGE UP (ref 3.5–5.3)
SODIUM SERPL-SCNC: 140 MMOL/L — SIGNIFICANT CHANGE UP (ref 135–145)
TROPONIN T, HIGH SENSITIVITY RESULT: 39 NG/L — SIGNIFICANT CHANGE UP (ref 0–51)

## 2024-04-09 PROCEDURE — 99291 CRITICAL CARE FIRST HOUR: CPT

## 2024-04-09 PROCEDURE — 95718 EEG PHYS/QHP 2-12 HR W/VEEG: CPT

## 2024-04-09 PROCEDURE — 71045 X-RAY EXAM CHEST 1 VIEW: CPT | Mod: 26

## 2024-04-09 PROCEDURE — 93308 TTE F-UP OR LMTD: CPT | Mod: 26

## 2024-04-09 PROCEDURE — 93321 DOPPLER ECHO F-UP/LMTD STD: CPT | Mod: 26

## 2024-04-09 PROCEDURE — 93010 ELECTROCARDIOGRAM REPORT: CPT

## 2024-04-09 RX ORDER — SODIUM CHLORIDE 9 MG/ML
500 INJECTION INTRAMUSCULAR; INTRAVENOUS; SUBCUTANEOUS ONCE
Refills: 0 | Status: COMPLETED | OUTPATIENT
Start: 2024-04-09 | End: 2024-04-09

## 2024-04-09 RX ORDER — QUETIAPINE FUMARATE 200 MG/1
12.5 TABLET, FILM COATED ORAL ONCE
Refills: 0 | Status: DISCONTINUED | OUTPATIENT
Start: 2024-04-09 | End: 2024-04-10

## 2024-04-09 RX ORDER — IPRATROPIUM/ALBUTEROL SULFATE 18-103MCG
3 AEROSOL WITH ADAPTER (GRAM) INHALATION ONCE
Refills: 0 | Status: COMPLETED | OUTPATIENT
Start: 2024-04-09 | End: 2024-04-09

## 2024-04-09 RX ADMIN — ENOXAPARIN SODIUM 40 MILLIGRAM(S): 100 INJECTION SUBCUTANEOUS at 17:13

## 2024-04-09 RX ADMIN — POLYETHYLENE GLYCOL 3350 17 GRAM(S): 17 POWDER, FOR SOLUTION ORAL at 17:13

## 2024-04-09 RX ADMIN — CHLORHEXIDINE GLUCONATE 1 APPLICATION(S): 213 SOLUTION TOPICAL at 05:45

## 2024-04-09 RX ADMIN — Medication 15 MILLILITER(S): at 11:05

## 2024-04-09 RX ADMIN — POLYETHYLENE GLYCOL 3350 17 GRAM(S): 17 POWDER, FOR SOLUTION ORAL at 05:42

## 2024-04-09 RX ADMIN — SENNA PLUS 2 TABLET(S): 8.6 TABLET ORAL at 22:01

## 2024-04-09 RX ADMIN — DEXMEDETOMIDINE HYDROCHLORIDE IN 0.9% SODIUM CHLORIDE 3.56 MICROGRAM(S)/KG/HR: 4 INJECTION INTRAVENOUS at 19:24

## 2024-04-09 RX ADMIN — Medication 50 MILLIGRAM(S): at 17:13

## 2024-04-09 RX ADMIN — Medication 50 MILLIGRAM(S): at 05:41

## 2024-04-09 RX ADMIN — CHLORHEXIDINE GLUCONATE 15 MILLILITER(S): 213 SOLUTION TOPICAL at 05:41

## 2024-04-09 RX ADMIN — Medication 3 MILLILITER(S): at 14:27

## 2024-04-09 RX ADMIN — Medication 13.4 MICROGRAM(S)/KG/MIN: at 19:26

## 2024-04-09 RX ADMIN — SODIUM CHLORIDE 500 MILLILITER(S): 9 INJECTION INTRAMUSCULAR; INTRAVENOUS; SUBCUTANEOUS at 01:30

## 2024-04-09 NOTE — PROGRESS NOTE ADULT - ASSESSMENT
58F, on ASA81 for pain relief? 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 Was initially found unresponsive at 21:00, intubated at 23:00 at OSH, then txfrd. Given ddavp at OSH. On arrival patient w/ PERRL, +cough/gag, 2/5 spontaneous movement in LUE, o/w no movement. 1 unit PLTs given and R frontal EVD placed-high pressure. PLTs/Coags wnl.     Intubated sedated in NSCU   Echo with abnormal findings of severe cardiomyopathy and RWMA

## 2024-04-09 NOTE — PROGRESS NOTE ADULT - ASSESSMENT
ASSESSMENT/PLAN: HH4 WNFS 5 mF4 subarachnoid hemorrhage     Post-SAH day 13, SAH 3/25/2024  3/26/24 evd placed  3/26/24 pcomm clip  4/6 Intra arterial treatment for vasospasm    N   # @ Pcomm ruptured, s/p clip   #EVD tract acute hemorrhage/not tolerating clamp - CTA/CTP to follow up, hypertensive goal 160-200   #Hydrocephalus/ICP crisis: R EVD in place 20cm H2O 51cc last 24h   #Delayed Cerebral Ischemia Management: angiogram with intraarterial treatment -  - 220, euvolemia, nimodipine  #Pain: PRN tylenol oxi fentanyl   #agitations: precedex   MRI: Multifocal acute to subacute infarction. This extensively involves each MCA posterior distribution, the right basal ganglia, the anterior parasagittal vertex CINDY distribution and an additional smaller lesion in the right cerebellum  No further intervenation from Neuro IR  Activity: [x] OOB as tolerated    CV   SBP goal 180 - 220  #R heart strain: TTE decreased systolic cardiac function EF 36% with decreased diastolic function -rTTE EF 55%   #bradycardia theophyllin   Philip for BP augmentation    P   RA  incentive spirometry    R   I&o    GI   Diet: fees   PPI   Senna miralax  BM 3/30    E   Goal euglycemia (-180)  A1c 5.5     H   SCD   Chemoppx   LED R below knee dvt     ID   afebrile  E.Coli UTI s/p 3 days course of ceftriaxone     ASSESSMENT/PLAN: HH4 WNFS 5 mF4 subarachnoid hemorrhage     Post-SAH day 13, SAH 3/25/2024  3/26/24 evd placed  3/26/24 pcomm clip  4/6 Intra arterial treatment for vasospasm    N   # @ Pcomm ruptured, s/p clip   #EVD tract acute hemorrhage/not tolerating clamp - CTA/CTP to follow up, hypertensive goal 160-200   #Hydrocephalus/ICP crisis: R EVD in place 20cm H2O 51cc last 24h   #Delayed Cerebral Ischemia Management: angiogram with intraarterial treatment -  - 220, euvolemia, nimodipine  #Pain: PRN tylenol oxi fentanyl   #agitations: precedex   MRI: Multifocal acute to subacute infarction. This extensively involves each MCA posterior distribution, the right basal ganglia, the anterior parasagittal vertex CINDY distribution and an additional smaller lesion in the right cerebellum  s/p angio for tx  Neurochecks Q 1 vitals Q1  Activity: [x] OOB as tolerated    CV   SBP goal 180 - 220  #R heart strain: TTE decreased systolic cardiac function EF 36% with decreased diastolic function -rTTE EF 55%   #bradycardia theophyllin   Philip for BP augmentation    P   Intubated   CPAP trial   NPO for extubation in the evening      R   I&o    GI   Diet: fees   PPI   Senna miralax  BM 08/08    E   Goal euglycemia (-180)  A1c 5.5     H   SCD   Chemoppx: lovenox 40 mg QD  LED R below knee dvt     ID   afebrile  E.Coli UTI s/p 3 days course of ceftriaxone

## 2024-04-09 NOTE — AIRWAY REMOVAL NOTE  ADULT & PEDS - ARTIFICAL AIRWAY REMOVAL COMMENTS
patient extubated using two patient identifiers per provider order. Lucero MCPHERSON present and aware. No resp distress noted.

## 2024-04-09 NOTE — EEG REPORT - NS EEG TEXT BOX
EEG REPORT:     [facility] EPILEPSY CENTER   REPORT OF CONTINUOUS VIDEO EEG     [facility]: [address] [place] [address] [address], [place] [address], Ph#: [phone]  [facility]:  [address] [place] [address], Ph#: [phone]  [facility]: [address] [place] [address], Ph#: [phone]    Patient Name: JOSE MCLEAN  Age and : 58y ([birth year])  MRN #: [id number]  Location: [place] [address]  Referring Physician: Perlita Varela    Start Time/Date: 2024 10:11-08:00 2024. EEG is off for a duration of 32  Duration: 24H     _____________________________________________________________  STUDY INFORMATION    EEG Recording Technique:  The patient underwent continuous Video-EEG monitoring, using Telemetry System hardware on the XLTek Digital System. EEG and video data were stored on a computer hard drive with important events saved in digital archive files. The material was reviewed by a physician (electroencephalographer / epileptologist) on a daily basis. Vitor and seizure detection algorithms were utilized and reviewed. An EEG Technician attended to the patient, and was available throughout daytime work hours.  The epilepsy center neurologist was available in person or on call 24-hours per day.    EEG Placement and Labeling of Electrodes:  The EEG was performed utilizing 20 channel referential EEG connections (coronal over temporal over parasagittal montage) using all standard 10-20 electrode placements with EKG, with additional electrodes placed in the inferior temporal region using the modified 10-10 montage electrode placements for elective admissions, or if deemed necessary. Recording was at a sampling rate of 256 samples per second per channel. Time synchronized digital video recording was done simultaneously with EEG recording. A low light infrared camera was used for low light recording.     _____________________________________________________________  HISTORY    Patient is a 58y old  Female who presents with a chief complaint of SAH (2024 09:41)      PERTINENT MEDICATION:  MEDICATIONS  (STANDING):  chlorhexidine 4% Liquid 1 Application(s) Topical at bedtime  dexMEDEtomidine Infusion 0.2 MICROgram(s)/kG/Hr (3.56 mL/Hr) IV Continuous <Continuous>  enoxaparin Injectable 40 milliGRAM(s) SubCutaneous <User Schedule>  phenylephrine    Infusion 0.1 MICROgram(s)/kG/Min (2.67 mL/Hr) IV Continuous <Continuous>  polyethylene glycol 3350 17 Gram(s) Oral every 12 hours  QUEtiapine 12.5 milliGRAM(s) Oral <User Schedule>  QUEtiapine 25 milliGRAM(s) Oral at bedtime  senna 2 Tablet(s) Oral at bedtime  theophylline Syrup 50 milliGRAM(s) Oral every 12 hours    _____________________________________________________________  STUDY INTERPRETATION    Findings: The background was continuous, spontaneously variable and reactive.   No posterior dominant rhythm seen.  Background predominantly consisted of theta, delta and faster activities.    Focal Slowing:   Paucity of faster frequencies over the right anterior head region.    Sleep Background:  Drowsiness and stage II sleep transients were not recorded.    Other Non-Epileptiform Findings:  None were present.    Interictal Epileptiform Activity:   None during this recording    Events:  Clinical events: None recorded.  Seizures: None recorded.    Activation Procedures:   Hyperventilation was not performed.    Photic stimulation was not performed.     Artifacts:  Intermittent myogenic and movement artifacts were noted in addition to intermittent electrode artifact particularly in right anterior electrodes    _____________________________________________________________  EEG SUMMARY/CLASSIFICATION    Abnormal EEG  - Right hemisphere slowing more prominent anteriorly.  - Mild to moderate generalized slowing.  - Triphasic waves    _____________________________________________________________  EEG IMPRESSION/CLINICAL CORRELATE    Abnormal EEG study.    1- Structural or functional abnormality in the right anterior head region.  2- Mild to moderate nonspecific diffuse or multifocal cerebral dysfunction. Noted worsening of background toward the end of the recording with the appearance of triphasic waves  3- Triphasic waves typically seen in toxic/metabolic encephalopathy      No epileptic discharges seen during this recording  No seizure seen.    WM Serrano  Attending Physician, [facility] Epilepsy [place]    ------------------------------------  EEG Reading Room: [phone]  On Call Service After Hours: [phone]                           EEG REPORT:     [facility] EPILEPSY CENTER   REPORT OF CONTINUOUS VIDEO EEG     [facility]: [address] [place] [address] [address], [place] [address], Ph#: [phone]  [facility]:  [address] [place] [address], Ph#: [phone]  [facility]: [address] [place] [address], Ph#: [phone]    Patient Name: JOSE MCLEAN  Age and : 58y (67)  MRN #: [id number]  Location: [place] [address]  Referring Physician: Perlita Varela    Start Time/Date: 2024 10:11-08:00 2024. EEG is off for a duration of 32  Duration: 24H     _____________________________________________________________  STUDY INFORMATION    EEG Recording Technique:  The patient underwent continuous Video-EEG monitoring, using Telemetry System hardware on the XLTek Digital System. EEG and video data were stored on a computer hard drive with important events saved in digital archive files. The material was reviewed by a physician (electroencephalographer / epileptologist) on a daily basis. Vitor and seizure detection algorithms were utilized and reviewed. An EEG Technician attended to the patient, and was available throughout daytime work hours.  The epilepsy center neurologist was available in person or on call 24-hours per day.    EEG Placement and Labeling of Electrodes:  The EEG was performed utilizing 20 channel referential EEG connections (coronal over temporal over parasagittal montage) using all standard 10-20 electrode placements with EKG, with additional electrodes placed in the inferior temporal region using the modified 10-10 montage electrode placements for elective admissions, or if deemed necessary. Recording was at a sampling rate of 256 samples per second per channel. Time synchronized digital video recording was done simultaneously with EEG recording. A low light infrared camera was used for low light recording.     _____________________________________________________________  HISTORY    Patient is a 58y old  Female who presents with a chief complaint of SAH (2024 09:41)      PERTINENT MEDICATION:  MEDICATIONS  (STANDING):  chlorhexidine 4% Liquid 1 Application(s) Topical at bedtime  dexMEDEtomidine Infusion 0.2 MICROgram(s)/kG/Hr (3.56 mL/Hr) IV Continuous <Continuous>  enoxaparin Injectable 40 milliGRAM(s) SubCutaneous <User Schedule>  phenylephrine    Infusion 0.1 MICROgram(s)/kG/Min (2.67 mL/Hr) IV Continuous <Continuous>  polyethylene glycol 3350 17 Gram(s) Oral every 12 hours  QUEtiapine 12.5 milliGRAM(s) Oral <User Schedule>  QUEtiapine 25 milliGRAM(s) Oral at bedtime  senna 2 Tablet(s) Oral at bedtime  theophylline Syrup 50 milliGRAM(s) Oral every 12 hours    _____________________________________________________________  STUDY INTERPRETATION    Findings: The background was continuous, spontaneously variable and reactive.   No posterior dominant rhythm seen.  Background predominantly consisted of theta, delta and faster activities.    Focal Slowing:   Paucity of faster frequencies over the right anterior head region.    Sleep Background:  Drowsiness and stage II sleep transients were not recorded.    Other Non-Epileptiform Findings:  None were present.    Interictal Epileptiform Activity:   None during this recording    Events:  Clinical events: None recorded.  Seizures: None recorded.    Activation Procedures:   Hyperventilation was not performed.    Photic stimulation was not performed.     Artifacts:  Intermittent myogenic and movement artifacts were noted in addition to intermittent electrode artifact particularly in right anterior electrodes    _____________________________________________________________  EEG SUMMARY/CLASSIFICATION    Abnormal EEG  - Right hemisphere slowing more prominent anteriorly.  - Mild to moderate generalized slowing.  - Triphasic waves    _____________________________________________________________  EEG IMPRESSION/CLINICAL CORRELATE    Abnormal EEG study.    1- Structural or functional abnormality in the right anterior head region.  2- Mild to moderate nonspecific diffuse or multifocal cerebral dysfunction. Noted worsening of background toward the end of the recording with the appearance of triphasic waves  3- Triphasic waves typically seen in toxic/metabolic encephalopathy      No epileptic discharges seen during this recording  No seizure seen.    In absence of additional concerns, recommend consideration for discontinuation of current EEG study with reconnection in future if warranted.      WM Serrano  Attending Physician, [facility] Epilepsy [place]    ------------------------------------  EEG Reading Room: [phone]  On Call Service After Hours: [phone]                           EEG REPORT:     [facility]   COMPREHENSIVE EPILEPSY CENTER   REPORT OF CONTINUOUS VIDEO EEG     [facility]: [address] [place] [address] [address], [place] [address], Ph#: [phone]  [facility]:  [address] [place] [address], Ph#: [phone]  [facility]: [address] [place] [address], Ph#: [phone]    Patient Name: JOSE MCLEAN  Age and : 58y (67)  MRN #: [id number]  Location: [place] [address]  Referring Physician: Perlita Varela    Start Time/Date: 2024 10::19 2024. EEG is off for a duration of 32  Duration: 30 H 19 m    _____________________________________________________________  STUDY INFORMATION    EEG Recording Technique:  The patient underwent continuous Video-EEG monitoring, using Telemetry System hardware on the XLTek Digital System. EEG and video data were stored on a computer hard drive with important events saved in digital archive files. The material was reviewed by a physician (electroencephalographer / epileptologist) on a daily basis. Vitor and seizure detection algorithms were utilized and reviewed. An EEG Technician attended to the patient, and was available throughout daytime work hours.  The epilepsy center neurologist was available in person or on call 24-hours per day.    EEG Placement and Labeling of Electrodes:  The EEG was performed utilizing 20 channel referential EEG connections (coronal over temporal over parasagittal montage) using all standard 10-20 electrode placements with EKG, with additional electrodes placed in the inferior temporal region using the modified 10-10 montage electrode placements for elective admissions, or if deemed necessary. Recording was at a sampling rate of 256 samples per second per channel. Time synchronized digital video recording was done simultaneously with EEG recording. A low light infrared camera was used for low light recording.     _____________________________________________________________  HISTORY    Patient is a 58y old  Female who presents with a chief complaint of SAH (2024 09:41)      PERTINENT MEDICATION:  MEDICATIONS  (STANDING):  chlorhexidine 4% Liquid 1 Application(s) Topical at bedtime  dexMEDEtomidine Infusion 0.2 MICROgram(s)/kG/Hr (3.56 mL/Hr) IV Continuous <Continuous>  enoxaparin Injectable 40 milliGRAM(s) SubCutaneous <User Schedule>  phenylephrine    Infusion 0.1 MICROgram(s)/kG/Min (2.67 mL/Hr) IV Continuous <Continuous>  polyethylene glycol 3350 17 Gram(s) Oral every 12 hours  QUEtiapine 12.5 milliGRAM(s) Oral <User Schedule>  QUEtiapine 25 milliGRAM(s) Oral at bedtime  senna 2 Tablet(s) Oral at bedtime  theophylline Syrup 50 milliGRAM(s) Oral every 12 hours    _____________________________________________________________  STUDY INTERPRETATION    Findings: The background was continuous, spontaneously variable and reactive.   No posterior dominant rhythm seen.  Background predominantly consisted of theta, delta and faster activities.    Focal Slowing:   Paucity of faster frequencies over the right anterior head region.    Sleep Background:  Drowsiness and stage II sleep transients were not recorded.    Other Non-Epileptiform Findings:  None were present.    Interictal Epileptiform Activity:   None during this recording    Events:  Clinical events: None recorded.  Seizures: None recorded.    Activation Procedures:   Hyperventilation was not performed.    Photic stimulation was not performed.     Artifacts:  Intermittent myogenic and movement artifacts were noted in addition to intermittent electrode artifact particularly in right anterior electrodes    _____________________________________________________________  EEG SUMMARY/CLASSIFICATION    Abnormal EEG  - Right hemisphere slowing more prominent anteriorly.  - Mild to moderate generalized slowing.  - Triphasic waves    _____________________________________________________________  EEG IMPRESSION/CLINICAL CORRELATE    Abnormal EEG study.    1- Structural or functional abnormality in the right anterior head region.  2- Mild to moderate nonspecific diffuse or multifocal cerebral dysfunction. Noted worsening of background toward the end of the recording with the appearance of triphasic waves  3- Triphasic waves typically seen in toxic/metabolic encephalopathy      No epileptic discharges seen during this recording  No seizure seen.    In absence of additional concerns, recommend consideration for discontinuation of current EEG study with reconnection in future if warranted.      WM Serrano  Attending Physician, [facility] Epilepsy [place]    ------------------------------------  EEG Reading Room: [phone]  On Call Service After Hours: [phone]

## 2024-04-09 NOTE — PROGRESS NOTE ADULT - SUBJECTIVE AND OBJECTIVE BOX
Subjective: Patient seen and examined. No new events except as noted.   remains in NSCU   New Bilateral CVA      REVIEW OF SYSTEMS:  Unable to obtain   MEDICATIONS:  MEDICATIONS  (STANDING):  chlorhexidine 0.12% Liquid 15 milliLiter(s) Oral Mucosa every 12 hours  chlorhexidine 4% Liquid 1 Application(s) Topical <User Schedule>  dexMEDEtomidine Infusion 0.2 MICROgram(s)/kG/Hr (3.56 mL/Hr) IV Continuous <Continuous>  enoxaparin Injectable 40 milliGRAM(s) SubCutaneous <User Schedule>  multivitamin/minerals/iron Oral Solution (CENTRUM) 15 milliLiter(s) Oral daily  norepinephrine Infusion 0.1 MICROgram(s)/kG/Min (13.4 mL/Hr) IV Continuous <Continuous>  polyethylene glycol 3350 17 Gram(s) Oral every 12 hours  senna 2 Tablet(s) Oral at bedtime  theophylline Syrup 50 milliGRAM(s) Oral every 12 hours      PHYSICAL EXAM:  T(C): 36.5 (04-09-24 @ 07:00), Max: 36.9 (04-09-24 @ 03:00)  HR: 58 (04-09-24 @ 08:30) (47 - 117)  BP: 175/70 (04-08-24 @ 14:20) (175/70 - 175/70)  RR: 16 (04-09-24 @ 08:30) (14 - 34)  SpO2: 100% (04-09-24 @ 08:30) (97% - 100%)  Wt(kg): --  I&O's Summary    08 Apr 2024 07:01  -  09 Apr 2024 07:00  --------------------------------------------------------  IN: 2508.2 mL / OUT: 2145 mL / NET: 363.2 mL    09 Apr 2024 07:01  -  09 Apr 2024 09:09  --------------------------------------------------------  IN: 148.5 mL / OUT: 2 mL / NET: 146.5 mL      Height (cm): 157.5 (04-08 @ 14:20)  Weight (kg): 71.2 (04-08 @ 14:20)  BMI (kg/m2): 28.7 (04-08 @ 14:20)  BSA (m2): 1.72 (04-08 @ 14:20)      Appearance: NAD Intubated sedated   HEENT:  dry oral mucosa, PERRL, EOMI	  Lymphatic: No lymphadenopathy  Cardiovascular: Normal S1 S2, No JVD, No murmurs, No edema  Respiratory: Ventilated   Psychiatry: sedated   Gastrointestinal:  Soft, Non-tender, + BS	  Skin: No rashes, No ecchymoses, No cyanosis	  Neurologic: Intubated EOS, PERRL, no FC, R side spontaneous strong, L side 3/5  Extremities: decreased  range of motion, No clubbing, cyanosis or edema  Vascular: Peripheral pulses palpable 2+ bilaterally        LABS:    CARDIAC MARKERS:                                10.1   17.54 )-----------( 314      ( 08 Apr 2024 22:46 )             30.3     04-08    140  |  106  |  9   ----------------------------<  167<H>  4.5   |  20<L>  |  0.40<L>    Ca    9.4      08 Apr 2024 22:46  Phos  3.8     04-08  Mg     2.0     04-08        TELEMETRY: 	SR    ECG:  	  RADIOLOGY: < from: CT Head No Cont (04.07.24 @ 10:24) >    ACC: [id number] EXAM:  CT BRAIN   ORDERED BY:  RACH WATKINS     PROCEDURE DATE:  04/07/2024          INTERPRETATION:  CLINICAL INFORMATION: Interval ct scan. follow up   subarachnoid hemorrhage    COMPARISON: CT head 4/6/2024    CONTRAST:  IV Contrast: NONE  Complications: None reported at time of study completion    TECHNIQUE:  Serial axial images were obtained from the skull base to the   vertex using multi-slice helical technique on portable CT [facility].    FINDINGS:    VENTRICLES AND SULCI: Right frontotemporal approach ventriculostomy   catheter terminating in the lateral ventricles. No hydrocephalus.. Right   parasellar aneurysmal clip.  INTRA-AXIAL: Slight decrease in size of right thalamic capsular   parenchymal hematoma, now measuring 1.0 cm (1:21). Decreased conspicuity   of subarachnoid hemorrhage within the right sylvian fissure No midline   shift.  There are periventricular and subcortical white matter   hypodensities, consistent with microvascular type changes.  EXTRA-AXIAL: No mass or fluid collection. Basal cisterns are normal in   appearance.    VISUALIZED SINUSES:  Clear.  TYMPANOMASTOID CAVITIES:  Clear.  VISUALIZED ORBITS: Normal.  CALVARIUM: Intact.        IMPRESSION:    Slight decrease in size of right thalamic capsular parenchymal hematoma.    --- End of Report ---          NELY REESE DO; Resident Radiologist  This document has been electronically signed.  ANJU CAPPS MD; Attending Radiologist  This document has been electronically signed. Apr 7 2024 12:12PM    < end of copied text >  < from: CT Angio Neck w/ IV Cont (04.06.24 @ 12:45) >    ACC: [id number] EXAM:  CT ANGIO NECK (W)AW IC   ORDERED BY: ELIZABETH WILLIAMSON     ACC: [id number] EXAM:  CT PERFUSION W MAPS IC   ORDERED BY: ELIZABETH WILLIAMSON     ACC: [id number] EXAM:  CT ANGIO BRAIN (W)AW IC   ORDERED BY: ELIZABETH WILLIAMSON    PROCEDURE DATE:  04/06/2024          INTERPRETATION:  CLINICAL INFORMATION: Status post right P-comm aneurysm   clipping, subarachnoid hemorrhage. Evaluate for vasospasm    TECHNIQUE: A CT angiogram study of the neck and head was performed with   axial thin section images with volume rendered 2-D and 3-D maximum   intensity projection MIP reformatted series. CT Perfusion analysis was   performed with RAPID software.    70 cc intravenous Omnipaque 300 contrast was administered, 0 cc contrast   was discarded.    COMPARISON: CTA head and neck 3/29/2024 and 3/26/2024    CT HEAD:    Right frontotemporal craniotomy.  Right frontal approach ventriculostomy catheter terminates in the lateral   ventricles, unchanged in position. The ventricles areunchanged in size.   No hydrocephalus.    Right parasellar aneurysm clip.    1.2 cm thalamocapsular parenchymal hematoma not significantly changed in   size from prior exam. No evidence of acute cerebral ischemia.    Partially visualized right nasal approach enteric tube.    CTA NECK:    There is no internal carotid artery stenosis by NASCET criteria. 0%   stenosis is noted. No carotid artery dissection.  There is no evidence for vertebral artery stenosis or dissection.  Normal aortic arch morphology. Vertebral artery origins are unremarkable.  Enlarged heterogeneous thyroid gland. Right lung dependent atelectasis.    CTA HEAD:    There is diffuse attenuation of the cerebral vasculature about the [place]. For instance, the right [id number] measures approximately 1.1   cm, where as it was previously 2.0 cm. The right A1 segment is not well   visualized.    The vasculature about the [place] is patent without major vessel   occlusion. Status post right P-comm aneurysm clipping. Narrowing of the   distal basilar artery.    CT PERFUSION:    RAPID values are as follows:    CBF < 30%: 0cc  Tmax > 6.0s: 0 cc  Mismatch Volume: 0 cc    IMPRESSION:    CT HEAD: Unchanged right thalamic capsular parenchymal hematoma. No new   hemorrhage or evidence of acute cerebral ischemia.    CTA NECK: No evidence of hemodynamically significant stenosis using   NASCET criteria. No evidence of arterial dissection.    CTA HEAD: Diffusely decreased caliber of vasculature about the [place], possibly related to vasospasm. No evidence of major vessel   occlusion. Origin of the distal basilar artery.    CT PERFUSION: No perfusion defects.    If the patient remains symptomatic, consider brain MRI imaging follow-up.    CAROTID STENOSIS REFERENCE: (NASCET = 100x1-(N/D)). N=greatest narrowing.   D=normal distal diameter - MILD = <50% stenosis. - MODERATE = 50-69%   stenosis. - SEVERE = 70-89% stenosis. - HAIRLINE/CRITICAL = 90-99%   stenosis. - OCCLUDED = 100% stenosis.    --- End of Report ---           SAUL TYSON MD; Resident Radiologist  This document has been electronically signed.  MELONIE RICHARD MD; Attending Radiologist  This document has been electronically signed. Apr 6 2024  1:28PM    < end of copied text >    DIAGNOSTIC TESTING:  [ ] Echocardiogram:  [ ]  Catheterization:  [ ] Stress Test:    OTHER:

## 2024-04-09 NOTE — PROGRESS NOTE ADULT - PROBLEM SELECTOR PLAN 2
suspect underlying CAD/Ischemic heart disease   will need to obtain prior records   Bedside echo with Definity contrast performed this am shows improved LV function, no LV thrombus visualized  Atropine for sustained HR < 30

## 2024-04-09 NOTE — PROGRESS NOTE ADULT - SUBJECTIVE AND OBJECTIVE BOX
Patient seen and examined at bedside.    --Anticoagulation--    T(C): 36.9 (04-07-24 @ 03:00), Max: 37.7 (04-06-24 @ 18:00)  HR: 60 (04-07-24 @ 03:00) (56 - 96)  BP: 175/70 (04-06-24 @ 15:30) (107/67 - 185/74)  RR: 19 (04-07-24 @ 03:00) (16 - 48)  SpO2: 96% (04-07-24 @ 03:00) (90% - 100%)  Wt(kg): --    Exam: Intubated, EOS, PERRL, BUE loc R>L, BLE wd

## 2024-04-09 NOTE — PROGRESS NOTE ADULT - SUBJECTIVE AND OBJECTIVE BOX
HPI   58F, on ASA81, transferred from [place] for SAH HH4 WNFS 5 mF4 ruptured R pcomm aneurysm. Was initially found unresponsive on 3/25/24 at 21:00, intubated at 23:00 at OSH, then txfrd. Given ddavp at OSH. Here in ED 1 unit PLTs given and R frontal EVD placed-high pressure. Went for R pcom clip on 3/26/24, postop evd not functioning then drained on the floor.     Admission scores:   SAH R pcomm ruptured @ HH4 WNFS 5 mF4, GCS<7    24h events   evd was clamped complicated with patient becoming more sleepy, evd reopened   04/08: MRI with widespread ischemic stroke b/l. As per neuro IR no further intervention.    Exam   Ox0, nonverbal, EOS, PERRL, no FC, R side spontaneous strong, L side 3/5    VITALS:   T(C): 36.9 (04-07-24 @ 03:00), Max: 37.7 (04-06-24 @ 18:00)  HR: 60 (04-07-24 @ 03:00) (56 - 96)  BP: 175/70 (04-06-24 @ 15:30) (107/67 - 185/74)  BP(mean): 112 (04-06-24 @ 15:30) (80 - 112)  RR: 19 (04-07-24 @ 03:00) (16 - 48)  SpO2: 96% (04-07-24 @ 03:00) (90% - 100%)    acetaminophen     Tablet .. 650 milliGRAM(s) Oral every 6 hours PRN  chlorhexidine 4% Liquid 1 Application(s) Topical at bedtime  dexMEDEtomidine Infusion 0.2 MICROgram(s)/kG/Hr IV Continuous <Continuous>  enoxaparin Injectable 40 milliGRAM(s) SubCutaneous <User Schedule>  multiple electrolytes Injection Type 1 1000 milliLiter(s) IV Continuous <Continuous>  oxyCODONE    IR 5 milliGRAM(s) Oral every 4 hours PRN  oxyCODONE    IR 10 milliGRAM(s) Oral every 4 hours PRN  phenylephrine    Infusion 0.1 MICROgram(s)/kG/Min IV Continuous <Continuous>  polyethylene glycol 3350 17 Gram(s) Oral every 12 hours  potassium chloride   Solution 20 milliEquivalent(s) Oral once  potassium phosphate / sodium phosphate Powder (PHOS-NaK) 2 Packet(s) Oral once  QUEtiapine 12.5 milliGRAM(s) Oral <User Schedule>  QUEtiapine 25 milliGRAM(s) Oral at bedtime  senna 2 Tablet(s) Oral at bedtime  theophylline Syrup 50 milliGRAM(s) Oral every 12 hours        04-05-24 @ 07:01  -  04-06-24 @ 07:00  --------------------------------------------------------  IN: 1640 mL / OUT: 1101 mL / NET: 539 mL    04-06-24 @ 07:01  -  04-07-24 @ 03:49  --------------------------------------------------------  IN: 1823.3 mL / OUT: 1495 mL / NET: 328.3 mL                            10.1   15.59 )-----------( 295      ( 06 Apr 2024 23:50 )             30.5   04-06    138  |  102  |  11  ----------------------------<  105<H>  3.8   |  23  |  0.38<L>

## 2024-04-09 NOTE — DISCHARGE NOTE NURSING/CASE MANAGEMENT/SOCIAL WORK - PATIENT PORTAL LINK FT
You can access the FollowMyHealth Patient Portal offered by [facility] by registering at the following website: [email or web address]. By joining [employer]’s FollowMyHealth portal, you will also be able to view your health information using other applications (apps) compatible with our system.

## 2024-04-10 LAB
ANION GAP SERPL CALC-SCNC: 13 MMOL/L — SIGNIFICANT CHANGE UP (ref 5–17)
BUN SERPL-MCNC: 9 MG/DL — SIGNIFICANT CHANGE UP (ref 7–23)
CALCIUM SERPL-MCNC: 9.2 MG/DL — SIGNIFICANT CHANGE UP (ref 8.4–10.5)
CHLORIDE SERPL-SCNC: 103 MMOL/L — SIGNIFICANT CHANGE UP (ref 96–108)
CO2 SERPL-SCNC: 24 MMOL/L — SIGNIFICANT CHANGE UP (ref 22–31)
CREAT SERPL-MCNC: 0.45 MG/DL — LOW (ref 0.5–1.3)
CULTURE RESULTS: SIGNIFICANT CHANGE UP
EGFR: 112 ML/MIN/1.73M2 — SIGNIFICANT CHANGE UP
GLUCOSE SERPL-MCNC: 122 MG/DL — HIGH (ref 70–99)
HCT VFR BLD CALC: 26.5 % — LOW (ref 34.5–45)
HGB BLD-MCNC: 8.8 G/DL — LOW (ref 11.5–15.5)
MAGNESIUM SERPL-MCNC: 2 MG/DL — SIGNIFICANT CHANGE UP (ref 1.6–2.6)
MCHC RBC-ENTMCNC: 29.5 PG — SIGNIFICANT CHANGE UP (ref 27–34)
MCHC RBC-ENTMCNC: 33.2 GM/DL — SIGNIFICANT CHANGE UP (ref 32–36)
MCV RBC AUTO: 88.9 FL — SIGNIFICANT CHANGE UP (ref 80–100)
NRBC # BLD: 0 /100 WBCS — SIGNIFICANT CHANGE UP (ref 0–0)
PHOSPHATE SERPL-MCNC: 3.3 MG/DL — SIGNIFICANT CHANGE UP (ref 2.5–4.5)
PLATELET # BLD AUTO: 287 K/UL — SIGNIFICANT CHANGE UP (ref 150–400)
POTASSIUM SERPL-MCNC: 3.4 MMOL/L — LOW (ref 3.5–5.3)
POTASSIUM SERPL-SCNC: 3.4 MMOL/L — LOW (ref 3.5–5.3)
RBC # BLD: 2.98 M/UL — LOW (ref 3.8–5.2)
RBC # FLD: 14.4 % — SIGNIFICANT CHANGE UP (ref 10.3–14.5)
SODIUM SERPL-SCNC: 140 MMOL/L — SIGNIFICANT CHANGE UP (ref 135–145)
SPECIMEN SOURCE: SIGNIFICANT CHANGE UP
TROPONIN T, HIGH SENSITIVITY RESULT: 30 NG/L — SIGNIFICANT CHANGE UP (ref 0–51)
WBC # BLD: 11.44 K/UL — HIGH (ref 3.8–10.5)
WBC # FLD AUTO: 11.44 K/UL — HIGH (ref 3.8–10.5)

## 2024-04-10 PROCEDURE — 71045 X-RAY EXAM CHEST 1 VIEW: CPT | Mod: 26

## 2024-04-10 PROCEDURE — 93970 EXTREMITY STUDY: CPT | Mod: 26

## 2024-04-10 PROCEDURE — 99232 SBSQ HOSP IP/OBS MODERATE 35: CPT

## 2024-04-10 PROCEDURE — 99291 CRITICAL CARE FIRST HOUR: CPT

## 2024-04-10 RX ORDER — POTASSIUM CHLORIDE 20 MEQ
40 PACKET (EA) ORAL EVERY 4 HOURS
Refills: 0 | Status: COMPLETED | OUTPATIENT
Start: 2024-04-10 | End: 2024-04-10

## 2024-04-10 RX ADMIN — DEXMEDETOMIDINE HYDROCHLORIDE IN 0.9% SODIUM CHLORIDE 3.56 MICROGRAM(S)/KG/HR: 4 INJECTION INTRAVENOUS at 19:40

## 2024-04-10 RX ADMIN — Medication 15 MILLILITER(S): at 11:31

## 2024-04-10 RX ADMIN — Medication 40 MILLIEQUIVALENT(S): at 06:12

## 2024-04-10 RX ADMIN — Medication 40 MILLIEQUIVALENT(S): at 09:21

## 2024-04-10 RX ADMIN — POLYETHYLENE GLYCOL 3350 17 GRAM(S): 17 POWDER, FOR SOLUTION ORAL at 17:32

## 2024-04-10 RX ADMIN — Medication 50 MILLIGRAM(S): at 17:31

## 2024-04-10 RX ADMIN — CHLORHEXIDINE GLUCONATE 1 APPLICATION(S): 213 SOLUTION TOPICAL at 06:16

## 2024-04-10 RX ADMIN — POLYETHYLENE GLYCOL 3350 17 GRAM(S): 17 POWDER, FOR SOLUTION ORAL at 06:13

## 2024-04-10 RX ADMIN — ENOXAPARIN SODIUM 40 MILLIGRAM(S): 100 INJECTION SUBCUTANEOUS at 17:31

## 2024-04-10 RX ADMIN — Medication 50 MILLIGRAM(S): at 06:13

## 2024-04-10 RX ADMIN — Medication 13.4 MICROGRAM(S)/KG/MIN: at 19:39

## 2024-04-10 NOTE — SWALLOW BEDSIDE ASSESSMENT ADULT - SWALLOW EVAL: DIAGNOSIS
Pt p/w a mentation does not support oral feeding at this time. Will continue to follow closely for improvement in sustained mentation and ability to follow directives. 58F, transferred from [place] for SAH i/s/o ruptured R pcomm aneurysm s/p R pcom clip w/ R EVD, extubated on 4/3 (9 days), reintubated 4/8 for vasospasm, extubated 4/9. Pt p/w a mentation does not support oral feeding at this time. Will continue to follow closely for improvement in sustained mentation and ability to follow directives.

## 2024-04-10 NOTE — PROGRESS NOTE ADULT - ASSESSMENT
ASSESSMENT/PLAN: HH4 WNFS 5 mF4 subarachnoid hemorrhage     Post-SAH day 13, SAH 3/25/2024  3/26/24 evd placed  3/26/24 pcomm clip  4/6 Intra arterial treatment for vasospasm    N   # @ Pcomm ruptured, s/p clip   #EVD tract acute hemorrhage/not tolerating clamp - CTA/CTP to follow up, hypertensive goal 160-200   #Hydrocephalus/ICP crisis: R EVD in place 20cm H2O 51cc last 24h   #Delayed Cerebral Ischemia Management: angiogram with intraarterial treatment -  - 220, euvolemia, nimodipine  #Pain: PRN tylenol oxi fentanyl   #agitations: precedex   MRI: Multifocal acute to subacute infarction. This extensively involves each MCA posterior distribution, the right basal ganglia, the anterior parasagittal vertex CINDY distribution and an additional smaller lesion in the right cerebellum  s/p angio for tx  Neurochecks Q 1 vitals Q1  Activity: [x] OOB as tolerated    CV   SBP goal 180 - 220  #R heart strain: TTE decreased systolic cardiac function EF 36% with decreased diastolic function -rTTE EF 55%   #bradycardia theophyllin   Philip for BP augmentation    P   Intubated   CPAP trial   NPO for extubation in the evening      R   I&o    GI   Diet: fees   PPI   Senna miralax  BM 08/08    E   Goal euglycemia (-180)  A1c 5.5     H   SCD   Chemoppx: lovenox 40 mg QD  LED R below knee dvt     ID   afebrile  E.Coli UTI s/p 3 days course of ceftriaxone     ASSESSMENT/PLAN: HH4 WNFS 5 mF4 subarachnoid hemorrhage     Post-SAH day 13, SAH 3/25/2024  3/26/24 evd placed  3/26/24 pcomm clip  4/6 Intra arterial treatment for vasospasm    N   #Pcomm ruptured, s/p clip   #EVD tract acute hemorrhage/not tolerating clamp  #Hydrocephalus/ICP crisis: R EVD in place 20cm H2O   #Delayed Cerebral Ischemia Management: angiogram with intraarterial treatment -  - 220, euvolemia, nimodipine  #Pain: PRN tylenol oxi fentanyl   #agitations: precedex   MRI: Multifocal acute to subacute infarction. This extensively involves each MCA posterior distribution, the right basal ganglia, the anterior parasagittal vertex CINDY distribution and an additional smaller lesion in the right cerebellum  s/p angio for tx  Neurochecks Q 2 vitals Q2  Will request neurosgx for an angio to assess for vasospasm and if negative can consider to clamp drain.  Will wean off vasospasm tx and consider clamping the drain  Activity: [x] OOB as tolerated    CV   SBP goal 180 - 220  #R heart strain: TTE decreased systolic cardiac function EF 36% with decreased diastolic function -rTTE EF 55%   #bradycardia theophyllin   Philip for BP augmentation    P   Intubated   CPAP trial   NPO for extubation in the evening      R   I&o    GI   Diet: NPO for potential angiogram  PPI   Senna miralax  BM 08/08  F/U SLP    E   Goal euglycemia (-180)  A1c 5.5     H   SCD   Chemoppx: lovenox 40 mg QD  LED R below knee dvt     ID   afebrile  E.Coli UTI s/p 3 days course of ceftriaxone

## 2024-04-10 NOTE — SWALLOW BEDSIDE ASSESSMENT ADULT - NS ASR SWALLOW FINDINGS DISCUS
d/w crystal Elise; [place]; PEDRO Duckworth d/w crystal Elise; [place] Barker; PEDRO Duckworth/Nursing/Patient/Family

## 2024-04-10 NOTE — SWALLOW BEDSIDE ASSESSMENT ADULT - ASR SWALLOW RECOMMEND DIAG
FEES indicated to r/o vocal cord dysfunction and further assess swallow, however, pt mentation not fully appropriate for exam
Not appropriate at this time; will continue to follow for when pt appropriate for p.o.

## 2024-04-10 NOTE — PROGRESS NOTE ADULT - SUBJECTIVE AND OBJECTIVE BOX
HPI   58F, on ASA81, transferred from [place] for SAH HH4 WNFS 5 mF4 ruptured R pcomm aneurysm. Was initially found unresponsive on 3/25/24 at 21:00, intubated at 23:00 at OSH, then txfrd. Given ddavp at OSH. Here in ED 1 unit PLTs given and R frontal EVD placed-high pressure. Went for R pcom clip on 3/26/24, postop evd not functioning then drained on the floor.     Admission scores:   SAH R pcomm ruptured @ HH4 WNFS 5 mF4, GCS<7    24h events   evd was clamped complicated with patient becoming more sleepy, evd reopened   04/08: MRI with widespread ischemic stroke b/l. Patient s/p angio for vasospasm tx    Exam   Ox0, nonverbal, EOS, PERRL, no FC, R side spontaneous strong, L side 3/5    VITALS:   T(C): 36.9 (04-07-24 @ 03:00), Max: 37.7 (04-06-24 @ 18:00)  HR: 60 (04-07-24 @ 03:00) (56 - 96)  BP: 175/70 (04-06-24 @ 15:30) (107/67 - 185/74)  BP(mean): 112 (04-06-24 @ 15:30) (80 - 112)  RR: 19 (04-07-24 @ 03:00) (16 - 48)  SpO2: 96% (04-07-24 @ 03:00) (90% - 100%)    acetaminophen     Tablet .. 650 milliGRAM(s) Oral every 6 hours PRN  chlorhexidine 4% Liquid 1 Application(s) Topical at bedtime  dexMEDEtomidine Infusion 0.2 MICROgram(s)/kG/Hr IV Continuous <Continuous>  enoxaparin Injectable 40 milliGRAM(s) SubCutaneous <User Schedule>  multiple electrolytes Injection Type 1 1000 milliLiter(s) IV Continuous <Continuous>  oxyCODONE    IR 5 milliGRAM(s) Oral every 4 hours PRN  oxyCODONE    IR 10 milliGRAM(s) Oral every 4 hours PRN  phenylephrine    Infusion 0.1 MICROgram(s)/kG/Min IV Continuous <Continuous>  polyethylene glycol 3350 17 Gram(s) Oral every 12 hours  potassium chloride   Solution 20 milliEquivalent(s) Oral once  potassium phosphate / sodium phosphate Powder (PHOS-NaK) 2 Packet(s) Oral once  QUEtiapine 12.5 milliGRAM(s) Oral <User Schedule>  QUEtiapine 25 milliGRAM(s) Oral at bedtime  senna 2 Tablet(s) Oral at bedtime  theophylline Syrup 50 milliGRAM(s) Oral every 12 hours        04-05-24 @ 07:01  -  04-06-24 @ 07:00  --------------------------------------------------------  IN: 1640 mL / OUT: 1101 mL / NET: 539 mL    04-06-24 @ 07:01  -  04-07-24 @ 03:49  --------------------------------------------------------  IN: 1823.3 mL / OUT: 1495 mL / NET: 328.3 mL                            10.1   15.59 )-----------( 295      ( 06 Apr 2024 23:50 )             30.5   04-06    138  |  102  |  11  ----------------------------<  105<H>  3.8   |  23  |  0.38<L>                         HPI   58F, on ASA81, transferred from [place] for SAH HH4 WNFS 5 mF4 ruptured R pcomm aneurysm. Was initially found unresponsive on 3/25/24 at 21:00, intubated at 23:00 at OSH, then txfrd. Given ddavp at OSH. Here in ED 1 unit PLTs given and R frontal EVD placed-high pressure. Went for R pcom clip on 3/26/24, postop evd not functioning then drained on the floor.     Admission scores:   SAH R pcomm ruptured @ HH4 WNFS 5 mF4, GCS<7    24h events   evd was clamped complicated with patient becoming more sleepy, evd reopened   04/08: MRI with widespread ischemic stroke b/l. Patient s/p angio for vasospasm tx  04/09: Extubated, tolerating well    Exam   Ox0, nonverbal, EOS, PERRL, no FC, R side spontaneous strong > L, BLE WDs    VITALS:   T(C): 36.9 (04-07-24 @ 03:00), Max: 37.7 (04-06-24 @ 18:00)  HR: 60 (04-07-24 @ 03:00) (56 - 96)  BP: 175/70 (04-06-24 @ 15:30) (107/67 - 185/74)  BP(mean): 112 (04-06-24 @ 15:30) (80 - 112)  RR: 19 (04-07-24 @ 03:00) (16 - 48)  SpO2: 96% (04-07-24 @ 03:00) (90% - 100%)    acetaminophen     Tablet .. 650 milliGRAM(s) Oral every 6 hours PRN  chlorhexidine 4% Liquid 1 Application(s) Topical at bedtime  dexMEDEtomidine Infusion 0.2 MICROgram(s)/kG/Hr IV Continuous <Continuous>  enoxaparin Injectable 40 milliGRAM(s) SubCutaneous <User Schedule>  multiple electrolytes Injection Type 1 1000 milliLiter(s) IV Continuous <Continuous>  oxyCODONE    IR 5 milliGRAM(s) Oral every 4 hours PRN  oxyCODONE    IR 10 milliGRAM(s) Oral every 4 hours PRN  phenylephrine    Infusion 0.1 MICROgram(s)/kG/Min IV Continuous <Continuous>  polyethylene glycol 3350 17 Gram(s) Oral every 12 hours  potassium chloride   Solution 20 milliEquivalent(s) Oral once  potassium phosphate / sodium phosphate Powder (PHOS-NaK) 2 Packet(s) Oral once  QUEtiapine 12.5 milliGRAM(s) Oral <User Schedule>  QUEtiapine 25 milliGRAM(s) Oral at bedtime  senna 2 Tablet(s) Oral at bedtime  theophylline Syrup 50 milliGRAM(s) Oral every 12 hours        04-05-24 @ 07:01  -  04-06-24 @ 07:00  --------------------------------------------------------  IN: 1640 mL / OUT: 1101 mL / NET: 539 mL    04-06-24 @ 07:01  -  04-07-24 @ 03:49  --------------------------------------------------------  IN: 1823.3 mL / OUT: 1495 mL / NET: 328.3 mL                            10.1   15.59 )-----------( 295      ( 06 Apr 2024 23:50 )             30.5   04-06    138  |  102  |  11  ----------------------------<  105<H>  3.8   |  23  |  0.38<L>

## 2024-04-10 NOTE — SWALLOW BEDSIDE ASSESSMENT ADULT - SPECIFY REASON(S)
to clinically assess swallow safety/function; r/o dysphagia.

## 2024-04-10 NOTE — SWALLOW BEDSIDE ASSESSMENT ADULT - COMMENTS
No prior SLP exams in SCM or MBS in PACS
No prior SLP exams in SCM or MBS in PACS
No SLP exams in [facility] or MBS in PACS prior to this admission. This admission seen 4/04 with recommendations for NPO, with non-oral nutrition/hydration/medications given overt s/sx of aspiration/penetration on moderately thick liquids. Noted pt to be mostly aphonic and w/ waxing/waning mentation.   4/6 s/p angio for vasospasm tx  4/8 re-intubated for vasospasm tx, but pt remained intubated because the pt was not back to her baseline after anesthesia meds were held.  4/9 extubated    MRI 4/8 IMPRESSION:  1. Multifocal acute to subacute infarction. This extensively involves each MCA posterior distribution, the right basal ganglia, the anterior parasagittal vertex CINDY distribution and an additional smaller lesion in the right cerebellum  2. Right middle cranial fossa surgical aneurysm clip is associated with local susceptibility artifact and mild vasogenic edema right medial temporal tip. Persistent subarachnoid space hemorrhage right sylvian fissure. Small middle cranial fossa subdural space fluid collections, likely postoperative, right larger than left  3. Right-sided ventricular catheter. Right corona radiata/basal ganglia adjacent parenchymal hemorrhage. Right intraventricular hemorrhage

## 2024-04-10 NOTE — PROGRESS NOTE ADULT - SUBJECTIVE AND OBJECTIVE BOX
HPI   58F, on ASA81, transferred from [place] for SAH HH4 WNFS 5 mF4 ruptured R pcomm aneurysm. Was initially found unresponsive on 3/25/24 at 21:00, intubated at 23:00 at OSH, then txfrd. Given ddavp at OSH. Here in ED 1 unit PLTs given and R frontal EVD placed-high pressure. Went for R pcom clip on 3/26/24, postop evd not functioning then drained on the floor.     Admission scores:   SAH R pcomm ruptured @ HH4 WNFS 5 mF4, GCS<7    24h events   evd was clamped complicated with patient becoming more sleepy, evd reopened   04/08: MRI with widespread ischemic stroke b/l. Patient s/p angio for vasospasm tx  04/09: Extubated, tolerating well  4/10: no events today     Exam   Ox0, nonverbal, EOS, PERRL, intermittently FC on RUE, B/l upper extremities localize (R>L), BLE WDs    ICU Vital Signs Last 24 Hrs  T(C): 37.1 (10 Apr 2024 15:00), Max: 37.1 (10 Apr 2024 07:00)  T(F): 98.7 (10 Apr 2024 15:00), Max: 98.8 (10 Apr 2024 07:00)  HR: 67 (10 Apr 2024 18:45) (47 - 80)  BP: --  BP(mean): --  ABP: 168/73 (10 Apr 2024 18:45) (160/73 - 221/84)  ABP(mean): 109 (10 Apr 2024 18:45) (105 - 144)  RR: 18 (10 Apr 2024 18:45) (12 - 25)  SpO2: 100% (10 Apr 2024 18:45) (100% - 100%)      I&O's Summary    09 Apr 2024 07:01  -  10 Apr 2024 07:00  --------------------------------------------------------  IN: 1232 mL / OUT: 1323 mL / NET: -91 mL    10 Apr 2024 07:01  -  10 Apr 2024 19:31  --------------------------------------------------------  IN: 836.9 mL / OUT: 1258 mL / NET: -421.1 mL      LABS                        8.8    11.44 )-----------( 287      ( 10 Apr 2024 00:48 )             26.5   04-10    140  |  103  |  9   ----------------------------<  122<H>  3.4<L>   |  24  |  0.45<L>    Ca    9.2      10 Apr 2024 00:48  Phos  3.3     04-10  Mg     2.0     04-10      MEDICATIONS  (STANDING):  chlorhexidine 4% Liquid 1 Application(s) Topical <User Schedule>  dexMEDEtomidine Infusion 0.2 MICROgram(s)/kG/Hr (3.56 mL/Hr) IV Continuous <Continuous>  enoxaparin Injectable 40 milliGRAM(s) SubCutaneous <User Schedule>  multivitamin/minerals/iron Oral Solution (CENTRUM) 15 milliLiter(s) Oral daily  norepinephrine Infusion 0.1 MICROgram(s)/kG/Min (13.4 mL/Hr) IV Continuous <Continuous>  polyethylene glycol 3350 17 Gram(s) Oral every 12 hours  senna 2 Tablet(s) Oral at bedtime  theophylline Syrup 50 milliGRAM(s) Oral every 12 hours    MEDICATIONS  (PRN):  acetaminophen     Tablet .. 650 milliGRAM(s) Oral every 6 hours PRN Temp greater or equal to 38.5C (101.3F), Mild Pain (1 - 3)

## 2024-04-10 NOTE — PROGRESS NOTE ADULT - SUBJECTIVE AND OBJECTIVE BOX
Patient seen and examined at bedside.    --Anticoagulation--    T(C): 36.9 (04-07-24 @ 03:00), Max: 37.7 (04-06-24 @ 18:00)  HR: 60 (04-07-24 @ 03:00) (56 - 96)  BP: 175/70 (04-06-24 @ 15:30) (107/67 - 185/74)  RR: 19 (04-07-24 @ 03:00) (16 - 48)  SpO2: 96% (04-07-24 @ 03:00) (90% - 100%)  Wt(kg): --    Exam: Extubated, EOS, PERRL, int FC, BUE loc R>L, BLE wd

## 2024-04-10 NOTE — SWALLOW BEDSIDE ASSESSMENT ADULT - NS SPL SWALLOW CLINIC TRIAL FT
No p.o. administered as pt mentation inappropriate for p.o. w/ waxing and waning status
Further p.o. trials not administered given increased aspiration risk with prolonged intubation, suspected vocal cord dysfunction, and overt s/sx of aspiration/penetration

## 2024-04-10 NOTE — SWALLOW BEDSIDE ASSESSMENT ADULT - SLP PERTINENT HISTORY OF CURRENT PROBLEM
58F, on ASA81, transferred from [place] for SAH HH4 WNFS 5 mF4 ruptured R pcomm aneurysm. Was initially found unresponsive on 3/25/24 at 21:00, intubated at 23:00 at OSH, then txfrd. Given ddavp at OSH. Here in ED 1 unit PLTs given and R frontal EVD placed-high pressure. Went for R pcom clip on 3/26/24, postop evd not functioning then drained on the floor. Angio on 4/1 (-) for vasospasms. Extubated on 4/3 (9 days).
58F, on ASA81 for pain relief? (fell a month ago), presents as txfr from [place] for diffuse SAH w/small IVH, 2/2 ruptured posterolaterally projecting 2.9x2.4x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 Was initially found unresponsive at 21:00, intubated at 23:00 at OSH, then txfrd. Given ddavp at OSH. On arrival patient w/ PERRL, +cough/gag, 2/5 spontaneous movement in LUE, o/w no movement. 1 unit PLTs given and R frontal EVD placed-high pressure. PLTs/Coags wnl. Exam improved: intubated, PERRL, BUE localize, b/l wd. 3/26 actively in OR during morning for angiogram, possible embolization for aneurysm, possible craniotomy for clipping of aneurysm. SAH protocol. EVD at 10.
58F, on ASA81, transferred from [place] for SAH HH4 WNFS 5 mF4 ruptured R pcomm aneurysm. Was initially found unresponsive on 3/25/24 at 21:00, intubated at 23:00 at OSH, then txfrd. Given ddavp at OSH. Here in ED 1 unit PLTs given and R frontal EVD placed-high pressure. Went for R pcom clip on 3/26/24, postop evd not functioning then drained on the floor. Angio on 4/1 (-) for vasospasms. Extubated on 4/3 (9 days).

## 2024-04-10 NOTE — PROGRESS NOTE ADULT - SUBJECTIVE AND OBJECTIVE BOX
Subjective: Patient seen and examined. No new events except as noted.   remains in NSCU   extubated   follows command         REVIEW OF SYSTEMS:    CONSTITUTIONAL: + weakness, fevers or chills  EYES/ENT: No visual changes;  No vertigo or throat pain   NECK: No pain or stiffness  RESPIRATORY: No cough, wheezing, hemoptysis; No shortness of breath  CARDIOVASCULAR: No chest pain or palpitations  GASTROINTESTINAL: No abdominal or epigastric pain. No nausea, vomiting, or hematemesis; No diarrhea or constipation. No melena or hematochezia.  GENITOURINARY: No dysuria, frequency or hematuria  NEUROLOGICAL: No numbness or weakness  SKIN: No itching, burning, rashes, or lesions   All other review of systems is negative unless indicated above.    MEDICATIONS:  MEDICATIONS  (STANDING):  chlorhexidine 4% Liquid 1 Application(s) Topical <User Schedule>  dexMEDEtomidine Infusion 0.2 MICROgram(s)/kG/Hr (3.56 mL/Hr) IV Continuous <Continuous>  enoxaparin Injectable 40 milliGRAM(s) SubCutaneous <User Schedule>  multivitamin/minerals/iron Oral Solution (CENTRUM) 15 milliLiter(s) Oral daily  norepinephrine Infusion 0.1 MICROgram(s)/kG/Min (13.4 mL/Hr) IV Continuous <Continuous>  polyethylene glycol 3350 17 Gram(s) Oral every 12 hours  QUEtiapine 12.5 milliGRAM(s) Oral once  senna 2 Tablet(s) Oral at bedtime  theophylline Syrup 50 milliGRAM(s) Oral every 12 hours      PHYSICAL EXAM:  T(C): 37.1 (04-10-24 @ 07:00), Max: 37.1 (04-10-24 @ 07:00)  HR: 76 (04-10-24 @ 09:30) (47 - 88)  BP: --  RR: 22 (04-10-24 @ 09:30) (12 - 27)  SpO2: 100% (04-10-24 @ 09:30) (98% - 100%)  Wt(kg): --  I&O's Summary    09 Apr 2024 07:01  -  10 Apr 2024 07:00  --------------------------------------------------------  IN: 1232 mL / OUT: 1323 mL / NET: -91 mL    10 Apr 2024 07:01  -  10 Apr 2024 09:56  --------------------------------------------------------  IN: 166.7 mL / OUT: 2 mL / NET: 164.7 mL          Appearance: NAD extubated  HEENT:  dry oral mucosa, PERRL, EOMI	  +NGT  Lymphatic: No lymphadenopathy  Cardiovascular: Normal S1 S2, No JVD, No murmurs, No edema  Respiratory: decreased bs   Psychiatry: sedated   Gastrointestinal:  Soft, Non-tender, + BS	  Skin: No rashes, No ecchymoses, No cyanosis	  Neurologic: extubated EOS, PERRL, no FC, R side spontaneous strong, L side 3/5  Extremities: decreased  range of motion, No clubbing, cyanosis or edema  Vascular: Peripheral pulses palpable 2+ bilaterally          LABS:    CARDIAC MARKERS:                                8.8    11.44 )-----------( 287      ( 10 Apr 2024 00:48 )             26.5     04-10    140  |  103  |  9   ----------------------------<  122<H>  3.4<L>   |  24  |  0.45<L>    Ca    9.2      10 Apr 2024 00:48  Phos  3.3     04-10  Mg     2.0     04-10          TELEMETRY: 	SR     ECG:  	  RADIOLOGY: x< from: Xray Chest 1 View- PORTABLE-Routine (Xray Chest 1 View- PORTABLE-Routine in AM.) (04.09.24 @ 04:37) >    ACC: [id number] EXAM:  XR CHEST PORTABLE ROUTINE 1V   ORDERED BY: ELSA SIDDIQUI     ACC: [id number] EXAM:  XR CHEST PORTABLE URGENT 1V   ORDERED BY: SONA MOODY     PROCEDURE DATE:  04/08/2024          INTERPRETATION:  CLINICAL INFORMATION: Lineplacement status post   subarachnoid hemorrhage.    TECHNIQUE: Frontal radiographs of the chest.    COMPARISON: Chest radiograph 4/6/2024.    FINDINGS:  4/8/2024 6:25 PM:  The left IJ central line tip is in the SVC.  The endotracheal tube tip is above the norm in the last image.  The enteric tube traverses below the diaphragm with the tip not imaged.  The lungs are clear.  No pleural effusion or pneumothorax.  There is limited evaluation of the heart size and mediastinum on portable   technique.  No acute abnormality within visible osseous structures.    4/9/2024 2:45 AM:  Enteric tube tip is partially visualized stomach. ET tube 3 cm above the   norm.  The lungs are clear.      IMPRESSION:    The left IJ central line tip is in the SVC.  The endotracheal tube tip is above the norm in the last image.  The enteric tube traverses below the diaphragm with the tip partially   imaged in the stomach.    --- End of Report ---           LY PERKINS MD; Resident Radiologist  This document has been electronically signed.  SABRINA DE LA CRUZ MD; Attending Radiologist  This document has been electronically signed. Apr 9 2024  9:28AM    < end of copied text >    DIAGNOSTIC TESTING:  [ ] Echocardiogram:  < from: TTE W or WO Ultrasound Enhancing Agent (04.09.24 @ 08:26) >    TRANSTHORACIC ECHOCARDIOGRAM REPORT  ________________________________________________________________________________                                      _______       Pt. Name:       JOSE MCLEAN Study Date:    4/9/2024  MRN:            [id number]     YOB: 1965  Accession #:    [id number]      Age:           58 years  Account#:       [id number]   Gender:        F  Heart Rate:                    Height:        62.00 in (157.48 cm)  Rhythm:                        Weight: 157.00 lb (71.22 kg)  Blood Pressure: 196/75 mmHg    BSA/BMI:       1.72 m² / 28.72 kg/m²  ________________________________________________________________________________________  Referring Physician:    [id number] Perlita Varela  Interpreting Physician: Roger Alford M.D.  Primary Sonographer:    Florin Herrera [facility]    CPT:                ECHO TTE W CON FU LTD - .m;LIMITED SPECTRAL -                      74516.m;DEFINITY ECHO CONTRAST PER ML WASTED -                      .m;DEFINITY ECHO CONTRAST PER ML - .m  Indication(s):      Abnormal electrocardiogram ECG/EKG - R94.31  Procedure:          Limited transthoracic echocardiogram.  Ordering Location:  [facility] [place]  Admission Status:   Inpatient  Contrast Injection: Verbal consent was obtained for injection of Ultrasonic                      Enhancing Agent following a discussion of risks and                      benefits.                      Endocardial visualization enhanced with 2 ml of Definity                      Ultrasound enhancing agent (Lot#:[id number] Exp.Date:04/25                      Discarded Dose:8ml).  UEA Reaction:       Patient had no adverse reaction after injection of                      Ultrasound Enhancing Agent.  Study Information:  Image quality for this study is fair.    _______________________________________________________________________________________     CONCLUSIONS:      1. Left ventricular systolic function is normal with an ejection fraction of 66 % by Mcgee's method of disks. There are no regional wall motion abnormalities seen.   2. Compared to the transthoracic echocardiogram performed on 3/31/2024, there have been no significant interval changes.   3. There is no evidence of a left ventricular thrombus.    < end of copied text >    [ ]  Catheterization:  [ ] Stress Test:    OTHER:

## 2024-04-10 NOTE — PROGRESS NOTE ADULT - ASSESSMENT
ASSESSMENT/PLAN: HH4 WNFS 5 mF4 subarachnoid hemorrhage     Post-SAH day 16, SAH 3/25/2024  3/26/24 evd placed  3/26/24 pcomm clip  4/6 Intra arterial treatment for vasospasm    N   #Pcomm ruptured, s/p clip   #EVD tract acute hemorrhage/not tolerating clamp  #Hydrocephalus/ICP crisis: R EVD in place 20cm H2O   #Delayed Cerebral Ischemia Management: angiogram with intraarterial treatment -  - 220, euvolemia, nimodipine  #Pain: PRN tylenol oxi fentanyl   #agitations: precedex   MRI: Multifocal acute to subacute infarction. This extensively involves each MCA posterior distribution, the right basal ganglia, the anterior parasagittal vertex CINDY distribution and an additional smaller lesion in the right cerebellum  s/p angio for tx  Neurochecks Q 2 vitals Q2  Will request neurosgx for an angio to assess for vasospasm and if negative can consider to clamp drain.  Will wean off vasospasm tx and consider clamping the drain  Activity: [x] OOB as tolerated    CV   SBP goal 180 - 220  #R heart strain: TTE decreased systolic cardiac function EF 36% with decreased diastolic function -rTTE EF 55%   #bradycardia theophyllin   Philip for BP augmentation    P   Intubated   CPAP trial   NPO for extubation in the evening      R   I&o    GI   Diet: NPO for potential angiogram  PPI   Senna miralax  BM 08/08  F/U SLP    E   Goal euglycemia (-180)  A1c 5.5     H   SCD   Chemoppx: lovenox 40 mg QD  LED R below knee dvt     ID   afebrile  E.Coli UTI s/p 3 days course of ceftriaxone     ASSESSMENT/PLAN: HH4 WNFS 5 mF4 subarachnoid hemorrhage     Post-SAH day 16, SAH 3/25/2024  3/26/24 evd placed  3/26/24 pcomm clip  4/6 Intra arterial treatment for vasospasm  4/8/24  Angio: moderate B/L anterior circulation vasospam, s/p milrinone and verapamil     N   #Pcomm ruptured, s/p clip   #EVD tract acute hemorrhage/not tolerating clamp  #Hydrocephalus/ICP crisis: R EVD in place 20cm H2O   #Delayed Cerebral Ischemia Management: angiogram with intraarterial treatment -  - 220, euvolemia, nimodipine  #Pain: PRN tylenol oxy fentanyl   #agitations: precedex   MRI: Multifocal acute to subacute infarction. This extensively involves each MCA posterior distribution, the right basal ganglia, the anterior parasagittal vertex CINDY distribution and an additional smaller lesion in the right cerebellum  s/p angio for tx  Neurochecks Q 1 vitals Q1  Will request neurosgx for an angio to assess for vasospasm and if negative can consider to clamp drain.  Will wean off vasospasm tx and consider clamping the drain  Activity: [x] OOB as tolerated    CV   SBP goal 180 - 220  #4/9 TTE: EF 66%, no structural abnormalities/thrombus seen   #bradycardia theophyllin    levo for BP augmentation    P   extubated 4/9  saO2 > 92%      R   IVL  monitor I&O, maintain euvolemia     GI   Diet: NPO, repeat speech and swallow eval on Friday 4/12  Senna   BM 4/10      E   Goal euglycemia (-180)  A1c 5.5     H   SCD   Chemoppx: lovenox 40 mg QD  4/10 LED: stable B/L Soleal DVT with extension to Rt posterior tibial DVT    ID   afebrile  E.Coli UTI s/p 3 days course of ceftriaxone    Disposition: ICU    ASSESSMENT/PLAN: HH4 WNFS 5 mF4 subarachnoid hemorrhage     Post-SAH day 16, SAH 3/25/2024  3/26/24 evd placed  3/26/24 pcomm clip  4/6 Intra arterial treatment for vasospasm  4/8/24  Angio: moderate B/L anterior circulation vasospam, s/p milrinone and verapamil     N   #Pcomm ruptured, s/p clip   #EVD tract acute hemorrhage/not tolerating clamp  #Hydrocephalus/ICP crisis: R EVD in place 20cm H2O   #Delayed Cerebral Ischemia Management: angiogram with intraarterial treatment -  - 220, euvolemia, nimodipine  #Pain: PRN tylenol oxy fentanyl   #agitations: precedex   MRI: Multifocal acute to subacute infarction. This extensively involves each MCA posterior distribution, the right basal ganglia, the anterior parasagittal vertex CINDY distribution and an additional smaller lesion in the right cerebellum  s/p angio for tx  Neurochecks Q 2 vitals Q1  Will request neurosgx for an angio to assess for vasospasm and if negative can consider to clamp drain.  Will wean off vasospasm tx and consider clamping the drain  Activity: [x] OOB as tolerated    CV   SBP goal 180 - 220  #4/9 TTE: EF 66%, no structural abnormalities/thrombus seen   #bradycardia theophyllin    levo for BP augmentation    P   extubated 4/9  saO2 > 92%      R   IVL  monitor I&O, maintain euvolemia     GI   Diet: NGT feeds, repeat speech and swallow eval on Friday 4/12  Senna   BM 4/10      E   Goal euglycemia (-180)  A1c 5.5     H   SCD   Chemoppx: lovenox 40 mg QD  4/10 LED: stable B/L Soleal DVT with extension to Rt posterior tibial DVT    ID   afebrile  E.Coli UTI s/p 3 days course of ceftriaxone    Disposition: ICU

## 2024-04-10 NOTE — SWALLOW BEDSIDE ASSESSMENT ADULT - SLP GENERAL OBSERVATIONS
Pt received upright at bedside, +EVD +johnny, on room air. Pt AOx0, absent directive following, no verbal communication. Pt waxing and waning throughout session, and not responding to son Arik's verbalizations/requests at bedside.
Prior to session, KY Burt reporting pt had prolonged intubation i/s/o suspected pharyngeal swelling, which reportedly is now resolved and pt was cleared for extubation. Pt received upright at bedside, on 3L NC, +NGT +EVD. Pt noted significantly dysphonic-mostly aphonic, w/ R gaze preference. Son Arik at bedside; reporting pt is fluent in Yoruba and English. Exam conducted in English. Pt AOx1-2, able to follow simple 1 step directives, attempting to verbally communicate but w/ very limited voicing. Able to nod yes and wag finger no. Noted pt with R hand restlessness with frequent groping/grasping for bed railing, blankets, or air throughout exam and unable to rest arm; per ACP Maisha, vasospasms have been r/o and w/u is ongoing. Pt leaning toward R side throughout exam despite having been respositioned with RN at start of exam; repositioning required twice. Also noted pt with slightly waxing/waning mental status, although generally awake during exam, post exam, noted pt very quickly falling into a deep sleep and snoring; did not open eyes with sternal rub; KY Elise reports that this happens sometimes as pt "tires herself out" with the R arm movements. KY Burt made aware prior to end of session.

## 2024-04-11 LAB
ANION GAP SERPL CALC-SCNC: 13 MMOL/L — SIGNIFICANT CHANGE UP (ref 5–17)
APTT BLD: 36.8 SEC — HIGH (ref 24.5–35.6)
BUN SERPL-MCNC: 13 MG/DL — SIGNIFICANT CHANGE UP (ref 7–23)
CALCIUM SERPL-MCNC: 9.7 MG/DL — SIGNIFICANT CHANGE UP (ref 8.4–10.5)
CHLORIDE SERPL-SCNC: 100 MMOL/L — SIGNIFICANT CHANGE UP (ref 96–108)
CO2 SERPL-SCNC: 26 MMOL/L — SIGNIFICANT CHANGE UP (ref 22–31)
CREAT SERPL-MCNC: 0.42 MG/DL — LOW (ref 0.5–1.3)
EGFR: 114 ML/MIN/1.73M2 — SIGNIFICANT CHANGE UP
GLUCOSE SERPL-MCNC: 123 MG/DL — HIGH (ref 70–99)
HCT VFR BLD CALC: 28.3 % — LOW (ref 34.5–45)
HGB BLD-MCNC: 9.1 G/DL — LOW (ref 11.5–15.5)
INR BLD: 1.09 RATIO — SIGNIFICANT CHANGE UP (ref 0.85–1.18)
MAGNESIUM SERPL-MCNC: 2 MG/DL — SIGNIFICANT CHANGE UP (ref 1.6–2.6)
MCHC RBC-ENTMCNC: 29.2 PG — SIGNIFICANT CHANGE UP (ref 27–34)
MCHC RBC-ENTMCNC: 32.2 GM/DL — SIGNIFICANT CHANGE UP (ref 32–36)
MCV RBC AUTO: 90.7 FL — SIGNIFICANT CHANGE UP (ref 80–100)
NRBC # BLD: 0 /100 WBCS — SIGNIFICANT CHANGE UP (ref 0–0)
PHOSPHATE SERPL-MCNC: 4.4 MG/DL — SIGNIFICANT CHANGE UP (ref 2.5–4.5)
PLATELET # BLD AUTO: 310 K/UL — SIGNIFICANT CHANGE UP (ref 150–400)
POTASSIUM SERPL-MCNC: 3.8 MMOL/L — SIGNIFICANT CHANGE UP (ref 3.5–5.3)
POTASSIUM SERPL-SCNC: 3.8 MMOL/L — SIGNIFICANT CHANGE UP (ref 3.5–5.3)
PROTHROM AB SERPL-ACNC: 12 SEC — SIGNIFICANT CHANGE UP (ref 9.5–13)
RBC # BLD: 3.12 M/UL — LOW (ref 3.8–5.2)
RBC # FLD: 14.6 % — HIGH (ref 10.3–14.5)
SODIUM SERPL-SCNC: 139 MMOL/L — SIGNIFICANT CHANGE UP (ref 135–145)
TROPONIN T, HIGH SENSITIVITY RESULT: 19 NG/L — SIGNIFICANT CHANGE UP (ref 0–51)
TROPONIN T, HIGH SENSITIVITY RESULT: 22 NG/L — SIGNIFICANT CHANGE UP (ref 0–51)
WBC # BLD: 9.34 K/UL — SIGNIFICANT CHANGE UP (ref 3.8–10.5)
WBC # FLD AUTO: 9.34 K/UL — SIGNIFICANT CHANGE UP (ref 3.8–10.5)

## 2024-04-11 PROCEDURE — 99232 SBSQ HOSP IP/OBS MODERATE 35: CPT

## 2024-04-11 PROCEDURE — 99291 CRITICAL CARE FIRST HOUR: CPT

## 2024-04-11 PROCEDURE — 99223 1ST HOSP IP/OBS HIGH 75: CPT

## 2024-04-11 RX ORDER — NOREPINEPHRINE BITARTRATE/D5W 8 MG/250ML
0.1 PLASTIC BAG, INJECTION (ML) INTRAVENOUS
Qty: 8 | Refills: 0 | Status: DISCONTINUED | OUTPATIENT
Start: 2024-04-11 | End: 2024-04-12

## 2024-04-11 RX ORDER — OXYCODONE HYDROCHLORIDE 5 MG/1
10 TABLET ORAL EVERY 4 HOURS
Refills: 0 | Status: DISCONTINUED | OUTPATIENT
Start: 2024-04-11 | End: 2024-04-17

## 2024-04-11 RX ORDER — SODIUM CHLORIDE 9 MG/ML
1000 INJECTION, SOLUTION INTRAVENOUS
Refills: 0 | Status: DISCONTINUED | OUTPATIENT
Start: 2024-04-11 | End: 2024-04-12

## 2024-04-11 RX ORDER — OXYCODONE HYDROCHLORIDE 5 MG/1
5 TABLET ORAL EVERY 4 HOURS
Refills: 0 | Status: DISCONTINUED | OUTPATIENT
Start: 2024-04-11 | End: 2024-04-17

## 2024-04-11 RX ORDER — POTASSIUM CHLORIDE 20 MEQ
20 PACKET (EA) ORAL ONCE
Refills: 0 | Status: COMPLETED | OUTPATIENT
Start: 2024-04-11 | End: 2024-04-11

## 2024-04-11 RX ADMIN — Medication 50 MILLIGRAM(S): at 18:25

## 2024-04-11 RX ADMIN — Medication 20 MILLIEQUIVALENT(S): at 05:35

## 2024-04-11 RX ADMIN — CHLORHEXIDINE GLUCONATE 1 APPLICATION(S): 213 SOLUTION TOPICAL at 06:07

## 2024-04-11 RX ADMIN — Medication 15 MILLILITER(S): at 11:39

## 2024-04-11 RX ADMIN — DEXMEDETOMIDINE HYDROCHLORIDE IN 0.9% SODIUM CHLORIDE 3.56 MICROGRAM(S)/KG/HR: 4 INJECTION INTRAVENOUS at 19:06

## 2024-04-11 RX ADMIN — ENOXAPARIN SODIUM 40 MILLIGRAM(S): 100 INJECTION SUBCUTANEOUS at 18:07

## 2024-04-11 RX ADMIN — DEXMEDETOMIDINE HYDROCHLORIDE IN 0.9% SODIUM CHLORIDE 3.56 MICROGRAM(S)/KG/HR: 4 INJECTION INTRAVENOUS at 11:40

## 2024-04-11 RX ADMIN — Medication 50 MILLIGRAM(S): at 05:36

## 2024-04-11 RX ADMIN — Medication 13.4 MICROGRAM(S)/KG/MIN: at 19:07

## 2024-04-11 RX ADMIN — Medication 13.4 MICROGRAM(S)/KG/MIN: at 11:40

## 2024-04-11 NOTE — PROGRESS NOTE ADULT - SUBJECTIVE AND OBJECTIVE BOX
Patient seen and examined at bedside.    Overnight Events: No acute events overnight. Patient is extubated, but non-verbal. She does not appear to be in any distress.       REVIEW OF SYSTEMS:  Constitutional:     [ x] negative [ ] fevers [ ] chills [ ] weight loss [ ] weight gain  HEENT:                  [ x] negative [ ] dry eyes [ ] eye irritation [ ] postnasal drip [ ] nasal congestion  CV:                         [x ] negative  [ ] chest pain [ ] orthopnea [ ] palpitations [ ] murmur  Resp:                     [ x] negative [ ] cough [ ] shortness of breath [ ] dyspnea [ ] wheezing [ ] sputum [ ]hemoptysis  GI:                          [ x] negative [ ] nausea [ ] vomiting [ ] diarrhea [ ] constipation [ ] abd pain [ ] dysphagia   :                        [ x] negative [ ] dysuria [ ] nocturia [ ] hematuria [ ] increased urinary frequency  Musculoskeletal: [ x] negative [ ] back pain [ ] myalgias [ ] arthralgias [ ] fracture  Skin:                       [ x] negative [ ] rash [ ] itch  Neurological:        [ x] negative [ ] headache [ ] dizziness [ ] syncope [ ] weakness [ ] numbness  Psychiatric:           [ x] negative [ ] anxiety [ ] depression  Endocrine:            [ x] negative [ ] diabetes [ ] thyroid problem  Heme/Lymph:      [ x] negative [ ] anemia [ ] bleeding problem  Allergic/Immune: [x ] negative [ ] itchy eyes [ ] nasal discharge [ ] hives [ ] angioedema    [x ] All other systems negative  [ ] Unable to assess ROS due to    Current Meds:  acetaminophen     Tablet .. 650 milliGRAM(s) Oral every 6 hours PRN  chlorhexidine 4% Liquid 1 Application(s) Topical <User Schedule>  dexMEDEtomidine Infusion 0.2 MICROgram(s)/kG/Hr IV Continuous <Continuous>  enoxaparin Injectable 40 milliGRAM(s) SubCutaneous <User Schedule>  multivitamin/minerals/iron Oral Solution (CENTRUM) 15 milliLiter(s) Oral daily  norepinephrine Infusion 0.1 MICROgram(s)/kG/Min IV Continuous <Continuous>  senna 2 Tablet(s) Oral at bedtime  theophylline Syrup 50 milliGRAM(s) Oral every 12 hours      PAST MEDICAL & SURGICAL HISTORY:      Vitals:  T(F): 98.7 (04-11), Max: 98.8 (04-10)  HR: 68 (04-11) (52 - 81)  BP: --  RR: 19 (04-11)  SpO2: 100% (04-11)  I&O's Summary    10 Apr 2024 07:01  -  11 Apr 2024 07:00  --------------------------------------------------------  IN: 1602.2 mL / OUT: 1408 mL / NET: 194.2 mL    11 Apr 2024 07:01  -  11 Apr 2024 14:50  --------------------------------------------------------  IN: 242.5 mL / OUT: 2 mL / NET: 240.5 mL        Physical Exam:  Appearance: No acute distress  Eyes: pink conjunctiva  HENT: Normal oral mucosa  Cardiovascular: RRR, S1, S2, no murmurs, rubs, or gallops; no edema; no JVD  Respiratory: Clear to auscultation bilaterally  Gastrointestinal: soft, non-tender, non-distended with normal bowel sounds  Musculoskeletal: No clubbing; no joint deformity   Neurologic:  AAOx0, nonverbal.   Lymphatic: No lymphadenopathy  Psychiatry: awake, but AAOx0, nonverbal.   Skin: No rashes, ecchymoses, or cyanosis                          9.1    9.34  )-----------( 310      ( 11 Apr 2024 03:32 )             28.3     04-11    139  |  100  |  13  ----------------------------<  123<H>  3.8   |  26  |  0.42<L>    Ca    9.7      11 Apr 2024 03:32  Phos  4.4     04-11  Mg     2.0     04-11

## 2024-04-11 NOTE — PROGRESS NOTE ADULT - ASSESSMENT
57 YO Woman on aspirin at home presented to OSH after being found unresponsive, found to have SAH HH4 WNFS 5 mF4 ruptured R pcomm aneurysm s/p R frontal EVD placed-high pressure, and then. R pcom clip on 3/26/24, with course c/b bilateral soleal DVTs.     Assessment:  1. L/R Soleal Below the Knee DVT (L seen on 3/28, L/R soleal seen on 4/3, again seen on 4/10)  2. 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 Reduced EF, Improved on Last TTE. Normal RV size and function     Plan:  1. Recommend to continue Lovenox 40 mg daily for below the knee DVT in setting of recent SAH and EVD / aneurysm clipping / cerebral angiogram.  2. Repeat surveillance LE venous duplex on 4/15.   3. Appreciate General Cardiology.  4. Appreciate excellent Neurosurgical Care.     Mitchell Montana, Cardiology Fellow, F-2    For all New Consults and Questions:  [email or web address]   Login: [email or web address]    *** Note not final until signed by attending

## 2024-04-11 NOTE — PROGRESS NOTE ADULT - ATTENDING COMMENTS
59 yo woman with no PMH, admitted with unresponsiveness, found to have a HH4 mF4 SAH (bleed day 3/25) from R PComm aneurysm s/p clipping and R frontal EVD. Course c/b EVD tract hemorrhage and R BG stroke.  TTE with EF 37% with regional wall motion abnormalities which improved to 66%.   Hospital course c/b vasospasm c/b moderate L posterior MCA stroke and R cerebellar stroke with patient no longer following commands.     Interval events:  EVD at 20cm H2O, output 8cc.   Is and Os net even.     On exam, opens eyes to stim, briefly attends, does not follow commands, with r gaze deviation, with L facial, R arm AG to elbow, L arm AG to elbow, moves R leg spontaneously in plane of bed, L leg withdraws to noxious    PBD 17. Monitoring for vasospasm and hydrocephalus.     angiogram tomorrow  minimal Precedex for sedation   Nimodipine held while on pressors   TCDs with poor windows   euvolemia   on RA  SBP goal 140-200, on Levo   NPO after MN, LBM 4/11   Lov ppx, with b/l DVTs     L IJ central line     Patient seen and examined by attending on 4/11/2023.    Patient is critically ill due to SAH and at high risk for neurological deterioration or death due to: hydrocephalus requiring an EVD for CSF diversion, with vasospasm at risk for stroke.

## 2024-04-11 NOTE — CHART NOTE - NSCHARTNOTEFT_GEN_A_CORE
Nutrition Follow Up Note  Patient seen for: follow up  Chart reviewed, events noted. Pt is 57 yo F with PMH: SAH HH4, mF4 ruptured R PCOMM aneurysm. Unresponsive on 3/25/24, intubated at OSH. R frontal EVD placed. S/P PCOM clip on 3/26/24. now extubated.     Source: [] Patient       [x] Medical Record        [x] Nursing        [] Family at bedside       [x] Other: team during interdisciplinary rounds     -If unable to interview patient: [] Trach/Vent/BiPAP  [x] Disoriented/confused/inappropriate to interview    Diet, NPO after Midnight:      NPO Start Date: 2024,   NPO Start Time: 23:59  Except Medications (24 @ 11:04) [Active]  Diet, NPO with Tube Feed:   Tube Feeding Modality: Nasogastric  Jevity 1.5 Les (JEVITY1.5RTH)  Total Volume for 24 Hours (mL): 1200  Continuous  Starting Tube Feed Rate {mL per Hour}: 30  Increase Tube Feed Rate by (mL): 10     Every 4 hours  Until Goal Tube Feed Rate (mL per Hour): 50  Tube Feed Duration (in Hours): 24  Tube Feed Start Time: 00:00  Supplement Feeding Modality:  Oral  Probiotic Yogurt/Smoothie Cans or Servings Per Day:  2       Frequency:  Two Times a day (24 @ 11:04) [Active]    EN order providing at 100% provision: 1800kcal (25kcal/kg) and 77g protein (1g/kg)    - Is current order appropriate/adequate? see below for recommendations     Nutrition-related concerns:  -4/10 Swallow Bedside Assessment Adult Recommendations: NPO, with non-oral nutrition/hydration/medications.  -Potassium low this morning, being replenished   -Levophed ordered  -Precedex ordered   -EVD, see flow sheets for output     GI:  Last BM  per flow sheets. Bowel Regimen? [x] Yes   [] No    Weights:   UBW: 70.5kg per family  Dosing weight 71.2kg   Daily Weight in k.4 ()  Daily Weight in k.2 (04-10)  RD will continue to monitor trends.     MEDICATIONS  (STANDING):  multivitamin/minerals/iron Oral Solution (CENTRUM)  norepinephrine Infusion  senna    Pertinent Labs:  @ 03:32: Na 139, BUN 13, Cr 0.42<L>, <H>, K+ 3.8, Phos 4.4, Mg 2.0, Alk Phos --, ALT/SGPT --, AST/SGOT --, HbA1c --    A1C with Estimated Average Glucose Result: 5.5 % (24 @ 04:27)  A1C with Estimated Average Glucose Result: 5.5 % (24 @ 01:34)    Triglycerides, Serum: 82 mg/dL (24 @ 07:53)  Triglycerides, Serum: 109 mg/dL (24 @ 04:32)  Triglycerides, Serum: 150 mg/dL (24 @ 01:34)    Skin per nursing documentation: EVD site, right head surgical incision, left side of head pin site   Edema per nursing documentation: none noted     Estimated Energy Needs (per RD predictive equation), based on dosing wt 71.2kg:  (27-32kcal/kg): 9322-1081 kcal  (1.2-1.4g protein/kg): 85-100g protein   IC study (3/28): 2175 kcal   Defer fluid needs to team     Previous Nutrition Diagnosis: Increased nutrient needs; Acute severe protein calorie malnutrition  Nutrition Diagnosis is: [x] ongoing  [] resolved [] not applicable     Nutrition Care Plan:  [x] In Progress  [] Achieved  [] Not applicable    New Nutrition Diagnosis: [x] Not applicable    Nutrition Interventions:     Education Provided   [] Yes:  [x] No:     Recommendations:      -When able, increase tube feeding dose to better align with pt nutritional needs. Consider 60ml/hr x 24 hours to provide a total volume of 1440ml, 2160kcal (30kcal/kg) and 92g protein (1.3g/kg).   -Continue Probiotic Yogurt  -Defer free water flushes to team   -Continue multivitamin (if no medication contraindications) to aid in prevention of micronutrient deficiencies and incision healing   -Monitor wt trends/labs/skin integrity/hydration status/bowel regularity.     Monitoring and Evaluation:   Continue to monitor nutritional intake, tolerance to diet prescription, weights, labs, skin integrity    RD remains available upon request and will follow up per protocol  Maricel Meyers, MS, RD, CDN / Teams

## 2024-04-11 NOTE — PROGRESS NOTE ADULT - ASSESSMENT
ASSESSMENT/PLAN: HH4 WNFS 5 mF4 subarachnoid hemorrhage     Post-SAH day 16, SAH 3/25/2024  3/26/24 evd placed  3/26/24 pcomm clip  4/6 Intra arterial treatment for vasospasm  4/8/24  Angio: moderate B/L anterior circulation vasospam, s/p milrinone and verapamil     N   #Pcomm ruptured, s/p clip   #EVD tract acute hemorrhage/not tolerating clamp  #Hydrocephalus/ICP crisis: R EVD in place 20cm H2O   #Delayed Cerebral Ischemia Management: angiogram with intraarterial treatment -  - 220, euvolemia, nimodipine  #Pain: PRN tylenol oxy fentanyl   #agitations: precedex   MRI: Multifocal acute to subacute infarction. This extensively involves each MCA posterior distribution, the right basal ganglia, the anterior parasagittal vertex CINDY distribution and an additional smaller lesion in the right cerebellum  s/p angio for tx  Neurochecks Q 2 vitals Q1  Will request neurosgx for an angio to assess for vasospasm and if negative can consider to clamp drain.  Will wean off vasospasm tx and consider clamping the drain  Activity: [x] OOB as tolerated    CV   SBP goal 180 - 220  #4/9 TTE: EF 66%, no structural abnormalities/thrombus seen   #bradycardia theophyllin    levo for BP augmentation    P   extubated 4/9  saO2 > 92%      R   IVL  monitor I&O, maintain euvolemia     GI   Diet: NGT feeds, repeat speech and swallow eval on Friday 4/12  Senna   BM 4/10      E   Goal euglycemia (-180)  A1c 5.5     H   SCD   Chemoppx: lovenox 40 mg QD  4/10 LED: stable B/L Soleal DVT with extension to Rt posterior tibial DVT    ID   afebrile  E.Coli UTI s/p 3 days course of ceftriaxone    Disposition: ICU    ASSESSMENT/PLAN: HH4 WNFS 5 mF4 subarachnoid hemorrhage. Post bleed day 17.    Post-SAH day 16, SAH 3/25/2024  3/26/24 evd placed  3/26/24 pcomm clip  4/6 Intra arterial treatment for vasospasm  4/8/24  Angio: moderate B/L anterior circulation vasospam, s/p milrinone and verapamil   4/11: BP parameters liberalized. NPO after midnight for angio on 04/12    N   Neuro Q1, Vitals Q 1 to see for exam change while BP parameters changed   #Pcomm ruptured, s/p clip   #EVD tract acute hemorrhage/not tolerating clamp  #Hydrocephalus/ICP crisis: R EVD in place 20cm H2O   #Delayed Cerebral Ischemia Management: angiogram with intraarterial treatment -  - 220, euvolemia, nimodipine  #Pain: PRN tylenol oxy fentanyl   #agitations: precedex   MRI: Multifocal acute to subacute infarction. This extensively involves each MCA posterior distribution, the right basal ganglia, the anterior parasagittal vertex CINDY distribution and an additional smaller lesion in the right cerebellum  s/p angio for tx  Neurochecks Q1 vitals Q1  Will request neurosgx for an angio to assess for vasospasm and if negative can consider to clamp drain.  Will wean off vasospasm tx and consider clamping the drain  Activity: [x] OOB as tolerated    CV   SBP goal 140 - 220  #4/9 TTE: EF 66%, no structural abnormalities/thrombus seen   #bradycardia theophyllin   levo for BP augmentation      P   extubated 4/9  saO2 > 92%      R   IVL  monitor I&O, maintain euvolemia     GI   Diet: NGT feeds, repeat speech and swallow eval on Friday 4/12  Senna   BM 4/11      E   Goal euglycemia (-180)  A1c 5.5     H   SCD   Chemoppx: lovenox 40 mg QD  4/10 LED: stable B/L Soleal DVT with extension to Rt posterior tibial DVT  5 day surveillance    ID   afebrile  E.Coli UTI s/p 3 days course of ceftriaxone    Disposition: ICU

## 2024-04-11 NOTE — PROGRESS NOTE ADULT - SUBJECTIVE AND OBJECTIVE BOX
HPI   58F, on ASA81, transferred from [place] for SAH HH4 WNFS 5 mF4 ruptured R pcomm aneurysm. Was initially found unresponsive on 3/25/24 at 21:00, intubated at 23:00 at OSH, then txfrd. Given ddavp at OSH. Here in ED 1 unit PLTs given and R frontal EVD placed-high pressure. Went for R pcom clip on 3/26/24, postop evd not functioning then drained on the floor.     Admission scores:   SAH R pcomm ruptured @ HH4 WNFS 5 mF4, GCS<7    24h events   evd was clamped complicated with patient becoming more sleepy, evd reopened   04/08: MRI with widespread ischemic stroke b/l. Patient s/p angio for vasospasm tx  04/09: Extubated, tolerating well  4/10: no events today     Exam   Ox0, nonverbal, EOS, PERRL, intermittently FC on RUE, B/l upper extremities localize (R>L), BLE WDs    ICU Vital Signs Last 24 Hrs  T(C): 37.1 (10 Apr 2024 15:00), Max: 37.1 (10 Apr 2024 07:00)  T(F): 98.7 (10 Apr 2024 15:00), Max: 98.8 (10 Apr 2024 07:00)  HR: 67 (10 Apr 2024 18:45) (47 - 80)  BP: --  BP(mean): --  ABP: 168/73 (10 Apr 2024 18:45) (160/73 - 221/84)  ABP(mean): 109 (10 Apr 2024 18:45) (105 - 144)  RR: 18 (10 Apr 2024 18:45) (12 - 25)  SpO2: 100% (10 Apr 2024 18:45) (100% - 100%)      I&O's Summary    09 Apr 2024 07:01  -  10 Apr 2024 07:00  --------------------------------------------------------  IN: 1232 mL / OUT: 1323 mL / NET: -91 mL    10 Apr 2024 07:01  -  10 Apr 2024 19:31  --------------------------------------------------------  IN: 836.9 mL / OUT: 1258 mL / NET: -421.1 mL      LABS                        8.8    11.44 )-----------( 287      ( 10 Apr 2024 00:48 )             26.5   04-10    140  |  103  |  9   ----------------------------<  122<H>  3.4<L>   |  24  |  0.45<L>    Ca    9.2      10 Apr 2024 00:48  Phos  3.3     04-10  Mg     2.0     04-10      MEDICATIONS  (STANDING):  chlorhexidine 4% Liquid 1 Application(s) Topical <User Schedule>  dexMEDEtomidine Infusion 0.2 MICROgram(s)/kG/Hr (3.56 mL/Hr) IV Continuous <Continuous>  enoxaparin Injectable 40 milliGRAM(s) SubCutaneous <User Schedule>  multivitamin/minerals/iron Oral Solution (CENTRUM) 15 milliLiter(s) Oral daily  norepinephrine Infusion 0.1 MICROgram(s)/kG/Min (13.4 mL/Hr) IV Continuous <Continuous>  polyethylene glycol 3350 17 Gram(s) Oral every 12 hours  senna 2 Tablet(s) Oral at bedtime  theophylline Syrup 50 milliGRAM(s) Oral every 12 hours    MEDICATIONS  (PRN):  acetaminophen     Tablet .. 650 milliGRAM(s) Oral every 6 hours PRN Temp greater or equal to 38.5C (101.3F), Mild Pain (1 - 3)                   HPI   58F, on ASA81, transferred from [place] for SAH HH4 WNFS 5 mF4 ruptured R pcomm aneurysm. Was initially found unresponsive on 3/25/24 at 21:00, intubated at 23:00 at OSH, then txfrd. Given ddavp at OSH. Here in ED 1 unit PLTs given and R frontal EVD placed-high pressure. Went for R pcom clip on 3/26/24, postop evd not functioning then drained on the floor.     Admission scores:   SAH R pcomm ruptured @ HH4 WNFS 5 mF4, GCS<7    24h events   evd was clamped complicated with patient becoming more sleepy, evd reopened   04/08: MRI with widespread ischemic stroke b/l. Patient s/p angio for vasospasm tx  04/09: Extubated, tolerating well  4/10: no events today     Exam   Ox0, nonverbal, EOS, PERRL, intermittently FC on RUE, B/l upper extremities localize (R>L), BLE WDs    ICU Vital Signs Last 24 Hrs  T(C): 37.1 (10 Apr 2024 15:00), Max: 37.1 (10 Apr 2024 07:00)  T(F): 98.7 (10 Apr 2024 15:00), Max: 98.8 (10 Apr 2024 07:00)  HR: 67 (10 Apr 2024 18:45) (47 - 80)  BP: --  BP(mean): --  ABP: 168/73 (10 Apr 2024 18:45) (160/73 - 221/84)  ABP(mean): 109 (10 Apr 2024 18:45) (105 - 144)  RR: 18 (10 Apr 2024 18:45) (12 - 25)  SpO2: 100% (10 Apr 2024 18:45) (100% - 100%)      I&O's Summary    09 Apr 2024 07:01  -  10 Apr 2024 07:00  --------------------------------------------------------  IN: 1232 mL / OUT: 1323 mL / NET: -91 mL    10 Apr 2024 07:01  -  10 Apr 2024 19:31  --------------------------------------------------------  IN: 836.9 mL / OUT: 1258 mL / NET: -421.1 mL      LABS                        8.8    11.44 )-----------( 287      ( 10 Apr 2024 00:48 )             26.5   04-10    140  |  103  |  9   ----------------------------<  122<H>  3.4<L>   |  24  |  0.45<L>    Ca    9.2      10 Apr 2024 00:48  Phos  3.3     04-10  Mg     2.0     04-10      MEDICATIONS  (STANDING):  chlorhexidine 4% Liquid 1 Application(s) Topical <User Schedule>  dexMEDEtomidine Infusion 0.2 MICROgram(s)/kG/Hr (3.56 mL/Hr) IV Continuous <Continuous>  enoxaparin Injectable 40 milliGRAM(s) SubCutaneous <User Schedule>  multivitamin/minerals/iron Oral Solution (CENTRUM) 15 milliLiter(s) Oral daily  norepinephrine Infusion 0.1 MICROgram(s)/kG/Min (13.4 mL/Hr) IV Continuous <Continuous>  polyethylene glycol 3350 17 Gram(s) Oral every 12 hours  senna 2 Tablet(s) Oral at bedtime  theophylline Syrup 50 milliGRAM(s) Oral every 12 hours    MEDICATIONS  (PRN):  acetaminophen     Tablet .. 650 milliGRAM(s) Oral every 6 hours PRN Temp greater or equal to 38.5C (101.3F), Mild Pain (1 - 3)

## 2024-04-11 NOTE — PROGRESS NOTE ADULT - SUBJECTIVE AND OBJECTIVE BOX
Subjective: Patient seen and examined. No new events except as noted.   remains in NSCU      REVIEW OF SYSTEMS:    CONSTITUTIONAL: + weakness, fevers or chills  EYES/ENT: No visual changes;  No vertigo or throat pain   NECK: No pain or stiffness  RESPIRATORY: No cough, wheezing, hemoptysis; No shortness of breath  CARDIOVASCULAR: No chest pain or palpitations  GASTROINTESTINAL: No abdominal or epigastric pain. No nausea, vomiting, or hematemesis; No diarrhea or constipation. No melena or hematochezia.  GENITOURINARY: No dysuria, frequency or hematuria  NEUROLOGICAL: No numbness or weakness  SKIN: No itching, burning, rashes, or lesions   All other review of systems is negative unless indicated above.    MEDICATIONS:  MEDICATIONS  (STANDING):  chlorhexidine 4% Liquid 1 Application(s) Topical <User Schedule>  dexMEDEtomidine Infusion 0.2 MICROgram(s)/kG/Hr (3.56 mL/Hr) IV Continuous <Continuous>  enoxaparin Injectable 40 milliGRAM(s) SubCutaneous <User Schedule>  lactated ringers. 1000 milliLiter(s) (75 mL/Hr) IV Continuous <Continuous>  multivitamin/minerals/iron Oral Solution (CENTRUM) 15 milliLiter(s) Oral daily  norepinephrine Infusion 0.1 MICROgram(s)/kG/Min (13.4 mL/Hr) IV Continuous <Continuous>  senna 2 Tablet(s) Oral at bedtime  theophylline Syrup 50 milliGRAM(s) Oral every 12 hours      PHYSICAL EXAM:  T(C): 37.1 (04-11-24 @ 15:00), Max: 37.1 (04-10-24 @ 23:00)  HR: 71 (04-11-24 @ 18:15) (52 - 81)  BP: --  RR: 21 (04-11-24 @ 18:15) (14 - 33)  SpO2: 100% (04-11-24 @ 18:15) (98% - 100%)  Wt(kg): --  I&O's Summary    10 Apr 2024 07:01  -  11 Apr 2024 07:00  --------------------------------------------------------  IN: 1602.2 mL / OUT: 1408 mL / NET: 194.2 mL    11 Apr 2024 07:01  -  11 Apr 2024 18:43  --------------------------------------------------------  IN: 708.7 mL / OUT: 304 mL / NET: 404.7 mL          Appearance: NAD  HEENT:  dry oral mucosa, PERRL, EOMI	  +NGT  Lymphatic: No lymphadenopathy  Cardiovascular: Normal S1 S2, No JVD, No murmurs, No edema  Respiratory: decreased bs   Psychiatry: nonoverbal  Gastrointestinal:  Soft, Non-tender, + BS	  Skin: No rashes, No ecchymoses, No cyanosis	  Neurologic:Ox0, nonverbal, EOS, PERRL, intermittently FC on RUE, B/l upper extremities localize (R>L), BLE WDs    Extremities: decreased  range of motion, No clubbing, cyanosis or edema  Vascular: Peripheral pulses palpable 2+ bilaterally        LABS:    CARDIAC MARKERS:                                9.1    9.34  )-----------( 310      ( 11 Apr 2024 03:32 )             28.3     04-11    139  |  100  |  13  ----------------------------<  123<H>  3.8   |  26  |  0.42<L>    Ca    9.7      11 Apr 2024 03:32  Phos  4.4     04-11  Mg     2.0     04-11      proBNP:   Lipid Profile:   HgA1c:   TSH:             TELEMETRY: 	SR    ECG:  	  RADIOLOGY:   DIAGNOSTIC TESTING:  [ ] Echocardiogram:  [ ]  Catheterization:  [ ] Stress Test:    OTHER:

## 2024-04-11 NOTE — PROGRESS NOTE ADULT - ASSESSMENT
58F, on ASA81 for pain relief? 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 Was initially found unresponsive at 21:00, intubated at 23:00 at OSH, then txfrd. Given ddavp at OSH. On arrival patient w/ PERRL, +cough/gag, 2/5 spontaneous movement in LUE, o/w no movement. 1 unit PLTs given and R frontal EVD placed-high pressure. PLTs/Coags wnl.     Intubated sedated in NSCU   Echo with abnormal findings of severe cardiomyopathy and RWMA

## 2024-04-11 NOTE — PROGRESS NOTE ADULT - ASSESSMENT
ASSESSMENT/PLAN: HH4 WNFS 5 mF4 subarachnoid hemorrhage. Post bleed day 17.    Post-SAH day 16, SAH 3/25/2024  3/26/24 evd placed  3/26/24 pcomm clip  4/6 Intra arterial treatment for vasospasm  4/8/24  Angio: moderate B/L anterior circulation vasospam, s/p milrinone and verapamil   4/11: BP parameters liberalized. NPO after midnight for angio on 04/12    N   Neuro Q2, Vitals Q 1  #Pcomm ruptured, s/p clip   #EVD tract acute hemorrhage/not tolerating clamp  #Hydrocephalus/ICP crisis: R EVD in place 20cm H2O   #Delayed Cerebral Ischemia Management: angiogram with intraarterial treatment -  - 220, euvolemia, nimodipine  #Pain: PRN tylenol oxy fentanyl   #agitations: precedex   MRI: Multifocal acute to subacute infarction. This extensively involves each MCA posterior distribution, the right basal ganglia, the anterior parasagittal vertex CINDY distribution and an additional smaller lesion in the right cerebellum  s/p angio for tx  Will request neurosgx for an angio to assess for vasospasm and if negative can consider to clamp drain.  Will wean off vasospasm tx and consider clamping the drain  Activity: [x] OOB as tolerated    CV   SBP goal 140 - 220  #4/9 TTE: EF 66%, no structural abnormalities/thrombus seen   #bradycardia theophyllin   levo for BP augmentation      P   extubated 4/9  saO2 > 92%      R   IVL  monitor I&O, maintain euvolemia     GI   Diet: NGT feeds, repeat speech and swallow eval on Friday 4/12  Senna   BM 4/11      E   Goal euglycemia (-180)  A1c 5.5     H   SCD   Chemoppx: lovenox 40 mg QD  4/10 LED: stable B/L Soleal DVT with extension to Rt posterior tibial DVT  5 day surveillance    ID   afebrile  E.Coli UTI s/p 3 days course of ceftriaxone    Disposition: ICU    ASSESSMENT/PLAN: HH4 WNFS 5 mF4 subarachnoid hemorrhage. Post bleed day 17.    Post-SAH day 16, SAH 3/25/2024  3/26/24 evd placed  3/26/24 pcomm clip  4/6 Intra arterial treatment for vasospasm  4/8/24  Angio: moderate B/L anterior circulation vasospam, s/p milrinone and verapamil   4/11: BP parameters liberalized. NPO after midnight for angio on 04/12    N   Neuro Q1, Vitals Q 1  #Pcomm ruptured, s/p clip   #EVD tract acute hemorrhage/not tolerating clamp  #Hydrocephalus/ICP crisis: R EVD in place 20cm H2O   #Delayed Cerebral Ischemia Management: angiogram with intraarterial treatment -  - 220, euvolemia, nimodipine  #Pain: PRN tylenol oxy fentanyl   #agitations: precedex   MRI: Multifocal acute to subacute infarction. This extensively involves each MCA posterior distribution, the right basal ganglia, the anterior parasagittal vertex CINDY distribution and an additional smaller lesion in the right cerebellum  s/p angio for tx  Will request neurosgx for an angio to assess for vasospasm and if negative can consider to clamp drain.  Will wean off vasospasm tx and consider clamping the drain  Activity: [x] OOB as tolerated    CV   SBP goal 140 - 220  #4/9 TTE: EF 66%, no structural abnormalities/thrombus seen   #bradycardia theophyllin   levo for BP augmentation      P   extubated 4/9  saO2 > 92%      R   IVL  monitor I&O, maintain euvolemia     GI   Diet: NGT feeds, repeat speech and swallow eval on Friday 4/12  Senna   BM 4/11      E   Goal euglycemia (-180)  A1c 5.5     H   SCD   Chemoppx: lovenox 40 mg QD  4/10 LED: stable B/L Soleal DVT with extension to Rt posterior tibial DVT  5 day surveillance    ID   afebrile  E.Coli UTI s/p 3 days course of ceftriaxone    Disposition: ICU    ASSESSMENT/PLAN: HH4 WNFS 5 mF4 subarachnoid hemorrhage. Post bleed day 17.    Post-SAH day 16, SAH 3/25/2024  3/26/24 evd placed  3/26/24 pcomm clip  4/6 Intra arterial treatment for vasospasm  4/8/24  Angio: moderate B/L anterior circulation vasospam, s/p milrinone and verapamil   4/11: BP parameters liberalized. NPO after midnight for angio on 04/12    N   Neuro Q1, Vitals Q 1  #Pcomm ruptured, s/p clip   #EVD tract acute hemorrhage/not tolerating clamp  #Hydrocephalus/ICP crisis: R EVD in place 20cm H2O   #Delayed Cerebral Ischemia Management: angiogram with intraarterial treatment -  - 220, euvolemia, nimodipine  #Pain: PRN tylenol oxy   #agitations: precedex   MRI: Multifocal acute to subacute infarction. This extensively involves each MCA posterior distribution, the right basal ganglia, the anterior parasagittal vertex CINDY distribution and an additional smaller lesion in the right cerebellum  s/p angio for tx  Will go for angio tomorrow 4/12  Will wean off vasospasm tx and consider clamping the drain  Activity: [x] OOB as tolerated    CV   SBP goal 140 - 220  #4/9 TTE: EF 66%, no structural abnormalities/thrombus seen   #bradycardia theophyllin   levo for BP augmentation      P   extubated 4/9  saO2 > 92%      R   LR @ 75   monitor I&O, maintain euvolemia     GI   Diet: NPO after midnight for angio, repeat speech and swallow eval on Friday 4/12  Senna   BM 4/11      E   Goal euglycemia (-180)  A1c 5.5     H   SCD   Chemoppx: lovenox 40 mg QD  4/10 LED: stable B/L Soleal DVT with extension to Rt posterior tibial DVT  5 day surveillance    ID   afebrile  E.Coli UTI s/p 3 days course of ceftriaxone    Disposition: ICU

## 2024-04-11 NOTE — PROGRESS NOTE ADULT - NS ATTEND AMEND GEN_ALL_CORE FT
Events that transpired leading to the patient's current hospitalization were reviewed along with the patient's hospital couse.  Agree with 14 point review of system and physical   examination as noted above.      Patient with L/R Soleal Below the Knee DVT (L seen on 3/28, L/R soleal seen on 4/3, again seen on 4/10).  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    Continue lovenox 40mg SQ daily.  Recommend repeat surveillance LE venous duplex on 4/15/2024.    Agree with assessment/plan as noted above.

## 2024-04-11 NOTE — PROGRESS NOTE ADULT - SUBJECTIVE AND OBJECTIVE BOX
HPI   58F, on ASA81, transferred from [place] for SAH HH4 WNFS 5 mF4 ruptured R pcomm aneurysm. Was initially found unresponsive on 3/25/24 at 21:00, intubated at 23:00 at OSH, then txfrd. Given ddavp at OSH. Here in ED 1 unit PLTs given and R frontal EVD placed-high pressure. Went for R pcom clip on 3/26/24, postop evd not functioning then drained on the floor.     Admission scores:   SAH R pcomm ruptured @ HH4 WNFS 5 mF4, GCS<7    24h events   evd was clamped complicated with patient becoming more sleepy, evd reopened   04/08: MRI with widespread ischemic stroke b/l. Patient s/p angio for vasospasm tx  04/09: Extubated, tolerating well  4/10: no events today     Exam   Ox0, nonverbal, EOS, PERRL, intermittently FC on RUE, B/l upper extremities localize (R>L), BLE WDs    ICU Vital Signs Last 24 Hrs  T(C): 36.5 (11 Apr 2024 19:00), Max: 37.1 (10 Apr 2024 23:00)  T(F): 97.7 (11 Apr 2024 19:00), Max: 98.8 (10 Apr 2024 23:00)  HR: 71 (11 Apr 2024 19:45) (52 - 81)  BP: --  BP(mean): --  ABP: 147/64 (11 Apr 2024 19:45) (136/57 - 216/107)  ABP(mean): 97 (11 Apr 2024 19:45) (88 - 149)  RR: 23 (11 Apr 2024 19:45) (14 - 33)  SpO2: 100% (11 Apr 2024 19:45) (98% - 100%)    O2 Parameters below as of 11 Apr 2024 19:00  Patient On (Oxygen Delivery Method): nasal cannula  O2 Flow (L/min): 2    I&O's Summary    10 Apr 2024 07:01  -  11 Apr 2024 07:00  --------------------------------------------------------  IN: 1602.2 mL / OUT: 1408 mL / NET: 194.2 mL    11 Apr 2024 07:01  -  11 Apr 2024 20:19  --------------------------------------------------------  IN: 708.7 mL / OUT: 304 mL / NET: 404.7 mL        LABS                             9.1    9.34  )-----------( 310      ( 11 Apr 2024 03:32 )             28.3   04-11    139  |  100  |  13  ----------------------------<  123<H>  3.8   |  26  |  0.42<L>    Ca    9.7      11 Apr 2024 03:32  Phos  4.4     04-11  Mg     2.0     04-11      MEDICATIONS  (STANDING):  chlorhexidine 4% Liquid 1 Application(s) Topical <User Schedule>  dexMEDEtomidine Infusion 0.2 MICROgram(s)/kG/Hr (3.56 mL/Hr) IV Continuous <Continuous>  enoxaparin Injectable 40 milliGRAM(s) SubCutaneous <User Schedule>  lactated ringers. 1000 milliLiter(s) (75 mL/Hr) IV Continuous <Continuous>  multivitamin/minerals/iron Oral Solution (CENTRUM) 15 milliLiter(s) Oral daily  norepinephrine Infusion 0.1 MICROgram(s)/kG/Min (13.4 mL/Hr) IV Continuous <Continuous>  senna 2 Tablet(s) Oral at bedtime  theophylline Syrup 50 milliGRAM(s) Oral every 12 hours    MEDICATIONS  (PRN):  acetaminophen     Tablet .. 650 milliGRAM(s) Oral every 6 hours PRN Temp greater or equal to 38.5C (101.3F), Mild Pain (1 - 3)   HPI   58F, on ASA81, transferred from [place] for SAH HH4 WNFS 5 mF4 ruptured R pcomm aneurysm. Was initially found unresponsive on 3/25/24 at 21:00, intubated at 23:00 at OSH, then txfrd. Given ddavp at OSH. Here in ED 1 unit PLTs given and R frontal EVD placed-high pressure. Went for R pcom clip on 3/26/24, postop evd not functioning then drained on the floor.     Admission scores:   SAH R pcomm ruptured @ HH4 WNFS 5 mF4, GCS<7    24h events   evd was clamped complicated with patient becoming more sleepy, evd reopened   04/08: MRI with widespread ischemic stroke b/l. Patient s/p angio for vasospasm tx  04/09: Extubated, tolerating well  4/10: no events today     Exam   Ox0, nonverbal, EOS, PERRL, intermittently FC on RUE, B/l upper extremities localize (R>L), LLE WD RLE AG    ICU Vital Signs Last 24 Hrs  T(C): 36.5 (11 Apr 2024 19:00), Max: 37.1 (10 Apr 2024 23:00)  T(F): 97.7 (11 Apr 2024 19:00), Max: 98.8 (10 Apr 2024 23:00)  HR: 71 (11 Apr 2024 19:45) (52 - 81)  BP: --  BP(mean): --  ABP: 147/64 (11 Apr 2024 19:45) (136/57 - 216/107)  ABP(mean): 97 (11 Apr 2024 19:45) (88 - 149)  RR: 23 (11 Apr 2024 19:45) (14 - 33)  SpO2: 100% (11 Apr 2024 19:45) (98% - 100%)    O2 Parameters below as of 11 Apr 2024 19:00  Patient On (Oxygen Delivery Method): nasal cannula  O2 Flow (L/min): 2    I&O's Summary    10 Apr 2024 07:01  -  11 Apr 2024 07:00  --------------------------------------------------------  IN: 1602.2 mL / OUT: 1408 mL / NET: 194.2 mL    11 Apr 2024 07:01  -  11 Apr 2024 20:19  --------------------------------------------------------  IN: 708.7 mL / OUT: 304 mL / NET: 404.7 mL        LABS                             9.1    9.34  )-----------( 310      ( 11 Apr 2024 03:32 )             28.3   04-11    139  |  100  |  13  ----------------------------<  123<H>  3.8   |  26  |  0.42<L>    Ca    9.7      11 Apr 2024 03:32  Phos  4.4     04-11  Mg     2.0     04-11      MEDICATIONS  (STANDING):  chlorhexidine 4% Liquid 1 Application(s) Topical <User Schedule>  dexMEDEtomidine Infusion 0.2 MICROgram(s)/kG/Hr (3.56 mL/Hr) IV Continuous <Continuous>  enoxaparin Injectable 40 milliGRAM(s) SubCutaneous <User Schedule>  lactated ringers. 1000 milliLiter(s) (75 mL/Hr) IV Continuous <Continuous>  multivitamin/minerals/iron Oral Solution (CENTRUM) 15 milliLiter(s) Oral daily  norepinephrine Infusion 0.1 MICROgram(s)/kG/Min (13.4 mL/Hr) IV Continuous <Continuous>  senna 2 Tablet(s) Oral at bedtime  theophylline Syrup 50 milliGRAM(s) Oral every 12 hours    MEDICATIONS  (PRN):  acetaminophen     Tablet .. 650 milliGRAM(s) Oral every 6 hours PRN Temp greater or equal to 38.5C (101.3F), Mild Pain (1 - 3)

## 2024-04-12 LAB
ANION GAP SERPL CALC-SCNC: 13 MMOL/L — SIGNIFICANT CHANGE UP (ref 5–17)
BLD GP AB SCN SERPL QL: NEGATIVE — SIGNIFICANT CHANGE UP
BUN SERPL-MCNC: 16 MG/DL — SIGNIFICANT CHANGE UP (ref 7–23)
CALCIUM SERPL-MCNC: 9.4 MG/DL — SIGNIFICANT CHANGE UP (ref 8.4–10.5)
CHLORIDE SERPL-SCNC: 102 MMOL/L — SIGNIFICANT CHANGE UP (ref 96–108)
CO2 SERPL-SCNC: 24 MMOL/L — SIGNIFICANT CHANGE UP (ref 22–31)
CREAT SERPL-MCNC: 0.42 MG/DL — LOW (ref 0.5–1.3)
CULTURE RESULTS: SIGNIFICANT CHANGE UP
EGFR: 114 ML/MIN/1.73M2 — SIGNIFICANT CHANGE UP
GLUCOSE SERPL-MCNC: 128 MG/DL — HIGH (ref 70–99)
MAGNESIUM SERPL-MCNC: 2.1 MG/DL — SIGNIFICANT CHANGE UP (ref 1.6–2.6)
PHOSPHATE SERPL-MCNC: 3.8 MG/DL — SIGNIFICANT CHANGE UP (ref 2.5–4.5)
POTASSIUM SERPL-MCNC: 3.7 MMOL/L — SIGNIFICANT CHANGE UP (ref 3.5–5.3)
POTASSIUM SERPL-SCNC: 3.7 MMOL/L — SIGNIFICANT CHANGE UP (ref 3.5–5.3)
RH IG SCN BLD-IMP: POSITIVE — SIGNIFICANT CHANGE UP
SODIUM SERPL-SCNC: 139 MMOL/L — SIGNIFICANT CHANGE UP (ref 135–145)
SPECIMEN SOURCE: SIGNIFICANT CHANGE UP
TROPONIN T, HIGH SENSITIVITY RESULT: 14 NG/L — SIGNIFICANT CHANGE UP (ref 0–51)

## 2024-04-12 PROCEDURE — 71045 X-RAY EXAM CHEST 1 VIEW: CPT | Mod: 26

## 2024-04-12 PROCEDURE — 99291 CRITICAL CARE FIRST HOUR: CPT

## 2024-04-12 RX ORDER — NOREPINEPHRINE BITARTRATE/D5W 8 MG/250ML
0.1 PLASTIC BAG, INJECTION (ML) INTRAVENOUS
Qty: 8 | Refills: 0 | Status: DISCONTINUED | OUTPATIENT
Start: 2024-04-12 | End: 2024-04-13

## 2024-04-12 RX ORDER — POTASSIUM CHLORIDE 20 MEQ
40 PACKET (EA) ORAL ONCE
Refills: 0 | Status: COMPLETED | OUTPATIENT
Start: 2024-04-12 | End: 2024-04-13

## 2024-04-12 RX ADMIN — CHLORHEXIDINE GLUCONATE 1 APPLICATION(S): 213 SOLUTION TOPICAL at 05:00

## 2024-04-12 RX ADMIN — Medication 15 MILLILITER(S): at 13:05

## 2024-04-12 RX ADMIN — SODIUM CHLORIDE 75 MILLILITER(S): 9 INJECTION, SOLUTION INTRAVENOUS at 00:58

## 2024-04-12 RX ADMIN — SODIUM CHLORIDE 75 MILLILITER(S): 9 INJECTION, SOLUTION INTRAVENOUS at 07:15

## 2024-04-12 RX ADMIN — DEXMEDETOMIDINE HYDROCHLORIDE IN 0.9% SODIUM CHLORIDE 3.56 MICROGRAM(S)/KG/HR: 4 INJECTION INTRAVENOUS at 19:21

## 2024-04-12 RX ADMIN — ENOXAPARIN SODIUM 40 MILLIGRAM(S): 100 INJECTION SUBCUTANEOUS at 18:38

## 2024-04-12 RX ADMIN — Medication 50 MILLIGRAM(S): at 05:01

## 2024-04-12 RX ADMIN — Medication 50 MILLIGRAM(S): at 18:37

## 2024-04-12 RX ADMIN — Medication 13.4 MICROGRAM(S)/KG/MIN: at 07:15

## 2024-04-12 RX ADMIN — DEXMEDETOMIDINE HYDROCHLORIDE IN 0.9% SODIUM CHLORIDE 3.56 MICROGRAM(S)/KG/HR: 4 INJECTION INTRAVENOUS at 07:15

## 2024-04-12 NOTE — PROGRESS NOTE ADULT - ASSESSMENT
58F, on ASA81 for pain relief? (fell a month ago), presents as txfr from [place] for diffuse SAH w/small IVH, 2/2 ruptured posterolaterally projecting 2.9x2.4x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 Was initially found unresponsive at 21:00, intubated at 23:00 at OSH, then txfrd. Given ddavp at OSH. On arrival patient w/ PERRL, +cough/gag, 2/5 spontaneous movement in LUE, o/w no movement. 1 unit PLTs given and R frontal EVD placed-high pressure. PLTs/Coags wnl.     Intubated sedated in NSCU   Echo with abnormal findings of severe cardiomyopathy and RWMA

## 2024-04-12 NOTE — PROGRESS NOTE ADULT - ASSESSMENT
ASSESSMENT/PLAN: HH4 WNFS 5 mF4 subarachnoid hemorrhage. Post bleed day 17.    Post-SAH day 16, SAH 3/25/2024  3/26/24 evd placed  3/26/24 pcomm clip  4/6 Intra arterial treatment for vasospasm  4/8/24  Angio: moderate B/L anterior circulation vasospam, s/p milrinone and verapamil   4/11: BP parameters liberalized. NPO after midnight for angio on 04/12    N   Neuro Q1, Vitals Q 1  #Pcomm ruptured, s/p clip   #EVD tract acute hemorrhage/not tolerating clamp  #Hydrocephalus/ICP crisis: R EVD in place 20cm H2O ( 4ccs overnight )  ( plan to clamp EVD 04/13 )  #Delayed Cerebral Ischemia Management: angiogram with intraarterial treatment -  - 220, euvolemia, nimodipine  #Pain: PRN tylenol oxy   #agitations: precedex   MRI: Multifocal acute to subacute infarction. This extensively involves each MCA posterior distribution, the right basal ganglia, the anterior parasagittal vertex CINDY distribution and an additional smaller lesion in the right cerebellum  s/p angio for tx  Will go for angio tomorrow 4/12  Will wean off vasospasm tx and consider clamping the drain  Activity: [x] OOB as tolerated    CV   SBP goal 120 - 200  #4/9 TTE: EF 66%, no structural abnormalities/thrombus seen   #bradycardia theophyllin   levo for BP augmentation      P   extubated 4/9  saO2 > 92%  2L NC      R   IVL  monitor I&O, maintain euvolemia     GI   Diet: Restart Jevity tube feeds  Senna   BM 4/11      E   Goal euglycemia (-180)  A1c 5.5     H   SCD   Chemoppx: lovenox 40 mg QD  4/10 LED: stable B/L Soleal DVT with extension to Rt posterior tibial DVT  5 day surveillance    ID   afebrile  E.Coli UTI s/p 3 days course of ceftriaxone    Disposition: ICU

## 2024-04-12 NOTE — PROGRESS NOTE ADULT - SUBJECTIVE AND OBJECTIVE BOX
Subjective: Patient seen and examined. No new events except as noted.   remains in NSCU       REVIEW OF SYSTEMS:    Unable to obtain     MEDICATIONS:  MEDICATIONS  (STANDING):  chlorhexidine 4% Liquid 1 Application(s) Topical <User Schedule>  dexMEDEtomidine Infusion 0.2 MICROgram(s)/kG/Hr (3.56 mL/Hr) IV Continuous <Continuous>  enoxaparin Injectable 40 milliGRAM(s) SubCutaneous <User Schedule>  multivitamin/minerals/iron Oral Solution (CENTRUM) 15 milliLiter(s) Oral daily  norepinephrine Infusion 0.1 MICROgram(s)/kG/Min (13.4 mL/Hr) IV Continuous <Continuous>  theophylline Syrup 50 milliGRAM(s) Oral every 12 hours      PHYSICAL EXAM:  T(C): 36.6 (04-12-24 @ 07:00), Max: 37.1 (04-11-24 @ 15:00)  HR: 58 (04-12-24 @ 11:45) (54 - 84)  BP: --  RR: 12 (04-12-24 @ 11:45) (12 - 72)  SpO2: 100% (04-12-24 @ 11:45) (94% - 100%)  Wt(kg): --  I&O's Summary    11 Apr 2024 07:01  -  12 Apr 2024 07:00  --------------------------------------------------------  IN: 1611.2 mL / OUT: 654 mL / NET: 957.2 mL    12 Apr 2024 07:01  -  12 Apr 2024 12:12  --------------------------------------------------------  IN: 350.6 mL / OUT: 0 mL / NET: 350.6 mL            Appearance: NAD  HEENT:  dry oral mucosa, PERRL, EOMI	  +NGT  Lymphatic: No lymphadenopathy  Cardiovascular: Normal S1 S2, No JVD, No murmurs, No edema  Respiratory: decreased bs   Psychiatry: nonoverbal  Gastrointestinal:  Soft, Non-tender, + BS	  Skin: No rashes, No ecchymoses, No cyanosis	  Neurologic:Ox0, nonverbal, EOS, PERRL, intermittently FC on RUE, B/l upper extremities localize (R>L), BLE WDs    Extremities: decreased  range of motion, No clubbing, cyanosis or edema  Vascular: Peripheral pulses palpable 2+ bilaterally          LABS:    CARDIAC MARKERS:                                9.1    9.34  )-----------( 310      ( 11 Apr 2024 03:32 )             28.3     04-11    139  |  100  |  13  ----------------------------<  123<H>  3.8   |  26  |  0.42<L>    Ca    9.7      11 Apr 2024 03:32  Phos  4.4     04-11  Mg     2.0     04-11            TELEMETRY: 	SR    ECG:  	  RADIOLOGY:   DIAGNOSTIC TESTING:  [ ] Echocardiogram:  [ ]  Catheterization:  [ ] Stress Test:    OTHER:

## 2024-04-12 NOTE — SPEECH LANGUAGE PATHOLOGY EVALUATION - [facility] SLP REFERRAL
PT and OT. Of note, pt appears to have difficulty with visual tracking, can consider further visual assessment if appropriate

## 2024-04-12 NOTE — SPEECH LANGUAGE PATHOLOGY EVALUATION - SLP PERTINENT HISTORY OF CURRENT PROBLEM
58F, on ASA81, transferred from [place] for SAH HH4 WNFS 5 mF4 ruptured R pcomm aneurysm. Was initially found unresponsive on 3/25/24 at 21:00, intubated at 23:00 at OSH, then txfrd 3/26/24 and s/p R frontal EVD placed-high pressure. s/p R pcom clip on 3/26/24, postop evd not functioning then drained on the floor. Angio on 4/1 (-) for vasospasms. Extubated on 4/3 (9 days). Course c/b vasospasms; pt s/p angios on 4/6 and 4/8 for treatment. Reintubated 4/8 for angio, but remained intubated because pt was not at baseline after anesthesia meds were held. Extubated for 2nd time on 4/9.

## 2024-04-12 NOTE — PROGRESS NOTE ADULT - SUBJECTIVE AND OBJECTIVE BOX
HPI   58F, on ASA81, transferred from [place] for SAH HH4 WNFS 5 mF4 ruptured R pcomm aneurysm. Was initially found unresponsive on 3/25/24 at 21:00, intubated at 23:00 at OSH, then txfrd. Given ddavp at OSH. Here in ED 1 unit PLTs given and R frontal EVD placed-high pressure. Went for R pcom clip on 3/26/24, postop evd not functioning then drained on the floor.     Admission scores:   SAH R pcomm ruptured @ HH4 WNFS 5 mF4, GCS<7    24h events   evd was clamped complicated with patient becoming more sleepy, evd reopened   04/08: MRI with widespread ischemic stroke b/l. Patient s/p angio for vasospasm tx  04/09: Extubated, tolerating well  4/10: no events today   04/12: No angio today. pending neuroIR reccs    Exam   Ox0, nonverbal, EOS, PERRL, intermittently FC on RUE, B/l upper extremities localize (R>L), LLE WD RLE AG    ICU Vital Signs Last 24 Hrs  T(C): 36.5 (11 Apr 2024 19:00), Max: 37.1 (10 Apr 2024 23:00)  T(F): 97.7 (11 Apr 2024 19:00), Max: 98.8 (10 Apr 2024 23:00)  HR: 71 (11 Apr 2024 19:45) (52 - 81)  BP: --  BP(mean): --  ABP: 147/64 (11 Apr 2024 19:45) (136/57 - 216/107)  ABP(mean): 97 (11 Apr 2024 19:45) (88 - 149)  RR: 23 (11 Apr 2024 19:45) (14 - 33)  SpO2: 100% (11 Apr 2024 19:45) (98% - 100%)    O2 Parameters below as of 11 Apr 2024 19:00  Patient On (Oxygen Delivery Method): nasal cannula  O2 Flow (L/min): 2    I&O's Summary    10 Apr 2024 07:01  -  11 Apr 2024 07:00  --------------------------------------------------------  IN: 1602.2 mL / OUT: 1408 mL / NET: 194.2 mL    11 Apr 2024 07:01  -  11 Apr 2024 20:19  --------------------------------------------------------  IN: 708.7 mL / OUT: 304 mL / NET: 404.7 mL        LABS                             9.1    9.34  )-----------( 310      ( 11 Apr 2024 03:32 )             28.3   04-11    139  |  100  |  13  ----------------------------<  123<H>  3.8   |  26  |  0.42<L>    Ca    9.7      11 Apr 2024 03:32  Phos  4.4     04-11  Mg     2.0     04-11      MEDICATIONS  (STANDING):  chlorhexidine 4% Liquid 1 Application(s) Topical <User Schedule>  dexMEDEtomidine Infusion 0.2 MICROgram(s)/kG/Hr (3.56 mL/Hr) IV Continuous <Continuous>  enoxaparin Injectable 40 milliGRAM(s) SubCutaneous <User Schedule>  lactated ringers. 1000 milliLiter(s) (75 mL/Hr) IV Continuous <Continuous>  multivitamin/minerals/iron Oral Solution (CENTRUM) 15 milliLiter(s) Oral daily  norepinephrine Infusion 0.1 MICROgram(s)/kG/Min (13.4 mL/Hr) IV Continuous <Continuous>  senna 2 Tablet(s) Oral at bedtime  theophylline Syrup 50 milliGRAM(s) Oral every 12 hours    MEDICATIONS  (PRN):  acetaminophen     Tablet .. 650 milliGRAM(s) Oral every 6 hours PRN Temp greater or equal to 38.5C (101.3F), Mild Pain (1 - 3)

## 2024-04-12 NOTE — PROGRESS NOTE ADULT - SUBJECTIVE AND OBJECTIVE BOX
HPI   58F, on ASA81, transferred from [place] for SAH HH4 WNFS 5 mF4 ruptured R pcomm aneurysm. Was initially found unresponsive on 3/25/24 at 21:00, intubated at 23:00 at OSH, then txfrd. Given ddavp at OSH. Here in ED 1 unit PLTs given and R frontal EVD placed-high pressure. Went for R pcom clip on 3/26/24, postop evd not functioning then drained on the floor.     Admission scores:   SAH R pcomm ruptured @ HH4 WNFS 5 mF4, GCS<7    24h events   evd was clamped complicated with patient becoming more sleepy, evd reopened   04/08: MRI with widespread ischemic stroke b/l. Patient s/p angio for vasospasm tx  04/09: Extubated, tolerating well  4/10: no events today     Exam   Ox0, nonverbal, EOS, PERRL, intermittently FC on RUE, B/l upper extremities localize (R>L), LLE WD RLE AG    ICU Vital Signs Last 24 Hrs  T(C): 36.5 (11 Apr 2024 19:00), Max: 37.1 (10 Apr 2024 23:00)  T(F): 97.7 (11 Apr 2024 19:00), Max: 98.8 (10 Apr 2024 23:00)  HR: 71 (11 Apr 2024 19:45) (52 - 81)  BP: --  BP(mean): --  ABP: 147/64 (11 Apr 2024 19:45) (136/57 - 216/107)  ABP(mean): 97 (11 Apr 2024 19:45) (88 - 149)  RR: 23 (11 Apr 2024 19:45) (14 - 33)  SpO2: 100% (11 Apr 2024 19:45) (98% - 100%)    O2 Parameters below as of 11 Apr 2024 19:00  Patient On (Oxygen Delivery Method): nasal cannula  O2 Flow (L/min): 2    I&O's Summary    10 Apr 2024 07:01  -  11 Apr 2024 07:00  --------------------------------------------------------  IN: 1602.2 mL / OUT: 1408 mL / NET: 194.2 mL    11 Apr 2024 07:01  -  11 Apr 2024 20:19  --------------------------------------------------------  IN: 708.7 mL / OUT: 304 mL / NET: 404.7 mL        LABS                             9.1    9.34  )-----------( 310      ( 11 Apr 2024 03:32 )             28.3   04-11    139  |  100  |  13  ----------------------------<  123<H>  3.8   |  26  |  0.42<L>    Ca    9.7      11 Apr 2024 03:32  Phos  4.4     04-11  Mg     2.0     04-11      MEDICATIONS  (STANDING):  chlorhexidine 4% Liquid 1 Application(s) Topical <User Schedule>  dexMEDEtomidine Infusion 0.2 MICROgram(s)/kG/Hr (3.56 mL/Hr) IV Continuous <Continuous>  enoxaparin Injectable 40 milliGRAM(s) SubCutaneous <User Schedule>  lactated ringers. 1000 milliLiter(s) (75 mL/Hr) IV Continuous <Continuous>  multivitamin/minerals/iron Oral Solution (CENTRUM) 15 milliLiter(s) Oral daily  norepinephrine Infusion 0.1 MICROgram(s)/kG/Min (13.4 mL/Hr) IV Continuous <Continuous>  senna 2 Tablet(s) Oral at bedtime  theophylline Syrup 50 milliGRAM(s) Oral every 12 hours    MEDICATIONS  (PRN):  acetaminophen     Tablet .. 650 milliGRAM(s) Oral every 6 hours PRN Temp greater or equal to 38.5C (101.3F), Mild Pain (1 - 3)   HPI   58F, on ASA81, transferred from [place] for SAH HH4 WNFS 5 mF4 ruptured R pcomm aneurysm. Was initially found unresponsive on 3/25/24 at 21:00, intubated at 23:00 at OSH, then txfrd. Given ddavp at OSH. Here in ED 1 unit PLTs given and R frontal EVD placed-high pressure. Went for R pcom clip on 3/26/24, postop evd not functioning then drained on the floor.     Admission scores:   SAH R pcomm ruptured @ HH4 WNFS 5 mF4, GCS<7    24h events   evd was clamped complicated with patient becoming more sleepy, evd reopened   04/08: MRI with widespread ischemic stroke b/l. Patient s/p angio for vasospasm tx  04/09: Extubated, tolerating well  4/10: no events today   04/12: No angio today. pending neuroIR reccs    Exam   Ox0, nonverbal, EOS, PERRL, intermittently FC on RUE, B/l upper extremities localize (R>L), LLE WD RLE AG    ICU Vital Signs Last 24 Hrs  T(C): 36.5 (11 Apr 2024 19:00), Max: 37.1 (10 Apr 2024 23:00)  T(F): 97.7 (11 Apr 2024 19:00), Max: 98.8 (10 Apr 2024 23:00)  HR: 71 (11 Apr 2024 19:45) (52 - 81)  BP: --  BP(mean): --  ABP: 147/64 (11 Apr 2024 19:45) (136/57 - 216/107)  ABP(mean): 97 (11 Apr 2024 19:45) (88 - 149)  RR: 23 (11 Apr 2024 19:45) (14 - 33)  SpO2: 100% (11 Apr 2024 19:45) (98% - 100%)    O2 Parameters below as of 11 Apr 2024 19:00  Patient On (Oxygen Delivery Method): nasal cannula  O2 Flow (L/min): 2    I&O's Summary    10 Apr 2024 07:01  -  11 Apr 2024 07:00  --------------------------------------------------------  IN: 1602.2 mL / OUT: 1408 mL / NET: 194.2 mL    11 Apr 2024 07:01  -  11 Apr 2024 20:19  --------------------------------------------------------  IN: 708.7 mL / OUT: 304 mL / NET: 404.7 mL        LABS                             9.1    9.34  )-----------( 310      ( 11 Apr 2024 03:32 )             28.3   04-11    139  |  100  |  13  ----------------------------<  123<H>  3.8   |  26  |  0.42<L>    Ca    9.7      11 Apr 2024 03:32  Phos  4.4     04-11  Mg     2.0     04-11      MEDICATIONS  (STANDING):  chlorhexidine 4% Liquid 1 Application(s) Topical <User Schedule>  dexMEDEtomidine Infusion 0.2 MICROgram(s)/kG/Hr (3.56 mL/Hr) IV Continuous <Continuous>  enoxaparin Injectable 40 milliGRAM(s) SubCutaneous <User Schedule>  lactated ringers. 1000 milliLiter(s) (75 mL/Hr) IV Continuous <Continuous>  multivitamin/minerals/iron Oral Solution (CENTRUM) 15 milliLiter(s) Oral daily  norepinephrine Infusion 0.1 MICROgram(s)/kG/Min (13.4 mL/Hr) IV Continuous <Continuous>  senna 2 Tablet(s) Oral at bedtime  theophylline Syrup 50 milliGRAM(s) Oral every 12 hours    MEDICATIONS  (PRN):  acetaminophen     Tablet .. 650 milliGRAM(s) Oral every 6 hours PRN Temp greater or equal to 38.5C (101.3F), Mild Pain (1 - 3)

## 2024-04-12 NOTE — PROGRESS NOTE ADULT - ASSESSMENT
ASSESSMENT/PLAN: HH4 WNFS 5 mF4 subarachnoid hemorrhage. Post bleed day 17.    Post-SAH day 16, SAH 3/25/2024  3/26/24 evd placed  3/26/24 pcomm clip  4/6 Intra arterial treatment for vasospasm  4/8/24  Angio: moderate B/L anterior circulation vasospam, s/p milrinone and verapamil   4/11: BP parameters liberalized. NPO after midnight for angio on 04/12    N   Neuro Q1, Vitals Q 1  #Pcomm ruptured, s/p clip   #EVD tract acute hemorrhage/not tolerating clamp  #Hydrocephalus/ICP crisis: R EVD in place 20cm H2O   #Delayed Cerebral Ischemia Management: angiogram with intraarterial treatment -  - 220, euvolemia, nimodipine  #Pain: PRN tylenol oxy   #agitations: precedex   MRI: Multifocal acute to subacute infarction. This extensively involves each MCA posterior distribution, the right basal ganglia, the anterior parasagittal vertex CINDY distribution and an additional smaller lesion in the right cerebellum  s/p angio for tx  Will go for angio tomorrow 4/12  Will wean off vasospasm tx and consider clamping the drain  Activity: [x] OOB as tolerated    CV   SBP goal 140 - 220  #4/9 TTE: EF 66%, no structural abnormalities/thrombus seen   #bradycardia theophyllin   levo for BP augmentation      P   extubated 4/9  saO2 > 92%      R   LR @ 75   monitor I&O, maintain euvolemia     GI   Diet: NPO after midnight for angio, repeat speech and swallow eval on Friday 4/12  Senna   BM 4/11      E   Goal euglycemia (-180)  A1c 5.5     H   SCD   Chemoppx: lovenox 40 mg QD  4/10 LED: stable B/L Soleal DVT with extension to Rt posterior tibial DVT  5 day surveillance    ID   afebrile  E.Coli UTI s/p 3 days course of ceftriaxone    Disposition: ICU    ASSESSMENT/PLAN: HH4 WNFS 5 mF4 subarachnoid hemorrhage. Post bleed day 17.    Post-SAH day 16, SAH 3/25/2024  3/26/24 evd placed  3/26/24 pcomm clip  4/6 Intra arterial treatment for vasospasm  4/8/24  Angio: moderate B/L anterior circulation vasospam, s/p milrinone and verapamil   4/11: BP parameters liberalized. NPO after midnight for angio on 04/12    N   Neuro Q1, Vitals Q 1  #Pcomm ruptured, s/p clip   #EVD tract acute hemorrhage/not tolerating clamp  #Hydrocephalus/ICP crisis: R EVD in place 20cm H2O ( 4ccs overnight )  ( plan to clamp EVD 04/13 )  #Delayed Cerebral Ischemia Management: angiogram with intraarterial treatment -  - 220, euvolemia, nimodipine  #Pain: PRN tylenol oxy   #agitations: precedex   MRI: Multifocal acute to subacute infarction. This extensively involves each MCA posterior distribution, the right basal ganglia, the anterior parasagittal vertex CINDY distribution and an additional smaller lesion in the right cerebellum  s/p angio for tx  Will go for angio tomorrow 4/12  Will wean off vasospasm tx and consider clamping the drain  Activity: [x] OOB as tolerated    CV   SBP goal 120 - 200  #4/9 TTE: EF 66%, no structural abnormalities/thrombus seen   #bradycardia theophyllin   levo for BP augmentation      P   extubated 4/9  saO2 > 92%  2L NC      R   IVL  monitor I&O, maintain euvolemia     GI   Diet: Restart Jevity tube feeds  Senna   BM 4/11      E   Goal euglycemia (-180)  A1c 5.5     H   SCD   Chemoppx: lovenox 40 mg QD  4/10 LED: stable B/L Soleal DVT with extension to Rt posterior tibial DVT  5 day surveillance    ID   afebrile  E.Coli UTI s/p 3 days course of ceftriaxone    Disposition: ICU

## 2024-04-12 NOTE — SPEECH LANGUAGE PATHOLOGY EVALUATION - SLP DIAGNOSIS
Pt is a 58yoF prsenting w/ SAH from ruptured R pcomm aneurysm, R corona radiata and basal ganglia hemorrhage, and extensive R MCA infarcts and R cerebellum, s/p 2 extubations (3/26/24-4/3; 4/8-4/9), s/p pcomm clip, course c/b vasospasms. Pt p/w severe receptive and expressive aphasia w/ waxing and waning mentation. Pt noted with absent orientation, poor to absent ability to follow directives, and is nonverbal. Pt w/ reduced ability to sustain attention with waxing and waning mentation at this time.

## 2024-04-12 NOTE — PROGRESS NOTE ADULT - ATTENDING COMMENTS
57 yo woman with no PMH, admitted with unresponsiveness, found to have a HH4 mF4 SAH (bleed day 3/25) from R PComm aneurysm s/p clipping and R frontal EVD. Course c/b EVD tract hemorrhage and R BG stroke.  TTE with EF 37% with regional wall motion abnormalities which improved to 66%.   Hospital course c/b vasospasm c/b moderate L posterior MCA stroke and R cerebellar stroke with patient no longer following commands.     Interval events:  Exam stable with more liberal BPs.   EVD at 20cm H2O, output 4cc.   Is and Os net positive 1L.    On exam, alert, briefly attends, does not follow commands, with R gaze deviation but able to cross midline to the L, with L facial, R arm AG to elbow, L arm AG to elbow, moves R leg spontaneously in plane of bed, L leg withdraws to noxious    PBD 18. Monitoring for vasospasm and hydrocephalus.     minimal Precedex for sedation   Nimodipine held while on pressors   TCDs with poor windows   euvolemia   on RA  SBP goal liberalized to 120-200, on Levo   on TF, LBM 4/11   Lov ppx, with b/l DVTs     L IJ central line     Patient seen and examined by attending on 4/12/2023.    Patient is critically ill due to SAH and at high risk for neurological deterioration or death due to: hydrocephalus requiring an EVD for CSF diversion, with vasospasm at risk for stroke.

## 2024-04-12 NOTE — SPEECH LANGUAGE PATHOLOGY EVALUATION - SLP GENERAL OBSERVATIONS
Pt received upright at bedside, on room air, +johnny + EVD set to 20 +tele +b/l wrist restraints, Aox0, nonverbal, poor to absent ability to follow directives. Waxing and waning mentation. Per Son Arik in initial evaluation, pt with no communication or swallow difficulties prior to this admission and CVA. He also reports she is fluent in Belgian and English. Exam conducted in simple Belgian directives and English with no change in results/outcomes.

## 2024-04-12 NOTE — CHART NOTE - NSCHARTNOTEFT_GEN_A_CORE
58F, on ASA81, transferred from [place] for SAH HH4 WNFS 5 mF4 ruptured R pcomm aneurysm. Was initially found unresponsive on 3/25/24 at 21:00, intubated at 23:00 at OSH, then txfrd 3/26/24 and s/p R frontal EVD placed-high pressure. s/p R pcom clip on 3/26/24, postop evd not functioning then drained on the floor. Angio on 4/1 (-) for vasospasms. Extubated on 4/3 (9 days). Course c/b vasospasms; pt s/p angios on 4/6 and 4/8 for treatment. Reintubated 4/8 for angio, but remained intubated because pt was not at baseline after anesthesia meds were held. Extubated for 2nd time on 4/9.    MRI 4/8 IMPRESSION:  1. Multifocal acute to subacute infarction. This extensively involves each MCA posterior distribution, the right basal ganglia, the anterior parasagittal vertex CINDY distribution and an additional smaller lesion in the right cerebellum  2. Right middle cranial fossa surgical aneurysm clip is associated with local susceptibility artifact and mild vasogenic edema right medial temporal tip. Persistent subarachnoid space hemorrhage right sylvian fissure. Small middle cranial fossa subdural space fluid collections, likely postoperative, right larger than left  3. Right-sided ventricular catheter. Right corona radiata/basal ganglia adjacent parenchymal hemorrhage. Right intraventricular hemorrhage    DYSPHAGIA COURSE THIS ADMISSION:  4/4 initial bedside swallow exam completed. Recommendations for NPO, with non-oral nutrition/hydration/medications. Noted overt s/sx of aspiration/penetration on conservative moderately thick liquids and that pt was aphonic w/ waxing and waning mentation.  4/10 2nd bedside swallow exam completed. Pt seen after 2nd extubation. Pt now presenting w/ a waxing and waning mentation that does not support oral feeding at this time.   4/11 (see flowsheets) Re-evaluation completed. Pt w/ poor bolus orientation despite slightly improved sustained mentation. Not safe for p.o. or a candidate for objective swallow testing.    TODAY, pt seen for 4th swallow re-evaluation for candidacy for p.o. feeding and objective swallow testing since admission. Pt +EVD, on room air, b/l wrist restraints, fixed L gaze w/ limited visual tracking. Pt mentation today more similar to exam on 4/10 w/ waxing and waning mentation. Pt initially noted w/ eyes open, then closed and unable to fully arouse despite sternal rubbing. Noted facial grimace response to ice chip on labial surface, but no attempts to lick lips or orient to tsp. Pt left in NAD with even breaths and VSS on tele.     IMPRESSION: Pt has been seen 4 times this admission for bedside swallow evaluation. Pt continues to p/w a mentation that does not support oral feeding or objective swallow testing at this time.     RECOMMENDATIONS  > NPO, with non-oral nutrition/hydration/medications.  >Maintain strict aspiration precautions  >Maintain oral hygiene  >D/C rehab    D/W [place] CAPONE; LATASHA YANG and son Pierre via phone  SPEECH PATHOLOGY  Jenny Pathak CCC-SLP *Teams preferred* ([phone]) 58F, on ASA81, transferred from [place] for SAH HH4 WNFS 5 mF4 ruptured R pcomm aneurysm. Was initially found unresponsive on 3/25/24 at 21:00, intubated at 23:00 at OSH, then txfrd 3/26/24 and s/p R frontal EVD placed-high pressure. s/p R pcom clip on 3/26/24, postop evd not functioning then drained on the floor. Angio on 4/1 (-) for vasospasms. Extubated on 4/3 (9 days). Course c/b vasospasms; pt s/p angios on 4/6 and 4/8 for treatment. Reintubated 4/8 for angio, but remained intubated because pt was not at baseline after anesthesia meds were held. Extubated for 2nd time on 4/9.    MRI 4/8 IMPRESSION:  1. Multifocal acute to subacute infarction. This extensively involves each MCA posterior distribution, the right basal ganglia, the anterior parasagittal vertex CINDY distribution and an additional smaller lesion in the right cerebellum  2. Right middle cranial fossa surgical aneurysm clip is associated with local susceptibility artifact and mild vasogenic edema right medial temporal tip. Persistent subarachnoid space hemorrhage right sylvian fissure. Small middle cranial fossa subdural space fluid collections, likely postoperative, right larger than left  3. Right-sided ventricular catheter. Right corona radiata/basal ganglia adjacent parenchymal hemorrhage. Right intraventricular hemorrhage    DYSPHAGIA COURSE THIS ADMISSION:  4/4 initial bedside swallow exam completed. Recommendations for NPO, with non-oral nutrition/hydration/medications. Noted overt s/sx of aspiration/penetration on conservative moderately thick liquids and that pt was aphonic w/ waxing and waning mentation.  4/10 2nd bedside swallow exam completed. Pt seen after 2nd extubation. Pt now presenting w/ a waxing and waning mentation that does not support oral feeding at this time.   4/11 (see flowsheets) Re-evaluation completed. Pt w/ poor bolus orientation despite slightly improved sustained mentation. Not safe for p.o. or a candidate for objective swallow testing.    TODAY, pt seen for 4th swallow re-evaluation for candidacy for p.o. feeding and objective swallow testing since admission. Pt +EVD, on room air, b/l wrist restraints, fixed L gaze w/ limited visual tracking. Pt mentation today more similar to exam on 4/10 w/ waxing and waning mentation. Pt initially noted w/ eyes open, then closed and unable to fully arouse despite sternal rubbing. Noted facial grimace response to ice chip on labial surface, but no attempts to lick lips or orient to tsp. Pt left in NAD with even breaths and VSS on tele.     IMPRESSION: Pt has been seen 4 times this admission for bedside swallow evaluation. Pt continues to p/w a mentation that does not support oral feeding or objective swallow testing at this time.     RECOMMENDATIONS  > NPO, with non-oral nutrition/hydration/medications.  > Would recommend for team placement of speech-language evaluation order to further assess communication  >Maintain strict aspiration precautions  >Maintain oral hygiene  >D/C rehab    D/W [place] [facility]ONE; LATASHA YANG and son Pierre via phone  SPEECH PATHOLOGY  Jenny Pathak CCC-SLP *Teams preferred* ([phone])

## 2024-04-12 NOTE — SPEECH LANGUAGE PATHOLOGY EVALUATION - COMMENTS
Language: 1. Pt will improve expressive language skills to express simple wants/needs.  2. Pt will improve receptive language skills to follow simple 1 step directives  3. Family/caregiver will demonstrate understanding/carryover of strategies to improve patient’s communication of wants/needs  Cognition: 1. Pt will improve cognitive-linguistic skills to sustain mentation for 10 minutes NEISHA given pt nonverbal Pt noted with waxing and waning mental status. Pt able to x1 squeeze hands on R side. Unable to point or show fingers. Does not follow directives to open mouth, blink or open eyes, raise eyebrows. MRI 4/8 IMPRESSION:  1. Multifocal acute to subacute infarction. This extensively involves each MCA posterior distribution, the right basal ganglia, the anterior parasagittal vertex CINDY distribution and an additional smaller lesion in the right cerebellum  2. Right middle cranial fossa surgical aneurysm clip is associated with local susceptibility artifact and mild vasogenic edema right medial temporal tip. Persistent subarachnoid space hemorrhage right sylvian fissure. Small middle cranial fossa subdural space fluid collections, likely postoperative, right larger than left  3. Right-sided ventricular catheter. Right corona radiata/basal ganglia adjacent parenchymal hemorrhage. Right intraventricular hemorrhage    DYSPHAGIA COURSE THIS ADMISSION:  4/4 initial bedside swallow exam completed. Recommendations for NPO, with non-oral nutrition/hydration/medications. Noted overt s/sx of aspiration/penetration on conservative moderately thick liquids and that pt was aphonic w/ waxing and waning mentation.  4/10 2nd bedside swallow exam completed. Pt seen after 2nd extubation. Pt now presenting w/ a waxing and waning mentation that does not support oral feeding at this time.   4/11 (see flowsheets) Re-evaluation completed. Pt w/ poor bolus orientation despite slightly improved sustained mentation. Not safe for p.o. or a candidate for objective swallow testing.  4/12 re-evaluation completed recommendations for NPO, with non-oral nutrition/hydration/medications. NEISHA given pt waxing/waning mentation status and b/l wrist restraints; not appropriate to administer When pt eyes open, pt with fixed L field gaze. Variable accuracy w/ tracking fingers. At times pt appears to have fixated L gaze w/ no tracking, and at other times,  appears to be able to track along L field only and unable to cross gaze mid-line. Pt did not follow prompts for head nodding or shaking or pointing Given mentation, not appropriate to assess at this time Pt nonverbal Pt w/ waxing and waning mental status w poor attention. Not appropriate to administer higher level cognitive tasks given mentation.

## 2024-04-13 LAB
ANION GAP SERPL CALC-SCNC: 13 MMOL/L — SIGNIFICANT CHANGE UP (ref 5–17)
BUN SERPL-MCNC: 22 MG/DL — SIGNIFICANT CHANGE UP (ref 7–23)
CALCIUM SERPL-MCNC: 9.2 MG/DL — SIGNIFICANT CHANGE UP (ref 8.4–10.5)
CHLORIDE SERPL-SCNC: 104 MMOL/L — SIGNIFICANT CHANGE UP (ref 96–108)
CO2 SERPL-SCNC: 23 MMOL/L — SIGNIFICANT CHANGE UP (ref 22–31)
CREAT SERPL-MCNC: 0.47 MG/DL — LOW (ref 0.5–1.3)
CULTURE RESULTS: SIGNIFICANT CHANGE UP
CULTURE RESULTS: SIGNIFICANT CHANGE UP
EGFR: 111 ML/MIN/1.73M2 — SIGNIFICANT CHANGE UP
GLUCOSE SERPL-MCNC: 111 MG/DL — HIGH (ref 70–99)
HCT VFR BLD CALC: 28.5 % — LOW (ref 34.5–45)
HGB BLD-MCNC: 9.2 G/DL — LOW (ref 11.5–15.5)
MAGNESIUM SERPL-MCNC: 2.2 MG/DL — SIGNIFICANT CHANGE UP (ref 1.6–2.6)
MCHC RBC-ENTMCNC: 29 PG — SIGNIFICANT CHANGE UP (ref 27–34)
MCHC RBC-ENTMCNC: 32.3 GM/DL — SIGNIFICANT CHANGE UP (ref 32–36)
MCV RBC AUTO: 89.9 FL — SIGNIFICANT CHANGE UP (ref 80–100)
NRBC # BLD: 0 /100 WBCS — SIGNIFICANT CHANGE UP (ref 0–0)
PHOSPHATE SERPL-MCNC: 4.3 MG/DL — SIGNIFICANT CHANGE UP (ref 2.5–4.5)
PLATELET # BLD AUTO: 349 K/UL — SIGNIFICANT CHANGE UP (ref 150–400)
POTASSIUM SERPL-MCNC: 3.9 MMOL/L — SIGNIFICANT CHANGE UP (ref 3.5–5.3)
POTASSIUM SERPL-SCNC: 3.9 MMOL/L — SIGNIFICANT CHANGE UP (ref 3.5–5.3)
RBC # BLD: 3.17 M/UL — LOW (ref 3.8–5.2)
RBC # FLD: 14.6 % — HIGH (ref 10.3–14.5)
SODIUM SERPL-SCNC: 140 MMOL/L — SIGNIFICANT CHANGE UP (ref 135–145)
SPECIMEN SOURCE: SIGNIFICANT CHANGE UP
SPECIMEN SOURCE: SIGNIFICANT CHANGE UP
WBC # BLD: 7.94 K/UL — SIGNIFICANT CHANGE UP (ref 3.8–10.5)
WBC # FLD AUTO: 7.94 K/UL — SIGNIFICANT CHANGE UP (ref 3.8–10.5)

## 2024-04-13 PROCEDURE — 99291 CRITICAL CARE FIRST HOUR: CPT

## 2024-04-13 PROCEDURE — 70450 CT HEAD/BRAIN W/O DYE: CPT | Mod: 26

## 2024-04-13 RX ORDER — POTASSIUM CHLORIDE 20 MEQ
40 PACKET (EA) ORAL ONCE
Refills: 0 | Status: COMPLETED | OUTPATIENT
Start: 2024-04-13 | End: 2024-04-13

## 2024-04-13 RX ORDER — THEOPHYLLINE ANHYDROUS 200 MG
25 TABLET, EXTENDED RELEASE 12 HR ORAL EVERY 12 HOURS
Refills: 0 | Status: DISCONTINUED | OUTPATIENT
Start: 2024-04-13 | End: 2024-04-14

## 2024-04-13 RX ADMIN — Medication 25 MILLIGRAM(S): at 17:06

## 2024-04-13 RX ADMIN — CHLORHEXIDINE GLUCONATE 1 APPLICATION(S): 213 SOLUTION TOPICAL at 05:16

## 2024-04-13 RX ADMIN — Medication 40 MILLIEQUIVALENT(S): at 22:58

## 2024-04-13 RX ADMIN — Medication 15 MILLILITER(S): at 11:01

## 2024-04-13 RX ADMIN — Medication 650 MILLIGRAM(S): at 17:36

## 2024-04-13 RX ADMIN — Medication 650 MILLIGRAM(S): at 17:06

## 2024-04-13 RX ADMIN — ENOXAPARIN SODIUM 40 MILLIGRAM(S): 100 INJECTION SUBCUTANEOUS at 17:06

## 2024-04-13 RX ADMIN — Medication 50 MILLIGRAM(S): at 05:16

## 2024-04-13 RX ADMIN — Medication 40 MILLIEQUIVALENT(S): at 00:17

## 2024-04-13 NOTE — PROGRESS NOTE ADULT - ASSESSMENT
58F, on ASA81 for pain relief? 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 Was initially found unresponsive at 21:00, intubated at 23:00 at OSH, then txfrd. Given ddavp at OSH. On arrival patient w/ PERRL, +cough/gag, 2/5 spontaneous movement in LUE, o/w no movement. 1 unit PLTs given and R frontal EVD placed-high pressure. PLTs/Coags wnl.     Intubated sedated in NSCU   Echo with abnormal findings of severe cardiomyopathy and RWMA

## 2024-04-13 NOTE — PROGRESS NOTE ADULT - SUBJECTIVE AND OBJECTIVE BOX
HPI   58F, on ASA81, transferred from [place] for SAH HH4 WNFS 5 mF4 ruptured R pcomm aneurysm. Was initially found unresponsive on 3/25/24 at 21:00, intubated at 23:00 at OSH, then txfrd. Given ddavp at OSH. Here in ED 1 unit PLTs given and R frontal EVD placed-high pressure. Went for R pcom clip on 3/26/24, postop evd not functioning then drained on the floor.     Admission scores:   SAH R pcomm ruptured @ HH4 WNFS 5 mF4, GCS<7    24h events   evd was clamped complicated with patient becoming more sleepy, evd reopened   04/08: MRI with widespread ischemic stroke b/l. Patient s/p angio for vasospasm tx  04/09: Extubated, tolerating well  4/10: no events today   04/12: No angio today. pending neuroIR reccs  04/13: Patient on pressors overnight. Held this morning. Plan to clamp as per neurosgx    Exam   Ox1 ( name ), EOS, PERRL, FC on RUE ( shows to fingers ), B/l upper extremities localize (R>L), LLE WD RLE AG    ICU Vital Signs Last 24 Hrs  T(C): 37 (13 Apr 2024 07:00), Max: 37 (13 Apr 2024 07:00)  T(F): 98.6 (13 Apr 2024 07:00), Max: 98.6 (13 Apr 2024 07:00)  HR: 91 (13 Apr 2024 10:00) (57 - 91)  BP: --  BP(mean): --  ABP: 142/67 (13 Apr 2024 10:00) (101/60 - 196/79)  ABP(mean): 97 (13 Apr 2024 10:00) (64 - 135)  RR: 18 (13 Apr 2024 10:00) (12 - 51)  SpO2: 97% (13 Apr 2024 10:00) (91% - 100%)    O2 Parameters below as of 13 Apr 2024 07:00  Patient On (Oxygen Delivery Method): room air    04-12    139  |  102  |  16  ----------------------------<  128<H>  3.7   |  24  |  0.42<L>    Ca    9.4      12 Apr 2024 20:54  Phos  3.8     04-12  Mg     2.1     04-12          MEDICATIONS  (STANDING):  chlorhexidine 4% Liquid 1 Application(s) Topical <User Schedule>  dexMEDEtomidine Infusion 0.2 MICROgram(s)/kG/Hr (3.56 mL/Hr) IV Continuous <Continuous>  enoxaparin Injectable 40 milliGRAM(s) SubCutaneous <User Schedule>  lactated ringers. 1000 milliLiter(s) (75 mL/Hr) IV Continuous <Continuous>  multivitamin/minerals/iron Oral Solution (CENTRUM) 15 milliLiter(s) Oral daily  norepinephrine Infusion 0.1 MICROgram(s)/kG/Min (13.4 mL/Hr) IV Continuous <Continuous>  senna 2 Tablet(s) Oral at bedtime  theophylline Syrup 50 milliGRAM(s) Oral every 12 hours    MEDICATIONS  (PRN):  acetaminophen     Tablet .. 650 milliGRAM(s) Oral every 6 hours PRN Temp greater or equal to 38.5C (101.3F), Mild Pain (1 - 3)   HPI   58F, on ASA81, transferred from [place] for SAH HH4 WNFS 5 mF4 ruptured R pcomm aneurysm. Was initially found unresponsive on 3/25/24 at 21:00, intubated at 23:00 at OSH, then txfrd. Given ddavp at OSH. Here in ED 1 unit PLTs given and R frontal EVD placed-high pressure. Went for R pcom clip on 3/26/24, postop evd not functioning then drained on the floor.     Admission scores:   SAH R pcomm ruptured @ HH4 WNFS 5 mF4, GCS<7    24h events   evd was clamped complicated with patient becoming more sleepy, evd reopened   04/08: MRI with widespread ischemic stroke b/l. Patient s/p angio for vasospasm tx  04/09: Extubated, tolerating well  4/10: no events today   04/12: No angio today. pending neuroIR reccs  04/13: Patient on pressors overnight. Held this morning. Plan to clamp as per neurosgx, Drain clamped in AM.    Review of Systems  Unattainable 2/2 neuro injury    Allergies    No Known Allergies    Intolerances    ICU Vital Signs Last 24 Hrs  T(C): 36.9 (14 Apr 2024 03:00), Max: 37 (13 Apr 2024 07:00)  T(F): 98.4 (14 Apr 2024 03:00), Max: 98.6 (13 Apr 2024 07:00)  HR: 95 (14 Apr 2024 03:00) (66 - 100)  BP: --  BP(mean): --  ABP: 164/72 (14 Apr 2024 03:00) (132/60 - 188/84)  ABP(mean): 111 (14 Apr 2024 03:00) (88 - 127)  RR: 21 (14 Apr 2024 03:00) (15 - 46)  SpO2: 99% (14 Apr 2024 03:00) (95% - 100%)    O2 Parameters below as of 13 Apr 2024 19:00  Patient On (Oxygen Delivery Method): room air    I&O's Summary    12 Apr 2024 07:01  -  13 Apr 2024 07:00  --------------------------------------------------------  IN: 1644.4 mL / OUT: 1500 mL / NET: 144.4 mL    13 Apr 2024 07:01  -  14 Apr 2024 03:38  --------------------------------------------------------  IN: 1060 mL / OUT: 715 mL / NET: 345 mL      MEDICATIONS  (STANDING):  chlorhexidine 4% Liquid 1 Application(s) Topical <User Schedule>  enoxaparin Injectable 40 milliGRAM(s) SubCutaneous <User Schedule>  multivitamin/minerals/iron Oral Solution (CENTRUM) 15 milliLiter(s) Oral daily  theophylline Syrup 25 milliGRAM(s) Oral every 12 hours    MEDICATIONS  (PRN):  acetaminophen     Tablet .. 650 milliGRAM(s) Oral every 6 hours PRN Temp greater or equal to 38.5C (101.3F), Mild Pain (1 - 3)  oxyCODONE    IR 5 milliGRAM(s) Oral every 4 hours PRN Moderate Pain (4 - 6)  oxyCODONE    IR 10 milliGRAM(s) Oral every 4 hours PRN Severe Pain (7 - 10)    Physical Exam:  Neuro: Ox0, EOS, PERRL, not FC, B/l upper extremities localize (R>L), LLE WD RLE AG  Resp: CTA BL  CV: +s1s2  GI: soft ntnd

## 2024-04-13 NOTE — PROGRESS NOTE ADULT - ATTENDING COMMENTS
59 yo woman with no PMH, admitted with unresponsiveness, found to have a HH4 mF4 SAH (bleed day 3/25) from R PComm aneurysm s/p clipping and R frontal EVD. Course c/b EVD tract hemorrhage and R BG stroke.  TTE with EF 37% with regional wall motion abnormalities which improved to 66%.   Hospital course c/b vasospasm c/b moderate L posterior MCA stroke and R cerebellar stroke with patient no longer following commands, now improving.     Interval events:  Exam improving with more liberal BPs.   Afebrile.   EVD at 20cm H2O, output 0cc.   Is and Os net even.   Off Levo this AM. Off Precedex.     On exam, alert, attends to both sides, able to say her name, followed some simple commands (showed 2 fingers), with R gaze deviation but able to cross midline to the L, with L facial, R arm AG to elbow, L arm AG to elbow, moves R leg spontaneously in plane of bed, L leg withdraws to noxious    PBD 19. Monitoring for vasospasm and hydrocephalus.     nrsg asked to clamp EVD today since no output   off sedation   Nimodipine held briefly off pressors   TCDs with poor windows   euvolemia   on RA  SBP goal liberalized to 100-220, off Levo   on TF, LBM 4/13   Lov ppx, with b/l DVTs     L IJ central line     Patient seen and examined by attending on 4/13/2023.    Patient is critically ill due to SAH and at high risk for neurological deterioration or death due to: hydrocephalus requiring an EVD for CSF diversion, with vasospasm at risk for stroke.

## 2024-04-13 NOTE — PROGRESS NOTE ADULT - ASSESSMENT
ASSESSMENT/PLAN: HH4 WNFS 5 mF4 subarachnoid hemorrhage. Post bleed day 17.    Post-SAH day 16, SAH 3/25/2024  3/26/24 evd placed  3/26/24 pcomm clip  4/6 Intra arterial treatment for vasospasm  4/8/24  Angio: moderate B/L anterior circulation vasospam, s/p milrinone and verapamil   4/11: BP parameters liberalized. NPO after midnight for angio on 04/12    N   Neuro Q1, Vitals Q 1  #Pcomm ruptured, s/p clip   #EVD tract acute hemorrhage/not tolerating clamp  #Hydrocephalus/ICP crisis: R EVD in place 20cm H2O ( 4ccs overnight )  ( plan to clamp EVD 04/13 )  #Delayed Cerebral Ischemia Management: angiogram with intraarterial treatment -  - 220, euvolemia, nimodipine  #Pain: PRN tylenol oxy   #agitations: precedex   MRI: Multifocal acute to subacute infarction. This extensively involves each MCA posterior distribution, the right basal ganglia, the anterior parasagittal vertex CINDY distribution and an additional smaller lesion in the right cerebellum  s/p angio for tx  Will go for angio tomorrow 4/12  Will wean off vasospasm tx and consider clamping the drain  Activity: [x] OOB as tolerated    CV   SBP goal 120 - 200  #4/9 TTE: EF 66%, no structural abnormalities/thrombus seen   #bradycardia theophyllin   levo for BP augmentation      P   extubated 4/9  saO2 > 92%  2L NC      R   IVL  monitor I&O, maintain euvolemia     GI   Diet: Restart Jevity tube feeds  Senna   BM 4/11      E   Goal euglycemia (-180)  A1c 5.5     H   SCD   Chemoppx: lovenox 40 mg QD  4/10 LED: stable B/L Soleal DVT with extension to Rt posterior tibial DVT  5 day surveillance    ID   afebrile  E.Coli UTI s/p 3 days course of ceftriaxone    Disposition: ICU    ASSESSMENT/PLAN: HH4 WNFS 5 mF4 subarachnoid hemorrhage. Post bleed day 17.    Post-SAH day 16, SAH 3/25/2024  3/26/24 evd placed  3/26/24 pcomm clip  4/6 Intra arterial treatment for vasospasm  4/8/24  Angio: moderate B/L anterior circulation vasospam, s/p milrinone and verapamil   4/11: BP parameters liberalized. NPO after midnight for angio on 04/12    N   Neuro Q1, Vitals Q 1  #Pcomm ruptured, s/p clip   #EVD tract acute hemorrhage/not tolerating clamp  #Hydrocephalus/ICP crisis: R EVD in place 20cm H2O ( 4ccs overnight )  ( plan to clamp EVD 04/13 )  #Delayed Cerebral Ischemia Management: angiogram with intraarterial treatment -  - 220, euvolemia, nimodipine  #Pain: PRN tylenol oxy   #agitations: precedex   MRI: Multifocal acute to subacute infarction. This extensively involves each MCA posterior distribution, the right basal ganglia, the anterior parasagittal vertex CINDY distribution and an additional smaller lesion in the right cerebellum  s/p angio for tx  Will wean off vasospasm tx and consider clamping the drain  Activity: [x] OOB as tolerated    CV   SBP goal 100-220  #4/9 TTE: EF 66%, no structural abnormalities/thrombus seen   #bradycardia theophyllin ( will reduce dose to 25 BID )  levo for BP augmentation ( held at 7 am on 04/13 )  As per neurosgx clamp trial on 04/14.      P   extubated 4/9  saO2 > 92%  2L NC      R   IVL  monitor I&O, maintain euvolemia     GI   Diet: Restart Jevity tube feeds  Senna   BM 4/11      E   Goal euglycemia (-180)  A1c 5.5     H   SCD   Chemoppx: lovenox 40 mg QD  4/10 LED: stable B/L Soleal DVT with extension to Rt posterior tibial DVT  5 day surveillance    ID   afebrile  E.Coli UTI s/p 3 days course of ceftriaxone    Disposition: ICU    ASSESSMENT/PLAN: HH4 WNFS 5 mF4 subarachnoid hemorrhage. Post bleed day 17.    Post-SAH day 16, SAH 3/25/2024  3/26/24 evd placed  3/26/24 pcomm clip  4/6 Intra arterial treatment for vasospasm  4/8/24  Angio: moderate B/L anterior circulation vasospam, s/p milrinone and verapamil   4/11: BP parameters liberalized. NPO after midnight for angio on 04/12    N   Neuro Q1, Vitals Q 1  #Pcomm ruptured, s/p clip   #EVD tract acute hemorrhage/not tolerating clamp  #Hydrocephalus/ICP crisis: R EVD in place 20cm H2O ( 4ccs overnight )  ( plan to clamp EVD 04/13 )  #Delayed Cerebral Ischemia Management: angiogram with intraarterial treatment -  - 220, euvolemia, nimodipine  #Pain: PRN tylenol oxy   #agitations: precedex   MRI: Multifocal acute to subacute infarction. This extensively involves each MCA posterior distribution, the right basal ganglia, the anterior parasagittal vertex CINDY distribution and an additional smaller lesion in the right cerebellum  s/p angio for tx  Will wean off vasospasm tx and consider clamping the drain  Activity: [x] OOB as tolerated    CV   SBP goal 100-220  #4/9 TTE: EF 66%, no structural abnormalities/thrombus seen   #bradycardia theophyllin ( will reduce dose to 25 BID )  levo for BP augmentation ( held at 7 am on 04/13 )  As per neurosgx clamp trial on 04/14.      P   extubated 4/9  saO2 > 92%  2L NC      R   IVL  monitor I&O, maintain euvolemia     GI   Diet: Restart Jevity tube feeds  Senna   BM 4/12      E   Goal euglycemia (-180)  A1c 5.5     H   SCD   Chemoppx: lovenox 40 mg QD  4/10 LED: stable B/L Soleal DVT with extension to Rt posterior tibial DVT  5 day surveillance    ID   afebrile  E.Coli UTI s/p 3 days course of ceftriaxone    Disposition: ICU     R. IJ

## 2024-04-13 NOTE — PROGRESS NOTE ADULT - SUBJECTIVE AND OBJECTIVE BOX
Patient seen and examined at bedside.    --Anticoagulation--    T(C): 36.9 (04-07-24 @ 03:00), Max: 37.7 (04-06-24 @ 18:00)  HR: 60 (04-07-24 @ 03:00) (56 - 96)  BP: 175/70 (04-06-24 @ 15:30) (107/67 - 185/74)  RR: 19 (04-07-24 @ 03:00) (16 - 48)  SpO2: 96% (04-07-24 @ 03:00) (90% - 100%)  Wt(kg): --    Exam: Extubated, EOS, Ox0, PERRL, int FC, BUE loc R>L, BLE wd

## 2024-04-13 NOTE — PROGRESS NOTE ADULT - SUBJECTIVE AND OBJECTIVE BOX
Subjective: Patient seen and examined. No new events except as noted.   remains in NSCU   on pressors overnight. Held this morning  Theophyline decreased   REVIEW OF SYSTEMS:    Unable to obtain      MEDICATIONS:  MEDICATIONS  (STANDING):  chlorhexidine 4% Liquid 1 Application(s) Topical <User Schedule>  enoxaparin Injectable 40 milliGRAM(s) SubCutaneous <User Schedule>  multivitamin/minerals/iron Oral Solution (CENTRUM) 15 milliLiter(s) Oral daily  theophylline Syrup 25 milliGRAM(s) Oral every 12 hours      PHYSICAL EXAM:  T(C): 36.7 (04-13-24 @ 19:00), Max: 37 (04-13-24 @ 07:00)  HR: 93 (04-13-24 @ 21:00) (62 - 100)  BP: --  RR: 26 (04-13-24 @ 21:00) (13 - 51)  SpO2: 98% (04-13-24 @ 21:00) (95% - 100%)  Wt(kg): --  I&O's Summary    12 Apr 2024 07:01  -  13 Apr 2024 07:00  --------------------------------------------------------  IN: 1644.4 mL / OUT: 1500 mL / NET: 144.4 mL    13 Apr 2024 07:01  -  13 Apr 2024 22:19  --------------------------------------------------------  IN: 700 mL / OUT: 715 mL / NET: -15 mL              Appearance: NAD  HEENT:  dry oral mucosa, PERRL, EOMI	  +NGT  Lymphatic: No lymphadenopathy  Cardiovascular: Normal S1 S2, No JVD, No murmurs, No edema  Respiratory: decreased bs   Psychiatry: nonoverbal  Gastrointestinal:  Soft, Non-tender, + BS	  Skin: No rashes, No ecchymoses, No cyanosis	  Extremities: decreased  range of motion, No clubbing, cyanosis or edema  Vascular: Peripheral pulses palpable 2+ bilaterally  Ox1 ( name ), EOS, PERRL, FC on RUE ( shows to fingers ), B/l upper extremities localize (R>L), LLE WD RLE AG    LABS:    CARDIAC MARKERS:                                9.2    7.94  )-----------( 349      ( 13 Apr 2024 12:42 )             28.5     04-12    139  |  102  |  16  ----------------------------<  128<H>  3.7   |  24  |  0.42<L>    Ca    9.4      12 Apr 2024 20:54  Phos  3.8     04-12  Mg     2.1     04-12          TELEMETRY: 	  SR   ECG:  	  RADIOLOGY: < from: CT Head No Cont (04.13.24 @ 13:54) >    ACC: [id number] EXAM:  CT BRAIN   ORDERED BY: CELIA PARMAR     PROCEDURE DATE:  04/13/2024          INTERPRETATION:  CLINICAL INDICATION: Subarachnoid hemorrhage, P-comm   rupture, EVD clamping    5mm axial sections of the brain were obtained from base to vertex,   without the intravenous administration of contrast material. Coronal and   sagittal computer generated reconstructed views are available    Comparison is made with prior CT of 4/7/2024.    The right frontal sherwin hole is present through which an extra ventricular   drain extends the third ventricle. There is minimal increase in   ventricular size. Minimal prominence of the temporal horns are   identified. There is lucency in the right basal ganglia which likely   represents infarctand residual from prior hemorrhage. There is also   lucency in the left posterior frontal cortex consistent with ischemic   change better seen on the MRI of 4/8/2024. The small right corona radiata   hemorrhage has resolved. There has been a right frontal temporal   craniotomy and there is a right parasellar aneurysm clip.      IMPRESSION: Right frontal ventriculostomy. Minimal increased ventricular   size since 4/7/2024 after ventriculostomy clamping.    --- End of Report ---            IMER GORDON MD; Attending Radiologist  This document has been electronically signed. Apr 13 2024  2:33PM    < end of copied text >    DIAGNOSTIC TESTING:  [ ] Echocardiogram:  [ ]  Catheterization:  [ ] Stress Test:    OTHER:

## 2024-04-13 NOTE — PROGRESS NOTE ADULT - SUBJECTIVE AND OBJECTIVE BOX
HPI   58F, on ASA81, transferred from [place] for SAH HH4 WNFS 5 mF4 ruptured R pcomm aneurysm. Was initially found unresponsive on 3/25/24 at 21:00, intubated at 23:00 at OSH, then txfrd. Given ddavp at OSH. Here in ED 1 unit PLTs given and R frontal EVD placed-high pressure. Went for R pcom clip on 3/26/24, postop evd not functioning then drained on the floor.     Admission scores:   SAH R pcomm ruptured @ HH4 WNFS 5 mF4, GCS<7    24h events   evd was clamped complicated with patient becoming more sleepy, evd reopened   04/08: MRI with widespread ischemic stroke b/l. Patient s/p angio for vasospasm tx  04/09: Extubated, tolerating well  4/10: no events today   04/12: No angio today. pending neuroIR reccs    Exam   Ox0, nonverbal, EOS, PERRL, intermittently FC on RUE, B/l upper extremities localize (R>L), LLE WD RLE AG    ICU Vital Signs Last 24 Hrs  T(C): 36.5 (11 Apr 2024 19:00), Max: 37.1 (10 Apr 2024 23:00)  T(F): 97.7 (11 Apr 2024 19:00), Max: 98.8 (10 Apr 2024 23:00)  HR: 71 (11 Apr 2024 19:45) (52 - 81)  BP: --  BP(mean): --  ABP: 147/64 (11 Apr 2024 19:45) (136/57 - 216/107)  ABP(mean): 97 (11 Apr 2024 19:45) (88 - 149)  RR: 23 (11 Apr 2024 19:45) (14 - 33)  SpO2: 100% (11 Apr 2024 19:45) (98% - 100%)    O2 Parameters below as of 11 Apr 2024 19:00  Patient On (Oxygen Delivery Method): nasal cannula  O2 Flow (L/min): 2    I&O's Summary    10 Apr 2024 07:01  -  11 Apr 2024 07:00  --------------------------------------------------------  IN: 1602.2 mL / OUT: 1408 mL / NET: 194.2 mL    11 Apr 2024 07:01  -  11 Apr 2024 20:19  --------------------------------------------------------  IN: 708.7 mL / OUT: 304 mL / NET: 404.7 mL        LABS                             9.1    9.34  )-----------( 310      ( 11 Apr 2024 03:32 )             28.3   04-11    139  |  100  |  13  ----------------------------<  123<H>  3.8   |  26  |  0.42<L>    Ca    9.7      11 Apr 2024 03:32  Phos  4.4     04-11  Mg     2.0     04-11      MEDICATIONS  (STANDING):  chlorhexidine 4% Liquid 1 Application(s) Topical <User Schedule>  dexMEDEtomidine Infusion 0.2 MICROgram(s)/kG/Hr (3.56 mL/Hr) IV Continuous <Continuous>  enoxaparin Injectable 40 milliGRAM(s) SubCutaneous <User Schedule>  lactated ringers. 1000 milliLiter(s) (75 mL/Hr) IV Continuous <Continuous>  multivitamin/minerals/iron Oral Solution (CENTRUM) 15 milliLiter(s) Oral daily  norepinephrine Infusion 0.1 MICROgram(s)/kG/Min (13.4 mL/Hr) IV Continuous <Continuous>  senna 2 Tablet(s) Oral at bedtime  theophylline Syrup 50 milliGRAM(s) Oral every 12 hours    MEDICATIONS  (PRN):  acetaminophen     Tablet .. 650 milliGRAM(s) Oral every 6 hours PRN Temp greater or equal to 38.5C (101.3F), Mild Pain (1 - 3)   HPI   58F, on ASA81, transferred from [place] for SAH HH4 WNFS 5 mF4 ruptured R pcomm aneurysm. Was initially found unresponsive on 3/25/24 at 21:00, intubated at 23:00 at OSH, then txfrd. Given ddavp at OSH. Here in ED 1 unit PLTs given and R frontal EVD placed-high pressure. Went for R pcom clip on 3/26/24, postop evd not functioning then drained on the floor.     Admission scores:   SAH R pcomm ruptured @ HH4 WNFS 5 mF4, GCS<7    24h events   evd was clamped complicated with patient becoming more sleepy, evd reopened   04/08: MRI with widespread ischemic stroke b/l. Patient s/p angio for vasospasm tx  04/09: Extubated, tolerating well  4/10: no events today   04/12: No angio today. pending neuroIR reccs  04/13: Patient on pressors overnight. Held this morning. Plan to clamp as per neurosgx    Exam   Ox1 ( name ), EOS, PERRL, FC on RUE ( shows to fingers ), B/l upper extremities localize (R>L), LLE WD RLE AG    ICU Vital Signs Last 24 Hrs  T(C): 37 (13 Apr 2024 07:00), Max: 37 (13 Apr 2024 07:00)  T(F): 98.6 (13 Apr 2024 07:00), Max: 98.6 (13 Apr 2024 07:00)  HR: 91 (13 Apr 2024 10:00) (57 - 91)  BP: --  BP(mean): --  ABP: 142/67 (13 Apr 2024 10:00) (101/60 - 196/79)  ABP(mean): 97 (13 Apr 2024 10:00) (64 - 135)  RR: 18 (13 Apr 2024 10:00) (12 - 51)  SpO2: 97% (13 Apr 2024 10:00) (91% - 100%)    O2 Parameters below as of 13 Apr 2024 07:00  Patient On (Oxygen Delivery Method): room air    04-12    139  |  102  |  16  ----------------------------<  128<H>  3.7   |  24  |  0.42<L>    Ca    9.4      12 Apr 2024 20:54  Phos  3.8     04-12  Mg     2.1     04-12          MEDICATIONS  (STANDING):  chlorhexidine 4% Liquid 1 Application(s) Topical <User Schedule>  dexMEDEtomidine Infusion 0.2 MICROgram(s)/kG/Hr (3.56 mL/Hr) IV Continuous <Continuous>  enoxaparin Injectable 40 milliGRAM(s) SubCutaneous <User Schedule>  lactated ringers. 1000 milliLiter(s) (75 mL/Hr) IV Continuous <Continuous>  multivitamin/minerals/iron Oral Solution (CENTRUM) 15 milliLiter(s) Oral daily  norepinephrine Infusion 0.1 MICROgram(s)/kG/Min (13.4 mL/Hr) IV Continuous <Continuous>  senna 2 Tablet(s) Oral at bedtime  theophylline Syrup 50 milliGRAM(s) Oral every 12 hours    MEDICATIONS  (PRN):  acetaminophen     Tablet .. 650 milliGRAM(s) Oral every 6 hours PRN Temp greater or equal to 38.5C (101.3F), Mild Pain (1 - 3)   [Follow-Up: _____] : a [unfilled] follow-up visit

## 2024-04-13 NOTE — PROGRESS NOTE ADULT - ASSESSMENT
ASSESSMENT/PLAN: HH4 WNFS 5 mF4 subarachnoid hemorrhage. Post bleed day 17.    Post-SAH day 16, SAH 3/25/2024  3/26/24 evd placed  3/26/24 pcomm clip  4/6 Intra arterial treatment for vasospasm  4/8/24  Angio: moderate B/L anterior circulation vasospam, s/p milrinone and verapamil   4/11: BP parameters liberalized. NPO after midnight for angio on 04/12    N   Neuro Q1, Vitals Q 1  #Pcomm ruptured, s/p clip   #EVD tract acute hemorrhage/not tolerating clamp  #Hydrocephalus/ICP crisis: R EVD in place 20cm H2O ( 4ccs overnight )  ( plan to clamp EVD 04/13 )  #Delayed Cerebral Ischemia Management: angiogram with intraarterial treatment -  - 220, euvolemia, nimodipine  #Pain: PRN tylenol oxy   #agitations: precedex   MRI: Multifocal acute to subacute infarction. This extensively involves each MCA posterior distribution, the right basal ganglia, the anterior parasagittal vertex CINDY distribution and an additional smaller lesion in the right cerebellum  s/p angio for tx  Will wean off vasospasm tx and consider clamping the drain  Activity: [x] OOB as tolerated    CV   SBP goal 100-220  #4/9 TTE: EF 66%, no structural abnormalities/thrombus seen   #bradycardia theophyllin ( will reduce dose to 25 BID )  levo for BP augmentation ( held at 7 am on 04/13 )  As per neurosgx clamp trial on 04/14.      P   extubated 4/9  saO2 > 92%  2L NC      R   IVL  monitor I&O, maintain euvolemia     GI   Diet: Restart Jevity tube feeds  Senna   BM 4/12      E   Goal euglycemia (-180)  A1c 5.5     H   SCD   Chemoppx: lovenox 40 mg QD  4/10 LED: stable B/L Soleal DVT with extension to Rt posterior tibial DVT  5 day surveillance    ID   afebrile  E.Coli UTI s/p 3 days course of ceftriaxone    Disposition: ICU     R. IJ ASSESSMENT/PLAN: HH4 WNFS 5 mF4 subarachnoid hemorrhage. Post bleed day 17.    Post-SAH day 16, SAH 3/25/2024  3/26/24 evd placed  3/26/24 pcomm clip  4/6 Intra arterial treatment for vasospasm  4/8/24  Angio: moderate B/L anterior circulation vasospam, s/p milrinone and verapamil   4/11: BP parameters liberalized. NPO after midnight for angio on 04/12    N   Neuro Q1, Vitals Q 1  #Pcomm ruptured, s/p clip   #EVD tract acute hemorrhage/not tolerating clamp  #Hydrocephalus/ICP crisis: R EVD in place 20cm H2O ( 4ccs overnight )  ( plan to clamp EVD 04/13 )  #Delayed Cerebral Ischemia Management: angiogram with intraarterial treatment -  - 220, euvolemia, nimodipine  #Pain: PRN tylenol oxy   #agitations: precedex   MRI: Multifocal acute to subacute infarction. This extensively involves each MCA posterior distribution, the right basal ganglia, the anterior parasagittal vertex CINDY distribution and an additional smaller lesion in the right cerebellum  s/p angio for tx  EVD clamped 4/12  Activity: [x] OOB as tolerated    CV   SBP goal 100-220  #4/9 TTE: EF 66%, no structural abnormalities/thrombus seen   #bradycardia theophyllin 25 BID    P   extubated 4/9  saO2 > 92%  2L NC    R   IVL  monitor I&O, maintain euvolemia     GI   Diet: Restart Jevity tube feeds  Senna   BM 4/12      E   Goal euglycemia (-180)  A1c 5.5     H   SCD   Chemoppx: lovenox 40 mg QD  4/10 LED: stable B/L Soleal DVT with extension to Rt posterior tibial DVT  5 day surveillance    ID   afebrile  E.Coli UTI s/p 3 days course of ceftriaxone    Disposition: ICU     R. IJ ASSESSMENT/PLAN: HH4 WNFS 5 mF4 subarachnoid hemorrhage. Post bleed day 17.    Post-SAH day 16, SAH 3/25/2024  3/26/24 evd placed  3/26/24 pcomm clip  4/6 Intra arterial treatment for vasospasm  4/8/24  Angio: moderate B/L anterior circulation vasospam, s/p milrinone and verapamil   4/11: BP parameters liberalized. NPO after midnight for angio on 04/12    N   Neuro Q1, Vitals Q 1  #Pcomm ruptured, s/p clip   #EVD tract acute hemorrhage/not tolerating clamp  #Hydrocephalus/ICP crisis: R EVD in place 20cm H2O ( 4ccs overnight )  ( plan to clamp EVD 04/13 )  #Delayed Cerebral Ischemia Management: angiogram with intraarterial treatment -  - 220, euvolemia, nimodipine  #Pain: PRN tylenol oxy   #agitations: precedex   MRI: Multifocal acute to subacute infarction. This extensively involves each MCA posterior distribution, the right basal ganglia, the anterior parasagittal vertex CINDY distribution and an additional smaller lesion in the right cerebellum  s/p angio for tx  EVD clamped 4/13 @ 2pm  Activity: [x] OOB as tolerated    CV   SBP goal 100-220  #4/9 TTE: EF 66%, no structural abnormalities/thrombus seen   #bradycardia theophyllin 25 BID    P   extubated 4/9  saO2 > 92%  2L NC    R   IVL  monitor I&O, maintain euvolemia     GI   Diet: Restart Jevity tube feeds  Senna   BM 4/12      E   Goal euglycemia (-180)  A1c 5.5     H   SCD   Chemoppx: lovenox 40 mg QD  4/10 LED: stable B/L Soleal DVT with extension to Rt posterior tibial DVT  5 day surveillance    ID   afebrile  E.Coli UTI s/p 3 days course of ceftriaxone    Disposition: ICU     R. IJ

## 2024-04-14 PROCEDURE — 71045 X-RAY EXAM CHEST 1 VIEW: CPT | Mod: 26

## 2024-04-14 PROCEDURE — 70450 CT HEAD/BRAIN W/O DYE: CPT | Mod: 26

## 2024-04-14 PROCEDURE — 99291 CRITICAL CARE FIRST HOUR: CPT

## 2024-04-14 RX ORDER — CHLORHEXIDINE GLUCONATE 213 G/1000ML
1 SOLUTION TOPICAL
Refills: 0 | Status: DISCONTINUED | OUTPATIENT
Start: 2024-04-14 | End: 2024-04-14

## 2024-04-14 RX ORDER — SODIUM CHLORIDE 9 MG/ML
10 INJECTION INTRAMUSCULAR; INTRAVENOUS; SUBCUTANEOUS
Refills: 0 | Status: DISCONTINUED | OUTPATIENT
Start: 2024-04-14 | End: 2024-04-15

## 2024-04-14 RX ORDER — CHLORPROMAZINE HCL 10 MG
25 TABLET ORAL ONCE
Refills: 0 | Status: COMPLETED | OUTPATIENT
Start: 2024-04-14 | End: 2024-04-14

## 2024-04-14 RX ADMIN — Medication 25 MILLIGRAM(S): at 06:22

## 2024-04-14 RX ADMIN — OXYCODONE HYDROCHLORIDE 5 MILLIGRAM(S): 5 TABLET ORAL at 06:00

## 2024-04-14 RX ADMIN — Medication 102 MILLIGRAM(S): at 06:21

## 2024-04-14 RX ADMIN — CHLORHEXIDINE GLUCONATE 1 APPLICATION(S): 213 SOLUTION TOPICAL at 05:30

## 2024-04-14 RX ADMIN — OXYCODONE HYDROCHLORIDE 5 MILLIGRAM(S): 5 TABLET ORAL at 05:30

## 2024-04-14 RX ADMIN — ENOXAPARIN SODIUM 40 MILLIGRAM(S): 100 INJECTION SUBCUTANEOUS at 17:02

## 2024-04-14 RX ADMIN — Medication 15 MILLILITER(S): at 11:21

## 2024-04-14 NOTE — PROGRESS NOTE ADULT - SUBJECTIVE AND OBJECTIVE BOX
NSICU DAY PROGRESS NOTE      24h events   NAEON    Physical Exam:  Neuro: Ox0, EOS, PERRL, not FC, B/l upper extremities localize (R>L), LLE WD RLE AG      VITALS:   Vital Signs Last 24 Hrs  T(C): 36.8 (14 Apr 2024 07:00), Max: 36.9 (14 Apr 2024 03:00)  T(F): 98.2 (14 Apr 2024 07:00), Max: 98.4 (14 Apr 2024 03:00)  HR: 98 (14 Apr 2024 09:00) (90 - 100)  BP: --  BP(mean): --  RR: 19 (14 Apr 2024 09:00) (15 - 46)  SpO2: 97% (14 Apr 2024 09:00) (95% - 99%)    Parameters below as of 14 Apr 2024 07:00  Patient On (Oxygen Delivery Method): room air      CAPILLARY BLOOD GLUCOSE        I&O's Summary    13 Apr 2024 07:01  -  14 Apr 2024 07:00  --------------------------------------------------------  IN: 1420 mL / OUT: 1215 mL / NET: 205 mL    14 Apr 2024 07:01  -  14 Apr 2024 10:19  --------------------------------------------------------  IN: 100 mL / OUT: 0 mL / NET: 100 mL        Respiratory:        LABS:                        9.2    7.94  )-----------( 349      ( 13 Apr 2024 12:42 )             28.5     04-13    140  |  104  |  22  ----------------------------<  111<H>  3.9   |  23  |  0.47<L>        MEDICATION LEVELS:     IVF FLUIDS/MEDICATIONS:   MEDICATIONS  (STANDING):  chlorhexidine 4% Liquid 1 Application(s) Topical <User Schedule>  enoxaparin Injectable 40 milliGRAM(s) SubCutaneous <User Schedule>  multivitamin/minerals/iron Oral Solution (CENTRUM) 15 milliLiter(s) Oral daily  theophylline Syrup 25 milliGRAM(s) Oral every 12 hours    MEDICATIONS  (PRN):  acetaminophen     Tablet .. 650 milliGRAM(s) Oral every 6 hours PRN Temp greater or equal to 38.5C (101.3F), Mild Pain (1 - 3)  oxyCODONE    IR 5 milliGRAM(s) Oral every 4 hours PRN Moderate Pain (4 - 6)  oxyCODONE    IR 10 milliGRAM(s) Oral every 4 hours PRN Severe Pain (7 - 10)       NSICU DAY PROGRESS NOTE      24h events   NAEON    Physical Exam:  Neuro: sleepy, oriented on self, place, EOS, PERRL, not FC, B/l upper extremities localize (R>L), LLE WD RLE AG    VITALS:   Vital Signs Last 24 Hrs  T(C): 36.8 (14 Apr 2024 07:00), Max: 36.9 (14 Apr 2024 03:00)  T(F): 98.2 (14 Apr 2024 07:00), Max: 98.4 (14 Apr 2024 03:00)  HR: 98 (14 Apr 2024 09:00) (90 - 100)  BP: --  BP(mean): --  RR: 19 (14 Apr 2024 09:00) (15 - 46)  SpO2: 97% (14 Apr 2024 09:00) (95% - 99%)    Parameters below as of 14 Apr 2024 07:00  Patient On (Oxygen Delivery Method): room air      CAPILLARY BLOOD GLUCOSE        I&O's Summary    13 Apr 2024 07:01  -  14 Apr 2024 07:00  --------------------------------------------------------  IN: 1420 mL / OUT: 1215 mL / NET: 205 mL    14 Apr 2024 07:01  -  14 Apr 2024 11:12  --------------------------------------------------------  IN: 100 mL / OUT: 0 mL / NET: 100 mL        Respiratory:        LABS:                        9.2    7.94  )-----------( 349      ( 13 Apr 2024 12:42 )             28.5     04-13    140  |  104  |  22  ----------------------------<  111<H>  3.9   |  23  |  0.47<L>        MEDICATION LEVELS:     IVF FLUIDS/MEDICATIONS:   MEDICATIONS  (STANDING):  chlorhexidine 4% Liquid 1 Application(s) Topical <User Schedule>  enoxaparin Injectable 40 milliGRAM(s) SubCutaneous <User Schedule>  multivitamin/minerals/iron Oral Solution (CENTRUM) 15 milliLiter(s) Oral daily  theophylline Syrup 25 milliGRAM(s) Oral every 12 hours    MEDICATIONS  (PRN):  acetaminophen     Tablet .. 650 milliGRAM(s) Oral every 6 hours PRN Temp greater or equal to 38.5C (101.3F), Mild Pain (1 - 3)  oxyCODONE    IR 10 milliGRAM(s) Oral every 4 hours PRN Severe Pain (7 - 10)  oxyCODONE    IR 5 milliGRAM(s) Oral every 4 hours PRN Moderate Pain (4 - 6)

## 2024-04-14 NOTE — PROVIDER CONTACT NOTE (OTHER) - ACTION/TREATMENT ORDERED:
Continue to monitor Q1hrly stroke checks, if pt gets worse on examination and if pt's ICP is above 20 notify ICU team.

## 2024-04-14 NOTE — PROGRESS NOTE ADULT - ATTENDING COMMENTS
59 yo woman with no PMH, admitted with unresponsiveness, found to have a HH4 mF4 SAH (bleed day 3/25) from R PComm aneurysm s/p clipping and R frontal EVD. Course c/b EVD tract hemorrhage and R BG stroke.  TTE with EF 37% with regional wall motion abnormalities which improved to 66%.   Hospital course c/b vasospasm c/b moderate L posterior MCA stroke and R cerebellar stroke with patient no longer following commands, now improving with patient again following commands and intermittently able to say her name.     Interval events:  Afebrile.   EVD clamped, ICPs low.   Is and Os net even.     On exam, alert, attends to both sides, followed some simple commands (showed 2 fingers), with R gaze deviation but able to cross midline to the L, with L facial, R arm AG to elbow, L arm AG to elbow, moves R leg spontaneously in plane of bed, L leg withdraws to noxious (exam fluctuates)    PBD 20. Monitoring for vasospasm and hydrocephalus.     c/w EVD clamp trial, CTH tomorrow   off sedation   Nimodipine held for low BPs  TCDs with poor windows   euvolemia   on RA  SBP goal , off Levo   on TF, LBM 4/13   hold Lov ppx for EVD pull tomorrow, with b/l DVTs     d/c L IJ central line, with PICC      Patient seen and examined by attending on 4/14/2023.    Patient is critically ill due to SAH and at high risk for neurological deterioration or death due to: undergoing an EVD clamp trial, at risk of hydrocephalus

## 2024-04-14 NOTE — PROGRESS NOTE ADULT - SUBJECTIVE AND OBJECTIVE BOX
Patient seen and examined at bedside.    --Anticoagulation--    T(C): 36.8 (04-13-24 @ 23:00), Max: 37 (04-13-24 @ 07:00)  HR: 97 (04-14-24 @ 00:00) (62 - 100)  BP: --  RR: 22 (04-14-24 @ 00:00) (13 - 44)  SpO2: 98% (04-14-24 @ 00:00) (95% - 100%)  Wt(kg): --    Exam: EOS, PERRL, Ox1 (name), intermittently FC, Rt side spont AG, LUE localize, LLE WD

## 2024-04-14 NOTE — PROVIDER CONTACT NOTE (OTHER) - SITUATION
Pt's is on EVD clamp trial, pt's ICP's are WDL below 2 thus far, pt is lethargic on exam and arouses with vigorous stimulation from baseline exam.

## 2024-04-14 NOTE — PROGRESS NOTE ADULT - SUBJECTIVE AND OBJECTIVE BOX
NSCU ATTENDING -- ADDITIONAL PROGRESS NOTE    Nighttime rounds were performed -- please refer to earlier Progress Note for HPI details.    T(C): 37.1 (04-14-24 @ 19:00), Max: 37.1 (04-14-24 @ 19:00)  HR: 101 (04-14-24 @ 19:00) (90 - 110)  BP: --  RR: 20 (04-14-24 @ 19:00) (15 - 46)  SpO2: 97% (04-14-24 @ 19:00) (95% - 99%)  Wt(kg): --    Relevant labwork and imaging reviewed.    CHIEF COMPLAINT:    [A/P] PBD 20 SAH HH4 MF4  EVD clamped since 4/13 @14:00    ATTENDING STATEMENT:    Patient is critically ill, requiring critical care services.     Attending: I have personally and independently provided 30 minutes of critical care services.  This excludes any time spent on separate procedures or teaching.   NSCU ATTENDING -- ADDITIONAL PROGRESS NOTE    Nighttime rounds were performed -- please refer to earlier Progress Note for HPI details.    T(C): 37.1 (04-14-24 @ 19:00), Max: 37.1 (04-14-24 @ 19:00)  HR: 101 (04-14-24 @ 19:00) (90 - 110)  BP: --  RR: 20 (04-14-24 @ 19:00) (15 - 46)  SpO2: 97% (04-14-24 @ 19:00) (95% - 99%)  Wt(kg): --    Relevant labwork and imaging reviewed.  CBC:            9.2    7.94  )-----------( 349      ( 04-13-24 @ 12:42 )             28.5         Chem:         ( 04-13-24 @ 21:55 )    140  |  104  |  22  ----------------------------<  111<H>  3.9   |  23  |  0.47<L>        Liver Functions:     Type & Screen:     MEDICATIONS  (STANDING):  chlorhexidine 4% Liquid 1 Application(s) Topical <User Schedule>  enoxaparin Injectable 40 milliGRAM(s) SubCutaneous <User Schedule>  multivitamin/minerals/iron Oral Solution (CENTRUM) 15 milliLiter(s) Oral daily    MEDICATIONS  (PRN):  acetaminophen     Tablet .. 650 milliGRAM(s) Oral every 6 hours PRN Temp greater or equal to 38.5C (101.3F), Mild Pain (1 - 3)  oxyCODONE    IR 5 milliGRAM(s) Oral every 4 hours PRN Moderate Pain (4 - 6)  oxyCODONE    IR 10 milliGRAM(s) Oral every 4 hours PRN Severe Pain (7 - 10)  sodium chloride 0.9% lock flush 10 milliLiter(s) IV Push every 1 hour PRN Pre/post blood products, medications, blood draw, and to maintain line patency    [A/P] PBD 20 SAH HH4 MF4  EVD clamped since 4/13 @14:00  d/c L IJ TLC, has midline and PICC  lovenox ppx   BLE DVTs soleal, PT 4/10, repeat 4/15    ATTENDING STATEMENT:    Patient is critically ill, requiring critical care services.     Attending: I have personally and independently provided 30 minutes of critical care services.  This excludes any time spent on separate procedures or teaching.

## 2024-04-14 NOTE — PROGRESS NOTE ADULT - SUBJECTIVE AND OBJECTIVE BOX
Subjective: Patient seen and examined. No new events except as noted.   remains in NSCU    neurologically worsening     REVIEW OF SYSTEMS:  Unable to obtain       MEDICATIONS:  MEDICATIONS  (STANDING):  chlorhexidine 4% Liquid 1 Application(s) Topical <User Schedule>  enoxaparin Injectable 40 milliGRAM(s) SubCutaneous <User Schedule>  multivitamin/minerals/iron Oral Solution (CENTRUM) 15 milliLiter(s) Oral daily  theophylline Syrup 25 milliGRAM(s) Oral every 12 hours      PHYSICAL EXAM:  T(C): 36.8 (04-14-24 @ 07:00), Max: 36.9 (04-14-24 @ 03:00)  HR: 96 (04-14-24 @ 08:00) (90 - 100)  BP: --  RR: 17 (04-14-24 @ 08:00) (15 - 46)  SpO2: 97% (04-14-24 @ 08:00) (95% - 99%)  Wt(kg): --  I&O's Summary    13 Apr 2024 07:01  -  14 Apr 2024 07:00  --------------------------------------------------------  IN: 1420 mL / OUT: 1215 mL / NET: 205 mL    14 Apr 2024 07:01  -  14 Apr 2024 08:13  --------------------------------------------------------  IN: 50 mL / OUT: 0 mL / NET: 50 mL            Appearance: NAD  HEENT:  dry oral mucosa, PERRL, EOMI	  +NGT  Lymphatic: No lymphadenopathy  Cardiovascular: Normal S1 S2, No JVD, No murmurs, No edema  Respiratory: decreased bs   Psychiatry: nonoverbal  Gastrointestinal:  Soft, Non-tender, + BS	  Skin: No rashes, No ecchymoses, No cyanosis	  Extremities: decreased  range of motion, No clubbing, cyanosis or edema  Vascular: Peripheral pulses palpable 2+ bilaterally  Ox1 ( name ), EOS, PERRL, FC on RUE ( shows to fingers ), B/l upper extremities localize (R>L), LLE WD RLE AG      LABS:    CARDIAC MARKERS:                                9.2    7.94  )-----------( 349      ( 13 Apr 2024 12:42 )             28.5     04-13    140  |  104  |  22  ----------------------------<  111<H>  3.9   |  23  |  0.47<L>    Ca    9.2      13 Apr 2024 21:55  Phos  4.3     04-13  Mg     2.2     04-13      proBNP:   Lipid Profile:   HgA1c:   TSH:             TELEMETRY: 	    ECG:  	  RADIOLOGY:   DIAGNOSTIC TESTING:  [ ] Echocardiogram:  [ ]  Catheterization:  [ ] Stress Test:    OTHER:

## 2024-04-14 NOTE — PROGRESS NOTE ADULT - ASSESSMENT
CTH in AM then pull EVD    Rpt LED 4/15 per Vasc card    S&S- p    TCDs- poor windows    Nimodipine dc'd due to bradycardia

## 2024-04-14 NOTE — PROGRESS NOTE ADULT - ASSESSMENT
ASSESSMENT/PLAN: HH4 WNFS 5 mF4 subarachnoid hemorrhage.    N   #Pcomm ruptured, s/p clip   #EVD tract acute hemorrhage/not tolerating clamp  #Hydrocephalus/ICP crisis: R EVD in place 20cm H2O ( 4ccs overnight )  ( plan to clamp EVD 04/13 )  #Delayed Cerebral Ischemia Management: angiogram with intraarterial treatment -  - 220, euvolemia, nimodipine  #Pain: PRN tylenol oxy   #agitations: precedex   MRI: Multifocal acute to subacute infarction. This extensively involves each MCA posterior distribution, the right basal ganglia, the anterior parasagittal vertex CINDY distribution and an additional smaller lesion in the right cerebellum  s/p angio for tx  EVD clamped 4/13 @ 2pm  Activity: [x] OOB as tolerated    CV   SBP goal 100-220  #4/9 TTE: EF 66%, no structural abnormalities/thrombus seen   #bradycardia theophyllin 25 BID    P   extubated 4/9  saO2 > 92%  2L NC    R   IVL  monitor I&O, maintain euvolemia     GI   Diet: Restart Jevity tube feeds  Senna   BM 4/12      E   Goal euglycemia (-180)  A1c 5.5     H   SCD   Chemoppx: lovenox 40 mg QD  4/10 LED: stable B/L Soleal DVT with extension to Rt posterior tibial DVT  5 day surveillance    ID   afebrile    # full code     #Disposition: ICU  ASSESSMENT/PLAN: HH4 WNFS 5 mF4 subarachnoid hemorrhage.    N   #Pcomm ruptured, s/p clip   #EVD tract acute hemorrhage  #Hydrocephalus/ICP crisis: R EVD clamp 04/13  #Delayed Cerebral Ischemia Management: euvolemia, nimodipine  #Pain: PRN tylenol oxy   #agitations: precedex   #Activity: [x] OOB as tolerated    CV   #SBP goal 100-220  #4/9 TTE: EF 66%, no structural abnormalities/thrombus seen   #bradycardia resolved, dc theophyllin 25 BID    P   extubated 4/9  saO2 > 92%  2L NC    R   IVL  monitor I&O, maintain euvolemia     GI   Diet: Restart Jevity tube feeds  Senna   BM 4/12      E   Goal euglycemia (-180)  A1c 5.5     H   SCD   Chemoppx: lovenox 40 mg QD  4/10 LED: stable B/L Soleal DVT with extension to Rt posterior tibial DVT  5 day surveillance    ID   afebrile    # full code     #Disposition: ICU

## 2024-04-14 NOTE — PROGRESS NOTE ADULT - ASSESSMENT
58F, on ASA81 for pain relief? 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 Was initially found unresponsive at 21:00, intubated at 23:00 at OSH, then txfrd. Given ddavp at OSH. On arrival patient w/ PERRL, +cough/gag, 2/5 spontaneous movement in LUE, o/w no movement. 1 unit PLTs given and R frontal EVD placed-high pressure. PLTs/Coags wnl.     Intubated sedated in NSCU   Echo with abnormal findings of severe cardiomyopathy and RWMA

## 2024-04-15 LAB
ANION GAP SERPL CALC-SCNC: 13 MMOL/L — SIGNIFICANT CHANGE UP (ref 5–17)
BUN SERPL-MCNC: 23 MG/DL — SIGNIFICANT CHANGE UP (ref 7–23)
CALCIUM SERPL-MCNC: 9.2 MG/DL — SIGNIFICANT CHANGE UP (ref 8.4–10.5)
CHLORIDE SERPL-SCNC: 105 MMOL/L — SIGNIFICANT CHANGE UP (ref 96–108)
CO2 SERPL-SCNC: 26 MMOL/L — SIGNIFICANT CHANGE UP (ref 22–31)
CREAT SERPL-MCNC: 0.46 MG/DL — LOW (ref 0.5–1.3)
EGFR: 112 ML/MIN/1.73M2 — SIGNIFICANT CHANGE UP
GLUCOSE SERPL-MCNC: 108 MG/DL — HIGH (ref 70–99)
INR BLD: 1.2 RATIO — HIGH (ref 0.85–1.18)
MAGNESIUM SERPL-MCNC: 2.2 MG/DL — SIGNIFICANT CHANGE UP (ref 1.6–2.6)
PHOSPHATE SERPL-MCNC: 4.8 MG/DL — HIGH (ref 2.5–4.5)
POTASSIUM SERPL-MCNC: 3.8 MMOL/L — SIGNIFICANT CHANGE UP (ref 3.5–5.3)
POTASSIUM SERPL-SCNC: 3.8 MMOL/L — SIGNIFICANT CHANGE UP (ref 3.5–5.3)
PROTHROM AB SERPL-ACNC: 12.5 SEC — SIGNIFICANT CHANGE UP (ref 9.5–13)
SODIUM SERPL-SCNC: 144 MMOL/L — SIGNIFICANT CHANGE UP (ref 135–145)

## 2024-04-15 PROCEDURE — 70450 CT HEAD/BRAIN W/O DYE: CPT | Mod: 26

## 2024-04-15 PROCEDURE — 99291 CRITICAL CARE FIRST HOUR: CPT

## 2024-04-15 PROCEDURE — 99232 SBSQ HOSP IP/OBS MODERATE 35: CPT

## 2024-04-15 RX ORDER — CHLORPROMAZINE HCL 10 MG
10 TABLET ORAL ONCE
Refills: 0 | Status: COMPLETED | OUTPATIENT
Start: 2024-04-15 | End: 2024-04-15

## 2024-04-15 RX ORDER — METOCLOPRAMIDE HCL 10 MG
5 TABLET ORAL ONCE
Refills: 0 | Status: COMPLETED | OUTPATIENT
Start: 2024-04-15 | End: 2024-04-15

## 2024-04-15 RX ORDER — DOXAZOSIN MESYLATE 4 MG
2 TABLET ORAL AT BEDTIME
Refills: 0 | Status: DISCONTINUED | OUTPATIENT
Start: 2024-04-15 | End: 2024-04-23

## 2024-04-15 RX ORDER — POTASSIUM CHLORIDE 20 MEQ
40 PACKET (EA) ORAL ONCE
Refills: 0 | Status: COMPLETED | OUTPATIENT
Start: 2024-04-15 | End: 2024-04-15

## 2024-04-15 RX ORDER — ALTEPLASE 100 MG
2 KIT INTRAVENOUS ONCE
Refills: 0 | Status: COMPLETED | OUTPATIENT
Start: 2024-04-15 | End: 2024-04-15

## 2024-04-15 RX ADMIN — Medication 2 MILLIGRAM(S): at 23:23

## 2024-04-15 RX ADMIN — Medication 15 MILLILITER(S): at 11:17

## 2024-04-15 RX ADMIN — Medication 10 MILLIGRAM(S): at 23:27

## 2024-04-15 RX ADMIN — Medication 650 MILLIGRAM(S): at 08:40

## 2024-04-15 RX ADMIN — CHLORHEXIDINE GLUCONATE 1 APPLICATION(S): 213 SOLUTION TOPICAL at 05:10

## 2024-04-15 RX ADMIN — Medication 650 MILLIGRAM(S): at 09:05

## 2024-04-15 RX ADMIN — Medication 40 MILLIEQUIVALENT(S): at 05:10

## 2024-04-15 RX ADMIN — Medication 5 MILLIGRAM(S): at 17:17

## 2024-04-15 RX ADMIN — ALTEPLASE 2 MILLIGRAM(S): KIT at 04:37

## 2024-04-15 NOTE — PROGRESS NOTE ADULT - ASSESSMENT
58F, on ASA81 for pain relief? 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 Was initially found unresponsive at 21:00, intubated at 23:00 at OSH, then txfrd. Given ddavp at OSH. On arrival patient w/ PERRL, +cough/gag, 2/5 spontaneous movement in LUE, o/w no movement. 1 unit PLTs given and R frontal EVD placed-high pressure. PLTs/Coags wnl.     Intubated sedated in NSCU   Echo with abnormal findings of severe cardiomyopathy and RWMA

## 2024-04-15 NOTE — PROGRESS NOTE ADULT - SUBJECTIVE AND OBJECTIVE BOX
Patient seen and examined at bedside.    --Anticoagulation--    T(C): 36.8 (04-13-24 @ 23:00), Max: 37 (04-13-24 @ 07:00)  HR: 97 (04-14-24 @ 00:00) (62 - 100)  BP: --  RR: 22 (04-14-24 @ 00:00) (13 - 44)  SpO2: 98% (04-14-24 @ 00:00) (95% - 100%)  Wt(kg): --    Exam: EOS, Ox0, PERRL, Rt side spont AG, LUE localize, LLE WD

## 2024-04-15 NOTE — CHART NOTE - NSCHARTNOTEFT_GEN_A_CORE
JOSE [id number]      Drain type: []SD []SG [] MAK [] HMV [] Lumbar drain [x] EVD [] ICP [place] [] Abd drain     Patient's position while drain removed: flat    [x] Patient tolerated well [] No complications [] complications:     Exit Site secured with: [] _2_ staples [] __ suture (please specify how many of each)     Additional Info:

## 2024-04-15 NOTE — PROGRESS NOTE ADULT - ATTENDING COMMENTS
EVD remains clamped, ICPs within normal limited.  plan to finish clamp trial today @~2pm and possibly d/c at that time.  if so will get post-pull CT brain in AM.

## 2024-04-15 NOTE — PROGRESS NOTE ADULT - SUBJECTIVE AND OBJECTIVE BOX
NSICU DAY PROGRESS NOTE      24h events   NAEON    Physical Exam:  Neuro: sleepy, oriented on self, place, EOS, PERRL, not FC, B/l upper extremities localize (R>L), LLE WD RLE AG    VITALS:   Vital Signs Last 24 Hrs  T(C): 36.8 (14 Apr 2024 07:00), Max: 36.9 (14 Apr 2024 03:00)  T(F): 98.2 (14 Apr 2024 07:00), Max: 98.4 (14 Apr 2024 03:00)  HR: 98 (14 Apr 2024 09:00) (90 - 100)  BP: --  BP(mean): --  RR: 19 (14 Apr 2024 09:00) (15 - 46)  SpO2: 97% (14 Apr 2024 09:00) (95% - 99%)    Parameters below as of 14 Apr 2024 07:00  Patient On (Oxygen Delivery Method): room air      CAPILLARY BLOOD GLUCOSE        I&O's Summary    13 Apr 2024 07:01  -  14 Apr 2024 07:00  --------------------------------------------------------  IN: 1420 mL / OUT: 1215 mL / NET: 205 mL    14 Apr 2024 07:01  -  14 Apr 2024 11:12  --------------------------------------------------------  IN: 100 mL / OUT: 0 mL / NET: 100 mL        Respiratory:        LABS:                        9.2    7.94  )-----------( 349      ( 13 Apr 2024 12:42 )             28.5     04-13    140  |  104  |  22  ----------------------------<  111<H>  3.9   |  23  |  0.47<L>        MEDICATION LEVELS:     IVF FLUIDS/MEDICATIONS:   MEDICATIONS  (STANDING):  chlorhexidine 4% Liquid 1 Application(s) Topical <User Schedule>  enoxaparin Injectable 40 milliGRAM(s) SubCutaneous <User Schedule>  multivitamin/minerals/iron Oral Solution (CENTRUM) 15 milliLiter(s) Oral daily  theophylline Syrup 25 milliGRAM(s) Oral every 12 hours    MEDICATIONS  (PRN):  acetaminophen     Tablet .. 650 milliGRAM(s) Oral every 6 hours PRN Temp greater or equal to 38.5C (101.3F), Mild Pain (1 - 3)  oxyCODONE    IR 10 milliGRAM(s) Oral every 4 hours PRN Severe Pain (7 - 10)  oxyCODONE    IR 5 milliGRAM(s) Oral every 4 hours PRN Moderate Pain (4 - 6)       NSICU DAY PROGRESS NOTE      24h events   NAEON    Physical Exam:  Neuro: alert, oriented on self, place, EOS, PERRL, intermittently FC, B/l upper extremities localize (R>L), LLE WD RLE AG    VITALS:   Vital Signs Last 24 Hrs  T(C): 36.8 (14 Apr 2024 07:00), Max: 36.9 (14 Apr 2024 03:00)  T(F): 98.2 (14 Apr 2024 07:00), Max: 98.4 (14 Apr 2024 03:00)  HR: 98 (14 Apr 2024 09:00) (90 - 100)  BP: --  BP(mean): --  RR: 19 (14 Apr 2024 09:00) (15 - 46)  SpO2: 97% (14 Apr 2024 09:00) (95% - 99%)    Parameters below as of 14 Apr 2024 07:00  Patient On (Oxygen Delivery Method): room air      CAPILLARY BLOOD GLUCOSE        I&O's Summary    13 Apr 2024 07:01  -  14 Apr 2024 07:00  --------------------------------------------------------  IN: 1420 mL / OUT: 1215 mL / NET: 205 mL    14 Apr 2024 07:01  -  14 Apr 2024 11:12  --------------------------------------------------------  IN: 100 mL / OUT: 0 mL / NET: 100 mL        Respiratory:        LABS:                        9.2    7.94  )-----------( 349      ( 13 Apr 2024 12:42 )             28.5     04-13    140  |  104  |  22  ----------------------------<  111<H>  3.9   |  23  |  0.47<L>        MEDICATION LEVELS:     IVF FLUIDS/MEDICATIONS:   MEDICATIONS  (STANDING):  chlorhexidine 4% Liquid 1 Application(s) Topical <User Schedule>  enoxaparin Injectable 40 milliGRAM(s) SubCutaneous <User Schedule>  multivitamin/minerals/iron Oral Solution (CENTRUM) 15 milliLiter(s) Oral daily  theophylline Syrup 25 milliGRAM(s) Oral every 12 hours    MEDICATIONS  (PRN):  acetaminophen     Tablet .. 650 milliGRAM(s) Oral every 6 hours PRN Temp greater or equal to 38.5C (101.3F), Mild Pain (1 - 3)  oxyCODONE    IR 10 milliGRAM(s) Oral every 4 hours PRN Severe Pain (7 - 10)  oxyCODONE    IR 5 milliGRAM(s) Oral every 4 hours PRN Moderate Pain (4 - 6)

## 2024-04-15 NOTE — PROGRESS NOTE ADULT - ATTENDING COMMENTS
No acute events reported overnight.  Agree with 14 point review of systems and physical examination as noted above.    Patient with L/R Soleal Below the Knee DVT (L seen on 3/28, L/R soleal seen on 4/3, again seen on 4/10).  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    Continue lovenox 40mg SQ daily.  Recommend repeat surveillance LE venous duplex.    Agree with assessment/plan as noted above.

## 2024-04-15 NOTE — PROGRESS NOTE ADULT - SUBJECTIVE AND OBJECTIVE BOX
Subjective: Patient seen and examined. No new events except as noted.   remains in NSCU   Left IJ central line was removed    REVIEW OF SYSTEMS:  Unable to obtain          MEDICATIONS:  MEDICATIONS  (STANDING):  chlorhexidine 4% Liquid 1 Application(s) Topical <User Schedule>  multivitamin/minerals/iron Oral Solution (CENTRUM) 15 milliLiter(s) Oral daily      PHYSICAL EXAM:  T(C): 37.1 (04-15-24 @ 07:00), Max: 37.6 (04-14-24 @ 23:00)  HR: 100 (04-15-24 @ 07:00) (92 - 110)  BP: --  RR: 18 (04-15-24 @ 07:00) (15 - 20)  SpO2: 98% (04-15-24 @ 07:00) (95% - 100%)  Wt(kg): --  I&O's Summary    14 Apr 2024 07:01  -  15 Apr 2024 07:00  --------------------------------------------------------  IN: 1380 mL / OUT: 1180 mL / NET: 200 mL    15 Apr 2024 07:01  -  15 Apr 2024 09:42  --------------------------------------------------------  IN: 100 mL / OUT: 0 mL / NET: 100 mL            Appearance: NAD  HEENT:  dry oral mucosa, PERRL, EOMI	  +NGT  Lymphatic: No lymphadenopathy  Cardiovascular: Normal S1 S2, No JVD, No murmurs, No edema  Respiratory: decreased bs   Psychiatry: nonoverbal  Gastrointestinal:  Soft, Non-tender, + BS	  Skin: No rashes, No ecchymoses, No cyanosis	  Extremities: decreased  range of motion, No clubbing, cyanosis or edema  Vascular: Peripheral pulses palpable 2+ bilaterally  Ox1 ( name ), EOS, PERRL, FC on RUE ( shows to fingers ), B/l upper extremities localize (R>L), LLE WD RLE AG      LABS:    CARDIAC MARKERS:                                9.2    7.94  )-----------( 349      ( 13 Apr 2024 12:42 )             28.5     04-15    144  |  105  |  23  ----------------------------<  108<H>  3.8   |  26  |  0.46<L>    Ca    9.2      15 Apr 2024 03:50  Phos  4.8     04-15  Mg     2.2     04-15        TELEMETRY: 	  SR  ECG:  	  RADIOLOGY: < from: CT Head No Cont (04.15.24 @ 08:28) >    ACC: [id number] EXAM:  CT BRAIN   ORDERED BY: GRACIELA IHRSCH     PROCEDURE DATE:  04/15/2024          INTERPRETATION:  Exam Date: 4/15/2024 8:28 AM    CT head without IV contrast    CLINICAL INFORMATION:  interval Admitting Dxs: I60.9 NONTRAUMATIC   SUBARACHNOID HEMORRHAGE, UNSPECIFIED    TECHNIQUE: Contiguous axial sections were obtained through the head. This   study was performed in the portable scanner.    COMPARISON: CT head 4/14/2024    FINDINGS:    Evaluation limited by portable scanner. Right frontotemporal craniotomy   and right parasellar aneurysm clip is reidentified. Right frontal   approach ventriculostomy catheter with tip at the septum pellucidum is   unchanged. Ventricular size is unchanged. No new acute hemorrhage is   identified. Evaluation for infarct is limited on this exam, however   infarcts in the right basal ganglia is suggested.    IMPRESSION:    No interval change since prior exam.    --- End of Report ---            ULISES HOLLINGSWORTH MD; Attending Radiologist  This document has been electronically signed. Apr 15 2024  8:36AM    < end of copied text >    DIAGNOSTIC TESTING:  [ ] Echocardiogram:  [ ]  Catheterization:  [ ] Stress Test:    OTHER:

## 2024-04-15 NOTE — PROGRESS NOTE ADULT - SUBJECTIVE AND OBJECTIVE BOX
Patient seen and examined at bedside.    Overnight Events: No acute events overnight. Patient remains awake but nonverbal ad not consistently following commands.       REVIEW OF SYSTEMS:  Constitutional:     [ x] negative [ ] fevers [ ] chills [ ] weight loss [ ] weight gain  HEENT:                  [ x] negative [ ] dry eyes [ ] eye irritation [ ] postnasal drip [ ] nasal congestion  CV:                         [x ] negative  [ ] chest pain [ ] orthopnea [ ] palpitations [ ] murmur  Resp:                     [ x] negative [ ] cough [ ] shortness of breath [ ] dyspnea [ ] wheezing [ ] sputum [ ]hemoptysis  GI:                          [ x] negative [ ] nausea [ ] vomiting [ ] diarrhea [ ] constipation [ ] abd pain [ ] dysphagia   :                        [ x] negative [ ] dysuria [ ] nocturia [ ] hematuria [ ] increased urinary frequency  Musculoskeletal: [ x] negative [ ] back pain [ ] myalgias [ ] arthralgias [ ] fracture  Skin:                       [ x] negative [ ] rash [ ] itch  Neurological:        [ x] negative [ ] headache [ ] dizziness [ ] syncope [ ] weakness [ ] numbness  Psychiatric:           [ x] negative [ ] anxiety [ ] depression  Endocrine:            [ x] negative [ ] diabetes [ ] thyroid problem  Heme/Lymph:      [ x] negative [ ] anemia [ ] bleeding problem  Allergic/Immune: [x ] negative [ ] itchy eyes [ ] nasal discharge [ ] hives [ ] angioedema    [x ] All other systems negative  [ ] Unable to assess ROS due to    Current Meds:  acetaminophen     Tablet .. 650 milliGRAM(s) Oral every 6 hours PRN  chlorhexidine 4% Liquid 1 Application(s) Topical <User Schedule>  multivitamin/minerals/iron Oral Solution (CENTRUM) 15 milliLiter(s) Oral daily  oxyCODONE    IR 5 milliGRAM(s) Oral every 4 hours PRN  oxyCODONE    IR 10 milliGRAM(s) Oral every 4 hours PRN  sodium chloride 0.9% lock flush 10 milliLiter(s) IV Push every 1 hour PRN      PAST MEDICAL & SURGICAL HISTORY:      Vitals:  T(F): 98.8 (04-15), Max: 99.6 (04-14)  HR: 100 (04-15) (92 - 110)  BP: --  RR: 18 (04-15)  SpO2: 98% (04-15)  I&O's Summary    14 Apr 2024 07:01  -  15 Apr 2024 07:00  --------------------------------------------------------  IN: 1380 mL / OUT: 1180 mL / NET: 200 mL    15 Apr 2024 07:01  -  15 Apr 2024 10:41  --------------------------------------------------------  IN: 100 mL / OUT: 0 mL / NET: 100 mL          Physical Exam:  Appearance: No acute distress  Eyes: pink conjunctiva  HENT: Normal oral mucosa  Cardiovascular: RRR, S1, S2, no murmurs, rubs, or gallops; no edema; no JVD  Respiratory: Clear to auscultation bilaterally  Gastrointestinal: soft, non-tender, non-distended with normal bowel sounds  Musculoskeletal: No clubbing; no joint deformity   Neurologic:  AAOx0, nonverbal.   Lymphatic: No lymphadenopathy  Psychiatry: awake, but AAOx0, nonverbal.   Skin: No rashes, ecchymoses, or cyanosis                          9.2    7.94  )-----------( 349      ( 13 Apr 2024 12:42 )             28.5     04-15    144  |  105  |  23  ----------------------------<  108<H>  3.8   |  26  |  0.46<L>    Ca    9.2      15 Apr 2024 03:50  Phos  4.8     04-15  Mg     2.2     04-15      PT/INR - ( 15 Apr 2024 03:50 )   PT: 12.5 sec;   INR: 1.20 ratio

## 2024-04-15 NOTE — CHART NOTE - NSCHARTNOTEFT_GEN_A_CORE
Left IJ central line was removed without complication. Patient was laid flat and site cleansed with chlorhexidine. Catheter removed without resistance or difficulty and catheter tip intact. Pressure held for 10 minutes, hemostasis achieved. Dressing placed at exit site. Patient tolerated procedure well.

## 2024-04-15 NOTE — PROGRESS NOTE ADULT - ASSESSMENT
ASSESSMENT/PLAN: HH4 WNFS 5 mF4 subarachnoid hemorrhage.    N   #Pcomm ruptured, s/p clip   #EVD tract acute hemorrhage  #Hydrocephalus/ICP crisis: R EVD clamp 04/13  #Delayed Cerebral Ischemia Management: euvolemia, nimodipine  #Pain: PRN tylenol oxy   #agitations: precedex   #Activity: [x] OOB as tolerated    CV   #SBP goal 100-220  #4/9 TTE: EF 66%, no structural abnormalities/thrombus seen   #bradycardia resolved, dc theophyllin 25 BID    P   extubated 4/9  saO2 > 92%  2L NC    R   IVL  monitor I&O, maintain euvolemia     GI   Diet: Restart Jevity tube feeds  Senna   BM 4/12      E   Goal euglycemia (-180)  A1c 5.5     H   SCD   Chemoppx: lovenox 40 mg QD  4/10 LED: stable B/L Soleal DVT with extension to Rt posterior tibial DVT  5 day surveillance    ID   afebrile    # full code     #Disposition: ICU  ASSESSMENT/PLAN: HH4 WNFS 5 mF4 subarachnoid hemorrhage.    N   #Pcomm ruptured, s/p clip   #EVD tract acute hemorrhage  #Hydrocephalus/ICP crisis: R EVD clamp 04/13 ( 2 pm plan to remove EVD )  #Delayed Cerebral Ischemia Management: euvolemia, nimodipine  #Pain: PRN tylenol oxy   #agitations: precedex   #Activity: [x] OOB as tolerated    CV   #SBP goal 100-220  #4/9 TTE: EF 66%, no structural abnormalities/thrombus seen   #bradycardia resolved, dc theophyllin 25 BID    P   extubated 4/9  saO2 > 92%  2L NC    R   IVL  monitor I&O, maintain euvolemia     GI   Diet:  Jevity tube feeds  Senna   BM 4/14      E   Goal euglycemia (-180)  A1c 5.5     H   SCD   Chemoppx: lovenox 40 mg QD ( held for EVD rmeoval )  4/10 LED: stable B/L Soleal DVT with extension to Rt posterior tibial DVT      ID   afebrile    # full code     #Disposition: ICU

## 2024-04-15 NOTE — PROGRESS NOTE ADULT - ASSESSMENT
57 YO Woman on aspirin at home presented to OSH after being found unresponsive, found to have SAH HH4 WNFS 5 mF4 ruptured R pcomm aneurysm s/p R frontal EVD placed-high pressure, and then. R pcom clip on 3/26/24, with course c/b bilateral soleal DVTs.     Assessment:  1. L/R Soleal Below the Knee DVT (L seen on 3/28, L/R soleal seen on 4/3, again seen on 4/10)  2. 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 Reduced EF, Improved on Last TTE. Normal RV size and function     Plan:  1. Recommend to continue Lovenox 40 mg daily for below the knee DVT in setting of recent SAH and EVD / aneurysm clipping / cerebral angiogram.  2. Repeat surveillance LE venous duplex today  3. Appreciate General Cardiology.  4. Appreciate excellent Neurosurgical Care.     Mitchell Montana, Cardiology Fellow, F-2    For all New Consults and Questions:  [email or web address]   Login: [email or web address]    *** Note not final until signed by attending

## 2024-04-15 NOTE — PROGRESS NOTE ADULT - SUBJECTIVE AND OBJECTIVE BOX
NSCU ATTENDING -- ADDITIONAL PROGRESS NOTE    Nighttime rounds were performed -- please refer to earlier Progress Note for HPI details.    ICU Vital Signs Last 24 Hrs  T(C): 36.9 (15 Apr 2024 19:00), Max: 37.6 (14 Apr 2024 23:00)  T(F): 98.5 (15 Apr 2024 19:00), Max: 99.6 (14 Apr 2024 23:00)  HR: 93 (15 Apr 2024 19:00) (83 - 102)  BP: --  BP(mean): --  ABP: 151/91 (15 Apr 2024 19:00) (142/88 - 196/92)  ABP(mean): 114 (15 Apr 2024 19:00) (106 - 183)  RR: 20 (15 Apr 2024 19:00) (15 - 30)  SpO2: 100% (15 Apr 2024 19:00) (95% - 100%)    O2 Parameters below as of 15 Apr 2024 19:00  Patient On (Oxygen Delivery Method): room air      Relevant labwork and imaging reviewed.    CBC:     Chem:         ( 04-15-24 @ 03:50 )    144  |  105  |  23  ----------------------------<  108<H>  3.8   |  26  |  0.46<L>        Liver Functions:     Type & Screen:       MEDICATIONS  (STANDING):  chlorhexidine 4% Liquid 1 Application(s) Topical <User Schedule>  multivitamin/minerals/iron Oral Solution (CENTRUM) 15 milliLiter(s) Oral daily    MEDICATIONS  (PRN):  acetaminophen     Tablet .. 650 milliGRAM(s) Oral every 6 hours PRN Temp greater or equal to 38.5C (101.3F), Mild Pain (1 - 3)  oxyCODONE    IR 10 milliGRAM(s) Oral every 4 hours PRN Severe Pain (7 - 10)  oxyCODONE    IR 5 milliGRAM(s) Oral every 4 hours PRN Moderate Pain (4 - 6)  sodium chloride 0.9% lock flush 10 milliLiter(s) IV Push every 1 hour PRN Pre/post blood products, medications, blood draw, and to maintain line patency      [A/P] PBD 21 SAH HH4 MF4  EVD d/c'ed post pull CTH pending   lovenox ppx   BLE DVTs harriet, PT 4/10, repeat 4/15    ATTENDING STATEMENT:    Patient is critically ill, requiring critical care services.     Attending: I have personally and independently provided 30 minutes of critical care services.  This excludes any time spent on separate procedures or teaching.   NSCU ATTENDING -- ADDITIONAL PROGRESS NOTE    Nighttime rounds were performed -- please refer to earlier Progress Note for HPI details.    ICU Vital Signs Last 24 Hrs  T(C): 36.9 (15 Apr 2024 19:00), Max: 37.6 (14 Apr 2024 23:00)  T(F): 98.5 (15 Apr 2024 19:00), Max: 99.6 (14 Apr 2024 23:00)  HR: 93 (15 Apr 2024 19:00) (83 - 102)  BP: --  BP(mean): --  ABP: 151/91 (15 Apr 2024 19:00) (142/88 - 196/92)  ABP(mean): 114 (15 Apr 2024 19:00) (106 - 183)  RR: 20 (15 Apr 2024 19:00) (15 - 30)  SpO2: 100% (15 Apr 2024 19:00) (95% - 100%)    O2 Parameters below as of 15 Apr 2024 19:00  Patient On (Oxygen Delivery Method): room air      Relevant labwork and imaging reviewed.    CBC:     Chem:         ( 04-15-24 @ 03:50 )    144  |  105  |  23  ----------------------------<  108<H>  3.8   |  26  |  0.46<L>        Liver Functions:     Type & Screen:       MEDICATIONS  (STANDING):  chlorhexidine 4% Liquid 1 Application(s) Topical <User Schedule>  multivitamin/minerals/iron Oral Solution (CENTRUM) 15 milliLiter(s) Oral daily    MEDICATIONS  (PRN):  acetaminophen     Tablet .. 650 milliGRAM(s) Oral every 6 hours PRN Temp greater or equal to 38.5C (101.3F), Mild Pain (1 - 3)  oxyCODONE    IR 10 milliGRAM(s) Oral every 4 hours PRN Severe Pain (7 - 10)  oxyCODONE    IR 5 milliGRAM(s) Oral every 4 hours PRN Moderate Pain (4 - 6)  sodium chloride 0.9% lock flush 10 milliLiter(s) IV Push every 1 hour PRN Pre/post blood products, medications, blood draw, and to maintain line patency      [A/P] PBD 21 SAH HH4 MF4  EVD d/c'ed post pull CTH pending   -180  excessive hiccups-- reglan didn't work, will try thorazine tonight   cardura 2mg for urinary retention, PT, OOB as tolerated   LBM 4/15  lovenox ppx   BLE DVTs soleal, PT 4/10, repeat 4/15  Q4 neuro/vitals   speech/swallow

## 2024-04-15 NOTE — PROGRESS NOTE ADULT - ASSESSMENT
EVD Clamped  Rpt LED 4/15 per Vasc card  S&S- p  TCDs- poor windows  Nimodipine dc'd due to bradycardia

## 2024-04-15 NOTE — PROVIDER CONTACT NOTE (OTHER) - BACKGROUND
SAH
SAH
Admitted for SAH.
Pt given mannitol for change in pupil size 2 hrs prior. Pt went to CT.
SAH
admitted for SAH.
Admitted for SAH ., s/p clipping of R. Pcomm aneurysm
Subarachnoid hemorrhage
SAH

## 2024-04-16 PROCEDURE — 70450 CT HEAD/BRAIN W/O DYE: CPT | Mod: 26

## 2024-04-16 PROCEDURE — 99233 SBSQ HOSP IP/OBS HIGH 50: CPT

## 2024-04-16 PROCEDURE — 99222 1ST HOSP IP/OBS MODERATE 55: CPT

## 2024-04-16 RX ORDER — ENOXAPARIN SODIUM 100 MG/ML
40 INJECTION SUBCUTANEOUS
Refills: 0 | Status: DISCONTINUED | OUTPATIENT
Start: 2024-04-16 | End: 2024-04-19

## 2024-04-16 RX ADMIN — ENOXAPARIN SODIUM 40 MILLIGRAM(S): 100 INJECTION SUBCUTANEOUS at 17:17

## 2024-04-16 RX ADMIN — Medication 2 MILLIGRAM(S): at 21:11

## 2024-04-16 RX ADMIN — Medication 15 MILLILITER(S): at 11:25

## 2024-04-16 RX ADMIN — CHLORHEXIDINE GLUCONATE 1 APPLICATION(S): 213 SOLUTION TOPICAL at 05:12

## 2024-04-16 NOTE — PROGRESS NOTE ADULT - ASSESSMENT
ASSESSMENT/PLAN: HH4 WNFS 5 mF4 subarachnoid hemorrhage.    N   #Pcomm ruptured, s/p clip   #EVD tract acute hemorrhage  #Hydrocephalus/ICP crisis: R EVD clamp 04/13 ( 2 pm plan to remove EVD )  #Delayed Cerebral Ischemia Management: euvolemia, nimodipine  #Pain: PRN tylenol oxy   #agitations: precedex   #Activity: [x] OOB as tolerated    CV   #SBP goal 100-220  #4/9 TTE: EF 66%, no structural abnormalities/thrombus seen   #bradycardia resolved, dc theophyllin 25 BID    P   extubated 4/9  saO2 > 92%  2L NC    R   IVL  monitor I&O, maintain euvolemia     GI   Diet:  Jevity tube feeds  Senna   BM 4/14      E   Goal euglycemia (-180)  A1c 5.5     H   SCD   Chemoppx: lovenox 40 mg QD ( held for EVD rmeoval )  4/10 LED: stable B/L Soleal DVT with extension to Rt posterior tibial DVT      ID   afebrile    # full code     #Disposition: ICU  ASSESSMENT/PLAN: HH4 WNFS 5 mF4 subarachnoid hemorrhage.    N   #Pcomm ruptured, s/p clip   #EVD tract acute hemorrhage ( removed )  #Hydrocephalus/ICP crisis: resolved  #Delayed Cerebral Ischemia Management: euvolemia, nimodipine  #Pain: PRN tylenol oxy   #agitations: precedex   #Activity: [x] OOB as tolerated    CV   #SBP goal 100-220  #4/9 TTE: EF 66%, no structural abnormalities/thrombus seen   #bradycardia resolved, dc theophyllin 25 BID    P   extubated 4/9  saO2 > 92%  RA    R   IVL  monitor I&O, maintain euvolemia     GI   Diet:  Jevity tube feeds  Senna   BM 4/15      E   Goal euglycemia (-180)  A1c 5.5     H   SCD   Chemoppx: lovenox 40 mg QD   4/10 LED: stable B/L Soleal DVT with extension to Rt posterior tibial DVT      ID   afebrile    # full code     #Disposition: ICU

## 2024-04-16 NOTE — PROGRESS NOTE ADULT - SUBJECTIVE AND OBJECTIVE BOX
NSICU DAY PROGRESS NOTE      24h events   NAEON    Physical Exam:  Neuro: alert, oriented on self, place, EOS, PERRL, intermittently FC, B/l upper extremities localize (R>L), LLE WD RLE AG    ICU Vital Signs Last 24 Hrs  T(C): 37.1 (16 Apr 2024 03:00), Max: 37.2 (15 Apr 2024 23:00)  T(F): 98.8 (16 Apr 2024 03:00), Max: 98.9 (15 Apr 2024 23:00)  HR: 107 (16 Apr 2024 05:00) (83 - 110)  BP: --  BP(mean): --  ABP: 130/59 (16 Apr 2024 05:00) (120/59 - 313/312)  ABP(mean): 89 (16 Apr 2024 05:00) (82 - 313)  RR: 15 (16 Apr 2024 05:00) (15 - 34)  SpO2: 100% (16 Apr 2024 05:00) (98% - 100%)    O2 Parameters below as of 15 Apr 2024 19:00  Patient On (Oxygen Delivery Method): room air    04-15    144  |  105  |  23  ----------------------------<  108<H>  3.8   |  26  |  0.46<L>    Ca    9.2      15 Apr 2024 03:50  Phos  4.8     04-15  Mg     2.2     04-15          MEDICATION LEVELS:     IVF FLUIDS/MEDICATIONS:   MEDICATIONS  (STANDING):  chlorhexidine 4% Liquid 1 Application(s) Topical <User Schedule>  enoxaparin Injectable 40 milliGRAM(s) SubCutaneous <User Schedule>  multivitamin/minerals/iron Oral Solution (CENTRUM) 15 milliLiter(s) Oral daily  theophylline Syrup 25 milliGRAM(s) Oral every 12 hours    MEDICATIONS  (PRN):  acetaminophen     Tablet .. 650 milliGRAM(s) Oral every 6 hours PRN Temp greater or equal to 38.5C (101.3F), Mild Pain (1 - 3)  oxyCODONE    IR 10 milliGRAM(s) Oral every 4 hours PRN Severe Pain (7 - 10)  oxyCODONE    IR 5 milliGRAM(s) Oral every 4 hours PRN Moderate Pain (4 - 6)       NSICU DAY PROGRESS NOTE      24h events   NAEON  04/15: EVD removed  04/16: CTH: tract hemorrhage, Bedboarded    Physical Exam:  Neuro: alert, oriented on self, place, EOS, PERRL, intermittently FC, B/l upper extremities localize (R>L), LLE WD RLE AG    ICU Vital Signs Last 24 Hrs  T(C): 37.1 (16 Apr 2024 03:00), Max: 37.2 (15 Apr 2024 23:00)  T(F): 98.8 (16 Apr 2024 03:00), Max: 98.9 (15 Apr 2024 23:00)  HR: 107 (16 Apr 2024 05:00) (83 - 110)  BP: --  BP(mean): --  ABP: 130/59 (16 Apr 2024 05:00) (120/59 - 313/312)  ABP(mean): 89 (16 Apr 2024 05:00) (82 - 313)  RR: 15 (16 Apr 2024 05:00) (15 - 34)  SpO2: 100% (16 Apr 2024 05:00) (98% - 100%)    O2 Parameters below as of 15 Apr 2024 19:00  Patient On (Oxygen Delivery Method): room air    04-15    144  |  105  |  23  ----------------------------<  108<H>  3.8   |  26  |  0.46<L>    Ca    9.2      15 Apr 2024 03:50  Phos  4.8     04-15  Mg     2.2     04-15          MEDICATION LEVELS:     IVF FLUIDS/MEDICATIONS:   MEDICATIONS  (STANDING):  chlorhexidine 4% Liquid 1 Application(s) Topical <User Schedule>  enoxaparin Injectable 40 milliGRAM(s) SubCutaneous <User Schedule>  multivitamin/minerals/iron Oral Solution (CENTRUM) 15 milliLiter(s) Oral daily  theophylline Syrup 25 milliGRAM(s) Oral every 12 hours    MEDICATIONS  (PRN):  acetaminophen     Tablet .. 650 milliGRAM(s) Oral every 6 hours PRN Temp greater or equal to 38.5C (101.3F), Mild Pain (1 - 3)  oxyCODONE    IR 10 milliGRAM(s) Oral every 4 hours PRN Severe Pain (7 - 10)  oxyCODONE    IR 5 milliGRAM(s) Oral every 4 hours PRN Moderate Pain (4 - 6)

## 2024-04-16 NOTE — PROGRESS NOTE ADULT - SUBJECTIVE AND OBJECTIVE BOX
NSCU ATTENDING -- ADDITIONAL PROGRESS NOTE    Nighttime rounds were performed -- please refer to earlier Progress Note for HPI details.    ICU Vital Signs Last 24 Hrs  T(C): 36.8 (16 Apr 2024 15:00), Max: 37.2 (15 Apr 2024 23:00)  T(F): 98.2 (16 Apr 2024 15:00), Max: 98.9 (15 Apr 2024 23:00)  HR: 102 (16 Apr 2024 15:00) (92 - 110)  BP: --  BP(mean): --  ABP: 146/73 (16 Apr 2024 15:00) (120/59 - 313/312)  ABP(mean): 104 (16 Apr 2024 15:00) (82 - 313)  RR: 20 (16 Apr 2024 15:00) (15 - 34)  SpO2: 100% (16 Apr 2024 15:00) (99% - 100%)        Relevant labwork and imaging reviewed.    CBC:     Chem:         ( 04-15-24 @ 03:50 )    144  |  105  |  23  ----------------------------<  108<H>  3.8   |  26  |  0.46<L>        Liver Functions:     Type & Screen:       MEDICATIONS  (STANDING):  chlorhexidine 4% Liquid 1 Application(s) Topical <User Schedule>  multivitamin/minerals/iron Oral Solution (CENTRUM) 15 milliLiter(s) Oral daily    MEDICATIONS  (PRN):  acetaminophen     Tablet .. 650 milliGRAM(s) Oral every 6 hours PRN Temp greater or equal to 38.5C (101.3F), Mild Pain (1 - 3)  oxyCODONE    IR 10 milliGRAM(s) Oral every 4 hours PRN Severe Pain (7 - 10)  oxyCODONE    IR 5 milliGRAM(s) Oral every 4 hours PRN Moderate Pain (4 - 6)  sodium chloride 0.9% lock flush 10 milliLiter(s) IV Push every 1 hour PRN Pre/post blood products, medications, blood draw, and to maintain line patency      [A/P] PBD 22 SAH HH4 MF4  EVD d/c'ed post pull CTH pending   -180  excessive hiccups-- reglan didn't work, will try thorazine tonight   cardura 2mg for urinary retention, PT, OOB as tolerated   LBM 4/15  lovenox ppx   BLE DVTs soleal, PT 4/10, repeat still pending--> f/u   Q4 neuro/vitals   speech/swallow   stable for transfer to floor, awaiting bed       NSCU ATTENDING -- ADDITIONAL PROGRESS NOTE    Nighttime rounds were performed -- please refer to earlier Progress Note for HPI details.    ICU Vital Signs Last 24 Hrs  T(C): 36.8 (16 Apr 2024 15:00), Max: 37.2 (15 Apr 2024 23:00)  T(F): 98.2 (16 Apr 2024 15:00), Max: 98.9 (15 Apr 2024 23:00)  HR: 102 (16 Apr 2024 15:00) (92 - 110)  BP: --  BP(mean): --  ABP: 146/73 (16 Apr 2024 15:00) (120/59 - 313/312)  ABP(mean): 104 (16 Apr 2024 15:00) (82 - 313)  RR: 20 (16 Apr 2024 15:00) (15 - 34)  SpO2: 100% (16 Apr 2024 15:00) (99% - 100%)        Relevant labwork and imaging reviewed.    CBC:     Chem:         ( 04-15-24 @ 03:50 )    144  |  105  |  23  ----------------------------<  108<H>  3.8   |  26  |  0.46<L>        Liver Functions:     Type & Screen:       MEDICATIONS  (STANDING):  chlorhexidine 4% Liquid 1 Application(s) Topical <User Schedule>  multivitamin/minerals/iron Oral Solution (CENTRUM) 15 milliLiter(s) Oral daily    MEDICATIONS  (PRN):  acetaminophen     Tablet .. 650 milliGRAM(s) Oral every 6 hours PRN Temp greater or equal to 38.5C (101.3F), Mild Pain (1 - 3)  oxyCODONE    IR 10 milliGRAM(s) Oral every 4 hours PRN Severe Pain (7 - 10)  oxyCODONE    IR 5 milliGRAM(s) Oral every 4 hours PRN Moderate Pain (4 - 6)  sodium chloride 0.9% lock flush 10 milliLiter(s) IV Push every 1 hour PRN Pre/post blood products, medications, blood draw, and to maintain line patency      [A/P] PBD 22 SAH HH4 MF4  EVD d/c'ed post pull CTH pending   -180  cardura 2mg for urinary retention, PT, OOB as tolerated   LBM 4/15  lovenox ppx   BLE DVTs soleal, PT 4/10, repeat still pending--> f/u   Q4 neuro/vitals; protected sleep 11-5  speech/swallow   stable for transfer to floor, awaiting bed

## 2024-04-16 NOTE — PROGRESS NOTE ADULT - ASSESSMENT
58F, on ASA81 for pain relief? 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 Was initially found unresponsive at 21:00, intubated at 23:00 at OSH, then txfrd. Given ddavp at OSH. On arrival patient w/ PERRL, +cough/gag, 2/5 spontaneous movement in LUE, o/w no movement. 1 unit PLTs given and R frontal EVD placed-high pressure. PLTs/Coags wnl.     Intubated sedated in NSCU   Echo with abnormal findings of severe cardiomyopathy and RWMA

## 2024-04-16 NOTE — PROGRESS NOTE ADULT - ATTENDING COMMENTS
CASSIDY d/c'ed, post pull CT brain this AM reviewed.  ok for q4h neuro and vitals.  may transfer out of NSCU when bed available.

## 2024-04-16 NOTE — PROGRESS NOTE ADULT - SUBJECTIVE AND OBJECTIVE BOX
Subjective: Patient seen and examined. No new events except as noted.   remains in NSCU     REVIEW OF SYSTEMS:      CONSTITUTIONAL: + weakness, fevers or chills  EYES/ENT: No visual changes;  No vertigo or throat pain   NECK: No pain or stiffness  RESPIRATORY: No cough, wheezing, hemoptysis; No shortness of breath  CARDIOVASCULAR: No chest pain or palpitations  GASTROINTESTINAL: No abdominal or epigastric pain. No nausea, vomiting, or hematemesis; No diarrhea or constipation. No melena or hematochezia.  GENITOURINARY: No dysuria, frequency or hematuria  NEUROLOGICAL: No numbness or weakness  SKIN: No itching, burning, rashes, or lesions   All other review of systems is negative unless indicated above.        MEDICATIONS:  MEDICATIONS  (STANDING):  chlorhexidine 4% Liquid 1 Application(s) Topical <User Schedule>  doxazosin 2 milliGRAM(s) Oral at bedtime  multivitamin/minerals/iron Oral Solution (CENTRUM) 15 milliLiter(s) Oral daily      PHYSICAL EXAM:  T(C): 37.1 (04-16-24 @ 07:00), Max: 37.2 (04-15-24 @ 23:00)  HR: 102 (04-16-24 @ 07:00) (83 - 110)  BP: --  RR: 20 (04-16-24 @ 07:00) (15 - 34)  SpO2: 99% (04-16-24 @ 07:00) (99% - 100%)  Wt(kg): --  I&O's Summary    15 Apr 2024 07:01  -  16 Apr 2024 07:00  --------------------------------------------------------  IN: 1250 mL / OUT: 1200 mL / NET: 50 mL          Appearance: NAD  HEENT:  dry oral mucosa, PERRL, EOMI	  +NGT  Lymphatic: No lymphadenopathy  Cardiovascular: Normal S1 S2, No JVD, No murmurs, No edema  Respiratory: decreased bs   Psychiatry: nonoverbal  Gastrointestinal:  Soft, Non-tender, + BS	  Skin: No rashes, No ecchymoses, No cyanosis	  Extremities: decreased  range of motion, No clubbing, cyanosis or edema  Vascular: Peripheral pulses palpable 2+ bilaterally  alert, oriented on self, place, EOS, PERRL, intermittently FC, B/l upper extremities localize (R>L), LLE WD RLE AG    LABS:    CARDIAC MARKERS:            04-15    144  |  105  |  23  ----------------------------<  108<H>  3.8   |  26  |  0.46<L>    Ca    9.2      15 Apr 2024 03:50  Phos  4.8     04-15  Mg     2.2     04-15          TELEMETRY: 	SR     ECG:  	  RADIOLOGY:  < from: CT Head No Cont (04.15.24 @ 08:28) >    ACC: [id number] EXAM:  CT BRAIN   ORDERED BY: GRACIELA HIRSCH     PROCEDURE DATE:  04/15/2024          INTERPRETATION:  Exam Date: 4/15/2024 8:28 AM    CT head without IV contrast    CLINICAL INFORMATION:  interval Admitting Dxs: I60.9 NONTRAUMATIC   SUBARACHNOID HEMORRHAGE, UNSPECIFIED    TECHNIQUE: Contiguous axial sections were obtained through the head. This   study was performed in the portable scanner.    COMPARISON: CT head 4/14/2024    FINDINGS:    Evaluation limited by portable scanner. Right frontotemporal craniotomy   and right parasellar aneurysm clip is reidentified. Right frontal   approach ventriculostomy catheter with tip at the septum pellucidum is   unchanged. Ventricular size is unchanged. No new acute hemorrhage is   identified. Evaluation for infarct is limited on this exam, however   infarcts in the right basal ganglia is suggested.    IMPRESSION:    No interval change since prior exam.    --- End of Report ---        < end of copied text >  < from: CT Head No Cont (04.14.24 @ 09:15) >    ACC: [id number] EXAM:  CT BRAIN   ORDERED BY: ELSA SIDDIQUI     PROCEDURE DATE:  04/14/2024          INTERPRETATION:  INDICATIONS:  Follow-up subarachnoid hemorrhage. Clamped   external ventricular drain.    TECHNIQUE:  Serial axial images were obtained from the skull base to the   vertex without intravenous contrast. Coronal and sagittal reformatted   images were obtained.    COMPARISON EXAMINATION: CT head 4/13/2024    FINDINGS:    Status post right frontotemporal craniotomy and right parasellar aneurysm   clip. Right frontal approach external ventricular drain with tip in   stable position. Ventricles are similar in size to 4/13/2024.    Similar minimal prominence of the bilateral temporal horns.    Hypodense foci in the right basal ganglia and left frontal lobe are   similar to 4/13/2024.    No acute hemorrhage or midline shift. Small extradural collection along   the anterior right temporal convexity, unchanged.  Visualized paranasal sinuses and tympanomastoid cells are clear.  Nasogastric tube is present.    IMPRESSION:  Stable ventricle size compared to 4/13/2024.    --- End of Report ---    < end of copied text >  < from: CT Head No Cont (04.13.24 @ 13:54) >  ACC: [id number] EXAM:  CT BRAIN   ORDERED BY: CELIA PARMAR     PROCEDURE DATE:  04/13/2024          INTERPRETATION:  CLINICAL INDICATION: Subarachnoid hemorrhage, P-comm   rupture, EVD clamping    5mm axial sections of the brain were obtained from base to vertex,   without the intravenous administration of contrast material. Coronal and   sagittal computer generated reconstructed views are available    Comparison is made with prior CT of 4/7/2024.    The right frontal sherwin hole is present through which an extra ventricular   drain extends the third ventricle. There is minimal increase in   ventricular size. Minimal prominence of the temporal horns are   identified. There is lucency in the right basal ganglia which likely   represents infarctand residual from prior hemorrhage. There is also   lucency in the left posterior frontal cortex consistent with ischemic   change better seen on the MRI of 4/8/2024. The small right corona radiata   hemorrhage has resolved. There has been a right frontal temporal   craniotomy and there is a right parasellar aneurysm clip.      IMPRESSION: Right frontal ventriculostomy. Minimal increased ventricular   size since 4/7/2024 after ventriculostomy clamping.    --- End of Report ---      < end of copied text >  < from: [facility] Duplex Lower Ext Vein Scan, Bilat (04.10.24 @ 13:01) >    ACC: [id number] EXAM:  DUPLEX SCAN EXT VEINS LOWER BI   ORDERED BY:   ELIZABETH WILLIAMSON     PROCEDURE DATE:  04/10/2024          INTERPRETATION:  CLINICAL INFORMATION: A prior examination, dated   4/3/2024, showed bilateral soleal vein DVT.    COMPARISON: None available.    TECHNIQUE: Duplex sonography of the BILATERAL LOWER extremity veins with   color and spectral Doppler, with and without compression.    FINDINGS:    RIGHT:    The left common femoral vein was not diagnostically imaged because of an   overlying dressing.    The right common femoral vein and the right and the left femoral and   popliteal veins are patent and free of clot.    Below the knees, there is persistent right and left soleal vein DVT.    In addition, there is now below the knee DVT affecting the right   posterior tibial veins.    IMPRESSION:    As on prior examinations, there is no proximal DVT.    Below the knee, in addition to the previously identified right and left   soleal vein DVT, there is now also DVT affecting the right posterior   tibial veins.      PA Jorge notified.          < end of copied text >    DIAGNOSTIC TESTING:  [ ] Echocardiogram:  [ ]  Catheterization:  [ ] Stress Test:    OTHER:

## 2024-04-16 NOTE — PROGRESS NOTE ADULT - ASSESSMENT
EVD dc'd today pending post pull CTH in AM poss xfer to floor/restart SQL  TCDs- poor windows  Nimodipine dc'd due to bradycardia

## 2024-04-16 NOTE — CONSULT NOTE ADULT - SUBJECTIVE AND OBJECTIVE BOX
HPI:  Jie Lin  58F, on ASA81 for pain relief? 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 Was initially found unresponsive at 21:00, intubated at 23:00 at OSH, then txfrd. Given ddavp at OSH. On arrival patient w/ PERRL, +cough/gag, 2/5 spontaneous movement in LUE, o/w no movement. 1 unit PLTs given and R frontal EVD placed-high pressure. PLTs/Coags wnl.     Patient was admitted on 3/26, s/p right craniotomy, Pcomm clip, EVD now removed, seen today in NSCU      REVIEW OF SYSTEMS  unable to obtain     VITALS  T(C): 37.1 (04-16-24 @ 07:00), Max: 37.2 (04-15-24 @ 23:00)  HR: 102 (04-16-24 @ 07:00) (83 - 110)  BP: --  RR: 20 (04-16-24 @ 07:00) (15 - 34)  SpO2: 99% (04-16-24 @ 07:00) (99% - 100%)  Wt(kg): --    PAST MEDICAL & SURGICAL HISTORY  see HPI     FUNCTIONAL HISTORY  Lives with son, apt with ramp, elevator  Independent AMB and ADLs PTA     CURRENT FUNCTIONAL STATUS  SLP 4/12  waxing/waning mentation  NPO     PT  4/10  bed mobility max assist x 2  sitting EOB max assist   unable to follow verbal commands, easily distracted    OT 4/11  therapeutic exercises  nonverbal, not following commands     RECENT LABS/IMAGING    04-15    144  |  105  |  23  ----------------------------<  108<H>  3.8   |  26  |  0.46<L>    Ca    9.2      15 Apr 2024 03:50  Phos  4.8     04-15  Mg     2.2     04-15      Urinalysis Basic - ( 15 Apr 2024 03:50 )    Color: x / Appearance: x / SG: x / pH: x  Gluc: 108 mg/dL / Ketone: x  / Bili: x / Urobili: x   Blood: x / Protein: x / Nitrite: x   Leuk Esterase: x / RBC: x / WBC x   Sq Epi: x / Non Sq Epi: x / Bacteria: x    < from: CT Head No Cont (04.15.24 @ 08:28) >    FINDINGS:    Evaluation limited by portable scanner. Right frontotemporal craniotomy   and right parasellar aneurysm clip is reidentified. Right frontal   approach ventriculostomy catheter with tip at the septum pellucidum is   unchanged. Ventricular size is unchanged. No new acute hemorrhage is   identified. Evaluation for infarct is limited on this exam, however   infarcts in the right basal ganglia is suggested.    IMPRESSION:    No interval change since prior exam.    < end of copied text >    < from: MR Head No Cont (04.08.24 @ 10:34) >    IMPRESSION:    1. Multifocal acute to subacute infarction. This extensively involves   each MCA posterior distribution, the right basal ganglia, the anterior   parasagittal vertex CINDY distribution and an additional smaller lesion in   the right cerebellum    2. Right middle cranial fossa surgical aneurysm clip is associated with   local susceptibility artifact and mild vasogenic edema right medial   temporal tip. Persistent subarachnoid space hemorrhage right sylvian   fissure. Small middle cranial fossa subdural space fluid collections,   likely postoperative, right larger than left    3. Right-sided ventricular catheter. Right corona radiata/basal ganglia   adjacent parenchymal hemorrhage. Right intraventricular hemorrhage      < end of copied text >      ALLERGIES  No Known Allergies      MEDICATIONS   acetaminophen     Tablet .. 650 milliGRAM(s) Oral every 6 hours PRN  chlorhexidine 4% Liquid 1 Application(s) Topical <User Schedule>  doxazosin 2 milliGRAM(s) Oral at bedtime  multivitamin/minerals/iron Oral Solution (CENTRUM) 15 milliLiter(s) Oral daily  oxyCODONE    IR 5 milliGRAM(s) Oral every 4 hours PRN  oxyCODONE    IR 10 milliGRAM(s) Oral every 4 hours PRN      ----------------------------------------------------------------------------------------  PHYSICAL EXAM  Constitutional - NAD, Comfortable, in bed   NGT  Chest - Breathing comfortably  Cardiovascular - S1S2   Extremities - right wrist restraint   Neurologic Exam -                 Cognitive - eyes closed     Communication - does not follow commands, open eyes           Psychiatric - Mood stable, Affect WNL  ----------------------------------------------------------------------------------------  ASSESSMENT/PLAN  58yFemale with functional deficits after SAH  s/p craniotomy, Pcomm clip, EVD removed   MRI with multifocal infarction  b/l soleal DVTs  NPO with NGT   Pain - Tylenol oxycodone   Rehab - Will continue to follow for ongoing rehab needs and recommendations.    continue bedside therapy, patient last seen 4/10-12 for therapy, required max assist with bed mobility, sitting at EOB, waxing/waning mentation, not following commands   may benefit from Amantadine to improve attention, 100 mg at 8am/12pm   will continue to follow, patient will require continued inpatient rehabilitation, Acute vs DAV to be determined

## 2024-04-17 PROCEDURE — 99233 SBSQ HOSP IP/OBS HIGH 50: CPT

## 2024-04-17 PROCEDURE — 93970 EXTREMITY STUDY: CPT | Mod: 26

## 2024-04-17 RX ORDER — OXYCODONE HYDROCHLORIDE 5 MG/1
5 TABLET ORAL EVERY 4 HOURS
Refills: 0 | Status: DISCONTINUED | OUTPATIENT
Start: 2024-04-17 | End: 2024-04-23

## 2024-04-17 RX ORDER — OXYCODONE HYDROCHLORIDE 5 MG/1
10 TABLET ORAL EVERY 4 HOURS
Refills: 0 | Status: DISCONTINUED | OUTPATIENT
Start: 2024-04-17 | End: 2024-04-23

## 2024-04-17 RX ADMIN — CHLORHEXIDINE GLUCONATE 1 APPLICATION(S): 213 SOLUTION TOPICAL at 05:04

## 2024-04-17 RX ADMIN — ENOXAPARIN SODIUM 40 MILLIGRAM(S): 100 INJECTION SUBCUTANEOUS at 17:41

## 2024-04-17 RX ADMIN — Medication 15 MILLILITER(S): at 12:55

## 2024-04-17 RX ADMIN — Medication 2 MILLIGRAM(S): at 21:44

## 2024-04-17 NOTE — PROGRESS NOTE ADULT - SUBJECTIVE AND OBJECTIVE BOX
NSICU DAY PROGRESS NOTE      24h events   NAEON  04/15: EVD removed  04/16: CTH: tract hemorrhage, Bedboarded    Physical Exam:  Neuro: alert, oriented on self, place, EOS, PERRL, intermittently FC, B/l upper extremities localize (R>L), LLE WD RLE AG    ICU Vital Signs Last 24 Hrs  T(C): 37.1 (16 Apr 2024 03:00), Max: 37.2 (15 Apr 2024 23:00)  T(F): 98.8 (16 Apr 2024 03:00), Max: 98.9 (15 Apr 2024 23:00)  HR: 107 (16 Apr 2024 05:00) (83 - 110)  BP: --  BP(mean): --  ABP: 130/59 (16 Apr 2024 05:00) (120/59 - 313/312)  ABP(mean): 89 (16 Apr 2024 05:00) (82 - 313)  RR: 15 (16 Apr 2024 05:00) (15 - 34)  SpO2: 100% (16 Apr 2024 05:00) (98% - 100%)    O2 Parameters below as of 15 Apr 2024 19:00  Patient On (Oxygen Delivery Method): room air    04-15    144  |  105  |  23  ----------------------------<  108<H>  3.8   |  26  |  0.46<L>    Ca    9.2      15 Apr 2024 03:50  Phos  4.8     04-15  Mg     2.2     04-15          MEDICATION LEVELS:     IVF FLUIDS/MEDICATIONS:   MEDICATIONS  (STANDING):  chlorhexidine 4% Liquid 1 Application(s) Topical <User Schedule>  enoxaparin Injectable 40 milliGRAM(s) SubCutaneous <User Schedule>  multivitamin/minerals/iron Oral Solution (CENTRUM) 15 milliLiter(s) Oral daily  theophylline Syrup 25 milliGRAM(s) Oral every 12 hours    MEDICATIONS  (PRN):  acetaminophen     Tablet .. 650 milliGRAM(s) Oral every 6 hours PRN Temp greater or equal to 38.5C (101.3F), Mild Pain (1 - 3)  oxyCODONE    IR 10 milliGRAM(s) Oral every 4 hours PRN Severe Pain (7 - 10)  oxyCODONE    IR 5 milliGRAM(s) Oral every 4 hours PRN Moderate Pain (4 - 6)

## 2024-04-17 NOTE — PROGRESS NOTE ADULT - ATTENDING COMMENTS
EVD out.  no concerning for ongoing vasospasm/DCI.  can transfer out of ICU when floor bed available.

## 2024-04-17 NOTE — PROGRESS NOTE ADULT - PROBLEM SELECTOR PROBLEM 2
- Afib with RVR on presentation  S/p cardizem given in ED  metorpolol 25 BID   Amiodarone   Per pt his cardiologist is Dr. Tillman who is part of [facility]  - [facility] cardiology recs - Afib with RVR on presentation  S/p cardizem given in ED  metorpolol 25 BID   Amiodarone   Per pt his cardiologist is Dr. Tillman who is part of [facility]  - Appreciate cardiology recs- Pending discussion with outpatient cardiologist in regards to severity of AS Cardiomyopathy - Afib with RVR on presentation  S/p cardizem given in ED  metorpolol 25 BID   Amiodarone   Per pt his cardiologist is Dr. Tillman who is part of [facility]  - [facility] cardiology recs - Afib with RVR on presentation  S/p cardizem given in ED  metorpolol 25 BID   Amiodarone   Per pt his cardiologist is Dr. Tillman who is part of [facility]  - Appreciate cardiology recs- Pending discussion with outpatient cardiologist in regards to severity of AS

## 2024-04-17 NOTE — PROGRESS NOTE ADULT - ASSESSMENT
58F, on ASA81 for pain relief? 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 Was initially found unresponsive at 21:00, intubated at 23:00 at OSH, then txfrd. Given ddavp at OSH. On arrival patient w/ PERRL, +cough/gag, 2/5 spontaneous movement in LUE, o/w no movement. 1 unit PLTs given and R frontal EVD placed-high pressure. PLTs/Coags wnl.     Intubated sedated in NSCU   Echo with abnormal findings of severe cardiomyopathy and RWMA

## 2024-04-17 NOTE — PROGRESS NOTE ADULT - SUBJECTIVE AND OBJECTIVE BOX
Subjective: Patient seen and examined. No new events except as noted.   remains in NSCU       REVIEW OF SYSTEMS:    CONSTITUTIONAL: + weakness, fevers or chills  EYES/ENT: No visual changes;  No vertigo or throat pain   NECK: No pain or stiffness  RESPIRATORY: No cough, wheezing, hemoptysis; No shortness of breath  CARDIOVASCULAR: No chest pain or palpitations  GASTROINTESTINAL: No abdominal or epigastric pain. No nausea, vomiting, or hematemesis; No diarrhea or constipation. No melena or hematochezia.  GENITOURINARY: No dysuria, frequency or hematuria  NEUROLOGICAL: No numbness or weakness  SKIN: No itching, burning, rashes, or lesions   All other review of systems is negative unless indicated above.    MEDICATIONS:  MEDICATIONS  (STANDING):  chlorhexidine 4% Liquid 1 Application(s) Topical <User Schedule>  doxazosin 2 milliGRAM(s) Oral at bedtime  enoxaparin Injectable 40 milliGRAM(s) SubCutaneous <User Schedule>  multivitamin/minerals/iron Oral Solution (CENTRUM) 15 milliLiter(s) Oral daily      PHYSICAL EXAM:  T(C): 36.8 (04-17-24 @ 07:00), Max: 37.1 (04-16-24 @ 11:00)  HR: 98 (04-17-24 @ 07:00) (96 - 105)  BP: --  RR: 20 (04-17-24 @ 07:00) (15 - 29)  SpO2: 100% (04-17-24 @ 07:00) (99% - 100%)  Wt(kg): --  I&O's Summary    16 Apr 2024 07:01  -  17 Apr 2024 07:00  --------------------------------------------------------  IN: 1340 mL / OUT: 650 mL / NET: 690 mL    17 Apr 2024 07:01  -  17 Apr 2024 09:29  --------------------------------------------------------  IN: 100 mL / OUT: 0 mL / NET: 100 mL          Appearance: NAD  HEENT:  dry oral mucosa, PERRL, EOMI	  +NGT  Lymphatic: No lymphadenopathy  Cardiovascular: Normal S1 S2, No JVD, No murmurs, No edema  Respiratory: decreased bs   Psychiatry: nonoverbal  Gastrointestinal:  Soft, Non-tender, + BS	  Skin: No rashes, No ecchymoses, No cyanosis	  Extremities: decreased  range of motion, No clubbing, cyanosis or edema  Vascular: Peripheral pulses palpable 2+ bilaterally  alert, oriented on self, place, EOS, PERRL, intermittently FC, B/l upper extremities localize (R>L), LLE WD RLE AG    LABS:    CARDIAC MARKERS:          TELEMETRY: 	  SR  ECG:  	  RADIOLOGY:   DIAGNOSTIC TESTING:  [ ] Echocardiogram:  [ ]  Catheterization:  [ ] Stress Test:    OTHER:

## 2024-04-17 NOTE — CHART NOTE - NSCHARTNOTEFT_GEN_A_CORE
JOSE MCLEAN is a 58F, on ASA81, transferred from [place] for SAH HH4 WNFS 5 mF4 ruptured R pcomm aneurysm. Was initially found unresponsive on 3/25/24 at 21:00, intubated at 23:00 at OSH, then txfrd 3/26/24 and s/p R frontal EVD placed-high pressure. s/p R pcom clip on 3/26/24, postop evd not functioning then drained on the floor. Angio on 4/1 (-) for vasospasms. Extubated on 4/3 (9 days).   -Course c/b vasospasms; pt s/p angios on 4/6 and 4/8 for treatment. Reintubated 4/8 for angio, but remained intubated because pt was not at baseline after anesthesia meds were held. Extubated for 2nd time on 4/9.    MRI 4/8 IMPRESSION:  1. Multifocal acute to subacute infarction. This extensively involves each MCA posterior distribution, the right basal ganglia, the anterior parasagittal vertex CINDY distribution and an additional smaller lesion in the right cerebellum  2. Right middle cranial fossa surgical aneurysm clip is associated with local susceptibility artifact and mild vasogenic edema right medial temporal tip. Persistent subarachnoid space hemorrhage right sylvian fissure. Small middle cranial fossa subdural space fluid collections, likely postoperative, right larger than left  3. Right-sided ventricular catheter. Right corona radiata/basal ganglia adjacent parenchymal hemorrhage. Right intraventricular hemorrhage    DYSPHAGIA COURSE THIS ADMISSION:  Clinical swallow evaluations/interventions;   -4/4- Recommendations for NPO, with non-oral nutrition/hydration/medications. +overt s/sx of aspiration/penetration on conservative moderately thick liquids and that pt was aphonic w/ waxing and waning mentation.  -4/10- Pt now presenting w/ a waxing and waning mentation that does not support oral feeding  -4/11- Pt w/ poor bolus orientation despite slightly improved sustained mentation. Not safe for p.o. or a candidate for objective swallow testing.  -4/12-Npo recommendation; p/w a mentation that does not support oral feeding or objective swallow testing    TODAY, pt seen for 5th swallow intervention/evaluation to determine candidacy for safe/ functional p.o. feeding and/or objective swallow testing. +EVD, on room air, b/l wrist restraints, fixed gaze w/ limited visual tracking. Appearing more awake. Offered patient coated tsp of moderately thick liquids x5 with son present. Oropharyngeal swallow profile p/w inability to orient to feeding task, not attempting to open oral cavity or strip bolus from tsp, continued with clenched lips and teeth despite verbal and tactile stimuli by SLP and son, in English and [place]. No attempt for lingual search on residue on lips. No other trials were offered given poor awareness and orientation to a feeding task. Answered questions to son.     IMPRESSION: Pt has been seen 5 times this admission for bedside swallow intervention/evaluation. Mentation and ability to orient to a feeding task remains barrier that does not support oral feeding or objective swallow testing.     RECOMMENDATIONS  > NPO, with non-oral nutrition/hydration/medications.  > Would recommend for team placement of speech-language evaluation order to further assess communication  >Maintain strict aspiration precautions  >Maintain oral hygiene  >GI CONSULT for more permanent means of nutrition and hydration   >D/C rehab    D/w [place] Connelly and son     GOAL- Pt to tolerate recommended textures during course w/ no s/sx of aspiration Pt/family/caregiver will demonstrate understanding and carryover of dysphagia management (safe swallow guidelines, dysphagia diet).

## 2024-04-17 NOTE — CHART NOTE - NSCHARTNOTEFT_GEN_A_CORE
Nutrition Follow Up Note  Patient seen for: Nutrition Follow Up     Chart reviewed, events noted. Pt is a 59 yo F; s/p HH4 mF4 subarachnoid hemorrhage. EVD removed. S/P hydrocephalus/ICP crisis, resolved.     Source: [] Patient       [x] EMR        [x] RN        [] Family at bedside       [x] Other: interdisciplinary medical team     Diet Order:   Diet, NPO with Tube Feed:   Tube Feeding Modality: Nasogastric  Jevity 1.5 Les (JEVITY1.5RTH)  Total Volume for 24 Hours (mL): 1200  Continuous  Starting Tube Feed Rate {mL per Hour}: 30  Increase Tube Feed Rate by (mL): 10     Every 4 hours  Until Goal Tube Feed Rate (mL per Hour): 50  Tube Feed Duration (in Hours): 24  Tube Feed Start Time: 00:00  Supplement Feeding Modality:  Oral  Probiotic Yogurt/Smoothie Cans or Servings Per Day:  2       Frequency:  Two Times a day (04-11-24)    EN Order Provides: 1200ml, 1800kcal and 77g protein   -Pt has received 100% of feeds between 4/13-4/16. Infusing at goal rate (50ml/hr) at time of RD visit.     Is current diet order appropriate/adequate? see below for updated diet recommendations    Nutrition-related concerns:  -S/P swallow evaluation 4/12; recommendations noted for pt to remain NPO with non-oral means of nutrition/hydration. Remains on nasogastric tube feeds.   -Last BM 4/15: x 1. Not on apparent bowel regimen.   -Continues on daily multivitamin.     Weights:   (4/3): 71.4 Kg; (4/10): 71.2 Kg.  Will continue to monitor, trend.     MEDICATIONS  (STANDING):  doxazosin  multivitamin/minerals/iron Oral Solution (CENTRUM)    Pertinent Labs:   A1C with Estimated Average Glucose Result: 5.5 % (03-26-24 @ 04:27)  A1C with Estimated Average Glucose Result: 5.5 % (03-26-24 @ 01:34)    Finger Sticks:    Triglycerides, Serum: 82 mg/dL (03-26-24 @ 07:53)  Triglycerides, Serum: 109 mg/dL (03-26-24 @ 04:32)  Triglycerides, Serum: 150 mg/dL (03-26-24 @ 01:34)    Skin per nursing documentation: surgical incision s/p crani for clipping 3/26  Edema: none noted    Estimated Energy Needs (per RD predictive equation), based on dosing wt 71.2kg:  (27-32kcal/kg): 8648-6505 kcal  (1.2-1.4g protein/kg): 85-100g protein   IC study (3/28): 2175 kcal   Defer fluid needs to team     Previous Nutrition Diagnosis: Increased nutrient needs; Acute severe protein calorie malnutrition  Nutrition Diagnosis is: [x] ongoing  [] resolved [] not applicable     New Nutrition Diagnosis: [x] Not applicable    Nutrition Care Plan:  [x] In Progress  [] Achieved  [] Not applicable    Recommendations:      1. Consider increasing tube feeds to Jevity 1.5 at 60ml/hr x 24 hrs. To provide: 1440ml, 2160kcal (30kcal/kg) and 92g protein (1.3g/kg).  2. Can continue Probiotic Yogurt to aid in gut delfina; antibiotics discontinued at this time.   3. Free water flushes deferred to team.   4. Continue multivitamin as prescribed.   5. Monitor wt trends/labs/skin integrity/hydration status/bowel regularity.     Monitoring and Evaluation:   Continue to monitor nutritional intake, tolerance to diet prescription, weights, labs, skin integrity    RD remains available upon request and will follow up per protocol    Marely Montoya RD, available via TEAMS

## 2024-04-17 NOTE — PROGRESS NOTE ADULT - CRITICAL CARE SERVICES
70
45
45
70
70
45
45
70
70
30
30
40
45
70
70
40
45
70
30
45
45
60
70
45
70
35
40
70
40
70

## 2024-04-17 NOTE — PROGRESS NOTE ADULT - ASSESSMENT
ASSESSMENT/PLAN: HH4 WNFS 5 mF4 subarachnoid hemorrhage.    N   #Pcomm ruptured, s/p clip   #EVD tract acute hemorrhage ( removed )  #Hydrocephalus/ICP crisis: resolved  #Delayed Cerebral Ischemia Management: euvolemia, nimodipine  #Pain: PRN tylenol oxy   #agitations: precedex   #Activity: [x] OOB as tolerated    CV   #SBP goal 100-220  #4/9 TTE: EF 66%, no structural abnormalities/thrombus seen   #bradycardia resolved, dc theophyllin 25 BID    P   extubated 4/9  saO2 > 92%  RA    R   IVL  monitor I&O, maintain euvolemia     GI   Diet:  Jevity tube feeds  Senna   BM 4/15      E   Goal euglycemia (-180)  A1c 5.5     H   SCD   Chemoppx: lovenox 40 mg QD   4/10 LED: stable B/L Soleal DVT with extension to Rt posterior tibial DVT      ID   afebrile    # full code     #Disposition: ICU

## 2024-04-17 NOTE — PROGRESS NOTE ADULT - CRITICAL CARE SERVICES PROVIDED
Patient is critically ill, requiring critical care services.

## 2024-04-18 DIAGNOSIS — I61.9 NONTRAUMATIC INTRACEREBRAL HEMORRHAGE, UNSPECIFIED: ICD-10-CM

## 2024-04-18 DIAGNOSIS — E87.0 HYPEROSMOLALITY AND HYPERNATREMIA: ICD-10-CM

## 2024-04-18 DIAGNOSIS — I82.409 ACUTE EMBOLISM AND THROMBOSIS OF UNSPECIFIED DEEP VEINS OF UNSPECIFIED LOWER EXTREMITY: ICD-10-CM

## 2024-04-18 DIAGNOSIS — I50.21 ACUTE SYSTOLIC (CONGESTIVE) HEART FAILURE: ICD-10-CM

## 2024-04-18 DIAGNOSIS — R13.10 DYSPHAGIA, UNSPECIFIED: ICD-10-CM

## 2024-04-18 LAB
ANION GAP SERPL CALC-SCNC: 14 MMOL/L — SIGNIFICANT CHANGE UP (ref 5–17)
ANION GAP SERPL CALC-SCNC: 16 MMOL/L — SIGNIFICANT CHANGE UP (ref 5–17)
BASOPHILS # BLD AUTO: 0.04 K/UL — SIGNIFICANT CHANGE UP (ref 0–0.2)
BASOPHILS NFR BLD AUTO: 0.4 % — SIGNIFICANT CHANGE UP (ref 0–2)
BLD GP AB SCN SERPL QL: NEGATIVE — SIGNIFICANT CHANGE UP
BUN SERPL-MCNC: 35 MG/DL — HIGH (ref 7–23)
BUN SERPL-MCNC: 36 MG/DL — HIGH (ref 7–23)
CALCIUM SERPL-MCNC: 10.3 MG/DL — SIGNIFICANT CHANGE UP (ref 8.4–10.5)
CALCIUM SERPL-MCNC: 10.8 MG/DL — HIGH (ref 8.4–10.5)
CHLORIDE SERPL-SCNC: 107 MMOL/L — SIGNIFICANT CHANGE UP (ref 96–108)
CHLORIDE SERPL-SCNC: 113 MMOL/L — HIGH (ref 96–108)
CO2 SERPL-SCNC: 26 MMOL/L — SIGNIFICANT CHANGE UP (ref 22–31)
CO2 SERPL-SCNC: 27 MMOL/L — SIGNIFICANT CHANGE UP (ref 22–31)
CREAT SERPL-MCNC: 0.53 MG/DL — SIGNIFICANT CHANGE UP (ref 0.5–1.3)
CREAT SERPL-MCNC: 0.54 MG/DL — SIGNIFICANT CHANGE UP (ref 0.5–1.3)
EGFR: 107 ML/MIN/1.73M2 — SIGNIFICANT CHANGE UP
EGFR: 108 ML/MIN/1.73M2 — SIGNIFICANT CHANGE UP
EOSINOPHIL # BLD AUTO: 0.06 K/UL — SIGNIFICANT CHANGE UP (ref 0–0.5)
EOSINOPHIL NFR BLD AUTO: 0.7 % — SIGNIFICANT CHANGE UP (ref 0–6)
GLUCOSE SERPL-MCNC: 124 MG/DL — HIGH (ref 70–99)
GLUCOSE SERPL-MCNC: 128 MG/DL — HIGH (ref 70–99)
HCT VFR BLD CALC: 33.1 % — LOW (ref 34.5–45)
HGB BLD-MCNC: 10.3 G/DL — LOW (ref 11.5–15.5)
IMM GRANULOCYTES NFR BLD AUTO: 0.4 % — SIGNIFICANT CHANGE UP (ref 0–0.9)
LYMPHOCYTES # BLD AUTO: 0.94 K/UL — LOW (ref 1–3.3)
LYMPHOCYTES # BLD AUTO: 10.5 % — LOW (ref 13–44)
MCHC RBC-ENTMCNC: 29.1 PG — SIGNIFICANT CHANGE UP (ref 27–34)
MCHC RBC-ENTMCNC: 31.1 GM/DL — LOW (ref 32–36)
MCV RBC AUTO: 93.5 FL — SIGNIFICANT CHANGE UP (ref 80–100)
MONOCYTES # BLD AUTO: 0.57 K/UL — SIGNIFICANT CHANGE UP (ref 0–0.9)
MONOCYTES NFR BLD AUTO: 6.3 % — SIGNIFICANT CHANGE UP (ref 2–14)
NEUTROPHILS # BLD AUTO: 7.33 K/UL — SIGNIFICANT CHANGE UP (ref 1.8–7.4)
NEUTROPHILS NFR BLD AUTO: 81.7 % — HIGH (ref 43–77)
NRBC # BLD: 0 /100 WBCS — SIGNIFICANT CHANGE UP (ref 0–0)
PLATELET # BLD AUTO: 386 K/UL — SIGNIFICANT CHANGE UP (ref 150–400)
POTASSIUM SERPL-MCNC: 4 MMOL/L — SIGNIFICANT CHANGE UP (ref 3.5–5.3)
POTASSIUM SERPL-MCNC: 4 MMOL/L — SIGNIFICANT CHANGE UP (ref 3.5–5.3)
POTASSIUM SERPL-SCNC: 4 MMOL/L — SIGNIFICANT CHANGE UP (ref 3.5–5.3)
POTASSIUM SERPL-SCNC: 4 MMOL/L — SIGNIFICANT CHANGE UP (ref 3.5–5.3)
RBC # BLD: 3.54 M/UL — LOW (ref 3.8–5.2)
RBC # FLD: 15.2 % — HIGH (ref 10.3–14.5)
RH IG SCN BLD-IMP: POSITIVE — SIGNIFICANT CHANGE UP
SODIUM SERPL-SCNC: 149 MMOL/L — HIGH (ref 135–145)
SODIUM SERPL-SCNC: 154 MMOL/L — HIGH (ref 135–145)
WBC # BLD: 8.98 K/UL — SIGNIFICANT CHANGE UP (ref 3.8–10.5)
WBC # FLD AUTO: 8.98 K/UL — SIGNIFICANT CHANGE UP (ref 3.8–10.5)

## 2024-04-18 PROCEDURE — 99223 1ST HOSP IP/OBS HIGH 75: CPT

## 2024-04-18 PROCEDURE — 99233 SBSQ HOSP IP/OBS HIGH 50: CPT

## 2024-04-18 RX ORDER — POLYETHYLENE GLYCOL 3350 17 G/17G
17 POWDER, FOR SOLUTION ORAL ONCE
Refills: 0 | Status: COMPLETED | OUTPATIENT
Start: 2024-04-18 | End: 2024-04-18

## 2024-04-18 RX ORDER — POLYETHYLENE GLYCOL 3350 17 G/17G
POWDER, FOR SOLUTION ORAL
Refills: 0 | Status: DISCONTINUED | OUTPATIENT
Start: 2024-04-18 | End: 2024-04-23

## 2024-04-18 RX ORDER — SENNA PLUS 8.6 MG/1
10 TABLET ORAL AT BEDTIME
Refills: 0 | Status: DISCONTINUED | OUTPATIENT
Start: 2024-04-18 | End: 2024-04-23

## 2024-04-18 RX ORDER — CEFAZOLIN SODIUM 1 G
2000 VIAL (EA) INJECTION ONCE
Refills: 0 | Status: COMPLETED | OUTPATIENT
Start: 2024-04-19 | End: 2024-04-19

## 2024-04-18 RX ORDER — POLYETHYLENE GLYCOL 3350 17 G/17G
17 POWDER, FOR SOLUTION ORAL DAILY
Refills: 0 | Status: DISCONTINUED | OUTPATIENT
Start: 2024-04-19 | End: 2024-04-23

## 2024-04-18 RX ADMIN — ENOXAPARIN SODIUM 40 MILLIGRAM(S): 100 INJECTION SUBCUTANEOUS at 17:22

## 2024-04-18 RX ADMIN — Medication 15 MILLILITER(S): at 11:06

## 2024-04-18 RX ADMIN — POLYETHYLENE GLYCOL 3350 17 GRAM(S): 17 POWDER, FOR SOLUTION ORAL at 17:23

## 2024-04-18 RX ADMIN — SENNA PLUS 10 MILLILITER(S): 8.6 TABLET ORAL at 22:28

## 2024-04-18 RX ADMIN — Medication 10 MILLIGRAM(S): at 17:22

## 2024-04-18 RX ADMIN — CHLORHEXIDINE GLUCONATE 1 APPLICATION(S): 213 SOLUTION TOPICAL at 05:33

## 2024-04-18 RX ADMIN — Medication 2 MILLIGRAM(S): at 21:16

## 2024-04-18 NOTE — CONSULT NOTE ADULT - NS ATTEND AMEND GEN_ALL_CORE FT
s/p SAH,  with aneurysm, s/p clip, and now with dysphagia,tolerating ng tube feeds    plan; peg tomorrow if clinically stable  management as above

## 2024-04-18 NOTE — PROGRESS NOTE ADULT - TIME BILLING
chart review of PT/OT/SLP notes, exam, imaging, counseling and education on inpatient rehabilitation, coordination of care with rehab team and

## 2024-04-18 NOTE — PROGRESS NOTE ADULT - SUBJECTIVE AND OBJECTIVE BOX
Subjective: Patient seen and examined. No new events except as noted.   moved out of NSCU   resting comfortably       REVIEW OF SYSTEMS:    CONSTITUTIONAL: +weakness, fevers or chills  EYES/ENT: No visual changes;  No vertigo or throat pain   NECK: No pain or stiffness  RESPIRATORY: No cough, wheezing, hemoptysis; No shortness of breath  CARDIOVASCULAR: No chest pain or palpitations  GASTROINTESTINAL: No abdominal or epigastric pain. No nausea, vomiting, or hematemesis; No diarrhea or constipation. No melena or hematochezia.  GENITOURINARY: No dysuria, frequency or hematuria  NEUROLOGICAL: No numbness or weakness  SKIN: No itching, burning, rashes, or lesions   All other review of systems is negative unless indicated above.    MEDICATIONS:  MEDICATIONS  (STANDING):  chlorhexidine 4% Liquid 1 Application(s) Topical <User Schedule>  doxazosin 2 milliGRAM(s) Oral at bedtime  enoxaparin Injectable 40 milliGRAM(s) SubCutaneous <User Schedule>  multivitamin/minerals/iron Oral Solution (CENTRUM) 15 milliLiter(s) Oral daily      PHYSICAL EXAM:  T(C): 37.1 (04-18-24 @ 08:35), Max: 37.2 (04-18-24 @ 00:58)  HR: 86 (04-18-24 @ 08:35) (86 - 104)  BP: 137/76 (04-18-24 @ 08:35) (121/75 - 145/79)  RR: 18 (04-18-24 @ 08:35) (18 - 20)  SpO2: 100% (04-18-24 @ 08:35) (97% - 100%)  Wt(kg): --  I&O's Summary    17 Apr 2024 07:01  -  18 Apr 2024 07:00  --------------------------------------------------------  IN: 150 mL / OUT: 150 mL / NET: 0 mL    Appearance: NAD  HEENT:  dry oral mucosa, PERRL, EOMI	  +NGT  Lymphatic: No lymphadenopathy  Cardiovascular: Normal S1 S2, No JVD, No murmurs, No edema  Respiratory: decreased bs   Psychiatry: nonoverbal  Gastrointestinal:  Soft, Non-tender, + BS	  Skin: No rashes, No ecchymoses, No cyanosis	  Extremities: decreased  range of motion, No clubbing, cyanosis or edema  Vascular: Peripheral pulses palpable 2+ bilaterally  alert, oriented on self, place, EOS, PERRL, intermittently FC, B/l upper extremities localize (R>L), LLE WD RLE AG        LABS:    CARDIAC MARKERS:      04-18    149<H>  |  107  |  35<H>  ----------------------------<  124<H>  4.0   |  26  |  0.54    Ca    10.8<H>      18 Apr 2024 05:37      proBNP:   Lipid Profile:   HgA1c:   TSH:             TELEMETRY: 	    ECG:  	  RADIOLOGY:   DIAGNOSTIC TESTING:  [ ] Echocardiogram:  [ ]  Catheterization:  [ ] Stress Test:    OTHER: 	    · Note Type	Speech & Swallow      JOSE MCLEAN is a 58F, on ASA81, transferred from [place] for SAH HH4 WNFS 5 mF4 ruptured R pcomm aneurysm. Was initially found unresponsive on 3/25/24 at 21:00, intubated at 23:00 at OSH, then txfrd 3/26/24 and s/p R frontal EVD placed-high pressure. s/p R pcom clip on 3/26/24, postop evd not functioning then drained on the floor. Angio on 4/1 (-) for vasospasms. Extubated on 4/3 (9 days).   -Course c/b vasospasms; pt s/p angios on 4/6 and 4/8 for treatment. Reintubated 4/8 for angio, but remained intubated because pt was not at baseline after anesthesia meds were held. Extubated for 2nd time on 4/9.    MRI 4/8 IMPRESSION:  1. Multifocal acute to subacute infarction. This extensively involves each MCA posterior distribution, the right basal ganglia, the anterior parasagittal vertex CINDY distribution and an additional smaller lesion in the right cerebellum  2. Right middle cranial fossa surgical aneurysm clip is associated with local susceptibility artifact and mild vasogenic edema right medial temporal tip. Persistent subarachnoid space hemorrhage right sylvian fissure. Small middle cranial fossa subdural space fluid collections, likely postoperative, right larger than left  3. Right-sided ventricular catheter. Right corona radiata/basal ganglia adjacent parenchymal hemorrhage. Right intraventricular hemorrhage    DYSPHAGIA COURSE THIS ADMISSION:  Clinical swallow evaluations/interventions;   -4/4- Recommendations for NPO, with non-oral nutrition/hydration/medications. +overt s/sx of aspiration/penetration on conservative moderately thick liquids and that pt was aphonic w/ waxing and waning mentation.  -4/10- Pt now presenting w/ a waxing and waning mentation that does not support oral feeding  -4/11- Pt w/ poor bolus orientation despite slightly improved sustained mentation. Not safe for p.o. or a candidate for objective swallow testing.  -4/12-Npo recommendation; p/w a mentation that does not support oral feeding or objective swallow testing    TODAY, pt seen for 5th swallow intervention/evaluation to determine candidacy for safe/ functional p.o. feeding and/or objective swallow testing. +EVD, on room air, b/l wrist restraints, fixed gaze w/ limited visual tracking. Appearing more awake. Offered patient coated tsp of moderately thick liquids x5 with son present. Oropharyngeal swallow profile p/w inability to orient to feeding task, not attempting to open oral cavity or strip bolus from tsp, continued with clenched lips and teeth despite verbal and tactile stimuli by SLP and son, in English and Persian. No attempt for lingual search on residue on lips. No other trials were offered given poor awareness and orientation to a feeding task. Answered questions to son.     IMPRESSION: Pt has been seen 5 times this admission for bedside swallow intervention/evaluation. Mentation and ability to orient to a feeding task remains barrier that does not support oral feeding or objective swallow testing.     RECOMMENDATIONS  > NPO, with non-oral nutrition/hydration/medications.  > Would recommend for team placement of speech-language evaluation order to further assess communication  >Maintain strict aspiration precautions  >Maintain oral hygiene  >GI CONSULT for more permanent means of nutrition and hydration   >D/C rehab    D/w [place] Connelly and son     GOAL- Pt to tolerate recommended textures during course w/ no s/sx of aspiration Pt/family/caregiver will demonstrate understanding and carryover of dysphagia management (safe swallow guidelines, dysphagia diet).      Electronic Signatures:  Lisa Briceno (Speech Pathologist)  (Signed 17-Apr-2024 14:02)  	Authored: Note Type, Note      Last Updated: 17-Apr-2024 14:02 by Lisa Briceno (Speech Pathologist)

## 2024-04-18 NOTE — CONSULT NOTE ADULT - PROBLEM SELECTOR PROBLEM 2
Ms. Cleaning requests that her sister, Ms. Nancy Flores, make all medical decisions for her if she should become incapable. Ms. Flores was contacted today and is agreeable. Ms. Flores was also updated on her sisters current situation and is going to make plans to come to [place].    
Acute systolic congestive heart failure
Cardiomyopathy

## 2024-04-18 NOTE — CONSULT NOTE ADULT - SUBJECTIVE AND OBJECTIVE BOX
Jose De Jesus Cardoso  contact via pager or TEAMS  Office [phone]    HPI:  Jie Lin  58F, on ASA81 for pain relief? (fell a month ago), presents as txfr from [place] for diffuse SAH w/small IVH, 2/2 ruptured posterolaterally projecting 2.9x2.4x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 Was initially found unresponsive at 21:00, intubated at 23:00 at OSH, then txfrd. Given ddavp at OSH. On arrival patient w/ PERRL, +cough/gag, 2/5 spontaneous movement in LUE, o/w no movement. 1 unit PLTs given and R frontal EVD placed-high pressure. PLTs/Coags wnl.      (26 Mar 2024 04:21)      PAST MEDICAL & SURGICAL HISTORY:      Review of Systems:   CONSTITUTIONAL: No fever, weight loss, or fatigue  EYES: No eye pain, visual disturbances, or discharge  ENMT:  No difficulty hearing, tinnitus, vertigo; No sinus or throat pain  NECK: No pain or stiffness  BREASTS: No pain, masses, or nipple discharge  RESPIRATORY: No cough, wheezing, chills or hemoptysis; No shortness of breath  CARDIOVASCULAR: No chest pain, palpitations, dizziness, or leg swelling  GASTROINTESTINAL: No abdominal or epigastric pain. No nausea, vomiting, or hematemesis; No diarrhea or constipation. No melena or hematochezia.  GENITOURINARY: No dysuria, frequency, hematuria, or incontinence  NEUROLOGICAL: No headaches, memory loss, loss of strength, numbness, or tremors  SKIN: No itching, burning, rashes, or lesions   LYMPH NODES: No enlarged glands  ENDOCRINE: No heat or cold intolerance; No hair loss  MUSCULOSKELETAL: No joint pain or swelling; No muscle, back, or extremity pain  PSYCHIATRIC: No depression, anxiety, mood swings, or difficulty sleeping  HEME/LYMPH: No easy bruising, or bleeding gums  ALLERY AND IMMUNOLOGIC: No hives or eczema    Allergies    No Known Allergies    Intolerances        Social History:     FAMILY HISTORY:      MEDICATIONS  (STANDING):  chlorhexidine 4% Liquid 1 Application(s) Topical <User Schedule>  doxazosin 2 milliGRAM(s) Oral at bedtime  enoxaparin Injectable 40 milliGRAM(s) SubCutaneous <User Schedule>  multivitamin/minerals/iron Oral Solution (CENTRUM) 15 milliLiter(s) Oral daily    MEDICATIONS  (PRN):  acetaminophen     Tablet .. 650 milliGRAM(s) Oral every 6 hours PRN Temp greater or equal to 38.5C (101.3F), Mild Pain (1 - 3)  oxyCODONE    IR 5 milliGRAM(s) Oral every 4 hours PRN Moderate Pain (4 - 6)  oxyCODONE    IR 10 milliGRAM(s) Oral every 4 hours PRN Severe Pain (7 - 10)      Vital Signs Last 24 Hrs  T(C): 37.1 (18 Apr 2024 12:39), Max: 37.2 (18 Apr 2024 00:58)  T(F): 98.7 (18 Apr 2024 12:39), Max: 99 (18 Apr 2024 00:58)  HR: 114 (18 Apr 2024 12:39) (86 - 114)  BP: 152/73 (18 Apr 2024 12:39) (121/75 - 152/73)  BP(mean): --  RR: 20 (18 Apr 2024 12:39) (18 - 20)  SpO2: 96% (18 Apr 2024 12:39) (96% - 100%)  CAPILLARY BLOOD GLUCOSE        I&O's Summary    17 Apr 2024 07:01  -  18 Apr 2024 07:00  --------------------------------------------------------  IN: 150 mL / OUT: 150 mL / NET: 0 mL        PHYSICAL EXAM:  GENERAL: NAD, well-developed  HEAD:  Atraumatic, Normocephalic  EYES: EOMI, PERRLA, conjunctiva and sclera clear  NECK: Supple, No JVD  CHEST/LUNG: Clear to auscultation bilaterally; No wheeze  HEART: Regular rate and rhythm; No murmurs, rubs, or gallops  ABDOMEN: Soft, Nontender, Nondistended; Bowel sounds present  EXTREMITIES:  2+ Peripheral Pulses, No clubbing, cyanosis, or edema  PSYCH: AAOx3  NEUROLOGY: non-focal  SKIN: No rashes or lesions    LABS:    04-18    149<H>  |  107  |  35<H>  ----------------------------<  124<H>  4.0   |  26  |  0.54    Ca    10.8<H>      18 Apr 2024 05:37            Urinalysis Basic - ( 18 Apr 2024 05:37 )    Color: x / Appearance: x / SG: x / pH: x  Gluc: 124 mg/dL / Ketone: x  / Bili: x / Urobili: x   Blood: x / Protein: x / Nitrite: x   Leuk Esterase: x / RBC: x / WBC x   Sq Epi: x / Non Sq Epi: x / Bacteria: x        RADIOLOGY & ADDITIONAL TESTS:    Imaging Personally Reviewed:    Consultant(s) Notes Reviewed:      Care Discussed with Consultants/Other Providers:   Jose De Jesus Janae  contact via pager or TEAMS  Office [phone]    CC: comanagement    HPI:    58F, c no PMH occasional took NSAIDs for headaches,  transferred from [place] for SAH HH4 WNFS 5 mF4 ruptured R pcomm aneurysm. Was initially found unresponsive on 3/25/24 at 21:00, intubated at 23:00 at OSH, then txfrd. Given ddavp at OSH. Here in ED 1 unit PLTs given and R frontal EVD placed-high pressure. Went for R pcom clip on 3/26/24. Found with LV dysfunction, bradycardia and Acute left lower extremity DVT below the knee, involving the left soleal   vein. Extubated 4/3.     PAST MEDICAL & SURGICAL HISTORY:      Review of Systems:   CONSTITUTIONAL: No fever, weight loss, or fatigue  EYES: No eye pain, visual disturbances, or discharge  ENMT:  No difficulty hearing, tinnitus, vertigo; No sinus or throat pain  NECK: No pain or stiffness  BREASTS: No pain, masses, or nipple discharge  RESPIRATORY: No cough, wheezing, chills or hemoptysis; No shortness of breath  CARDIOVASCULAR: No chest pain, palpitations, dizziness, or leg swelling  GASTROINTESTINAL: No abdominal or epigastric pain. No nausea, vomiting, or hematemesis; No diarrhea or constipation. No melena or hematochezia.  GENITOURINARY: No dysuria, frequency, hematuria, or incontinence  NEUROLOGICAL: No headaches, memory loss, loss of strength, numbness, or tremors  SKIN: No itching, burning, rashes, or lesions   LYMPH NODES: No enlarged glands  ENDOCRINE: No heat or cold intolerance; No hair loss  MUSCULOSKELETAL: No joint pain or swelling; No muscle, back, or extremity pain  PSYCHIATRIC: No depression, anxiety, mood swings, or difficulty sleeping  HEME/LYMPH: No easy bruising, or bleeding gums  ALLERY AND IMMUNOLOGIC: No hives or eczema    Allergies    No Known Allergies    Intolerances        Social History:     FAMILY HISTORY:      MEDICATIONS  (STANDING):  chlorhexidine 4% Liquid 1 Application(s) Topical <User Schedule>  doxazosin 2 milliGRAM(s) Oral at bedtime  enoxaparin Injectable 40 milliGRAM(s) SubCutaneous <User Schedule>  multivitamin/minerals/iron Oral Solution (CENTRUM) 15 milliLiter(s) Oral daily    MEDICATIONS  (PRN):  acetaminophen     Tablet .. 650 milliGRAM(s) Oral every 6 hours PRN Temp greater or equal to 38.5C (101.3F), Mild Pain (1 - 3)  oxyCODONE    IR 5 milliGRAM(s) Oral every 4 hours PRN Moderate Pain (4 - 6)  oxyCODONE    IR 10 milliGRAM(s) Oral every 4 hours PRN Severe Pain (7 - 10)      Vital Signs Last 24 Hrs  T(C): 37.1 (18 Apr 2024 12:39), Max: 37.2 (18 Apr 2024 00:58)  T(F): 98.7 (18 Apr 2024 12:39), Max: 99 (18 Apr 2024 00:58)  HR: 114 (18 Apr 2024 12:39) (86 - 114)  BP: 152/73 (18 Apr 2024 12:39) (121/75 - 152/73)  BP(mean): --  RR: 20 (18 Apr 2024 12:39) (18 - 20)  SpO2: 96% (18 Apr 2024 12:39) (96% - 100%)  CAPILLARY BLOOD GLUCOSE        I&O's Summary    17 Apr 2024 07:01  -  18 Apr 2024 07:00  --------------------------------------------------------  IN: 150 mL / OUT: 150 mL / NET: 0 mL        PHYSICAL EXAM:  GENERAL: NAD, well-developed  HEAD:  Atraumatic, Normocephalic  EYES: EOMI, PERRLA, conjunctiva and sclera clear  NECK: Supple, No JVD  CHEST/LUNG: Clear to auscultation bilaterally; No wheeze  HEART: Regular rate and rhythm; No murmurs, rubs, or gallops  ABDOMEN: Soft, Nontender, Nondistended; Bowel sounds present  EXTREMITIES:  2+ Peripheral Pulses, No clubbing, cyanosis, or edema  PSYCH: AAOx3  NEUROLOGY: non-focal  SKIN: No rashes or lesions    LABS:    04-18    149<H>  |  107  |  35<H>  ----------------------------<  124<H>  4.0   |  26  |  0.54    Ca    10.8<H>      18 Apr 2024 05:37            Urinalysis Basic - ( 18 Apr 2024 05:37 )    Color: x / Appearance: x / SG: x / pH: x  Gluc: 124 mg/dL / Ketone: x  / Bili: x / Urobili: x   Blood: x / Protein: x / Nitrite: x   Leuk Esterase: x / RBC: x / WBC x   Sq Epi: x / Non Sq Epi: x / Bacteria: x        RADIOLOGY & ADDITIONAL TESTS:    Imaging Personally Reviewed:    Consultant(s) Notes Reviewed:      Care Discussed with Consultants/Other Providers:   Jose De Jesus Cardoso  contact via pager or TEAMS  Office [phone]    CC: comanagement. hx per chart and son    HPI:    58F, c no PMH occasional took NSAIDs for headaches,  transferred from [place] for SAH HH4 WNFS 5 mF4 ruptured R pcomm aneurysm. Was initially found unresponsive on 3/25/24 at 21:00, intubated at 23:00 at OSH, then txfrd. Given ddavp at OSH. Here in ED 1 unit PLTs given and R frontal EVD placed-high pressure. Went for R pcom clip on 3/26/24. Found with LV dysfunction, bradycardia and Acute left lower extremity DVT below the knee, involving the left soleal   vein. Extubated 4/3. MRI 4/8 showed widespread ischemic stroke b/l, s/p angio for vasospasm intervention. 4/16: CTH: tract hemorrhage    PAST MEDICAL & SURGICAL HISTORY:  none      Review of Systems:   CONSTITUTIONAL: No fever, weight loss, or fatigue  EYES: No eye pain, visual disturbances, or discharge  ENMT:  No difficulty hearing, tinnitus, vertigo; No sinus or throat pain  NECK: No pain or stiffness  BREASTS: No pain, masses, or nipple discharge  RESPIRATORY: No cough, wheezing, chills or hemoptysis; No shortness of breath  CARDIOVASCULAR: No chest pain, palpitations, dizziness, or leg swelling  GASTROINTESTINAL: No abdominal or epigastric pain. No nausea, vomiting, or hematemesis; No diarrhea or constipation. No melena or hematochezia.  GENITOURINARY: No dysuria, frequency, hematuria, or incontinence  NEUROLOGICAL: No headaches, memory loss, loss of strength, numbness, or tremors  SKIN: No itching, burning, rashes, or lesions   LYMPH NODES: No enlarged glands  ENDOCRINE: No heat or cold intolerance; No hair loss  MUSCULOSKELETAL: No joint pain or swelling; No muscle, back, or extremity pain  PSYCHIATRIC: No depression, anxiety, mood swings, or difficulty sleeping  HEME/LYMPH: No easy bruising, or bleeding gums  ALLERY AND IMMUNOLOGIC: No hives or eczema    Allergies    No Known Allergies    Social History:   no etoh, illegal drug use or etoh    FAMILY HISTORY:  Mother c "stomach issues"      MEDICATIONS  (STANDING):  chlorhexidine 4% Liquid 1 Application(s) Topical <User Schedule>  doxazosin 2 milliGRAM(s) Oral at bedtime  enoxaparin Injectable 40 milliGRAM(s) SubCutaneous <User Schedule>  multivitamin/minerals/iron Oral Solution (CENTRUM) 15 milliLiter(s) Oral daily    MEDICATIONS  (PRN):  acetaminophen     Tablet .. 650 milliGRAM(s) Oral every 6 hours PRN Temp greater or equal to 38.5C (101.3F), Mild Pain (1 - 3)  oxyCODONE    IR 5 milliGRAM(s) Oral every 4 hours PRN Moderate Pain (4 - 6)  oxyCODONE    IR 10 milliGRAM(s) Oral every 4 hours PRN Severe Pain (7 - 10)      Vital Signs Last 24 Hrs  T(C): 37.1 (18 Apr 2024 12:39), Max: 37.2 (18 Apr 2024 00:58)  T(F): 98.7 (18 Apr 2024 12:39), Max: 99 (18 Apr 2024 00:58)  HR: 114 (18 Apr 2024 12:39) (86 - 114)  BP: 152/73 (18 Apr 2024 12:39) (121/75 - 152/73)  BP(mean): --  RR: 20 (18 Apr 2024 12:39) (18 - 20)  SpO2: 96% (18 Apr 2024 12:39) (96% - 100%)  CAPILLARY BLOOD GLUCOSE        I&O's Summary    17 Apr 2024 07:01  -  18 Apr 2024 07:00  --------------------------------------------------------  IN: 150 mL / OUT: 150 mL / NET: 0 mL        PHYSICAL EXAM:  GENERAL: NAD, well-developed  EYES: EOMI, PERRLA, conjunctiva and sclera clear  NECK: Supple, No JVD  CHEST/LUNG: Clear to auscultation bilaterally; No wheeze  HEART: Regular rate and rhythm; No murmurs, rubs, or gallops; s1, s2+  ABDOMEN: Soft, Nontender, Nondistended; Bowel sounds present  EXTREMITIES:  2+ Peripheral Pulses, No clubbing, cyanosis, or edema  PSYCH: Alert  NEUROLOGY: aphasic; doesnt follow commands; feels noxious stimuli in all 4 ext but only moves right side c mild withdrawal on left  SKIN: No rashes     LABS:    04-18    149<H>  |  107  |  35<H>  ----------------------------<  124<H>  4.0   |  26  |  0.54    Ca    10.8<H>      18 Apr 2024 05:37            Urinalysis Basic - ( 18 Apr 2024 05:37 )    Color: x / Appearance: x / SG: x / pH: x  Gluc: 124 mg/dL / Ketone: x  / Bili: x / Urobili: x   Blood: x / Protein: x / Nitrite: x   Leuk Esterase: x / RBC: x / WBC x   Sq Epi: x / Non Sq Epi: x / Bacteria: x        RADIOLOGY & ADDITIONAL TESTS:    Imaging Personally Reviewed: CT head    Consultant(s) Notes Reviewed:      Care Discussed with Consultants/Other Providers: neurosurg

## 2024-04-18 NOTE — CONSULT NOTE ADULT - PROBLEM SELECTOR RECOMMENDATION 2
suspect underlying CAD/Ischemic heart disease   will need to obtain preior records   findings are NOT compatible with Takotsubo cardiomyopathy
w/u per cards. entresto vs arb/ace-i per cards

## 2024-04-18 NOTE — CONSULT NOTE ADULT - SUBJECTIVE AND OBJECTIVE BOX
Patient is a 58y old  Female who presents with a chief complaint of SAH (18 Apr 2024 12:50)      HPI:    JOSE MCLEAN is a 58F, on ASA81, transferred from [place] for SAH HH4 WNFS 5 mF4 ruptured R pcomm aneurysm. Was initially found unresponsive on 3/25/24 at 21:00, intubated at 23:00 at OSH, then txfrd 3/26/24 and s/p R frontal EVD placed-high pressure. s/p R pcom clip on 3/26/24, postop evd not functioning then drained on the floor. Angio on 4/1 (-) for vasospasms. Extubated on 4/3 (9 days).   -Course c/b vasospasms; pt s/p angios on 4/6 and 4/8 for treatment. Reintubated 4/8 for angio, but remained intubated because pt was not at baseline after anesthesia meds were held. Extubated for 2nd time on 4/9.  -EVD removed 4/15  -b/l below knee DVT (L seen on 3/28, L/R soleal seen on 4/3, again seen on 4/10)      DYSPHAGIA COURSE THIS ADMISSION:  Clinical swallow evaluations/interventions;   -4/4- Recommendations for NPO, with non-oral nutrition/hydration/medications. +overt s/sx of aspiration/penetration on conservative moderately thick liquids and that pt was aphonic w/ waxing and waning mentation.  -4/10- Pt now presenting w/ a waxing and waning mentation that does not support oral feeding  -4/11- Pt w/ poor bolus orientation despite slightly improved sustained mentation. Not safe for p.o. or a candidate for objective swallow testing.  -4/12-Npo recommendation; p/w a mentation that does not support oral feeding or objective swallow testing  -4/19 Fifth swallow evaluation: Mentation and ability to orient to a feeding task remains barrier that does not support oral feeding or objective swallow testing.    Rec: NPO with non oral nutrition/hydration and meds  Tolerating NGT feeds  no diarrhea; last BM 4/15  no fever or chills  +Left esther    not following commands, no verbalizations      PAST MEDICAL & SURGICAL HISTORY:      Allergies  No Known Allergies      MEDICATIONS  (STANDING):  chlorhexidine 4% Liquid 1 Application(s) Topical <User Schedule>  doxazosin 2 milliGRAM(s) Oral at bedtime  enoxaparin Injectable 40 milliGRAM(s) SubCutaneous <User Schedule>  multivitamin/minerals/iron Oral Solution (CENTRUM) 15 milliLiter(s) Oral daily    MEDICATIONS  (PRN):  acetaminophen     Tablet .. 650 milliGRAM(s) Oral every 6 hours PRN Temp greater or equal to 38.5C (101.3F), Mild Pain (1 - 3)  oxyCODONE    IR 5 milliGRAM(s) Oral every 4 hours PRN Moderate Pain (4 - 6)  oxyCODONE    IR 10 milliGRAM(s) Oral every 4 hours PRN Severe Pain (7 - 10)      Social History:  no known toxic habits    Family History   IBD (  ) Yes   (  ) No  GI Malignancy (  )  Yes    (  ) No      Advanced Directives: (   X  ) None    (      ) DNR    (     ) DNI    (     ) Health Care Proxy:     Review of Systems:  unable to obtain    Vital Signs Last 24 Hrs  T(C): 37.1 (18 Apr 2024 12:39), Max: 37.2 (18 Apr 2024 00:58)  T(F): 98.7 (18 Apr 2024 12:39), Max: 99 (18 Apr 2024 00:58)  HR: 114 (18 Apr 2024 12:39) (86 - 114)  BP: 152/73 (18 Apr 2024 12:39) (121/75 - 152/73)  BP(mean): --  RR: 20 (18 Apr 2024 12:39) (18 - 20)  SpO2: 96% (18 Apr 2024 12:39) (96% - 100%)    Parameters below as of 18 Apr 2024 12:39  Patient On (Oxygen Delivery Method): room air        PHYSICAL EXAM:    Constitutional: eyes closed, unresponsive to verbal or tactile stimuli +NGT +right wrist restraint  Neck: No LAD, supple  Respiratory: grossly clear no inc WOB  Cardiovascular: S1 and S2, regular  Gastrointestinal: BS+, soft, NT/ND, no surgical scars appreciated  Extremities: No peripheral edema  Vascular: 2+ peripheral pulses  Neurological: unresponsive  +left esther +LLE brace in place  Psychiatric: calm unresponsive  Skin: No rashes        LABS:    04-18    149<H>  |  107  |  35<H>  ----------------------------<  124<H>  4.0   |  26  |  0.54    Ca    10.8<H>      18 Apr 2024 05:37        Urinalysis Basic - ( 18 Apr 2024 05:37 )    Color: x / Appearance: x / SG: x / pH: x  Gluc: 124 mg/dL / Ketone: x  / Bili: x / Urobili: x   Blood: x / Protein: x / Nitrite: x   Leuk Esterase: x / RBC: x / WBC x   Sq Epi: x / Non Sq Epi: x / Bacteria: x          RADIOLOGY & ADDITIONAL TESTS:

## 2024-04-18 NOTE — PROGRESS NOTE ADULT - SUBJECTIVE AND OBJECTIVE BOX
CC: in NAD, +NGT, restraints    Vital Signs Last 24 Hrs  T(C): 36.9 (18 Apr 2024 16:37), Max: 37.2 (18 Apr 2024 00:58)  T(F): 98.4 (18 Apr 2024 16:37), Max: 99 (18 Apr 2024 00:58)  HR: 106 (18 Apr 2024 16:37) (86 - 114)  BP: 142/89 (18 Apr 2024 16:37) (121/75 - 152/73)  BP(mean): --  RR: 20 (18 Apr 2024 16:37) (18 - 20)  SpO2: 97% (18 Apr 2024 16:37) (96% - 100%)    Parameters below as of 18 Apr 2024 16:37  Patient On (Oxygen Delivery Method): room air    Physical Exam:  Neuro: alert, oriented to self with choices, mostly non verbal; EOS, PERRL, intermittently FC, B/l upper extremities localize (R>L),   LLE WD RLE AG  Lung: CTA bilat  GI: abdomen soft  incision: C/D/I, absorbable suture, EVD staples intact    04-15                        10.3   8.98  )-----------( 386      ( 18 Apr 2024 16:10 )             33.1       144  |  105  |  23  ----------------------------<  108<H>  3.8   |  26  |  0.46<L>    Ca    9.2      15 Apr 2024 03:50  Phos  4.8     04-15  Mg     2.2     04-15      MEDICATION LEVELS:     IVF FLUIDS/MEDICATIONS:   MEDICATIONS  (STANDING):  chlorhexidine 4% Liquid 1 Application(s) Topical <User Schedule>  enoxaparin Injectable 40 milliGRAM(s) SubCutaneous <User Schedule>  multivitamin/minerals/iron Oral Solution (CENTRUM) 15 milliLiter(s) Oral daily  theophylline Syrup 25 milliGRAM(s) Oral every 12 hours    MEDICATIONS  (PRN):  acetaminophen     Tablet .. 650 milliGRAM(s) Oral every 6 hours PRN Temp greater or equal to 38.5C (101.3F), Mild Pain (1 - 3)  oxyCODONE    IR 10 milliGRAM(s) Oral every 4 hours PRN Severe Pain (7 - 10)  oxyCODONE    IR 5 milliGRAM(s) Oral every 4 hours PRN Moderate Pain (4 - 6)

## 2024-04-18 NOTE — CONSULT NOTE ADULT - PROBLEM SELECTOR RECOMMENDATION 9
- Continue decadron 8mg q8h x24 hours  - Plan for glidescope monday if no improvement   - ENT will continue to follow  - Call with questions or concerns
s/p EVD   plan for OR Pcomm  for clip   Seizure Prophylaxis: Levetiracetam  TCDs, euvolemia, nimodipine
per neurosurg. monitor clinically

## 2024-04-18 NOTE — PROGRESS NOTE ADULT - ASSESSMENT
58F, on ASA81 for pain relief? 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 Was initially found unresponsive at 21:00, intubated at 23:00 at OSH, then txfrd. Given ddavp at OSH. On arrival patient w/ PERRL, +cough/gag, 2/5 spontaneous movement in LUE, o/w no movement. 1 unit PLTs given and R frontal EVD placed-high pressure. PLTs/Coags wnl.     Intubated sedated in NSCU   Echo with abnormal findings of severe cardiomyopathy and RWMA

## 2024-04-18 NOTE — PROGRESS NOTE ADULT - SUBJECTIVE AND OBJECTIVE BOX
patient now on 4 hanley  son at bedside     REVIEW OF SYSTEMS  unable to obtain     FUNCTIONAL PROGRESS  SLP 4/17  more awake, fixed gaze   NPO     PT 4/16  max assist x 2    OT 4/16  bed mobility max assist x 1-2  sitting EOB x 10-15 minutes, mod to max assist   dressing max assist -dependent    VITALS  T(C): 37.1 (04-18-24 @ 08:35), Max: 37.2 (04-18-24 @ 00:58)  HR: 86 (04-18-24 @ 08:35) (86 - 104)  BP: 137/76 (04-18-24 @ 08:35) (121/75 - 145/79)  RR: 18 (04-18-24 @ 08:35) (18 - 20)  SpO2: 100% (04-18-24 @ 08:35) (97% - 100%)  Wt(kg): --    MEDICATIONS   acetaminophen     Tablet .. 650 milliGRAM(s) every 6 hours PRN  chlorhexidine 4% Liquid 1 Application(s) <User Schedule>  doxazosin 2 milliGRAM(s) at bedtime  enoxaparin Injectable 40 milliGRAM(s) <User Schedule>  multivitamin/minerals/iron Oral Solution (CENTRUM) 15 milliLiter(s) daily  oxyCODONE    IR 5 milliGRAM(s) every 4 hours PRN  oxyCODONE    IR 10 milliGRAM(s) every 4 hours PRN      RECENT LABS - Reviewed    04-18    149<H>  |  107  |  35<H>  ----------------------------<  124<H>  4.0   |  26  |  0.54    Ca    10.8<H>      18 Apr 2024 05:37        Urinalysis Basic - ( 18 Apr 2024 05:37 )    Color: x / Appearance: x / SG: x / pH: x  Gluc: 124 mg/dL / Ketone: x  / Bili: x / Urobili: x   Blood: x / Protein: x / Nitrite: x   Leuk Esterase: x / RBC: x / WBC x   Sq Epi: x / Non Sq Epi: x / Bacteria: x      < from: CT Head No Cont (04.16.24 @ 09:42) >    IMPRESSION:  Status post right pterional craniotomy for distal right ICA aneurysm   clipping. Status post removal of right frontal EVD. No hydrocephalus.   Trace intraventricular air.      < end of copied text >      ----------------------------------------------------------------------------------------  PHYSICAL EXAM  Constitutional - NAD, Comfortable, in bed   +NGT  Chest - Breathing comfortably on room air   Cardiovascular - S1S2   Extremities - right wrist restraint   Neurologic Exam -                 Cognitive - awake, alert, left neglect, limited tracking     Communication - does not follow commands, nonverbal     moves right side spontaneously            Psychiatric - Mood stable, Affect WNL  ----------------------------------------------------------------------------------------  ASSESSMENT/PLAN  58yFemale with functional deficits after SAH  s/p craniotomy, Pcomm clip, EVD removed   CT with no hydrocephalus   b/l soleal DVTs, lovenox  NPO with NGT, GI consult pending   Pain - Tylenol oxycodone   Rehab - Will continue to follow for ongoing rehab needs and recommendations.    continue bedside therapy, PT/OT/SLP   requires max assist with bed mobility, sitting at EOB, more awake    recommend ACUTE inpatient rehabilitation for the functional deficits consisting of 3 hours of therapy/day & 24 hour RN/daily PMR physician for comorbid medical management, x 2-4 weeks depending on progress at rehabilitation facility. Will continue to follow for ongoing rehab needs and recommendations. Patient will be able to tolerate 3 hours a day.

## 2024-04-18 NOTE — PROGRESS NOTE ADULT - ASSESSMENT
ASSESSMENT/PLAN: HH4 WNFS 5 mF4 subarachnoid hemorrhage.    04/15: EVD removed  04/16: CTH: tract hemorrhage, Bedboarded  4/18 TX'd from NSCU overnight, called for PEG as failed swallow study    Neurosurgery   #Pcomm ruptured, s/p clip   #EVD tract acute hemorrhage ( removed 4/15 )  #Hydrocephalus/ICP crisis: resolved  #Delayed Cerebral Ischemia Management: euvolemia, nimodipine completed  #Pain: PRN tylenol oxy   #Activity: [x] OOB as tolerated    CV   #SBP goal 100-220  #4/9 TTE: EF 66%, no structural abnormalities/thrombus seen   #bradycardia resolved, dc theophyllin 25 BID    Pulm  extubated 4/9  saO2 > 92%  RA    Renal  IVL  monitor I&O, maintain euvolemia     GI   Diet:  Jevity tube feeds via NGT  Senna, suppository  LBM 4/15  GI called for PEG, will place tomorrow     Endocrine  Goal euglycemia (-180)  A1c 5.5     Heme  SCD   Chemoppx: lovenox 40 mg QD   4/10 LED: stable B/L Soleal DVT with extension to Rt posterior tibial DVT    ID   afebrile    appreciate hospitalist medicine follow up  cont restraints to avoid NGT pulling, PEG tomorrow-NPO  son updated at [facility]    will discuss with Dr Varela  [phone]

## 2024-04-19 LAB
ANION GAP SERPL CALC-SCNC: 13 MMOL/L — SIGNIFICANT CHANGE UP (ref 5–17)
ANION GAP SERPL CALC-SCNC: 18 MMOL/L — HIGH (ref 5–17)
BLD GP AB SCN SERPL QL: NEGATIVE — SIGNIFICANT CHANGE UP
BUN SERPL-MCNC: 36 MG/DL — HIGH (ref 7–23)
BUN SERPL-MCNC: 36 MG/DL — HIGH (ref 7–23)
CALCIUM SERPL-MCNC: 10.4 MG/DL — SIGNIFICANT CHANGE UP (ref 8.4–10.5)
CALCIUM SERPL-MCNC: 10.6 MG/DL — HIGH (ref 8.4–10.5)
CHLORIDE SERPL-SCNC: 113 MMOL/L — HIGH (ref 96–108)
CHLORIDE SERPL-SCNC: 113 MMOL/L — HIGH (ref 96–108)
CO2 SERPL-SCNC: 20 MMOL/L — LOW (ref 22–31)
CO2 SERPL-SCNC: 27 MMOL/L — SIGNIFICANT CHANGE UP (ref 22–31)
CREAT SERPL-MCNC: 0.54 MG/DL — SIGNIFICANT CHANGE UP (ref 0.5–1.3)
CREAT SERPL-MCNC: 0.57 MG/DL — SIGNIFICANT CHANGE UP (ref 0.5–1.3)
EGFR: 106 ML/MIN/1.73M2 — SIGNIFICANT CHANGE UP
EGFR: 107 ML/MIN/1.73M2 — SIGNIFICANT CHANGE UP
GLUCOSE SERPL-MCNC: 102 MG/DL — HIGH (ref 70–99)
GLUCOSE SERPL-MCNC: 113 MG/DL — HIGH (ref 70–99)
HCT VFR BLD CALC: 33.9 % — LOW (ref 34.5–45)
HGB BLD-MCNC: 10.5 G/DL — LOW (ref 11.5–15.5)
MCHC RBC-ENTMCNC: 29.5 PG — SIGNIFICANT CHANGE UP (ref 27–34)
MCHC RBC-ENTMCNC: 31 GM/DL — LOW (ref 32–36)
MCV RBC AUTO: 95.2 FL — SIGNIFICANT CHANGE UP (ref 80–100)
NRBC # BLD: 0 /100 WBCS — SIGNIFICANT CHANGE UP (ref 0–0)
PHOSPHATE SERPL-MCNC: 4.9 MG/DL — HIGH (ref 2.5–4.5)
PLATELET # BLD AUTO: 367 K/UL — SIGNIFICANT CHANGE UP (ref 150–400)
POTASSIUM SERPL-MCNC: 3.7 MMOL/L — SIGNIFICANT CHANGE UP (ref 3.5–5.3)
POTASSIUM SERPL-MCNC: 4.1 MMOL/L — SIGNIFICANT CHANGE UP (ref 3.5–5.3)
POTASSIUM SERPL-SCNC: 3.7 MMOL/L — SIGNIFICANT CHANGE UP (ref 3.5–5.3)
POTASSIUM SERPL-SCNC: 4.1 MMOL/L — SIGNIFICANT CHANGE UP (ref 3.5–5.3)
RBC # BLD: 3.56 M/UL — LOW (ref 3.8–5.2)
RBC # FLD: 15.1 % — HIGH (ref 10.3–14.5)
RH IG SCN BLD-IMP: POSITIVE — SIGNIFICANT CHANGE UP
SODIUM SERPL-SCNC: 151 MMOL/L — HIGH (ref 135–145)
SODIUM SERPL-SCNC: 153 MMOL/L — HIGH (ref 135–145)
WBC # BLD: 8.38 K/UL — SIGNIFICANT CHANGE UP (ref 3.8–10.5)
WBC # FLD AUTO: 8.38 K/UL — SIGNIFICANT CHANGE UP (ref 3.8–10.5)

## 2024-04-19 PROCEDURE — 99233 SBSQ HOSP IP/OBS HIGH 50: CPT

## 2024-04-19 PROCEDURE — 44360 SMALL BOWEL ENDOSCOPY: CPT

## 2024-04-19 PROCEDURE — 74177 CT ABD & PELVIS W/CONTRAST: CPT | Mod: 26

## 2024-04-19 PROCEDURE — 99222 1ST HOSP IP/OBS MODERATE 55: CPT

## 2024-04-19 RX ORDER — SODIUM CHLORIDE 9 MG/ML
1000 INJECTION, SOLUTION INTRAVENOUS
Refills: 0 | Status: DISCONTINUED | OUTPATIENT
Start: 2024-04-19 | End: 2024-04-20

## 2024-04-19 RX ORDER — SODIUM CHLORIDE 9 MG/ML
1000 INJECTION INTRAMUSCULAR; INTRAVENOUS; SUBCUTANEOUS
Refills: 0 | Status: DISCONTINUED | OUTPATIENT
Start: 2024-04-19 | End: 2024-04-19

## 2024-04-19 RX ORDER — SODIUM CHLORIDE 9 MG/ML
1000 INJECTION, SOLUTION INTRAVENOUS
Refills: 0 | Status: DISCONTINUED | OUTPATIENT
Start: 2024-04-19 | End: 2024-04-19

## 2024-04-19 RX ORDER — ACETAMINOPHEN 500 MG
1000 TABLET ORAL ONCE
Refills: 0 | Status: DISCONTINUED | OUTPATIENT
Start: 2024-04-19 | End: 2024-04-23

## 2024-04-19 RX ADMIN — SODIUM CHLORIDE 30 MILLILITER(S): 9 INJECTION, SOLUTION INTRAVENOUS at 10:38

## 2024-04-19 RX ADMIN — Medication 100 MILLIGRAM(S): at 10:38

## 2024-04-19 RX ADMIN — Medication 15 MILLILITER(S): at 10:39

## 2024-04-19 RX ADMIN — CHLORHEXIDINE GLUCONATE 1 APPLICATION(S): 213 SOLUTION TOPICAL at 06:22

## 2024-04-19 RX ADMIN — POLYETHYLENE GLYCOL 3350 17 GRAM(S): 17 POWDER, FOR SOLUTION ORAL at 10:38

## 2024-04-19 NOTE — CONSULT NOTE ADULT - SUBJECTIVE AND OBJECTIVE BOX
SURGERY CONSULT NOTE  --------------------------------------------------------------------------------------------    Patient is a 58y old  Female who presents with a chief complaint of SAH (19 Apr 2024 09:43)      HPI: 58F presents as transfer from [facility] w SAH 2/2 ruptured PCOMM aneurysm, s/p EVD, clip, and multiple repeat neurosurgical interventions since admission 3/26. Course cb dysphagia, s/p attempted PEG placement by GI 4/19. During PEG placement, growing hematoma noted in gastric wall on endoscopy after trocar placement. Procedure aborted d/t cf bleeding, and Surgery called for evaluation.     HDS in Endo Suite recovery.     Seen and examined. Subjective hx limited by baseline exam.       PAST MEDICAL & SURGICAL HISTORY:    FAMILY HISTORY:    [] Family history not pertinent as reviewed with the patient and family    ALLERGIES: No Known Allergies    CURRENT MEDICATIONS  MEDICATIONS (STANDING): dextrose 5%. 1000 milliLiter(s) IV Continuous <Continuous>  doxazosin 2 milliGRAM(s) Oral at bedtime  multivitamin/minerals/iron Oral Solution (CENTRUM) 15 milliLiter(s) Oral daily  polyethylene glycol 3350      polyethylene glycol 3350 17 Gram(s) Oral daily  senna Syrup 10 milliLiter(s) Oral at bedtime    MEDICATIONS (PRN):acetaminophen     Tablet .. 650 milliGRAM(s) Oral every 6 hours PRN Temp greater or equal to 38.5C (101.3F), Mild Pain (1 - 3)  oxyCODONE    IR 5 milliGRAM(s) Oral every 4 hours PRN Moderate Pain (4 - 6)  oxyCODONE    IR 10 milliGRAM(s) Oral every 4 hours PRN Severe Pain (7 - 10)    --------------------------------------------------------------------------------------------    Vitals:   T(C): 36.6 (04-19-24 @ 14:46), Max: 37.5 (04-19-24 @ 12:11)  HR: 96 (04-19-24 @ 16:55) (93 - 105)  BP: 170/70 (04-19-24 @ 16:55) (128/71 - 175/70)  RR: 18 (04-19-24 @ 16:55) (14 - 21)  SpO2: 100% (04-19-24 @ 16:55) (95% - 100%)  CAPILLARY BLOOD GLUCOSE        CAPILLARY BLOOD GLUCOSE          04-18 @ 07:01  -  04-19 @ 07:00  --------------------------------------------------------  IN:    Enteral Tube Flush: 400 mL    Jevity 1.5: 300 mL  Total IN: 700 mL    OUT:    Voided (mL): 750 mL  Total OUT: 750 mL    Total NET: -50 mL      04-19 @ 07:01  -  04-19 @ 17:08  --------------------------------------------------------  IN:    dextrose 5%: 150 mL    IV PiggyBack: 100 mL  Total IN: 250 mL    OUT:  Total OUT: 0 mL    Total NET: 250 mL            PHYSICAL EXAM: ***  General: Alert, NAD  Neuro: A+Ox3  HEENT: NC/AT, no asymmetry, no scleral icterus  Neck: Soft, supple  Cardio: RRR, nml S1/S2  Resp: Airway patent, unlabored breathing  Thorax: No chest wall tenderness  GI/Abd: Soft, NT/ND, no rebound/guarding, no masses palpated  Vascular: All 4 extremities warm, B/l radial pulses palpable, b/l femoral pulse palpable,  b/l DP/PT palpable  Skin: Intact, no breakdown  Musculoskeletal: All 4 extremities moving spontaneously, no limitations  --------------------------------------------------------------------------------------------    LABS  CBC (04-18 @ 16:10)                              10.3<L>                         8.98    )----------------(  386        81.7<H>% Neutrophils, 10.5<L>% Lymphocytes, ANC: 7.33                                33.1<L>    BMP (04-19 @ 09:42)             153<H>  |  113<H>  |  36<H> 		Ca++ --      Ca 10.6<H>             ---------------------------------( 113<H>		Mg --                 3.7     |  27      |  0.54  			Ph 4.9<H>  BMP (04-18 @ 17:11)             154<H>  |  113<H>  |  36<H> 		Ca++ --      Ca 10.3               ---------------------------------( 128<H>		Mg --                 4.0     |  27      |  0.53  			Ph --                  --------------------------------------------------------------------------------------------    MICROBIOLOGY  Urinalysis (04-19 @ 09:42):     Color:  / Appearance:  / SG:  / pH:  / Gluc: 113<H> / Ketones:  / Bili:  / Urobili:  / Protein : / Nitrites:  / Leuk.Est:  / RBC:  / WBC:  / Sq Epi:  / Non Sq Epi:  / Bacteria          --------------------------------------------------------------------------------------------    IMAGING   SURGERY CONSULT NOTE  --------------------------------------------------------------------------------------------    Patient is a 58y old  Female who presents with a chief complaint of SAH (19 Apr 2024 09:43)      HPI: 58F presents as transfer [facility] w SAH 2/2 ruptured PCOMM aneurysm, s/p EVD, clip, and multiple repeat neurosurgical interventions since admission 3/26. Course cb dysphagia, s/p attempted PEG placement by GI 4/19. During PEG placement, growing hematoma noted in gastric wall on endoscopy after trocar placement. Procedure aborted d/t cf bleeding, and Surgery called for evaluation.     HDS in Endo Suite recovery.     Seen and examined. Subjective hx limited by baseline exam.       PAST MEDICAL & SURGICAL HISTORY:    FAMILY HISTORY:    [] Family history not pertinent as reviewed with the patient and family    ALLERGIES: No Known Allergies    CURRENT MEDICATIONS  MEDICATIONS (STANDING): dextrose 5%. 1000 milliLiter(s) IV Continuous <Continuous>  doxazosin 2 milliGRAM(s) Oral at bedtime  multivitamin/minerals/iron Oral Solution (CENTRUM) 15 milliLiter(s) Oral daily  polyethylene glycol 3350      polyethylene glycol 3350 17 Gram(s) Oral daily  senna Syrup 10 milliLiter(s) Oral at bedtime    MEDICATIONS (PRN):acetaminophen     Tablet .. 650 milliGRAM(s) Oral every 6 hours PRN Temp greater or equal to 38.5C (101.3F), Mild Pain (1 - 3)  oxyCODONE    IR 5 milliGRAM(s) Oral every 4 hours PRN Moderate Pain (4 - 6)  oxyCODONE    IR 10 milliGRAM(s) Oral every 4 hours PRN Severe Pain (7 - 10)    --------------------------------------------------------------------------------------------    Vitals:   T(C): 36.6 (04-19-24 @ 14:46), Max: 37.5 (04-19-24 @ 12:11)  HR: 96 (04-19-24 @ 16:55) (93 - 105)  BP: 170/70 (04-19-24 @ 16:55) (128/71 - 175/70)  RR: 18 (04-19-24 @ 16:55) (14 - 21)  SpO2: 100% (04-19-24 @ 16:55) (95% - 100%)  CAPILLARY BLOOD GLUCOSE        CAPILLARY BLOOD GLUCOSE          04-18 @ 07:01  -  04-19 @ 07:00  --------------------------------------------------------  IN:    Enteral Tube Flush: 400 mL    Jevity 1.5: 300 mL  Total IN: 700 mL    OUT:    Voided (mL): 750 mL  Total OUT: 750 mL    Total NET: -50 mL      04-19 @ 07:01  -  04-19 @ 17:08  --------------------------------------------------------  IN:    dextrose 5%: 150 mL    IV PiggyBack: 100 mL  Total IN: 250 mL    OUT:  Total OUT: 0 mL    Total NET: 250 mL            PHYSICAL EXAM:   General: Alert, eyes open   Neuro: A+Ox0  HEENT: NC/AT, no asymmetry, no scleral icterus  Neck: Soft, supple  Cardio: RRR   Resp: Airway patent, unlabored breathing  Thorax: No chest wall tenderness  GI/Abd: Soft, NT/ND, no rebound/guarding, no masses palpated, sub cm incision hemostatic in LUQ   Vascular: All 4 extremities warm   Skin: Intact, no breakdown  Musculoskeletal: All 4 extremities moving spontaneously   --------------------------------------------------------------------------------------------    LABS  CBC (04-18 @ 16:10)                              10.3<L>                         8.98    )----------------(  386        81.7<H>% Neutrophils, 10.5<L>% Lymphocytes, ANC: 7.33                                33.1<L>    BMP (04-19 @ 09:42)             153<H>  |  113<H>  |  36<H> 		Ca++ --      Ca 10.6<H>             ---------------------------------( 113<H>		Mg --                 3.7     |  27      |  0.54  			Ph 4.9<H>  BMP (04-18 @ 17:11)             154<H>  |  113<H>  |  36<H> 		Ca++ --      Ca 10.3               ---------------------------------( 128<H>		Mg --                 4.0     |  27      |  0.53  			Ph --                  --------------------------------------------------------------------------------------------    MICROBIOLOGY  Urinalysis (04-19 @ 09:42):     Color:  / Appearance:  / SG:  / pH:  / Gluc: 113<H> / Ketones:  / Bili:  / Urobili:  / Protein : / Nitrites:  / Leuk.Est:  / RBC:  / WBC:  / Sq Epi:  / Non Sq Epi:  / Bacteria          --------------------------------------------------------------------------------------------    IMAGING

## 2024-04-19 NOTE — PROGRESS NOTE ADULT - PROBLEM SELECTOR PLAN 2
son reports that patient hasn't seen doctors for a while and has no h/o cad. f/u cards recs for entresto vs arb/ace-i. beta-blocker if no further concern for bradycardia resolved on repeat echo. now normal.

## 2024-04-19 NOTE — PROGRESS NOTE ADULT - REASON FOR ADMISSION
This psychosocial assessment note is copied from mom's chart    [facility]  MATERNAL CHILD HEALTH   INITIAL NICU PSYCHOSOCIAL ASSESSMENT      DATA:      Presenting Information: Mom is a  who delivered twins, Juan and Ap (pronounced Anjel) on 2022 at 33w5d gestation in the setting of pre-E. Baby was admitted to the NICU for management of prematurity.  SW was consulted to meet with this family per NICU admission of infants.      Living Situation: Parents are unmarried and live together in [place], [place] in [facility], along with their other children ages 7 and 4.     Social Support: Ethel reports social support from extended family and friends but Juan is her biggest supporter.     Education/Employment: Ethel works at [employer] and Juan at [employer]     Insurance: stickK     Source of Financial Support: parental employment      Mental Health History: yes    Diagnoses: Bipolar II    Medications: not during pregnancy    Therapy: yes     History of Postpartum Mood Disorders: yes     Chemical Health History: no     Legal/Child Protection Involvement: no     INTERVENTION:        Chart review    Collaboration with team    Conducted Psychosocial Assessment    Introduction to Maternal Child Health SW role and scope of practice    Orientation to the NICU (parking, lodging, meals, visitation)    Validated emotions and provided supportive listening    Provided resources and referrals  ? Monthly parking pass  ? [facility]--individual therapy  ? WIC  ? Child Life for support managing kids at home and caring for twins in the NICU    Provided psychoeducation on  mood disorders and indicated that SW would continue to monitor mood and support bridging to mental health resources as needed.    Provided SW contact info     ASSESSMENT:      Coping:  This writer met with Ethel in [facility].  She was reserved but engaged with this writer as she  better understood the social work role.  Ethel reports the twins were delivered several weeks early due to pre-E.  She reports this has been challenging as they still need to obtain some baby items and had some last minute things they hoped to take care of.  Ethel reports she and Juan provide good support for one another.  He has paid paternity leave and hopes to use it when the twins come home.  Ethel reports she has a history of Bipolar II which she has managed with therapy during her pregnancy.  She feels her mood is stable at this time but she is open to therapy resources as her current therapist only works with women who are pregnant or in the first two weeks post-partum.  Ethel knows she will need support as she finds a balance with her own recovery from child birth, caring for twins and parenting their other two children.  She recognizes this is a lot to manage and knows she will need support and encouragement.  Ethel is hopeful the twins will have a short NICU stay.  She reports they are both on room air and cueing to eat.  Ethel is unsure of her discharge date but hopes the twins may be in a private room when she is discharged so she can stay with them.  Ethel has had a bit of trouble managing pain and blood pressure post-delivery which is also concerning her.  Ethel is also open to [facility] visits post-partum if she is eligible.   (emotional/coping skills, integration of information, engagement with SW/staff, medical understanding, observed family interactions)     Assessment of parental risk for PMAD: Higher than average risk, considering medically complexity and history     Risk Factors: mental health concerns, managing kids at home and kids in NICU     Resiliency Factors & Strengths: supportive partner, willingness to engage with supports, experienced parents     PLAN:      SW will continue to follow for supportive intervention.     Ellen PERALTAW, MSW, [facility]  Maternal Child Health            [facility]H

## 2024-04-19 NOTE — CHART NOTE - NSCHARTNOTEFT_GEN_A_CORE
s/p EGD with attempt at PEG placement  Full note in [place]  4/19/24 EGD WNL. Aborted PEG placement 2/2 extraluminal hematoma. No blood in stomach. no bleeding noted at incision site    RECS:  -NPO  -Urgent CT AP w/ IV contrast  -Surgical consult ([facility] surgery team called by Neurosurgery)  -monitor CBC     Kvng Jay updated over phone  Neurosurgery team updated    Moses Burch PA-C  [facility] Non Teaching GI Service  Available on TEAMS Mon-Fri 8a-4p  After hours and weekend coverage [phone]

## 2024-04-19 NOTE — PROGRESS NOTE ADULT - SUBJECTIVE AND OBJECTIVE BOX
Subjective: Patient seen and examined. No new events except as noted.     REVIEW OF SYSTEMS:    CONSTITUTIONAL: + weakness, fevers or chills  EYES/ENT: No visual changes;  No vertigo or throat pain   NECK: No pain or stiffness  RESPIRATORY: No cough, wheezing, hemoptysis; No shortness of breath  CARDIOVASCULAR: No chest pain or palpitations  GASTROINTESTINAL: No abdominal or epigastric pain. No nausea, vomiting, or hematemesis; No diarrhea or constipation. No melena or hematochezia.  GENITOURINARY: No dysuria, frequency or hematuria  NEUROLOGICAL: No numbness or weakness  SKIN: No itching, burning, rashes, or lesions   All other review of systems is negative unless indicated above.    MEDICATIONS:  MEDICATIONS  (STANDING):  ceFAZolin   IVPB 2000 milliGRAM(s) IV Intermittent once  chlorhexidine 4% Liquid 1 Application(s) Topical <User Schedule>  doxazosin 2 milliGRAM(s) Oral at bedtime  enoxaparin Injectable 40 milliGRAM(s) SubCutaneous <User Schedule>  multivitamin/minerals/iron Oral Solution (CENTRUM) 15 milliLiter(s) Oral daily  polyethylene glycol 3350      polyethylene glycol 3350 17 Gram(s) Oral daily  senna Syrup 10 milliLiter(s) Oral at bedtime      PHYSICAL EXAM:  T(C): 37 (04-19-24 @ 01:18), Max: 37.1 (04-18-24 @ 08:35)  HR: 104 (04-19-24 @ 01:18) (86 - 114)  BP: 128/71 (04-19-24 @ 01:18) (128/71 - 152/73)  RR: 18 (04-19-24 @ 01:18) (18 - 20)  SpO2: 96% (04-19-24 @ 01:18) (96% - 100%)  Wt(kg): --  I&O's Summary    17 Apr 2024 07:01  -  18 Apr 2024 07:00  --------------------------------------------------------  IN: 150 mL / OUT: 150 mL / NET: 0 mL    18 Apr 2024 07:01  -  19 Apr 2024 05:12  --------------------------------------------------------  IN: 700 mL / OUT: 300 mL / NET: 400 mL        Appearance: NAD  HEENT:  dry oral mucosa, PERRL, EOMI	  +NGT  Lymphatic: No lymphadenopathy  Cardiovascular: Normal S1 S2, No JVD, No murmurs, No edema  Respiratory: decreased bs   Psychiatry: nonoverbal  Gastrointestinal:  Soft, Non-tender, + BS	  Skin: No rashes, No ecchymoses, No cyanosis	  Extremities: decreased  range of motion, No clubbing, cyanosis or edema  Vascular: Peripheral pulses palpable 2+ bilaterally  alert, oriented on self, place, EOS, PERRL, intermittently FC, B/l upper extremities localize (R>L), LLE WD RLE AG      LABS:    CARDIAC MARKERS:                                10.3   8.98  )-----------( 386      ( 18 Apr 2024 16:10 )             33.1     04-18    154<H>  |  113<H>  |  36<H>  ----------------------------<  128<H>  4.0   |  27  |  0.53    Ca    10.3      18 Apr 2024 17:11          TELEMETRY: 	    ECG:  	  RADIOLOGY:   DIAGNOSTIC TESTING:  [ ] Echocardiogram:  [ ]  Catheterization:  [ ] Stress Test:    OTHER:

## 2024-04-19 NOTE — CONSULT NOTE ADULT - CONSULT REQUESTED DATE/TIME
19-Apr-2024 17:08
01-Apr-2024
18-Apr-2024 14:00
26-Mar-2024 17:09
16-Apr-2024 09:07
18-Apr-2024
30-Mar-2024

## 2024-04-19 NOTE — CONSULT NOTE ADULT - CONSULT REASON
airway eval
medical management
Cardiomyopathy
Rehabilitation consult
Bleeding 2/2 PEG attempt
dysphagia
Below the knee DVT

## 2024-04-19 NOTE — PROGRESS NOTE ADULT - SUBJECTIVE AND OBJECTIVE BOX
CC: in NAD, +NGT, restraints; now s/p attempted and aborted PEG for noted hematoma at trochar site during failed placement  maintain NPO and check CBC/BMP and urgent surgery consult called and CTA of abdomen/pelvis ordered to r/o active bleed.     Vital Signs Last 24 Hrs  T(C): 36.9 (18 Apr 2024 16:37), Max: 37.2 (18 Apr 2024 00:58)  T(F): 98.4 (18 Apr 2024 16:37), Max: 99 (18 Apr 2024 00:58)  HR: 106 (18 Apr 2024 16:37) (86 - 114)  BP: 142/89 (18 Apr 2024 16:37) (121/75 - 152/73)  BP(mean): --  RR: 20 (18 Apr 2024 16:37) (18 - 20)  SpO2: 97% (18 Apr 2024 16:37) (96% - 100%)    Parameters below as of 18 Apr 2024 16:37  Patient On (Oxygen Delivery Method): room air    Physical Exam:  Neuro: alert, oriented to self with choices, mostly non verbal; EOS, PERRL, intermittently FC, B/l upper extremities localize (R>L),   LLE WD RLE AG  Lung: CTA bilat  GI: abdomen soft  incision: C/D/I, absorbable suture, EVD staples intact    labs:                        10.3   8.98  )-----------( 386      ( 18 Apr 2024 16:10 )             33.1   04-19    153<H>  |  113<H>  |  36<H>  ----------------------------<  113<H>  3.7   |  27  |  0.54    Ca    10.6<H>      19 Apr 2024 09:42  Phos  4.9     04-19             MEDICATIONS  (STANDING):  chlorhexidine 4% Liquid 1 Application(s) Topical <User Schedule>  dextrose 5%. 1000 milliLiter(s) (30 mL/Hr) IV Continuous <Continuous>  doxazosin 2 milliGRAM(s) Oral at bedtime  multivitamin/minerals/iron Oral Solution (CENTRUM) 15 milliLiter(s) Oral daily  polyethylene glycol 3350      polyethylene glycol 3350 17 Gram(s) Oral daily  senna Syrup 10 milliLiter(s) Oral at bedtime    MEDICATIONS  (PRN):  acetaminophen     Tablet .. 650 milliGRAM(s) Oral every 6 hours PRN Temp greater or equal to 38.5C (101.3F), Mild Pain (1 - 3)  acetaminophen   IVPB .. 1000 milliGRAM(s) IV Intermittent once PRN Severe Pain (7 - 10)  oxyCODONE    IR 5 milliGRAM(s) Oral every 4 hours PRN Moderate Pain (4 - 6)  oxyCODONE    IR 10 milliGRAM(s) Oral every 4 hours PRN Severe Pain (7 - 10)

## 2024-04-19 NOTE — CONSULT NOTE ADULT - ASSESSMENT
57 YO Woman on aspirin at home presented to OSH after being found unresponsive, found to have SAH HH4 WNFS 5 mF4 ruptured R pcomm aneurysm s/p R frontal EVD placed-high pressure, and then. R pcom clip on 3/26/24, with course c/b bilateral soleal DVTs.     GI consulted for PEG evaluation 2/2 persistent Dysphagia   Son: Pierre Patrick [phone]    #dysphagia    RECS:  -continue NGT, hold feeds at MN. ok to given meds via NGT after MN  -tentatively scheduled PEG placement Fri 4/19  -reviewed indications, risks, benefits and alternatives with pt's son Pierre Patrick ( [phone]) who has discussed with his brothers.  Family in agreement to proceed with PEG placement  -check CBC, BMP, T&S and coags today  -ancef 2 grams IV x 1 dose tomorrow (will order)  -start bowel regimen (last BM 4/15): Miralax QD and Senna HS    Discussed with Neurosurgery team    Moses Burch PA-C  [facility] Teaching GI Service  Available on TEAMS Mon-Fri 8a-4p  After hours and weekend coverage [phone]  
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 Course cb dysphagia, s/p attempted PEG placement by GI 4/19. During PEG placement, growing hematoma noted in gastric wall on endoscopy after trocar placement. Procedure aborted d/t cf bleeding, and Surgery called for evaluation.     Plan:   -No acute surgical intervention   -Maintain T/Sx2  -Maintain 2 Large Bore IV   -CTA A/P r/o active extravasation   -IR if evidence of active extrav   -Surg to follow up imaging     Discussed with Attending Surgeon Dr. Shannan Ashton MD PGY3  ACS, [id number] 
58F, on ASA81 for pain relief? (fell a month ago), presents as txfr from [place] for diffuse SAH w/small IVH, 2/2 ruptured posterolaterally projecting 2.9x2.4x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 Was initially found unresponsive at 21:00, intubated at 23:00 at OSH, then txfrd. Given ddavp at OSH. On arrival patient w/ PERRL, +cough/gag, 2/5 spontaneous movement in LUE, o/w no movement. 1 unit PLTs given and R frontal EVD placed-high pressure. PLTs/Coags wnl.     Intubated sedated in NSCU   Echo with abnormal findings of severe cardiomyopathy and RWMA  
58F w/ SAH, ruptured R pcomm aneurysm, currently intubated. ENT called for airway eval given lack of cuff leak. Pt already received 3 doses of 8mg decadron per primary team. On exam, no cuff leak heard upon deflating cuff. Larynx suctioned w/ flexible suction catheter and indirect laryngoscopy performed, mild b/l arytenoid edema noted, pooling of secretions at ET tube, no cuff leak visualized. 
58F, c no PMH occasional took NSAIDs for headaches,  transferred from [place] for SAH HH4 WNFS 5 mF4 ruptured R pcomm aneurysm. Was initially found unresponsive on 3/25/24 at 21:00, intubated at 23:00 at OSH, then txfrd. Given ddavp at OSH. Here in ED 1 unit PLTs given and R frontal EVD placed-high pressure. Went for R pcom clip on 3/26/24. Found with LV dysfunction, bradycardia and Acute left lower extremity DVT below the knee, involving the left soleal   vein. Extubated 4/3. MRI 4/8 showed widespread ischemic stroke b/l, s/p angio for vasospasm intervention. 4/16: CTH: tract hemorrhage

## 2024-04-19 NOTE — PROGRESS NOTE ADULT - ASSESSMENT
58F, on ASA81 for pain relief? (fell a month ago), presents as txfr from [place] for diffuse SAH w/small IVH, 2/2 ruptured posterolaterally projecting 2.9x2.4x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 Was initially found unresponsive at 21:00, intubated at 23:00 at OSH, then txfrd. Given ddavp at OSH. On arrival patient w/ PERRL, +cough/gag, 2/5 spontaneous movement in LUE, o/w no movement. 1 unit PLTs given and R frontal EVD placed-high pressure. PLTs/Coags wnl.     Intubated sedated in NSCU   Echo with abnormal findings of severe cardiomyopathy and RWMA

## 2024-04-19 NOTE — CONSULT NOTE ADULT - TIME BILLING
I saw and evaluated patient and agree with residents note  seen in endoscopy - benign exam and hemodynamically stable  arranged for CT scan which did not show active bleed and hematoma not concerning from my perspective
chart review, d/w son, exam, d/w neurosurg

## 2024-04-19 NOTE — PROGRESS NOTE ADULT - SUBJECTIVE AND OBJECTIVE BOX
Jose De Jesus Cardoso   contact via pager or TEAMS        CC: Patient is a 58y old  Female who presents with a chief complaint of SAH (19 Apr 2024 05:12)      SUBJECTIVE / OVERNIGHT EVENTS:    MEDICATIONS  (STANDING):  ceFAZolin   IVPB 2000 milliGRAM(s) IV Intermittent once  chlorhexidine 4% Liquid 1 Application(s) Topical <User Schedule>  doxazosin 2 milliGRAM(s) Oral at bedtime  enoxaparin Injectable 40 milliGRAM(s) SubCutaneous <User Schedule>  multivitamin/minerals/iron Oral Solution (CENTRUM) 15 milliLiter(s) Oral daily  polyethylene glycol 3350      polyethylene glycol 3350 17 Gram(s) Oral daily  senna Syrup 10 milliLiter(s) Oral at bedtime    MEDICATIONS  (PRN):  acetaminophen     Tablet .. 650 milliGRAM(s) Oral every 6 hours PRN Temp greater or equal to 38.5C (101.3F), Mild Pain (1 - 3)  oxyCODONE    IR 5 milliGRAM(s) Oral every 4 hours PRN Moderate Pain (4 - 6)  oxyCODONE    IR 10 milliGRAM(s) Oral every 4 hours PRN Severe Pain (7 - 10)      Vital Signs Last 24 Hrs  T(C): 36.8 (19 Apr 2024 08:50), Max: 37.1 (18 Apr 2024 12:39)  T(F): 98.2 (19 Apr 2024 08:50), Max: 98.8 (18 Apr 2024 20:37)  HR: 100 (19 Apr 2024 08:50) (100 - 114)  BP: 145/92 (19 Apr 2024 08:50) (128/71 - 156/95)  BP(mean): --  RR: 21 (19 Apr 2024 08:50) (18 - 21)  SpO2: 100% (19 Apr 2024 08:50) (96% - 100%)  CAPILLARY BLOOD GLUCOSE        I&O's Summary    18 Apr 2024 07:01  -  19 Apr 2024 07:00  --------------------------------------------------------  IN: 700 mL / OUT: 750 mL / NET: -50 mL      tele:    PHYSICAL EXAM:    GENERAL: NAD   HEENT: EOMI, PERRL  PULM: Clear to auscultation bilaterally  CV: Regular rate and rhythm; nl S1, S2; No murmurs, rubs, or gallops  ABDOMEN: Soft, Nontender, Nondistended; Bowel sounds present  EXTREMITIES/MSK:  No edema, calf tenderness   PSYCH: AAOx3  NEUROLOGY: non-focal          LABS:                        10.3   8.98  )-----------( 386      ( 18 Apr 2024 16:10 )             33.1     04-18    154<H>  |  113<H>  |  36<H>  ----------------------------<  128<H>  4.0   |  27  |  0.53    Ca    10.3      18 Apr 2024 17:11            Urinalysis Basic - ( 18 Apr 2024 17:11 )    Color: x / Appearance: x / SG: x / pH: x  Gluc: 128 mg/dL / Ketone: x  / Bili: x / Urobili: x   Blood: x / Protein: x / Nitrite: x   Leuk Esterase: x / RBC: x / WBC x   Sq Epi: x / Non Sq Epi: x / Bacteria: x          RADIOLOGY & ADDITIONAL TESTS:    Imaging Personally Reviewed:    Consultant(s) Notes Reviewed:      Care Discussed with Consultants/Other Providers:   Jose De Jesus Cardoso   contact via pager or TEAMS        CC: Patient is a 58y old  Female who presents with a chief complaint of SAH (19 Apr 2024 05:12)      SUBJECTIVE / OVERNIGHT EVENTS: no events overnight per staff and son.    MEDICATIONS  (STANDING):  ceFAZolin   IVPB 2000 milliGRAM(s) IV Intermittent once  chlorhexidine 4% Liquid 1 Application(s) Topical <User Schedule>  doxazosin 2 milliGRAM(s) Oral at bedtime  enoxaparin Injectable 40 milliGRAM(s) SubCutaneous <User Schedule>  multivitamin/minerals/iron Oral Solution (CENTRUM) 15 milliLiter(s) Oral daily  polyethylene glycol 3350      polyethylene glycol 3350 17 Gram(s) Oral daily  senna Syrup 10 milliLiter(s) Oral at bedtime    MEDICATIONS  (PRN):  acetaminophen     Tablet .. 650 milliGRAM(s) Oral every 6 hours PRN Temp greater or equal to 38.5C (101.3F), Mild Pain (1 - 3)  oxyCODONE    IR 5 milliGRAM(s) Oral every 4 hours PRN Moderate Pain (4 - 6)  oxyCODONE    IR 10 milliGRAM(s) Oral every 4 hours PRN Severe Pain (7 - 10)      Vital Signs Last 24 Hrs  T(C): 36.8 (19 Apr 2024 08:50), Max: 37.1 (18 Apr 2024 12:39)  T(F): 98.2 (19 Apr 2024 08:50), Max: 98.8 (18 Apr 2024 20:37)  HR: 100 (19 Apr 2024 08:50) (100 - 114)  BP: 145/92 (19 Apr 2024 08:50) (128/71 - 156/95)  BP(mean): --  RR: 21 (19 Apr 2024 08:50) (18 - 21)  SpO2: 100% (19 Apr 2024 08:50) (96% - 100%)  CAPILLARY BLOOD GLUCOSE        I&O's Summary    18 Apr 2024 07:01  -  19 Apr 2024 07:00  --------------------------------------------------------  IN: 700 mL / OUT: 750 mL / NET: -50 mL      tele: nsr    PHYSICAL EXAM:    GENERAL: NAD   HEENT: EOMI, PERRL  PULM: Clear to auscultation bilaterally  CV: Regular rate and rhythm; nl S1, S2; No murmurs, rubs, or gallops  ABDOMEN: Soft, Nontender, Nondistended; Bowel sounds present  EXTREMITIES/MSK:  No edema, calf tenderness   PSYCH: Alert  NEUROLOGY: aphasic; doesnt follow commands; feels noxious stimuli in all 4 ext but only moves right side c mild withdrawal on left          LABS:                        10.3   8.98  )-----------( 386      ( 18 Apr 2024 16:10 )             33.1     04-18    154<H>  |  113<H>  |  36<H>  ----------------------------<  128<H>  4.0   |  27  |  0.53    Ca    10.3      18 Apr 2024 17:11            Urinalysis Basic - ( 18 Apr 2024 17:11 )    Color: x / Appearance: x / SG: x / pH: x  Gluc: 128 mg/dL / Ketone: x  / Bili: x / Urobili: x   Blood: x / Protein: x / Nitrite: x   Leuk Esterase: x / RBC: x / WBC x   Sq Epi: x / Non Sq Epi: x / Bacteria: x          RADIOLOGY & ADDITIONAL TESTS:    Imaging Personally Reviewed:    Consultant(s) Notes Reviewed:      Care Discussed with Consultants/Other Providers: neurosurg

## 2024-04-19 NOTE — PRE PROCEDURE NOTE - PRE PROCEDURE EVALUATION
Patient seen and examined at bedside.    --Anticoagulation--    T(C): 36.6 (04-11-24 @ 23:00), Max: 37.1 (04-11-24 @ 11:00)  HR: 73 (04-12-24 @ 00:45) (52 - 84)  BP: --  RR: 17 (04-12-24 @ 00:45) (14 - 72)  SpO2: 100% (04-12-24 @ 00:45) (94% - 100%)  Wt(kg): --    Exam: EOS, Ox0, PERRL, R gaze preference, R side spont AG, LUE LOC LLE wds no FC  Preop for angio in AM  -220  S&S-p  EVD @20
angiogram, possible embolization for aneurysm, possible craniotomy for clipping of aneurysm
preop for cerebral angiogram for spasm check  ENT will re-scope monday  CPAP as gurdeep  
                                                                                              Pre-Endoscopy Evaluation      Referring Physician:        Neurosurgery                            Procedure: EGD + PEG    Indication for Procedure: dysphagia    Pertinent History:   57 YO Woman on aspirin at home presented to OSH after being found unresponsive, found to have SAH HH4 WNFS 5 mF4 ruptured R pcomm aneurysm s/p R frontal EVD placed-high pressure, and then. R pcom clip on 3/26/24, with course c/b bilateral soleal DVTs.     GI consulted for PEG evaluation 2/2 persistent Dysphagia   Son: Pierre Patrick [phone]    #dysphagia; failed multiple SLP evaluations    feeds held at MN  gentle IVF    D/w Anesthesia attending - hypernatremia improving with hydration. Ok to proceed with planned EGD + PEG today    IV Ancef 2 gram x1 dose ordered pre-procedure    reviewed indications, risks, benefits and alternatives with pt's son Pierre Patrick ( [phone]) who has discussed with his brothers.  Family in agreement to proceed with PEG placement      Sedation by Anesthesia [X ]    PAST MEDICAL & SURGICAL HISTORY:      PMH of Gastroparesis [ ]  Gastric Surgery [ ]  Gastric Outlet Obstruction [ ]    Allergies    No Known Allergies      Latex allergy: [ ] yes [X ] no    Medications:MEDICATIONS  (STANDING):  ceFAZolin   IVPB 2000 milliGRAM(s) IV Intermittent once  chlorhexidine 4% Liquid 1 Application(s) Topical <User Schedule>  doxazosin 2 milliGRAM(s) Oral at bedtime  enoxaparin Injectable 40 milliGRAM(s) SubCutaneous <User Schedule>  multivitamin/minerals/iron Oral Solution (CENTRUM) 15 milliLiter(s) Oral daily  polyethylene glycol 3350      polyethylene glycol 3350 17 Gram(s) Oral daily  senna Syrup 10 milliLiter(s) Oral at bedtime    MEDICATIONS  (PRN):  acetaminophen     Tablet .. 650 milliGRAM(s) Oral every 6 hours PRN Temp greater or equal to 38.5C (101.3F), Mild Pain (1 - 3)  oxyCODONE    IR 5 milliGRAM(s) Oral every 4 hours PRN Moderate Pain (4 - 6)  oxyCODONE    IR 10 milliGRAM(s) Oral every 4 hours PRN Severe Pain (7 - 10)      Smoking: [ ] yes  [X ] no    AICD/PPM: [ ] yes   [X ] no    Pertinent lab data:                        10.3   8.98  )-----------( 386      ( 18 Apr 2024 16:10 )             33.1     04-19    153<H>  |  113<H>  |  36<H>  ----------------------------<  113<H>  3.7   |  27  |  0.54    Sodium: 153 mmol/L (04.19.24 @ 09:42)   Sodium: 154 mmol/L (04.18.24 @ 17:11)       Ca    10.6<H>      19 Apr 2024 09:42  Phos  4.9     04-19                  Physical Examination:  Daily     Daily   Vital Signs Last 24 Hrs  T(C): 36.8 (19 Apr 2024 08:50), Max: 37.1 (18 Apr 2024 12:39)  T(F): 98.2 (19 Apr 2024 08:50), Max: 98.8 (18 Apr 2024 20:37)  HR: 100 (19 Apr 2024 08:50) (100 - 114)  BP: 145/92 (19 Apr 2024 08:50) (128/71 - 156/95)  BP(mean): --  RR: 21 (19 Apr 2024 08:50) (18 - 21)  SpO2: 100% (19 Apr 2024 08:50) (96% - 100%)    Parameters below as of 19 Apr 2024 08:50  Patient On (Oxygen Delivery Method): room air        BP:                 HR:                  SPO2:               Temperature:    Constitutional: NAD +NGT  not following commands +RUE wrist restraint    HEENT: PERRL     Neck:  No JVD    Respiratory: CTAB/L    Cardiovascular: S1 and S2    Gastrointestinal: BS+, soft, NT/ND    Extremities: No peripheral edema    Neurological: not following commands, opens eyes spontaneously +Left Jesu    Psychiatric: Normal mood, normal affect    : No [place]    Skin: No rashes    Comments:    ASA Class: I [ ]  II [ ]  III [X ]  IV [ ]    The patient is a suitable candidate for the planned procedure unless box checked [ ]  No, explain:

## 2024-04-20 DIAGNOSIS — I60.9 NONTRAUMATIC SUBARACHNOID HEMORRHAGE, UNSPECIFIED: ICD-10-CM

## 2024-04-20 DIAGNOSIS — T14.8XXA OTHER INJURY OF UNSPECIFIED BODY REGION, INITIAL ENCOUNTER: ICD-10-CM

## 2024-04-20 DIAGNOSIS — Z29.9 ENCOUNTER FOR PROPHYLACTIC MEASURES, UNSPECIFIED: ICD-10-CM

## 2024-04-20 LAB
ANION GAP SERPL CALC-SCNC: 16 MMOL/L — SIGNIFICANT CHANGE UP (ref 5–17)
BUN SERPL-MCNC: 35 MG/DL — HIGH (ref 7–23)
CALCIUM SERPL-MCNC: 10.6 MG/DL — HIGH (ref 8.4–10.5)
CHLORIDE SERPL-SCNC: 113 MMOL/L — HIGH (ref 96–108)
CO2 SERPL-SCNC: 20 MMOL/L — LOW (ref 22–31)
CREAT SERPL-MCNC: 0.55 MG/DL — SIGNIFICANT CHANGE UP (ref 0.5–1.3)
CULTURE RESULTS: SIGNIFICANT CHANGE UP
EGFR: 107 ML/MIN/1.73M2 — SIGNIFICANT CHANGE UP
GLUCOSE SERPL-MCNC: 91 MG/DL — SIGNIFICANT CHANGE UP (ref 70–99)
HCT VFR BLD CALC: 33.6 % — LOW (ref 34.5–45)
HGB BLD-MCNC: 10.3 G/DL — LOW (ref 11.5–15.5)
MCHC RBC-ENTMCNC: 28.9 PG — SIGNIFICANT CHANGE UP (ref 27–34)
MCHC RBC-ENTMCNC: 30.7 GM/DL — LOW (ref 32–36)
MCV RBC AUTO: 94.4 FL — SIGNIFICANT CHANGE UP (ref 80–100)
NRBC # BLD: 0 /100 WBCS — SIGNIFICANT CHANGE UP (ref 0–0)
PLATELET # BLD AUTO: 378 K/UL — SIGNIFICANT CHANGE UP (ref 150–400)
POTASSIUM SERPL-MCNC: 4.3 MMOL/L — SIGNIFICANT CHANGE UP (ref 3.5–5.3)
POTASSIUM SERPL-SCNC: 4.3 MMOL/L — SIGNIFICANT CHANGE UP (ref 3.5–5.3)
RBC # BLD: 3.56 M/UL — LOW (ref 3.8–5.2)
RBC # FLD: 15.1 % — HIGH (ref 10.3–14.5)
SODIUM SERPL-SCNC: 149 MMOL/L — HIGH (ref 135–145)
SPECIMEN SOURCE: SIGNIFICANT CHANGE UP
WBC # BLD: 8.73 K/UL — SIGNIFICANT CHANGE UP (ref 3.8–10.5)
WBC # FLD AUTO: 8.73 K/UL — SIGNIFICANT CHANGE UP (ref 3.8–10.5)

## 2024-04-20 PROCEDURE — 99233 SBSQ HOSP IP/OBS HIGH 50: CPT

## 2024-04-20 RX ORDER — SODIUM CHLORIDE 9 MG/ML
1000 INJECTION, SOLUTION INTRAVENOUS
Refills: 0 | Status: DISCONTINUED | OUTPATIENT
Start: 2024-04-20 | End: 2024-04-21

## 2024-04-20 RX ORDER — PETROLATUM,WHITE
1 JELLY (GRAM) TOPICAL EVERY 12 HOURS
Refills: 0 | Status: DISCONTINUED | OUTPATIENT
Start: 2024-04-20 | End: 2024-04-26

## 2024-04-20 RX ADMIN — CHLORHEXIDINE GLUCONATE 1 APPLICATION(S): 213 SOLUTION TOPICAL at 06:01

## 2024-04-20 RX ADMIN — SODIUM CHLORIDE 50 MILLILITER(S): 9 INJECTION, SOLUTION INTRAVENOUS at 17:54

## 2024-04-20 RX ADMIN — Medication 1 APPLICATION(S): at 13:59

## 2024-04-20 NOTE — PROGRESS NOTE ADULT - SUBJECTIVE AND OBJECTIVE BOX
SURGERY PROGRESS NOTE    Interval events  - prelim CT read " hyperdense collection within the gastric wall measuring 3.8 x 1.6 cm likely corresponds to visualized intramural hematoma seen on endoscopy (3:26, 9:34). No extravasation of contrast on arterial or delayed phases to suggest acute bleed."  - No acute events overnight.  - Vital signs stable, afebrile, on room air.   - h/h 10.5/34 > no transfusions        OBJECTIVE:   Vital Signs Last 24 Hrs  T(C): 36.9 (20 Apr 2024 04:48), Max: 37.5 (19 Apr 2024 12:11)  T(F): 98.4 (20 Apr 2024 04:48), Max: 99.5 (19 Apr 2024 12:11)  HR: 89 (20 Apr 2024 04:48) (89 - 100)  BP: 142/72 (20 Apr 2024 04:48) (137/82 - 175/70)  BP(mean): --  RR: 18 (20 Apr 2024 04:48) (14 - 21)  SpO2: 98% (20 Apr 2024 04:48) (94% - 100%)    Parameters below as of 20 Apr 2024 04:48  Patient On (Oxygen Delivery Method): room air            I&O's Summary    18 Apr 2024 07:01  -  19 Apr 2024 07:00  --------------------------------------------------------  IN: 700 mL / OUT: 750 mL / NET: -50 mL    19 Apr 2024 07:01  -  20 Apr 2024 06:49  --------------------------------------------------------  IN: 760 mL / OUT: 1000 mL / NET: -240 mL      I&O's Detail    18 Apr 2024 07:01  -  19 Apr 2024 07:00  --------------------------------------------------------  IN:    Enteral Tube Flush: 400 mL    Jevity 1.5: 300 mL  Total IN: 700 mL    OUT:    Voided (mL): 750 mL  Total OUT: 750 mL    Total NET: -50 mL      19 Apr 2024 07:01  -  20 Apr 2024 06:49  --------------------------------------------------------  IN:    dextrose 5%: 660 mL    IV PiggyBack: 100 mL  Total IN: 760 mL    OUT:    Voided (mL): 1000 mL  Total OUT: 1000 mL    Total NET: -240 mL      PHYSICAL EXAM  General: No acute distress, resting comfortably in bed.  Respiratory: Nonlabored respirations  Abdomen: soft, nondistended, nontender, dressing c/d        MEDICATIONS  (STANDING):  chlorhexidine 4% Liquid 1 Application(s) Topical <User Schedule>  dextrose 5%. 1000 milliLiter(s) (30 mL/Hr) IV Continuous <Continuous>  doxazosin 2 milliGRAM(s) Oral at bedtime  multivitamin/minerals/iron Oral Solution (CENTRUM) 15 milliLiter(s) Oral daily  polyethylene glycol 3350      polyethylene glycol 3350 17 Gram(s) Oral daily  senna Syrup 10 milliLiter(s) Oral at bedtime    MEDICATIONS  (PRN):  acetaminophen     Tablet .. 650 milliGRAM(s) Oral every 6 hours PRN Temp greater or equal to 38.5C (101.3F), Mild Pain (1 - 3)  acetaminophen   IVPB .. 1000 milliGRAM(s) IV Intermittent once PRN Severe Pain (7 - 10)  oxyCODONE    IR 5 milliGRAM(s) Oral every 4 hours PRN Moderate Pain (4 - 6)  oxyCODONE    IR 10 milliGRAM(s) Oral every 4 hours PRN Severe Pain (7 - 10)      LABS:                        10.5   8.38  )-----------( 367      ( 19 Apr 2024 18:22 )             33.9     04-19    151<H>  |  113<H>  |  36<H>  ----------------------------<  102<H>  4.1   |  20<L>  |  0.57    Ca    10.4      19 Apr 2024 18:22  Phos  4.9     04-19        Urinalysis Basic - ( 19 Apr 2024 18:22 )    Color: x / Appearance: x / SG: x / pH: x  Gluc: 102 mg/dL / Ketone: x  / Bili: x / Urobili: x   Blood: x / Protein: x / Nitrite: x   Leuk Esterase: x / RBC: x / WBC x   Sq Epi: x / Non Sq Epi: x / Bacteria: x        RADIOLOGY & ADDITIONAL STUDIES:

## 2024-04-20 NOTE — PROVIDER CONTACT NOTE (OTHER) - DATE AND TIME:
27-Mar-2024 13:00
27-Mar-2024 10:00
28-Mar-2024 10:00
30-Mar-2024 19:20
14-Apr-2024 12:32
20-Apr-2024 16:48
31-Mar-2024 19:15
30-Mar-2024 06:40
15-Apr-2024 23:00
29-Mar-2024 15:00

## 2024-04-20 NOTE — PROGRESS NOTE ADULT - ASSESSMENT
ASSESSMENT/PLAN: HH4 WNFS 5 mF4 subarachnoid hemorrhage.    04/15: EVD removed  04/16: CTH: tract hemorrhage, Bedboarded  4/18 TX'd from NSCU overnight, called for PEG as failed swallow study  4/19 failed/aborted PEG due to hematoma during placement in endo suite, general surgery to see; CTA A/P to r/o bleed  4/20: monitored, d5w increased to 50/hr, need to confirm ok to replace ngt w/ gen surg    Neurosurgery   #Pcomm ruptured, s/p clipping   #EVD tract acute hemorrhage ( removed 4/15 )  #Hydrocephalus/ICP crisis: resolved  #Delayed Cerebral Ischemia Management: euvolemia, nimodipine completed  #Pain: PRN tylenol oxy   #Activity: [x] OOB as tolerated    CV   #SBP goal 100-220  #4/9 TTE: EF 66%, no structural abnormalities/thrombus seen   #bradycardia resolved    Pulm  extubated 4/9  saO2 > 92%  RA    Renal  IVL  monitor I&O, maintain euvolemia   hypernatremia- will hydrate with D5W @ 60cc/h ON and f/u am labs; likely dehydration  Hospitalist following    GI   Diet: maintain NPO, will need NGT replace  Senna, suppository  LBM 4/15  aborted PEG due to hematoma and awaiting CTA A/P to r/o active bleeding, eventually will need PEG placed. surgery to see.   family updated by GI team of events  Fu NGT replacement with Gen surg in AM    Endocrine  Goal euglycemia (-180)  A1C 5.5     Heme  SCD   Chemoppx: lovenox 40 mg QD stopped given the events - consider restarting tomorrow  4/10 LED: stable B/L Soleal DVT with extension to Rt posterior tibial DVT  will trend CBC based on results of CTA and in am     ID   afebrile    appreciate hospitalist medicine follow up

## 2024-04-20 NOTE — PROGRESS NOTE ADULT - SUBJECTIVE AND OBJECTIVE BOX
Pooja Cervantes MD  Division of Hospital Medicine  [facility]  Spectra: [phone]      Patient is a 58y old  Female who presents with a chief complaint of SAH (20 Apr 2024 06:48)      SUBJECTIVE / OVERNIGHT EVENTS: PEG placement aborted due to c/f gastric wall hematoma intra-procedure. limited ROS as only answers intermittently but denies any abd pain nor discomfort.   ADDITIONAL REVIEW OF SYSTEMS:    MEDICATIONS  (STANDING):  chlorhexidine 4% Liquid 1 Application(s) Topical <User Schedule>  dextrose 5%. 1000 milliLiter(s) (30 mL/Hr) IV Continuous <Continuous>  doxazosin 2 milliGRAM(s) Oral at bedtime  multivitamin/minerals/iron Oral Solution (CENTRUM) 15 milliLiter(s) Oral daily  polyethylene glycol 3350 17 Gram(s) Oral daily  polyethylene glycol 3350      senna Syrup 10 milliLiter(s) Oral at bedtime    MEDICATIONS  (PRN):  acetaminophen     Tablet .. 650 milliGRAM(s) Oral every 6 hours PRN Temp greater or equal to 38.5C (101.3F), Mild Pain (1 - 3)  acetaminophen   IVPB .. 1000 milliGRAM(s) IV Intermittent once PRN Severe Pain (7 - 10)  oxyCODONE    IR 10 milliGRAM(s) Oral every 4 hours PRN Severe Pain (7 - 10)  oxyCODONE    IR 5 milliGRAM(s) Oral every 4 hours PRN Moderate Pain (4 - 6)  petrolatum white Ointment 1 Application(s) Topical every 12 hours PRN dryness      CAPILLARY BLOOD GLUCOSE        I&O's Summary    19 Apr 2024 07:01  -  20 Apr 2024 07:00  --------------------------------------------------------  IN: 760 mL / OUT: 1000 mL / NET: -240 mL    20 Apr 2024 07:01  -  20 Apr 2024 12:31  --------------------------------------------------------  IN: 90 mL / OUT: 0 mL / NET: 90 mL        PHYSICAL EXAM:  Vital Signs Last 24 Hrs  T(C): 36.9 (20 Apr 2024 08:22), Max: 36.9 (19 Apr 2024 17:34)  T(F): 98.4 (20 Apr 2024 08:22), Max: 98.5 (20 Apr 2024 01:12)  HR: 84 (20 Apr 2024 08:22) (84 - 100)  BP: 122/77 (20 Apr 2024 08:22) (122/77 - 175/70)  BP(mean): --  RR: 20 (20 Apr 2024 08:22) (14 - 20)  SpO2: 96% (20 Apr 2024 08:22) (94% - 100%)    Parameters below as of 20 Apr 2024 08:22  Patient On (Oxygen Delivery Method): room air      CONSTITUTIONAL: NAD, well-developed, well-groomed  EYES: PERRLA; conjunctiva and sclera clear  ENMT: Moist oral mucosa, no pharyngeal injection or exudates; normal dentition  NECK: Supple, no palpable masses; no thyromegaly  RESPIRATORY: Normal respiratory effort; lungs are clear to auscultation bilaterally  CARDIOVASCULAR: Regular rate and rhythm, normal S1 and S2, no murmur/rub/gallop; No lower extremity edema  ABDOMEN: Soft, Nondistended, Nontender to palpation, normoactive bowel sounds  MUSCULOSKELETAL: No clubbing or cyanosis of digits; no joint swelling or tenderness to palpation  PSYCH: Alert, answers to name, but unable to assess orientation as minimally verbal  NEUROLOGY: minimally verbal, not following commands but moving extremities spontaneously  SKIN: No rashes; no palpable lesions      LABS:                        10.3   8.73  )-----------( 378      ( 20 Apr 2024 08:27 )             33.6     04-20    149<H>  |  113<H>  |  35<H>  ----------------------------<  91  4.3   |  20<L>  |  0.55    Ca    10.6<H>      20 Apr 2024 06:47  Phos  4.9     04-19          Urinalysis Basic - ( 20 Apr 2024 06:47 )    Color: x / Appearance: x / SG: x / pH: x  Gluc: 91 mg/dL / Ketone: x  / Bili: x / Urobili: x   Blood: x / Protein: x / Nitrite: x   Leuk Esterase: x / RBC: x / WBC x   Sq Epi: x / Non Sq Epi: x / Bacteria: x        RADIOLOGY & ADDITIONAL TESTS:  Results Reviewed: H/H stable  Imaging Personally Reviewed:  4/19/24 CT abd/pelv:  FINDINGS:  LOWER CHEST: Mild bibasilar linear type atelectasis.    LIVER: Within normal limits.  BILE DUCTS: Normal caliber.  GALLBLADDER: Within normal limits.  SPLEEN: Within normal limits.  PANCREAS: Within normal limits.  ADRENALS: Within normal limits.  KIDNEYS/URETERS: Within normal limits.    BLADDER: Within normal limits.  REPRODUCTIVE ORGANS: Uterus and adnexa within normal limits.    BOWEL: A 3.8 x 1.6 cm gastric wall hematoma at the gastric body   corresponding to finding on recent endoscopy. No evidence of active   intraluminal hemorrhage. No extraluminal hemorrhage. No bowel   obstruction. Appendix is normal.  PERITONEUM: No ascites.  VESSELS: Atherosclerotic changes.  RETROPERITONEUM/LYMPH NODES: No lymphadenopathy.  ABDOMINAL WALL: Within normal limits.  BONES: Within normal limits.    IMPRESSION:  Gastric wall hematoma without intraluminal or extraluminal hemorrhage.    Electrocardiogram Personally Reviewed:    COORDINATION OF CARE:  Care Discussed with Consultants/Other Providers [Y]: Neurosurgery resident Radha  Prior or Outpatient Records Reviewed [Y/N]:

## 2024-04-20 NOTE — PROGRESS NOTE ADULT - PROBLEM SELECTOR PLAN 6
- PEG attempted on 4/19 by GI but aborted due to gastric wall hematoma  - CT abd/pelv with confirmed gastric wall hematoma, no active bleed  - PEG planning as per GI vs. Surgery  - If clear from surgery standpoint given gastric wall hematoma, NGT re-placement for feeds/FWB but defer this decision to surgery/GI

## 2024-04-20 NOTE — PROGRESS NOTE ADULT - PROBLEM SELECTOR PLAN 5
b/l below the knee DVT  - Vasc Cards consulted, recs appreciated  - serial duplex as per Vasc Cards  - c/w lovenox ppx dosing if clear from surgery/GI standpoint given gastric wall hematoma

## 2024-04-20 NOTE — PROGRESS NOTE ADULT - PROBLEM SELECTOR PLAN 3
- hypernatremia improved, but still with 2.3L FWD  - if planned for NGT today, would start FWB 250cc q6h  - if no plan for NGT, would re-start D5W gtt @ 60cc/hr with repeat BMP in evening  - monitor Na on BMP

## 2024-04-20 NOTE — PROGRESS NOTE ADULT - PROBLEM SELECTOR PLAN 2
gastric wall hematoma on 4/19.  - CT abd/pelv with 3.8 x 1.6 cm gastric wall hematoma; no evidence of active intraluminal nor extraluminal hemorrhage  - Surgery consult recs appreciated  - monitor H/H

## 2024-04-20 NOTE — PROVIDER CONTACT NOTE (OTHER) - ASSESSMENT
Patient's HR 34 and sustaining. BP and O2 sat stable and within parameters. Neuro checks unchanged , Pupils B/L 2RB.
Pupils 2mm round and brisk bilaterally. No effort against gravity on left upper and lower extremity. Right upper extremity and right lower extremity some effort against gravity.
Pupils 2mm round and brisk. Left upper and left lower extremity no effort against gravity and right upper and right lower extremity has some effort against gravity
See flow sheet for trended bladder scan.
EVD not draining for more than 1hr.
Patient intubated and sedated. Neuro checks unchanged.
Patient's HR noted Sinus Terrence 40's - 50's . Lowest noted 38 , not sustaining. Neuro checks unchanged.
Pupils 2mm round and brisk bilaterally. Drift on RUE and no effort against gravity on LUE. RLE is some effort against gravity and LLE has no effort against gravity.
Pt's is on EVD clamp trial, pt's ICP's are WDL below 2 thus far, pt is lethargic on exam and arouses with vigorous stimulation from baseline exam. PERRL. Pt has some effort and is spontaneous on Rt upper and lower extremity. Pt withdraws left upper and left lower extremity.
Left pupil 2mm round brisk and right pupil unable to assess due to inflammation. RUE some effort against gravity and LUE no effort against gravity. Bilateral lower extremities no effort against gravity.

## 2024-04-20 NOTE — PROGRESS NOTE ADULT - ASSESSMENT
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 Course cb dysphagia, s/p attempted PEG placement by GI 4/19. During PEG placement, growing hematoma noted in gastric wall on endoscopy after trocar placement. Procedure aborted d/t cf bleeding, and Surgery called for evaluation.     Plan/recs  - No acute surgical intervention   - Evening CBC stable, CT without active extrav, benign abd exam >Follow-up AM labs   - Continue care per primary team.     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 Course cb dysphagia, s/p attempted PEG placement by GI 4/19. During PEG placement, growing hematoma noted in gastric wall on endoscopy after trocar placement. Procedure aborted d/t cf bleeding, and Surgery called for evaluation.     Plan/recs  - No acute surgical intervention   - Evening CBC stable, CT without active extrav, benign abd exam >Follow-up AM labs   - Continue care per primary team.   - Surgery team to sign off, please contact with any questions, concerns.    ACS/Trauma Surgery  [phone]

## 2024-04-20 NOTE — PROVIDER CONTACT NOTE (OTHER) - NAME OF MD/NP/PA/DO NOTIFIED:
PEDRO Coles
GHAZALA Zeng
MD Radha
PEDRO Hercules
PEDRO Stone and MD Hopper
PEDRO Hercules
MD Hopper
MD Kline
PEDRO Aviles
PEDRO Miramontes and neurosurgical resident Haydee

## 2024-04-20 NOTE — PROGRESS NOTE ADULT - PROBLEM SELECTOR PLAN 7
DVT ppx: lovenox on hold for gastric wall hematoma. resume when clear from surgery standpoint    Dispo: Acute Rehab

## 2024-04-20 NOTE — PROGRESS NOTE ADULT - ASSESSMENT
57 yo F with no known PMH other than occasional headaches for which she took NSAIDs presented as transfer from [place] for SAH HH4 WNFS 5 mF4 ruptured R pcomm aneurysm. Was initially found unresponsive on 3/25/24 at 21:00, intubated at 23:00 at OSH, then txfrd. Given DDAVP at OSH. Here in ED 1 unit PLTs given and R frontal EVD placed-high pressure. Went for R pcomm clip on 3/26/24. Found with LV dysfunction, bradycardia and Acute left lower extremity DVT below the knee, involving the left soleal vein. Extubated 4/3. MRI 4/8 showed widespread ischemic stroke b/l, s/p angio for vasospasm intervention. 4/16. CTH: tract hemorrhage. Awaiting PEG placement.

## 2024-04-20 NOTE — PROGRESS NOTE ADULT - SUBJECTIVE AND OBJECTIVE BOX
Patient seen and examined at bedside.    --Anticoagulation--    T(C): 37.1 (04-20-24 @ 20:16), Max: 37.1 (04-20-24 @ 20:16)  HR: 87 (04-20-24 @ 20:16) (84 - 100)  BP: 163/85 (04-20-24 @ 20:16) (122/77 - 163/85)  RR: 18 (04-20-24 @ 20:16) (18 - 20)  SpO2: 95% (04-20-24 @ 20:16) (95% - 98%)  Wt(kg): --    Exam:  alert, oriented on self, place, EOS, PERRL, intermittently FC, B/l upper extremities localize (R>L), LLE WD RLE AG      Labs:                        10.3   8.73  )-----------( 378      ( 20 Apr 2024 08:27 )             33.6   04-20    149<H>  |  113<H>  |  35<H>  ----------------------------<  91  4.3   |  20<L>  |  0.55    Ca    10.6<H>      20 Apr 2024 06:47  Phos  4.9     04-19

## 2024-04-21 LAB
ANION GAP SERPL CALC-SCNC: 15 MMOL/L — SIGNIFICANT CHANGE UP (ref 5–17)
ANION GAP SERPL CALC-SCNC: 16 MMOL/L — SIGNIFICANT CHANGE UP (ref 5–17)
BUN SERPL-MCNC: 32 MG/DL — HIGH (ref 7–23)
BUN SERPL-MCNC: 34 MG/DL — HIGH (ref 7–23)
CALCIUM SERPL-MCNC: 10.3 MG/DL — SIGNIFICANT CHANGE UP (ref 8.4–10.5)
CALCIUM SERPL-MCNC: 10.7 MG/DL — HIGH (ref 8.4–10.5)
CHLORIDE SERPL-SCNC: 113 MMOL/L — HIGH (ref 96–108)
CHLORIDE SERPL-SCNC: 113 MMOL/L — HIGH (ref 96–108)
CO2 SERPL-SCNC: 23 MMOL/L — SIGNIFICANT CHANGE UP (ref 22–31)
CO2 SERPL-SCNC: 24 MMOL/L — SIGNIFICANT CHANGE UP (ref 22–31)
CREAT SERPL-MCNC: 0.52 MG/DL — SIGNIFICANT CHANGE UP (ref 0.5–1.3)
CREAT SERPL-MCNC: 0.56 MG/DL — SIGNIFICANT CHANGE UP (ref 0.5–1.3)
EGFR: 106 ML/MIN/1.73M2 — SIGNIFICANT CHANGE UP
EGFR: 108 ML/MIN/1.73M2 — SIGNIFICANT CHANGE UP
GLUCOSE SERPL-MCNC: 100 MG/DL — HIGH (ref 70–99)
GLUCOSE SERPL-MCNC: 90 MG/DL — SIGNIFICANT CHANGE UP (ref 70–99)
HCG UR QL: NEGATIVE — SIGNIFICANT CHANGE UP
HCT VFR BLD CALC: 35.8 % — SIGNIFICANT CHANGE UP (ref 34.5–45)
HGB BLD-MCNC: 10.6 G/DL — LOW (ref 11.5–15.5)
MAGNESIUM SERPL-MCNC: 2.4 MG/DL — SIGNIFICANT CHANGE UP (ref 1.6–2.6)
MCHC RBC-ENTMCNC: 28.2 PG — SIGNIFICANT CHANGE UP (ref 27–34)
MCHC RBC-ENTMCNC: 29.6 GM/DL — LOW (ref 32–36)
MCV RBC AUTO: 95.2 FL — SIGNIFICANT CHANGE UP (ref 80–100)
NRBC # BLD: 0 /100 WBCS — SIGNIFICANT CHANGE UP (ref 0–0)
PHOSPHATE SERPL-MCNC: 4 MG/DL — SIGNIFICANT CHANGE UP (ref 2.5–4.5)
PLATELET # BLD AUTO: 378 K/UL — SIGNIFICANT CHANGE UP (ref 150–400)
POTASSIUM SERPL-MCNC: 3.5 MMOL/L — SIGNIFICANT CHANGE UP (ref 3.5–5.3)
POTASSIUM SERPL-MCNC: 3.9 MMOL/L — SIGNIFICANT CHANGE UP (ref 3.5–5.3)
POTASSIUM SERPL-SCNC: 3.5 MMOL/L — SIGNIFICANT CHANGE UP (ref 3.5–5.3)
POTASSIUM SERPL-SCNC: 3.9 MMOL/L — SIGNIFICANT CHANGE UP (ref 3.5–5.3)
RBC # BLD: 3.76 M/UL — LOW (ref 3.8–5.2)
RBC # FLD: 14.8 % — HIGH (ref 10.3–14.5)
SODIUM SERPL-SCNC: 151 MMOL/L — HIGH (ref 135–145)
SODIUM SERPL-SCNC: 153 MMOL/L — HIGH (ref 135–145)
WBC # BLD: 6.59 K/UL — SIGNIFICANT CHANGE UP (ref 3.8–10.5)
WBC # FLD AUTO: 6.59 K/UL — SIGNIFICANT CHANGE UP (ref 3.8–10.5)

## 2024-04-21 PROCEDURE — 99233 SBSQ HOSP IP/OBS HIGH 50: CPT

## 2024-04-21 RX ORDER — SODIUM CHLORIDE 9 MG/ML
1000 INJECTION, SOLUTION INTRAVENOUS
Refills: 0 | Status: DISCONTINUED | OUTPATIENT
Start: 2024-04-21 | End: 2024-04-24

## 2024-04-21 RX ORDER — POTASSIUM CHLORIDE 20 MEQ
10 PACKET (EA) ORAL
Refills: 0 | Status: COMPLETED | OUTPATIENT
Start: 2024-04-21 | End: 2024-04-21

## 2024-04-21 RX ORDER — SODIUM CHLORIDE 9 MG/ML
1000 INJECTION, SOLUTION INTRAVENOUS
Refills: 0 | Status: DISCONTINUED | OUTPATIENT
Start: 2024-04-21 | End: 2024-04-21

## 2024-04-21 RX ADMIN — Medication 100 MILLIEQUIVALENT(S): at 11:11

## 2024-04-21 RX ADMIN — SODIUM CHLORIDE 60 MILLILITER(S): 9 INJECTION, SOLUTION INTRAVENOUS at 09:04

## 2024-04-21 RX ADMIN — CHLORHEXIDINE GLUCONATE 1 APPLICATION(S): 213 SOLUTION TOPICAL at 05:57

## 2024-04-21 RX ADMIN — Medication 100 MILLIEQUIVALENT(S): at 10:10

## 2024-04-21 RX ADMIN — Medication 100 MILLIEQUIVALENT(S): at 09:06

## 2024-04-21 NOTE — PROGRESS NOTE ADULT - SUBJECTIVE AND OBJECTIVE BOX
Pooja Cervantes MD  Division of Hospital Medicine  [facility]  Spectra: [phone]      Patient is a 58y old  Female who presents with a chief complaint of SAH (20 Apr 2024 12:31)      SUBJECTIVE / OVERNIGHT EVENTS: no acute events overnight. unable to obtain ROS as not answering questions/participating in exam.  ADDITIONAL REVIEW OF SYSTEMS:    MEDICATIONS  (STANDING):  chlorhexidine 4% Liquid 1 Application(s) Topical <User Schedule>  dextrose 5%. 1000 milliLiter(s) (60 mL/Hr) IV Continuous <Continuous>  doxazosin 2 milliGRAM(s) Oral at bedtime  multivitamin/minerals/iron Oral Solution (CENTRUM) 15 milliLiter(s) Oral daily  polyethylene glycol 3350 17 Gram(s) Oral daily  polyethylene glycol 3350      senna Syrup 10 milliLiter(s) Oral at bedtime    MEDICATIONS  (PRN):  acetaminophen     Tablet .. 650 milliGRAM(s) Oral every 6 hours PRN Temp greater or equal to 38.5C (101.3F), Mild Pain (1 - 3)  acetaminophen   IVPB .. 1000 milliGRAM(s) IV Intermittent once PRN Severe Pain (7 - 10)  oxyCODONE    IR 5 milliGRAM(s) Oral every 4 hours PRN Moderate Pain (4 - 6)  oxyCODONE    IR 10 milliGRAM(s) Oral every 4 hours PRN Severe Pain (7 - 10)  petrolatum white Ointment 1 Application(s) Topical every 12 hours PRN dryness      CAPILLARY BLOOD GLUCOSE        I&O's Summary    20 Apr 2024 07:01  -  21 Apr 2024 07:00  --------------------------------------------------------  IN: 370 mL / OUT: 875 mL / NET: -505 mL        PHYSICAL EXAM:  Vital Signs Last 24 Hrs  T(C): 36.9 (21 Apr 2024 08:42), Max: 37.1 (20 Apr 2024 20:16)  T(F): 98.4 (21 Apr 2024 08:42), Max: 98.7 (20 Apr 2024 20:16)  HR: 85 (21 Apr 2024 08:42) (73 - 93)  BP: 131/80 (21 Apr 2024 08:42) (131/80 - 163/85)  BP(mean): --  RR: 18 (21 Apr 2024 08:42) (18 - 18)  SpO2: 96% (21 Apr 2024 08:42) (95% - 98%)    Parameters below as of 21 Apr 2024 08:42  Patient On (Oxygen Delivery Method): room air    CONSTITUTIONAL: NAD, well-developed, well-groomed  EYES: PERRLA; conjunctiva and sclera clear  ENMT: Moist oral mucosa, no pharyngeal injection or exudates; normal dentition  NECK: Supple, no palpable masses; no thyromegaly  RESPIRATORY: Normal respiratory effort; lungs are clear to auscultation bilaterally  CARDIOVASCULAR: Regular rate and rhythm, normal S1 and S2, no murmur/rub/gallop; No lower extremity edema  ABDOMEN: Soft, Nondistended, Nontender to palpation, normoactive bowel sounds  MUSCULOSKELETAL: No clubbing or cyanosis of digits; no joint swelling or tenderness to palpation  PSYCH: Alert, unable to assess orientation as minimally verbal  NEUROLOGY: minimally verbal, not following commands but moving R extremities spontaneously  SKIN: No rashes; no palpable lesions    LABS:                        10.6   6.59  )-----------( 378      ( 21 Apr 2024 07:15 )             35.8     04-21    153<H>  |  113<H>  |  34<H>  ----------------------------<  90  3.5   |  24  |  0.56    Ca    10.7<H>      21 Apr 2024 07:15  Phos  4.0     04-21  Mg     2.4     04-21      Urinalysis Basic - ( 21 Apr 2024 07:15 )    Color: x / Appearance: x / SG: x / pH: x  Gluc: 90 mg/dL / Ketone: x  / Bili: x / Urobili: x   Blood: x / Protein: x / Nitrite: x   Leuk Esterase: x / RBC: x / WBC x   Sq Epi: x / Non Sq Epi: x / Bacteria: x      RADIOLOGY & ADDITIONAL TESTS:  Results Reviewed: worsening hypernatremia, hypokalemia repleted  Imaging Personally Reviewed:  Electrocardiogram Personally Reviewed:    COORDINATION OF CARE:  Care Discussed with Consultants/Other Providers [Y]: Neurosurgery PEDRO Aragon  Prior or Outpatient Records Reviewed [Y/N]:

## 2024-04-21 NOTE — PROGRESS NOTE ADULT - ASSESSMENT
59 yo F with no known PMH other than occasional headaches for which she took NSAIDs presented as transfer from [place] for SAH HH4 WNFS 5 mF4 ruptured R pcomm aneurysm. Was initially found unresponsive on 3/25/24 at 21:00, intubated at 23:00 at OSH, then txfrd. Given DDAVP at OSH. Here in ED 1 unit PLTs given and R frontal EVD placed-high pressure. Went for R pcomm clip on 3/26/24. Found with LV dysfunction, bradycardia and Acute left lower extremity DVT below the knee, involving the left soleal vein. Extubated 4/3. MRI 4/8 showed widespread ischemic stroke b/l, s/p angio for vasospasm intervention. 4/16. CTH: tract hemorrhage. Awaiting PEG placement.

## 2024-04-21 NOTE — PROGRESS NOTE ADULT - TIME BILLING
Pooja Cervantes MD  Division of Hospital Medicine  [facility]   Spectra: [phone] - Ordering, reviewing, and interpreting labs, testing, and imaging.  - Independently obtaining a review of systems and performing a physical exam  - Reviewing consultant documentation/recommendations.  - Counselling and educating patient regarding interpretation of aforementioned items and plan of care.

## 2024-04-21 NOTE — PROGRESS NOTE ADULT - PROBLEM SELECTOR PLAN 2
gastric wall hematoma on 4/19.  - CT abd/pelv with 3.8 x 1.6 cm gastric wall hematoma; no evidence of active intraluminal nor extraluminal hemorrhage  - Surgery consult recs appreciated  - monitor H/H - stable

## 2024-04-21 NOTE — PROGRESS NOTE ADULT - SUBJECTIVE AND OBJECTIVE BOX
Patient was seen at bedside this am. Patient is awake and appeared comfortable in bed.    OVERNIGHT EVENTS: no acute event overnight    Vital Signs Last 24 Hrs  T(C): 36.8 (21 Apr 2024 13:15), Max: 37.1 (20 Apr 2024 20:16)  T(F): 98.3 (21 Apr 2024 13:15), Max: 98.7 (20 Apr 2024 20:16)  HR: 78 (21 Apr 2024 13:15) (73 - 93)  BP: 151/97 (21 Apr 2024 13:15) (131/80 - 163/85)  BP(mean): --  RR: 18 (21 Apr 2024 13:15) (18 - 18)  SpO2: 95% (21 Apr 2024 13:15) (95% - 98%)    Parameters below as of 21 Apr 2024 13:15  Patient On (Oxygen Delivery Method): room air        I&O's Detail    20 Apr 2024 07:01  -  21 Apr 2024 07:00  --------------------------------------------------------  IN:    dextrose 5%: 270 mL    dextrose 5%: 100 mL  Total IN: 370 mL    OUT:    Intermittent Catheterization - Urethral (mL): 875 mL  Total OUT: 875 mL    Total NET: -505 mL        I&O's Summary    20 Apr 2024 07:01  -  21 Apr 2024 07:00  --------------------------------------------------------  IN: 370 mL / OUT: 875 mL / NET: -505 mL        PHYSICAL EXAM:  Neurological:  awake, alert, nonverbal, PERRL, Rt gaze preference, not follow commands, Rt side spontaneous, LUE spontanous, LLE withdraws to pain  Cardiovascular: +s1, s2  Respiratory: clear to auscultation b/l  Gastrointestinal: soft, non-distended, non-tender, prior incision site C/D/I, no palpable hematoma  Genitourinary:  straight cath  Extremities: warm, dry  Incision/Wound: head incision site C/D/I    LABS:                        10.6   6.59  )-----------( 378      ( 21 Apr 2024 07:15 )             35.8     04-21    153<H>  |  113<H>  |  34<H>  ----------------------------<  90  3.5   |  24  |  0.56    Ca    10.7<H>      21 Apr 2024 07:15  Phos  4.0     04-21  Mg     2.4     04-21        Urinalysis Basic - ( 21 Apr 2024 07:15 )    Color: x / Appearance: x / SG: x / pH: x  Gluc: 90 mg/dL / Ketone: x  / Bili: x / Urobili: x   Blood: x / Protein: x / Nitrite: x   Leuk Esterase: x / RBC: x / WBC x   Sq Epi: x / Non Sq Epi: x / Bacteria: x          CAPILLARY BLOOD GLUCOSE          Drug Levels: [] N/A    CSF Analysis: [] N/A      Allergies    No Known Allergies    Intolerances      MEDICATIONS:  Antibiotics:    Neuro:  acetaminophen     Tablet .. 650 milliGRAM(s) Oral every 6 hours PRN  acetaminophen   IVPB .. 1000 milliGRAM(s) IV Intermittent once PRN  oxyCODONE    IR 5 milliGRAM(s) Oral every 4 hours PRN  oxyCODONE    IR 10 milliGRAM(s) Oral every 4 hours PRN    Anticoagulation:    OTHER:  bisacodyl Suppository 10 milliGRAM(s) Rectal once  chlorhexidine 4% Liquid 1 Application(s) Topical <User Schedule>  doxazosin 2 milliGRAM(s) Oral at bedtime  petrolatum white Ointment 1 Application(s) Topical every 12 hours PRN  polyethylene glycol 3350      polyethylene glycol 3350 17 Gram(s) Oral daily  senna Syrup 10 milliLiter(s) Oral at bedtime    IVF:  dextrose 5%. 1000 milliLiter(s) IV Continuous <Continuous>  multivitamin/minerals/iron Oral Solution (CENTRUM) 15 milliLiter(s) Oral daily    CULTURES:  Culture Results:   No growth at 5 days (04-07 @ 20:28)  Culture Results:   No growth at 5 days (04-07 @ 20:15)    RADIOLOGY & ADDITIONAL TESTS:

## 2024-04-21 NOTE — PROGRESS NOTE ADULT - ASSESSMENT
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 Course cb dysphagia, s/p attempted PEG placement by GI 4/19. During PEG placement, growing hematoma noted in gastric wall on endoscopy after trocar placement. Procedure aborted d/t cf bleeding, and Surgery called for evaluation. Neurosurgery reconsulted for re-planning for PEG vs. NGt placement in setting of gagstric wall hematoma.    Recommendations:  - Pending attending eval      Trauma/ACS  [id number]

## 2024-04-21 NOTE — PROGRESS NOTE ADULT - SUBJECTIVE AND OBJECTIVE BOX
[facility] Surgery Daily Progress Note  =====================================================    INTERVAL EVENTS: Surgery reconsulted for gastric wall hematoma s/p attempted PEG placement by GI    --------------------------------------------------------------------------------------  VITAL SIGNS:  T(C): 36.8 (04-21-24 @ 20:38), Max: 37.2 (04-21-24 @ 16:40)  HR: 79 (04-21-24 @ 20:38) (73 - 85)  BP: 160/85 (04-21-24 @ 20:38) (131/80 - 160/85)  RR: 18 (04-21-24 @ 20:38) (18 - 18)  SpO2: 95% (04-21-24 @ 20:38) (95% - 98%)  --------------------------------------------------------------------------------------

## 2024-04-21 NOTE — PROGRESS NOTE ADULT - ASSESSMENT
HH4 WNFS 5 mF4 subarachnoid hemorrhage.    04/15: EVD removed  04/16: CTH: tract hemorrhage, Bedboarded  4/18 TX'd from NSCU overnight, called for PEG as failed swallow study  4/19 failed/aborted PEG due to hematoma during placement in endo suite, general surgery to see; CTA A/P to r/o bleed  4/20: monitored, d5w increased to 50/hr,  4/21 D5W inc to 60ml/hr    PLAN  - neuro and vital check Q4hrs  - pain control with tylenol IV prn as no oral access  - hypernatremia, Na 153 this am, now on D5W@60ml per hour, will f/u serial BMPs  - dysphagia, failed/aborted PEG due to hematoma during placement in endo suite, CT abd/pelvis yesterday showed gastric wall hematoma without intraluminal or extraluminal hemorrhage. H/H today 10.6/35.8 stable, hemodynamically stable, spoke with surgery team, will pre-op for possible PEG tomorrow  - hypokalemia, s/p IV potassium supplements  - b/l soleal DVTs, persistent on 4/17 LE doppler, will f/u serial repeat LED on 4/24  - urinary retention, continue bladder scan Q6hr and straight cath as needed  - last BM 4/15, dulcolax added for bowel regimens  - activity increase as tolerated  - DVT ppx: hold chemical ppx given possible PEG tomorrow  - Disposition: PT/OT/PMR- [facility]    plan to be discussed with Dr. Varela    [facility] [id number]

## 2024-04-21 NOTE — PROGRESS NOTE ADULT - PROBLEM SELECTOR PLAN 3
- hypernatremia worse today  - currently no NGT and planned for PEG tentatively tomorrow with surgery  - please increase D5W gtt to 60cc/hr  - repeat BMP this afternoon to monitor Na

## 2024-04-22 DIAGNOSIS — R33.9 RETENTION OF URINE, UNSPECIFIED: ICD-10-CM

## 2024-04-22 LAB
ANION GAP SERPL CALC-SCNC: 12 MMOL/L — SIGNIFICANT CHANGE UP (ref 5–17)
ANION GAP SERPL CALC-SCNC: 14 MMOL/L — SIGNIFICANT CHANGE UP (ref 5–17)
APTT BLD: 36.4 SEC — HIGH (ref 24.5–35.6)
BLD GP AB SCN SERPL QL: NEGATIVE — SIGNIFICANT CHANGE UP
BUN SERPL-MCNC: 23 MG/DL — SIGNIFICANT CHANGE UP (ref 7–23)
BUN SERPL-MCNC: 26 MG/DL — HIGH (ref 7–23)
CALCIUM SERPL-MCNC: 10.4 MG/DL — SIGNIFICANT CHANGE UP (ref 8.4–10.5)
CALCIUM SERPL-MCNC: 10.5 MG/DL — SIGNIFICANT CHANGE UP (ref 8.4–10.5)
CHLORIDE SERPL-SCNC: 110 MMOL/L — HIGH (ref 96–108)
CHLORIDE SERPL-SCNC: 111 MMOL/L — HIGH (ref 96–108)
CO2 SERPL-SCNC: 24 MMOL/L — SIGNIFICANT CHANGE UP (ref 22–31)
CO2 SERPL-SCNC: 26 MMOL/L — SIGNIFICANT CHANGE UP (ref 22–31)
CREAT SERPL-MCNC: 0.49 MG/DL — LOW (ref 0.5–1.3)
CREAT SERPL-MCNC: 0.51 MG/DL — SIGNIFICANT CHANGE UP (ref 0.5–1.3)
EGFR: 109 ML/MIN/1.73M2 — SIGNIFICANT CHANGE UP
EGFR: 110 ML/MIN/1.73M2 — SIGNIFICANT CHANGE UP
GLUCOSE BLDC GLUCOMTR-MCNC: 116 MG/DL — HIGH (ref 70–99)
GLUCOSE SERPL-MCNC: 99 MG/DL — SIGNIFICANT CHANGE UP (ref 70–99)
GLUCOSE SERPL-MCNC: 99 MG/DL — SIGNIFICANT CHANGE UP (ref 70–99)
HCT VFR BLD CALC: 36.5 % — SIGNIFICANT CHANGE UP (ref 34.5–45)
HGB BLD-MCNC: 11.2 G/DL — LOW (ref 11.5–15.5)
INR BLD: 1.21 RATIO — HIGH (ref 0.85–1.18)
MCHC RBC-ENTMCNC: 28.5 PG — SIGNIFICANT CHANGE UP (ref 27–34)
MCHC RBC-ENTMCNC: 30.7 GM/DL — LOW (ref 32–36)
MCV RBC AUTO: 92.9 FL — SIGNIFICANT CHANGE UP (ref 80–100)
NRBC # BLD: 0 /100 WBCS — SIGNIFICANT CHANGE UP (ref 0–0)
PLATELET # BLD AUTO: 375 K/UL — SIGNIFICANT CHANGE UP (ref 150–400)
POTASSIUM SERPL-MCNC: 3.5 MMOL/L — SIGNIFICANT CHANGE UP (ref 3.5–5.3)
POTASSIUM SERPL-MCNC: 4.2 MMOL/L — SIGNIFICANT CHANGE UP (ref 3.5–5.3)
POTASSIUM SERPL-SCNC: 3.5 MMOL/L — SIGNIFICANT CHANGE UP (ref 3.5–5.3)
POTASSIUM SERPL-SCNC: 4.2 MMOL/L — SIGNIFICANT CHANGE UP (ref 3.5–5.3)
PROTHROM AB SERPL-ACNC: 13.2 SEC — HIGH (ref 9.5–13)
RBC # BLD: 3.93 M/UL — SIGNIFICANT CHANGE UP (ref 3.8–5.2)
RBC # FLD: 14.4 % — SIGNIFICANT CHANGE UP (ref 10.3–14.5)
RH IG SCN BLD-IMP: POSITIVE — SIGNIFICANT CHANGE UP
SODIUM SERPL-SCNC: 148 MMOL/L — HIGH (ref 135–145)
SODIUM SERPL-SCNC: 149 MMOL/L — HIGH (ref 135–145)
WBC # BLD: 6.95 K/UL — SIGNIFICANT CHANGE UP (ref 3.8–10.5)
WBC # FLD AUTO: 6.95 K/UL — SIGNIFICANT CHANGE UP (ref 3.8–10.5)

## 2024-04-22 PROCEDURE — 99233 SBSQ HOSP IP/OBS HIGH 50: CPT

## 2024-04-22 RX ORDER — POTASSIUM CHLORIDE 20 MEQ
20 PACKET (EA) ORAL
Refills: 0 | Status: DISCONTINUED | OUTPATIENT
Start: 2024-04-22 | End: 2024-04-22

## 2024-04-22 RX ORDER — POTASSIUM CHLORIDE 20 MEQ
10 PACKET (EA) ORAL
Refills: 0 | Status: COMPLETED | OUTPATIENT
Start: 2024-04-22 | End: 2024-04-22

## 2024-04-22 RX ADMIN — CHLORHEXIDINE GLUCONATE 1 APPLICATION(S): 213 SOLUTION TOPICAL at 06:30

## 2024-04-22 RX ADMIN — Medication 100 MILLIEQUIVALENT(S): at 09:46

## 2024-04-22 RX ADMIN — Medication 100 MILLIEQUIVALENT(S): at 11:11

## 2024-04-22 RX ADMIN — SODIUM CHLORIDE 75 MILLILITER(S): 9 INJECTION, SOLUTION INTRAVENOUS at 07:30

## 2024-04-22 RX ADMIN — Medication 100 MILLIEQUIVALENT(S): at 12:44

## 2024-04-22 NOTE — PROGRESS NOTE ADULT - SUBJECTIVE AND OBJECTIVE BOX
Pooja Cervantes MD  Division of Hospital Medicine  [facility]  Spectra: [phone]      Patient is a 58y old  Female who presents with a chief complaint of SAH (22 Apr 2024 10:48)      SUBJECTIVE / OVERNIGHT EVENTS: no acute events overnight. urinary retention in setting of no access for cardura. unable to obtain ROS due to mental status status.  ADDITIONAL REVIEW OF SYSTEMS:    MEDICATIONS  (STANDING):  chlorhexidine 4% Liquid 1 Application(s) Topical <User Schedule>  dextrose 5%. 1000 milliLiter(s) (75 mL/Hr) IV Continuous <Continuous>  doxazosin 2 milliGRAM(s) Oral at bedtime  multivitamin/minerals/iron Oral Solution (CENTRUM) 15 milliLiter(s) Oral daily  polyethylene glycol 3350      polyethylene glycol 3350 17 Gram(s) Oral daily  potassium chloride  10 mEq/100 mL IVPB 10 milliEquivalent(s) IV Intermittent every 1 hour  senna Syrup 10 milliLiter(s) Oral at bedtime    MEDICATIONS  (PRN):  acetaminophen     Tablet .. 650 milliGRAM(s) Oral every 6 hours PRN Temp greater or equal to 38.5C (101.3F), Mild Pain (1 - 3)  acetaminophen   IVPB .. 1000 milliGRAM(s) IV Intermittent once PRN Severe Pain (7 - 10)  oxyCODONE    IR 5 milliGRAM(s) Oral every 4 hours PRN Moderate Pain (4 - 6)  oxyCODONE    IR 10 milliGRAM(s) Oral every 4 hours PRN Severe Pain (7 - 10)  petrolatum white Ointment 1 Application(s) Topical every 12 hours PRN dryness      CAPILLARY BLOOD GLUCOSE        I&O's Summary    21 Apr 2024 07:01  -  22 Apr 2024 07:00  --------------------------------------------------------  IN: 0 mL / OUT: 650 mL / NET: -650 mL    22 Apr 2024 07:01  -  22 Apr 2024 12:15  --------------------------------------------------------  IN: 475 mL / OUT: 0 mL / NET: 475 mL        PHYSICAL EXAM:  Vital Signs Last 24 Hrs  T(C): 36.8 (22 Apr 2024 10:14), Max: 37.2 (21 Apr 2024 16:40)  T(F): 98.3 (22 Apr 2024 10:14), Max: 99 (21 Apr 2024 16:40)  HR: 73 (22 Apr 2024 10:14) (73 - 83)  BP: 153/91 (22 Apr 2024 10:14) (151/97 - 176/91)  BP(mean): --  RR: 18 (22 Apr 2024 10:14) (18 - 18)  SpO2: 96% (22 Apr 2024 10:14) (95% - 96%)    Parameters below as of 22 Apr 2024 10:14  Patient On (Oxygen Delivery Method): room air    CONSTITUTIONAL: NAD, well-developed, well-groomed  EYES: PERRLA; conjunctiva and sclera clear  ENMT: Moist oral mucosa, no pharyngeal injection or exudates; normal dentition  NECK: Supple, no palpable masses; no thyromegaly  RESPIRATORY: Normal respiratory effort; lungs are clear to auscultation bilaterally  CARDIOVASCULAR: Regular rate and rhythm, normal S1 and S2, no murmur/rub/gallop; No lower extremity edema  ABDOMEN: Soft, Nondistended, Nontender to palpation, normoactive bowel sounds  MUSCULOSKELETAL: No clubbing or cyanosis of digits; no joint swelling or tenderness to palpation  PSYCH: Alert, unable to assess orientation as minimally verbal  NEUROLOGY: minimally verbal, R gaze preference, not following commands but moving R extremities spontaneously  SKIN: No rashes; no palpable lesions    LABS:                        11.2   6.95  )-----------( 375      ( 22 Apr 2024 06:22 )             36.5     04-22    149<H>  |  111<H>  |  26<H>  ----------------------------<  99  3.5   |  24  |  0.51    Ca    10.5      22 Apr 2024 06:22  Phos  4.0     04-21  Mg     2.4     04-21      PT/INR - ( 22 Apr 2024 06:22 )   PT: 13.2 sec;   INR: 1.21 ratio         PTT - ( 22 Apr 2024 06:22 )  PTT:36.4 sec      Urinalysis Basic - ( 22 Apr 2024 06:22 )    Color: x / Appearance: x / SG: x / pH: x  Gluc: 99 mg/dL / Ketone: x  / Bili: x / Urobili: x   Blood: x / Protein: x / Nitrite: x   Leuk Esterase: x / RBC: x / WBC x   Sq Epi: x / Non Sq Epi: x / Bacteria: x      RADIOLOGY & ADDITIONAL TESTS:  Results Reviewed: hypernatremia improving, hypokalemia repleted, Cr stable  Imaging Personally Reviewed:  Electrocardiogram Personally Reviewed:    COORDINATION OF CARE:  Care Discussed with Consultants/Other Providers [Y]: Neurosurgery PEDRO Gates  Prior or Outpatient Records Reviewed [Y/N]:

## 2024-04-22 NOTE — PROGRESS NOTE ADULT - PROBLEM SELECTOR PLAN 3
- improving  - currently no NGT and planned for PEG tentatively ?today with surgery  - FWD 2.3L today  - c/w D5W gtt @ 75cc/hr  - check repeat BMP in afternoon  - if no plan for PEG today and NGT placed, would start FWB 250cc q6h and decrease vs. trial off D5W gtt pending afternoon Na level gastric wall hematoma on 4/19.  - CT abd/pelv with 3.8 x 1.6 cm gastric wall hematoma; no evidence of active intraluminal nor extraluminal hemorrhage  - Surgery consult recs appreciated  - monitor H/H - stable  - per GI, no contraindication for NGT placement if needed

## 2024-04-22 NOTE — PROGRESS NOTE ADULT - NS ATTEND AMEND GEN_ALL_CORE FT
s/p SAH, with dysphagia, s/p aborted peg due to abdominal wall hematoma    plan ppi daily  surgical evaluation for peg

## 2024-04-22 NOTE — PROGRESS NOTE ADULT - PROBLEM SELECTOR PLAN 2
gastric wall hematoma on 4/19.  - CT abd/pelv with 3.8 x 1.6 cm gastric wall hematoma; no evidence of active intraluminal nor extraluminal hemorrhage  - Surgery consult recs appreciated  - monitor H/H - stable  - per GI, no contraindication for NGT placement if needed urinary retention in setting of no access for cardura  - resume cardura once PEG or NGT placed  - in interim, bladder scan q6h and straight cath if needed  - suspect her recent elevated BP 2/2 urinary retention and missed cardura since NGT has been out

## 2024-04-22 NOTE — PROGRESS NOTE ADULT - SUBJECTIVE AND OBJECTIVE BOX
SURGERY PROGRESS NOTE    Interval events  - No acute events.  - Vital signs stable (sbp 151-176), afebrile, on room air.     OBJECTIVE:   Vital Signs Last 24 Hrs  T(C): 36.8 (22 Apr 2024 10:14), Max: 37.2 (21 Apr 2024 16:40)  T(F): 98.3 (22 Apr 2024 10:14), Max: 99 (21 Apr 2024 16:40)  HR: 73 (22 Apr 2024 10:14) (73 - 83)  BP: 153/91 (22 Apr 2024 10:14) (151/97 - 176/91)  BP(mean): --  RR: 18 (22 Apr 2024 10:14) (18 - 18)  SpO2: 96% (22 Apr 2024 10:14) (95% - 96%)    Parameters below as of 22 Apr 2024 10:14  Patient On (Oxygen Delivery Method): room air            I&O's Summary    21 Apr 2024 07:01  -  22 Apr 2024 07:00  --------------------------------------------------------  IN: 0 mL / OUT: 650 mL / NET: -650 mL    22 Apr 2024 07:01  -  22 Apr 2024 11:28  --------------------------------------------------------  IN: 475 mL / OUT: 0 mL / NET: 475 mL      I&O's Detail    21 Apr 2024 07:01  -  22 Apr 2024 07:00  --------------------------------------------------------  IN:  Total IN: 0 mL    OUT:    Intermittent Catheterization - Urethral (mL): 650 mL  Total OUT: 650 mL    Total NET: -650 mL      22 Apr 2024 07:01  -  22 Apr 2024 11:28  --------------------------------------------------------  IN:    dextrose 5%: 375 mL    IV PiggyBack: 100 mL  Total IN: 475 mL    OUT:  Total OUT: 0 mL    Total NET: 475 mL      PHYSICAL EXAM  General: No acute distress, resting comfortably in bed.  HEENT: Normocephalic atraumatic  Respiratory: Nonlabored respirations  Cardio: regular rate  Abdomen: soft, nondistended, nontender, abd dressing with stable strikethrough.   Extremities: warm and well perfused        MEDICATIONS  (STANDING):  chlorhexidine 4% Liquid 1 Application(s) Topical <User Schedule>  dextrose 5%. 1000 milliLiter(s) (75 mL/Hr) IV Continuous <Continuous>  doxazosin 2 milliGRAM(s) Oral at bedtime  multivitamin/minerals/iron Oral Solution (CENTRUM) 15 milliLiter(s) Oral daily  polyethylene glycol 3350 17 Gram(s) Oral daily  polyethylene glycol 3350      potassium chloride  10 mEq/100 mL IVPB 10 milliEquivalent(s) IV Intermittent every 1 hour  senna Syrup 10 milliLiter(s) Oral at bedtime    MEDICATIONS  (PRN):  acetaminophen     Tablet .. 650 milliGRAM(s) Oral every 6 hours PRN Temp greater or equal to 38.5C (101.3F), Mild Pain (1 - 3)  acetaminophen   IVPB .. 1000 milliGRAM(s) IV Intermittent once PRN Severe Pain (7 - 10)  oxyCODONE    IR 5 milliGRAM(s) Oral every 4 hours PRN Moderate Pain (4 - 6)  oxyCODONE    IR 10 milliGRAM(s) Oral every 4 hours PRN Severe Pain (7 - 10)  petrolatum white Ointment 1 Application(s) Topical every 12 hours PRN dryness      LABS:                        11.2   6.95  )-----------( 375      ( 22 Apr 2024 06:22 )             36.5     04-22    149<H>  |  111<H>  |  26<H>  ----------------------------<  99  3.5   |  24  |  0.51    Ca    10.5      22 Apr 2024 06:22  Phos  4.0     04-21  Mg     2.4     04-21      PT/INR - ( 22 Apr 2024 06:22 )   PT: 13.2 sec;   INR: 1.21 ratio         PTT - ( 22 Apr 2024 06:22 )  PTT:36.4 sec  Urinalysis Basic - ( 22 Apr 2024 06:22 )    Color: x / Appearance: x / SG: x / pH: x  Gluc: 99 mg/dL / Ketone: x  / Bili: x / Urobili: x   Blood: x / Protein: x / Nitrite: x   Leuk Esterase: x / RBC: x / WBC x   Sq Epi: x / Non Sq Epi: x / Bacteria: x        RADIOLOGY & ADDITIONAL STUDIES:

## 2024-04-22 NOTE — PROGRESS NOTE ADULT - NS ATTEND AMEND GEN_ALL_CORE FT
Events that transpired over the last 24 hours were reviewed.  No upper or lower extremity edema.  Patient noted to have gastric wall hematoma.  Currently off of heparin drip.  Agree with assessment/plan as noted above with plan for repeat surveillance lower extremity venous duplex study on 4/23/2024.  Recommend patient be started on pharmacologic DVT prophylaxis once cleared from GI/surgical perspective.      Time-based billing (NON-critical care).   35 minutes spent on total encounter. The necessity of the time spent during the encounter on this date of service was due to: education, assessment and coordination of care.

## 2024-04-22 NOTE — PROGRESS NOTE ADULT - ASSESSMENT
ASSESSMENT/PLAN: HH4 WNFS 5 mF4 subarachnoid hemorrhage.    04/15: EVD removed  04/16: CTH: tract hemorrhage, Bedboarded  4/18 TX'd from NSCU overnight, called for PEG as failed swallow study  4/19 failed/aborted PEG due to hematoma during placement in endo suite, general surgery to see; CTA A/P to r/o bleed  4/20: monitored, d5w increased to 50/hr, need to confirm ok to replace ngt w/ gen surg    NEURO:  q4 neurochecks/q4 vitals  Pain control prn Tylenol & Oxycodone   EVD dc'd 4/15  R basilic vein PICC placed on 4/1  Activity: [x] OOB as tolerated    PULM:  Incentive spirometry, mobilize as tolerated  Satting on RA     CV:  Keep -180mmHg  Cards following for bradycardia, now resolved   TTE 4/9 w/ EF of 66 % w/ normal left ventricular systolic function, no regional wall motion abnormalities seen.      RENAL:  on D5@75 for hypernatremia, last  downtrending   Continue Cardura for urinary retention - currently held due to no PO access   -bladder scan q6 and straight cath as needed for bladder volume >350cc  Hypokalemia 3.5, supplemented, f/u AM BMP     GI:  Diet: NPO  Failed/aborted PEG on 4/19 due to hematoma during placement in endo suite, CT abd/pelvis done on 4/19 showed gastric wall hematoma without intraluminal or extraluminal hemorrhage. H/H today stable, hemodynamically stable  Continue free water 200ml every 6 hours for hypernatremia   Preop for possible PEG with trauma surgery today   Bowel regimen: senna, miralax   Last BM 4/22    ENDO:   Goal euglycemia (-180)    HEME/ONC:  VTE prophylaxis: [x] SCDs  Chemoprophylaxis held in setting of possible PEG placement today   4/17 LED: Persistent bilateral soleal vein DVT  -Repeat LED on 4/24    ID:  Afebrile     MISC:  Hospitalist following for co-management     DISPOSITION:  AR    Plan to be d/w Dr. Varela   #[id number]

## 2024-04-22 NOTE — PROGRESS NOTE ADULT - ASSESSMENT
59 yo F with no known PMH other than occasional headaches for which she took NSAIDs presented as transfer from [place] for SAH HH4 WNFS 5 mF4 ruptured R pcomm aneurysm. Was initially found unresponsive on 3/25/24 at 21:00, intubated at 23:00 at OSH, then txfrd. Given DDAVP at OSH. Here in ED 1 unit PLTs given and R frontal EVD placed-high pressure. Went for R pcomm clip on 3/26/24. Found with LV dysfunction, bradycardia and Acute left lower extremity DVT below the knee, involving the left soleal vein. Extubated 4/3. MRI 4/8 showed widespread ischemic stroke b/l, s/p angio for vasospasm intervention. 4/16. CTH: tract hemorrhage. Awaiting PEG placement.

## 2024-04-22 NOTE — PROGRESS NOTE ADULT - PROBLEM SELECTOR PLAN 7
DVT ppx: lovenox on hold for PEG. resume when clear from surgery standpoint    Dispo: Acute Rehab - PEG attempted on 4/19 by GI but aborted due to gastric wall hematoma  - CT abd/pelv with confirmed gastric wall hematoma, no active bleed  - PEG planning as per Surgery  - d/w GI PA, no contraindication for NGT placement given hematoma was intramural

## 2024-04-22 NOTE — PROGRESS NOTE ADULT - ASSESSMENT
YO Woman on aspirin at home presented to OSH after being found unresponsive, found to have SAH HH4 WNFS 5 mF4 ruptured R pcomm aneurysm s/p R frontal EVD placed-high pressure, and then. R pcom clip on 3/26/24, with course c/b bilateral soleal DVTs.     GI consulted for PEG evaluation 2/2 persistent Dysphagia   Son: Pierre Patrick [phone]    #dysphagia  s/p aborted PEG attempt 4/19 2/2 gastric wall hematoma  EGD 4/19: normal esophagus, normal stomach, normal duodenum.  Visible enlarging hematoma following passage of trocar therefore PEG was aborted  CT 4/19: gastric wall hematoma without intraluminal or extraluminal hemorrhage.  stable Hgb      RECS:  -Surgery following for re-attempt at PEG; no further plans for GI team PEG placement   -NO GI objection to NGT replacement if not able to get PEG today. Defer timing to surgery team    GI team will sign off  Discussed with Neurosurgery team    Moses Burch PA-C  [facility] Non Teaching GI Service  Available on TEAMS Mon-Fri 8a-4p  After hours and weekend coverage [phone]

## 2024-04-22 NOTE — PROGRESS NOTE ADULT - SUBJECTIVE AND OBJECTIVE BOX
SUBJECTIVE:   no acute overnight events    Vital Signs Last 24 Hrs  T(C): 36.9 (04-22-24 @ 04:45), Max: 37.2 (04-21-24 @ 16:40)  T(F): 98.5 (04-22-24 @ 04:45), Max: 99 (04-21-24 @ 16:40)  HR: 79 (04-22-24 @ 04:45) (78 - 83)  BP: 176/91 (04-22-24 @ 04:45) (151/97 - 176/91)  BP(mean): --  RR: 18 (04-22-24 @ 04:45) (18 - 18)  SpO2: 96% (04-22-24 @ 04:45) (95% - 96%)    PHYSICAL EXAM:    Constitutional: No Acute Distress     Neurological: Nonverbal, Ox3, PERRL, Rt gaze preference, does not follow commands, Rt side spontaneous, LUE spontaneous LLE withdraws to pain    Incision: clean, dry & intact    Pulmonary: Clear to Auscultation    Cardiovascular: Regular rate and rhythm     Gastrointestinal: Soft, Non-tender    Extremities: No calf tenderness bilaterally          LABS:                          11.2   6.95  )-----------( 375      ( 22 Apr 2024 06:22 )             36.5    04-22    149<H>  |  111<H>  |  26<H>  ----------------------------<  99  3.5   |  24  |  0.51    Ca    10.5      22 Apr 2024 06:22  Phos  4.0     04-21  Mg     2.4     04-21    PT/INR - ( 22 Apr 2024 06:22 )   PT: 13.2 sec;   INR: 1.21 ratio         PTT - ( 22 Apr 2024 06:22 )  PTT:36.4 sec    04-21 @ 07:01  -  04-22 @ 07:00  --------------------------------------------------------  IN: 0 mL / OUT: 650 mL / NET: -650 mL    04-22 @ 07:01 - 04-22 @ 09:25  --------------------------------------------------------  IN: 150 mL / OUT: 0 mL / NET: 150 mL        IMAGING:   ACC: [id number] EXAM: CT ABDOMEN AND PELVIS IC ORDERED BY: BELÉN BRYAN    PROCEDURE DATE: 04/19/2024        INTERPRETATION: CLINICAL INFORMATION: Gastric wall hematoma noted on endoscopy.    COMPARISON: None.    CONTRAST/COMPLICATIONS:  IV Contrast: Omnipaque 350 90 cc administered 10 cc discarded  Oral Contrast: NONE  Complications: None reported at time of study completion    PROCEDURE:  CT of the Abdomen and Pelvis was performed.  Precontrast, Arterial and Delayed phases were performed.  Sagittal and coronal reformats were performed.    FINDINGS:  LOWER CHEST: Mild bibasilar linear type atelectasis.    LIVER: Within normal limits.  BILE DUCTS: Normal caliber.  GALLBLADDER: Within normal limits.  SPLEEN: Within normal limits.  PANCREAS: Within normal limits.  ADRENALS: Within normal limits.  KIDNEYS/URETERS: Within normal limits.    BLADDER: Within normal limits.  REPRODUCTIVE ORGANS: Uterus and adnexa within normal limits.    BOWEL: A 3.8 x 1.6 cm gastric wall hematoma at the gastric body corresponding to finding on recent endoscopy. No evidence of active intraluminal hemorrhage. No extraluminal hemorrhage. No bowel obstruction. Appendix is normal.  PERITONEUM: No ascites.  VESSELS: Atherosclerotic changes.  RETROPERITONEUM/LYMPH NODES: No lymphadenopathy.  ABDOMINAL WALL: Within normal limits.  BONES: Within normal limits.    IMPRESSION:  Gastric wall hematoma without intraluminal or extraluminal hemorrhage.        --- End of Report ---         CASA BECKFORD MD; Resident Radiology  This document has been electronically signed.  THANIA MCCALLUM MD; Attending Radiologist  This document has been electronically signed. Apr 20 2024 8:54AM    MEDICATIONS:  Neuro:  acetaminophen     Tablet .. 650 milliGRAM(s) Oral every 6 hours PRN Temp greater or equal to 38.5C (101.3F), Mild Pain (1 - 3)  acetaminophen   IVPB .. 1000 milliGRAM(s) IV Intermittent once PRN Severe Pain (7 - 10)  oxyCODONE    IR 5 milliGRAM(s) Oral every 4 hours PRN Moderate Pain (4 - 6)  oxyCODONE    IR 10 milliGRAM(s) Oral every 4 hours PRN Severe Pain (7 - 10)    Cardiac:  doxazosin 2 milliGRAM(s) Oral at bedtime    GI/:  polyethylene glycol 3350 17 Gram(s) Oral daily  polyethylene glycol 3350      senna Syrup 10 milliLiter(s) Oral at bedtime    Other:   chlorhexidine 4% Liquid 1 Application(s) Topical <User Schedule>  dextrose 5%. 1000 milliLiter(s) IV Continuous <Continuous>  multivitamin/minerals/iron Oral Solution (CENTRUM) 15 milliLiter(s) Oral daily  petrolatum white Ointment 1 Application(s) Topical every 12 hours PRN dryness  potassium chloride   Solution 20 milliEquivalent(s) Oral every 2 hours        DIET: NPO pending PEG placement

## 2024-04-22 NOTE — PROGRESS NOTE ADULT - PROBLEM SELECTOR PLAN 5
b/l below the knee DVT  - Vasc Cards consulted, recs appreciated  - serial duplex as per Vasc Cards  - resume lovenox ppx dosing when clear from PEG placement standpoint resolved on repeat TTE performed on 4/9/24. normal EF 66% with no WMA

## 2024-04-22 NOTE — PROGRESS NOTE ADULT - ASSESSMENT
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 Course cb dysphagia, s/p attempted PEG placement by GI 4/19. During PEG placement, growing hematoma noted in gastric wall on endoscopy after trocar placement. Procedure aborted d/t cf bleeding, and Surgery called for evaluation. Neurosurgery reconsulted for re-planning for PEG vs. NGt placement in setting of gagstric wall hematoma.    Plan/Recommendations:  - Will discuss PEG timing with attending.      Trauma/ACS  [id number]

## 2024-04-22 NOTE — PROGRESS NOTE ADULT - SUBJECTIVE AND OBJECTIVE BOX
INTERVAL HPI/OVERNIGHT EVENTS:  no events reported over weekend  no transfusion requirement  NGT remains out  no Bleeding from abdominal incision site  no melena or rectal bleeding    eyes open, more alert  some movement on Left side; moves right side freely  no fever or chills    surgery following - awaiting recs for surgery G tube placement    MEDICATIONS  (STANDING):  chlorhexidine 4% Liquid 1 Application(s) Topical <User Schedule>  dextrose 5%. 1000 milliLiter(s) (75 mL/Hr) IV Continuous <Continuous>  doxazosin 2 milliGRAM(s) Oral at bedtime  multivitamin/minerals/iron Oral Solution (CENTRUM) 15 milliLiter(s) Oral daily  polyethylene glycol 3350      polyethylene glycol 3350 17 Gram(s) Oral daily  potassium chloride  10 mEq/100 mL IVPB 10 milliEquivalent(s) IV Intermittent every 1 hour  senna Syrup 10 milliLiter(s) Oral at bedtime    MEDICATIONS  (PRN):  acetaminophen     Tablet .. 650 milliGRAM(s) Oral every 6 hours PRN Temp greater or equal to 38.5C (101.3F), Mild Pain (1 - 3)  acetaminophen   IVPB .. 1000 milliGRAM(s) IV Intermittent once PRN Severe Pain (7 - 10)  oxyCODONE    IR 5 milliGRAM(s) Oral every 4 hours PRN Moderate Pain (4 - 6)  oxyCODONE    IR 10 milliGRAM(s) Oral every 4 hours PRN Severe Pain (7 - 10)  petrolatum white Ointment 1 Application(s) Topical every 12 hours PRN dryness      Allergies  No Known Allergies      Review of Systems:  unable to obtain    Vital Signs Last 24 Hrs  T(C): 36.8 (22 Apr 2024 10:14), Max: 37.2 (21 Apr 2024 16:40)  T(F): 98.3 (22 Apr 2024 10:14), Max: 99 (21 Apr 2024 16:40)  HR: 73 (22 Apr 2024 10:14) (73 - 83)  BP: 153/91 (22 Apr 2024 10:14) (151/97 - 176/91)  BP(mean): --  RR: 18 (22 Apr 2024 10:14) (18 - 18)  SpO2: 96% (22 Apr 2024 10:14) (95% - 96%)    Parameters below as of 22 Apr 2024 10:14  Patient On (Oxygen Delivery Method): room air    PHYSICAL EXAM:  Constitutional: eyes open; non toxic appearing NAD  Neck: No LAD, supple  Respiratory: grossly clear no inc WOB  Cardiovascular: S1 and S2, regular  Gastrointestinal: BS+, soft, ND NT  epigastric incision site soft, no bleeding, hematoma or ecchymosis +dry gauze dressing over site)  Extremities: No peripheral edema  Vascular: 2+ peripheral pulses  Neurological: awake; moves bl UE and LE (right >left)  Psychiatric: calm   Skin: anicteric, no ecchymoses or bruising    LABS:                        11.2   6.95  )-----------( 375      ( 22 Apr 2024 06:22 )             36.5     04-22    149<H>  |  111<H>  |  26<H>  ----------------------------<  99  3.5   |  24  |  0.51    Ca    10.5      22 Apr 2024 06:22  Phos  4.0     04-21  Mg     2.4     04-21      PT/INR - ( 22 Apr 2024 06:22 )   PT: 13.2 sec;   INR: 1.21 ratio         PTT - ( 22 Apr 2024 06:22 )  PTT:36.4 sec          RADIOLOGY & ADDITIONAL TESTS:    ACC: [id number] EXAM:  CT ABDOMEN AND PELVIS IC   ORDERED BY: BELÉN BRYAN     PROCEDURE DATE:  04/19/2024          INTERPRETATION:  CLINICAL INFORMATION: Gastric wall hematoma noted on   endoscopy.    COMPARISON: None.    CONTRAST/COMPLICATIONS:  IV Contrast: Omnipaque 350  90 cc administered   10 cc discarded  Oral Contrast: NONE  Complications: None reported at time of study completion    PROCEDURE:  CT of the Abdomen and Pelvis was performed.  Precontrast, Arterial and Delayed phases were performed.  Sagittal and coronal reformats were performed.    FINDINGS:  LOWER CHEST: Mild bibasilar linear type atelectasis.    LIVER: Within normal limits.  BILE DUCTS: Normal caliber.  GALLBLADDER: Within normal limits.  SPLEEN: Within normal limits.  PANCREAS: Within normal limits.  ADRENALS: Within normal limits.  KIDNEYS/URETERS: Within normal limits.    BLADDER: Within normal limits.  REPRODUCTIVE ORGANS: Uterus and adnexa within normal limits.    BOWEL: A 3.8 x 1.6 cm gastric wall hematoma at the gastric body   corresponding to finding on recent endoscopy. No evidence of active   intraluminal hemorrhage. No extraluminal hemorrhage. No bowel   obstruction. Appendix is normal.  PERITONEUM: No ascites.  VESSELS: Atherosclerotic changes.  RETROPERITONEUM/LYMPH NODES: No lymphadenopathy.  ABDOMINAL WALL: Within normal limits.  BONES: Within normal limits.    IMPRESSION:  Gastric wall hematoma without intraluminal or extraluminal hemorrhage.

## 2024-04-22 NOTE — PROGRESS NOTE ADULT - PROBLEM SELECTOR PLAN 6
- PEG attempted on 4/19 by GI but aborted due to gastric wall hematoma  - CT abd/pelv with confirmed gastric wall hematoma, no active bleed  - PEG planning as per Surgery  - d/w GI PA, no contraindication for NGT placement given hematoma was intramural b/l below the knee DVT  - Vasc Cards consulted, recs appreciated  - serial duplex as per Vasc Cards  - resume lovenox ppx dosing when clear from PEG placement standpoint

## 2024-04-22 NOTE — PROGRESS NOTE ADULT - PROBLEM SELECTOR PLAN 4
resolved on repeat TTE performed on 4/9/24. normal EF 66% with no WMA - improving  - currently no NGT and planned for PEG tentatively ?today with surgery  - FWD 2.3L today  - c/w D5W gtt @ 75cc/hr  - check repeat BMP in afternoon  - if no plan for PEG today and NGT placed, would start FWB 250cc q6h and decrease vs. trial off D5W gtt pending afternoon Na level

## 2024-04-22 NOTE — PROGRESS NOTE ADULT - SUBJECTIVE AND OBJECTIVE BOX
Vascular Cardiology Consult Note     DIRECT PROVIDER NUMBER: [phone]  / Available on TEAMS    CC:  SAH    Interval Events:  No LE edema. No UE edema.   Gastric wall hematoma noted on CT Abd/Pelvis on 4/19.   Stable bilateral soleal DVT on LE venous duplex 4/17.  Hgb stable.     Allergies  No Known Allergies    MEDICATIONS  (STANDING):  chlorhexidine 4% Liquid 1 Application(s) Topical <User Schedule>  dextrose 5%. 1000 milliLiter(s) (75 mL/Hr) IV Continuous <Continuous>  doxazosin 2 milliGRAM(s) Oral at bedtime  multivitamin/minerals/iron Oral Solution (CENTRUM) 15 milliLiter(s) Oral daily  polyethylene glycol 3350      polyethylene glycol 3350 17 Gram(s) Oral daily  senna Syrup 10 milliLiter(s) Oral at bedtime	    PAST MEDICAL & SURGICAL HISTORY: see HPI    FAMILY HISTORY: see HPI    SOCIAL HISTORY:  unchanged    REVIEW OF SYSTEMS:  [X] Unable to obtain    PHYSICAL EXAM:  Vital Signs Last 24 Hrs  T(C): 36.8 (22 Apr 2024 12:30), Max: 37.2 (21 Apr 2024 16:40)  T(F): 98.3 (22 Apr 2024 12:30), Max: 99 (21 Apr 2024 16:40)  HR: 80 (22 Apr 2024 13:00) (73 - 83)  BP: 170/95 (22 Apr 2024 13:00) (153/91 - 181/103)  BP(mean): --  RR: 18 (22 Apr 2024 12:30) (18 - 18)  SpO2: 97% (22 Apr 2024 12:30) (95% - 97%)    Parameters below as of 22 Apr 2024 12:30  Patient On (Oxygen Delivery Method): room air      Appearance: NAD  Cardiovascular: RRR, S1 and S2  Respiratory: CTA B/L  Gastrointestinal:  Soft, Non-tender, Dressing C/D/I	  Skin: No cyanosis	  Extremities: No LE edema, bilateral calves soft  Vascular Pulse Exam: Palpable DP / PT bilaterally  Foot Exam: No foot wounds    LABS:	 	                                   11.2   6.95  )-----------( 375      ( 22 Apr 2024 06:22 )             36.5     04-22    149<H>  |  111<H>  |  26<H>  ----------------------------<  99  3.5   |  24  |  0.51    Ca    10.5      22 Apr 2024 06:22  Phos  4.0     04-21  Mg     2.4     04-21    PT/INR - ( 22 Apr 2024 06:22 )   PT: 13.2 sec;   INR: 1.21 ratio       PTT - ( 22 Apr 2024 06:22 )  PTT:36.4 sec    Urinalysis Basic - ( 22 Apr 2024 06:22 )    Color: x / Appearance: x / SG: x / pH: x  Gluc: 99 mg/dL / Ketone: x  / Bili: x / Urobili: x   Blood: x / Protein: x / Nitrite: x   Leuk Esterase: x / RBC: x / WBC x   Sq Epi: x / Non Sq Epi: x / Bacteria: x      Assessment:  1. Bilateral Soleal DVT      Normal RV size and function   2. 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 Reduced EF, Improved on Last TTE  4. Gastric wall hematoma (PEG procedure was aborted)    Plan:  1. Last LE venous duplex with stable bilateral soleal DVT.       Recommend to repeat surveillance LE venous duplex on 4/23.  2. Recommend to resume pharmacological DVT PPX (Lovenox 40 mg daily), once safe from GI / surgical perspective.   3. Appreciate excellent Neurosurgical Care.        Thank you  PEDRO Cm, MS, [facility]  Vascular Cardiology Service    Please call with any questions:   DIRECT SERVICE NUMBER: [phone] / Available on TEAMS

## 2024-04-23 LAB
ANION GAP SERPL CALC-SCNC: 15 MMOL/L — SIGNIFICANT CHANGE UP (ref 5–17)
BUN SERPL-MCNC: 21 MG/DL — SIGNIFICANT CHANGE UP (ref 7–23)
CALCIUM SERPL-MCNC: 10.4 MG/DL — SIGNIFICANT CHANGE UP (ref 8.4–10.5)
CHLORIDE SERPL-SCNC: 105 MMOL/L — SIGNIFICANT CHANGE UP (ref 96–108)
CO2 SERPL-SCNC: 23 MMOL/L — SIGNIFICANT CHANGE UP (ref 22–31)
CREAT SERPL-MCNC: 0.52 MG/DL — SIGNIFICANT CHANGE UP (ref 0.5–1.3)
EGFR: 108 ML/MIN/1.73M2 — SIGNIFICANT CHANGE UP
GLUCOSE BLDC GLUCOMTR-MCNC: 119 MG/DL — HIGH (ref 70–99)
GLUCOSE SERPL-MCNC: 87 MG/DL — SIGNIFICANT CHANGE UP (ref 70–99)
HCT VFR BLD CALC: 33.6 % — LOW (ref 34.5–45)
HGB BLD-MCNC: 10.5 G/DL — LOW (ref 11.5–15.5)
MAGNESIUM SERPL-MCNC: 2.1 MG/DL — SIGNIFICANT CHANGE UP (ref 1.6–2.6)
MCHC RBC-ENTMCNC: 28.7 PG — SIGNIFICANT CHANGE UP (ref 27–34)
MCHC RBC-ENTMCNC: 31.3 GM/DL — LOW (ref 32–36)
MCV RBC AUTO: 91.8 FL — SIGNIFICANT CHANGE UP (ref 80–100)
NRBC # BLD: 0 /100 WBCS — SIGNIFICANT CHANGE UP (ref 0–0)
PHOSPHATE SERPL-MCNC: 3.6 MG/DL — SIGNIFICANT CHANGE UP (ref 2.5–4.5)
PLATELET # BLD AUTO: 345 K/UL — SIGNIFICANT CHANGE UP (ref 150–400)
POTASSIUM SERPL-MCNC: 3.3 MMOL/L — LOW (ref 3.5–5.3)
POTASSIUM SERPL-SCNC: 3.3 MMOL/L — LOW (ref 3.5–5.3)
RBC # BLD: 3.66 M/UL — LOW (ref 3.8–5.2)
RBC # FLD: 14.4 % — SIGNIFICANT CHANGE UP (ref 10.3–14.5)
SODIUM SERPL-SCNC: 143 MMOL/L — SIGNIFICANT CHANGE UP (ref 135–145)
WBC # BLD: 6.83 K/UL — SIGNIFICANT CHANGE UP (ref 3.8–10.5)
WBC # FLD AUTO: 6.83 K/UL — SIGNIFICANT CHANGE UP (ref 3.8–10.5)

## 2024-04-23 PROCEDURE — 99232 SBSQ HOSP IP/OBS MODERATE 35: CPT | Mod: 25,57

## 2024-04-23 PROCEDURE — 99232 SBSQ HOSP IP/OBS MODERATE 35: CPT

## 2024-04-23 PROCEDURE — 43246 EGD PLACE GASTROSTOMY TUBE: CPT | Mod: GC

## 2024-04-23 PROCEDURE — 93970 EXTREMITY STUDY: CPT | Mod: 26

## 2024-04-23 DEVICE — PEG KT SAFETY 20FR: Type: IMPLANTABLE DEVICE | Status: FUNCTIONAL

## 2024-04-23 RX ORDER — SENNA PLUS 8.6 MG/1
10 TABLET ORAL AT BEDTIME
Refills: 0 | Status: DISCONTINUED | OUTPATIENT
Start: 2024-04-23 | End: 2024-04-26

## 2024-04-23 RX ORDER — DOXAZOSIN MESYLATE 4 MG
2 TABLET ORAL AT BEDTIME
Refills: 0 | Status: DISCONTINUED | OUTPATIENT
Start: 2024-04-23 | End: 2024-04-26

## 2024-04-23 RX ORDER — POLYETHYLENE GLYCOL 3350 17 G/17G
17 POWDER, FOR SOLUTION ORAL
Refills: 0 | Status: DISCONTINUED | OUTPATIENT
Start: 2024-04-23 | End: 2024-04-26

## 2024-04-23 RX ORDER — OXYCODONE HYDROCHLORIDE 5 MG/1
5 TABLET ORAL EVERY 4 HOURS
Refills: 0 | Status: DISCONTINUED | OUTPATIENT
Start: 2024-04-23 | End: 2024-04-26

## 2024-04-23 RX ORDER — ACETAMINOPHEN 500 MG
650 TABLET ORAL EVERY 6 HOURS
Refills: 0 | Status: DISCONTINUED | OUTPATIENT
Start: 2024-04-23 | End: 2024-04-26

## 2024-04-23 RX ORDER — POTASSIUM CHLORIDE 20 MEQ
10 PACKET (EA) ORAL
Refills: 0 | Status: DISCONTINUED | OUTPATIENT
Start: 2024-04-23 | End: 2024-04-24

## 2024-04-23 RX ORDER — OXYCODONE HYDROCHLORIDE 5 MG/1
10 TABLET ORAL EVERY 4 HOURS
Refills: 0 | Status: DISCONTINUED | OUTPATIENT
Start: 2024-04-23 | End: 2024-04-26

## 2024-04-23 RX ORDER — MULTIVIT-MIN/FERROUS GLUCONATE 9 MG/15 ML
15 LIQUID (ML) ORAL DAILY
Refills: 0 | Status: DISCONTINUED | OUTPATIENT
Start: 2024-04-23 | End: 2024-04-26

## 2024-04-23 RX ADMIN — SENNA PLUS 10 MILLILITER(S): 8.6 TABLET ORAL at 22:23

## 2024-04-23 RX ADMIN — CHLORHEXIDINE GLUCONATE 1 APPLICATION(S): 213 SOLUTION TOPICAL at 06:37

## 2024-04-23 RX ADMIN — Medication 100 MILLIEQUIVALENT(S): at 10:25

## 2024-04-23 RX ADMIN — Medication 100 MILLIEQUIVALENT(S): at 11:59

## 2024-04-23 RX ADMIN — Medication 2 MILLIGRAM(S): at 22:23

## 2024-04-23 NOTE — PRE-ANESTHESIA EVALUATION ADULT - NSANTHPEFT_GEN_ALL_CORE
RRR, resting comfortably on room air
LUNGS CLEAR  COR RRR S1S2
PHYSICAL EXAM:  GENERAL: Not sedated.  CHEST/LUNG: Intubated.  HEART: Regular rate and rhythm; No murmurs, rubs, or gallops  NEUROLOGY: Arousable, opens eyes, moves extremities, does not follow commands.
RRR  Lungs clear  EVD in place  L axillary a-line
sedated

## 2024-04-23 NOTE — PRE PROCEDURE NOTE - PROCEDURE SERVICE
Patient information on fall and injury prevention
Acute Care Surgery
Neurosurgery
Endoscopy

## 2024-04-23 NOTE — PROGRESS NOTE ADULT - PROBLEM SELECTOR PLAN 2
urinary retention in setting of no access for cardura  - resume cardura once PEG or NGT placed  - in interim, bladder scan q6h and straight cath if needed  - suspect her recent elevated BP 2/2 urinary retention and missed cardura since NGT has been out  - resume cardura once able to use PEG after placement

## 2024-04-23 NOTE — CHART NOTE - NSCHARTNOTEFT_GEN_A_CORE
General Surgery Post op Check    Pt seen and examined without complaints. Pain is controlled. Denies SOB/CP/N/V.     Vital Signs Last 24 Hrs  T(C): 36.5 (23 Apr 2024 17:45), Max: 37.2 (22 Apr 2024 21:45)  T(F): 97.7 (23 Apr 2024 17:45), Max: 98.9 (22 Apr 2024 21:45)  HR: 75 (23 Apr 2024 17:45) (65 - 88)  BP: 133/66 (23 Apr 2024 17:45) (117/66 - 192/85)  BP(mean): 112 (23 Apr 2024 15:27) (112 - 112)  RR: 18 (23 Apr 2024 17:45) (13 - 22)  SpO2: 94% (23 Apr 2024 17:45) (94% - 100%)    Parameters below as of 23 Apr 2024 17:45  Patient On (Oxygen Delivery Method): room air        I&O's Summary    22 Apr 2024 07:01  -  23 Apr 2024 07:00  --------------------------------------------------------  IN: 2100 mL / OUT: 300 mL / NET: 1800 mL      Physical Exam  Gen: NAD  Pulm: No respiratory distress, no subcostal retractions  CV: RRR, no JVD  Abd: Soft, NT, ND, PEG under abdominal binder. On examination, peg site is clean, dry and intact   Extremities: warm and well perfused      A/P: 58y Female s/p PEG placement 4/23   -Strict I&O's  -Abdominal binder to prevent pulling of PEG   -Analgesia and antiemetics as needed  -Can feed through peg   -Monitor overnight    ACS/trauma

## 2024-04-23 NOTE — PROGRESS NOTE ADULT - SUBJECTIVE AND OBJECTIVE BOX
SUBJECTIVE:   no acute overnight evetns     Vital Signs Last 24 Hrs  T(C): 36.9 (04-23-24 @ 12:21), Max: 37.2 (04-22-24 @ 21:45)  T(F): 98.4 (04-23-24 @ 12:21), Max: 98.9 (04-22-24 @ 21:45)  HR: 71 (04-23-24 @ 12:21) (71 - 88)  BP: 134/82 (04-23-24 @ 12:21) (134/82 - 169/96)  BP(mean): --  RR: 20 (04-23-24 @ 12:21) (18 - 20)  SpO2: 96% (04-23-24 @ 12:21) (94% - 98%)    PHYSICAL EXAM:    Constitutional: No Acute Distress     Neurological: EOS, PERRL, Rt gaze preference, nonverbal, does not follow commands, Rt side spontaneous, LUE spontanous, LLE withdraws to pain    Incision: clean, dry and intact     Pulmonary: Clear to Auscultation    Cardiovascular: Regular rate and rhythm     Gastrointestinal: Soft, Non-tender    Extremities: No calf tenderness bilaterally          LABS:                          10.5   6.83  )-----------( 345      ( 23 Apr 2024 07:42 )             33.6    04-23    143  |  105  |  21  ----------------------------<  87  3.3<L>   |  23  |  0.52    Ca    10.4      23 Apr 2024 07:42  Phos  3.6     04-23  Mg     2.1     04-23    PT/INR - ( 22 Apr 2024 06:22 )   PT: 13.2 sec;   INR: 1.21 ratio         PTT - ( 22 Apr 2024 06:22 )  PTT:36.4 sec    04-22 @ 07:01  -  04-23 @ 07:00  --------------------------------------------------------  IN: 2100 mL / OUT: 300 mL / NET: 1800 mL        IMAGING:   ACC: [id number] EXAM: CT ABDOMEN AND PELVIS IC ORDERED BY: BELÉN BRYAN    PROCEDURE DATE: 04/19/2024        INTERPRETATION: CLINICAL INFORMATION: Gastric wall hematoma noted on endoscopy.    COMPARISON: None.    CONTRAST/COMPLICATIONS:  IV Contrast: Omnipaque 350 90 cc administered 10 cc discarded  Oral Contrast: NONE  Complications: None reported at time of study completion    PROCEDURE:  CT of the Abdomen and Pelvis was performed.  Precontrast, Arterial and Delayed phases were performed.  Sagittal and coronal reformats were performed.    FINDINGS:  LOWER CHEST: Mild bibasilar linear type atelectasis.    LIVER: Within normal limits.  BILE DUCTS: Normal caliber.  GALLBLADDER: Within normal limits.  SPLEEN: Within normal limits.  PANCREAS: Within normal limits.  ADRENALS: Within normal limits.  KIDNEYS/URETERS: Within normal limits.    BLADDER: Within normal limits.  REPRODUCTIVE ORGANS: Uterus and adnexa within normal limits.    BOWEL: A 3.8 x 1.6 cm gastric wall hematoma at the gastric body corresponding to finding on recent endoscopy. No evidence of active intraluminal hemorrhage. No extraluminal hemorrhage. No bowel obstruction. Appendix is normal.  PERITONEUM: No ascites.  VESSELS: Atherosclerotic changes.  RETROPERITONEUM/LYMPH NODES: No lymphadenopathy.  ABDOMINAL WALL: Within normal limits.  BONES: Within normal limits.    IMPRESSION:  Gastric wall hematoma without intraluminal or extraluminal hemorrhage.        --- End of Report ---           CASA BECKFORD MD; Resident Radiology  This document has been electronically signed.  THANIA MCCALLUM MD; Attending Radiologist  This document has been electronically signed. Apr 20 2024 8:54AM    MEDICATIONS:  Neuro:  acetaminophen     Tablet .. 650 milliGRAM(s) Oral every 6 hours PRN Temp greater or equal to 38.5C (101.3F), Mild Pain (1 - 3)  acetaminophen   IVPB .. 1000 milliGRAM(s) IV Intermittent once PRN Severe Pain (7 - 10)  oxyCODONE    IR 5 milliGRAM(s) Oral every 4 hours PRN Moderate Pain (4 - 6)  oxyCODONE    IR 10 milliGRAM(s) Oral every 4 hours PRN Severe Pain (7 - 10)    Cardiac:  doxazosin 2 milliGRAM(s) Oral at bedtime    GI/:  polyethylene glycol 3350      polyethylene glycol 3350 17 Gram(s) Oral daily  senna Syrup 10 milliLiter(s) Oral at bedtime    Other:   chlorhexidine 4% Liquid 1 Application(s) Topical <User Schedule>  dextrose 5%. 1000 milliLiter(s) IV Continuous <Continuous>  multivitamin/minerals/iron Oral Solution (CENTRUM) 15 milliLiter(s) Oral daily  petrolatum white Ointment 1 Application(s) Topical every 12 hours PRN dryness  potassium chloride  10 mEq/100 mL IVPB 10 milliEquivalent(s) IV Intermittent every 1 hour        DIET: NPO for PEG this afternoon w/ Trauma Surgery

## 2024-04-23 NOTE — PROGRESS NOTE ADULT - PROBLEM SELECTOR PLAN 3
gastric wall hematoma on 4/19.  - CT abd/pelv with 3.8 x 1.6 cm gastric wall hematoma; no evidence of active intraluminal nor extraluminal hemorrhage  - Surgery consult recs appreciated  - monitor H/H - stable  - per GI, no contraindication for NGT placement if needed

## 2024-04-23 NOTE — PRE-ANESTHESIA EVALUATION ADULT - LAST ECHOCARDIOGRAM
LVEF 36%, moderate diastolic dysfunction, normal RV, no significant valvular issues

## 2024-04-23 NOTE — PROGRESS NOTE ADULT - ASSESSMENT
57 yo F with no known PMH other than occasional headaches for which she took NSAIDs presented as transfer from [place] for SAH HH4 WNFS 5 mF4 ruptured R pcomm aneurysm. Was initially found unresponsive on 3/25/24 at 21:00, intubated at 23:00 at OSH, then txfrd. Given DDAVP at OSH. Here in ED 1 unit PLTs given and R frontal EVD placed-high pressure. Went for R pcomm clip on 3/26/24. Found with LV dysfunction, bradycardia and Acute left lower extremity DVT below the knee, involving the left soleal vein. Extubated 4/3. MRI 4/8 showed widespread ischemic stroke b/l, s/p angio for vasospasm intervention. 4/16. CTH: tract hemorrhage. Awaiting PEG placement.

## 2024-04-23 NOTE — PROGRESS NOTE ADULT - SUBJECTIVE AND OBJECTIVE BOX
3/26/2024 - right pterional craniotomy for clipping of ruptured right posterior communicating artery aneurysm  4/19/2024 - attempted PEG by GI, aborted because of expanding gastric mural hematoma during trocar needle placement    not on antibiotics  not on therapeutic anticoagulation  not on antiplatelet medications    on room aid    no NG tube in place  soft / NT / ND  small dry incision in LUQ from attempted PEG on 4/19      plats - 345    4/22 coags  -INR - 1.2  -ptt - 36.4 sec    CTA abd/pelvis (4/19/2024) - small hiatal hernia. small gastric wall hematoma. (I reviewed the images)

## 2024-04-23 NOTE — PROGRESS NOTE ADULT - PROBLEM SELECTOR PLAN 7
- PEG attempted on 4/19 by GI but aborted due to gastric wall hematoma  - CT abd/pelv with confirmed gastric wall hematoma, no active bleed  - PEG scheduled for today

## 2024-04-23 NOTE — PRE-ANESTHESIA EVALUATION ADULT - NSANTHOSAYNRD_GEN_A_CORE
No. AUGUSTO screening performed.  STOP BANG Legend: 0-2 = LOW Risk; 3-4 = INTERMEDIATE Risk; 5-8 = HIGH Risk

## 2024-04-23 NOTE — BRIEF OPERATIVE NOTE - OPERATION/FINDINGS
Pull gastrostomy tube with EGD. Previous hematoma seen and avoided. G tube placed in anterior body under direct visualization with 1:1 wall motion and translumination. Bumper moves freely, 4cm at the skin.

## 2024-04-23 NOTE — PROGRESS NOTE ADULT - SUBJECTIVE AND OBJECTIVE BOX
Pooja Cervantes MD  Division of Hospital Medicine  [facility]  Spectra: [phone]      Patient is a 58y old  Female who presents with a chief complaint of SAH (23 Apr 2024 13:14)      SUBJECTIVE / OVERNIGHT EVENTS: no acute events overnight. unable to obtain ROS as patient not answering questions at time of exam.  ADDITIONAL REVIEW OF SYSTEMS:    MEDICATIONS  (STANDING):  chlorhexidine 4% Liquid 1 Application(s) Topical <User Schedule>  dextrose 5%. 1000 milliLiter(s) (75 mL/Hr) IV Continuous <Continuous>  doxazosin 2 milliGRAM(s) Oral at bedtime  multivitamin/minerals/iron Oral Solution (CENTRUM) 15 milliLiter(s) Oral daily  polyethylene glycol 3350      polyethylene glycol 3350 17 Gram(s) Oral daily  potassium chloride  10 mEq/100 mL IVPB 10 milliEquivalent(s) IV Intermittent every 1 hour  senna Syrup 10 milliLiter(s) Oral at bedtime    MEDICATIONS  (PRN):  acetaminophen     Tablet .. 650 milliGRAM(s) Oral every 6 hours PRN Temp greater or equal to 38.5C (101.3F), Mild Pain (1 - 3)  acetaminophen   IVPB .. 1000 milliGRAM(s) IV Intermittent once PRN Severe Pain (7 - 10)  oxyCODONE    IR 5 milliGRAM(s) Oral every 4 hours PRN Moderate Pain (4 - 6)  oxyCODONE    IR 10 milliGRAM(s) Oral every 4 hours PRN Severe Pain (7 - 10)  petrolatum white Ointment 1 Application(s) Topical every 12 hours PRN dryness      CAPILLARY BLOOD GLUCOSE      POCT Blood Glucose.: 119 mg/dL (23 Apr 2024 06:32)  POCT Blood Glucose.: 116 mg/dL (22 Apr 2024 23:56)    I&O's Summary    22 Apr 2024 07:01  -  23 Apr 2024 07:00  --------------------------------------------------------  IN: 2100 mL / OUT: 300 mL / NET: 1800 mL        PHYSICAL EXAM:  Vital Signs Last 24 Hrs  T(C): 36.9 (23 Apr 2024 12:21), Max: 37.2 (22 Apr 2024 21:45)  T(F): 98.4 (23 Apr 2024 12:21), Max: 98.9 (22 Apr 2024 21:45)  HR: 71 (23 Apr 2024 12:21) (71 - 88)  BP: 134/82 (23 Apr 2024 12:21) (134/82 - 169/96)  BP(mean): --  RR: 20 (23 Apr 2024 12:21) (18 - 20)  SpO2: 96% (23 Apr 2024 12:21) (94% - 98%)    Parameters below as of 23 Apr 2024 12:21  Patient On (Oxygen Delivery Method): room air    CONSTITUTIONAL: NAD, well-developed, well-groomed  EYES: PERRLA; conjunctiva and sclera clear  ENMT: Moist oral mucosa, no pharyngeal injection or exudates; normal dentition  NECK: Supple, no palpable masses; no thyromegaly  RESPIRATORY: Normal respiratory effort; lungs are clear to auscultation bilaterally  CARDIOVASCULAR: Regular rate and rhythm, normal S1 and S2, no murmur/rub/gallop; No lower extremity edema  ABDOMEN: Soft, Nondistended, Nontender to palpation, normoactive bowel sounds  MUSCULOSKELETAL: No clubbing or cyanosis of digits; no joint swelling or tenderness to palpation  PSYCH: Alert, unable to assess orientation as minimally verbal  NEUROLOGY: minimally verbal, R gaze preference, not following commands but moving R extremities spontaneously  SKIN: No rashes; no palpable lesions, +R crani site c/d/i    LABS:                        10.5   6.83  )-----------( 345      ( 23 Apr 2024 07:42 )             33.6     04-23    143  |  105  |  21  ----------------------------<  87  3.3<L>   |  23  |  0.52    Ca    10.4      23 Apr 2024 07:42  Phos  3.6     04-23  Mg     2.1     04-23      PT/INR - ( 22 Apr 2024 06:22 )   PT: 13.2 sec;   INR: 1.21 ratio         PTT - ( 22 Apr 2024 06:22 )  PTT:36.4 sec      Urinalysis Basic - ( 23 Apr 2024 07:42 )    Color: x / Appearance: x / SG: x / pH: x  Gluc: 87 mg/dL / Ketone: x  / Bili: x / Urobili: x   Blood: x / Protein: x / Nitrite: x   Leuk Esterase: x / RBC: x / WBC x   Sq Epi: x / Non Sq Epi: x / Bacteria: x      RADIOLOGY & ADDITIONAL TESTS:  Results Reviewed: hypernatremia improved, hypokalemia repleted  Imaging Personally Reviewed:  Electrocardiogram Personally Reviewed:    COORDINATION OF CARE:  Care Discussed with Consultants/Other Providers [Y]: Neurosurgery PEDRO Gates  Prior or Outpatient Records Reviewed [Y/N]:

## 2024-04-23 NOTE — BRIEF OPERATIVE NOTE - NSICDXBRIEFPOSTOP_GEN_ALL_CORE_FT
POST-OP DIAGNOSIS:  Ruptured aneurysm of intracranial region 26-Mar-2024 14:57:28  Kathie Box  
POST-OP DIAGNOSIS:  Ruptured aneurysm of intracranial region 26-Mar-2024 14:57:28  Kathie Box

## 2024-04-23 NOTE — PRE PROCEDURE NOTE - GENERAL PROCEDURE NAME
Angiogram
EGD with PEG
angiogram, possible embolization for aneurysm, possible craniotomy for clipping of aneurysm
EGD w/ PEG placement
Cerebral angio

## 2024-04-23 NOTE — PROGRESS NOTE ADULT - PROBLEM SELECTOR PLAN 6
b/l below the knee DVT  - Vasc Cards consulted, recs appreciated  - serial duplex as per Vasc Cards  - resume lovenox ppx dosing when clear from PEG placement standpoint

## 2024-04-23 NOTE — PROGRESS NOTE ADULT - ASSESSMENT
ASSESSMENT/PLAN: HH4 WNFS 5 mF4 subarachnoid hemorrhage.    04/15 EVD removed  04/16 CTH: tract hemorrhage, Bedboarded  4/18 TX'd from NSCU overnight, called for PEG as failed swallow study  4/19 failed/aborted PEG due to hematoma during placement in endo suite, general surgery to see; CTA A/P to r/o bleed  4/20 monitored, d5w increased to 50/hr, need to confirm ok to replace ngt w/ gen surg  4/23 preop for PEG today     NEURO:  q4 neurochecks/q4 vitals  Pain control prn Tylenol & Oxycodone   EVD dc'd 4/15  R basilic vein PICC placed on 4/1  Activity: [x] OOB as tolerated    PULM:  Incentive spirometry, mobilize as tolerated  Satting on RA     CV:  Keep -180mmHg  Cards following for bradycardia, now resolved   TTE 4/9 w/ EF of 66 % w/ normal left ventricular systolic function, no regional wall motion abnormalities seen.      RENAL:  on D5@75 for hypernatremia, last , downtrending   -f/u AM BMP   Continue Cardura for urinary retention - currently held due to no PO access   -bladder scan q6 and straight cath as needed for bladder volume >350cc  Hypokalemia 3.3, supplemented, f/u AM BMP     GI:  Diet: NPO  Failed/aborted PEG on 4/19 due to hematoma during placement in endo suite, CT abd/pelvis done on 4/19 showed gastric wall hematoma without intraluminal or extraluminal hemorrhage. H/H today stable, hemodynamically stable  Continue free water 200ml every 6 hours for hypernatremia   Preop for PEG today with trauma surgery   Bowel regimen: senna, miralax   Last BM 4/22    ENDO:   Goal euglycemia (-180)    HEME/ONC:  VTE prophylaxis: [x] SCDs  Chemoprophylaxis held in setting of PEG placement today   4/17 LED: Persistent bilateral soleal vein DVT  -Repeat LED today     ID:  Afebrile     MISC:  Hospitalist following for co-management     DISPOSITION:  [facility]    Plan to be d/w Dr. Varela   #[id number]

## 2024-04-23 NOTE — PROGRESS NOTE ADULT - ASSESSMENT
dysphagia  -The risks (bleeding, infection, bowel injury, dislodged PEG requiring laparotomy), benefits and alternatives of PEG placement were discussed with the patient's son at bedside. Written informed consent was obtained for surgery.  -will plan PEG in endoscopy this afternoon.

## 2024-04-23 NOTE — PROGRESS NOTE ADULT - PROBLEM SELECTOR PLAN 4
- improving  - currently no NGT and planned for PEG tentatively ?today with surgery  - FWD 2.3L today  - c/w D5W gtt @ 75cc/hr  - check repeat BMP in afternoon  - planned for PEG today. will transition to FWB once PEG able to be accessed

## 2024-04-24 LAB
ANION GAP SERPL CALC-SCNC: 15 MMOL/L — SIGNIFICANT CHANGE UP (ref 5–17)
BUN SERPL-MCNC: 16 MG/DL — SIGNIFICANT CHANGE UP (ref 7–23)
CALCIUM SERPL-MCNC: 9.8 MG/DL — SIGNIFICANT CHANGE UP (ref 8.4–10.5)
CHLORIDE SERPL-SCNC: 105 MMOL/L — SIGNIFICANT CHANGE UP (ref 96–108)
CO2 SERPL-SCNC: 20 MMOL/L — LOW (ref 22–31)
CREAT SERPL-MCNC: 0.5 MG/DL — SIGNIFICANT CHANGE UP (ref 0.5–1.3)
EGFR: 109 ML/MIN/1.73M2 — SIGNIFICANT CHANGE UP
GLUCOSE SERPL-MCNC: 107 MG/DL — HIGH (ref 70–99)
HCT VFR BLD CALC: 34.2 % — LOW (ref 34.5–45)
HGB BLD-MCNC: 11.2 G/DL — LOW (ref 11.5–15.5)
MAGNESIUM SERPL-MCNC: 2.1 MG/DL — SIGNIFICANT CHANGE UP (ref 1.6–2.6)
MCHC RBC-ENTMCNC: 29.3 PG — SIGNIFICANT CHANGE UP (ref 27–34)
MCHC RBC-ENTMCNC: 32.7 GM/DL — SIGNIFICANT CHANGE UP (ref 32–36)
MCV RBC AUTO: 89.5 FL — SIGNIFICANT CHANGE UP (ref 80–100)
NRBC # BLD: 0 /100 WBCS — SIGNIFICANT CHANGE UP (ref 0–0)
PHOSPHATE SERPL-MCNC: 4.1 MG/DL — SIGNIFICANT CHANGE UP (ref 2.5–4.5)
PLATELET # BLD AUTO: 298 K/UL — SIGNIFICANT CHANGE UP (ref 150–400)
POTASSIUM SERPL-MCNC: 3.3 MMOL/L — LOW (ref 3.5–5.3)
POTASSIUM SERPL-SCNC: 3.3 MMOL/L — LOW (ref 3.5–5.3)
RBC # BLD: 3.82 M/UL — SIGNIFICANT CHANGE UP (ref 3.8–5.2)
RBC # FLD: 14.6 % — HIGH (ref 10.3–14.5)
SODIUM SERPL-SCNC: 140 MMOL/L — SIGNIFICANT CHANGE UP (ref 135–145)
WBC # BLD: 6.29 K/UL — SIGNIFICANT CHANGE UP (ref 3.8–10.5)
WBC # FLD AUTO: 6.29 K/UL — SIGNIFICANT CHANGE UP (ref 3.8–10.5)

## 2024-04-24 PROCEDURE — 99233 SBSQ HOSP IP/OBS HIGH 50: CPT

## 2024-04-24 PROCEDURE — 99232 SBSQ HOSP IP/OBS MODERATE 35: CPT

## 2024-04-24 RX ORDER — POTASSIUM CHLORIDE 20 MEQ
40 PACKET (EA) ORAL
Refills: 0 | Status: COMPLETED | OUTPATIENT
Start: 2024-04-24 | End: 2024-04-24

## 2024-04-24 RX ORDER — ENOXAPARIN SODIUM 100 MG/ML
40 INJECTION SUBCUTANEOUS
Refills: 0 | Status: DISCONTINUED | OUTPATIENT
Start: 2024-04-24 | End: 2024-04-26

## 2024-04-24 RX ORDER — POTASSIUM CHLORIDE 20 MEQ
40 PACKET (EA) ORAL EVERY 4 HOURS
Refills: 0 | Status: DISCONTINUED | OUTPATIENT
Start: 2024-04-24 | End: 2024-04-24

## 2024-04-24 RX ADMIN — Medication 40 MILLIEQUIVALENT(S): at 13:50

## 2024-04-24 RX ADMIN — POLYETHYLENE GLYCOL 3350 17 GRAM(S): 17 POWDER, FOR SOLUTION ORAL at 05:04

## 2024-04-24 RX ADMIN — POLYETHYLENE GLYCOL 3350 17 GRAM(S): 17 POWDER, FOR SOLUTION ORAL at 17:27

## 2024-04-24 RX ADMIN — Medication 40 MILLIEQUIVALENT(S): at 17:28

## 2024-04-24 RX ADMIN — ENOXAPARIN SODIUM 40 MILLIGRAM(S): 100 INJECTION SUBCUTANEOUS at 17:27

## 2024-04-24 RX ADMIN — Medication 15 MILLILITER(S): at 17:28

## 2024-04-24 RX ADMIN — SODIUM CHLORIDE 75 MILLILITER(S): 9 INJECTION, SOLUTION INTRAVENOUS at 00:41

## 2024-04-24 RX ADMIN — Medication 2 MILLIGRAM(S): at 21:47

## 2024-04-24 RX ADMIN — CHLORHEXIDINE GLUCONATE 1 APPLICATION(S): 213 SOLUTION TOPICAL at 05:03

## 2024-04-24 NOTE — PROGRESS NOTE ADULT - PROBLEM SELECTOR PLAN 4
resolved.  - now off D5W gtt.  - Na normalized  - would start FWB 200cc q6h for maintenance  - recheck BMP in AM to monitor Na

## 2024-04-24 NOTE — PROGRESS NOTE ADULT - SUBJECTIVE AND OBJECTIVE BOX
Vascular Cardiology Consult Note     DIRECT PROVIDER NUMBER: [phone]  / Available on TEAMS    CC:  SAH    Interval Events:  No LE edema.   VSS RA.  Hgb stable.   Repeat LE venous duplex 4/23 showed persistent bilateral soleal DVT.    Allergies  No Known Allergies    MEDICATIONS  (STANDING):  chlorhexidine 4% Liquid 1 Application(s) Topical <User Schedule>  doxazosin 2 milliGRAM(s) Oral at bedtime  enoxaparin Injectable 40 milliGRAM(s) SubCutaneous <User Schedule>  multivitamin/minerals/iron Oral Solution (CENTRUM) 15 milliLiter(s) Oral daily  polyethylene glycol 3350 17 Gram(s) Oral two times a day  potassium chloride    Tablet ER 40 milliEquivalent(s) Oral every 4 hours  potassium chloride  10 mEq/100 mL IVPB 10 milliEquivalent(s) IV Intermittent every 1 hour  senna Syrup 10 milliLiter(s) Oral at bedtime      PAST MEDICAL & SURGICAL HISTORY: see HPI    FAMILY HISTORY: see HPI    SOCIAL HISTORY:  unchanged    REVIEW OF SYSTEMS:  [X] Unable to obtain    PHYSICAL EXAM:  Vital Signs Last 24 Hrs  T(C): 36.7 (24 Apr 2024 08:35), Max: 37.2 (24 Apr 2024 05:00)  T(F): 98.1 (24 Apr 2024 08:35), Max: 98.9 (24 Apr 2024 05:00)  HR: 96 (24 Apr 2024 08:35) (65 - 96)  BP: 124/81 (24 Apr 2024 08:35) (117/66 - 192/85)  BP(mean): 112 (23 Apr 2024 15:27) (112 - 112)  RR: 18 (24 Apr 2024 08:35) (13 - 22)  SpO2: 98% (24 Apr 2024 08:35) (94% - 100%)    Parameters below as of 24 Apr 2024 08:35  Patient On (Oxygen Delivery Method): room air      Appearance: NAD  Cardiovascular: RRR, S1 and S2  Respiratory: CTA B/L  Gastrointestinal:  Soft, Non-tender, +PEG	  Skin: No cyanosis	  Extremities: No LE edema, bilateral calves soft  Vascular Pulse Exam: Palpable DP / PT bilaterally  Foot Exam: No foot wounds    LABS:	                              11.2   6.29  )-----------( 298      ( 24 Apr 2024 09:13 )             34.2     04-24    140  |  105  |  16  ----------------------------<  107<H>  3.3<L>   |  20<L>  |  0.50    Ca    9.8      24 Apr 2024 09:13  Phos  4.1     04-24  Mg     2.1     04-24    Urinalysis Basic - ( 24 Apr 2024 09:13 )    Color: x / Appearance: x / SG: x / pH: x  Gluc: 107 mg/dL / Ketone: x  / Bili: x / Urobili: x   Blood: x / Protein: x / Nitrite: x   Leuk Esterase: x / RBC: x / WBC x   Sq Epi: x / Non Sq Epi: x / Bacteria: x      Assessment:  1. Bilateral Soleal DVT      Normal RV size and function   2. 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 Reduced EF, Improved on Last TTE  4. Gastric wall hematoma (PEG procedure was aborted)      S/p PEG 4/23    Plan:  1. Last LE venous duplex with stable bilateral soleal DVT.       Recommend to repeat surveillance LE venous duplex on 4/30.  2. Recommend to continue Lovenox 40 mg daily while admitted and on discharge.  3. Appreciate excellent Neurosurgical Care.        Thank you  PEDRO Cm, MS, [facility]  Vascular Cardiology Service    Please call with any questions:   DIRECT SERVICE NUMBER: [phone] / Available on TEAMS

## 2024-04-24 NOTE — PROGRESS NOTE ADULT - PROBLEM SELECTOR PLAN 2
urinary retention in setting of no access for cardura few few days  - cardura 2mg qHS resumed now that PEG in place  - continue to monitor on serial bladder scans and straight cath if needed until cardura starts to take effect

## 2024-04-24 NOTE — PROGRESS NOTE ADULT - ATTENDING SUPERVISION STATEMENT
Fellow
Resident
Fellow

## 2024-04-24 NOTE — PROGRESS NOTE ADULT - SUBJECTIVE AND OBJECTIVE BOX
SUBJECTIVE:   PEG placed yesterday, tolerating tube feeds   Replied "good" when asked how are you     Vital Signs Last 24 Hrs  T(C): 36.7 (04-24-24 @ 08:35), Max: 37.2 (04-24-24 @ 05:00)  T(F): 98.1 (04-24-24 @ 08:35), Max: 98.9 (04-24-24 @ 05:00)  HR: 96 (04-24-24 @ 08:35) (65 - 96)  BP: 124/81 (04-24-24 @ 08:35) (117/66 - 192/85)  BP(mean): 112 (04-23-24 @ 15:27) (112 - 112)  RR: 18 (04-24-24 @ 08:35) (13 - 22)  SpO2: 98% (04-24-24 @ 08:35) (94% - 100%)    PHYSICAL EXAM:    Constitutional: No Acute Distress     Neurological: Awake, Ox1 (name), R gaze, no FC, BUE spontanous, RLE spontaneous, LLL wd     Incision: clean, dry and intact     Pulmonary: Clear to Auscultation    Cardiovascular: Regular rate and rhythm     Gastrointestinal: Soft, Non-tender, Non-distended, abdominal binder in place     Extremities: No calf tenderness bilaterally          LABS:                          11.2   6.29  )-----------( 298      ( 24 Apr 2024 09:13 )             34.2    04-24    140  |  105  |  16  ----------------------------<  107<H>  3.3<L>   |  20<L>  |  0.50    Ca    9.8      24 Apr 2024 09:13  Phos  4.1     04-24  Mg     2.1     04-24 04-23 @ 07:01  -  04-24 @ 07:00  --------------------------------------------------------  IN: 820 mL / OUT: 400 mL / NET: 420 mL        IMAGING:   ACC: [id number] EXAM: DUPLEX SCAN EXT VEINS LOWER BI ORDERED BY: DMITRIY LANE    PROCEDURE DATE: 04/23/2024        INTERPRETATION: CLINICAL INFORMATION: History of intracranial hemorrhage. History of bilateral soleal vein DVT.    COMPARISON: None available.    TECHNIQUE: Duplex sonography of the BILATERAL LOWER extremity veins with color and spectral Doppler, with and without compression.    FINDINGS:    RIGHT:  Normal compressibility of the RIGHT common femoral, femoral and popliteal veins.  Doppler examination shows normal spontaneous and phasic flow.  DVT persists within the right soleal vein.    LEFT:  Normal compressibility of the LEFT common femoral, femoral and popliteal veins.  Doppler examination shows normal spontaneous and phasic flow.  DVT persists within the left soleal vein.    IMPRESSION:  Persistent bilateral soleal vein DVT.        --- End of Report ---            NANCY JONES MD; Attending Radiologist  This document has been electronically signed. Apr 23 2024 4:42PM    MEDICATIONS:  Neuro:  acetaminophen     Tablet .. 650 milliGRAM(s) Oral every 6 hours PRN Temp greater or equal to 38.5C (101.3F), Mild Pain (1 - 3)  oxyCODONE    IR 5 milliGRAM(s) Oral every 4 hours PRN Moderate Pain (4 - 6)  oxyCODONE    IR 10 milliGRAM(s) Oral every 4 hours PRN Severe Pain (7 - 10)    Cardiac:  doxazosin 2 milliGRAM(s) Oral at bedtime    GI/:  polyethylene glycol 3350 17 Gram(s) Oral two times a day  senna Syrup 10 milliLiter(s) Oral at bedtime    Other:   chlorhexidine 4% Liquid 1 Application(s) Topical <User Schedule>  multivitamin/minerals/iron Oral Solution (CENTRUM) 15 milliLiter(s) Oral daily  petrolatum white Ointment 1 Application(s) Topical every 12 hours PRN dryness  potassium chloride    Tablet ER 40 milliEquivalent(s) Oral every 4 hours  potassium chloride  10 mEq/100 mL IVPB 10 milliEquivalent(s) IV Intermittent every 1 hour        DIET: PEG - Jevity 1.5

## 2024-04-24 NOTE — PROGRESS NOTE ADULT - PROBLEM SELECTOR PLAN 6
b/l below the knee DVT  - Vasc Cards consulted, recs appreciated  - serial duplex as per Vasc Cards - last duplex on 4/23 with no evidence of propagation  - c/w lovenox ppx dosing - resumed

## 2024-04-24 NOTE — PROGRESS NOTE ADULT - ATTENDING COMMENTS
seen and examined 04-24-24 @ 1055    afeb    on room air    soft / NT / ND  on Jevity 1.5 @ 60 ml/hr via PEG  PEG site clean without evidence of infection  I loosened PEG bumper 4.0 -> 6.0 cm from skin    WBC = 6      4/23/2024 - PEG placement  -KEEP CathGrip tube securement device on PEG at ALL TIMES  -continue tube feeds via PEG as tolerated    I reviewed her labs. seen and examined 04-24-24 @ 1055    afeb    on room air    soft / NT / ND  on Jevity 1.5 @ 60 ml/hr via PEG  PEG site clean without evidence of infection  I loosened PEG bumper 4.0 -> 6.0 cm from skin    WBC = 6      4/23/2024 - PEG placement  -KEEP CathGrip tube securement device on PEG at ALL TIMES  -continue tube feeds via PEG as tolerated    I reviewed her labs.  care coordinated with Yajaira Stone (Neurosurgery PA) seen and examined 04-24-24 @ 1055    afeb    on room air    soft / NT / ND  on Jevity 1.5 @ 60 ml/hr via PEG  PEG site clean without evidence of infection  I loosened PEG bumper 4.0 -> 6.0 cm from skin    WBC = 6      4/23/2024 - PEG placement  -KEEP CathGrip tube securement device on PEG at ALL TIMES  -continue tube feeds via PEG as tolerated    I reviewed her labs.  care coordinated with Neurosurgery PEDRO Stone and confirmed that it is ok to start VTE prophylaxis. seen and examined 04-24-24 @ 1055    afeb    on room air    soft / NT / ND  on Jevity 1.5 @ 60 ml/hr via PEG  PEG site clean without evidence of infection  I loosened PEG bumper 4.0 -> 6.0 cm from skin    WBC = 6  hgb - 10.5 -> 11.2 g/dL      4/19/2024 - EGD w/ attempted PEG placement, complicated by gastric mural hematoma  4/23/2024 - PEG placement  -KEEP CathGrip tube securement device on PEG at ALL TIMES  -continue tube feeds via PEG as tolerated    I reviewed her labs.  care coordinated with Neurosurgery PEDRO Stone and confirmed that it is ok to start VTE prophylaxis.

## 2024-04-24 NOTE — PROGRESS NOTE ADULT - ASSESSMENT
57 yo F with no known PMH other than occasional headaches for which she took NSAIDs presented as transfer from [place] for SAH HH4 WNFS 5 mF4 ruptured R pcomm aneurysm. Was initially found unresponsive on 3/25/24 at 21:00, intubated at 23:00 at OSH, then txfrd. Given DDAVP at OSH. Here in ED 1 unit PLTs given and R frontal EVD placed-high pressure. Went for R pcomm clip on 3/26/24. Found with LV dysfunction, bradycardia and Acute left lower extremity DVT below the knee, involving the left soleal vein. Extubated 4/3. MRI 4/8 showed widespread ischemic stroke b/l, s/p angio for vasospasm intervention. 4/16. CTH: tract hemorrhage. Underwent PEG placement with surgery on 4/23.

## 2024-04-24 NOTE — PROGRESS NOTE ADULT - NS ATTEND OPT1 GEN_ALL_CORE
I independently performed the documented:
I attest my time as attending is greater than 50% of the total combined time spent on qualifying patient care activities by the PA/NP and attending.
I independently performed the documented:
I independently performed the documented:
I attest my time as attending is greater than 50% of the total combined time spent on qualifying patient care activities by the PA/NP and attending.
I independently performed the documented:
I attest my time as attending is greater than 50% of the total combined time spent on qualifying patient care activities by the PA/NP and attending.

## 2024-04-24 NOTE — PROGRESS NOTE ADULT - SUBJECTIVE AND OBJECTIVE BOX
SURGERY PROGRESS NOTE    Interval events  - POD#1 s/p PEG  - tube feeds started and advancing to goal.   - No acute events overnight.   - Vital signs stable, afebrile, on room air.         OBJECTIVE:   Vital Signs Last 24 Hrs  T(C): 37.2 (24 Apr 2024 05:00), Max: 37.2 (24 Apr 2024 05:00)  T(F): 98.9 (24 Apr 2024 05:00), Max: 98.9 (24 Apr 2024 05:00)  HR: 87 (24 Apr 2024 05:00) (65 - 93)  BP: 124/90 (24 Apr 2024 05:00) (117/66 - 192/85)  BP(mean): 112 (23 Apr 2024 15:27) (112 - 112)  RR: 18 (24 Apr 2024 05:00) (13 - 22)  SpO2: 97% (24 Apr 2024 05:00) (94% - 100%)    Parameters below as of 24 Apr 2024 05:00  Patient On (Oxygen Delivery Method): room air            I&O's Summary    22 Apr 2024 07:01  -  23 Apr 2024 07:00  --------------------------------------------------------  IN: 2100 mL / OUT: 300 mL / NET: 1800 mL    23 Apr 2024 07:01  -  24 Apr 2024 06:29  --------------------------------------------------------  IN: 640 mL / OUT: 0 mL / NET: 640 mL      I&O's Detail    22 Apr 2024 07:01  -  23 Apr 2024 07:00  --------------------------------------------------------  IN:    dextrose 5%: 1800 mL    IV PiggyBack: 300 mL  Total IN: 2100 mL    OUT:    Voided (mL): 300 mL  Total OUT: 300 mL    Total NET: 1800 mL      23 Apr 2024 07:01  -  24 Apr 2024 06:29  --------------------------------------------------------  IN:    dextrose 5%: 450 mL    Enteral Tube Flush: 60 mL    Jevity 1.5: 130 mL  Total IN: 640 mL    OUT:  Total OUT: 0 mL    Total NET: 640 mL          MEDICATIONS  (STANDING):  chlorhexidine 4% Liquid 1 Application(s) Topical <User Schedule>  doxazosin 2 milliGRAM(s) Oral at bedtime  multivitamin/minerals/iron Oral Solution (CENTRUM) 15 milliLiter(s) Oral daily  polyethylene glycol 3350 17 Gram(s) Oral two times a day  potassium chloride  10 mEq/100 mL IVPB 10 milliEquivalent(s) IV Intermittent every 1 hour  senna Syrup 10 milliLiter(s) Oral at bedtime    MEDICATIONS  (PRN):  acetaminophen     Tablet .. 650 milliGRAM(s) Oral every 6 hours PRN Temp greater or equal to 38.5C (101.3F), Mild Pain (1 - 3)  oxyCODONE    IR 10 milliGRAM(s) Oral every 4 hours PRN Severe Pain (7 - 10)  oxyCODONE    IR 5 milliGRAM(s) Oral every 4 hours PRN Moderate Pain (4 - 6)  petrolatum white Ointment 1 Application(s) Topical every 12 hours PRN dryness    PHYSICAL EXAM  General: No acute distress, resting comfortably in bed.  HEENT: Normocephalic atraumatic  Respiratory: Nonlabored respirations  Abdomen: soft, nondistended, nontender, PEG 4cm at bumper, no surrounding erythema, no drainage - abdominal binder in place        LABS:                        10.5   6.83  )-----------( 345      ( 23 Apr 2024 07:42 )             33.6     04-23    143  |  105  |  21  ----------------------------<  87  3.3<L>   |  23  |  0.52    Ca    10.4      23 Apr 2024 07:42  Phos  3.6     04-23  Mg     2.1     04-23        Urinalysis Basic - ( 23 Apr 2024 07:42 )    Color: x / Appearance: x / SG: x / pH: x  Gluc: 87 mg/dL / Ketone: x  / Bili: x / Urobili: x   Blood: x / Protein: x / Nitrite: x   Leuk Esterase: x / RBC: x / WBC x   Sq Epi: x / Non Sq Epi: x / Bacteria: x        RADIOLOGY & ADDITIONAL STUDIES:

## 2024-04-24 NOTE — PROGRESS NOTE ADULT - ASSESSMENT
ASSESSMENT/PLAN: HH4 WNFS 5 mF4 subarachnoid hemorrhage.    04/15 EVD removed  04/16 CTH: tract hemorrhage, Bedboarded  4/18 TX'd from NSCU overnight, called for PEG as failed swallow study  4/19 failed/aborted PEG due to hematoma during placement in endo suite, general surgery to see; CTA A/P to r/o bleed  4/20 monitored, d5w increased to 50/hr, need to confirm ok to replace ngt w/ gen surg  4/23 preop for PEG today     NEURO:  q4 neurochecks/q4 vitals  Pain control prn Tylenol & Oxycodone   EVD dc'd 4/15  R basilic vein PICC placed on 4/1  Activity: [x] OOB as tolerated    PULM:  Incentive spirometry, mobilize as tolerated  Satting on RA     CV:  Keep -180mmHg  Cards following for bradycardia, now resolved   TTE 4/9 w/ EF of 66 % w/ normal left ventricular systolic function, no regional wall motion abnormalities seen.      RENAL:  Hypernatremia resolved, D5 dc'd, continue FW pushes 200q6 via PEG tube   Continue Cardura for urinary retention  -bladder scan q6 and straight cath as needed for bladder volume >350cc  Hypokalemia supplemented, f/u AM BMP     GI:  Diet: Jevity tube feeds via PEG  s/p PEG placement w/ trauma surgery on 4/23   Failed/aborted PEG on 4/19 due to hematoma during placement in endo suite, CT abd/pelvis done on 4/19 showed gastric wall hematoma without intraluminal or extraluminal hemorrhage. H/H today stable, hemodynamically stable   Bowel regimen: senna, miralax   Last BM 4/22    ENDO:   Goal euglycemia (-180)    HEME/ONC:  VTE prophylaxis: [x] SCDs  Will start SQL tonight, ok per trauma surgery in setting of gastric body hematoma   4/23 LED: Persistent bilateral soleal vein DVT  Vascular cardiology following     ID:  Afebrile     MISC:  Hospitalist following for co-management     DISPOSITION:  AR    Plan to be d/w Dr. Varela   #[id number]

## 2024-04-24 NOTE — PROGRESS NOTE ADULT - NS ATTEND AMEND GEN_ALL_CORE FT
Events that transpired leading to the patient's current hospitalization along with the patient's hospital course was reviewed.  The patient's past medical history/surgical history/family history/social history was gone over.  Agree with 14 point review of systems and physical examination as noted above.  Patient with bilateral soleal DVT with normal RV size and function in the setting of subarachnoid hemorrhage with ruptured P-comm aneurysm status post EVD and P-comm aneurysm clipping..  Recommend repeat lower extremity venous duplex setting 4/30/2024.  Continue Lovenox 40 mg subcu daily.  If any development of worsening swelling in lower extremity patient should be reassessed with repeat lower extremity venous duplex study.  Status post PEG on 4/19/2024 with gastric wall hematoma with no evidence of active intraluminal or extraluminal hemorrhage.  PEG placed with surgery on 4/23.    Findings and plan were discussed with medicine team.    Time-based billing (NON-critical care).   65 minutes spent on total encounter. The necessity of the time spent during the encounter on this date of service was due to: education, assessment and coordination of care. Events that transpired over the last 24 hours were reviewed.  Agree with 14 point review of systems and physical examination as noted above.  Patient with bilateral soleal DVT with normal RV size and function in the setting of subarachnoid hemorrhage with ruptured P-comm aneurysm status post EVD and P-comm aneurysm clipping..  Recommend repeat lower extremity venous duplex setting 4/30/2024.  Continue Lovenox 40 mg subcu daily.  If any development of worsening swelling in lower extremity patient should be reassessed with repeat lower extremity venous duplex study.  Status post PEG on 4/19/2024 with gastric wall hematoma with no evidence of active intraluminal or extraluminal hemorrhage.  PEG placed with surgery on 4/23.    Findings and plan were discussed with medicine team.    Time-based billing (NON-critical care).   35 minutes spent on total encounter. The necessity of the time spent during the encounter on this date of service was due to: education, assessment and coordination of care.

## 2024-04-24 NOTE — PROGRESS NOTE ADULT - SUBJECTIVE AND OBJECTIVE BOX
Pooja Cervantes MD  Division of Hospital Medicine  [facility]  Spectra: [phone]      Patient is a 58y old  Female who presents with a chief complaint of SAH (24 Apr 2024 10:51)      SUBJECTIVE / OVERNIGHT EVENTS: s/p PEG placement yesterday with no issues. no acute events overnight. tube feeds resumed. unable to obtain ROS as patient not participating in exam.  ADDITIONAL REVIEW OF SYSTEMS:    MEDICATIONS  (STANDING):  chlorhexidine 4% Liquid 1 Application(s) Topical <User Schedule>  doxazosin 2 milliGRAM(s) Oral at bedtime  enoxaparin Injectable 40 milliGRAM(s) SubCutaneous <User Schedule>  multivitamin/minerals/iron Oral Solution (CENTRUM) 15 milliLiter(s) Oral daily  polyethylene glycol 3350 17 Gram(s) Oral two times a day  potassium chloride   Solution 40 milliEquivalent(s) Oral every 2 hours  senna Syrup 10 milliLiter(s) Oral at bedtime    MEDICATIONS  (PRN):  acetaminophen     Tablet .. 650 milliGRAM(s) Oral every 6 hours PRN Temp greater or equal to 38.5C (101.3F), Mild Pain (1 - 3)  oxyCODONE    IR 5 milliGRAM(s) Oral every 4 hours PRN Moderate Pain (4 - 6)  oxyCODONE    IR 10 milliGRAM(s) Oral every 4 hours PRN Severe Pain (7 - 10)  petrolatum white Ointment 1 Application(s) Topical every 12 hours PRN dryness      CAPILLARY BLOOD GLUCOSE        I&O's Summary    23 Apr 2024 07:01  -  24 Apr 2024 07:00  --------------------------------------------------------  IN: 820 mL / OUT: 400 mL / NET: 420 mL        PHYSICAL EXAM:  Vital Signs Last 24 Hrs  T(C): 36.7 (24 Apr 2024 08:35), Max: 37.2 (24 Apr 2024 05:00)  T(F): 98.1 (24 Apr 2024 08:35), Max: 98.9 (24 Apr 2024 05:00)  HR: 96 (24 Apr 2024 08:35) (65 - 96)  BP: 124/81 (24 Apr 2024 08:35) (117/66 - 192/85)  BP(mean): 112 (23 Apr 2024 15:27) (112 - 112)  RR: 18 (24 Apr 2024 08:35) (13 - 22)  SpO2: 98% (24 Apr 2024 08:35) (94% - 100%)    Parameters below as of 24 Apr 2024 08:35  Patient On (Oxygen Delivery Method): room air    CONSTITUTIONAL: NAD, well-developed, well-groomed  EYES: PERRLA; conjunctiva and sclera clear  ENMT: Moist oral mucosa, no pharyngeal injection or exudates; normal dentition  NECK: Supple, no palpable masses; no thyromegaly  RESPIRATORY: Normal respiratory effort; lungs are clear to auscultation bilaterally  CARDIOVASCULAR: Regular rate and rhythm, normal S1 and S2, no murmur/rub/gallop; No lower extremity edema  ABDOMEN: Soft, Nondistended, Nontender to palpation, normoactive bowel sounds, +PEG site c/d/i  MUSCULOSKELETAL: No clubbing or cyanosis of digits; no joint swelling or tenderness to palpation  PSYCH: Alert, unable to assess orientation as minimally verbal  NEUROLOGY: minimally verbal, R gaze preference, not following commands but moving R extremities spontaneously  SKIN: No rashes; no palpable lesions, +R crani site c/d/i    LABS:                        11.2   6.29  )-----------( 298      ( 24 Apr 2024 09:13 )             34.2     04-24    140  |  105  |  16  ----------------------------<  107<H>  3.3<L>   |  20<L>  |  0.50    Ca    9.8      24 Apr 2024 09:13  Phos  4.1     04-24  Mg     2.1     04-24        Urinalysis Basic - ( 24 Apr 2024 09:13 )    Color: x / Appearance: x / SG: x / pH: x  Gluc: 107 mg/dL / Ketone: x  / Bili: x / Urobili: x   Blood: x / Protein: x / Nitrite: x   Leuk Esterase: x / RBC: x / WBC x   Sq Epi: x / Non Sq Epi: x / Bacteria: x      RADIOLOGY & ADDITIONAL TESTS:  Results Reviewed: Na stable, hypokalemia repleted, no leukocytosis, H/H stable  Imaging Personally Reviewed:  4/23/24 duplex of LE with persistent b/l soleal DVT  Electrocardiogram Personally Reviewed:    COORDINATION OF CARE:  Care Discussed with Consultants/Other Providers [Y]: Neurosurgery PEDRO Gates  Prior or Outpatient Records Reviewed [Y/N]:

## 2024-04-24 NOTE — PROGRESS NOTE ADULT - ASSESSMENT
58F pod1 s/p PEG. tolerating tube feeds at 50cc (goal 60). Peg 4cm at bumper bottom.     Plan/Recs  - Continue tube feeds.  - Continue care per primary team.  - Please contact Surgery team with any further questions, concerns.     ACS/Trauma Surgery  [phone]

## 2024-04-24 NOTE — PROGRESS NOTE ADULT - PROBLEM SELECTOR PLAN 7
- PEG attempted on 4/19 by GI but aborted due to gastric wall hematoma  - CT abd/pelv with confirmed gastric wall hematoma, no active bleed  - Surgery consult appreciated now s/p surgical PEG placement on 4/23

## 2024-04-25 LAB
ANION GAP SERPL CALC-SCNC: 12 MMOL/L — SIGNIFICANT CHANGE UP (ref 5–17)
BUN SERPL-MCNC: 19 MG/DL — SIGNIFICANT CHANGE UP (ref 7–23)
CALCIUM SERPL-MCNC: 9.4 MG/DL — SIGNIFICANT CHANGE UP (ref 8.4–10.5)
CHLORIDE SERPL-SCNC: 107 MMOL/L — SIGNIFICANT CHANGE UP (ref 96–108)
CO2 SERPL-SCNC: 21 MMOL/L — LOW (ref 22–31)
CREAT SERPL-MCNC: 0.46 MG/DL — LOW (ref 0.5–1.3)
EGFR: 112 ML/MIN/1.73M2 — SIGNIFICANT CHANGE UP
GLUCOSE SERPL-MCNC: 109 MG/DL — HIGH (ref 70–99)
HCT VFR BLD CALC: 30.9 % — LOW (ref 34.5–45)
HCT VFR BLD CALC: 32.5 % — LOW (ref 34.5–45)
HGB BLD-MCNC: 10 G/DL — LOW (ref 11.5–15.5)
HGB BLD-MCNC: 10.3 G/DL — LOW (ref 11.5–15.5)
MAGNESIUM SERPL-MCNC: 2.2 MG/DL — SIGNIFICANT CHANGE UP (ref 1.6–2.6)
MCHC RBC-ENTMCNC: 28.4 PG — SIGNIFICANT CHANGE UP (ref 27–34)
MCHC RBC-ENTMCNC: 29 PG — SIGNIFICANT CHANGE UP (ref 27–34)
MCHC RBC-ENTMCNC: 31.7 GM/DL — LOW (ref 32–36)
MCHC RBC-ENTMCNC: 32.4 GM/DL — SIGNIFICANT CHANGE UP (ref 32–36)
MCV RBC AUTO: 89.5 FL — SIGNIFICANT CHANGE UP (ref 80–100)
MCV RBC AUTO: 89.6 FL — SIGNIFICANT CHANGE UP (ref 80–100)
NRBC # BLD: 0 /100 WBCS — SIGNIFICANT CHANGE UP (ref 0–0)
NRBC # BLD: 0 /100 WBCS — SIGNIFICANT CHANGE UP (ref 0–0)
PHOSPHATE SERPL-MCNC: 3.3 MG/DL — SIGNIFICANT CHANGE UP (ref 2.5–4.5)
PLATELET # BLD AUTO: 271 K/UL — SIGNIFICANT CHANGE UP (ref 150–400)
PLATELET # BLD AUTO: 289 K/UL — SIGNIFICANT CHANGE UP (ref 150–400)
POTASSIUM SERPL-MCNC: 4 MMOL/L — SIGNIFICANT CHANGE UP (ref 3.5–5.3)
POTASSIUM SERPL-SCNC: 4 MMOL/L — SIGNIFICANT CHANGE UP (ref 3.5–5.3)
RBC # BLD: 3.45 M/UL — LOW (ref 3.8–5.2)
RBC # BLD: 3.63 M/UL — LOW (ref 3.8–5.2)
RBC # FLD: 14.8 % — HIGH (ref 10.3–14.5)
RBC # FLD: 14.8 % — HIGH (ref 10.3–14.5)
SODIUM SERPL-SCNC: 140 MMOL/L — SIGNIFICANT CHANGE UP (ref 135–145)
WBC # BLD: 7.11 K/UL — SIGNIFICANT CHANGE UP (ref 3.8–10.5)
WBC # BLD: 8.2 K/UL — SIGNIFICANT CHANGE UP (ref 3.8–10.5)
WBC # FLD AUTO: 7.11 K/UL — SIGNIFICANT CHANGE UP (ref 3.8–10.5)
WBC # FLD AUTO: 8.2 K/UL — SIGNIFICANT CHANGE UP (ref 3.8–10.5)

## 2024-04-25 PROCEDURE — 99233 SBSQ HOSP IP/OBS HIGH 50: CPT

## 2024-04-25 PROCEDURE — 99232 SBSQ HOSP IP/OBS MODERATE 35: CPT

## 2024-04-25 RX ORDER — METOCLOPRAMIDE HCL 10 MG
5 TABLET ORAL ONCE
Refills: 0 | Status: COMPLETED | OUTPATIENT
Start: 2024-04-25 | End: 2024-04-25

## 2024-04-25 RX ADMIN — Medication 2 MILLIGRAM(S): at 21:31

## 2024-04-25 RX ADMIN — ENOXAPARIN SODIUM 40 MILLIGRAM(S): 100 INJECTION SUBCUTANEOUS at 17:20

## 2024-04-25 RX ADMIN — Medication 15 MILLILITER(S): at 13:31

## 2024-04-25 RX ADMIN — Medication 5 MILLIGRAM(S): at 10:16

## 2024-04-25 RX ADMIN — Medication 5 MILLIGRAM(S): at 21:36

## 2024-04-25 RX ADMIN — CHLORHEXIDINE GLUCONATE 1 APPLICATION(S): 213 SOLUTION TOPICAL at 05:29

## 2024-04-25 RX ADMIN — Medication 5 MILLIGRAM(S): at 17:20

## 2024-04-25 RX ADMIN — POLYETHYLENE GLYCOL 3350 17 GRAM(S): 17 POWDER, FOR SOLUTION ORAL at 17:20

## 2024-04-25 RX ADMIN — SENNA PLUS 10 MILLILITER(S): 8.6 TABLET ORAL at 21:36

## 2024-04-25 NOTE — PROGRESS NOTE ADULT - ASSESSMENT
ASSESSMENT/PLAN: HH4 WNFS 5 mF4 subarachnoid hemorrhage.    04/15 EVD removed  04/16 CTH: tract hemorrhage, Bedboarded  4/18 TX'd from NSCU overnight, called for PEG as failed swallow study  4/19 failed/aborted PEG due to hematoma during placement in endo suite, general surgery to see; CTA A/P to r/o bleed  4/20 monitored, d5w increased to 50/hr, need to confirm ok to replace ngt w/ gen surg  4/23 preop for PEG today   4/24 started on DVT prophylaxis   4/25 H/H downtrending    NEURO:  q4 neurochecks/q4 vitals  Pain control prn Tylenol & Oxycodone   EVD dc'd 4/15  R basilic vein PICC placed on 4/1  Activity: [x] OOB as tolerated    PULM:  Incentive spirometry, mobilize as tolerated  Satting on RA     CV:  Keep -180mmHg  Cards following for bradycardia, now resolved   TTE 4/9 w/ EF of 66 % w/ normal left ventricular systolic function, no regional wall motion abnormalities seen.    RENAL:  Hypernatremia resolved, continue FW pushes 200q6 via PEG tube   Continue Cardura for urinary retention  -bladder scan q6 and straight cath as needed for bladder volume >350cc    GI:  Diet: Jevity tube feeds via PEG  Hiccups o/n - ordered for reglan 5mg IVP x1   s/p PEG placement w/ trauma surgery on 4/23   Failed/aborted PEG on 4/19 due to hematoma during placement in endo suite, CT abd/pelvis done on 4/19 showed gastric wall hematoma without intraluminal or extraluminal hemorrhage, hemodynamically stable   Bowel regimen: senna, miralax   Last BM 4/22 - will add PO dulcolax today     ENDO:   Goal euglycemia (-180)    HEME/ONC:  VTE prophylaxis: SCDs, SQL   4/23 LED: Persistent bilateral soleal vein DVT, repeat LED 4/30  Vascular cardiology following   Monitor H/H downtrending, will repeat CBC this afternoon     ID:  Afebrile     MISC:  Hospitalist following for co-management     DISPOSITION:  AR    Plan to be d/w Dr. Varela   #[id number]

## 2024-04-25 NOTE — PROGRESS NOTE ADULT - SUBJECTIVE AND OBJECTIVE BOX
patient on 4 dayan  niece at bedside    REVIEW OF SYSTEMS  unable to obtain     FUNCTIONAL PROGRESS  SLP 4/17  NPO    PT 4/24  bed mobility mod to max assist   sitting EOB mod assist   easily distracted, unable to follow commands    OT 4/24  bed mobility mod to max assist  sitting EOB x 8 min fair- static sitting balance  dressing max assist     VITALS  T(C): 37.1 (04-25-24 @ 08:49), Max: 37.2 (04-25-24 @ 05:10)  HR: 112 (04-25-24 @ 08:49) (72 - 112)  BP: 126/68 (04-25-24 @ 08:49) (109/60 - 131/79)  RR: 20 (04-25-24 @ 08:49) (18 - 20)  SpO2: 100% (04-25-24 @ 08:49) (96% - 100%)  Wt(kg): --    MEDICATIONS   acetaminophen     Tablet .. 650 milliGRAM(s) every 6 hours PRN  bisacodyl 5 milliGRAM(s) at bedtime  chlorhexidine 4% Liquid 1 Application(s) <User Schedule>  doxazosin 2 milliGRAM(s) at bedtime  enoxaparin Injectable 40 milliGRAM(s) <User Schedule>  multivitamin/minerals/iron Oral Solution (CENTRUM) 15 milliLiter(s) daily  oxyCODONE    IR 5 milliGRAM(s) every 4 hours PRN  oxyCODONE    IR 10 milliGRAM(s) every 4 hours PRN  petrolatum white Ointment 1 Application(s) every 12 hours PRN  polyethylene glycol 3350 17 Gram(s) two times a day  senna Syrup 10 milliLiter(s) at bedtime      RECENT LABS - Reviewed                        10.0   7.11  )-----------( 289      ( 25 Apr 2024 05:52 )             30.9     04-25    140  |  107  |  19  ----------------------------<  109<H>  4.0   |  21<L>  |  0.46<L>    Ca    9.4      25 Apr 2024 05:52  Phos  3.3     04-25  Mg     2.2     04-25        Urinalysis Basic - ( 25 Apr 2024 05:52 )    Color: x / Appearance: x / SG: x / pH: x  Gluc: 109 mg/dL / Ketone: x  / Bili: x / Urobili: x   Blood: x / Protein: x / Nitrite: x   Leuk Esterase: x / RBC: x / WBC x   Sq Epi: x / Non Sq Epi: x / Bacteria: x    < from: CT Abdomen and Pelvis w/ IV Cont (04.19.24 @ 19:38) >    IMPRESSION:  Gastric wall hematoma without intraluminal or extraluminal hemorrhage.        < end of copied text >      ----------------------------------------------------------------------------------------  PHYSICAL EXAM  Constitutional - NAD, Comfortable, in bed   Chest - Breathing comfortably on room air   Cardiovascular - S1S2   +PEG  Extremities -no edema   Neurologic Exam -                 Cognitive - awake, alert, left neglect     Communication - hypophonic, responds "good" to how are you doing, follows commands on right      moves right side spontaneously            Psychiatric - Mood stable, Affect WNL  ----------------------------------------------------------------------------------------  ASSESSMENT/PLAN  58yFemale with functional deficits after SAH  s/p craniotomy, Pcomm clip, EVD removed 4/15  dysphagia, s/p PEG 4/23  gastric wall hematoma on CT, monitor H/H   b/l soleal DVTs, lovenox PPX   Pain - Tylenol oxycodone   Rehab - Will continue to follow for ongoing rehab needs and recommendations.    continue bedside therapy, PT/OT/SLP   requires mod to max assist with bed mobility, sitting at EOB    recommend ACUTE inpatient rehabilitation for the functional deficits consisting of 3 hours of therapy/day & 24 hour RN/daily PMR physician for comorbid medical management, x 2-4 weeks depending on progress at rehabilitation facility. Will continue to follow for ongoing rehab needs and recommendations. Patient will be able to tolerate 3 hours a day.                35 minutes spent on total encounter  with chart review of PT/OT/SLP notes, exam, imaging, counseling and education on inpatient rehabilitation, coordination of care with rehab team and

## 2024-04-25 NOTE — PROGRESS NOTE ADULT - SUBJECTIVE AND OBJECTIVE BOX
SUBJECTIVE:   hiccups overnight     Vital Signs Last 24 Hrs  T(C): 37.1 (04-25-24 @ 08:49), Max: 37.2 (04-25-24 @ 05:10)  T(F): 98.7 (04-25-24 @ 08:49), Max: 99 (04-25-24 @ 05:10)  HR: 112 (04-25-24 @ 08:49) (72 - 112)  BP: 126/68 (04-25-24 @ 08:49) (109/60 - 131/79)  BP(mean): --  RR: 20 (04-25-24 @ 08:49) (18 - 20)  SpO2: 100% (04-25-24 @ 08:49) (96% - 100%)    PHYSICAL EXAM:    Constitutional: No Acute Distress     Neurological: Awake, Ox0, R gaze, no FC, BUE spontanous, RLE spontaneous, LLL wd    Incision: clean, dry and intact     Pulmonary: Clear to Auscultation    Cardiovascular: Regular rate and rhythm     Gastrointestinal: Soft, Non-tender    Extremities: No calf tenderness bilaterally          LABS:                          10.0   7.11  )-----------( 289      ( 25 Apr 2024 05:52 )             30.9    04-25    140  |  107  |  19  ----------------------------<  109<H>  4.0   |  21<L>  |  0.46<L>    Ca    9.4      25 Apr 2024 05:52  Phos  3.3     04-25  Mg     2.2     04-25 04-24 @ 07:01  -  04-25 @ 07:00  --------------------------------------------------------  IN: 0 mL / OUT: 450 mL / NET: -450 mL        IMAGING:   ACC: [id number] EXAM: DUPLEX SCAN EXT VEINS LOWER BI ORDERED BY: DMITRIY LANE    PROCEDURE DATE: 04/23/2024        INTERPRETATION: CLINICAL INFORMATION: History of intracranial hemorrhage. History of bilateral soleal vein DVT.    COMPARISON: None available.    TECHNIQUE: Duplex sonography of the BILATERAL LOWER extremity veins with color and spectral Doppler, with and without compression.    FINDINGS:    RIGHT:  Normal compressibility of the RIGHT common femoral, femoral and popliteal veins.  Doppler examination shows normal spontaneous and phasic flow.  DVT persists within the right soleal vein.    LEFT:  Normal compressibility of the LEFT common femoral, femoral and popliteal veins.  Doppler examination shows normal spontaneous and phasic flow.  DVT persists within the left soleal vein.    IMPRESSION:  Persistent bilateral soleal vein DVT.        --- End of Report ---            NANCY JONES MD; Attending Radiologist  This document has been electronically signed. Apr 23 2024 4:42PM    MEDICATIONS:  Neuro:  acetaminophen     Tablet .. 650 milliGRAM(s) Oral every 6 hours PRN Temp greater or equal to 38.5C (101.3F), Mild Pain (1 - 3)  oxyCODONE    IR 5 milliGRAM(s) Oral every 4 hours PRN Moderate Pain (4 - 6)  oxyCODONE    IR 10 milliGRAM(s) Oral every 4 hours PRN Severe Pain (7 - 10)    Cardiac:  doxazosin 2 milliGRAM(s) Oral at bedtime    GI/:  polyethylene glycol 3350 17 Gram(s) Oral two times a day  senna Syrup 10 milliLiter(s) Oral at bedtime    Other:   chlorhexidine 4% Liquid 1 Application(s) Topical <User Schedule>  enoxaparin Injectable 40 milliGRAM(s) SubCutaneous <User Schedule>  multivitamin/minerals/iron Oral Solution (CENTRUM) 15 milliLiter(s) Oral daily  petrolatum white Ointment 1 Application(s) Topical every 12 hours PRN dryness        DIET: Jevity 1.5 via PEG

## 2024-04-25 NOTE — PROGRESS NOTE ADULT - SUBJECTIVE AND OBJECTIVE BOX
Pooja Cervantes MD  Division of Hospital Medicine  [facility]  Spectra: [phone]      Patient is a 58y old  Female who presents with a chief complaint of SAH (25 Apr 2024 08:55)      SUBJECTIVE / OVERNIGHT EVENTS: no acute events overnight. remains afebrile. per niece bedside, she was conversing with her but not answering questions at time of my exam thus unable to obtain ROS.  ADDITIONAL REVIEW OF SYSTEMS:    MEDICATIONS  (STANDING):  bisacodyl 5 milliGRAM(s) Oral at bedtime  chlorhexidine 4% Liquid 1 Application(s) Topical <User Schedule>  doxazosin 2 milliGRAM(s) Oral at bedtime  enoxaparin Injectable 40 milliGRAM(s) SubCutaneous <User Schedule>  metoclopramide Injectable 5 milliGRAM(s) IV Push once  multivitamin/minerals/iron Oral Solution (CENTRUM) 15 milliLiter(s) Oral daily  polyethylene glycol 3350 17 Gram(s) Oral two times a day  senna Syrup 10 milliLiter(s) Oral at bedtime    MEDICATIONS  (PRN):  acetaminophen     Tablet .. 650 milliGRAM(s) Oral every 6 hours PRN Temp greater or equal to 38.5C (101.3F), Mild Pain (1 - 3)  oxyCODONE    IR 5 milliGRAM(s) Oral every 4 hours PRN Moderate Pain (4 - 6)  oxyCODONE    IR 10 milliGRAM(s) Oral every 4 hours PRN Severe Pain (7 - 10)  petrolatum white Ointment 1 Application(s) Topical every 12 hours PRN dryness      CAPILLARY BLOOD GLUCOSE        I&O's Summary    24 Apr 2024 07:01  -  25 Apr 2024 07:00  --------------------------------------------------------  IN: 0 mL / OUT: 450 mL / NET: -450 mL        PHYSICAL EXAM:  Vital Signs Last 24 Hrs  T(C): 37.1 (25 Apr 2024 08:49), Max: 37.2 (25 Apr 2024 05:10)  T(F): 98.7 (25 Apr 2024 08:49), Max: 99 (25 Apr 2024 05:10)  HR: 112 (25 Apr 2024 08:49) (72 - 112)  BP: 126/68 (25 Apr 2024 08:49) (109/60 - 131/79)  BP(mean): --  RR: 20 (25 Apr 2024 08:49) (18 - 20)  SpO2: 100% (25 Apr 2024 08:49) (96% - 100%)    Parameters below as of 25 Apr 2024 08:49  Patient On (Oxygen Delivery Method): room air    CONSTITUTIONAL: NAD, well-developed, well-groomed  EYES: PERRLA; conjunctiva and sclera clear  ENMT: Moist oral mucosa, no pharyngeal injection or exudates; normal dentition  NECK: Supple, no palpable masses; no thyromegaly  RESPIRATORY: Normal respiratory effort; lungs are clear to auscultation bilaterally  CARDIOVASCULAR: Regular rate and rhythm, normal S1 and S2, no murmur/rub/gallop; No lower extremity edema  ABDOMEN: Soft, Nondistended, Nontender to palpation, normoactive bowel sounds, +PEG site c/d/i  MUSCULOSKELETAL: No clubbing or cyanosis of digits; no joint swelling or tenderness to palpation  PSYCH: Alert, unable to assess orientation as minimally verbal  NEUROLOGY: minimally verbal, R gaze preference, not following commands but moving R extremities spontaneously  SKIN: No rashes; no palpable lesions, +R crani site c/d/i    LABS:                        10.0   7.11  )-----------( 289      ( 25 Apr 2024 05:52 )             30.9     04-25    140  |  107  |  19  ----------------------------<  109<H>  4.0   |  21<L>  |  0.46<L>    Ca    9.4      25 Apr 2024 05:52  Phos  3.3     04-25  Mg     2.2     04-25        Urinalysis Basic - ( 25 Apr 2024 05:52 )    Color: x / Appearance: x / SG: x / pH: x  Gluc: 109 mg/dL / Ketone: x  / Bili: x / Urobili: x   Blood: x / Protein: x / Nitrite: x   Leuk Esterase: x / RBC: x / WBC x   Sq Epi: x / Non Sq Epi: x / Bacteria: x      RADIOLOGY & ADDITIONAL TESTS:  Results Reviewed:  Na stable, electrolytes at goal  Imaging Personally Reviewed:  Electrocardiogram Personally Reviewed:    COORDINATION OF CARE:  Care Discussed with Consultants/Other Providers [Y]: Neurosurgery PEDRO Gates  Prior or Outpatient Records Reviewed [Y/N]:

## 2024-04-25 NOTE — PROGRESS NOTE ADULT - ASSESSMENT
57 yo F with no known PMH other than occasional headaches for which she took NSAIDs presented as transfer from [place] for SAH HH4 WNFS 5 mF4 ruptured R pcomm aneurysm. Was initially found unresponsive on 3/25/24 at 21:00, intubated at 23:00 at OSH, then txfrd. Given DDAVP at OSH. Here in ED 1 unit PLTs given and R frontal EVD placed-high pressure. Went for R pcomm clip on 3/26/24. Found with LV dysfunction, bradycardia and Acute left lower extremity DVT below the knee, involving the left soleal vein. Extubated 4/3. MRI 4/8 showed widespread ischemic stroke b/l, s/p angio for vasospasm intervention. 4/16. CTH: tract hemorrhage. Underwent PEG placement with surgery on 4/23.

## 2024-04-25 NOTE — PROGRESS NOTE ADULT - PROBLEM SELECTOR PLAN 6
b/l below the knee DVT  - Vasc Cards consulted, recs appreciated  - serial duplex as per Vasc Cards - last duplex on 4/23 with no evidence of propagation  - c/w lovenox ppx dosing

## 2024-04-25 NOTE — PROGRESS NOTE ADULT - PROBLEM SELECTOR PLAN 4
resolved.  - now off D5W gtt  - Na normalized and stable on current FWB regimen  - c/w FWB 200cc q6h for maintenance  - monitor Na on BMP periodically

## 2024-04-26 ENCOUNTER — TRANSCRIPTION ENCOUNTER (OUTPATIENT)
Age: 59
End: 2024-04-26

## 2024-04-26 ENCOUNTER — INPATIENT (INPATIENT)
Facility: HOSPITAL | Age: 59
LOS: 21 days | Discharge: NURSING, REHAB OR LONG-TERM CARE | DRG: 66 | End: 2024-05-18
Attending: PHYSICAL MEDICINE & REHABILITATION | Admitting: PHYSICAL MEDICINE & REHABILITATION
Payer: COMMERCIAL

## 2024-04-26 VITALS
SYSTOLIC BLOOD PRESSURE: 131 MMHG | HEART RATE: 95 BPM | TEMPERATURE: 98 F | WEIGHT: 138.01 LBS | HEIGHT: 62 IN | DIASTOLIC BLOOD PRESSURE: 82 MMHG | OXYGEN SATURATION: 96 % | RESPIRATION RATE: 17 BRPM

## 2024-04-26 VITALS
RESPIRATION RATE: 18 BRPM | HEART RATE: 94 BPM | TEMPERATURE: 98 F | DIASTOLIC BLOOD PRESSURE: 73 MMHG | SYSTOLIC BLOOD PRESSURE: 131 MMHG | OXYGEN SATURATION: 96 %

## 2024-04-26 DIAGNOSIS — I62.00 NONTRAUMATIC SUBDURAL HEMORRHAGE, UNSPECIFIED: ICD-10-CM

## 2024-04-26 LAB
ANION GAP SERPL CALC-SCNC: 12 MMOL/L — SIGNIFICANT CHANGE UP (ref 5–17)
BUN SERPL-MCNC: 20 MG/DL — SIGNIFICANT CHANGE UP (ref 7–23)
CALCIUM SERPL-MCNC: 10.2 MG/DL — SIGNIFICANT CHANGE UP (ref 8.4–10.5)
CHLORIDE SERPL-SCNC: 108 MMOL/L — SIGNIFICANT CHANGE UP (ref 96–108)
CO2 SERPL-SCNC: 24 MMOL/L — SIGNIFICANT CHANGE UP (ref 22–31)
CREAT SERPL-MCNC: 0.46 MG/DL — LOW (ref 0.5–1.3)
EGFR: 112 ML/MIN/1.73M2 — SIGNIFICANT CHANGE UP
FLUAV AG NPH QL: SIGNIFICANT CHANGE UP
FLUBV AG NPH QL: SIGNIFICANT CHANGE UP
GLUCOSE SERPL-MCNC: 95 MG/DL — SIGNIFICANT CHANGE UP (ref 70–99)
MAGNESIUM SERPL-MCNC: 2.4 MG/DL — SIGNIFICANT CHANGE UP (ref 1.6–2.6)
PHOSPHATE SERPL-MCNC: 3.9 MG/DL — SIGNIFICANT CHANGE UP (ref 2.5–4.5)
POTASSIUM SERPL-MCNC: 4 MMOL/L — SIGNIFICANT CHANGE UP (ref 3.5–5.3)
POTASSIUM SERPL-SCNC: 4 MMOL/L — SIGNIFICANT CHANGE UP (ref 3.5–5.3)
RSV RNA NPH QL NAA+NON-PROBE: SIGNIFICANT CHANGE UP
SARS-COV-2 RNA SPEC QL NAA+PROBE: SIGNIFICANT CHANGE UP
SODIUM SERPL-SCNC: 144 MMOL/L — SIGNIFICANT CHANGE UP (ref 135–145)

## 2024-04-26 PROCEDURE — C8924: CPT

## 2024-04-26 PROCEDURE — 83735 ASSAY OF MAGNESIUM: CPT

## 2024-04-26 PROCEDURE — 87070 CULTURE OTHR SPECIMN AEROBIC: CPT

## 2024-04-26 PROCEDURE — 81001 URINALYSIS AUTO W/SCOPE: CPT

## 2024-04-26 PROCEDURE — 93970 EXTREMITY STUDY: CPT

## 2024-04-26 PROCEDURE — 36569 INSJ PICC 5 YR+ W/O IMAGING: CPT

## 2024-04-26 PROCEDURE — 84132 ASSAY OF SERUM POTASSIUM: CPT

## 2024-04-26 PROCEDURE — 61651 EVASC PRLNG ADMN RX AGNT ADD: CPT | Mod: 59

## 2024-04-26 PROCEDURE — P9073: CPT

## 2024-04-26 PROCEDURE — 70551 MRI BRAIN STEM W/O DYE: CPT | Mod: MC

## 2024-04-26 PROCEDURE — 86901 BLOOD TYPING SEROLOGIC RH(D): CPT

## 2024-04-26 PROCEDURE — 86850 RBC ANTIBODY SCREEN: CPT

## 2024-04-26 PROCEDURE — 84484 ASSAY OF TROPONIN QUANT: CPT

## 2024-04-26 PROCEDURE — 86900 BLOOD TYPING SEROLOGIC ABO: CPT

## 2024-04-26 PROCEDURE — 82435 ASSAY OF BLOOD CHLORIDE: CPT

## 2024-04-26 PROCEDURE — 70498 CT ANGIOGRAPHY NECK: CPT | Mod: MC

## 2024-04-26 PROCEDURE — 85027 COMPLETE CBC AUTOMATED: CPT

## 2024-04-26 PROCEDURE — 70450 CT HEAD/BRAIN W/O DYE: CPT | Mod: MC

## 2024-04-26 PROCEDURE — 82962 GLUCOSE BLOOD TEST: CPT

## 2024-04-26 PROCEDURE — 87086 URINE CULTURE/COLONY COUNT: CPT

## 2024-04-26 PROCEDURE — 83036 HEMOGLOBIN GLYCOSYLATED A1C: CPT

## 2024-04-26 PROCEDURE — 87483 CNS DNA AMP PROBE TYPE 12-25: CPT

## 2024-04-26 PROCEDURE — 82330 ASSAY OF CALCIUM: CPT

## 2024-04-26 PROCEDURE — P9100: CPT

## 2024-04-26 PROCEDURE — 97530 THERAPEUTIC ACTIVITIES: CPT

## 2024-04-26 PROCEDURE — 96375 TX/PRO/DX INJ NEW DRUG ADDON: CPT

## 2024-04-26 PROCEDURE — P9037: CPT

## 2024-04-26 PROCEDURE — 95714 VEEG EA 12-26 HR UNMNTR: CPT

## 2024-04-26 PROCEDURE — 85025 COMPLETE CBC W/AUTO DIFF WBC: CPT

## 2024-04-26 PROCEDURE — 84100 ASSAY OF PHOSPHORUS: CPT

## 2024-04-26 PROCEDURE — 92610 EVALUATE SWALLOWING FUNCTION: CPT

## 2024-04-26 PROCEDURE — 84443 ASSAY THYROID STIM HORMONE: CPT

## 2024-04-26 PROCEDURE — 80061 LIPID PANEL: CPT

## 2024-04-26 PROCEDURE — 80076 HEPATIC FUNCTION PANEL: CPT

## 2024-04-26 PROCEDURE — 83605 ASSAY OF LACTIC ACID: CPT

## 2024-04-26 PROCEDURE — L8699: CPT

## 2024-04-26 PROCEDURE — 94640 AIRWAY INHALATION TREATMENT: CPT

## 2024-04-26 PROCEDURE — 83880 ASSAY OF NATRIURETIC PEPTIDE: CPT

## 2024-04-26 PROCEDURE — 92526 ORAL FUNCTION THERAPY: CPT

## 2024-04-26 PROCEDURE — 87205 SMEAR GRAM STAIN: CPT

## 2024-04-26 PROCEDURE — 82947 ASSAY GLUCOSE BLOOD QUANT: CPT

## 2024-04-26 PROCEDURE — 87040 BLOOD CULTURE FOR BACTERIA: CPT

## 2024-04-26 PROCEDURE — 82945 GLUCOSE OTHER FLUID: CPT

## 2024-04-26 PROCEDURE — 87186 SC STD MICRODIL/AGAR DIL: CPT

## 2024-04-26 PROCEDURE — 93308 TTE F-UP OR LMTD: CPT

## 2024-04-26 PROCEDURE — 97760 ORTHOTIC MGMT&TRAING 1ST ENC: CPT

## 2024-04-26 PROCEDURE — 89051 BODY FLUID CELL COUNT: CPT

## 2024-04-26 PROCEDURE — 85576 BLOOD PLATELET AGGREGATION: CPT

## 2024-04-26 PROCEDURE — 82565 ASSAY OF CREATININE: CPT

## 2024-04-26 PROCEDURE — P9016: CPT

## 2024-04-26 PROCEDURE — 80048 BASIC METABOLIC PNL TOTAL CA: CPT

## 2024-04-26 PROCEDURE — 83930 ASSAY OF BLOOD OSMOLALITY: CPT

## 2024-04-26 PROCEDURE — 93888 INTRACRANIAL LIMITED STUDY: CPT

## 2024-04-26 PROCEDURE — 96374 THER/PROPH/DIAG INJ IV PUSH: CPT

## 2024-04-26 PROCEDURE — 93971 EXTREMITY STUDY: CPT

## 2024-04-26 PROCEDURE — 95711 VEEG 2-12 HR UNMONITORED: CPT

## 2024-04-26 PROCEDURE — 36226 PLACE CATH VERTEBRAL ART: CPT

## 2024-04-26 PROCEDURE — 71045 X-RAY EXAM CHEST 1 VIEW: CPT

## 2024-04-26 PROCEDURE — 36224 PLACE CATH CAROTD ART: CPT

## 2024-04-26 PROCEDURE — 84157 ASSAY OF PROTEIN OTHER: CPT

## 2024-04-26 PROCEDURE — 36415 COLL VENOUS BLD VENIPUNCTURE: CPT

## 2024-04-26 PROCEDURE — 95700 EEG CONT REC W/VID EEG TECH: CPT

## 2024-04-26 PROCEDURE — 87640 STAPH A DNA AMP PROBE: CPT

## 2024-04-26 PROCEDURE — C1769: CPT

## 2024-04-26 PROCEDURE — 93005 ELECTROCARDIOGRAM TRACING: CPT

## 2024-04-26 PROCEDURE — 93886 INTRACRANIAL COMPLETE STUDY: CPT

## 2024-04-26 PROCEDURE — 85610 PROTHROMBIN TIME: CPT

## 2024-04-26 PROCEDURE — 93321 DOPPLER ECHO F-UP/LMTD STD: CPT

## 2024-04-26 PROCEDURE — 0042T: CPT

## 2024-04-26 PROCEDURE — 94799 UNLISTED PULMONARY SVC/PX: CPT

## 2024-04-26 PROCEDURE — C1887: CPT

## 2024-04-26 PROCEDURE — 74177 CT ABD & PELVIS W/CONTRAST: CPT | Mod: MC

## 2024-04-26 PROCEDURE — 99285 EMERGENCY DEPT VISIT HI MDM: CPT | Mod: 25

## 2024-04-26 PROCEDURE — 70496 CT ANGIOGRAPHY HEAD: CPT | Mod: MC

## 2024-04-26 PROCEDURE — 85018 HEMOGLOBIN: CPT

## 2024-04-26 PROCEDURE — 95813 EEG EXTND MNTR 61-119 MIN: CPT

## 2024-04-26 PROCEDURE — 86923 COMPATIBILITY TEST ELECTRIC: CPT

## 2024-04-26 PROCEDURE — C9399: CPT

## 2024-04-26 PROCEDURE — C1894: CPT

## 2024-04-26 PROCEDURE — C8929: CPT

## 2024-04-26 PROCEDURE — C1889: CPT

## 2024-04-26 PROCEDURE — 85730 THROMBOPLASTIN TIME PARTIAL: CPT

## 2024-04-26 PROCEDURE — 97535 SELF CARE MNGMENT TRAINING: CPT

## 2024-04-26 PROCEDURE — C1760: CPT

## 2024-04-26 PROCEDURE — C1713: CPT

## 2024-04-26 PROCEDURE — 94003 VENT MGMT INPAT SUBQ DAY: CPT

## 2024-04-26 PROCEDURE — 99232 SBSQ HOSP IP/OBS MODERATE 35: CPT

## 2024-04-26 PROCEDURE — 94002 VENT MGMT INPAT INIT DAY: CPT

## 2024-04-26 PROCEDURE — 61650 EVASC PRLNG ADMN RX AGNT 1ST: CPT

## 2024-04-26 PROCEDURE — 85396 CLOTTING ASSAY WHOLE BLOOD: CPT

## 2024-04-26 PROCEDURE — 97110 THERAPEUTIC EXERCISES: CPT

## 2024-04-26 PROCEDURE — 81025 URINE PREGNANCY TEST: CPT

## 2024-04-26 PROCEDURE — 84702 CHORIONIC GONADOTROPIN TEST: CPT

## 2024-04-26 PROCEDURE — 97166 OT EVAL MOD COMPLEX 45 MIN: CPT

## 2024-04-26 PROCEDURE — 85520 HEPARIN ASSAY: CPT

## 2024-04-26 PROCEDURE — 84295 ASSAY OF SERUM SODIUM: CPT

## 2024-04-26 PROCEDURE — 85014 HEMATOCRIT: CPT

## 2024-04-26 PROCEDURE — 84439 ASSAY OF FREE THYROXINE: CPT

## 2024-04-26 PROCEDURE — 97162 PT EVAL MOD COMPLEX 30 MIN: CPT

## 2024-04-26 PROCEDURE — 82803 BLOOD GASES ANY COMBINATION: CPT

## 2024-04-26 PROCEDURE — 97116 GAIT TRAINING THERAPY: CPT

## 2024-04-26 PROCEDURE — 87641 MR-STAPH DNA AMP PROBE: CPT

## 2024-04-26 PROCEDURE — C1751: CPT

## 2024-04-26 RX ORDER — PANTOPRAZOLE SODIUM 20 MG/1
40 TABLET, DELAYED RELEASE ORAL
Refills: 0 | Status: DISCONTINUED | OUTPATIENT
Start: 2024-04-26 | End: 2024-05-18

## 2024-04-26 RX ORDER — POLYETHYLENE GLYCOL 3350 17 G/17G
17 POWDER, FOR SOLUTION ORAL
Refills: 0 | Status: DISCONTINUED | OUTPATIENT
Start: 2024-04-26 | End: 2024-05-12

## 2024-04-26 RX ORDER — PETROLATUM,WHITE
1 JELLY (GRAM) TOPICAL EVERY 12 HOURS
Refills: 0 | Status: DISCONTINUED | OUTPATIENT
Start: 2024-04-26 | End: 2024-05-18

## 2024-04-26 RX ORDER — ENOXAPARIN SODIUM 100 MG/ML
40 INJECTION SUBCUTANEOUS
Qty: 0 | Refills: 0 | DISCHARGE
Start: 2024-04-26

## 2024-04-26 RX ORDER — ACETAMINOPHEN 500 MG
650 TABLET ORAL EVERY 6 HOURS
Refills: 0 | Status: DISCONTINUED | OUTPATIENT
Start: 2024-04-26 | End: 2024-05-18

## 2024-04-26 RX ORDER — SENNA PLUS 8.6 MG/1
10 TABLET ORAL AT BEDTIME
Refills: 0 | Status: DISCONTINUED | OUTPATIENT
Start: 2024-04-26 | End: 2024-05-15

## 2024-04-26 RX ORDER — METOCLOPRAMIDE HCL 10 MG
5 TABLET ORAL ONCE
Refills: 0 | Status: COMPLETED | OUTPATIENT
Start: 2024-04-26 | End: 2024-04-26

## 2024-04-26 RX ORDER — DOXAZOSIN MESYLATE 4 MG
1 TABLET ORAL
Qty: 0 | Refills: 0 | DISCHARGE
Start: 2024-04-26

## 2024-04-26 RX ORDER — SENNA PLUS 8.6 MG/1
10 TABLET ORAL
Qty: 0 | Refills: 0 | DISCHARGE
Start: 2024-04-26

## 2024-04-26 RX ORDER — MULTIVIT-MIN/FERROUS GLUCONATE 9 MG/15 ML
15 LIQUID (ML) ORAL
Qty: 0 | Refills: 0 | DISCHARGE
Start: 2024-04-26

## 2024-04-26 RX ORDER — ATORVASTATIN CALCIUM 80 MG/1
40 TABLET, FILM COATED ORAL AT BEDTIME
Refills: 0 | Status: DISCONTINUED | OUTPATIENT
Start: 2024-04-26 | End: 2024-04-26

## 2024-04-26 RX ORDER — MULTIVIT-MIN/FERROUS GLUCONATE 9 MG/15 ML
15 LIQUID (ML) ORAL DAILY
Refills: 0 | Status: DISCONTINUED | OUTPATIENT
Start: 2024-04-26 | End: 2024-05-18

## 2024-04-26 RX ORDER — PETROLATUM,WHITE
1 JELLY (GRAM) TOPICAL
Qty: 0 | Refills: 0 | DISCHARGE
Start: 2024-04-26

## 2024-04-26 RX ORDER — ATORVASTATIN CALCIUM 80 MG/1
1 TABLET, FILM COATED ORAL
Qty: 0 | Refills: 0 | DISCHARGE
Start: 2024-04-26

## 2024-04-26 RX ORDER — OXYCODONE HYDROCHLORIDE 5 MG/1
1 TABLET ORAL
Qty: 0 | Refills: 0 | DISCHARGE
Start: 2024-04-26

## 2024-04-26 RX ORDER — OXYCODONE HYDROCHLORIDE 5 MG/1
5 TABLET ORAL EVERY 4 HOURS
Refills: 0 | Status: DISCONTINUED | OUTPATIENT
Start: 2024-04-26 | End: 2024-04-30

## 2024-04-26 RX ORDER — ACETAMINOPHEN 500 MG
2 TABLET ORAL
Qty: 0 | Refills: 0 | DISCHARGE
Start: 2024-04-26

## 2024-04-26 RX ORDER — ENOXAPARIN SODIUM 100 MG/ML
40 INJECTION SUBCUTANEOUS
Refills: 0 | Status: DISCONTINUED | OUTPATIENT
Start: 2024-04-26 | End: 2024-05-18

## 2024-04-26 RX ORDER — ACETAMINOPHEN 500 MG
650 TABLET ORAL EVERY 6 HOURS
Refills: 0 | Status: DISCONTINUED | OUTPATIENT
Start: 2024-04-26 | End: 2024-04-26

## 2024-04-26 RX ORDER — DOXAZOSIN MESYLATE 4 MG
2 TABLET ORAL AT BEDTIME
Refills: 0 | Status: DISCONTINUED | OUTPATIENT
Start: 2024-04-26 | End: 2024-05-18

## 2024-04-26 RX ORDER — ATORVASTATIN CALCIUM 80 MG/1
40 TABLET, FILM COATED ORAL AT BEDTIME
Refills: 0 | Status: DISCONTINUED | OUTPATIENT
Start: 2024-04-26 | End: 2024-05-18

## 2024-04-26 RX ORDER — OXYCODONE HYDROCHLORIDE 5 MG/1
10 TABLET ORAL EVERY 4 HOURS
Refills: 0 | Status: DISCONTINUED | OUTPATIENT
Start: 2024-04-26 | End: 2024-04-30

## 2024-04-26 RX ORDER — POLYETHYLENE GLYCOL 3350 17 G/17G
17 POWDER, FOR SOLUTION ORAL
Qty: 0 | Refills: 0 | DISCHARGE
Start: 2024-04-26

## 2024-04-26 RX ADMIN — Medication 2 MILLIGRAM(S): at 21:06

## 2024-04-26 RX ADMIN — CHLORHEXIDINE GLUCONATE 1 APPLICATION(S): 213 SOLUTION TOPICAL at 05:14

## 2024-04-26 RX ADMIN — Medication 5 MILLIGRAM(S): at 12:20

## 2024-04-26 RX ADMIN — ATORVASTATIN CALCIUM 40 MILLIGRAM(S): 80 TABLET, FILM COATED ORAL at 21:05

## 2024-04-26 RX ADMIN — POLYETHYLENE GLYCOL 3350 17 GRAM(S): 17 POWDER, FOR SOLUTION ORAL at 05:14

## 2024-04-26 RX ADMIN — POLYETHYLENE GLYCOL 3350 17 GRAM(S): 17 POWDER, FOR SOLUTION ORAL at 18:28

## 2024-04-26 RX ADMIN — ENOXAPARIN SODIUM 40 MILLIGRAM(S): 100 INJECTION SUBCUTANEOUS at 18:25

## 2024-04-26 NOTE — PROGRESS NOTE ADULT - NSPROGADDITIONALINFOA_GEN_ALL_CORE
.  Pooja Cervantes MD  Division of Hospital Medicine  [facility]   Spectra: [phone]    Plan discussed with patient, son bedside, and neurosurgery PEDRO Gates.
.  Pooja Cervantes MD  Division of Hospital Medicine  [facility]   Spectra: [phone]    Can start DC planning to rehab, but please check BMP in AM to see if FWB needs to be adjusted prior to d/c.    Plan discussed with patient and Neurosurgery PEDRO Gates.
.  Pooja Cervantes MD  Division of Hospital Medicine  [facility]   Spectra: [phone]    No contraindication from medicine standpoint for discharge planning to rehab.    Plan discussed with patient, son bedside, and Neurosurgery PEDRO Barry.
.  Pooja Cervantes MD  Division of Hospital Medicine  [facility]   Spectra: [phone]    No contraindication from medicine standpoint for discharge planning to rehab.    Plan discussed with patient, niece bedside, and Neurosurgery PEDRO Gates.
.  Pooja Cervantes MD  Division of Hospital Medicine  [facility]   Spectra: [phone]    Plan discussed with patient, son bedside, and neurosurgery resident Radha.
.  Pooja Cervantes MD  Division of Hospital Medicine  [facility]   Spectra: [phone]    Plan discussed with patient, GI PEDRO Lopes, and neurosurgery PEDRO Gates.
.  Pooja Cervantes MD  Division of Hospital Medicine  [facility]   Spectra: [phone]    Plan discussed with patient and neurosurgery PEDRO Aragon.

## 2024-04-26 NOTE — PROGRESS NOTE ADULT - NUTRITIONAL ASSESSMENT
This patient has been assessed with a concern for Malnutrition and has been determined to have a diagnosis/diagnoses of Severe protein-calorie malnutrition.    This patient is being managed with:   Diet NPO with Tube Feed-  Tube Feeding Modality: Nasogastric  Jevity 1.5 Les (JEVITY1.5RTH)  Total Volume for 24 Hours (mL): 1200  Continuous  Starting Tube Feed Rate {mL per Hour}: 30  Increase Tube Feed Rate by (mL): 10     Every 4 hours  Until Goal Tube Feed Rate (mL per Hour): 50  Tube Feed Duration (in Hours): 24  Tube Feed Start Time: 00:00  Supplement Feeding Modality:  Oral  Probiotic Yogurt/Smoothie Cans or Servings Per Day:  2       Frequency:  Two Times a day  Entered: Apr 11 2024 11:04AM  
This patient has been assessed with a concern for Malnutrition and has been determined to have a diagnosis/diagnoses of Severe protein-calorie malnutrition.    This patient is being managed with:   Diet NPO with Tube Feed-  Tube Feeding Modality: Nasogastric  Jevity 1.5 Les (JEVITY1.5RTH)  Total Volume for 24 Hours (mL): 1440  Continuous  Until Goal Tube Feed Rate (mL per Hour): 60  Tube Feed Duration (in Hours): 24  Tube Feed Start Time: 23:00  Entered: Apr 1 2024  5:24PM  
This patient has been assessed with a concern for Malnutrition and has been determined to have a diagnosis/diagnoses of Severe protein-calorie malnutrition.    This patient is being managed with:   Diet NPO with Tube Feed-  Tube Feeding Modality: Nasogastric  Jevity 1.5 Les (JEVITY1.5RTH)  Total Volume for 24 Hours (mL): 1440  Continuous  Until Goal Tube Feed Rate (mL per Hour): 60  Tube Feed Duration (in Hours): 24  Tube Feed Start Time: 23:00  Entered: Apr 6 2024 11:32PM  
This patient has been assessed with a concern for Malnutrition and has been determined to have a diagnosis/diagnoses of Severe protein-calorie malnutrition.    This patient is being managed with:   Diet NPO with Tube Feed-  Tube Feeding Modality: Nasogastric  Jevity 1.5 Les (JEVITY1.5RTH)  Total Volume for 24 Hours (mL): 1440  Continuous  Until Goal Tube Feed Rate (mL per Hour): 60  Tube Feed Duration (in Hours): 24  Tube Feed Start Time: 23:00  Entered: Apr 8 2024  2:02PM  
This patient has been assessed with a concern for Malnutrition and has been determined to have a diagnosis/diagnoses of Severe protein-calorie malnutrition.    This patient is being managed with:   Diet NPO-  Entered: Apr 20 2024 12:11AM  
This patient has been assessed with a concern for Malnutrition and has been determined to have a diagnosis/diagnoses of Severe protein-calorie malnutrition.    This patient is being managed with:   Diet NPO-  Entered: Apr 20 2024 12:11AM  
This patient has been assessed with a concern for Malnutrition and has been determined to have a diagnosis/diagnoses of Severe protein-calorie malnutrition.    This patient is being managed with:   Diet NPO after Midnight-     NPO Start Date: 11-Apr-2024   NPO Start Time: 23:59  Except Medications  Entered: Apr 11 2024 11:04AM    Diet NPO with Tube Feed-  Tube Feeding Modality: Nasogastric  Jevity 1.5 Les (JEVITY1.5RTH)  Total Volume for 24 Hours (mL): 1200  Continuous  Starting Tube Feed Rate {mL per Hour}: 30  Increase Tube Feed Rate by (mL): 10     Every 4 hours  Until Goal Tube Feed Rate (mL per Hour): 50  Tube Feed Duration (in Hours): 24  Tube Feed Start Time: 00:00  Supplement Feeding Modality:  Oral  Probiotic Yogurt/Smoothie Cans or Servings Per Day:  2       Frequency:  Two Times a day  Entered: Apr 11 2024 11:04AM  
This patient has been assessed with a concern for Malnutrition and has been determined to have a diagnosis/diagnoses of Severe protein-calorie malnutrition.    This patient is being managed with:   Diet NPO after Midnight-     NPO Start Date: 18-Apr-2024   NPO Start Time: 23:59  Except Medications  Entered: Apr 18 2024  2:19PM    Diet NPO with Tube Feed-  Tube Feeding Modality: Nasogastric  Jevity 1.5 Les (JEVITY1.5RTH)  Total Volume for 24 Hours (mL): 1440  Continuous  Starting Tube Feed Rate {mL per Hour}: 30  Increase Tube Feed Rate by (mL): 10     Every 4 hours  Until Goal Tube Feed Rate (mL per Hour): 60  Tube Feed Duration (in Hours): 24  Tube Feed Start Time: 00:00  Supplement Feeding Modality:  Oral  Probiotic Yogurt/Smoothie Cans or Servings Per Day:  2       Frequency:  Two Times a day  Entered: Apr 17 2024  1:16PM  
This patient has been assessed with a concern for Malnutrition and has been determined to have a diagnosis/diagnoses of Severe protein-calorie malnutrition.    This patient is being managed with:   Diet NPO with Tube Feed-  Tube Feeding Modality: Gastrostomy  Jevity 1.5 Les (JEVITY1.5RTH)  Total Volume for 24 Hours (mL): 1440  Continuous  Starting Tube Feed Rate {mL per Hour}: 10  Increase Tube Feed Rate by (mL): 20     Every 4 hours  Until Goal Tube Feed Rate (mL per Hour): 60  Tube Feed Duration (in Hours): 24  Tube Feed Start Time: 20:30  Supplement Feeding Modality:  Oral  Probiotic Yogurt/Smoothie Cans or Servings Per Day:  2       Frequency:  Two Times a day  Entered: Apr 23 2024  8:22PM  
This patient has been assessed with a concern for Malnutrition and has been determined to have a diagnosis/diagnoses of Severe protein-calorie malnutrition.    This patient is being managed with:   Diet NPO with Tube Feed-  Tube Feeding Modality: Nasogastric  Jevity 1.5 Les (JEVITY1.5RTH)  Total Volume for 24 Hours (mL): 1200  Continuous  Starting Tube Feed Rate {mL per Hour}: 30  Increase Tube Feed Rate by (mL): 10     Every 4 hours  Until Goal Tube Feed Rate (mL per Hour): 50  Tube Feed Duration (in Hours): 24  Tube Feed Start Time: 00:00  Supplement Feeding Modality:  Oral  Probiotic Yogurt/Smoothie Cans or Servings Per Day:  2       Frequency:  Two Times a day  Entered: Apr 11 2024 11:04AM  
This patient has been assessed with a concern for Malnutrition and has been determined to have a diagnosis/diagnoses of Severe protein-calorie malnutrition.    This patient is being managed with:   Diet NPO with Tube Feed-  Tube Feeding Modality: Nasogastric  Jevity 1.5 Les (JEVITY1.5RTH)  Total Volume for 24 Hours (mL): 1440  Continuous  Until Goal Tube Feed Rate (mL per Hour): 60  Tube Feed Duration (in Hours): 24  Tube Feed Start Time: 23:00  Entered: Apr 1 2024  5:24PM  
This patient has been assessed with a concern for Malnutrition and has been determined to have a diagnosis/diagnoses of Severe protein-calorie malnutrition.    This patient is being managed with:   Diet NPO with Tube Feed-  Tube Feeding Modality: Nasogastric  Jevity 1.5 Les (JEVITY1.5RTH)  Total Volume for 24 Hours (mL): 1440  Continuous  Until Goal Tube Feed Rate (mL per Hour): 60  Tube Feed Duration (in Hours): 24  Tube Feed Start Time: 23:00  Entered: Apr 6 2024 11:32PM  
This patient has been assessed with a concern for Malnutrition and has been determined to have a diagnosis/diagnoses of Severe protein-calorie malnutrition.    This patient is being managed with:   Diet NPO with Tube Feed-  Tube Feeding Modality: Nasogastric  Jevity 1.5 Les (JEVITY1.5RTH)  Total Volume for 24 Hours (mL): 1440  Continuous  Until Goal Tube Feed Rate (mL per Hour): 60  Tube Feed Duration (in Hours): 24  Tube Feed Start Time: 23:00  Entered: Apr 6 2024 11:32PM  
This patient has been assessed with a concern for Malnutrition and has been determined to have a diagnosis/diagnoses of Severe protein-calorie malnutrition.    This patient is being managed with:   Diet NPO-  Except Medications  Entered: Apr 10 2024 12:10PM  
This patient has been assessed with a concern for Malnutrition and has been determined to have a diagnosis/diagnoses of Severe protein-calorie malnutrition.    This patient is being managed with:   Diet NPO after Midnight-     NPO Start Date: 07-Apr-2024   NPO Start Time: 23:59  Entered: Apr 7 2024  6:51AM    Diet NPO with Tube Feed-  Tube Feeding Modality: Nasogastric  Jevity 1.5 Les (JEVITY1.5RTH)  Total Volume for 24 Hours (mL): 1440  Continuous  Until Goal Tube Feed Rate (mL per Hour): 60  Tube Feed Duration (in Hours): 24  Tube Feed Start Time: 23:00  Entered: Apr 6 2024 11:32PM  
This patient has been assessed with a concern for Malnutrition and has been determined to have a diagnosis/diagnoses of Severe protein-calorie malnutrition.    This patient is being managed with:   Diet NPO after Midnight-     NPO Start Date: 11-Apr-2024   NPO Start Time: 23:59  Except Medications  Entered: Apr 11 2024 11:04AM    Diet NPO with Tube Feed-  Tube Feeding Modality: Nasogastric  Jevity 1.5 Les (JEVITY1.5RTH)  Total Volume for 24 Hours (mL): 1200  Continuous  Starting Tube Feed Rate {mL per Hour}: 30  Increase Tube Feed Rate by (mL): 10     Every 4 hours  Until Goal Tube Feed Rate (mL per Hour): 50  Tube Feed Duration (in Hours): 24  Tube Feed Start Time: 00:00  Supplement Feeding Modality:  Oral  Probiotic Yogurt/Smoothie Cans or Servings Per Day:  2       Frequency:  Two Times a day  Entered: Apr 11 2024 11:04AM  
This patient has been assessed with a concern for Malnutrition and has been determined to have a diagnosis/diagnoses of Severe protein-calorie malnutrition.    This patient is being managed with:   Diet NPO with Tube Feed-  Tube Feeding Modality: Gastrostomy  Jevity 1.5 Les (JEVITY1.5RTH)  Total Volume for 24 Hours (mL): 1440  Continuous  Starting Tube Feed Rate {mL per Hour}: 10  Increase Tube Feed Rate by (mL): 20     Every 4 hours  Until Goal Tube Feed Rate (mL per Hour): 60  Tube Feed Duration (in Hours): 24  Tube Feed Start Time: 20:30  Supplement Feeding Modality:  Oral  Probiotic Yogurt/Smoothie Cans or Servings Per Day:  2       Frequency:  Two Times a day  Entered: Apr 23 2024  8:22PM  
This patient has been assessed with a concern for Malnutrition and has been determined to have a diagnosis/diagnoses of Severe protein-calorie malnutrition.    This patient is being managed with:   Diet NPO with Tube Feed-  Tube Feeding Modality: Gastrostomy  Jevity 1.5 Les (JEVITY1.5RTH)  Total Volume for 24 Hours (mL): 1440  Continuous  Starting Tube Feed Rate {mL per Hour}: 10  Increase Tube Feed Rate by (mL): 20     Every 4 hours  Until Goal Tube Feed Rate (mL per Hour): 60  Tube Feed Duration (in Hours): 24  Tube Feed Start Time: 20:30  Supplement Feeding Modality:  Oral  Probiotic Yogurt/Smoothie Cans or Servings Per Day:  2       Frequency:  Two Times a day  Entered: Apr 23 2024  8:22PM  
This patient has been assessed with a concern for Malnutrition and has been determined to have a diagnosis/diagnoses of Severe protein-calorie malnutrition.    This patient is being managed with:   Diet NPO with Tube Feed-  Tube Feeding Modality: Nasogastric  Jevity 1.5 Les (JEVITY1.5RTH)  Total Volume for 24 Hours (mL): 1200  Continuous  Starting Tube Feed Rate {mL per Hour}: 30  Increase Tube Feed Rate by (mL): 10     Every 4 hours  Until Goal Tube Feed Rate (mL per Hour): 50  Tube Feed Duration (in Hours): 24  Tube Feed Start Time: 00:00  Supplement Feeding Modality:  Oral  Probiotic Yogurt/Smoothie Cans or Servings Per Day:  2       Frequency:  Two Times a day  Entered: Apr 11 2024 11:04AM  
This patient has been assessed with a concern for Malnutrition and has been determined to have a diagnosis/diagnoses of Severe protein-calorie malnutrition.    This patient is being managed with:   Diet NPO with Tube Feed-  Tube Feeding Modality: Nasogastric  Jevity 1.5 Les (JEVITY1.5RTH)  Total Volume for 24 Hours (mL): 1200  Continuous  Starting Tube Feed Rate {mL per Hour}: 30  Increase Tube Feed Rate by (mL): 10     Every 4 hours  Until Goal Tube Feed Rate (mL per Hour): 50  Tube Feed Duration (in Hours): 24  Tube Feed Start Time: 00:00  Supplement Feeding Modality:  Oral  Probiotic Yogurt/Smoothie Cans or Servings Per Day:  2       Frequency:  Two Times a day  Entered: Apr 11 2024 11:04AM  
This patient has been assessed with a concern for Malnutrition and has been determined to have a diagnosis/diagnoses of Severe protein-calorie malnutrition.    This patient is being managed with:   Diet NPO with Tube Feed-  Tube Feeding Modality: Nasogastric  Jevity 1.5 Les (JEVITY1.5RTH)  Total Volume for 24 Hours (mL): 1440  Continuous  Until Goal Tube Feed Rate (mL per Hour): 60  Tube Feed Duration (in Hours): 24  Tube Feed Start Time: 23:00  Entered: Apr 6 2024 11:32PM  
This patient has been assessed with a concern for Malnutrition and has been determined to have a diagnosis/diagnoses of Severe protein-calorie malnutrition.    This patient is being managed with:   Diet NPO-  Entered: Apr 20 2024 12:11AM  
This patient has been assessed with a concern for Malnutrition and has been determined to have a diagnosis/diagnoses of Severe protein-calorie malnutrition.    This patient is being managed with:   Diet NPO-  Except Medications  Entered: Apr 10 2024 12:10PM  
This patient has been assessed with a concern for Malnutrition and has been determined to have a diagnosis/diagnoses of Severe protein-calorie malnutrition.    This patient is being managed with:   Diet NPO with Tube Feed-  Tube Feeding Modality: Nasogastric  Jevity 1.5 Les (JEVITY1.5RTH)  Total Volume for 24 Hours (mL): 1440  Continuous  Until Goal Tube Feed Rate (mL per Hour): 60  Tube Feed Duration (in Hours): 24  Tube Feed Start Time: 23:00  Entered: Apr 1 2024  5:24PM  
This patient has been assessed with a concern for Malnutrition and has been determined to have a diagnosis/diagnoses of Severe protein-calorie malnutrition.    This patient is being managed with:   Diet NPO with Tube Feed-  Tube Feeding Modality: Nasogastric  Jevity 1.5 Les (JEVITY1.5RTH)  Total Volume for 24 Hours (mL): 1440  Continuous  Until Goal Tube Feed Rate (mL per Hour): 60  Tube Feed Duration (in Hours): 24  Tube Feed Start Time: 23:00  Entered: Apr 6 2024 11:32PM  
This patient has been assessed with a concern for Malnutrition and has been determined to have a diagnosis/diagnoses of Severe protein-calorie malnutrition.    This patient is being managed with:   Diet NPO with Tube Feed-  Tube Feeding Modality: Nasogastric  Jevity 1.5 Les (JEVITY1.5RTH)  Total Volume for 24 Hours (mL): 1440  Continuous  Until Goal Tube Feed Rate (mL per Hour): 60  Tube Feed Duration (in Hours): 24  Tube Feed Start Time: 23:00  Entered: Apr 6 2024 11:32PM  
This patient has been assessed with a concern for Malnutrition and has been determined to have a diagnosis/diagnoses of Severe protein-calorie malnutrition.    This patient is being managed with:   Diet NPO with Tube Feed-  Tube Feeding Modality: Nasogastric  Jevity 1.5 Les (JEVITY1.5RTH)  Total Volume for 24 Hours (mL): 1200  Continuous  Starting Tube Feed Rate {mL per Hour}: 30  Increase Tube Feed Rate by (mL): 10     Every 4 hours  Until Goal Tube Feed Rate (mL per Hour): 50  Tube Feed Duration (in Hours): 24  Tube Feed Start Time: 00:00  Supplement Feeding Modality:  Oral  Probiotic Yogurt/Smoothie Cans or Servings Per Day:  2       Frequency:  Two Times a day  Entered: Apr 11 2024 11:04AM  
This patient has been assessed with a concern for Malnutrition and has been determined to have a diagnosis/diagnoses of Severe protein-calorie malnutrition.    This patient is being managed with:   Diet NPO with Tube Feed-  Tube Feeding Modality: Nasogastric  Jevity 1.5 Les (JEVITY1.5RTH)  Total Volume for 24 Hours (mL): 1440  Continuous  Until Goal Tube Feed Rate (mL per Hour): 60  Tube Feed Duration (in Hours): 24  Tube Feed Start Time: 23:00  Entered: Apr 1 2024  5:24PM  
This patient has been assessed with a concern for Malnutrition and has been determined to have a diagnosis/diagnoses of Severe protein-calorie malnutrition.    This patient is being managed with:   Diet NPO with Tube Feed-  Tube Feeding Modality: Nasogastric  Jevity 1.5 Les (JEVITY1.5RTH)  Total Volume for 24 Hours (mL): 1440  Continuous  Until Goal Tube Feed Rate (mL per Hour): 60  Tube Feed Duration (in Hours): 24  Tube Feed Start Time: 23:00  Entered: Apr 1 2024  5:24PM  
This patient has been assessed with a concern for Malnutrition and has been determined to have a diagnosis/diagnoses of Severe protein-calorie malnutrition.    This patient is being managed with:   Diet NPO after Midnight-     NPO Start Date: 18-Apr-2024   NPO Start Time: 23:59  Entered: Apr 18 2024  2:37PM    Diet NPO after Midnight-     NPO Start Date: 18-Apr-2024   NPO Start Time: 23:59  Except Medications  Entered: Apr 18 2024  2:19PM    Diet NPO with Tube Feed-  Tube Feeding Modality: Nasogastric  Jevity 1.5 Les (JEVITY1.5RTH)  Total Volume for 24 Hours (mL): 1440  Continuous  Starting Tube Feed Rate {mL per Hour}: 30  Increase Tube Feed Rate by (mL): 10     Every 4 hours  Until Goal Tube Feed Rate (mL per Hour): 60  Tube Feed Duration (in Hours): 24  Tube Feed Start Time: 00:00  Supplement Feeding Modality:  Oral  Probiotic Yogurt/Smoothie Cans or Servings Per Day:  2       Frequency:  Two Times a day  Entered: Apr 17 2024  1:16PM  
This patient has been assessed with a concern for Malnutrition and has been determined to have a diagnosis/diagnoses of Severe protein-calorie malnutrition.    This patient is being managed with:   Diet NPO after Midnight-     NPO Start Date: 31-Mar-2024   NPO Start Time: 23:59  Except Medications  Entered: Mar 31 2024  6:35PM    Diet NPO with Tube Feed-  Tube Feeding Modality: Nasogastric  Jevity 1.2 Les (JEVITY1.2RTH)  Total Volume for 24 Hours (mL): 1200  Continuous  Until Goal Tube Feed Rate (mL per Hour): 50  Tube Feed Duration (in Hours): 24  Tube Feed Start Time: 23:00  Entered: Mar 27 2024  4:26PM  
This patient has been assessed with a concern for Malnutrition and has been determined to have a diagnosis/diagnoses of Severe protein-calorie malnutrition.    This patient is being managed with:   Diet NPO with Tube Feed-  Tube Feeding Modality: Nasogastric  Jevity 1.5 Les (JEVITY1.5RTH)  Total Volume for 24 Hours (mL): 1200  Continuous  Starting Tube Feed Rate {mL per Hour}: 30  Increase Tube Feed Rate by (mL): 10     Every 4 hours  Until Goal Tube Feed Rate (mL per Hour): 50  Tube Feed Duration (in Hours): 24  Tube Feed Start Time: 00:00  Supplement Feeding Modality:  Oral  Probiotic Yogurt/Smoothie Cans or Servings Per Day:  2       Frequency:  Two Times a day  Entered: Apr 11 2024 11:04AM  
This patient has been assessed with a concern for Malnutrition and has been determined to have a diagnosis/diagnoses of Severe protein-calorie malnutrition.    This patient is being managed with:   Diet NPO with Tube Feed-  Tube Feeding Modality: Nasogastric  Jevity 1.5 Les (JEVITY1.5RTH)  Total Volume for 24 Hours (mL): 1200  Continuous  Starting Tube Feed Rate {mL per Hour}: 30  Increase Tube Feed Rate by (mL): 10     Every 4 hours  Until Goal Tube Feed Rate (mL per Hour): 50  Tube Feed Duration (in Hours): 24  Tube Feed Start Time: 00:00  Supplement Feeding Modality:  Oral  Probiotic Yogurt/Smoothie Cans or Servings Per Day:  2       Frequency:  Two Times a day  Entered: Apr 11 2024 11:04AM  
This patient has been assessed with a concern for Malnutrition and has been determined to have a diagnosis/diagnoses of Severe protein-calorie malnutrition.    This patient is being managed with:   Diet NPO with Tube Feed-  Tube Feeding Modality: Nasogastric  Jevity 1.5 Les (JEVITY1.5RTH)  Total Volume for 24 Hours (mL): 1440  Continuous  Until Goal Tube Feed Rate (mL per Hour): 60  Tube Feed Duration (in Hours): 24  Tube Feed Start Time: 23:00  Entered: Apr 1 2024  5:24PM  
This patient has been assessed with a concern for Malnutrition and has been determined to have a diagnosis/diagnoses of Severe protein-calorie malnutrition.    This patient is being managed with:   Diet NPO with Tube Feed-  Tube Feeding Modality: Nasogastric  Jevity 1.5 Les (JEVITY1.5RTH)  Total Volume for 24 Hours (mL): 1440  Continuous  Until Goal Tube Feed Rate (mL per Hour): 60  Tube Feed Duration (in Hours): 24  Tube Feed Start Time: 23:00  Entered: Apr 6 2024 11:32PM  
This patient has been assessed with a concern for Malnutrition and has been determined to have a diagnosis/diagnoses of Severe protein-calorie malnutrition.    This patient is being managed with:   Diet NPO-  Entered: Apr 20 2024 12:11AM  
This patient has been assessed with a concern for Malnutrition and has been determined to have a diagnosis/diagnoses of Severe protein-calorie malnutrition.    This patient is being managed with:   Diet NPO-  Entered: Apr 20 2024 12:11AM

## 2024-04-26 NOTE — PROGRESS NOTE ADULT - SUBJECTIVE AND OBJECTIVE BOX
Pooja Cervantes MD  Division of Hospital Medicine  [facility]  Spectra: [phone]      Patient is a 58y old  Female who presents with a chief complaint of SAH (26 Apr 2024 09:51)      SUBJECTIVE / OVERNIGHT EVENTS: no acute events overnight. unable to obtain ROS as not participating in exam.  ADDITIONAL REVIEW OF SYSTEMS:    MEDICATIONS  (STANDING):  atorvastatin 40 milliGRAM(s) Oral at bedtime  bisacodyl 5 milliGRAM(s) Oral at bedtime  chlorhexidine 4% Liquid 1 Application(s) Topical <User Schedule>  doxazosin 2 milliGRAM(s) Oral at bedtime  enoxaparin Injectable 40 milliGRAM(s) SubCutaneous <User Schedule>  multivitamin/minerals/iron Oral Solution (CENTRUM) 15 milliLiter(s) Oral daily  polyethylene glycol 3350 17 Gram(s) Oral two times a day  senna Syrup 10 milliLiter(s) Oral at bedtime    MEDICATIONS  (PRN):  acetaminophen     Tablet .. 650 milliGRAM(s) Oral every 6 hours PRN Temp greater or equal to 38.5C (101.3F), Mild Pain (1 - 3)  oxyCODONE    IR 5 milliGRAM(s) Oral every 4 hours PRN Moderate Pain (4 - 6)  oxyCODONE    IR 10 milliGRAM(s) Oral every 4 hours PRN Severe Pain (7 - 10)  petrolatum white Ointment 1 Application(s) Topical every 12 hours PRN dryness      CAPILLARY BLOOD GLUCOSE        I&O's Summary    25 Apr 2024 07:01  -  26 Apr 2024 07:00  --------------------------------------------------------  IN: 680 mL / OUT: 0 mL / NET: 680 mL        PHYSICAL EXAM:  Vital Signs Last 24 Hrs  T(C): 36.7 (26 Apr 2024 07:37), Max: 36.8 (25 Apr 2024 17:13)  T(F): 98 (26 Apr 2024 07:37), Max: 98.2 (25 Apr 2024 17:13)  HR: 99 (26 Apr 2024 07:37) (91 - 109)  BP: 134/87 (26 Apr 2024 07:37) (112/62 - 143/79)  BP(mean): --  RR: 18 (26 Apr 2024 07:37) (18 - 20)  SpO2: 98% (26 Apr 2024 07:37) (96% - 98%)    Parameters below as of 26 Apr 2024 07:37  Patient On (Oxygen Delivery Method): room air    CONSTITUTIONAL: NAD, well-developed, well-groomed  EYES: PERRLA; conjunctiva and sclera clear  ENMT: Moist oral mucosa, no pharyngeal injection or exudates; normal dentition  NECK: Supple, no palpable masses; no thyromegaly  RESPIRATORY: Normal respiratory effort; lungs are clear to auscultation bilaterally  CARDIOVASCULAR: Regular rate and rhythm, normal S1 and S2, no murmur/rub/gallop; No lower extremity edema  ABDOMEN: Soft, Nondistended, Nontender to palpation, normoactive bowel sounds, +PEG site c/d/i  MUSCULOSKELETAL: No clubbing or cyanosis of digits; no joint swelling or tenderness to palpation  PSYCH: Alert, unable to assess orientation as minimally verbal  NEUROLOGY: minimally verbal, R gaze preference, not following commands but moves b/l uppers and RLE spontaneously  SKIN: No rashes; no palpable lesions, +R crani site c/d/i      LABS:                        10.3   8.20  )-----------( 271      ( 25 Apr 2024 13:03 )             32.5     04-26    144  |  108  |  20  ----------------------------<  95  4.0   |  24  |  0.46<L>    Ca    10.2      26 Apr 2024 06:36  Phos  3.9     04-26  Mg     2.4     04-26        Urinalysis Basic - ( 26 Apr 2024 06:36 )    Color: x / Appearance: x / SG: x / pH: x  Gluc: 95 mg/dL / Ketone: x  / Bili: x / Urobili: x   Blood: x / Protein: x / Nitrite: x   Leuk Esterase: x / RBC: x / WBC x   Sq Epi: x / Non Sq Epi: x / Bacteria: x        RADIOLOGY & ADDITIONAL TESTS:  Results Reviewed: Na stable  Imaging Personally Reviewed:  Electrocardiogram Personally Reviewed:    COORDINATION OF CARE:  Care Discussed with Consultants/Other Providers [Y]: Neurosurgery PEDRO Barry  Prior or Outpatient Records Reviewed [Y/N]:

## 2024-04-26 NOTE — PROGRESS NOTE ADULT - PROVIDER SPECIALTY LIST ADULT
Hospitalist
NSICU
Neurosurgery
Surgery
Vascular Cardiology
Cardiology
Gastroenterology
NSICU
Neurosurgery
Rehab Medicine
Rehab Medicine
Vascular Cardiology
Cardiology
ENT
ENT
NSICU
Neurosurgery
Surgery
Surgery
Vascular Cardiology
Cardiology
NSICU
Neurosurgery
Surgery
Vascular Cardiology
Vascular Cardiology
Hospitalist
NSICU
Neurosurgery
Trauma Surgery
Cardiology
NSICU
Neurosurgery
Cardiology
Cardiology
NSICU
Cardiology
Cardiology
NSICU
Cardiology
Hospitalist
Cardiology
NSICU
Hospitalist
Cardiology
Hospitalist
Cardiology
Cardiology
Hospitalist
Hospitalist
Cardiology
Hospitalist

## 2024-04-26 NOTE — H&P ADULT - NSHPPHYSICALEXAM_GEN_ALL_CORE
Gen - NAD, Comfortable  HEENT - NCAT, EOMI, MMM  Neck - Supple  Pulm - CTAB, No wheeze, No rhonchi, No crackles  Cardiovascular - RRR, S1S2, No murmurs  Abdomen - Soft, NT/ND, +PEG c/d/i  Extremities - No lower extremity edema  Neuro-     Cognitive - AAOx1 (name), limited/inconsistent simple command following      Communication - hypophonic, unintelligible sounds besides name "[place]"     Cranial Nerves - no facial asymmetry, EOMI     Motor - motor testing limited by command following difficulty, RUE/RLE appears full strength                    LEFT    UE - elbow flexion anti-gravity and weak                     LEFT    LE - knee flexion/extension 2/5     Sensory - unable to assess 2/2 mentation     Reflexes - no clonus in b/l LE     Tone - increased tone (MAS 1+ to 2) in left elbow   Psychiatric - Mood stable, Affect WNL  Skin: R craniotomy incision c/d/i (no staples/sutures), one staple to L scalp, left toe scabs, right lateral foot callus

## 2024-04-26 NOTE — PROGRESS NOTE ADULT - ASSESSMENT
57 yo F with no known PMH other than occasional headaches for which she took NSAIDs presented as transfer from [place] for SAH HH4 WNFS 5 mF4 ruptured R pcomm aneurysm. Was initially found unresponsive on 3/25/24 at 21:00, intubated at 23:00 at OSH, then txfrd. Given DDAVP at OSH. Here in ED 1 unit PLTs given and R frontal EVD placed-high pressure. Went for R pcomm clip on 3/26/24. Found with LV dysfunction, bradycardia and Acute left lower extremity DVT below the knee, involving the left soleal vein. Extubated 4/3. MRI 4/8 showed widespread ischemic stroke b/l, s/p angio for vasospasm intervention. 4/16. CTH: tract hemorrhage. Underwent PEG placement with surgery on 4/23.

## 2024-04-26 NOTE — DISCHARGE NOTE PROVIDER - CARE PROVIDER_API CALL
Perlita Varela  Neurosurgery  [address], [address]  [place] [address]  Phone: [phone]  Fax: [phone]  Follow Up Time:

## 2024-04-26 NOTE — PROGRESS NOTE ADULT - THIS PATIENT HAS THE FOLLOWING CONDITION(S)/DIAGNOSES ON THIS ADMISSION:
Cerebral Edema
None
Shock/Cerebral Edema
Cerebral Edema
EVD in place
Encephalopathy/Cerebral Edema
None
Brain Compression / Herniation
Cerebral Edema
EVD in place
Encephalopathy
None
SAH, ruptured aneurysm/Acute Respiratory Failure
Cerebral Edema
None
Cerebral Edema
EVD in place
Encephalopathy
None
Shock/Cerebral Edema/Acute Respiratory Failure
Cerebral Edema
Cerebral Edema
Encephalopathy
Encephalopathy/Cerebral Edema
None
None
stable/Acute Blood Loss Anemia
Encephalopathy/Cerebral Edema

## 2024-04-26 NOTE — DISCHARGE NOTE PROVIDER - NSDCFUADDINST_GEN_ALL_CORE_FT
Please keep incision clean and dry, do not submerge wound in water for prolonged periods of time, pat dry after showering, and do not use any creams or ointments to incision.  Please notify physician if fevers, bleeding, swelling, pain not relieved by medication, increased sluggishness or irritability, increased nausea or vomiting, inability to tolerate foods or liquids.  No heavy lifting/straining, Showering allowed, Stairs allowed, Walking - Indoors allowed, Walking - Outdoors allowed, Follow Instructions Provided by your Surgical Team  Do not start any Motrin /Aspirin NSAIDS( Motrin, Advil.... until cleared by your neurosurgeon

## 2024-04-26 NOTE — H&P ADULT - ASSESSMENT
Assessment/Plan:  JOSE MCLEAN is a 58y on ASA 81 mg, fell one month prior to admission, transferred from [place] to [facility] on 3/26/24 for diffuse SAH w/ small IVH, 2/2 ruptured posterolaterally projecting 2.9 x 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 Right frontal EVD placed and patient underwent right pterional craniotomy and clipping of a ruptured right PCOM aneurysm on 3/26 . 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 Patient found to have HFrEF (now recovered), bradycardia placed on theophyline), b/l soleal DVT (now on Lovenox ppx). PEG placement initially complicated by gastric wall hematoma, subsequently successfully placed for persistent dysphagia. Patient now admitted for a multidisciplinary rehab program. 04-26-24 @ 15:59.    57 y/o F on ASA 81 mg, fell one month prior to admission, transferred from [place] to [facility] on 3/26/24 for diffuse SAH w/ small IVH, 2/2 ruptured posterolaterally projecting 2.9 x 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 Patient was found unresponsive, intubated, and transferred to [facility]. On arrival to [facility] patient w/ PERRL, +cough/gag, 2/5 spontaneous movement in LUE, otherwise no movement. Given 1 unit platelets and R frontal EVD placed with high opening pressure. Patient underwent Right pterional craniotomy and clipping of a ruptured right PCOM aneurysm on 3/26.     On 3/29, EVD displaced and replaced in situ, complicated by EVD tract acute hemorrhage and new focus of infarction with hemorrhage in the right basal ganglia/corona radiata. Course further complicated by hydrocephalus with ICP crisis, vasospasm requiring cerebral angiography and IA milrinone and verapamil. MRI (4/8) with widespread ischemic stroke b/l, as per neuro IR no further intervention. Patient extubated on 4/3, re-intubated 4/8, and extubated again on 4/9. EVD removed on 4/15. CTH post removal stable.     Course also complicated by bradycardia treated with theophyline. Initial Echo (3/26) with severe cardiomyopathy, EF 36%, regional wall motion abnormality. Resolved on repeat TTE performed 4/9/24, normal EF 66% with no WMA. Patient first noted with left soleal DVT on Duplex 3/28. Duplex (4/10) with stable b/l soleal DVT with extension to right posterior tibial DVT.  Last LE duplex with stable bilateral soleal DVT, [facility] cardiology recommended repeat surveillance duplex on 4/30. On Lovenox ppx.     PEG placement attempted 4/19 for persistent dysphagia, but was aborted due to growing hematoma noted in gastric wall on endoscopy after trocar placement. CT Abdomen and Pelvis w/ IV Cont showed gastric wall hematoma without intraluminal or extraluminal hemorrhage. PEG successfully placed on 4/23.     # SAH s/p ruptured PCOM aneurysm  - R EVD placed 3/26 and removed 4/15  - s/p right pterional craniotomy and clipping of a ruptured right PCOM aneurysm on 3/26  - CTH (3/29): EVD tract acute hemorrhage and new focus of infarction with hemorrhage in the right basal ganglia/corona radiata  - complicated by vasospasm requiring cerebral angiography and IA milrinone and verapamil  - MRI (4/8): Multifocal acute to subacute infarction. This extensively involves each MCA posterior distribution, the right basal ganglia, the anterior parasagittal vertex CINDY distribution and an additional smaller lesion in the right cerebellum  - Start comprehensive rehab program of PT/OT/SLP - 3 hours a day, 5 days a week. P&O as needed   - Precautions: Fall, Aspiration, Seizure    # Bradycardia  - was on theophyline, now discontinued  - continue to monitor     # HFrEF  - Initial Echo (3/26) with severe cardiomyopathy, EF 36%, regional wall motion abnormality  - Resolved on repeat TTE performed 4/9/24, normal EF 66% with no WMA.    # B/l DVT   - Left soleal DVT first noted 3/28, right soleal DVT fist noted 4/3  - Most recent Duplex (4/23): persistent bilateral soleal vein DVT  - Continue Lovenox 40 mg QD    # Pain  - Tylenol PRN  - Avoid sedating medications that may interfere with cognitive recovery    # Mood / Cognition  - Neuropsychology consult PRN    # Sleep  - Maintain quiet hours and a low stim environment.   - Melatonin PRN to maximize participation in therapy during the day    # GI / Bowel  - Senna qHS  - Miralax PRN Daily  - GI ppx: ***    #  / Bladder  - Continue bladder scans Q8 hours with straight cath for >400cc.  - Toileting schedule every 4 hours    # Skin / Pressure injury  - Skin assessment on admission performed [  ] :   - Monitor Incisions:    - Pressure Injury/Skin: OOB to chair, PT/OT  - nursing to monitor skin qShift    # Diet/Dysphagia:  - Diet Consistency: ***  - Supplements: ***  - Aspiration Precautions  - SLP consult for swallow function evaluation and treatment  - Nutrition consult    # DVT prophylaxis:   - *****  - SCDs  - LE Doppler on ***       Outpatient Follow-up:  ** Specialty and Name of physician      Code Status/Emergency Contact:      ---------------    Goals: Safe discharge to home  Estimated Length of Stay: 10-14 days  Rehab Potential: Good  Medical Prognosis: Good  Estimated Disposition: Home with home care      PRESCREEN COMPARISON:  I have reviewed the prescreen information and I have found no relevant changes between the preadmission screening and my post admission evaluation.    RATIONALE FOR INPATIENT ADMISSION: Patient demonstrates the following:  [X] Medically appropriate for rehabilitation admission  [X] Has attainable rehab goals with an appropriate initial discharge plan  [X]Has rehabilitation potential (expected to make a significant improvement within a reasonable period of time)  [X] Requires close medical management by a rehab physician, rehab nursing care, Hospitalist and comprehensive interdisciplinary team (including PT, OT and/or SLP, Prosthetics and Orthotics)     Assessment/Plan:  JOSE MCLEAN is a 58y on ASA 81 mg, fell one month prior to admission, transferred from [place] to [facility] on 3/26/24 for diffuse SAH w/ small IVH, 2/2 ruptured posterolaterally projecting 2.9 x 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 Right frontal EVD placed and patient underwent right pterional craniotomy and clipping of a ruptured right PCOM aneurysm on 3/26 . 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 Patient found to have HFrEF (now recovered), bradycardia (placed on theophyline, now resolved), b/l soleal DVT (on Lovenox ppx). PEG placement initially complicated by gastric wall hematoma, subsequently successfully placed for persistent dysphagia. Patient now admitted for a multidisciplinary rehab program. 04-26-24 @ 15:59.    # SAH s/p ruptured PCOM aneurysm  - R EVD placed 3/26 and removed 4/15  - s/p right pterional craniotomy and clipping of a ruptured right PCOM aneurysm on 3/26  - CTH (3/29): EVD tract acute hemorrhage and new focus of infarction with hemorrhage in the right basal ganglia/corona radiata  - complicated by vasospasm requiring cerebral angiography and IA milrinone and verapamil  - MRI (4/8): Multifocal acute to subacute infarction. This extensively involves each MCA posterior distribution, the right basal ganglia, the anterior parasagittal vertex CINDY distribution and an additional smaller lesion in the right cerebellum  - Start comprehensive rehab program of PT/OT/SLP - 3 hours a day, 5 days a week. P&O as needed   - Precautions: Fall, Aspiration, Seizure    # Bradycardia  - was on theophyline, now discontinued  - continue to monitor     # HLD  - continue 40 mg QHS    # HFrEF  - Initial Echo (3/26) with severe cardiomyopathy, EF 36%, regional wall motion abnormality  - Resolved on repeat TTE performed 4/9/24, normal EF 66% with no WMA.    # B/l DVT   - Left soleal DVT first noted 3/28, right soleal DVT fist noted 4/3  - Most recent Duplex (4/23): persistent bilateral soleal vein DVT  - Continue Lovenox 40 mg QD  - [facility] cardiology recommended repeat surveillance duplex on 4/30.    # Hypernatremia  - continue free water 200 cc Q6H    # Gastric Hematoma  -  PEG placement attempted 4/19 for persistent dysphagia, but was aborted due to growing hematoma noted in gastric wall on endoscopy after trocar placement  - CT Abdomen and Pelvis w/ IV Cont showed gastric wall hematoma without intraluminal or extraluminal hemorrhage  - monitor H/H    # Pain  - Tylenol 650 mg Q6H PRN mild pain, oxycodone 5 mg Q4H PRN mod, oxycodone 10 mg Q4H PRN severe pain  - Avoid sedating medications that may interfere with cognitive recovery    # Mood / Cognition  - Neuropsychology consult PRN    # Sleep  - Maintain quiet hours and a low stim environment.   - Melatonin PRN to maximize participation in therapy during the day    # GI / Bowel  - Senna qHS  - Miralax BID  - bisacodyl 5 mg QHS  - GI ppx: protonix 40 mg QD (started on admission to rehab)    #  / Bladder  - Continue bladder scans Q8 hours with straight cath for >400cc.  - Continue cardura 2 mg QHS     # Skin / Pressure injury  - Skin assessment on admission performed [  ] :   - Monitor Incisions:    - Pressure Injury/Skin: OOB to chair, PT/OT  - nursing to monitor skin qShift    # Diet/Dysphagia:  - PEG placed on 4/23.   - Diet Consistency: Jevity tube feeds   - Supplements: MVI  - Aspiration Precautions  - SLP consult for swallow function evaluation and treatment  - Nutrition consult    # DVT prophylaxis:   - Lovenox 40 mg QD      Outpatient Follow-up:  ** Specialty and Name of physician      Code Status/Emergency Contact:      ---------------    Goals: Safe discharge to home  Estimated Length of Stay: 10-14 days  Rehab Potential: Good  Medical Prognosis: Good  Estimated Disposition: Home with home care      PRESCREEN COMPARISON:  I have reviewed the prescreen information and I have found no relevant changes between the preadmission screening and my post admission evaluation.    RATIONALE FOR INPATIENT ADMISSION: Patient demonstrates the following:  [X] Medically appropriate for rehabilitation admission  [X] Has attainable rehab goals with an appropriate initial discharge plan  [X]Has rehabilitation potential (expected to make a significant improvement within a reasonable period of time)  [X] Requires close medical management by a rehab physician, rehab nursing care, Hospitalist and comprehensive interdisciplinary team (including PT, OT and/or SLP, Prosthetics and Orthotics)     Assessment/Plan:  JOSE MCLEAN is a 59 y/o F with no PMH, fell one month prior to admission, transferred from [employer] to [facility] on 3/26/24 for diffuse SAH w/ small IVH, 2/2 ruptured posterolaterally projecting 2.9 x 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 Right frontal EVD placed and patient underwent right pterional craniotomy and clipping of a ruptured right PCOM aneurysm on 3/26 . 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 Patient found to have HFrEF (now recovered), bradycardia (placed on theophyline, now resolved), b/l soleal DVT (on Lovenox ppx). PEG placement initially complicated by gastric wall hematoma, subsequently successfully placed for persistent dysphagia. Patient now admitted for a multidisciplinary rehab program. 04-26-24 @ 15:59.    # SAH s/p ruptured PCOM aneurysm  - R EVD placed 3/26 and removed 4/15  - s/p right pterional craniotomy and clipping of a ruptured right PCOM aneurysm on 3/26  - CTH (3/29): EVD tract acute hemorrhage and new focus of infarction with hemorrhage in the right basal ganglia/corona radiata  - complicated by vasospasm requiring cerebral angiography and IA milrinone and verapamil  - MRI (4/8): Multifocal acute to subacute infarction. This extensively involves each MCA posterior distribution, the right basal ganglia, the anterior parasagittal vertex CINDY distribution and an additional smaller lesion in the right cerebellum  - Start comprehensive rehab program of PT/OT/SLP - 3 hours a day, 5 days a week. P&O as needed   - Precautions: Fall, Aspiration, Seizure    # B/l DVT   - Left soleal DVT first noted 3/28, right soleal DVT fist noted 4/3  - Most recent Duplex (4/23): persistent bilateral soleal vein DVT  - Continue Lovenox 40 mg QD  - [facility] cardiology recommended repeat surveillance duplex on 4/30.    # Bradycardia  - was on theophyline, now discontinued  - continue to monitor     # HLD  - continue lipitor 40 mg QHS    # HFrEF  - Initial Echo (3/26) with severe cardiomyopathy, EF 36%, regional wall motion abnormality  - Resolved on repeat TTE performed 4/9/24, normal EF 66% with no WMA.    # Hypernatremia  - continue free water 200 cc Q6H    # Gastric Hematoma  -  PEG placement attempted 4/19 for persistent dysphagia, but was aborted due to growing hematoma noted in gastric wall on endoscopy after trocar placement  - CT Abdomen and Pelvis w/ IV Cont showed gastric wall hematoma without intraluminal or extraluminal hemorrhage  - monitor H/H    # Pain  - Tylenol 650 mg Q6H PRN mild pain, oxycodone 5 mg Q4H PRN mod, oxycodone 10 mg Q4H PRN severe pain    # Mood / Cognition  - Neuropsychology consult PRN    # Sleep  - Maintain quiet hours and a low stim environment.    # GI / Bowel  - Senna qHS  - Miralax BID  - bisacodyl 5 mg QHS  - GI ppx: protonix 40 mg QD (started on admission to rehab)    #  / Bladder  - Continue bladder scans Q8 hours with straight cath for >400cc.  - Continue cardura 2 mg QHS     # Skin / Pressure injury  - Skin assessment on admission performed: R craniotomy incision c/d/i (no staples/sutures), one staple to L scalp, left toe scabs, right lateral foot callus  - Pressure Injury/Skin: OOB to chair, PT/OT  - nursing to monitor skin qShift    # Diet/Dysphagia:  - PEG placed on 4/23.   - Diet Consistency: Jevity tube feeds   - Supplements: MVI  - Aspiration Precautions  - SLP consult for swallow function evaluation and treatment  - Nutrition consult    # DVT prophylaxis:   - Lovenox 40 mg QD      Outpatient Follow-up:  Perlita Varela  Neurosurgery  [address], [address]  [place] [address]  Phone: [phone]  Fax: [phone]  Follow Up Time:      Code Status/Emergency Contact:      ---------------    Goals: Safe discharge to home  Estimated Length of Stay: 10-14 days  Rehab Potential: Good  Medical Prognosis: Good  Estimated Disposition: Home with home care      PRESCREEN COMPARISON:  I have reviewed the prescreen information and I have found no relevant changes between the preadmission screening and my post admission evaluation.    RATIONALE FOR INPATIENT ADMISSION: Patient demonstrates the following:  [X] Medically appropriate for rehabilitation admission  [X] Has attainable rehab goals with an appropriate initial discharge plan  [X]Has rehabilitation potential (expected to make a significant improvement within a reasonable period of time)  [X] Requires close medical management by a rehab physician, rehab nursing care, Hospitalist and comprehensive interdisciplinary team (including PT, OT and/or SLP, Prosthetics and Orthotics)

## 2024-04-26 NOTE — PATIENT PROFILE ADULT - FALL HARM RISK - HARM RISK INTERVENTIONS

## 2024-04-26 NOTE — DISCHARGE NOTE PROVIDER - HOSPITAL COURSE
58F, on ASA81 for pain relief? (fell a month ago), presents as transfer from [place] for diffuse SAH w/small IVH, 2/2 ruptured posterolaterally projecting 2.9x2.4x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 Was initially found unresponsive at 21:00, intubated at 23:00 at OSH, then transfered. Given ddavp at OSH. On arrival patient w/ PERRL, +cough/gag, 2/5 spontaneous movement in LUE, o/w no movement. 1 unit PLTs given and R frontal EVD placed-high pressure. PLTs/Coags wnl  04/15 EVD removed  04/16 CTH: tract hemorrhage, Bedboarded  4/18 TX'd from NSCU overnight, called for PEG as failed swallow study  4/19 failed/aborted PEG due to hematoma discovered prior to Peg placement in endo suite, general surgery saw. No acute surgical intervention was recommended. Follow up Ct of  A/P to r/o active extravasation revealed gastric wall hematoma no active bleeding  4/20 monitored, d5w increased to 50/hr, ngt replacement was confirmed w/ gen surg  4/23 Gastrostomy tube was placed  4/24 started on DVT prophylaxis   4/25 H/H downtrending, repeat on 4/26 stable  4/26 Patient condition is stable for transfer to rehab today

## 2024-04-26 NOTE — DISCHARGE NOTE PROVIDER - NSDCACTIVITY_GEN_ALL_CORE
Do not drive or operate machinery/Showering allowed/Stairs allowed/Walking - Indoors allowed/Walking - Outdoors allowed/Follow Instructions Provided by your Surgical Team/Activity as tolerated

## 2024-04-26 NOTE — PROGRESS NOTE ADULT - ASSESSMENT
HH4 WNFS 5 mF4 subarachnoid hemorrhage.    04/15 EVD removed  04/16 CTH: tract hemorrhage, Bedboarded  4/18 TX'd from NSCU overnight, called for PEG as failed swallow study  4/19 failed/aborted PEG due to hematoma during placement in endo suite, general surgery to see; CTA A/P to r/o bleed  4/20 monitored, d5w increased to 50/hr, need to confirm ok to replace ngt w/ gen surg  4/23 s/p Gastrostomy tube placement   4/24 started on DVT prophylaxis   4/25 H/H downtrending    NEURO:  q4 neurochecks/q4 vitals  Pain control prn Tylenol & Oxycodone   EVD dc'd 4/15  R basilic vein PICC placed on 4/1  Activity: [x] OOB as tolerated    PULM:  Incentive spirometry, mobilize as tolerated  Satting on RA     CV:  Keep -180mmHg  Cards following for bradycardia, now resolved   TTE 4/9 w/ EF of 66 % w/ normal left ventricular systolic function, no regional wall motion abnormalities seen.    RENAL:  Hypernatremia resolved, continue FW pushes 200q6 via PEG tube   Continue Cardura for urinary retention  -bladder scan q6 and straight cath as needed for bladder volume >350cc    GI:  Diet: Jevity tube feeds via PEG  Hiccups o/n - ordered for reglan 5mg IVP x1   s/p PEG placement w/ trauma surgery on 4/23   Failed/aborted PEG on 4/19 due to hematoma during placement in endo suite, CT abd/pelvis done on 4/19 showed gastric wall hematoma without intraluminal or extraluminal hemorrhage, hemodynamically stable   Bowel regimen: senna, miralax   Last BM 4/22 - will add PO dulcolax today     ENDO:   Goal euglycemia (-180)    HEME/ONC:  VTE prophylaxis: SCDs, SQL   4/23 LED: Persistent bilateral soleal vein DVT, repeat LED 4/30  Vascular cardiology following   Monitor H/H downtrending, will repeat CBC this afternoon     ID:  Afebrile     MISC:  Hospitalist following for co-management     DISPOSITION:  AR    Plan to be d/w Dr. Varela   [id number] HH4 WNFS 5 mF4 subarachnoid hemorrhage.    04/15 EVD removed  04/16 CTH: tract hemorrhage, Bedboarded  4/18 TX'd from NSCU overnight, called for PEG as failed swallow study  4/19 failed/aborted PEG due to hematoma during placement in endo suite, general surgery to see; CTA A/P to r/o bleed  4/20 monitored, d5w increased to 50/hr, need to confirm ok to replace ngt w/ gen surg  4/23 s/p Gastrostomy tube placement   4/24 started on DVT prophylaxis   4/25 H/H downtrending    NEURO:  q4 neurochecks/q4 vitals  Pain control prn Tylenol & Oxycodone   EVD dc'd 4/15  R basilic vein PICC placed on 4/1  Activity: [x] OOB as tolerated    PULM:  Incentive spirometry, mobilize as tolerated  Satting > 96% on RA     CV:  Keep -180mmHg  Cards following for bradycardia, now resolved   TTE 4/9 w/ EF of 66 % w/ normal left ventricular systolic function, no regional wall motion abnormalities seen.    RENAL:  Hypernatremia resolved, continue FW pushes 200q6 via PEG tube   Continue Cardura for urinary retention  -bladder scan q6 and straight cath as needed for bladder volume >350cc    GI:  Diet: Jevity tube feeds via PEG  Hiccups o/n - ordered for reglan 5mg IVP x1   s/p PEG placement w/ trauma surgery on 4/23   Failed/aborted PEG on 4/19 due to hematoma during placement in endo suite, CT abd/pelvis done on 4/19 showed gastric wall hematoma without intraluminal or extraluminal hemorrhage, hemodynamically stable   Bowel regimen: senna, miralax   Last BM 4/22 - will add PO dulcolax today     ENDO:   Goal euglycemia (-180)    HEME/ONC:  VTE prophylaxis: SCDs, SQL   4/23 LED: Persistent bilateral soleal vein DVT, repeat LED 4/30  Vascular cardiology following   H/H stable    ID:  Afebrile     MISC:  Hospitalist following for co-management     DISPOSITION:  AR    Plan to be d/w Dr. Varela   [id number]

## 2024-04-26 NOTE — PROGRESS NOTE ADULT - PROBLEM SELECTOR PLAN 8
DVT ppx: lovenox on hold for PEG. resume when clear from surgery standpoint    Dispo: Acute Rehab
DVT ppx: lovenox     Dispo: Acute Rehab
DVT ppx: lovenox on hold for PEG. resume when clear from surgery standpoint    Dispo: Acute Rehab

## 2024-04-26 NOTE — H&P ADULT - NSHPSOCIALHISTORY_GEN_ALL_CORE
Smoking -   EtOH -   Drugs -     Lives   PTA: Independent in ADLs and ambulation     CURRENT FUNCTIONAL STATUS  Bed Mobility:   Transfers:   Gait: Per sons:  Smoking - denies  EtOH - occasional  Drugs - denies    Lives in apartment with youngest son, no CRESCENCIO or inside, ramp access, elevator to apt. Has two other sons, one local and one lives in [place]. Sons expressed desire to bring mother back to [place] post-rehab for because they have more family support there.   PTA: Independent in ADLs and ambulation   Occupation:     CURRENT FUNCTIONAL STATUS (PT 4/26/24, OT 4/24/24)  Bed Mobility: modA  Transfers: modA  LBD: Horacio

## 2024-04-26 NOTE — DISCHARGE NOTE PROVIDER - NSDCMRMEDTOKEN_GEN_ALL_CORE_FT
acetaminophen 325 mg oral tablet: 2 tab(s) orally every 6 hours As needed Temp greater or equal to 38.5C (101.3F), Mild Pain (1 - 3)  atorvastatin 40 mg oral tablet: 1 tab(s) by gastrostomy tube once a day (at bedtime)  bisacodyl 5 mg oral delayed release tablet: 1 tab(s) orally once a day (at bedtime)  doxazosin 2 mg oral tablet: 1 tab(s) orally once a day (at bedtime)  enoxaparin: 40 milligram(s) subcutaneous once a day (at bedtime)  Multiple Vitamins with Minerals oral liquid: 15 milliliter(s) by gastrostomy tube once a day  oxyCODONE 10 mg oral tablet: 1 tab(s) orally every 4 hours As needed Severe Pain (7 - 10)  oxyCODONE 5 mg oral tablet: 1 tab(s) orally every 4 hours As needed Moderate Pain (4 - 6)  petrolatum topical ointment: 1 Apply topically to affected area every 12 hours As needed dryness  polyethylene glycol 3350 oral powder for reconstitution: 17 gram(s) orally 2 times a day  senna (sennosides) 8.8 mg/5 mL oral syrup: 10 milliliter(s) orally once a day (at bedtime)

## 2024-04-26 NOTE — DISCHARGE NOTE PROVIDER - NSDCCPCAREPLAN_GEN_ALL_CORE_FT
PRINCIPAL DISCHARGE DIAGNOSIS  Diagnosis: Ruptured aneurysm of intracranial region  Assessment and Plan of Treatment: Patient is s/p Rt craniotomy for P/comm aneurysm. She is recommended acute TBI as per PT/OT PM&R . Her condition is stable for transfer to rehab today. She needs to see Dr Varela for follow up once discharge from rehab      SECONDARY DISCHARGE DIAGNOSES  Diagnosis: Hypernatremia  Assessment and Plan of Treatment: Plerase continue free water 200cc every 6 hours    Diagnosis: Urinary retention  Assessment and Plan of Treatment: Continue Cardura, please bladder scan every 6 hours and straight cath as needed    Diagnosis: Dysphagia  Assessment and Plan of Treatment: S/p Peg    Diagnosis: Acute deep vein thrombosis (DVT)  Assessment and Plan of Treatment: Last LE venous duplex on 4/24 with stable bilateral soleal DVT. Patient is being seen by Vascular cardiology and pharmacological DVT PPX (Lovenox 40 mg daily) is recommended

## 2024-04-26 NOTE — H&P ADULT - NSHPLABSRESULTS_GEN_ALL_CORE
LABS:                      10.3   8.20  )-----------( 271      ( 25 Apr 2024 13:03 )             32.5     04-26    144  |  108  |  20  ----------------------------<  95  4.0   |  24  |  0.46<L>    Ca    10.2      26 Apr 2024 06:36  Phos  3.9     04-26  Mg     2.4     04-26    CT Head No Cont (03.26.24 @ 00:57)   Extensive subarachnoid hemorrhage throughout the basilar cisterns, bilateral sylvian fissures, and along the bilateral medial frontal lobes, right greater than left. Prominent temporal horns of the lateral ventricles.    CT Angio Head w/ IV Cont (03.26.24 @ 00:56)   Irregular posterolaterally projecting 2.9 x 2.4 x 2.6 mm right supraclinoid ICA aneurysm, likely ruptured. No additional aneurysms identified. No vessel occlusion, proximal stenosis or evidence for vasospasm at this time.    CT Angio Neck w/ IV Cont (03.26.24 @ 00:57)   No evidence of significant stenosis or occlusion. No evidence of dissection.    2.5 cm heterogeneous left thyroid nodule. This can be followed up with non emergent thyroid ultrasound for further characterization.      CT Head No Cont (03.26.24 @ 03:11)   Interval placement of a right frontal approach ventriculostomy catheter without significant change in size or configuration of the ventricular system.  Similar-appearing diffuse subarachnoid hemorrhage, as discussed.    CT Head No Cont (03.29.24 @ 05:27)   New focus of infarction with hemorrhage is seen in the RIGHT basal ganglia/corona radiata about the RIGHT frontal ventricular drain with minimal extension of hemorrhage and air into the RIGHT lateral ventricle subarachnoid hemorrhage again noted in the frontal sulci and BILATERAL sylvian fissures and basilar cisterns. Bilateral uncal herniation is noted with transtentorial herniation. Mild subdural hemorrhage seen along the tentorium. Ventricles are decreased in size with minimal dependent hemorrhage. Postoperative changes with air and hemorrhage noted subjacent to the RIGHT temporal frontal craniotomy which extends into the lateral roof of the orbit . The orbits show RIGHT periorbital soft tissue swelling with minimal orbital emphysema.    MR Head No Cont (04.08.24 @ 10:34)   1. Multifocal acute to subacute infarction. This extensively involves each MCA posterior distribution, the right basal ganglia, the anterior parasagittal vertex CINDY distribution and an additional smaller lesion in the right cerebellum  2. Right middle cranial fossa surgical aneurysm clip is associated with local susceptibility artifact and mild vasogenic edema right medial temporal tip. Persistent subarachnoid space hemorrhage right sylvian fissure. Small middle cranial fossa subdural space fluid collections, likely postoperative, right larger than left  3. Right-sided ventricular catheter. Right corona radiata/basal ganglia adjacent parenchymal hemorrhage. Right intraventricular hemorrhage    CT Head No Cont (04.16.24 @ 09:42)   Status post right pterional craniotomy for distal right ICA aneurysm clipping. Status post removal of right frontal EVD. No hydrocephalus. Trace intraventricular air.      VA Duplex Lower Ext Vein Scan, Bilat (03.28.24 @ 12:39)   Acute left lower extremity DVT below the knee, involving the left soleal vein. Edema of the superficial soft tissues of the left calf. Correlate clinically.  No acute DVT of the right lower extremity.    VA Duplex Lower Ext Vein Scan, Bilat (04.03.24 @ 16:39)   Acute left soleal vein DVT, unchanged.  Acute right soleal vein DVT.    VA Duplex Lower Ext Vein Scan, Bilat (04.10.24 @ 13:01)   Below the knee, in addition to the previously identified right and left soleal vein DVT, there is now also DVT affecting the right posterior tibial veins.    VA Duplex Lower Ext Vein Scan, Bilat (04.23.24 @ 14:30)   Persistent bilateral soleal vein DVT.

## 2024-04-26 NOTE — PROGRESS NOTE ADULT - PROBLEM/PLAN-3
DISPLAY PLAN FREE TEXT
No deformities present

## 2024-04-26 NOTE — PROGRESS NOTE ADULT - PROBLEM SELECTOR PLAN 1
s/p EVD   Pcomm ruptured, s/p clip  Seizure Prophylaxis: Levetiracetam  TCDs, euvolemia  nimodipine dced due to bradycardia  Now with new bilateral CVAs
s/p EVD   Pcomm ruptured, s/p clip  Seizure Prophylaxis: Levetiracetam  TCDs, euvolemia, nimodipine.
s/p EVD   Pcomm ruptured, s/p clip  Seizure Prophylaxis: Levetiracetam  TCDs, euvolemia, nimodipine  preop for cerebral angiogram for spasm check: acceptable cardiac risk to proceed
SAH w/ ruptured Pcomm aneurysm s/p EVD and PComm aneurysm clipping  - care as per neurosurgery team
s/p EVD   Pcomm ruptured, s/p clip  Seizure Prophylaxis: Levetiracetam  TCDs, euvolemia, nimodipine  Now with new bilateral CVAs
SAH w/ ruptured Pcomm aneurysm s/p EVD and PComm aneurysm clipping  - care as per neurosurgery team
s/p EVD   Pcomm ruptured, s/p clip  Seizure Prophylaxis: Levetiracetam  TCDs, euvolemia, nimodipine  preop for cerebral angiogram for spasm check: acceptable cardiac risk to proceed   ENT fiberoptic scope
s/p EVD   Pcomm ruptured, s/p clip  Seizure Prophylaxis: Levetiracetam  TCDs, euvolemia, nimodipine.
SAH w/ ruptured Pcomm aneurysm s/p EVD and PComm aneurysm clipping  - care as per neurosurgery team
- Continue IV decadron 8mg, q8h, x24hr (total 3 doses)  - Recommend PPI  - Plan to repeat Indirect Fiberoptic Laryngoscopy tomorrow  - ENT will continue to follow, please call with questions or concerns x [phone].
s/p EVD   Pcomm ruptured, s/p clip  Seizure Prophylaxis: Levetiracetam  TCDs, euvolemia, nimodipine  Now with new bilateral CVAs
s/p EVD   Pcomm ruptured, s/p clip  Seizure Prophylaxis: Levetiracetam  TCDs, euvolemia, nimodipine  ENT fiberoptic scope
s/p EVD   Pcomm ruptured, s/p clip  Seizure Prophylaxis: Levetiracetam  TCDs, euvolemia, nimodipine  Now with new bilateral CVAs
s/p EVD   Pcomm ruptured, s/p clip  Seizure Prophylaxis: Levetiracetam  TCDs, euvolemia, nimodipine  Now with new bilateral CVAs
- Cleared from an ENT perspective for extubation
s/p EVD   Pcomm ruptured, s/p clip  Seizure Prophylaxis: Levetiracetam  TCDs, euvolemia  nimodipine dced due to bradycardia  Now with new bilateral CVAs
s/p EVD   Pcomm ruptured, s/p clip  Seizure Prophylaxis: Levetiracetam  TCDs, euvolemia, nimodipine  Now with new bilateral CVAs  Suspect will need Trach/PEG
SAH w/ ruptured Pcomm aneurysm s/p EVD and PComm aneurysm clipping  - care as per neurosurgery team
s/p EVD   Pcomm ruptured, s/p clip  Seizure Prophylaxis: Levetiracetam  TCDs, euvolemia, nimodipine  Now with new bilateral CVAs
s/p EVD   Pcomm ruptured, s/p clip  Seizure Prophylaxis: Levetiracetam  TCDs, euvolemia, nimodipine.
s/p EVD   Pcomm ruptured, s/p clip  Seizure Prophylaxis: Levetiracetam  TCDs, euvolemia  nimodipine dced due to bradycardia  Now with new bilateral CVAs
s/p EVD   Pcomm ruptured, s/p clip  Seizure Prophylaxis: Levetiracetam  TCDs, euvolemia, nimodipine  Now with new bilateral CVAs
s/p EVD   Pcomm ruptured, s/p clip  Seizure Prophylaxis: Levetiracetam  TCDs, euvolemia, nimodipine.
s/p EVD   plan for OR Pcomm  for clip   Seizure Prophylaxis: Levetiracetam  TCDs, euvolemia, nimodipine.
SAH w/ ruptured Pcomm aneurysm s/p EVD and PComm aneurysm clipping  - care as per neurosurgery team
s/p EVD   Pcomm ruptured, s/p clip  Seizure Prophylaxis: Levetiracetam  TCDs, euvolemia, nimodipine  preop for cerebral angiogram for spasm check: acceptable cardiac risk to proceed  for ENT fiberoptic scope
SAH w/ ruptured Pcomm aneurysm s/p EVD and PComm aneurysm clipping  - care as per neurosurgery team
s/p EVD   Pcomm ruptured, s/p clip  Seizure Prophylaxis: Levetiracetam  TCDs, euvolemia  nimodipine dced due to bradycardia  Now with new bilateral CVAs
s/p EVD   Pcomm ruptured, s/p clip  Seizure Prophylaxis: Levetiracetam  TCDs, euvolemia, nimodipine   ENT fiberoptic scope
s/p EVD   Pcomm ruptured, s/p clip  Seizure Prophylaxis: Levetiracetam  TCDs, euvolemia, nimodipine  ENT fiberoptic scope  aspiration precautions
s/p EVD   Pcomm ruptured, s/p clip  Seizure Prophylaxis: Levetiracetam  TCDs, euvolemia, nimodipine  ENT fiberoptic scope
SAH w/ ruptured Pcomm aneurysm s/p EVD and PComm aneurysm clipping  - care as per neurosurgery team
per neurosurg. monitor clinically.

## 2024-04-26 NOTE — H&P ADULT - HISTORY OF PRESENT ILLNESS
59 y/o F on ASA 81 mg, fell one month prior to admission, transferred from [place] to [facility] on 3/26/24 for diffuse SAH w/ small IVH, 2/2 ruptured posterolaterally projecting 2.9 x 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 Patient was found unresponsive, intubated, and transferred to [facility]. On arrival to [facility] patient w/ PERRL, +cough/gag, 2/5 spontaneous movement in LUE, otherwise no movement. Given 1 unit platelets and R frontal EVD placed with high opening pressure. Patient underwent Right pterional craniotomy and clipping of a ruptured right PCOM aneurysm on 3/26.     On 3/29, EVD displaced and replaced in situ, complicated by EVD tract acute hemorrhage and new focus on infarction with hemorrhage in the right basal ganglia/corona radiata. Course further complicated by hydrocephalus with ICP crisis, vasospasm requiring cerebral angiography and IA milrinone and verapamil. MRI (4/8) with widespread ischemic stroke b/l, as per neuro IR no further intervention. Patient extubated on 4/3, re-intubated 4/8, and extubated again on 4/9. EVD removed on 4/15. CTH post removal stable.     Course also complicated by bradycardia treated with theophyline. Initial Echo (3/26) with severe cardiomyopathy, EF 36%, regional wall motion abnormality. Resolved on repeat TTE performed 4/9/24, normal EF 66% with no WMA. Patient first noted with left soleal DVT on Duplex 3/28. Duplex (4/10) with stable b/l soleal DVT with extension to right posterior tibial DVT.  Started on Lovenox DVT ppx on 4/24. Last LE duplex with stable bilateral soleal DVT, [facility] cardiology recommended repeat surveillance duplex on 4/30.     PEG placement attempted 4/19 for persistent dysphagia, but was aborted due to growing hematoma noted in gastric wall on endoscopy after trocar placement. CT Abdomen and Pelvis w/ IV Cont showed gastric wall hematoma without intraluminal or extraluminal hemorrhage. PEG successfully placed on 4/23.     Patient evaluated by PT/OT and was recommended for acute inpatient rehab. Patient is medically stable for discharge to [facility] on 4/26.  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 Patient was found unresponsive, intubated, and transferred to [facility]. On arrival to [facility] patient w/ PERRL, +cough/gag, 2/5 spontaneous movement in LUE, otherwise no movement. Given 1 unit platelets and R frontal EVD placed with high opening pressure. Patient underwent Right pterional craniotomy and clipping of a ruptured right PCOM aneurysm on 3/26.     On 3/29, EVD displaced and replaced in situ, complicated by EVD tract acute hemorrhage and new focus of infarction with hemorrhage in the right basal ganglia/corona radiata. Course further complicated by hydrocephalus with ICP crisis, vasospasm requiring cerebral angiography and IA milrinone and verapamil. MRI (4/8) with widespread ischemic stroke b/l, as per neuro IR no further intervention. Patient extubated on 4/3, re-intubated 4/8, and extubated again on 4/9. EVD removed on 4/15. CTH post removal stable.     Course also complicated by bradycardia treated with theophyline. Initial Echo (3/26) with severe cardiomyopathy, EF 36%, regional wall motion abnormality. Resolved on repeat TTE performed 4/9/24, normal EF 66% with no WMA. Patient first noted with left soleal DVT on Duplex 3/28. Duplex (4/10) with stable b/l soleal DVT with extension to right posterior tibial DVT.  Last LE duplex with stable bilateral soleal DVT, [facility] cardiology recommended repeat surveillance duplex on 4/30. On Lovenox ppx.     PEG placement attempted 4/19 for persistent dysphagia, but was aborted due to growing hematoma noted in gastric wall on endoscopy after trocar placement. CT Abdomen and Pelvis w/ IV Cont showed gastric wall hematoma without intraluminal or extraluminal hemorrhage. PEG successfully placed on 4/23.     Patient evaluated by PT/OT and was recommended for acute inpatient rehab. Patient is medically stable for discharge to [facility] on 4/26.  59 y/o F on ASA 81 mg, fell one month prior to admission, transferred from [place] to [facility] on 3/26/24 for diffuse SAH w/ small IVH, 2/2 ruptured posterolaterally projecting 2.9 x 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 Patient was found unresponsive, intubated, and transferred to [facility]. On arrival to [facility] patient w/ PERRL, +cough/gag, 2/5 spontaneous movement in LUE, otherwise no movement. Given 1 unit platelets and R frontal EVD placed with high opening pressure. Patient underwent Right pterional craniotomy and clipping of a ruptured right PCOM aneurysm on 3/26.     On 3/29, EVD displaced and replaced in situ, complicated by EVD tract acute hemorrhage and new focus of infarction with hemorrhage in the right basal ganglia/corona radiata. Course further complicated by hydrocephalus with ICP crisis, vasospasm requiring cerebral angiography and IA milrinone and verapamil. MRI (4/8) with widespread ischemic stroke b/l, as per neuro IR no further intervention. Patient extubated on 4/3, re-intubated 4/8, and extubated again on 4/9. EVD removed on 4/15. CTH post removal stable.     Course also complicated by bradycardia treated with theophyline, now resolved and discontinued. Initial Echo (3/26) with severe cardiomyopathy, EF 36%, regional wall motion abnormality. Resolved on repeat TTE performed 4/9/24, normal EF 66% with no WMA. Patient first noted with left soleal DVT on Duplex 3/28. Duplex (4/10) with stable b/l soleal DVT with extension to right posterior tibial DVT.  Last LE duplex with stable bilateral soleal DVT, [facility] cardiology recommended repeat surveillance duplex on 4/30. On Lovenox ppx.     PEG placement attempted 4/19 for persistent dysphagia, but was aborted due to growing hematoma noted in gastric wall on endoscopy after trocar placement. CT Abdomen and Pelvis w/ IV Cont showed gastric wall hematoma without intraluminal or extraluminal hemorrhage. PEG successfully placed on 4/23.     Patient evaluated by PT/OT and was recommended for acute inpatient rehab. Patient is medically stable for discharge to [facility] on 4/26.  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 Initially presented to [place] with headaches and became unresponsive, was intubated, and transferred to [facility]. On arrival to [facility] patient w/ PERRL, +cough/gag, 2/5 spontaneous movement in LUE, otherwise no movement. Given 1 unit platelets and R frontal EVD placed with high opening pressure. Patient underwent Right pterional craniotomy and clipping of a ruptured right PCOM aneurysm on 3/26.     On 3/29, EVD displaced and replaced in situ, complicated by EVD tract acute hemorrhage and new focus of infarction with hemorrhage in the right basal ganglia/corona radiata. Course further complicated by hydrocephalus with ICP crisis, vasospasm requiring cerebral angiography and IA milrinone and verapamil. MRI (4/8) with widespread ischemic stroke b/l, as per neuro IR no further intervention. Patient extubated on 4/3, re-intubated 4/8, and extubated again on 4/9. EVD removed on 4/15. CTH post removal stable.     Course also complicated by bradycardia treated with theophyline, now resolved and discontinued. Initial Echo (3/26) with severe cardiomyopathy, EF 36%, regional wall motion abnormality. Resolved on repeat TTE performed 4/9/24, normal EF 66% with no WMA. Patient first noted with left soleal DVT on Duplex 3/28. Duplex (4/10) with stable b/l soleal DVT with extension to right posterior tibial DVT.  Last LE duplex with stable bilateral soleal DVT, [facility] cardiology recommended repeat surveillance duplex on 4/30. On Lovenox ppx.     PEG placement attempted 4/19 for persistent dysphagia, but was aborted due to growing hematoma noted in gastric wall on endoscopy after trocar placement. CT Abdomen and Pelvis w/ IV Cont showed gastric wall hematoma without intraluminal or extraluminal hemorrhage. PEG successfully placed on 4/23.     Patient evaluated by PT/OT and was recommended for acute inpatient rehab. Patient is medically stable for discharge to [facility] on 4/26.

## 2024-04-26 NOTE — PATIENT PROFILE ADULT - FUNCTIONAL ASSESSMENT - BASIC MOBILITY 6.
1-calculated by average/Not able to assess (calculate score using [facility] averaging method)

## 2024-04-26 NOTE — PROGRESS NOTE ADULT - SUBJECTIVE AND OBJECTIVE BOX
58F, on ASA81 for pain relief? 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 Was initially found unresponsive at 21:00, intubated at 23:00 at OSH, then transfered. Given ddavp at OSH. On arrival patient w/ PERRL, +cough/gag, 2/5 spontaneous movement in LUE, o/w no movement. 1 unit PLTs given and R frontal EVD placed-high pressure. PLTs/Coags wnl.     4/23 s/p Gastrostomy tube placement  3/26 s/p Craniotomy with clip ligation of intracranial aneurysm by petrosal approach    Overnight event: no acute event    Vital Signs Last 24 Hrs  T(C): 36.7 (26 Apr 2024 07:37), Max: 36.8 (25 Apr 2024 17:13)  T(F): 98 (26 Apr 2024 07:37), Max: 98.2 (25 Apr 2024 17:13)  HR: 99 (26 Apr 2024 07:37) (91 - 109)  BP: 134/87 (26 Apr 2024 07:37) (112/62 - 143/79)  BP(mean): --  RR: 18 (26 Apr 2024 07:37) (18 - 20)  SpO2: 98% (26 Apr 2024 07:37) (96% - 98%)    Parameters below as of 26 Apr 2024 07:37  Patient On (Oxygen Delivery Method): room air                              10.3   8.20  )-----------( 271      ( 25 Apr 2024 13:03 )             32.5    04-26    144  |  108  |  20  ----------------------------<  95  4.0   |  24  |  0.46<L>    Ca    10.2      26 Apr 2024 06:36  Phos  3.9     04-26  Mg     2.4     04-26       Stroke Core Measures      DRAIN OUTPUT:     NEUROIMAGING:     PHYSICAL EXAM:    General: No Acute Distress     Neurological: Awake, alert oriented to person, place and time, Following Commands, PERRL, EOMI, Face Symmetrical, Speech Fluent, Moving all extremities, Muscle Strength normal in all four extremities, No Drift, Sensation to Light Touch Intact    Pulmonary: Clear to Auscultation, No Rales, No Rhonchi, No Wheezes     Cardiovascular: S1, S2, Regular Rate and Rhythm     Gastrointestinal: Soft, Nontender, Nondistended     Incision: intact    MEDICATIONS:   Antibiotics:    Neuro:  acetaminophen     Tablet .. 650 milliGRAM(s) Oral every 6 hours PRN Temp greater or equal to 38.5C (101.3F), Mild Pain (1 - 3)  oxyCODONE    IR 5 milliGRAM(s) Oral every 4 hours PRN Moderate Pain (4 - 6)  oxyCODONE    IR 10 milliGRAM(s) Oral every 4 hours PRN Severe Pain (7 - 10)    Anticoagulation:  enoxaparin Injectable 40 milliGRAM(s) SubCutaneous <User Schedule>    Cardiology:  doxazosin 2 milliGRAM(s) Oral at bedtime    Endo:     Pulm:    GI/:  bisacodyl 5 milliGRAM(s) Oral at bedtime  polyethylene glycol 3350 17 Gram(s) Oral two times a day  senna Syrup 10 milliLiter(s) Oral at bedtime    Other:  chlorhexidine 4% Liquid 1 Application(s) Topical <User Schedule>  multivitamin/minerals/iron Oral Solution (CENTRUM) 15 milliLiter(s) Oral daily  petrolatum white Ointment 1 Application(s) Topical every 12 hours PRN dryness  bisacodyl 5 milliGRAM(s) Oral at bedtime  polyethylene glycol 3350 17 Gram(s) Oral two times a day  senna Syrup 10 milliLiter(s) Oral at bedtime       58F, on ASA81 for pain relief? 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 Was initially found unresponsive at 21:00, intubated at 23:00 at OSH, then transfered. Given ddavp at OSH. On arrival patient w/ PERRL, +cough/gag, 2/5 spontaneous movement in LUE, o/w no movement. 1 unit PLTs given and R frontal EVD placed-high pressure. PLTs/Coags wnl.     4/23 s/p Gastrostomy tube placement  3/26 s/p Craniotomy with clip ligation of intracranial aneurysm by petrosal approach    Overnight event: no acute event    Vital Signs Last 24 Hrs  T(C): 36.7 (26 Apr 2024 07:37), Max: 36.8 (25 Apr 2024 17:13)  T(F): 98 (26 Apr 2024 07:37), Max: 98.2 (25 Apr 2024 17:13)  HR: 99 (26 Apr 2024 07:37) (91 - 109)  BP: 134/87 (26 Apr 2024 07:37) (112/62 - 143/79)  BP(mean): --  RR: 18 (26 Apr 2024 07:37) (18 - 20)  SpO2: 98% (26 Apr 2024 07:37) (96% - 98%)    Parameters below as of 26 Apr 2024 07:37  Patient On (Oxygen Delivery Method): room air                              10.3   8.20  )-----------( 271      ( 25 Apr 2024 13:03 )             32.5    04-26    144  |  108  |  20  ----------------------------<  95  4.0   |  24  |  0.46<L>    Ca    10.2      26 Apr 2024 06:36  Phos  3.9     04-26  Mg     2.4     04-26       Stroke Core Measures      DRAIN OUTPUT:     NEUROIMAGING:     PHYSICAL EXAM:    General: No Acute Distress     Neurological: Awake, alert, oriented x 0, moves Rt side spontaneously, withdraws on the Left, not following commands, PERRL, Rt gaze preference    Pulmonary: Clear to Auscultation, No Rales, No Rhonchi, No Wheezes     Cardiovascular: S1, S2, Regular Rate and Rhythm     Gastrointestinal: Soft, Nontender, Nondistended     Incision: intact    MEDICATIONS:   Antibiotics:    Neuro:  acetaminophen     Tablet .. 650 milliGRAM(s) Oral every 6 hours PRN Temp greater or equal to 38.5C (101.3F), Mild Pain (1 - 3)  oxyCODONE    IR 5 milliGRAM(s) Oral every 4 hours PRN Moderate Pain (4 - 6)  oxyCODONE    IR 10 milliGRAM(s) Oral every 4 hours PRN Severe Pain (7 - 10)    Anticoagulation:  enoxaparin Injectable 40 milliGRAM(s) SubCutaneous <User Schedule>    Cardiology:  doxazosin 2 milliGRAM(s) Oral at bedtime    Endo:     Pulm:    GI/:  bisacodyl 5 milliGRAM(s) Oral at bedtime  polyethylene glycol 3350 17 Gram(s) Oral two times a day  senna Syrup 10 milliLiter(s) Oral at bedtime    Other:  chlorhexidine 4% Liquid 1 Application(s) Topical <User Schedule>  multivitamin/minerals/iron Oral Solution (CENTRUM) 15 milliLiter(s) Oral daily  petrolatum white Ointment 1 Application(s) Topical every 12 hours PRN dryness  bisacodyl 5 milliGRAM(s) Oral at bedtime  polyethylene glycol 3350 17 Gram(s) Oral two times a day  senna Syrup 10 milliLiter(s) Oral at bedtime       58F, on ASA81 for pain relief? 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 Was initially found unresponsive at 21:00, intubated at 23:00 at OSH, then transfered. Given ddavp at OSH. On arrival patient w/ PERRL, +cough/gag, 2/5 spontaneous movement in LUE, o/w no movement. 1 unit PLTs given and R frontal EVD placed-high pressure. PLTs/Coags wnl.     4/23 s/p Gastrostomy tube placement  3/26 s/p Craniotomy with clip ligation of intracranial aneurysm by petrosal approach    Overnight event: no acute event    Vital Signs Last 24 Hrs  T(C): 36.7 (26 Apr 2024 07:37), Max: 36.8 (25 Apr 2024 17:13)  T(F): 98 (26 Apr 2024 07:37), Max: 98.2 (25 Apr 2024 17:13)  HR: 99 (26 Apr 2024 07:37) (91 - 109)  BP: 134/87 (26 Apr 2024 07:37) (112/62 - 143/79)  BP(mean): --  RR: 18 (26 Apr 2024 07:37) (18 - 20)  SpO2: 98% (26 Apr 2024 07:37) (96% - 98%)    Parameters below as of 26 Apr 2024 07:37  Patient On (Oxygen Delivery Method): room air                              10.3   8.20  )-----------( 271      ( 25 Apr 2024 13:03 )             32.5    04-26    144  |  108  |  20  ----------------------------<  95  4.0   |  24  |  0.46<L>    Ca    10.2      26 Apr 2024 06:36  Phos  3.9     04-26  Mg     2.4     04-26       Stroke Core Measures      DRAIN OUTPUT:     NEUROIMAGING:     PHYSICAL EXAM:    General: No Acute Distress     Neurological: Awake, alert, oriented x 0, moves Rt side and LUE spontaneously, withdraws on the Left lower, not following commands, PERRL, Rt gaze preference    Pulmonary: Clear to Auscultation, No Rales, No Rhonchi, No Wheezes     Cardiovascular: S1, S2, Regular Rate and Rhythm     Gastrointestinal: Soft, Nontender, Nondistended     Incision: intact    MEDICATIONS:   Antibiotics:    Neuro:  acetaminophen     Tablet .. 650 milliGRAM(s) Oral every 6 hours PRN Temp greater or equal to 38.5C (101.3F), Mild Pain (1 - 3)  oxyCODONE    IR 5 milliGRAM(s) Oral every 4 hours PRN Moderate Pain (4 - 6)  oxyCODONE    IR 10 milliGRAM(s) Oral every 4 hours PRN Severe Pain (7 - 10)    Anticoagulation:  enoxaparin Injectable 40 milliGRAM(s) SubCutaneous <User Schedule>    Cardiology:  doxazosin 2 milliGRAM(s) Oral at bedtime    Endo:     Pulm:    GI/:  bisacodyl 5 milliGRAM(s) Oral at bedtime  polyethylene glycol 3350 17 Gram(s) Oral two times a day  senna Syrup 10 milliLiter(s) Oral at bedtime    Other:  chlorhexidine 4% Liquid 1 Application(s) Topical <User Schedule>  multivitamin/minerals/iron Oral Solution (CENTRUM) 15 milliLiter(s) Oral daily  petrolatum white Ointment 1 Application(s) Topical every 12 hours PRN dryness  bisacodyl 5 milliGRAM(s) Oral at bedtime  polyethylene glycol 3350 17 Gram(s) Oral two times a day  senna Syrup 10 milliLiter(s) Oral at bedtime

## 2024-04-26 NOTE — PROGRESS NOTE ADULT - PROBLEM SELECTOR PROBLEM 1
Ruptured aneurysm of intracranial region
Respiratory failure
Ruptured aneurysm of intracranial region
SAH (subarachnoid hemorrhage)
Ruptured aneurysm of intracranial region
SAH (subarachnoid hemorrhage)
Ruptured aneurysm of intracranial region
SAH (subarachnoid hemorrhage)
Ruptured aneurysm of intracranial region
Respiratory failure
Ruptured aneurysm of intracranial region
Ruptured aneurysm of intracranial region
ICH (intracerebral hemorrhage)
Ruptured aneurysm of intracranial region
SAH (subarachnoid hemorrhage)
Ruptured aneurysm of intracranial region
SAH (subarachnoid hemorrhage)
Ruptured aneurysm of intracranial region
SAH (subarachnoid hemorrhage)
SAH (subarachnoid hemorrhage)

## 2024-04-27 LAB
ALBUMIN SERPL ELPH-MCNC: 2.6 G/DL — LOW (ref 3.3–5)
ALP SERPL-CCNC: 161 U/L — HIGH (ref 40–120)
ALT FLD-CCNC: 31 U/L — SIGNIFICANT CHANGE UP (ref 10–45)
ANION GAP SERPL CALC-SCNC: 13 MMOL/L — SIGNIFICANT CHANGE UP (ref 5–17)
AST SERPL-CCNC: 28 U/L — SIGNIFICANT CHANGE UP (ref 10–40)
BASOPHILS # BLD AUTO: 0.03 K/UL — SIGNIFICANT CHANGE UP (ref 0–0.2)
BASOPHILS NFR BLD AUTO: 0.6 % — SIGNIFICANT CHANGE UP (ref 0–2)
BILIRUB SERPL-MCNC: 0.5 MG/DL — SIGNIFICANT CHANGE UP (ref 0.2–1.2)
BUN SERPL-MCNC: 24 MG/DL — HIGH (ref 7–23)
CALCIUM SERPL-MCNC: 9.7 MG/DL — SIGNIFICANT CHANGE UP (ref 8.4–10.5)
CHLORIDE SERPL-SCNC: 108 MMOL/L — SIGNIFICANT CHANGE UP (ref 96–108)
CO2 SERPL-SCNC: 25 MMOL/L — SIGNIFICANT CHANGE UP (ref 22–31)
CREAT SERPL-MCNC: 0.47 MG/DL — LOW (ref 0.5–1.3)
EGFR: 111 ML/MIN/1.73M2 — SIGNIFICANT CHANGE UP
EOSINOPHIL # BLD AUTO: 0.09 K/UL — SIGNIFICANT CHANGE UP (ref 0–0.5)
EOSINOPHIL NFR BLD AUTO: 1.7 % — SIGNIFICANT CHANGE UP (ref 0–6)
GLUCOSE SERPL-MCNC: 93 MG/DL — SIGNIFICANT CHANGE UP (ref 70–99)
HCT VFR BLD CALC: 31.9 % — LOW (ref 34.5–45)
HGB BLD-MCNC: 9.8 G/DL — LOW (ref 11.5–15.5)
IMM GRANULOCYTES NFR BLD AUTO: 0.6 % — SIGNIFICANT CHANGE UP (ref 0–0.9)
LYMPHOCYTES # BLD AUTO: 1.23 K/UL — SIGNIFICANT CHANGE UP (ref 1–3.3)
LYMPHOCYTES # BLD AUTO: 23.3 % — SIGNIFICANT CHANGE UP (ref 13–44)
MCHC RBC-ENTMCNC: 28.7 PG — SIGNIFICANT CHANGE UP (ref 27–34)
MCHC RBC-ENTMCNC: 30.7 GM/DL — LOW (ref 32–36)
MCV RBC AUTO: 93.5 FL — SIGNIFICANT CHANGE UP (ref 80–100)
MONOCYTES # BLD AUTO: 0.37 K/UL — SIGNIFICANT CHANGE UP (ref 0–0.9)
MONOCYTES NFR BLD AUTO: 7 % — SIGNIFICANT CHANGE UP (ref 2–14)
NEUTROPHILS # BLD AUTO: 3.52 K/UL — SIGNIFICANT CHANGE UP (ref 1.8–7.4)
NEUTROPHILS NFR BLD AUTO: 66.8 % — SIGNIFICANT CHANGE UP (ref 43–77)
NRBC # BLD: 0 /100 WBCS — SIGNIFICANT CHANGE UP (ref 0–0)
PLATELET # BLD AUTO: 235 K/UL — SIGNIFICANT CHANGE UP (ref 150–400)
POTASSIUM SERPL-MCNC: 4 MMOL/L — SIGNIFICANT CHANGE UP (ref 3.5–5.3)
POTASSIUM SERPL-SCNC: 4 MMOL/L — SIGNIFICANT CHANGE UP (ref 3.5–5.3)
PROT SERPL-MCNC: 7.3 G/DL — SIGNIFICANT CHANGE UP (ref 6–8.3)
RBC # BLD: 3.41 M/UL — LOW (ref 3.8–5.2)
RBC # FLD: 14.8 % — HIGH (ref 10.3–14.5)
SODIUM SERPL-SCNC: 146 MMOL/L — HIGH (ref 135–145)
WBC # BLD: 5.27 K/UL — SIGNIFICANT CHANGE UP (ref 3.8–10.5)
WBC # FLD AUTO: 5.27 K/UL — SIGNIFICANT CHANGE UP (ref 3.8–10.5)

## 2024-04-27 PROCEDURE — 99221 1ST HOSP IP/OBS SF/LOW 40: CPT

## 2024-04-27 PROCEDURE — 99222 1ST HOSP IP/OBS MODERATE 55: CPT

## 2024-04-27 RX ADMIN — Medication 2 MILLIGRAM(S): at 21:42

## 2024-04-27 RX ADMIN — PANTOPRAZOLE SODIUM 40 MILLIGRAM(S): 20 TABLET, DELAYED RELEASE ORAL at 06:36

## 2024-04-27 RX ADMIN — Medication 15 MILLILITER(S): at 13:41

## 2024-04-27 RX ADMIN — ENOXAPARIN SODIUM 40 MILLIGRAM(S): 100 INJECTION SUBCUTANEOUS at 17:54

## 2024-04-27 RX ADMIN — ATORVASTATIN CALCIUM 40 MILLIGRAM(S): 80 TABLET, FILM COATED ORAL at 21:42

## 2024-04-27 RX ADMIN — POLYETHYLENE GLYCOL 3350 17 GRAM(S): 17 POWDER, FOR SOLUTION ORAL at 06:36

## 2024-04-27 NOTE — DIETITIAN INITIAL EVALUATION ADULT - OTHER INFO
Pt is a 57 y/o F with no PMH, fell one month prior to admission, transferred from [place] to [facility] on 3/26/24 for diffuse SAH w/ small IVH, 2/2 ruptured posterolaterally projecting 2.9 x 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 Right frontal EVD placed and patient underwent right pterional craniotomy and clipping of a ruptured right PCOM aneurysm on 3/26 . 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 Patient found to have HFrEF (now recovered), bradycardia (placed on theophyline, now resolved), b/l soleal DVT (on Lovenox ppx). PEG placement initially complicated by gastric wall hematoma, subsequently successfully placed for persistent dysphagia. Patient now admitted for a multidisciplinary rehab program. 04-26-24 @ 15:59.  Pt transitioned to bolus TF regimen to accommodate participation in rehab program. Tolerating well per discussion with RN. Pt confirms weight loss throughout hospitalization. Weight on (3/28) 157lbs. CBW 138lbs. NFPE with evidence of moderate muscle wasting/fat loss. No pressure ulcers or edema. Pt denies N/V/D, constipation. Last BM today. Current EN regimen provides 2,160kcals, 91g protein, 1,008ml H2O- would lower to 300ml QID + prosource qd to provide 1,860kcals, 91g protein, 912ml H2O.

## 2024-04-27 NOTE — DIETITIAN INITIAL EVALUATION ADULT - ADD RECOMMEND
1. Jevity 1.5 @ 300ml QID via PEG + prosource qd via PEG  2. 250ml free H2O QID via PEG  3. Continue MVI via PEG

## 2024-04-27 NOTE — DIETITIAN INITIAL EVALUATION ADULT - NS FNS DIET ORDER
Diet, NPO with Tube Feed:   Tube Feeding Modality: Gastrostomy  Jevity 1.5 Les  Total Volume for 24 Hours (mL): 1440  Bolus  Total Volume of Bolus (mL):  360  Total # of Feeds: 4  Tube Feed Frequency: Every 6 hours   Tube Feed Start Time: 08:00  Bolus Feed Rate (mL per Hour): 360   Bolus Feed Duration (in Hours): 0.5  Bolus Feed Instructions:  8 AM, 12 PM, 4 PM, 8 PM  Free Water Flush Instructions:  250 cc QID at least one hour apart from feeds  Supplement Feeding Modality:  Gastrostomy  Probiotic Yogurt/Smoothie Cans or Servings Per Day:  2       Frequency:  Two Times a day (04-27-24 @ 10:22)

## 2024-04-27 NOTE — CONSULT NOTE ADULT - SUBJECTIVE AND OBJECTIVE BOX
Patient is a 58y old  Female who presents with a chief complaint of SAH (27 Apr 2024 10:23)    HPI:  59 y/o F with no PMH, fell one month prior to admission, transferred from [place] to [facility] on 3/26/24 for diffuse SAH w/ small IVH, 2/2 ruptured posterolaterally projecting 2.9 x 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 Initially presented to [place] with headaches and became unresponsive, was intubated, and transferred to [facility]. On arrival to [facility] patient w/ PERRL, +cough/gag, 2/5 spontaneous movement in LUE, otherwise no movement. Given 1 unit platelets and R frontal EVD placed with high opening pressure. Patient underwent Right pterional craniotomy and clipping of a ruptured right PCOM aneurysm on 3/26.     On 3/29, EVD displaced and replaced in situ, complicated by EVD tract acute hemorrhage and new focus of infarction with hemorrhage in the right basal ganglia/corona radiata. Course further complicated by hydrocephalus with ICP crisis, vasospasm requiring cerebral angiography and IA milrinone and verapamil. MRI (4/8) with widespread ischemic stroke b/l, as per neuro IR no further intervention. Patient extubated on 4/3, re-intubated 4/8, and extubated again on 4/9. EVD removed on 4/15. CTH post removal stable.     Course also complicated by bradycardia treated with theophyline, now resolved and discontinued. Initial Echo (3/26) with severe cardiomyopathy, EF 36%, regional wall motion abnormality. Resolved on repeat TTE performed 4/9/24, normal EF 66% with no WMA. Patient first noted with left soleal DVT on Duplex 3/28. Duplex (4/10) with stable b/l soleal DVT with extension to right posterior tibial DVT.  Last LE duplex with stable bilateral soleal DVT, [facility] cardiology recommended repeat surveillance duplex on 4/30. On Lovenox ppx.     PEG placement attempted 4/19 for persistent dysphagia, but was aborted due to growing hematoma noted in gastric wall on endoscopy after trocar placement. CT Abdomen and Pelvis w/ IV Cont showed gastric wall hematoma without intraluminal or extraluminal hemorrhage. PEG successfully placed on 4/23.     Patient evaluated by PT/OT and was recommended for acute inpatient rehab. Patient is medically stable for discharge to [facility] on 4/26.  (26 Apr 2024 14:34)    - above reviewed, seen at bedside.  ID [id number]. states se has pain on her lower extremities, but otherwise unable to fully ascertain all ros/.     PAST MEDICAL & SURGICAL HISTORY: none    ALLERGIES:  No Known Allergies    MEDICATIONS  (STANDING):  atorvastatin 40 milliGRAM(s) Oral at bedtime  bisacodyl 5 milliGRAM(s) Oral at bedtime  doxazosin 2 milliGRAM(s) Oral at bedtime  enoxaparin Injectable 40 milliGRAM(s) SubCutaneous <User Schedule>  multivitamin/minerals/iron Oral Solution (CENTRUM) 15 milliLiter(s) Oral daily  pantoprazole   Suspension 40 milliGRAM(s) Oral before breakfast  polyethylene glycol 3350 17 Gram(s) Oral two times a day  senna Syrup 10 milliLiter(s) Oral at bedtime    MEDICATIONS  (PRN):  acetaminophen   Oral Liquid .. 650 milliGRAM(s) Oral every 6 hours PRN Mild Pain (1 - 3)  oxyCODONE    IR 10 milliGRAM(s) Oral every 4 hours PRN Severe Pain (7 - 10)  oxyCODONE    IR 5 milliGRAM(s) Oral every 4 hours PRN Moderate Pain (4 - 6)  petrolatum white Ointment 1 Application(s) Topical every 12 hours PRN dryness    Review of Systems: Refer to HPI for pertinent positives and negatives. All other ROS reviewed and negative except as otherwise stated above.    Vital Signs Last 24 Hrs  T(F): 97.9 (27 Apr 2024 08:22), Max: 98.5 (26 Apr 2024 16:48)  HR: 100 (27 Apr 2024 08:22) (94 - 100)  BP: 144/91 (27 Apr 2024 08:22) (131/73 - 144/91)  RR: 15 (27 Apr 2024 08:22) (15 - 18)  SpO2: 94% (27 Apr 2024 08:22) (94% - 98%)  I&O's Summary    26 Apr 2024 07:01  -  27 Apr 2024 07:00  --------------------------------------------------------  IN: 610 mL / OUT: 0 mL / NET: 610 mL      PHYSICAL EXAM:  GENERAL: NAD, well-groomed, well-developed  HEAD: + right craniotomy site is c/d/i no pus, bleeding or erythema   EYES: EOMI, PERRL, conjunctiva and sclera clear  ENMT: Moist mucous membranes, Good dentition  NECK: Supple, No JVD  CHEST/LUNG: Clear to auscultation bilaterally, non-labored breathing, good air entry  HEART: RRR; S1/S2, No murmur  ABDOMEN: Soft, Nontender, Nondistended; Bowel sounds present  VASCULAR: Normal pulses, Normal capillary refill  EXTREMITIES: No cyanosis, No edema  LYMPH: No lymphadenopathy noted  SKIN: Warm, Intact  PSYCH: Normal mood and affect  NERVOUS SYSTEM:  minimally verbal, follows simple commands, nods head yes and no, moves right side more than L    LABS:                        9.8    5.27  )-----------( 235      ( 27 Apr 2024 05:46 )             31.9     04-27    146  |  108  |  24  ----------------------------<  93  4.0   |  25  |  0.47    Ca    9.7      27 Apr 2024 06:30  Phos  3.9     04-26  Mg     2.4     04-26    TPro  7.3  /  Alb  2.6  /  TBili  0.5  /  DBili  x   /  AST  28  /  ALT  31  /  AlkPhos  161  04-27                              Urinalysis Basic - ( 27 Apr 2024 06:30 )    Color: x / Appearance: x / SG: x / pH: x  Gluc: 93 mg/dL / Ketone: x  / Bili: x / Urobili: x   Blood: x / Protein: x / Nitrite: x   Leuk Esterase: x / RBC: x / WBC x   Sq Epi: x / Non Sq Epi: x / Bacteria: x          RADIOLOGY & ADDITIONAL TESTS:    Care Discussed with Consultants/Other Providers: IDR team

## 2024-04-27 NOTE — DIETITIAN NUTRITION RISK NOTIFICATION - TREATMENT: THE FOLLOWING DIET HAS BEEN RECOMMENDED
Diet, NPO with Tube Feed:   Tube Feeding Modality: Gastrostomy  Jevity 1.5 Les  Total Volume for 24 Hours (mL): 1440  Bolus  Total Volume of Bolus (mL):  360  Total # of Feeds: 4  Tube Feed Frequency: Every 6 hours   Tube Feed Start Time: 08:00  Bolus Feed Rate (mL per Hour): 360   Bolus Feed Duration (in Hours): 0.5  Bolus Feed Instructions:  8 AM, 12 PM, 4 PM, 8 PM  Free Water Flush Instructions:  250 cc QID at least one hour apart from feeds  Supplement Feeding Modality:  Gastrostomy  Probiotic Yogurt/Smoothie Cans or Servings Per Day:  2       Frequency:  Two Times a day (04-27-24 @ 10:22) [Active]

## 2024-04-27 NOTE — DIETITIAN INITIAL EVALUATION ADULT - PERTINENT LABORATORY DATA
04-27    146<H>  |  108  |  24<H>  ----------------------------<  93  4.0   |  25  |  0.47<L>    Ca    9.7      27 Apr 2024 06:30  Phos  3.9     04-26  Mg     2.4     04-26    TPro  7.3  /  Alb  2.6<L>  /  TBili  0.5  /  DBili  x   /  AST  28  /  ALT  31  /  AlkPhos  161<H>  04-27  A1C with Estimated Average Glucose Result: 5.5 % (03-26-24 @ 04:27)  A1C with Estimated Average Glucose Result: 5.5 % (03-26-24 @ 01:34)

## 2024-04-27 NOTE — DIETITIAN INITIAL EVALUATION ADULT - PERTINENT MEDS FT
MEDICATIONS  (STANDING):  atorvastatin 40 milliGRAM(s) Oral at bedtime  bisacodyl 5 milliGRAM(s) Oral at bedtime  doxazosin 2 milliGRAM(s) Oral at bedtime  enoxaparin Injectable 40 milliGRAM(s) SubCutaneous <User Schedule>  multivitamin/minerals/iron Oral Solution (CENTRUM) 15 milliLiter(s) Oral daily  pantoprazole   Suspension 40 milliGRAM(s) Oral before breakfast  polyethylene glycol 3350 17 Gram(s) Oral two times a day  senna Syrup 10 milliLiter(s) Oral at bedtime    MEDICATIONS  (PRN):  acetaminophen   Oral Liquid .. 650 milliGRAM(s) Oral every 6 hours PRN Mild Pain (1 - 3)  oxyCODONE    IR 10 milliGRAM(s) Oral every 4 hours PRN Severe Pain (7 - 10)  oxyCODONE    IR 5 milliGRAM(s) Oral every 4 hours PRN Moderate Pain (4 - 6)  petrolatum white Ointment 1 Application(s) Topical every 12 hours PRN dryness

## 2024-04-27 NOTE — DIETITIAN INITIAL EVALUATION ADULT - NSFNSGIIOFT_GEN_A_CORE
04-26-24 @ 07:01  -  04-27-24 @ 07:00  --------------------------------------------------------  OUT:  Total OUT: 0 mL    Total NET: 360 mL

## 2024-04-27 NOTE — PROGRESS NOTE ADULT - SUBJECTIVE AND OBJECTIVE BOX
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 Initially presented to [place] with headaches and became unresponsive, was intubated, and transferred to [facility]. On arrival to [facility] patient w/ PERRL, +cough/gag, 2/5 spontaneous movement in LUE, otherwise no movement. Given 1 unit platelets and R frontal EVD placed with high opening pressure. Patient underwent Right pterional craniotomy and clipping of a ruptured right PCOM aneurysm on 3/26.     On 3/29, EVD displaced and replaced in situ, complicated by EVD tract acute hemorrhage and new focus of infarction with hemorrhage in the right basal ganglia/corona radiata. Course further complicated by hydrocephalus with ICP crisis, vasospasm requiring cerebral angiography and IA milrinone and verapamil. MRI (4/8) with widespread ischemic stroke b/l, as per neuro IR no further intervention. Patient extubated on 4/3, re-intubated 4/8, and extubated again on 4/9. EVD removed on 4/15. CTH post removal stable.     Course also complicated by bradycardia treated with theophyline, now resolved and discontinued. Initial Echo (3/26) with severe cardiomyopathy, EF 36%, regional wall motion abnormality. Resolved on repeat TTE performed 4/9/24, normal EF 66% with no WMA. Patient first noted with left soleal DVT on Duplex 3/28. Duplex (4/10) with stable b/l soleal DVT with extension to right posterior tibial DVT.  Last LE duplex with stable bilateral soleal DVT, [facility] cardiology recommended repeat surveillance duplex on 4/30. On Lovenox ppx.     PEG placement attempted 4/19 for persistent dysphagia, but was aborted due to growing hematoma noted in gastric wall on endoscopy after trocar placement. CT Abdomen and Pelvis w/ IV Cont showed gastric wall hematoma without intraluminal or extraluminal hemorrhage. PEG successfully placed on 4/23.     Patient evaluated by PT/OT and was recommended for acute inpatient rehab. Patient is medically stable for discharge to [facility] on 4/26.         Allergies and Intolerances:        Allergies:  	No Known Allergies:       Patient History:    Social History:  · Substance use	No   · Social History (marital status, living situation, occupation, and sexual history)	Per sons:  Smoking - denies  EtOH - occasional  Drugs - denies    Lives in apartment with youngest son, no CRESCENCIO or inside, ramp access, elevator to apt. Has two other sons, one local and one lives in [place]. Sons expressed desire to bring mother back to [place] post-rehab for because they have more family support there.   PTA: Independent in ADLs and ambulation   Occupation:     	      Vital Signs Last 24 Hrs  T(C): 36.6 (27 Apr 2024 08:22), Max: 36.9 (26 Apr 2024 16:48)  T(F): 97.9 (27 Apr 2024 08:22), Max: 98.5 (26 Apr 2024 16:48)  HR: 100 (27 Apr 2024 08:22) (94 - 100)  BP: 144/91 (27 Apr 2024 08:22) (131/73 - 144/91)  BP(mean): --  RR: 15 (27 Apr 2024 08:22) (15 - 18)  SpO2: 94% (27 Apr 2024 08:22) (94% - 98%)    Parameters below as of 27 Apr 2024 08:22  Patient On (Oxygen Delivery Method): room air        Physical Exam:   Physical Exam: Gen - NAD, Comfortable  HEENT - NCAT, MMM  Neck - Supple  Pulm - CTAB, No wheeze  Cardiovascular - RRR, S1S2  Abdomen - Soft, NT/ND, +PEG c/d/i  Extremities - No lower extremity edema  Neuro-     Cognitive - AAOx1 (name), limited/inconsistent simple command following      Communication - hypophonic     Cranial Nerves - no facial asymmetry, EOMI     Motor - motor testing limited by command following difficulty, RUE/RLE appears full strength                    LEFT    UE - elbow flexion anti-gravity and weak , <3/5mp                    LEFT    LE - knee flexion/extension 2/5     Sensory - unable to assess 2/2 mentation     Tone - increased tone (MAS 1+ to 2) in left elbow   Skin: R craniotomy incision c/d/i (no staples/sutures), one staple to L scalp, left toe scabs, right lateral foot callus       Labs and Results:  Labs, Radiology, Cardiology, and Other Results: LABS:    LABS:                        9.8    5.27  )-----------( 235      ( 27 Apr 2024 05:46 )             31.9     27 Apr 2024 06:30    146    |  108    |  24     ----------------------------<  93     4.0     |  25     |  0.47     Ca    9.7        27 Apr 2024 06:30    TPro  7.3    /  Alb  2.6    /  TBili  0.5    /  DBili  x      /  AST  28     /  ALT  31     /  AlkPhos  161    27 Apr 2024 06:30                        10.3   8.20  )-----------( 271      ( 25 Apr 2024 13:03 )             32.5     04-26    144  |  108  |  20  ----------------------------<  95  4.0   |  24  |  0.46<L>    Ca    10.2      26 Apr 2024 06:36  Phos  3.9     04-26  Mg     2.4     04-26    CT Head No Cont (03.26.24 @ 00:57)   Extensive subarachnoid hemorrhage throughout the basilar cisterns, bilateral sylvian fissures, and along the bilateral medial frontal lobes, right greater than left. Prominent temporal horns of the lateral ventricles.    CT Angio Head w/ IV Cont (03.26.24 @ 00:56)   Irregular posterolaterally projecting 2.9 x 2.4 x 2.6 mm right supraclinoid ICA aneurysm, likely ruptured. No additional aneurysms identified. No vessel occlusion, proximal stenosis or evidence for vasospasm at this time.    CT Angio Neck w/ IV Cont (03.26.24 @ 00:57)   No evidence of significant stenosis or occlusion. No evidence of dissection.    2.5 cm heterogeneous left thyroid nodule. This can be followed up with non emergent thyroid ultrasound for further characterization.      CT Head No Cont (03.26.24 @ 03:11)   Interval placement of a right frontal approach ventriculostomy catheter without significant change in size or configuration of the ventricular system.  Similar-appearing diffuse subarachnoid hemorrhage, as discussed.    CT Head No Cont (03.29.24 @ 05:27)   New focus of infarction with hemorrhage is seen in the RIGHT basal ganglia/corona radiata about the RIGHT frontal ventricular drain with minimal extension of hemorrhage and air into the RIGHT lateral ventricle subarachnoid hemorrhage again noted in the frontal sulci and BILATERAL sylvian fissures and basilar cisterns. Bilateral uncal herniation is noted with transtentorial herniation. Mild subdural hemorrhage seen along the tentorium. Ventricles are decreased in size with minimal dependent hemorrhage. Postoperative changes with air and hemorrhage noted subjacent to the RIGHT temporal frontal craniotomy which extends into the lateral roof of the orbit . The orbits show RIGHT periorbital soft tissue swelling with minimal orbital emphysema.    MR Head No Cont (04.08.24 @ 10:34)   1. Multifocal acute to subacute infarction. This extensively involves each MCA posterior distribution, the right basal ganglia, the anterior parasagittal vertex CINDY distribution and an additional smaller lesion in the right cerebellum  2. Right middle cranial fossa surgical aneurysm clip is associated with local susceptibility artifact and mild vasogenic edema right medial temporal tip. Persistent subarachnoid space hemorrhage right sylvian fissure. Small middle cranial fossa subdural space fluid collections, likely postoperative, right larger than left  3. Right-sided ventricular catheter. Right corona radiata/basal ganglia adjacent parenchymal hemorrhage. Right intraventricular hemorrhage    CT Head No Cont (04.16.24 @ 09:42)   Status post right pterional craniotomy for distal right ICA aneurysm clipping. Status post removal of right frontal EVD. No hydrocephalus. Trace intraventricular air.      VA Duplex Lower Ext Vein Scan, Bilat (03.28.24 @ 12:39)   Acute left lower extremity DVT below the knee, involving the left soleal vein. Edema of the superficial soft tissues of the left calf. Correlate clinically.  No acute DVT of the right lower extremity.    VA Duplex Lower Ext Vein Scan, Bilat (04.03.24 @ 16:39)   Acute left soleal vein DVT, unchanged.  Acute right soleal vein DVT.    VA Duplex Lower Ext Vein Scan, Bilat (04.10.24 @ 13:01)   Below the knee, in addition to the previously identified right and left soleal vein DVT, there is now also DVT affecting the right posterior tibial veins.    VA Duplex Lower Ext Vein Scan, Bilat (04.23.24 @ 14:30)   Persistent bilateral soleal vein DVT.        	  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 Right frontal EVD placed and patient underwent right pterional craniotomy and clipping of a ruptured right PCOM aneurysm on 3/26 . 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 Patient found to have HFrEF (now recovered), bradycardia (placed on theophyline, now resolved), b/l soleal DVT (on Lovenox ppx). PEG placement initially complicated by gastric wall hematoma, subsequently successfully placed for persistent dysphagia. Patient now admitted for a multidisciplinary rehab program. 04-26-24 @ 15:59.    # SAH s/p ruptured PCOM aneurysm  - R EVD placed 3/26 and removed 4/15  - s/p right pterional craniotomy and clipping of a ruptured right PCOM aneurysm on 3/26  - CTH (3/29): EVD tract acute hemorrhage and new focus of infarction with hemorrhage in the right basal ganglia/corona radiata  - complicated by vasospasm requiring cerebral angiography and IA milrinone and verapamil  - MRI (4/8): Multifocal acute to subacute infarction. This extensively involves each MCA posterior distribution, the right basal ganglia, the anterior parasagittal vertex CINDY distribution and an additional smaller lesion in the right cerebellum  - Start comprehensive rehab program of PT/OT/SLP - 3 hours a day, 5 days a week. P&O as needed   - Precautions: Fall, Aspiration, Seizure    # B/l DVT   - Left soleal DVT first noted 3/28, right soleal DVT fist noted 4/3  - Most recent Duplex (4/23): persistent bilateral soleal vein DVT  - Continue Lovenox 40 mg QD  - [facility] cardiology recommended repeat surveillance duplex on 4/30.    # Bradycardia; (94 - 100) 4/27  - was on theophyline, now discontinued  - continue to monitor     # HLD  - continue lipitor 40 mg QHS    # HFrEF  - Initial Echo (3/26) with severe cardiomyopathy, EF 36%, regional wall motion abnormality  - Resolved on repeat TTE performed 4/9/24, normal EF 66% with no WMA.    # Hypernatremia; Na 146 4/27  - will increae free water to 250cc Q6H  - CMP 4/29    # Gastric Hematoma  -  PEG placement attempted 4/19 for persistent dysphagia, but was aborted due to growing hematoma noted in gastric wall on endoscopy after trocar placement  - CT Abdomen and Pelvis w/ IV Cont showed gastric wall hematoma without intraluminal or extraluminal hemorrhage  - monitor H/H    # Pain  - Tylenol 650 mg Q6H PRN mild pain, oxycodone 5 mg Q4H PRN mod, oxycodone 10 mg Q4H PRN severe pain    # Mood / Cognition  - Neuropsychology consult PRN    # Sleep  - Maintain quiet hours and a low stim environment.    # GI / Bowel  - Senna qHS  - Miralax BID  - bisacodyl 5 mg QHS  - GI ppx: protonix 40 mg QD (started on admission to rehab)    #  / Bladder  - Continue bladder scans Q8 hours with straight cath for >400cc.  - Continue cardura 2 mg QHS     # Skin / Pressure injury  - Skin assessment on admission performed: R craniotomy incision c/d/i (no staples/sutures), one staple to L scalp, left toe scabs, right lateral foot callus  - Pressure Injury/Skin: OOB to chair, PT/OT  - nursing to monitor skin qShift    # Diet/Dysphagia:  - PEG placed on 4/23.   - Diet Consistency: Jevity tube feeds   - Supplements: MVI  - Aspiration Precautions  - SLP consult for swallow function evaluation and treatment  - Nutrition consult    # DVT prophylaxis:   - Lovenox 40 mg QD    Labs:  CBC, CMP 4/29        Outpatient Follow-up:  Perlita Varela  Neurosurgery  [address], [address]  [place] [address]  Phone: [phone]  Fax: [phone]  Follow Up Time:      Code Status/Emergency Contact:      ---------------    Goals: Safe discharge to home  Estimated Length of Stay: 10-14 days  Rehab Potential: Good  Medical Prognosis: Good  Estimated Disposition: Home with home care

## 2024-04-28 LAB
ALBUMIN SERPL ELPH-MCNC: 2.5 G/DL — LOW (ref 3.3–5)
ALP SERPL-CCNC: 166 U/L — HIGH (ref 40–120)
ALT FLD-CCNC: 40 U/L — SIGNIFICANT CHANGE UP (ref 10–45)
ANION GAP SERPL CALC-SCNC: 7 MMOL/L — SIGNIFICANT CHANGE UP (ref 5–17)
AST SERPL-CCNC: 30 U/L — SIGNIFICANT CHANGE UP (ref 10–40)
BILIRUB SERPL-MCNC: 0.3 MG/DL — SIGNIFICANT CHANGE UP (ref 0.2–1.2)
BUN SERPL-MCNC: 24 MG/DL — HIGH (ref 7–23)
CALCIUM SERPL-MCNC: 9.4 MG/DL — SIGNIFICANT CHANGE UP (ref 8.4–10.5)
CHLORIDE SERPL-SCNC: 106 MMOL/L — SIGNIFICANT CHANGE UP (ref 96–108)
CO2 SERPL-SCNC: 31 MMOL/L — SIGNIFICANT CHANGE UP (ref 22–31)
CREAT SERPL-MCNC: 0.53 MG/DL — SIGNIFICANT CHANGE UP (ref 0.5–1.3)
EGFR: 108 ML/MIN/1.73M2 — SIGNIFICANT CHANGE UP
GGT SERPL-CCNC: 48 U/L — HIGH (ref 8–40)
GLUCOSE SERPL-MCNC: 101 MG/DL — HIGH (ref 70–99)
MAGNESIUM SERPL-MCNC: 2.1 MG/DL — SIGNIFICANT CHANGE UP (ref 1.6–2.6)
PHOSPHATE SERPL-MCNC: 3.9 MG/DL — SIGNIFICANT CHANGE UP (ref 2.5–4.5)
POTASSIUM SERPL-MCNC: 3.6 MMOL/L — SIGNIFICANT CHANGE UP (ref 3.5–5.3)
POTASSIUM SERPL-SCNC: 3.6 MMOL/L — SIGNIFICANT CHANGE UP (ref 3.5–5.3)
PROT SERPL-MCNC: 6.9 G/DL — SIGNIFICANT CHANGE UP (ref 6–8.3)
SODIUM SERPL-SCNC: 144 MMOL/L — SIGNIFICANT CHANGE UP (ref 135–145)

## 2024-04-28 PROCEDURE — 99233 SBSQ HOSP IP/OBS HIGH 50: CPT

## 2024-04-28 RX ADMIN — POLYETHYLENE GLYCOL 3350 17 GRAM(S): 17 POWDER, FOR SOLUTION ORAL at 06:03

## 2024-04-28 RX ADMIN — POLYETHYLENE GLYCOL 3350 17 GRAM(S): 17 POWDER, FOR SOLUTION ORAL at 17:24

## 2024-04-28 RX ADMIN — ENOXAPARIN SODIUM 40 MILLIGRAM(S): 100 INJECTION SUBCUTANEOUS at 17:24

## 2024-04-28 RX ADMIN — PANTOPRAZOLE SODIUM 40 MILLIGRAM(S): 20 TABLET, DELAYED RELEASE ORAL at 06:03

## 2024-04-28 RX ADMIN — Medication 2 MILLIGRAM(S): at 21:44

## 2024-04-28 RX ADMIN — Medication 15 MILLILITER(S): at 13:23

## 2024-04-28 RX ADMIN — SENNA PLUS 10 MILLILITER(S): 8.6 TABLET ORAL at 21:44

## 2024-04-28 RX ADMIN — ATORVASTATIN CALCIUM 40 MILLIGRAM(S): 80 TABLET, FILM COATED ORAL at 21:43

## 2024-04-28 RX ADMIN — Medication 5 MILLIGRAM(S): at 21:43

## 2024-04-28 NOTE — PROGRESS NOTE ADULT - SUBJECTIVE AND OBJECTIVE BOX
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 Initially presented to [place] with headaches and became unresponsive, was intubated, and transferred to [facility]. On arrival to [facility] patient w/ PERRL, +cough/gag, 2/5 spontaneous movement in LUE, otherwise no movement. Given 1 unit platelets and R frontal EVD placed with high opening pressure. Patient underwent Right pterional craniotomy and clipping of a ruptured right PCOM aneurysm on 3/26.     On 3/29, EVD displaced and replaced in situ, complicated by EVD tract acute hemorrhage and new focus of infarction with hemorrhage in the right basal ganglia/corona radiata. Course further complicated by hydrocephalus with ICP crisis, vasospasm requiring cerebral angiography and IA milrinone and verapamil. MRI (4/8) with widespread ischemic stroke b/l, as per neuro IR no further intervention. Patient extubated on 4/3, re-intubated 4/8, and extubated again on 4/9. EVD removed on 4/15. CTH post removal stable.     Course also complicated by bradycardia treated with theophyline, now resolved and discontinued. Initial Echo (3/26) with severe cardiomyopathy, EF 36%, regional wall motion abnormality. Resolved on repeat TTE performed 4/9/24, normal EF 66% with no WMA. Patient first noted with left soleal DVT on Duplex 3/28. Duplex (4/10) with stable b/l soleal DVT with extension to right posterior tibial DVT.  Last LE duplex with stable bilateral soleal DVT, [facility] cardiology recommended repeat surveillance duplex on 4/30. On Lovenox ppx.     PEG placement attempted 4/19 for persistent dysphagia, but was aborted due to growing hematoma noted in gastric wall on endoscopy after trocar placement. CT Abdomen and Pelvis w/ IV Cont showed gastric wall hematoma without intraluminal or extraluminal hemorrhage. PEG successfully placed on 4/23.     Patient evaluated by PT/OT and was recommended for acute inpatient rehab. Patient is medically stable for discharge to [facility] on 4/26.         Allergies and Intolerances:        Allergies:  	No Known Allergies:         ROS/Subjective: Patient seen and examined at bedside this morning. Patient in bed in NAD, makes eye contact, non-verbal	      Vital Signs Last 24 Hrs  T(C): 36.4 (28 Apr 2024 08:11), Max: 36.5 (27 Apr 2024 19:37)  T(F): 97.6 (28 Apr 2024 08:11), Max: 97.7 (27 Apr 2024 19:37)  HR: 84 (28 Apr 2024 08:11) (84 - 94)  BP: 155/96 (28 Apr 2024 08:11) (140/80 - 155/96)  BP(mean): --  RR: 16 (28 Apr 2024 08:11) (14 - 16)  SpO2: 98% (28 Apr 2024 08:11) (98% - 98%)    Parameters below as of 28 Apr 2024 08:11  Patient On (Oxygen Delivery Method): room air            Physical Exam:   Physical Exam: Gen - NAD, Comfortable  HEENT - NCAT, MMM  Neck - Supple  Pulm - CTAB  Cardiovascular - RRR, S1S2  Abdomen - Soft, NT/ND, +PEG c/d/i  Extremities - No lower extremity edema  Neuro-     Cognitive - AAOx1 (name), limited/inconsistent simple command following        Motor - motor testing limited by command following difficulty, RUE/RLE ~4/5mp                    LEFT    UE - elbow flexion anti-gravity and weak , <3/5mp                    LEFT    LE - knee flexion/extension 2/5      Skin: R craniotomy incision c/d/i (no staples/sutures)       Labs and Results:  Labs, Radiology, Cardiology, and Other Results: LABS:    LABS:  LABS:    28 Apr 2024 06:38    144    |  106    |  24     ----------------------------<  101    3.6     |  31     |  0.53     Ca    9.4        28 Apr 2024 06:38  Phos  3.9       28 Apr 2024 06:38  Mg     2.1       28 Apr 2024 06:38    TPro  6.9    /  Alb  2.5    /  TBili  0.3    /  DBili  x      /  AST  30     /  ALT  40     /  AlkPhos  166    28 Apr 2024 06:38                            9.8    5.27  )-----------( 235      ( 27 Apr 2024 05:46 )             31.9     27 Apr 2024 06:30    146    |  108    |  24     ----------------------------<  93     4.0     |  25     |  0.47     Ca    9.7        27 Apr 2024 06:30    TPro  7.3    /  Alb  2.6    /  TBili  0.5    /  DBili  x      /  AST  28     /  ALT  31     /  AlkPhos  161    27 Apr 2024 06:30                        10.3   8.20  )-----------( 271      ( 25 Apr 2024 13:03 )             32.5     04-26    144  |  108  |  20  ----------------------------<  95  4.0   |  24  |  0.46<L>    Ca    10.2      26 Apr 2024 06:36  Phos  3.9     04-26  Mg     2.4     04-26    CT Head No Cont (03.26.24 @ 00:57)   Extensive subarachnoid hemorrhage throughout the basilar cisterns, bilateral sylvian fissures, and along the bilateral medial frontal lobes, right greater than left. Prominent temporal horns of the lateral ventricles.    CT Angio Head w/ IV Cont (03.26.24 @ 00:56)   Irregular posterolaterally projecting 2.9 x 2.4 x 2.6 mm right supraclinoid ICA aneurysm, likely ruptured. No additional aneurysms identified. No vessel occlusion, proximal stenosis or evidence for vasospasm at this time.    CT Angio Neck w/ IV Cont (03.26.24 @ 00:57)   No evidence of significant stenosis or occlusion. No evidence of dissection.    2.5 cm heterogeneous left thyroid nodule. This can be followed up with non emergent thyroid ultrasound for further characterization.      CT Head No Cont (03.26.24 @ 03:11)   Interval placement of a right frontal approach ventriculostomy catheter without significant change in size or configuration of the ventricular system.  Similar-appearing diffuse subarachnoid hemorrhage, as discussed.    CT Head No Cont (03.29.24 @ 05:27)   New focus of infarction with hemorrhage is seen in the RIGHT basal ganglia/corona radiata about the RIGHT frontal ventricular drain with minimal extension of hemorrhage and air into the RIGHT lateral ventricle subarachnoid hemorrhage again noted in the frontal sulci and BILATERAL sylvian fissures and basilar cisterns. Bilateral uncal herniation is noted with transtentorial herniation. Mild subdural hemorrhage seen along the tentorium. Ventricles are decreased in size with minimal dependent hemorrhage. Postoperative changes with air and hemorrhage noted subjacent to the RIGHT temporal frontal craniotomy which extends into the lateral roof of the orbit . The orbits show RIGHT periorbital soft tissue swelling with minimal orbital emphysema.    MR Head No Cont (04.08.24 @ 10:34)   1. Multifocal acute to subacute infarction. This extensively involves each MCA posterior distribution, the right basal ganglia, the anterior parasagittal vertex CINDY distribution and an additional smaller lesion in the right cerebellum  2. Right middle cranial fossa surgical aneurysm clip is associated with local susceptibility artifact and mild vasogenic edema right medial temporal tip. Persistent subarachnoid space hemorrhage right sylvian fissure. Small middle cranial fossa subdural space fluid collections, likely postoperative, right larger than left  3. Right-sided ventricular catheter. Right corona radiata/basal ganglia adjacent parenchymal hemorrhage. Right intraventricular hemorrhage    CT Head No Cont (04.16.24 @ 09:42)   Status post right pterional craniotomy for distal right ICA aneurysm clipping. Status post removal of right frontal EVD. No hydrocephalus. Trace intraventricular air.      VA Duplex Lower Ext Vein Scan, Bilat (03.28.24 @ 12:39)   Acute left lower extremity DVT below the knee, involving the left soleal vein. Edema of the superficial soft tissues of the left calf. Correlate clinically.  No acute DVT of the right lower extremity.    VA Duplex Lower Ext Vein Scan, Bilat (04.03.24 @ 16:39)   Acute left soleal vein DVT, unchanged.  Acute right soleal vein DVT.    VA Duplex Lower Ext Vein Scan, Bilat (04.10.24 @ 13:01)   Below the knee, in addition to the previously identified right and left soleal vein DVT, there is now also DVT affecting the right posterior tibial veins.    VA Duplex Lower Ext Vein Scan, Bilat (04.23.24 @ 14:30)   Persistent bilateral soleal vein DVT.        	  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 Right frontal EVD placed and patient underwent right pterional craniotomy and clipping of a ruptured right PCOM aneurysm on 3/26 . 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 Patient found to have HFrEF (now recovered), bradycardia (placed on theophyline, now resolved), b/l soleal DVT (on Lovenox ppx). PEG placement initially complicated by gastric wall hematoma, subsequently successfully placed for persistent dysphagia. Patient now admitted for a multidisciplinary rehab program. 04-26-24 @ 15:59.    # SAH s/p ruptured PCOM aneurysm  - R EVD placed 3/26 and removed 4/15  - s/p right pterional craniotomy and clipping of a ruptured right PCOM aneurysm on 3/26  - CTH (3/29): EVD tract acute hemorrhage and new focus of infarction with hemorrhage in the right basal ganglia/corona radiata  - complicated by vasospasm requiring cerebral angiography and IA milrinone and verapamil  - MRI (4/8): Multifocal acute to subacute infarction. This extensively involves each MCA posterior distribution, the right basal ganglia, the anterior parasagittal vertex CINDY distribution and an additional smaller lesion in the right cerebellum  - Continue comprehensive rehab program of PT/OT/SLP - 3 hours a day, 5 days a week. P&O as needed   - Precautions: Fall, Aspiration, Seizure    # B/l DVT   - Left soleal DVT first noted 3/28, right soleal DVT fist noted 4/3  - Most recent Duplex (4/23): persistent bilateral soleal vein DVT  - Continue Lovenox 40 mg QD  - [facility] cardiology recommended repeat surveillance duplex on 4/30.    # Bradycardia; (84 - 94) 4/28  - was on theophyline, now discontinued  - continue to monitor     # HLD  - continue lipitor 40 mg QHS    # HFrEF  - Initial Echo (3/26) with severe cardiomyopathy, EF 36%, regional wall motion abnormality  - Resolved on repeat TTE performed 4/9/24, normal EF 66% with no WMA.    # Hypernatremia; Na 146 4/27; Na 144 4/28  - will increased free water to 250cc Q6H 4/27  - monitor  - Hospitalist consult appreciated  - CMP 4/29    # Gastric Hematoma  -  PEG placement attempted 4/19 for persistent dysphagia, but was aborted due to growing hematoma noted in gastric wall on endoscopy after trocar placement  - CT Abdomen and Pelvis w/ IV Cont showed gastric wall hematoma without intraluminal or extraluminal hemorrhage  - monitor H/H    # Pain  - Tylenol 650 mg Q6H PRN mild pain, oxycodone 5 mg Q4H PRN mod, oxycodone 10 mg Q4H PRN severe pain    # Mood / Cognition  - Neuropsychology consult PRN    # Sleep  - Maintain quiet hours and a low stim environment.    # GI / Bowel  - Senna qHS  - Miralax BID  - bisacodyl 5 mg QHS  - GI ppx: protonix 40 mg QD (started on admission to rehab)    #  / Bladder  - Continue bladder scans Q8 hours with straight cath for >400cc.  - Continue cardura 2 mg QHS     # Skin / Pressure injury  - Skin assessment on admission performed: R craniotomy incision c/d/i (no staples/sutures), one staple to L scalp, left toe scabs, right lateral foot callus  - Pressure Injury/Skin: OOB to chair, PT/OT  - nursing to monitor skin qShift    # Diet/Dysphagia:  - PEG placed on 4/23.   - Diet Consistency: Jevity tube feeds   - Supplements: MVI  - Aspiration Precautions  - SLP consult for swallow function evaluation and treatment  - Nutrition consult    # DVT prophylaxis:   - Lovenox 40 mg QD    Labs:  CBC, CMP 4/29        Outpatient Follow-up:  Perlita Varela  Neurosurgery  [address], [address]  [place] [address]  Phone: [phone]  Fax: [phone]  Follow Up Time:      Code Status/Emergency Contact:      ---------------    Goals: Safe discharge to home  Estimated Length of Stay: 10-14 days  Rehab Potential: Good  Medical Prognosis: Good  Estimated Disposition: Home with home care

## 2024-04-29 DIAGNOSIS — Z86.718 PERSONAL HISTORY OF OTHER VENOUS THROMBOSIS AND EMBOLISM: ICD-10-CM

## 2024-04-29 LAB
ALBUMIN SERPL ELPH-MCNC: 2.5 G/DL — LOW (ref 3.3–5)
ALP SERPL-CCNC: 162 U/L — HIGH (ref 40–120)
ALT FLD-CCNC: 47 U/L — HIGH (ref 10–45)
ANION GAP SERPL CALC-SCNC: 10 MMOL/L — SIGNIFICANT CHANGE UP (ref 5–17)
AST SERPL-CCNC: 28 U/L — SIGNIFICANT CHANGE UP (ref 10–40)
BASOPHILS # BLD AUTO: 0.03 K/UL — SIGNIFICANT CHANGE UP (ref 0–0.2)
BASOPHILS NFR BLD AUTO: 0.5 % — SIGNIFICANT CHANGE UP (ref 0–2)
BILIRUB SERPL-MCNC: 0.3 MG/DL — SIGNIFICANT CHANGE UP (ref 0.2–1.2)
BUN SERPL-MCNC: 17 MG/DL — SIGNIFICANT CHANGE UP (ref 7–23)
CALCIUM SERPL-MCNC: 9.7 MG/DL — SIGNIFICANT CHANGE UP (ref 8.4–10.5)
CHLORIDE SERPL-SCNC: 103 MMOL/L — SIGNIFICANT CHANGE UP (ref 96–108)
CO2 SERPL-SCNC: 29 MMOL/L — SIGNIFICANT CHANGE UP (ref 22–31)
CREAT SERPL-MCNC: 0.5 MG/DL — SIGNIFICANT CHANGE UP (ref 0.5–1.3)
EGFR: 109 ML/MIN/1.73M2 — SIGNIFICANT CHANGE UP
EOSINOPHIL # BLD AUTO: 0.16 K/UL — SIGNIFICANT CHANGE UP (ref 0–0.5)
EOSINOPHIL NFR BLD AUTO: 2.5 % — SIGNIFICANT CHANGE UP (ref 0–6)
GLUCOSE SERPL-MCNC: 94 MG/DL — SIGNIFICANT CHANGE UP (ref 70–99)
HCT VFR BLD CALC: 29.6 % — LOW (ref 34.5–45)
HGB BLD-MCNC: 9.7 G/DL — LOW (ref 11.5–15.5)
IMM GRANULOCYTES NFR BLD AUTO: 0.6 % — SIGNIFICANT CHANGE UP (ref 0–0.9)
LYMPHOCYTES # BLD AUTO: 1.49 K/UL — SIGNIFICANT CHANGE UP (ref 1–3.3)
LYMPHOCYTES # BLD AUTO: 22.9 % — SIGNIFICANT CHANGE UP (ref 13–44)
MCHC RBC-ENTMCNC: 29.1 PG — SIGNIFICANT CHANGE UP (ref 27–34)
MCHC RBC-ENTMCNC: 32.8 GM/DL — SIGNIFICANT CHANGE UP (ref 32–36)
MCV RBC AUTO: 88.9 FL — SIGNIFICANT CHANGE UP (ref 80–100)
MONOCYTES # BLD AUTO: 0.48 K/UL — SIGNIFICANT CHANGE UP (ref 0–0.9)
MONOCYTES NFR BLD AUTO: 7.4 % — SIGNIFICANT CHANGE UP (ref 2–14)
NEUTROPHILS # BLD AUTO: 4.3 K/UL — SIGNIFICANT CHANGE UP (ref 1.8–7.4)
NEUTROPHILS NFR BLD AUTO: 66.1 % — SIGNIFICANT CHANGE UP (ref 43–77)
NRBC # BLD: 0 /100 WBCS — SIGNIFICANT CHANGE UP (ref 0–0)
PLATELET # BLD AUTO: 299 K/UL — SIGNIFICANT CHANGE UP (ref 150–400)
POTASSIUM SERPL-MCNC: 3.5 MMOL/L — SIGNIFICANT CHANGE UP (ref 3.5–5.3)
POTASSIUM SERPL-SCNC: 3.5 MMOL/L — SIGNIFICANT CHANGE UP (ref 3.5–5.3)
PROT SERPL-MCNC: 6.8 G/DL — SIGNIFICANT CHANGE UP (ref 6–8.3)
RBC # BLD: 3.33 M/UL — LOW (ref 3.8–5.2)
RBC # FLD: 14.2 % — SIGNIFICANT CHANGE UP (ref 10.3–14.5)
SODIUM SERPL-SCNC: 142 MMOL/L — SIGNIFICANT CHANGE UP (ref 135–145)
WBC # BLD: 6.5 K/UL — SIGNIFICANT CHANGE UP (ref 3.8–10.5)
WBC # FLD AUTO: 6.5 K/UL — SIGNIFICANT CHANGE UP (ref 3.8–10.5)

## 2024-04-29 PROCEDURE — 99232 SBSQ HOSP IP/OBS MODERATE 35: CPT

## 2024-04-29 PROCEDURE — 99233 SBSQ HOSP IP/OBS HIGH 50: CPT

## 2024-04-29 PROCEDURE — 76705 ECHO EXAM OF ABDOMEN: CPT | Mod: 26

## 2024-04-29 RX ADMIN — PANTOPRAZOLE SODIUM 40 MILLIGRAM(S): 20 TABLET, DELAYED RELEASE ORAL at 06:01

## 2024-04-29 RX ADMIN — Medication 15 MILLILITER(S): at 18:36

## 2024-04-29 RX ADMIN — Medication 2 MILLIGRAM(S): at 21:34

## 2024-04-29 RX ADMIN — ENOXAPARIN SODIUM 40 MILLIGRAM(S): 100 INJECTION SUBCUTANEOUS at 18:36

## 2024-04-29 RX ADMIN — ATORVASTATIN CALCIUM 40 MILLIGRAM(S): 80 TABLET, FILM COATED ORAL at 21:34

## 2024-04-29 RX ADMIN — POLYETHYLENE GLYCOL 3350 17 GRAM(S): 17 POWDER, FOR SOLUTION ORAL at 18:37

## 2024-04-29 RX ADMIN — POLYETHYLENE GLYCOL 3350 17 GRAM(S): 17 POWDER, FOR SOLUTION ORAL at 06:01

## 2024-04-29 NOTE — PROGRESS NOTE ADULT - ASSESSMENT
59 y/o F with no known PMH, h/o fell one month prior to admission, who was transferred from [place] to [facility] on 3/26/24 with diffuse SAH w/ small IVH, 2/2 ruptured posterolaterally projecting 2.9 x 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 Patient s/p right frontal EVD and right pterional craniotomy with clipping of a ruptured right PCOM aneurysm on 3/26. Course complicated by EVD tract hemorrhage, new right basal ganglia/corona radiata infarct, hydrocephalus with ICP crisis, vasospasm requiring IA milrinone/verapimil, and new widespread ischemic strokes. Patient also found to have HFrEF (now recovered), bradycardia (resolved), b/l soleal DVT. s/p PEG placement initially complicated by gastric wall hematoma    # SAH s/p ruptured PCOM aneurysm  - s/p right pterional craniotomy and clipping of a ruptured right PCOM aneurysm on 3/26  - complicated by vasospasm requiring cerebral angiography and IA milrinone and verapamil  - R craniotomy incision c/d/i (no staples/sutures), one staple to L scalp,  - CTH (3/29): EVD tract acute hemorrhage and new focus of infarction with hemorrhage in the right basal ganglia/corona radiata  - MRI (4/8): Multifocal acute to subacute infarction. This extensively involves each MCA posterior distribution, the right basal ganglia, the anterior parasagittal vertex CINDY distribution and an additional smaller lesion in the right cerebellum  - Continue comprehensive rehab program of PT/OT/SLP - 3 hours a day, 5 days a week  - Precautions: Fall, Aspiration, Seizure, PEG, VTE bLE    # Bradycardia  - was on theophyline, now discontinued  - HR 93 4/29    # HFrEF  - Initial Echo (3/26) with severe cardiomyopathy, EF 36%, regional wall motion abnormality  - Resolved on repeat TTE performed 4/9/24, normal EF 66% with no WMA.  - BP  (139/71 - 139/71) 4/29    # HLD  - continue lipitor 40 mg QHS    # B/l DVT   - Left soleal DVT first noted 3/28, right soleal DVT fist noted 4/3  - Duplex (4/23): persistent bilateral soleal vein DVT  - Continue Lovenox 40 mg QD  - Repeat surveillance duplex on 4/30.    # Hypernatremia  - continue free water 200 cc Q6H  - BMP 4/29 pending    # Gastric Hematoma post PEG placement  -  PEG placement attempted 4/19 for persistent dysphagia, but was aborted due to growing hematoma noted in gastric wall on endoscopy after trocar placement  - CT Abdomen and Pelvis w/ IV Cont showed gastric wall hematoma without intraluminal or extraluminal hemorrhage  - PEG placed 4/23  - monitor H/H. Hgb 9.7 4/29 stable    # Pain  - Tylenol 650 mg Q6H PRN mild pain  - oxycodone 5 mg Q4H PRN mod pain  - oxycodone 10 mg Q4H PRN severe pain    # Sleep  - Maintain quiet hours and a low stim environment.    # GI / Bowel  - Senna qHS  - Miralax BID  - bisacodyl 5 mg QHS  - GI ppx: protonix 40 mg QD (started on admission to rehab)    #  / Bladder  - Continue cardura 2 mg QHS   - bladder scans Q8 hours with straight cath for >400cc.    # Skin / Pressure injury  - Skin assessment on admission performed:  left toe scabs, right lateral foot callus    # Diet/Dysphagia:  - PEG placed on 4/23.   - Diet Consistency: Jevity tube feeds   - Supplements: MVI    # DVT prophylaxis:   - Lovenox 40 mg QD    # LABS  BMP 4/29  Doppler 4/30  CBC CMP 5/2      Outpatient Follow-up:  Perlita Varela  Neurosurgery  [address] [place] [address], [address]  [place] [address]  Phone: [phone]  Fax: [phone]  Follow Up Time:      Code Status/Emergency Contact:     59 y/o F with no known PMH, h/o fell one month prior to admission, who was transferred from [place] to [facility] on 3/26/24 with diffuse SAH w/ small IVH, 2/2 ruptured posterolaterally projecting 2.9 x 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 Patient s/p right frontal EVD and right pterional craniotomy with clipping of a ruptured right PCOM aneurysm on 3/26. Course complicated by EVD tract hemorrhage, new right basal ganglia/corona radiata infarct, hydrocephalus with ICP crisis, vasospasm requiring IA milrinone/verapimil, and new widespread ischemic strokes. Patient also found to have HFrEF (now recovered), bradycardia (resolved), b/l soleal DVT. s/p PEG placement initially complicated by gastric wall hematoma    # SAH s/p ruptured PCOM aneurysm with left hemiparesis, left esther inattention, dysphagia, cognitive impairment  - s/p right pterional craniotomy and clipping of a ruptured right PCOM aneurysm on 3/26  - complicated by vasospasm requiring cerebral angiography and IA milrinone and verapamil  - R craniotomy incision c/d/i (no staples/sutures), one staple to L scalp,  - CTH (3/29): EVD tract acute hemorrhage and new focus of infarction with hemorrhage in the right basal ganglia/corona radiata  - MRI (4/8): Multifocal acute to subacute infarction. This extensively involves each MCA posterior distribution, the right basal ganglia, the anterior parasagittal vertex CINDY distribution and an additional smaller lesion in the right cerebellum  - Continue comprehensive rehab program of PT/OT/SLP - 3 hours a day, 5 days a week  - Precautions: Fall, Aspiration, Seizure, PEG, VTE bLE    # Bradycardia  - was on theophyline, now discontinued  - HR 93 4/29    # HFrEF  - Initial Echo (3/26) with severe cardiomyopathy, EF 36%, regional wall motion abnormality  - Resolved on repeat TTE performed 4/9/24, normal EF 66% with no WMA.  - BP  (139/71 - 139/71) 4/29    # HLD  - continue lipitor 40 mg QHS    # B/l DVT   - Left soleal DVT first noted 3/28, right soleal DVT fist noted 4/3  - Duplex (4/23): persistent bilateral soleal vein DVT  - Continue Lovenox 40 mg QD  - Repeat surveillance duplex on 4/30.    # Hypernatremia  - continue free water 200 cc Q6H  - BMP 4/29 pending    # Gastric Hematoma post PEG placement  -  PEG placement attempted 4/19 for persistent dysphagia, but was aborted due to growing hematoma noted in gastric wall on endoscopy after trocar placement  - CT Abdomen and Pelvis w/ IV Cont showed gastric wall hematoma without intraluminal or extraluminal hemorrhage  - PEG placed 4/23  - monitor H/H. Hgb 9.7 4/29 stable    # Pain  - Tylenol 650 mg Q6H PRN mild pain  - oxycodone 5 mg Q4H PRN mod pain  - oxycodone 10 mg Q4H PRN severe pain    # Sleep  - Maintain quiet hours and a low stim environment.    # GI / Bowel  - Senna qHS  - Miralax BID  - bisacodyl 5 mg QHS  - GI ppx: protonix 40 mg QD (started on admission to rehab)    #  / Bladder  - Continue cardura 2 mg QHS   - bladder scans Q8 hours with straight cath for >400cc.    # Skin / Pressure injury  - Skin assessment on admission performed:  left toe scabs, right lateral foot callus (will request podiatry evaluation)    # Diet/Dysphagia:  - PEG placed on 4/23.   - Diet Consistency: Jevity tube feeds   - Supplements: MVI  - BUn/Cr 17/0.51 4/29    # DVT prophylaxis:   - Lovenox 40 mg QD    # LABS  Doppler 4/30  Podiatry  CBC CMP 5/2      Outpatient Follow-up:  Perlita Varela  Neurosurgery  [address], [address]  [place] [address]  Phone: [phone]  Fax: [phone]  Follow Up Time:      Code Status/Emergency Contact:

## 2024-04-29 NOTE — PROGRESS NOTE ADULT - SUBJECTIVE AND OBJECTIVE BOX
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 Initially presented to [place] with headaches and became unresponsive, was intubated, and transferred to [facility]. On arrival to [facility] patient w/ PERRL, +cough/gag, 2/5 spontaneous movement in LUE, otherwise no movement. Given 1 unit platelets and R frontal EVD placed with high opening pressure. Patient underwent Right pterional craniotomy and clipping of a ruptured right PCOM aneurysm on 3/26.     On 3/29, EVD displaced and replaced in situ, complicated by EVD tract acute hemorrhage and new focus of infarction with hemorrhage in the right basal ganglia/corona radiata. Course further complicated by hydrocephalus with ICP crisis, vasospasm requiring cerebral angiography and IA milrinone and verapamil. MRI (4/8) with widespread ischemic stroke b/l, as per neuro IR no further intervention. Patient extubated on 4/3, re-intubated 4/8, and extubated again on 4/9. EVD removed on 4/15. CTH post removal stable.     Course also complicated by bradycardia treated with theophyline, now resolved and discontinued. Initial Echo (3/26) with severe cardiomyopathy, EF 36%, regional wall motion abnormality. Resolved on repeat TTE performed 4/9/24, normal EF 66% with no WMA. Patient first noted with left soleal DVT on Duplex 3/28. Duplex (4/10) with stable b/l soleal DVT with extension to right posterior tibial DVT.  Last LE duplex with stable bilateral soleal DVT, [facility] cardiology recommended repeat surveillance duplex on 4/30. On Lovenox ppx.     PEG placement attempted 4/19 for persistent dysphagia, but was aborted due to growing hematoma noted in gastric wall on endoscopy after trocar placement. CT Abdomen and Pelvis w/ IV Cont showed gastric wall hematoma without intraluminal or extraluminal hemorrhage. PEG successfully placed on 4/23.     Patient evaluated by PT/OT and was recommended for acute inpatient rehab. Patient is medically stable for discharge to [facility] on 4/26.  (26 Apr 2024 14:34)      PAST MEDICAL & SURGICAL HISTORY:      MEDICATIONS  (STANDING):  atorvastatin 40 milliGRAM(s) Oral at bedtime  bisacodyl 5 milliGRAM(s) Oral at bedtime  doxazosin 2 milliGRAM(s) Oral at bedtime  enoxaparin Injectable 40 milliGRAM(s) SubCutaneous <User Schedule>  multivitamin/minerals/iron Oral Solution (CENTRUM) 15 milliLiter(s) Oral daily  pantoprazole   Suspension 40 milliGRAM(s) Oral before breakfast  polyethylene glycol 3350 17 Gram(s) Oral two times a day  senna Syrup 10 milliLiter(s) Oral at bedtime    MEDICATIONS  (PRN):  acetaminophen   Oral Liquid .. 650 milliGRAM(s) Oral every 6 hours PRN Mild Pain (1 - 3)  oxyCODONE    IR 10 milliGRAM(s) Oral every 4 hours PRN Severe Pain (7 - 10)  oxyCODONE    IR 5 milliGRAM(s) Oral every 4 hours PRN Moderate Pain (4 - 6)  petrolatum white Ointment 1 Application(s) Topical every 12 hours PRN dryness      Allergies    No Known Allergies    Intolerances          VITALS  58y  Vital Signs Last 24 Hrs  T(C): 36.7 (28 Apr 2024 20:26), Max: 36.7 (28 Apr 2024 20:26)  T(F): 98.1 (28 Apr 2024 20:26), Max: 98.1 (28 Apr 2024 20:26)  HR: 93 (28 Apr 2024 20:26) (93 - 93)  BP: 139/71 (28 Apr 2024 20:26) (139/71 - 139/71)  BP(mean): --  RR: 16 (28 Apr 2024 20:26) (16 - 16)  SpO2: 98% (28 Apr 2024 20:26) (98% - 98%)    Parameters below as of 28 Apr 2024 20:26  Patient On (Oxygen Delivery Method): room air      Daily     Daily         RECENT LABS:                          9.7    6.50  )-----------( 299      ( 29 Apr 2024 05:52 )             29.6     04-28    144  |  106  |  24<H>  ----------------------------<  101<H>  3.6   |  31  |  0.53    Ca    9.4      28 Apr 2024 06:38  Phos  3.9     04-28  Mg     2.1     04-28    TPro  6.9  /  Alb  2.5<L>  /  TBili  0.3  /  DBili  x   /  AST  30  /  ALT  40  /  AlkPhos  166<H>  04-28    LIVER FUNCTIONS - ( 28 Apr 2024 06:38 )  Alb: 2.5 g/dL / Pro: 6.9 g/dL / ALK PHOS: 166 U/L / ALT: 40 U/L / AST: 30 U/L / GGT: 48 U/L         Urinalysis Basic - ( 28 Apr 2024 06:38 )    Color: x / Appearance: x / SG: x / pH: x  Gluc: 101 mg/dL / Ketone: x  / Bili: x / Urobili: x   Blood: x / Protein: x / Nitrite: x   Leuk Esterase: x / RBC: x / WBC x   Sq Epi: x / Non Sq Epi: x / Bacteria: x          CAPILLARY BLOOD GLUCOSE                   Patient is a 58y old  Female who presents with a chief complaint of SAH (28 Apr 2024 09:40)      HPI:  57 y/o F with no PMH, fell one month prior to admission, transferred from [place] to [facility] on 3/26/24 for diffuse SAH w/ small IVH, 2/2 ruptured posterolaterally projecting 2.9 x 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 Initially presented to [place] with headaches and became unresponsive, was intubated, and transferred to [facility]. On arrival to [facility] patient w/ PERRL, +cough/gag, 2/5 spontaneous movement in LUE, otherwise no movement. Given 1 unit platelets and R frontal EVD placed with high opening pressure. Patient underwent Right pterional craniotomy and clipping of a ruptured right PCOM aneurysm on 3/26.     On 3/29, EVD displaced and replaced in situ, complicated by EVD tract acute hemorrhage and new focus of infarction with hemorrhage in the right basal ganglia/corona radiata. Course further complicated by hydrocephalus with ICP crisis, vasospasm requiring cerebral angiography and IA milrinone and verapamil. MRI (4/8) with widespread ischemic stroke b/l, as per neuro IR no further intervention. Patient extubated on 4/3, re-intubated 4/8, and extubated again on 4/9. EVD removed on 4/15. CTH post removal stable.     Course also complicated by bradycardia treated with theophyline, now resolved and discontinued. Initial Echo (3/26) with severe cardiomyopathy, EF 36%, regional wall motion abnormality. Resolved on repeat TTE performed 4/9/24, normal EF 66% with no WMA. Patient first noted with left soleal DVT on Duplex 3/28. Duplex (4/10) with stable b/l soleal DVT with extension to right posterior tibial DVT.  Last LE duplex with stable bilateral soleal DVT, [facility] cardiology recommended repeat surveillance duplex on 4/30. On Lovenox ppx.     PEG placement attempted 4/19 for persistent dysphagia, but was aborted due to growing hematoma noted in gastric wall on endoscopy after trocar placement. CT Abdomen and Pelvis w/ IV Cont showed gastric wall hematoma without intraluminal or extraluminal hemorrhage. PEG successfully placed on 4/23.     Patient evaluated by PT/OT and was recommended for acute inpatient rehab. Patient is medically stable for discharge to [facility] on 4/26.  (26 Apr 2024 14:34)      PAST MEDICAL & SURGICAL HISTORY:      MEDICATIONS  (STANDING):  atorvastatin 40 milliGRAM(s) Oral at bedtime  bisacodyl 5 milliGRAM(s) Oral at bedtime  doxazosin 2 milliGRAM(s) Oral at bedtime  enoxaparin Injectable 40 milliGRAM(s) SubCutaneous <User Schedule>  multivitamin/minerals/iron Oral Solution (CENTRUM) 15 milliLiter(s) Oral daily  pantoprazole   Suspension 40 milliGRAM(s) Oral before breakfast  polyethylene glycol 3350 17 Gram(s) Oral two times a day  senna Syrup 10 milliLiter(s) Oral at bedtime    MEDICATIONS  (PRN):  acetaminophen   Oral Liquid .. 650 milliGRAM(s) Oral every 6 hours PRN Mild Pain (1 - 3)  oxyCODONE    IR 10 milliGRAM(s) Oral every 4 hours PRN Severe Pain (7 - 10)  oxyCODONE    IR 5 milliGRAM(s) Oral every 4 hours PRN Moderate Pain (4 - 6)  petrolatum white Ointment 1 Application(s) Topical every 12 hours PRN dryness      Allergies    No Known Allergies    Intolerances          VITALS  58y  Vital Signs Last 24 Hrs  T(C): 36.7 (28 Apr 2024 20:26), Max: 36.7 (28 Apr 2024 20:26)  T(F): 98.1 (28 Apr 2024 20:26), Max: 98.1 (28 Apr 2024 20:26)  HR: 93 (28 Apr 2024 20:26) (93 - 93)  BP: 139/71 (28 Apr 2024 20:26) (139/71 - 139/71)  BP(mean): --  RR: 16 (28 Apr 2024 20:26) (16 - 16)  SpO2: 98% (28 Apr 2024 20:26) (98% - 98%)    Parameters below as of 28 Apr 2024 20:26  Patient On (Oxygen Delivery Method): room air      Daily     Daily         RECENT LABS:                          9.7    6.50  )-----------( 299      ( 29 Apr 2024 05:52 )             29.6     04-28    144  |  106  |  24<H>  ----------------------------<  101<H>  3.6   |  31  |  0.53    Ca    9.4      28 Apr 2024 06:38  Phos  3.9     04-28  Mg     2.1     04-28    TPro  6.9  /  Alb  2.5<L>  /  TBili  0.3  /  DBili  x   /  AST  30  /  ALT  40  /  AlkPhos  166<H>  04-28 04-29    142  |  103  |  17  ----------------------------<  94  3.5   |  29  |  0.50    Ca    9.7      29 Apr 2024 05:52  Phos  3.9     04-28  Mg     2.1     04-28    TPro  6.8  /  Alb  2.5<L>  /  TBili  0.3  /  DBili  x   /  AST  28  /  ALT  47<H>  /  AlkPhos  162<H>  04-29      LIVER FUNCTIONS - ( 28 Apr 2024 06:38 )  Alb: 2.5 g/dL / Pro: 6.9 g/dL / ALK PHOS: 166 U/L / ALT: 40 U/L / AST: 30 U/L / GGT: 48 U/L         Urinalysis Basic - ( 28 Apr 2024 06:38 )    Color: x / Appearance: x / SG: x / pH: x  Gluc: 101 mg/dL / Ketone: x  / Bili: x / Urobili: x   Blood: x / Protein: x / Nitrite: x   Leuk Esterase: x / RBC: x / WBC x   Sq Epi: x / Non Sq Epi: x / Bacteria: x          CAPILLARY BLOOD GLUCOSE

## 2024-04-29 NOTE — PROGRESS NOTE ADULT - ASSESSMENT
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 Right frontal EVD placed and patient underwent right pterional craniotomy and clipping of a ruptured right PCOM aneurysm on 3/26 . 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 Patient found to have HFrEF (now recovered), bradycardia (placed on theophyline, now resolved), b/l soleal DVT (on Lovenox ppx). PEG placement initially complicated by gastric wall hematoma, subsequently successfully placed for persistent dysphagia. Patient now admitted for a multidisciplinary rehab program. \    # SAH s/p ruptured PCOM aneurysm  - R EVD placed 3/26 and removed 4/15  - s/p right pterional craniotomy and clipping of a ruptured right PCOM aneurysm on 3/26  - CTH (3/29): EVD tract acute hemorrhage and new focus of infarction with hemorrhage in the right basal ganglia/corona radiata  - complicated by vasospasm requiring cerebral angiography and IA milrinone and verapamil  - MRI (4/8): Multifocal acute to subacute infarction. This extensively involves each MCA posterior distribution, the right basal ganglia, the anterior parasagittal vertex CINDY distribution and an additional smaller lesion in the right cerebellum  - Start comprehensive rehab program of PT/OT/SLP - 3 hours a day, 5 days a week. P&O as needed   - Precautions: Fall, Aspiration, Seizure  - currently with aphasia, but does follow commands     # B/l DVT   - Left soleal DVT first noted 3/28, right soleal DVT fist noted 4/3  - Most recent Duplex (4/23): persistent bilateral soleal vein DVT  - Continue Lovenox 40 mg QD  - [facility] cardiology recommended repeat surveillance duplex on 4/30.    # HLD  - continue lipitor 40 mg QHS    # HFrEF  - Initial Echo (3/26) with severe cardiomyopathy, EF 36%, regional wall motion abnormality  - Resolved on repeat TTE performed 4/9/24, normal EF 66% with no WMA.    # Hypernatremia  - was on free water 200 cc Q6H now increased to 250 q6h  - monitor BMP every other day     # Gastric Hematoma  - PEG placement attempted 4/19 for persistent dysphagia, but was aborted due to growing hematoma noted in gastric wall on endoscopy after trocar placement  - CT Abdomen and Pelvis w/ IV Cont showed gastric wall hematoma without intraluminal or extraluminal hemorrhage  - monitor H/H, stable at 9.8-10s    #Elevated alk phos  - now with rising AST, and elevated GGT with worsening alk phos - obtained right upper quadrant ultrasound and this showed "Distended gallbladder with sludge and wall thickening"  - currently without any sxs of acute justina, afebrile, abd soft and nontender. if develops fever, of if LFTs continue to rise would get HIDA scan  - AMA, CMP, acute hepatitis panel ordered for AM    #ETC  - DVT ppx with Lovenox  - cardura for urinary retention

## 2024-04-29 NOTE — PROGRESS NOTE ADULT - COMMENTS
Patient in bed, eyes spontaneously open. Does answer some simple personal questions verbally, (family reports equally fluent in [place] or english) +left hem inattention noted and some reduced simple attention, easily distractable but no agitation. Maintains eye opening throughout exam. No fever overnight. TF infusing well

## 2024-04-29 NOTE — PROGRESS NOTE ADULT - SUBJECTIVE AND OBJECTIVE BOX
Patient is a 58y old  Female who presents with a chief complaint of SAH (29 Apr 2024 08:17)      Patient seen and examined at bedside.  - no events overnight,  ID [id number] used  - denies any acute issues ( mostly nods head and moves hand), denies n/v/abd pain/cough  ALLERGIES:  No Known Allergies    MEDICATIONS  (STANDING):  atorvastatin 40 milliGRAM(s) Oral at bedtime  bisacodyl 5 milliGRAM(s) Oral at bedtime  doxazosin 2 milliGRAM(s) Oral at bedtime  enoxaparin Injectable 40 milliGRAM(s) SubCutaneous <User Schedule>  multivitamin/minerals/iron Oral Solution (CENTRUM) 15 milliLiter(s) Oral daily  pantoprazole   Suspension 40 milliGRAM(s) Oral before breakfast  polyethylene glycol 3350 17 Gram(s) Oral two times a day  senna Syrup 10 milliLiter(s) Oral at bedtime    MEDICATIONS  (PRN):  acetaminophen   Oral Liquid .. 650 milliGRAM(s) Oral every 6 hours PRN Mild Pain (1 - 3)  oxyCODONE    IR 10 milliGRAM(s) Oral every 4 hours PRN Severe Pain (7 - 10)  oxyCODONE    IR 5 milliGRAM(s) Oral every 4 hours PRN Moderate Pain (4 - 6)  petrolatum white Ointment 1 Application(s) Topical every 12 hours PRN dryness    Vital Signs Last 24 Hrs  T(F): 98 (29 Apr 2024 08:15), Max: 98.1 (28 Apr 2024 20:26)  HR: 92 (29 Apr 2024 08:15) (92 - 93)  BP: 131/82 (29 Apr 2024 08:15) (131/82 - 139/71)  RR: 16 (29 Apr 2024 08:15) (16 - 16)  SpO2: 95% (29 Apr 2024 08:15) (95% - 98%)  I&O's Summary    28 Apr 2024 07:01  -  29 Apr 2024 07:00  --------------------------------------------------------  IN: 550 mL / OUT: 0 mL / NET: 550 mL      BMI (kg/m2): 25.2 (04-26-24 @ 17:54)    PHYSICAL EXAM:  GENERAL: NAD, well-groomed, well-developed  HEAD: + right craniotomy site is c/d/i no pus, bleeding or erythema   EYES: EOMI, PERRL, conjunctiva and sclera clear  ENMT: Moist mucous membrane  NECK: Supple, No JVD  CHEST/LUNG: Clear to auscultation bilaterally, non-labored breathing, good air entry  HEART: RRR; S1/S2, No murmur  ABDOMEN: Soft, Nontender, Nondistended; Bowel sounds present  VASCULAR: Normal pulses, Normal capillary refill  EXTREMITIES: No cyanosis, No edema  NERVOUS SYSTEM:  minimally verbal, follows simple commands, nods head yes and no, moves right side more than L    LABS:                        9.7    6.50  )-----------( 299      ( 29 Apr 2024 05:52 )             29.6       04-29    142  |  103  |  17  ----------------------------<  94  3.5   |  29  |  0.50    Ca    9.7      29 Apr 2024 05:52  Phos  3.9     04-28  Mg     2.1     04-28    TPro  6.8  /  Alb  2.5  /  TBili  0.3  /  DBili  x   /  AST  28  /  ALT  47  /  AlkPhos  162  04-29                03-26 Chol 174 mg/dL LDL -- HDL 53 mg/dL Trig 82 mg/dL                  Urinalysis Basic - ( 29 Apr 2024 05:52 )    Color: x / Appearance: x / SG: x / pH: x  Gluc: 94 mg/dL / Ketone: x  / Bili: x / Urobili: x   Blood: x / Protein: x / Nitrite: x   Leuk Esterase: x / RBC: x / WBC x   Sq Epi: x / Non Sq Epi: x / Bacteria: x            RADIOLOGY & ADDITIONAL TESTS:    Care Discussed with Consultants/Other Providers: IDR team

## 2024-04-30 LAB
ALBUMIN SERPL ELPH-MCNC: 2.6 G/DL — LOW (ref 3.3–5)
ALP SERPL-CCNC: 158 U/L — HIGH (ref 40–120)
ALT FLD-CCNC: 45 U/L — SIGNIFICANT CHANGE UP (ref 10–45)
ANION GAP SERPL CALC-SCNC: 9 MMOL/L — SIGNIFICANT CHANGE UP (ref 5–17)
AST SERPL-CCNC: 29 U/L — SIGNIFICANT CHANGE UP (ref 10–40)
BILIRUB SERPL-MCNC: 0.4 MG/DL — SIGNIFICANT CHANGE UP (ref 0.2–1.2)
BUN SERPL-MCNC: 15 MG/DL — SIGNIFICANT CHANGE UP (ref 7–23)
CALCIUM SERPL-MCNC: 9.4 MG/DL — SIGNIFICANT CHANGE UP (ref 8.4–10.5)
CHLORIDE SERPL-SCNC: 104 MMOL/L — SIGNIFICANT CHANGE UP (ref 96–108)
CO2 SERPL-SCNC: 28 MMOL/L — SIGNIFICANT CHANGE UP (ref 22–31)
CREAT SERPL-MCNC: 0.44 MG/DL — LOW (ref 0.5–1.3)
EGFR: 113 ML/MIN/1.73M2 — SIGNIFICANT CHANGE UP
GLUCOSE SERPL-MCNC: 95 MG/DL — SIGNIFICANT CHANGE UP (ref 70–99)
HAV IGM SER-ACNC: SIGNIFICANT CHANGE UP
HBV CORE IGM SER-ACNC: SIGNIFICANT CHANGE UP
HBV SURFACE AG SER-ACNC: SIGNIFICANT CHANGE UP
HCV AB S/CO SERPL IA: 0.61 S/CO — SIGNIFICANT CHANGE UP (ref 0–0.99)
HCV AB SERPL-IMP: SIGNIFICANT CHANGE UP
POTASSIUM SERPL-MCNC: 3.7 MMOL/L — SIGNIFICANT CHANGE UP (ref 3.5–5.3)
POTASSIUM SERPL-SCNC: 3.7 MMOL/L — SIGNIFICANT CHANGE UP (ref 3.5–5.3)
PROT SERPL-MCNC: 6.9 G/DL — SIGNIFICANT CHANGE UP (ref 6–8.3)
SODIUM SERPL-SCNC: 141 MMOL/L — SIGNIFICANT CHANGE UP (ref 135–145)

## 2024-04-30 PROCEDURE — 99232 SBSQ HOSP IP/OBS MODERATE 35: CPT

## 2024-04-30 PROCEDURE — 93970 EXTREMITY STUDY: CPT | Mod: 26

## 2024-04-30 RX ADMIN — Medication 15 MILLILITER(S): at 11:55

## 2024-04-30 RX ADMIN — ATORVASTATIN CALCIUM 40 MILLIGRAM(S): 80 TABLET, FILM COATED ORAL at 21:03

## 2024-04-30 RX ADMIN — ENOXAPARIN SODIUM 40 MILLIGRAM(S): 100 INJECTION SUBCUTANEOUS at 17:00

## 2024-04-30 RX ADMIN — Medication 2 MILLIGRAM(S): at 21:03

## 2024-04-30 RX ADMIN — PANTOPRAZOLE SODIUM 40 MILLIGRAM(S): 20 TABLET, DELAYED RELEASE ORAL at 05:14

## 2024-04-30 RX ADMIN — POLYETHYLENE GLYCOL 3350 17 GRAM(S): 17 POWDER, FOR SOLUTION ORAL at 05:14

## 2024-04-30 NOTE — PROGRESS NOTE ADULT - COMMENTS
Patient sitting in wheelchair. Looks more alert than yesterday, still with left esther inattention but improved command following, increased initiation . Denies H/A, light sensitivity. Reports sleeping well. She fatigues quickly with reduced simple attention during exam however

## 2024-04-30 NOTE — PROGRESS NOTE ADULT - ASSESSMENT
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 Right frontal EVD placed and patient underwent right pterional craniotomy and clipping of a ruptured right PCOM aneurysm on 3/26 . 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 Patient found to have HFrEF (now recovered), bradycardia (placed on theophyline, now resolved), b/l soleal DVT (on Lovenox ppx). PEG placement initially complicated by gastric wall hematoma, subsequently successfully placed for persistent dysphagia. Patient now admitted for a multidisciplinary rehab program. \    # SAH s/p ruptured PCOM aneurysm  - R EVD placed 3/26 and removed 4/15  - s/p right pterional craniotomy and clipping of a ruptured right PCOM aneurysm on 3/26  - CTH (3/29): EVD tract acute hemorrhage and new focus of infarction with hemorrhage in the right basal ganglia/corona radiata  - complicated by vasospasm requiring cerebral angiography and IA milrinone and verapamil  - MRI (4/8): Multifocal acute to subacute infarction. This extensively involves each MCA posterior distribution, the right basal ganglia, the anterior parasagittal vertex CINDY distribution and an additional smaller lesion in the right cerebellum  - Start comprehensive rehab program of PT/OT/SLP  - Precautions: Fall, Aspiration, Seizure  - currently with aphasia, but does follow commands     # B/l DVT   - Left soleal DVT first noted 3/28, right soleal DVT fist noted 4/3  - Most recent Duplex (4/23): persistent bilateral soleal vein DVT  - Continue Lovenox 40 mg QD  - [facility] cardiology recommended repeat surveillance duplex on 4/30.    # HLD  - continue lipitor 40 mg QHS    # HFrEF  - Initial Echo (3/26) with severe cardiomyopathy, EF 36%, regional wall motion abnormality  - Resolved on repeat TTE performed 4/9/24, normal EF 66% with no WMA.    # Hypernatremia  - was on free water 200 cc Q6H now increased to 250 q6h with stabilization of sodium   - monitor BMP as needed     # Gastric Hematoma  - PEG placement attempted 4/19 for persistent dysphagia, but was aborted due to growing hematoma noted in gastric wall on endoscopy after trocar placement  - CT Abdomen and Pelvis w/ IV Cont showed gastric wall hematoma without intraluminal or extraluminal hemorrhage  - monitor H/H, stable at 9.8-10s    #Elevated alk phos  - now with rising AST, and elevated GGT with worsening alk phos - obtained right upper quadrant ultrasound and this showed "Distended gallbladder with sludge and wall thickening"  - currently without any sxs of acute justina, afebrile, abd soft and nontender  - if develops fever, of if LFTs continue to rise would get HIDA scan  - AMA, CMP, acute hepatitis panel ordered, f/u results     #ETC  - DVT ppx with Lovenox  - cardura for urinary retention

## 2024-04-30 NOTE — PROGRESS NOTE ADULT - SUBJECTIVE AND OBJECTIVE BOX
Patient is a 58y old  Female who presents with a chief complaint of SAH (29 Apr 2024 15:08)      Patient seen and examined at bedside.  - no events overnight, sitting comfortably on the wheelchair. no n/v and denies any pain, denies any abdominal pain at rest or after po intake. denies LE pain and swallowing. following commands.     ALLERGIES:  No Known Allergies    MEDICATIONS  (STANDING):  atorvastatin 40 milliGRAM(s) Oral at bedtime  bisacodyl 5 milliGRAM(s) Oral at bedtime  doxazosin 2 milliGRAM(s) Oral at bedtime  enoxaparin Injectable 40 milliGRAM(s) SubCutaneous <User Schedule>  multivitamin/minerals/iron Oral Solution (CENTRUM) 15 milliLiter(s) Oral daily  pantoprazole   Suspension 40 milliGRAM(s) Oral before breakfast  polyethylene glycol 3350 17 Gram(s) Oral two times a day  senna Syrup 10 milliLiter(s) Oral at bedtime    MEDICATIONS  (PRN):  acetaminophen   Oral Liquid .. 650 milliGRAM(s) Oral every 6 hours PRN Mild Pain (1 - 3)  oxyCODONE    IR 10 milliGRAM(s) Oral every 4 hours PRN Severe Pain (7 - 10)  oxyCODONE    IR 5 milliGRAM(s) Oral every 4 hours PRN Moderate Pain (4 - 6)  petrolatum white Ointment 1 Application(s) Topical every 12 hours PRN dryness    Vital Signs Last 24 Hrs  T(F): 97.7 (30 Apr 2024 08:40), Max: 98.8 (29 Apr 2024 20:35)  HR: 69 (30 Apr 2024 08:40) (69 - 79)  BP: 118/76 (30 Apr 2024 08:40) (115/85 - 118/76)  RR: 17 (30 Apr 2024 08:40) (16 - 17)  SpO2: 98% (30 Apr 2024 08:40) (96% - 98%)  I&O's Summary    BMI (kg/m2): 25.2 (04-26-24 @ 17:54)    PHYSICAL EXAM:  GENERAL: NAD, well-groomed, well-developed  HEAD: + right craniotomy site is c/d/i no pus, bleeding or erythema   EYES: EOMI, PERRL, conjunctiva and sclera clear  ENMT: Moist mucous membrane  NECK: Supple, No JVD  CHEST/LUNG: Clear to auscultation bilaterally, non-labored breathing, good air entry  HEART: RRR; S1/S2, No murmur  ABDOMEN: Soft, Nontender, Nondistended; Bowel sounds present  VASCULAR: Normal pulses, Normal capillary refill  EXTREMITIES: No cyanosis, No edema  NERVOUS SYSTEM:  minimally verbal, follows simple commands, nods head yes and no, moves right side more than L    LABS:                        9.7    6.50  )-----------( 299      ( 29 Apr 2024 05:52 )             29.6       04-30    141  |  104  |  15  ----------------------------<  95  3.7   |  28  |  0.44    Ca    9.4      30 Apr 2024 05:33  Phos  3.9     04-28  Mg     2.1     04-28    TPro  6.9  /  Alb  2.6  /  TBili  0.4  /  DBili  x   /  AST  29  /  ALT  45  /  AlkPhos  158  04-30                03-26 Chol 174 mg/dL LDL -- HDL 53 mg/dL Trig 82 mg/dL                  Urinalysis Basic - ( 30 Apr 2024 05:33 )    Color: x / Appearance: x / SG: x / pH: x  Gluc: 95 mg/dL / Ketone: x  / Bili: x / Urobili: x   Blood: x / Protein: x / Nitrite: x   Leuk Esterase: x / RBC: x / WBC x   Sq Epi: x / Non Sq Epi: x / Bacteria: x            RADIOLOGY & ADDITIONAL TESTS:    Care Discussed with Consultants/Other Providers: IDR team

## 2024-04-30 NOTE — PROGRESS NOTE ADULT - SUBJECTIVE AND OBJECTIVE BOX
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 Initially presented to [place] with headaches and became unresponsive, was intubated, and transferred to [facility]. On arrival to [facility] patient w/ PERRL, +cough/gag, 2/5 spontaneous movement in LUE, otherwise no movement. Given 1 unit platelets and R frontal EVD placed with high opening pressure. Patient underwent Right pterional craniotomy and clipping of a ruptured right PCOM aneurysm on 3/26.     On 3/29, EVD displaced and replaced in situ, complicated by EVD tract acute hemorrhage and new focus of infarction with hemorrhage in the right basal ganglia/corona radiata. Course further complicated by hydrocephalus with ICP crisis, vasospasm requiring cerebral angiography and IA milrinone and verapamil. MRI (4/8) with widespread ischemic stroke b/l, as per neuro IR no further intervention. Patient extubated on 4/3, re-intubated 4/8, and extubated again on 4/9. EVD removed on 4/15. CTH post removal stable.     Course also complicated by bradycardia treated with theophyline, now resolved and discontinued. Initial Echo (3/26) with severe cardiomyopathy, EF 36%, regional wall motion abnormality. Resolved on repeat TTE performed 4/9/24, normal EF 66% with no WMA. Patient first noted with left soleal DVT on Duplex 3/28. Duplex (4/10) with stable b/l soleal DVT with extension to right posterior tibial DVT.  Last LE duplex with stable bilateral soleal DVT, [facility] cardiology recommended repeat surveillance duplex on 4/30. On Lovenox ppx.     PEG placement attempted 4/19 for persistent dysphagia, but was aborted due to growing hematoma noted in gastric wall on endoscopy after trocar placement. CT Abdomen and Pelvis w/ IV Cont showed gastric wall hematoma without intraluminal or extraluminal hemorrhage. PEG successfully placed on 4/23.     Patient evaluated by PT/OT and was recommended for acute inpatient rehab. Patient is medically stable for discharge to [facility] on 4/26.  (26 Apr 2024 14:34)      PAST MEDICAL & SURGICAL HISTORY:      MEDICATIONS  (STANDING):  atorvastatin 40 milliGRAM(s) Oral at bedtime  bisacodyl 5 milliGRAM(s) Oral at bedtime  doxazosin 2 milliGRAM(s) Oral at bedtime  enoxaparin Injectable 40 milliGRAM(s) SubCutaneous <User Schedule>  multivitamin/minerals/iron Oral Solution (CENTRUM) 15 milliLiter(s) Oral daily  pantoprazole   Suspension 40 milliGRAM(s) Oral before breakfast  polyethylene glycol 3350 17 Gram(s) Oral two times a day  senna Syrup 10 milliLiter(s) Oral at bedtime    MEDICATIONS  (PRN):  acetaminophen   Oral Liquid .. 650 milliGRAM(s) Oral every 6 hours PRN Mild Pain (1 - 3)  oxyCODONE    IR 10 milliGRAM(s) Oral every 4 hours PRN Severe Pain (7 - 10)  oxyCODONE    IR 5 milliGRAM(s) Oral every 4 hours PRN Moderate Pain (4 - 6)  petrolatum white Ointment 1 Application(s) Topical every 12 hours PRN dryness      Allergies    No Known Allergies    Intolerances          VITALS  58y  Vital Signs Last 24 Hrs  T(C): 36.5 (30 Apr 2024 08:40), Max: 37.1 (29 Apr 2024 20:35)  T(F): 97.7 (30 Apr 2024 08:40), Max: 98.8 (29 Apr 2024 20:35)  HR: 69 (30 Apr 2024 08:40) (69 - 79)  BP: 118/76 (30 Apr 2024 08:40) (115/85 - 118/76)  BP(mean): --  RR: 17 (30 Apr 2024 08:40) (16 - 17)  SpO2: 98% (30 Apr 2024 08:40) (96% - 98%)    Parameters below as of 30 Apr 2024 08:40  Patient On (Oxygen Delivery Method): room air      Daily     Daily         RECENT LABS:                          9.7    6.50  )-----------( 299      ( 29 Apr 2024 05:52 )             29.6     04-30    141  |  104  |  15  ----------------------------<  95  3.7   |  28  |  0.44<L>    Ca    9.4      30 Apr 2024 05:33    TPro  6.9  /  Alb  2.6<L>  /  TBili  0.4  /  DBili  x   /  AST  29  /  ALT  45  /  AlkPhos  158<H>  04-30    LIVER FUNCTIONS - ( 30 Apr 2024 05:33 )  Alb: 2.6 g/dL / Pro: 6.9 g/dL / ALK PHOS: 158 U/L / ALT: 45 U/L / AST: 29 U/L / GGT: x             Urinalysis Basic - ( 30 Apr 2024 05:33 )    Color: x / Appearance: x / SG: x / pH: x  Gluc: 95 mg/dL / Ketone: x  / Bili: x / Urobili: x   Blood: x / Protein: x / Nitrite: x   Leuk Esterase: x / RBC: x / WBC x   Sq Epi: x / Non Sq Epi: x / Bacteria: x          CAPILLARY BLOOD GLUCOSE

## 2024-04-30 NOTE — PROGRESS NOTE ADULT - ASSESSMENT
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 Patient s/p right frontal EVD and right pterional craniotomy with clipping of a ruptured right PCOM aneurysm on 3/26. Course complicated by EVD tract hemorrhage, new right basal ganglia/corona radiata infarct, hydrocephalus with ICP crisis, vasospasm requiring IA milrinone/verapimil, and new widespread ischemic strokes. Patient also found to have HFrEF (now recovered), bradycardia (resolved), b/l soleal DVT. s/p PEG placement initially complicated by gastric wall hematoma    # SAH s/p ruptured PCOM aneurysm with left hemiparesis, left esther inattention, dysphagia, cognitive impairment  - s/p right pterional craniotomy and clipping of a ruptured right PCOM aneurysm on 3/26  - complicated by vasospasm requiring cerebral angiography and IA milrinone and verapamil  - R craniotomy incision c/d/i (no staples/sutures), one staple to L scalp, Staple removed 4/30  - CTH (3/29): EVD tract acute hemorrhage and new focus of infarction with hemorrhage in the right basal ganglia/corona radiata  - MRI (4/8): Multifocal acute to subacute infarction. This extensively involves each MCA posterior distribution, the right basal ganglia, the anterior parasagittal vertex CINDY distribution and an additional smaller lesion in the right cerebellum  - Continue comprehensive rehab program of PT/OT/SLP - 3 hours a day, 5 days a week  - Precautions: Fall, Aspiration, Seizure, PEG, VTE bLE    # Bradycardia  - was on theophyline, now discontinued  - resolved    # HFrEF  - Initial Echo (3/26) with severe cardiomyopathy, EF 36%, regional wall motion abnormality  - Resolved on repeat TTE performed 4/9/24, normal EF 66% with no WMA.  - /85 - 118/76) 4/30    # HLD  - continue lipitor 40 mg QHS    # B/l DVT   - Left soleal DVT first noted 3/28, right soleal DVT fist noted 4/3  - Duplex (4/23): persistent bilateral soleal vein DVT  - Continue Lovenox 40 mg QD  - surveillance duplex 4/30 ordered.    # Hypernatremia  - continue free water 200 cc Q6H  - Na 141 4/30    # Gastric Hematoma post PEG placement  -  PEG placement attempted 4/19 for persistent dysphagia, but was aborted due to growing hematoma noted in gastric wall on endoscopy after trocar placement  - CT Abdomen and Pelvis w/ IV Cont showed gastric wall hematoma without intraluminal or extraluminal hemorrhage  - PEG placed 4/23  - Hgb 9.7 4/29 stable. CBC 5/2    # Pain  - Tylenol 650 mg Q6H PRN mild pain  - oxycodone dc, patient denies pain 4/30    # Sleep  - Maintain quiet hours and a low stim environment.    # GI / Bowel  - Senna qHS  - Miralax BID  - bisacodyl 5 mg QHS  - GI ppx: protonix 40 mg QD (started on admission to rehab)    #  / Bladder  - Continue cardura 2 mg QHS   - bladder scans Q8 hours with straight cath for >400cc.    # Skin / Pressure injury  - Skin assessment on admission performed:  left toe scabs, right lateral foot callus   - podiatry evaluation pending    # Diet/Dysphagia:  - PEG placed on 4/23.   - Diet Consistency: Jevity tube feeds   - Supplements: MVI  - BUn/Cr 17/0.51 4/29--> 15/0.44 4/30    # DVT prophylaxis:   - Lovenox 40 mg QD    # Case to be discussed in IDT rounds 5/1 (initial)      # LABS  Doppler 4/30  Podiatry  CBC CMP 5/2      Outpatient Follow-up:  Perlita Varela  Neurosurgery  [address], [address]  [place] [address]  Phone: [phone]  Fax: [phone]  Follow Up Time:      Code Status/Emergency Contact:     57 y/o F with no known PMH, h/o fell one month prior to admission, who was transferred from [place] to [facility] on 3/26/24 with diffuse SAH w/ small IVH, 2/2 ruptured posterolaterally projecting 2.9 x 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 Patient s/p right frontal EVD and right pterional craniotomy with clipping of a ruptured right PCOM aneurysm on 3/26. Course complicated by EVD tract hemorrhage, new right basal ganglia/corona radiata infarct, hydrocephalus with ICP crisis, vasospasm requiring IA milrinone/verapimil, and new widespread ischemic strokes. Patient also found to have HFrEF (now recovered), bradycardia (resolved), b/l soleal DVT. s/p PEG placement initially complicated by gastric wall hematoma    # SAH s/p ruptured PCOM aneurysm with left hemiparesis, left esther inattention, dysphagia, cognitive impairment  - s/p right pterional craniotomy and clipping of a ruptured right PCOM aneurysm on 3/26  - complicated by vasospasm requiring cerebral angiography and IA milrinone and verapamil  - R craniotomy incision c/d/i (no staples/sutures), one staple to L scalp, Staple removed 4/30  - CTH (3/29): EVD tract acute hemorrhage and new focus of infarction with hemorrhage in the right basal ganglia/corona radiata  - MRI (4/8): Multifocal acute to subacute infarction. This extensively involves each MCA posterior distribution, the right basal ganglia, the anterior parasagittal vertex CINDY distribution and an additional smaller lesion in the right cerebellum  - Continue comprehensive rehab program of PT/OT/SLP - 3 hours a day, 5 days a week  - Precautions: Fall, Aspiration, Seizure, PEG, VTE bLE    # Bradycardia  - was on theophyline, now discontinued  - resolved    # HFrEF  - Initial Echo (3/26) with severe cardiomyopathy, EF 36%, regional wall motion abnormality  - Resolved on repeat TTE performed 4/9/24, normal EF 66% with no WMA.  - /85 - 118/76) 4/30    # HLD  - continue lipitor 40 mg QHS    # B/l DVT   - Left soleal DVT first noted 3/28, right soleal DVT fist noted 4/3  - Duplex (4/23): persistent bilateral soleal vein DVT  - Continue Lovenox 40 mg QD  - surveillance duplex 4/30 ordered.    # Hypernatremia  - continue free water 200 cc Q6H  - Na 141 4/30    # Gastric Hematoma post PEG placement  -  PEG placement attempted 4/19 for persistent dysphagia, but was aborted due to growing hematoma noted in gastric wall on endoscopy after trocar placement  - CT Abdomen and Pelvis w/ IV Cont showed gastric wall hematoma without intraluminal or extraluminal hemorrhage  - PEG placed 4/23  - Hgb 9.7 4/29 stable. CBC 5/2    # Pain  - Tylenol 650 mg Q6H PRN mild pain  - oxycodone dc, patient denies pain 4/30    # Sleep  - Maintain quiet hours and a low stim environment.    # GI / Bowel  - Senna qHS  - Miralax BID  - bisacodyl 5 mg QHS  - GI ppx: protonix 40 mg QD (started on admission to rehab)    #  / Bladder  - Continue cardura 2 mg QHS   - bladder scans Q8 hours with straight cath for >400cc.    # Skin / Pressure injury  - Skin assessment on admission performed:  left toe scabs, right lateral foot callus   - podiatry evaluation pending    # Diet/Dysphagia:  - PEG placed on 4/23.   - Diet Consistency: Jevity tube feeds   - Supplements: MVI  - BUn/Cr 17/0.51 4/29--> 15/0.44 4/30  - MBS 5/2    # DVT prophylaxis:   - Lovenox 40 mg QD    # Case to be discussed in IDT rounds 5/1 (initial)      # LABS  Doppler 4/30  Podiatry  MBS 5/2  CBC CMP 5/2      Outpatient Follow-up:  Perlita Varela  Neurosurgery  [address], [address]  [place] [address]  Phone: [phone]  Fax: [phone]  Follow Up Time:      Code Status/Emergency Contact:

## 2024-05-01 LAB — MITOCHONDRIA AB SER-ACNC: SIGNIFICANT CHANGE UP

## 2024-05-01 PROCEDURE — 99232 SBSQ HOSP IP/OBS MODERATE 35: CPT

## 2024-05-01 RX ADMIN — ENOXAPARIN SODIUM 40 MILLIGRAM(S): 100 INJECTION SUBCUTANEOUS at 17:30

## 2024-05-01 RX ADMIN — POLYETHYLENE GLYCOL 3350 17 GRAM(S): 17 POWDER, FOR SOLUTION ORAL at 05:32

## 2024-05-01 RX ADMIN — PANTOPRAZOLE SODIUM 40 MILLIGRAM(S): 20 TABLET, DELAYED RELEASE ORAL at 05:33

## 2024-05-01 RX ADMIN — ATORVASTATIN CALCIUM 40 MILLIGRAM(S): 80 TABLET, FILM COATED ORAL at 22:23

## 2024-05-01 RX ADMIN — Medication 2 MILLIGRAM(S): at 22:23

## 2024-05-01 RX ADMIN — Medication 15 MILLILITER(S): at 12:37

## 2024-05-01 RX ADMIN — SENNA PLUS 10 MILLILITER(S): 8.6 TABLET ORAL at 22:23

## 2024-05-01 RX ADMIN — Medication 5 MILLIGRAM(S): at 22:23

## 2024-05-01 NOTE — PROGRESS NOTE ADULT - COMMENTS
Patient seen en route to OT. She is alert, increasing left sided awareness. and some increased initiation, says "hi," more midline attention including sl to left but benefits from continued cues. Denies H/A or pain, slept well. She looks slightly more alert. However, in functional activities, continues to show severe apraxia

## 2024-05-01 NOTE — PROGRESS NOTE ADULT - SUBJECTIVE AND OBJECTIVE BOX
Patient is a 58y old  Female who presents with a chief complaint of SAH (30 Apr 2024 11:38)      HPI:  57 y/o F with no PMH, fell one month prior to admission, transferred from [place] to 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 Initially presented to [place] with headaches and became unresponsive, was intubated, and transferred to [facility]. On arrival to [facility] patient w/ PERRL, +cough/gag, 2/5 spontaneous movement in LUE, otherwise no movement. Given 1 unit platelets and R frontal EVD placed with high opening pressure. Patient underwent Right pterional craniotomy and clipping of a ruptured right PCOM aneurysm on 3/26.     On 3/29, EVD displaced and replaced in situ, complicated by EVD tract acute hemorrhage and new focus of infarction with hemorrhage in the right basal ganglia/corona radiata. Course further complicated by hydrocephalus with ICP crisis, vasospasm requiring cerebral angiography and IA milrinone and verapamil. MRI (4/8) with widespread ischemic stroke b/l, as per neuro IR no further intervention. Patient extubated on 4/3, re-intubated 4/8, and extubated again on 4/9. EVD removed on 4/15. CTH post removal stable.     Course also complicated by bradycardia treated with theophyline, now resolved and discontinued. Initial Echo (3/26) with severe cardiomyopathy, EF 36%, regional wall motion abnormality. Resolved on repeat TTE performed 4/9/24, normal EF 66% with no WMA. Patient first noted with left soleal DVT on Duplex 3/28. Duplex (4/10) with stable b/l soleal DVT with extension to right posterior tibial DVT.  Last LE duplex with stable bilateral soleal DVT, [facility] cardiology recommended repeat surveillance duplex on 4/30. On Lovenox ppx.     PEG placement attempted 4/19 for persistent dysphagia, but was aborted due to growing hematoma noted in gastric wall on endoscopy after trocar placement. CT Abdomen and Pelvis w/ IV Cont showed gastric wall hematoma without intraluminal or extraluminal hemorrhage. PEG successfully placed on 4/23.     Patient evaluated by PT/OT and was recommended for acute inpatient rehab. Patient is medically stable for discharge to [facility] on 4/26.  (26 Apr 2024 14:34)      PAST MEDICAL & SURGICAL HISTORY:      MEDICATIONS  (STANDING):  atorvastatin 40 milliGRAM(s) Oral at bedtime  bisacodyl 5 milliGRAM(s) Oral at bedtime  doxazosin 2 milliGRAM(s) Oral at bedtime  enoxaparin Injectable 40 milliGRAM(s) SubCutaneous <User Schedule>  multivitamin/minerals/iron Oral Solution (CENTRUM) 15 milliLiter(s) Oral daily  pantoprazole   Suspension 40 milliGRAM(s) Oral before breakfast  polyethylene glycol 3350 17 Gram(s) Oral two times a day  senna Syrup 10 milliLiter(s) Oral at bedtime    MEDICATIONS  (PRN):  acetaminophen   Oral Liquid .. 650 milliGRAM(s) Oral every 6 hours PRN Mild Pain (1 - 3)  petrolatum white Ointment 1 Application(s) Topical every 12 hours PRN dryness      Allergies    No Known Allergies    Intolerances          VITALS  58y  Vital Signs Last 24 Hrs  T(C): 37.2 (01 May 2024 08:09), Max: 37.2 (01 May 2024 08:09)  T(F): 98.9 (01 May 2024 08:09), Max: 98.9 (01 May 2024 08:09)  HR: 82 (01 May 2024 08:09) (82 - 100)  BP: 146/88 (01 May 2024 08:09) (119/77 - 146/88)  BP(mean): --  RR: 17 (01 May 2024 08:09) (17 - 17)  SpO2: 95% (01 May 2024 08:09) (95% - 98%)    Parameters below as of 01 May 2024 08:09  Patient On (Oxygen Delivery Method): room air      Daily     Daily         RECENT LABS:      04-30    141  |  104  |  15  ----------------------------<  95  3.7   |  28  |  0.44<L>    Ca    9.4      30 Apr 2024 05:33    TPro  6.9  /  Alb  2.6<L>  /  TBili  0.4  /  DBili  x   /  AST  29  /  ALT  45  /  AlkPhos  158<H>  04-30    LIVER FUNCTIONS - ( 30 Apr 2024 05:33 )  Alb: 2.6 g/dL / Pro: 6.9 g/dL / ALK PHOS: 158 U/L / ALT: 45 U/L / AST: 29 U/L / GGT: x             Urinalysis Basic - ( 30 Apr 2024 05:33 )    Color: x / Appearance: x / SG: x / pH: x  Gluc: 95 mg/dL / Ketone: x  / Bili: x / Urobili: x   Blood: x / Protein: x / Nitrite: x   Leuk Esterase: x / RBC: x / WBC x   Sq Epi: x / Non Sq Epi: x / Bacteria: x          CAPILLARY BLOOD GLUCOSE

## 2024-05-01 NOTE — PROGRESS NOTE ADULT - ASSESSMENT
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 Patient s/p right frontal EVD and right pterional craniotomy with clipping of a ruptured right PCOM aneurysm on 3/26. Course complicated by EVD tract hemorrhage, new right basal ganglia/corona radiata infarct, hydrocephalus with ICP crisis, vasospasm requiring IA milrinone/verapimil, and new widespread ischemic strokes. Patient also found to have HFrEF (now recovered), bradycardia (resolved), b/l soleal DVT. s/p PEG placement initially complicated by gastric wall hematoma    # SAH s/p ruptured PCOM aneurysm with left hemiparesis, left esther inattention, dysphagia, cognitive impairment  - s/p right pterional craniotomy and clipping of a ruptured right PCOM aneurysm on 3/26  - complicated by vasospasm s/p cerebral angiography and IA milrinone and verapamil  - Staple removed 4/30  - CTH (3/29): EVD tract acute hemorrhage and new focus of infarction with hemorrhage in the right basal ganglia/corona radiata  - MRI (4/8): Multifocal acute to subacute infarction. This extensively involves each MCA posterior distribution, the right basal ganglia, the anterior parasagittal vertex CINDY distribution and an additional smaller lesion in the right cerebellum  - Continue comprehensive rehab program of PT/OT/SLP - 3 hours a day, 5 days a week  - Precautions: Fall, Aspiration, Seizure, PEG, VTE bLE    # Bradycardia  - was on theophyline, now discontinued  - resolved, HR  5/1    # HFrEF  - Initial Echo (3/26) with severe cardiomyopathy, EF 36%, regional wall motion abnormality  - Resolved on repeat TTE performed 4/9/24, normal EF 66% with no WMA.  - BP  (119/77 - 146/88) 5/1    # HLD  - continue lipitor 40 mg QHS    # B/l DVT   - Left soleal DVT first noted 3/28, right soleal DVT fist noted 4/3  - Duplex (4/23): persistent bilateral soleal vein DVT  - Continue Lovenox 40 mg QD  - surveillance duplex 4/30: There is persistence of DVT identified within the left soleal vein. On the current evaluation DVT is also evident within the left gastrocnemius vein, new. Previously identified right soleal DVT is no longer visualized  - Discussed with hospitalist given new DVT gastroc vein; not candidate for full dose AC due to SAh. Continue PPX dose now, surveillance dopplers weekly. Will f/u NSGY re: timing for possible full dose AC    # Hypernatremia  - continue free water 200 cc Q6H  - Na 141 4/30. BMP 5/2    # Gastric Hematoma post PEG placement  -  PEG placement attempted 4/19 for persistent dysphagia, but was aborted due to growing hematoma noted in gastric wall on endoscopy after trocar placement  - CT Abdomen and Pelvis w/ IV Cont showed gastric wall hematoma without intraluminal or extraluminal hemorrhage  - PEG placed 4/23  - Hgb 9.7 4/29 stable. CBC 5/2    # Pain  - Tylenol 650 mg Q6H PRN mild pain  - oxycodone dc    # Sleep  - Maintain quiet hours and a low stim environment.    # GI / Bowel  - Senna qHS  - Miralax BID  - bisacodyl 5 mg QHS  - GI ppx: protonix 40 mg QD (started on admission to rehab)    #  / Bladder  - Continue cardura 2 mg QHS   - bladder scans Q8 hours with straight cath for >400cc.    # Skin / Pressure injury  - Skin assessment on admission performed:  left toe scabs, right lateral foot callus   - podiatry evaluation pending    # Diet/Dysphagia:  - PEG placed on 4/23.   - Diet Consistency: Jevity tube feeds   - Supplements: MVI  - BUn/Cr 17/0.51 4/29--> 15/0.44 4/30  - MBS 5/2    # DVT prophylaxis:   - Lovenox 40 mg QD    # Case discussed in IDT rounds 5/1 (initial)  -     # LABS  Podiatry  NSGY f/u re: full dose AC  MBS 5/2  CBC CMP 5/2      Outpatient Follow-up:  Perlita Varela  Neurosurgery  [address], [address]  [place] [address]  Phone: [phone]  Fax: [phone]  Follow Up Time:      Code Status/Emergency Contact:     57 y/o F with no known PMH, h/o fell one month prior to admission, who was transferred from [place] to [facility] on 3/26/24 with diffuse SAH w/ small IVH, 2/2 ruptured posterolaterally projecting 2.9 x 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 Patient s/p right frontal EVD and right pterional craniotomy with clipping of a ruptured right PCOM aneurysm on 3/26. Course complicated by EVD tract hemorrhage, new right basal ganglia/corona radiata infarct, hydrocephalus with ICP crisis, vasospasm requiring IA milrinone/verapimil, and new widespread ischemic strokes. Patient also found to have HFrEF (now recovered), bradycardia (resolved), b/l soleal DVT. s/p PEG placement initially complicated by gastric wall hematoma    # SAH s/p ruptured PCOM aneurysm with left hemiparesis, left esther inattention, dysphagia, cognitive impairment  - s/p right pterional craniotomy and clipping of a ruptured right PCOM aneurysm on 3/26  - complicated by vasospasm s/p cerebral angiography and IA milrinone and verapamil  - Staple removed 4/30  - CTH (3/29): EVD tract acute hemorrhage and new focus of infarction with hemorrhage in the right basal ganglia/corona radiata  - MRI (4/8): Multifocal acute to subacute infarction. This extensively involves each MCA posterior distribution, the right basal ganglia, the anterior parasagittal vertex CINDY distribution and an additional smaller lesion in the right cerebellum  - Continue comprehensive rehab program of PT/OT/SLP - 3 hours a day, 5 days a week  - Precautions: Fall, Aspiration, Seizure, PEG, VTE bLE    # Bradycardia  - was on theophyline, now discontinued  - resolved, HR  5/1    # HFrEF  - Initial Echo (3/26) with severe cardiomyopathy, EF 36%, regional wall motion abnormality  - Resolved on repeat TTE performed 4/9/24, normal EF 66% with no WMA.  - BP  (119/77 - 146/88) 5/1    # HLD  - continue lipitor 40 mg QHS    # B/l DVT   - Left soleal DVT first noted 3/28, right soleal DVT fist noted 4/3  - Duplex (4/23): persistent bilateral soleal vein DVT  - Continue Lovenox 40 mg QD  - surveillance duplex 4/30: There is persistence of DVT identified within the left soleal vein. On the current evaluation DVT is also evident within the left gastrocnemius vein, new. Previously identified right soleal DVT is no longer visualized  - Discussed with hospitalist given new DVT gastroc vein; not candidate for full dose AC due to SAh. Continue PPX dose now, surveillance dopplers weekly. Will f/u NSGY re: timing for possible full dose AC    # Hypernatremia  - continue free water 200 cc Q6H  - Na 141 4/30. BMP 5/2    # Gastric Hematoma post PEG placement  -  PEG placement attempted 4/19 for persistent dysphagia, but was aborted due to growing hematoma noted in gastric wall on endoscopy after trocar placement  - CT Abdomen and Pelvis w/ IV Cont showed gastric wall hematoma without intraluminal or extraluminal hemorrhage  - PEG placed 4/23  - Hgb 9.7 4/29 stable. CBC 5/2    # Pain  - Tylenol 650 mg Q6H PRN mild pain  - oxycodone dc    # Sleep  - Maintain quiet hours and a low stim environment.    # GI / Bowel  - Senna qHS  - Miralax BID  - bisacodyl 5 mg QHS  - GI ppx: protonix 40 mg QD (started on admission to rehab)    #  / Bladder  - Continue cardura 2 mg QHS   - bladder scans Q8 hours with straight cath for >400cc.    # Skin / Pressure injury  - Skin assessment on admission performed:  left toe scabs, right lateral foot callus   - podiatry evaluation pending    # Diet/Dysphagia:  - PEG placed on 4/23.   - Diet Consistency: Jevity tube feeds   - Supplements: MVI  - BUn/Cr 17/0.51 4/29--> 15/0.44 4/30  - MBS 5/2    # DVT prophylaxis:   - Lovenox 40 mg QD    # Case discussed in IDT rounds 5/1 (initial)  - incontinent, lives in elevator apt with ramp access, lives with son and other family close by. NPO with PEG, severe receptive and expressive language deficits with fluent aphasia, limited verbal output, unreliable yes/no and difficulty following 1 step commands, poor attention, max assist bADLs, left esther inattention, severe apraxia all tasks, bed mobility and transfers max assist-total assist,   - goals: 24 x 7 assist on dc, min-mod assist self care tasks and transfers, min assist transfers and ambulation  - family training  - target: 5/17 home with caregiver assist and home PT OT SLP    # LABS  Podiatry  NSGY f/u re: full dose AC  MBS 5/2  CBC CMP 5/2      Outpatient Follow-up:  Perlita Varela  Neurosurgery  [address], [address]  [place] [address]  Phone: [phone]  Fax: [phone]  Follow Up Time:      Code Status/Emergency Contact:     57 y/o F with no known PMH, h/o fell one month prior to admission, who was transferred from [place] to [facility] on 3/26/24 with diffuse SAH w/ small IVH, 2/2 ruptured posterolaterally projecting 2.9 x 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 Patient s/p right frontal EVD and right pterional craniotomy with clipping of a ruptured right PCOM aneurysm on 3/26. Course complicated by EVD tract hemorrhage, new right basal ganglia/corona radiata infarct, hydrocephalus with ICP crisis, vasospasm requiring IA milrinone/verapimil, and new widespread ischemic strokes. Patient also found to have HFrEF (now recovered), bradycardia (resolved), b/l soleal DVT. s/p PEG placement initially complicated by gastric wall hematoma    # SAH s/p ruptured PCOM aneurysm with left hemiparesis, left esther inattention, dysphagia, cognitive impairment  - s/p right pterional craniotomy and clipping of a ruptured right PCOM aneurysm on 3/26  - complicated by vasospasm s/p cerebral angiography and IA milrinone and verapamil  - Staple removed 4/30  - CTH (3/29): EVD tract acute hemorrhage and new focus of infarction with hemorrhage in the right basal ganglia/corona radiata  - MRI (4/8): Multifocal acute to subacute infarction. This extensively involves each MCA posterior distribution, the right basal ganglia, the anterior parasagittal vertex CINDY distribution and an additional smaller lesion in the right cerebellum  - Continue comprehensive rehab program of PT/OT/SLP - 3 hours a day, 5 days a week  - Precautions: Fall, Aspiration, Seizure, PEG, VTE bLE    # Bradycardia  - was on theophyline, now discontinued  - resolved, HR  5/1    # HFrEF  - Initial Echo (3/26) with severe cardiomyopathy, EF 36%, regional wall motion abnormality  - Resolved on repeat TTE performed 4/9/24, normal EF 66% with no WMA.  - BP  (119/77 - 146/88) 5/1    # HLD  - continue lipitor 40 mg QHS    # B/l DVT   - Left soleal DVT first noted 3/28, right soleal DVT fist noted 4/3  - Duplex (4/23): persistent bilateral soleal vein DVT  - Continue Lovenox 40 mg QD  - surveillance duplex 4/30: There is persistence of DVT identified within the left soleal vein. On the current evaluation DVT is also evident within the left gastrocnemius vein, new. Previously identified right soleal DVT is no longer visualized  - Discussed with hospitalist given new DVT gastroc vein; not candidate for full dose AC due to SAh. Continue PPX dose now, surveillance dopplers weekly. Will f/u NSGY re: timing for possible full dose AC    # Hypernatremia  - continue free water 200 cc Q6H  - Na 141 4/30. BMP 5/2    # Gastric Hematoma post PEG placement  -  PEG placement attempted 4/19 for persistent dysphagia, but was aborted due to growing hematoma noted in gastric wall on endoscopy after trocar placement  - CT Abdomen and Pelvis w/ IV Cont showed gastric wall hematoma without intraluminal or extraluminal hemorrhage  - PEG placed 4/23  - Hgb 9.7 4/29 stable. CBC 5/2    # Pain  - Tylenol 650 mg Q6H PRN mild pain  - oxycodone dc    # Sleep  - Maintain quiet hours and a low stim environment.    # GI / Bowel  - Senna qHS  - Miralax BID  - bisacodyl 5 mg QHS  - GI ppx: protonix 40 mg QD (started on admission to rehab)    #  / Bladder  - Continue cardura 2 mg QHS   - bladder scans Q8 hours with straight cath for >400cc.    # Skin / Pressure injury  - Skin assessment on admission performed:  left toe scabs, right lateral foot callus   - podiatry evaluation pending    # Diet/Dysphagia:  - PEG placed on 4/23.   - Diet Consistency: Jevity tube feeds   - Supplements: MVI  - BUn/Cr 17/0.51 4/29--> 15/0.44 4/30  - MBS 5/2    # DVT prophylaxis:   - Lovenox 40 mg QD    # Case discussed in IDT rounds 5/1 (initial)  - incontinent, lives in elevator apt with ramp access, lives with son and other family close by. NPO with PEG, severe receptive and expressive language deficits with fluent aphasia, limited verbal output, unreliable yes/no and difficulty following 1 step commands, poor attention, max assist bADLs, left esther inattention, severe apraxia all tasks, bed mobility and transfers max assist-total assist,   - goals: 24 x 7 assist on dc, min-mod assist self care tasks and transfers, min assist transfers and ambulation  - family training  - target: 5/17 home with caregiver assist and home PT OT SLP    # LABS  Podiatry  NSGY f/u re: full dose AC  MBS 5/2  CBC CMP 5/2    addendum 4:32 PM  - NSGY appreciated. full dose AC at minimum 4 weeks post op      Outpatient Follow-up:  Perlita Varela  Neurosurgery  [address] [place] [address], [address]  [place] [address]  Phone: [phone]  Fax: [phone]  Follow Up Time:      Code Status/Emergency Contact:     <<-----Click here for Discharge Medication Review

## 2024-05-02 LAB
ALBUMIN SERPL ELPH-MCNC: 2.6 G/DL — LOW (ref 3.3–5)
ALP SERPL-CCNC: 158 U/L — HIGH (ref 40–120)
ALT FLD-CCNC: 47 U/L — HIGH (ref 10–45)
ANION GAP SERPL CALC-SCNC: 8 MMOL/L — SIGNIFICANT CHANGE UP (ref 5–17)
AST SERPL-CCNC: 34 U/L — SIGNIFICANT CHANGE UP (ref 10–40)
BASOPHILS # BLD AUTO: 0.03 K/UL — SIGNIFICANT CHANGE UP (ref 0–0.2)
BASOPHILS NFR BLD AUTO: 0.4 % — SIGNIFICANT CHANGE UP (ref 0–2)
BILIRUB SERPL-MCNC: 0.2 MG/DL — SIGNIFICANT CHANGE UP (ref 0.2–1.2)
BUN SERPL-MCNC: 12 MG/DL — SIGNIFICANT CHANGE UP (ref 7–23)
CALCIUM SERPL-MCNC: 9.4 MG/DL — SIGNIFICANT CHANGE UP (ref 8.4–10.5)
CHLORIDE SERPL-SCNC: 104 MMOL/L — SIGNIFICANT CHANGE UP (ref 96–108)
CO2 SERPL-SCNC: 29 MMOL/L — SIGNIFICANT CHANGE UP (ref 22–31)
CREAT SERPL-MCNC: 0.61 MG/DL — SIGNIFICANT CHANGE UP (ref 0.5–1.3)
EGFR: 104 ML/MIN/1.73M2 — SIGNIFICANT CHANGE UP
EOSINOPHIL # BLD AUTO: 0.09 K/UL — SIGNIFICANT CHANGE UP (ref 0–0.5)
EOSINOPHIL NFR BLD AUTO: 1.3 % — SIGNIFICANT CHANGE UP (ref 0–6)
GLUCOSE SERPL-MCNC: 100 MG/DL — HIGH (ref 70–99)
HCT VFR BLD CALC: 30.6 % — LOW (ref 34.5–45)
HGB BLD-MCNC: 9.8 G/DL — LOW (ref 11.5–15.5)
IMM GRANULOCYTES NFR BLD AUTO: 0.7 % — SIGNIFICANT CHANGE UP (ref 0–0.9)
LYMPHOCYTES # BLD AUTO: 1.28 K/UL — SIGNIFICANT CHANGE UP (ref 1–3.3)
LYMPHOCYTES # BLD AUTO: 18.6 % — SIGNIFICANT CHANGE UP (ref 13–44)
MCHC RBC-ENTMCNC: 28.9 PG — SIGNIFICANT CHANGE UP (ref 27–34)
MCHC RBC-ENTMCNC: 32 GM/DL — SIGNIFICANT CHANGE UP (ref 32–36)
MCV RBC AUTO: 90.3 FL — SIGNIFICANT CHANGE UP (ref 80–100)
MONOCYTES # BLD AUTO: 0.53 K/UL — SIGNIFICANT CHANGE UP (ref 0–0.9)
MONOCYTES NFR BLD AUTO: 7.7 % — SIGNIFICANT CHANGE UP (ref 2–14)
NEUTROPHILS # BLD AUTO: 4.89 K/UL — SIGNIFICANT CHANGE UP (ref 1.8–7.4)
NEUTROPHILS NFR BLD AUTO: 71.3 % — SIGNIFICANT CHANGE UP (ref 43–77)
NRBC # BLD: 0 /100 WBCS — SIGNIFICANT CHANGE UP (ref 0–0)
PLATELET # BLD AUTO: 313 K/UL — SIGNIFICANT CHANGE UP (ref 150–400)
POTASSIUM SERPL-MCNC: 3.5 MMOL/L — SIGNIFICANT CHANGE UP (ref 3.5–5.3)
POTASSIUM SERPL-SCNC: 3.5 MMOL/L — SIGNIFICANT CHANGE UP (ref 3.5–5.3)
PROT SERPL-MCNC: 6.9 G/DL — SIGNIFICANT CHANGE UP (ref 6–8.3)
RBC # BLD: 3.39 M/UL — LOW (ref 3.8–5.2)
RBC # FLD: 14.5 % — SIGNIFICANT CHANGE UP (ref 10.3–14.5)
SODIUM SERPL-SCNC: 141 MMOL/L — SIGNIFICANT CHANGE UP (ref 135–145)
WBC # BLD: 6.87 K/UL — SIGNIFICANT CHANGE UP (ref 3.8–10.5)
WBC # FLD AUTO: 6.87 K/UL — SIGNIFICANT CHANGE UP (ref 3.8–10.5)

## 2024-05-02 PROCEDURE — 74230 X-RAY XM SWLNG FUNCJ C+: CPT | Mod: 26

## 2024-05-02 PROCEDURE — 99232 SBSQ HOSP IP/OBS MODERATE 35: CPT

## 2024-05-02 RX ADMIN — ENOXAPARIN SODIUM 40 MILLIGRAM(S): 100 INJECTION SUBCUTANEOUS at 17:23

## 2024-05-02 RX ADMIN — PANTOPRAZOLE SODIUM 40 MILLIGRAM(S): 20 TABLET, DELAYED RELEASE ORAL at 06:06

## 2024-05-02 RX ADMIN — POLYETHYLENE GLYCOL 3350 17 GRAM(S): 17 POWDER, FOR SOLUTION ORAL at 06:05

## 2024-05-02 RX ADMIN — ATORVASTATIN CALCIUM 40 MILLIGRAM(S): 80 TABLET, FILM COATED ORAL at 21:19

## 2024-05-02 RX ADMIN — Medication 2 MILLIGRAM(S): at 21:19

## 2024-05-02 NOTE — PROGRESS NOTE ADULT - SUBJECTIVE AND OBJECTIVE BOX
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 Initially presented to [facility] with headaches and became unresponsive, was intubated, and transferred to [facility]. On arrival to [facility] patient w/ PERRL, +cough/gag, 2/5 spontaneous movement in LUE, otherwise no movement. Given 1 unit platelets and R frontal EVD placed with high opening pressure. Patient underwent Right pterional craniotomy and clipping of a ruptured right PCOM aneurysm on 3/26.     On 3/29, EVD displaced and replaced in situ, complicated by EVD tract acute hemorrhage and new focus of infarction with hemorrhage in the right basal ganglia/corona radiata. Course further complicated by hydrocephalus with ICP crisis, vasospasm requiring cerebral angiography and IA milrinone and verapamil. MRI (4/8) with widespread ischemic stroke b/l, as per neuro IR no further intervention. Patient extubated on 4/3, re-intubated 4/8, and extubated again on 4/9. EVD removed on 4/15. CTH post removal stable.     Course also complicated by bradycardia treated with theophyline, now resolved and discontinued. Initial Echo (3/26) with severe cardiomyopathy, EF 36%, regional wall motion abnormality. Resolved on repeat TTE performed 4/9/24, normal EF 66% with no WMA. Patient first noted with left soleal DVT on Duplex 3/28. Duplex (4/10) with stable b/l soleal DVT with extension to right posterior tibial DVT.  Last LE duplex with stable bilateral soleal DVT, [facility] cardiology recommended repeat surveillance duplex on 4/30. On Lovenox ppx.     PEG placement attempted 4/19 for persistent dysphagia, but was aborted due to growing hematoma noted in gastric wall on endoscopy after trocar placement. CT Abdomen and Pelvis w/ IV Cont showed gastric wall hematoma without intraluminal or extraluminal hemorrhage. PEG successfully placed on 4/23.     Patient evaluated by PT/OT and was recommended for acute inpatient rehab. Patient is medically stable for discharge to [facility] on 4/26.  (26 Apr 2024 14:34)      PAST MEDICAL & SURGICAL HISTORY:      MEDICATIONS  (STANDING):  atorvastatin 40 milliGRAM(s) Oral at bedtime  bisacodyl 5 milliGRAM(s) Oral at bedtime  doxazosin 2 milliGRAM(s) Oral at bedtime  enoxaparin Injectable 40 milliGRAM(s) SubCutaneous <User Schedule>  multivitamin/minerals/iron Oral Solution (CENTRUM) 15 milliLiter(s) Oral daily  pantoprazole   Suspension 40 milliGRAM(s) Oral before breakfast  polyethylene glycol 3350 17 Gram(s) Oral two times a day  senna Syrup 10 milliLiter(s) Oral at bedtime    MEDICATIONS  (PRN):  acetaminophen   Oral Liquid .. 650 milliGRAM(s) Oral every 6 hours PRN Mild Pain (1 - 3)  petrolatum white Ointment 1 Application(s) Topical every 12 hours PRN dryness      Allergies    No Known Allergies    Intolerances          VITALS  58y  Vital Signs Last 24 Hrs  T(C): 36.7 (02 May 2024 08:02), Max: 36.8 (01 May 2024 20:34)  T(F): 98.1 (02 May 2024 08:02), Max: 98.2 (01 May 2024 20:34)  HR: 99 (02 May 2024 08:02) (90 - 99)  BP: 130/74 (02 May 2024 08:02) (121/79 - 130/74)  BP(mean): --  RR: 15 (02 May 2024 08:02) (15 - 16)  SpO2: 95% (02 May 2024 08:02) (95% - 96%)    Parameters below as of 02 May 2024 08:02  Patient On (Oxygen Delivery Method): room air      Daily     Daily         RECENT LABS:                          9.8    6.87  )-----------( 313      ( 02 May 2024 06:19 )             30.6     05-02    141  |  104  |  12  ----------------------------<  100<H>  3.5   |  29  |  0.61    Ca    9.4      02 May 2024 06:19    TPro  6.9  /  Alb  2.6<L>  /  TBili  0.2  /  DBili  x   /  AST  34  /  ALT  47<H>  /  AlkPhos  158<H>  05-02    LIVER FUNCTIONS - ( 02 May 2024 06:19 )  Alb: 2.6 g/dL / Pro: 6.9 g/dL / ALK PHOS: 158 U/L / ALT: 47 U/L / AST: 34 U/L / GGT: x             Urinalysis Basic - ( 02 May 2024 06:19 )    Color: x / Appearance: x / SG: x / pH: x  Gluc: 100 mg/dL / Ketone: x  / Bili: x / Urobili: x   Blood: x / Protein: x / Nitrite: x   Leuk Esterase: x / RBC: x / WBC x   Sq Epi: x / Non Sq Epi: x / Bacteria: x          CAPILLARY BLOOD GLUCOSE

## 2024-05-02 NOTE — CHART NOTE - NSCHARTNOTEFT_GEN_A_CORE
NUTRITION Follow Up Note    SOURCE: Patient [X]  Medical Record [X]  Nursing Staff [X]  Family Member []    DIET: Diet, Pureed (05-02-24 @ 11:22) [Active]    Patient s/p PEG placement (4/23/24). MBS noted today (5/2/24) and patient was upgraded to PO diet (pureed diet w/ thin liquids). Patient seen consuming small bites of food today at lunch in the restorative dining room. Recommend Ensure Plus High Protein 8oz PO TID (Provides 1,050kcal & 60grams of Protein) to enhance PO intakes and optimize nutritional status. Will continue to monitor PO intakes to determine if conditional tube feedings are appropriate.     EDEMA: no edema noted    LAST BM: 01-May-2024    SKIN: no pressure injuries noted    WEIGHT TRENDS: 62.6 kG (4/26/24)      PERTINENT MEDICATIONS: MEDICATIONS  (STANDING):  atorvastatin 40 milliGRAM(s) Oral at bedtime  bisacodyl 5 milliGRAM(s) Oral at bedtime  doxazosin 2 milliGRAM(s) Oral at bedtime  enoxaparin Injectable 40 milliGRAM(s) SubCutaneous <User Schedule>  multivitamin/minerals/iron Oral Solution (CENTRUM) 15 milliLiter(s) Oral daily  pantoprazole   Suspension 40 milliGRAM(s) Oral before breakfast  polyethylene glycol 3350 17 Gram(s) Oral two times a day  senna Syrup 10 milliLiter(s) Oral at bedtime    MEDICATIONS  (PRN):  acetaminophen   Oral Liquid .. 650 milliGRAM(s) Oral every 6 hours PRN Mild Pain (1 - 3)  petrolatum white Ointment 1 Application(s) Topical every 12 hours PRN dryness      PERTINENT LABS:  05-02 Na141 mmol/L Glu 100 mg/dL<H> K+ 3.5 mmol/L Cr  0.61 mg/dL BUN 12 mg/dL 04-28 Phos 3.9 mg/dL 05-02 Alb 2.6 g/dL<L>        ESTIMATED NEEDS:   [X] No Change Since Previous Assessment    PREVIOUS NUTRITION DIAGNOSIS:  1. Severe Protein Calorie Malnutrition    NUTRITION DIAGNOSIS IS [X] Ongoing     NEW NUTRITION DIAGNOSIS: [X] Not Applicable    INTERVENTIONS:   1. Add Ensure Plus High Protein 8oz PO TID (Provides 1,050kcal & 60grams of Protein)   2.     MONITORING & EVALUATION:   1. Weights   2. PO intakes   3. Skin integrity   4. Tolerance to diet prescription   5. Labs & POCT  6. Follow up (per protocol)    Registered Dietitian/Nutritionist Remains Available.  Ubaldo Steve RD, CDN    Contact: [phone] or via [place] TEAMS NUTRITION Follow Up Note    SOURCE: Patient [X]  Medical Record [X]  Nursing Staff [X]  Family Member []    DIET: Diet, Pureed (05-02-24 @ 11:22) [Active]    Patient s/p PEG placement (4/23/24). MBS noted today (5/2/24) and patient was upgraded to PO diet (pureed diet w/ thin liquids). Patient seen consuming small bites of food today at lunch in the restorative dining room. Recommend Ensure Plus High Protein 8oz PO TID (Provides 1,050kcal & 60grams of Protein) to enhance PO intakes and optimize nutritional status. Will continue to monitor PO intakes to determine if conditional tube feedings are appropriate. MVI ordered.    EDEMA: no edema noted    LAST BM: 01-May-2024    SKIN: no pressure injuries noted    WEIGHT TRENDS: 62.6 kG (4/26/24)      PERTINENT MEDICATIONS: MEDICATIONS  (STANDING):  atorvastatin 40 milliGRAM(s) Oral at bedtime  bisacodyl 5 milliGRAM(s) Oral at bedtime  doxazosin 2 milliGRAM(s) Oral at bedtime  enoxaparin Injectable 40 milliGRAM(s) SubCutaneous <User Schedule>  multivitamin/minerals/iron Oral Solution (CENTRUM) 15 milliLiter(s) Oral daily  pantoprazole   Suspension 40 milliGRAM(s) Oral before breakfast  polyethylene glycol 3350 17 Gram(s) Oral two times a day  senna Syrup 10 milliLiter(s) Oral at bedtime    MEDICATIONS  (PRN):  acetaminophen   Oral Liquid .. 650 milliGRAM(s) Oral every 6 hours PRN Mild Pain (1 - 3)  petrolatum white Ointment 1 Application(s) Topical every 12 hours PRN dryness      PERTINENT LABS:  05-02 Na141 mmol/L Glu 100 mg/dL<H> K+ 3.5 mmol/L Cr  0.61 mg/dL BUN 12 mg/dL 04-28 Phos 3.9 mg/dL 05-02 Alb 2.6 g/dL<L>        ESTIMATED NEEDS:   [X] No Change Since Previous Assessment    PREVIOUS NUTRITION DIAGNOSIS:  1. Severe Protein Calorie Malnutrition    NUTRITION DIAGNOSIS IS [X] Ongoing     NEW NUTRITION DIAGNOSIS: [X] Not Applicable    INTERVENTIONS:   1. Add Ensure Plus High Protein 8oz PO TID (Provides 1,050kcal & 60grams of Protein)   2. Monitor PO intakes to determine if conditional feeds are appropriate  3. Honor patients daily food preferences as feasible with prescribed diet     MONITORING & EVALUATION:   1. Weights   2. PO intakes   3. Skin integrity   4. Tolerance to diet prescription   5. Labs & POCT  6. Follow up (per protocol)    Registered Dietitian/Nutritionist Remains Available.  Ubaldo Steve RD, CDN    Contact: [phone] or via [place] TEAMS

## 2024-05-02 NOTE — PROGRESS NOTE ADULT - ASSESSMENT
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 Right frontal EVD placed and patient underwent right pterional craniotomy and clipping of a ruptured right PCOM aneurysm on 3/26 . 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 Patient found to have HFrEF (now recovered), bradycardia (placed on theophyline, now resolved), b/l soleal DVT (on Lovenox ppx). PEG placement initially complicated by gastric wall hematoma, subsequently successfully placed for persistent dysphagia. Patient now admitted for a multidisciplinary rehab program. \    # SAH s/p ruptured PCOM aneurysm  - R EVD placed 3/26 and removed 4/15  - s/p right pterional craniotomy and clipping of a ruptured right PCOM aneurysm on 3/26  - CTH (3/29): EVD tract acute hemorrhage and new focus of infarction with hemorrhage in the right basal ganglia/corona radiata  - complicated by vasospasm requiring cerebral angiography and IA milrinone and verapamil  - MRI (4/8): Multifocal acute to subacute infarction. This extensively involves each MCA posterior distribution, the right basal ganglia, the anterior parasagittal vertex CINDY distribution and an additional smaller lesion in the right cerebellum  - Start comprehensive rehab program of PT/OT/SLP  - Precautions: Fall, Aspiration, Seizure  - currently with aphasia, but does follow commands     # B/l DVT   - Left soleal DVT first noted 3/28, right soleal DVT fist noted 4/3  - Most recent Duplex (4/23): persistent bilateral soleal vein DVT  - Continue Lovenox 40 mg QD  - [facility] cardiology recommended repeat surveillance duplex on 4/30.    # HLD  - continue lipitor 40 mg QHS    # HFrEF  - Initial Echo (3/26) with severe cardiomyopathy, EF 36%, regional wall motion abnormality  - Resolved on repeat TTE performed 4/9/24, normal EF 66% with no WMA.    # Hypernatremia  - was on free water 200 cc Q6H now increased to 250 q6h with stabilization of sodium   - monitor BMP as needed     # Gastric Hematoma  - PEG placement attempted 4/19 for persistent dysphagia, but was aborted due to growing hematoma noted in gastric wall on endoscopy after trocar placement  - CT Abdomen and Pelvis w/ IV Cont showed gastric wall hematoma without intraluminal or extraluminal hemorrhage  - monitor H/H, stable at 9.8-10s    #Elevated alk phos  - now with rising AST, and elevated GGT with worsening alk phos - obtained right upper quadrant ultrasound and this showed "Distended gallbladder with sludge and wall thickening"  - currently without any sxs of acute justina, afebrile, abd soft and nontender  - if develops fever, of if LFTs continue to rise would get HIDA scan  - AMA, CMP, acute hepatitis panel ordered, f/u results     #ETC  - DVT ppx with Lovenox  - cardura for urinary retention  57 y/o F with no PMH, fell one month prior to admission, transferred from [place] to [facility] on 3/26/24 for diffuse SAH w/ small IVH, 2/2 ruptured posterolaterally projecting 2.9 x 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 Right frontal EVD placed and patient underwent right pterional craniotomy and clipping of a ruptured right PCOM aneurysm on 3/26 . 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 Patient found to have HFrEF (now recovered), bradycardia (placed on theophyline, now resolved), b/l soleal DVT (on Lovenox ppx). PEG placement initially complicated by gastric wall hematoma, subsequently successfully placed for persistent dysphagia. Patient now admitted for a multidisciplinary rehab program. \    # SAH s/p ruptured PCOM aneurysm  - R EVD placed 3/26 and removed 4/15  - s/p right pterional craniotomy and clipping of a ruptured right PCOM aneurysm on 3/26  - CTH (3/29): EVD tract acute hemorrhage and new focus of infarction with hemorrhage in the right basal ganglia/corona radiata  - complicated by vasospasm requiring cerebral angiography and IA milrinone and verapamil  - MRI (4/8): Multifocal acute to subacute infarction. This extensively involves each MCA posterior distribution, the right basal ganglia, the anterior parasagittal vertex CINDY distribution and an additional smaller lesion in the right cerebellum  - Start comprehensive rehab program of PT/OT/SLP  - Precautions: Fall, Aspiration, Seizure  - currently with aphasia, but does follow commands     # B/l DVT   - Left soleal DVT first noted 3/28, right soleal DVT fist noted 4/3  - Most recent Duplex (4/23): persistent bilateral soleal vein DVT  - vasc cardiology recommended repeat surveillance duplex on 4/30.  - repeat duplex 04/30:  persistent left soleal vein DVT + also left gastrocnemius vein DVT (new).   Right soleal vein DVT no longer visualized.  - Continue Lovenox 40 mg QD  - repeat duplex in 1-2 weeks. vasc cardio f/up prior to discharge or after next surveillance duplex.    # HLD  - continue lipitor 40 mg QHS    # HFrEF  - Initial Echo (3/26) with severe cardiomyopathy, EF 36%, regional wall motion abnormality  - Resolved on repeat TTE performed 4/9/24, normal EF 66% with no WMA.    # Hypernatremia:  resolved  - was on free water 200 cc Q6H now increased to 250 q6h with stabilization of sodium   - monitor BMP as needed     # Gastric Hematoma  - PEG placement attempted 4/19 for persistent dysphagia, but was aborted due to growing hematoma noted in gastric wall on endoscopy after trocar placement  - CT Abdomen and Pelvis w/ IV Cont showed gastric wall hematoma without intraluminal or extraluminal hemorrhage  - monitor H/H, stable at 9.8-10s    #Elevated alk phos  - now with rising AST, and elevated GGT with worsening alk phos - obtained right upper quadrant ultrasound and this showed "Distended gallbladder with sludge and wall thickening"  - currently without any sxs of acute justina, afebrile, abd soft and nontender  - if develops fever, of if LFTs continue to rise would get HIDA scan  - alk phos and ALT stable. AST wnl  - AMA, CMP, acute hepatitis panel negative so far.     #ETC  - DVT ppx with Lovenox  - cardura for urinary retention       Total time spent to complete patient's bedside assessment, review medical chart, discuss medical plan of care with covering medical team was more than 35 minutes  with >50% of time spent face to face with patient, discussion with patient/family and/or coordination of care.

## 2024-05-02 NOTE — PRE PROCEDURE NOTE - DAY OF PROCEDURE ATTESTATION
I have personally seen, examined, and participated in the care of this patient.
Him/He

## 2024-05-02 NOTE — PROGRESS NOTE ADULT - COMMENTS
Patient stable. Appears comfortable, incision healing well. Denies difficulty sleeping. Denies pain. Nursing also reports good sleep. No cough or SOB

## 2024-05-02 NOTE — PROGRESS NOTE ADULT - SUBJECTIVE AND OBJECTIVE BOX
Patient is a 58y old  Female who presents with a chief complaint of SAH (29 Apr 2024 15:08)      Patient seen and examined at bedside.  - no events overnight, sitting comfortably on the wheelchair. no n/v and denies any pain, denies any abdominal pain at rest or after po intake. denies LE pain and swallowing. following commands.     ALLERGIES:  No Known Allergies      MEDICATIONS:  STANDING MEDICATIONS  atorvastatin 40 milliGRAM(s) Oral at bedtime, 04-26-24 @ 17:56  bisacodyl 5 milliGRAM(s) Oral at bedtime, 04-26-24 @ 17:56  doxazosin 2 milliGRAM(s) Oral at bedtime, 04-26-24 @ 17:56  enoxaparin Injectable 40 milliGRAM(s) SubCutaneous <User Schedule>, 04-26-24 @ 17:55  multivitamin/minerals/iron Oral Solution (CENTRUM) 15 milliLiter(s) Oral daily, 04-26-24 @ 17:56  pantoprazole   Suspension 40 milliGRAM(s) Oral before breakfast, 04-26-24 @ 19:13  polyethylene glycol 3350 17 Gram(s) Oral two times a day, 04-26-24 @ 17:56  senna Syrup 10 milliLiter(s) Oral at bedtime, 04-26-24 @ 17:56    PRN MEDICATIONS  acetaminophen   Oral Liquid .. 650 milliGRAM(s) Oral every 6 hours, 04-26-24 @ 19:14 PRN  petrolatum white Ointment 1 Application(s) Topical every 12 hours, 04-26-24 @ 17:55 PRN    Diet, Pureed:   Supplement Feeding Modality:  Oral  Ensure Plus High Protein Cans or Servings Per Day:  1       Frequency:  Three Times a day (05-02-24 @ 13:46) [Active]          Vital Signs Last 24 Hrs  T(C): 36.7 (02 May 2024 08:02), Max: 36.8 (01 May 2024 20:34)  T(F): 98.1 (02 May 2024 08:02), Max: 98.2 (01 May 2024 20:34)  HR: 99 (02 May 2024 08:02) (90 - 99)  BP: 130/74 (02 May 2024 08:02) (121/79 - 130/74)  BP(mean): --  RR: 15 (02 May 2024 08:02) (15 - 16)  SpO2: 95% (02 May 2024 08:02) (95% - 96%)    Parameters below as of 02 May 2024 08:02  Patient On (Oxygen Delivery Method): room air      I&Os:  05-01-24 @ 07:01  -  05-02-24 @ 07:00  --------------------------------------------------------  IN: 800 mL / OUT: 0 mL / NET: 800 mL        LABS:                        9.8    6.87  )-----------( 313      ( 02 May 2024 06:19 )             30.6     WBC trend: 6.87 <--, 6.50 <--  Hgb: 9.8 [05-02-24 @ 06:19]<--, 9.7 [04-29-24 @ 05:52]<--, 9.8 [04-27-24 @ 05:46]<--    05-02    141  |  104  |  12  ----------------------------<  100<H>  3.5   |  29  |  0.61    Ca    9.4      02 May 2024 06:19    TPro  6.9  /  Alb  2.6<L>  /  TBili  0.2  /  DBili  x   /  AST  34  /  ALT  47<H>  /  AlkPhos  158<H>  05-02    Creatinine trend: 0.61<--, 0.44<--, 0.50<--, 0.53<--, 0.47<--, 0.46<--, 0.46<--  SODIUM TREND: Sodium 141 [05-02 @ 06:19]<--, Sodium 141 [04-30 @ 05:33]<--, Sodium 142 [04-29 @ 05:52]<--, Sodium 144 [04-28 @ 06:38]<--      POC Glucose:       Urinalysis Basic - ( 02 May 2024 06:19 )    Color: x / Appearance: x / SG: x / pH: x  Gluc: 100 mg/dL / Ketone: x  / Bili: x / Urobili: x   Blood: x / Protein: x / Nitrite: x   Leuk Esterase: x / RBC: x / WBC x   Sq Epi: x / Non Sq Epi: x / Bacteria: x                                              -------------------------------------------------          RADIOLOGY & ADDITIONAL TESTS:    Care Discussed with Consultants/Other Providers: IDR team      PHYSICAL EXAM:  GENERAL: NAD, well-groomed, well-developed  HEAD: + right craniotomy site is c/d/i no pus, bleeding or erythema   EYES: EOMI, PERRL, conjunctiva and sclera clear  ENMT: Moist mucous membrane  NECK: Supple, No JVD  CHEST/LUNG: Clear to auscultation bilaterally, non-labored breathing, good air entry  HEART: RRR; S1/S2, No murmur  ABDOMEN: Soft, Nontender, Nondistended; Bowel sounds present  VASCULAR: Normal pulses, Normal capillary refill  EXTREMITIES: No cyanosis, No edema  NERVOUS SYSTEM:  minimally verbal, follows simple commands, nods head yes and no, moves right side more than L     Patient is a 58y old  Female who presents with a chief complaint of SAH (29 Apr 2024 15:08)      Patient seen and examined at bedside.  - no events overnight, sitting comfortably on the wheelchair. no n/v and denies any pain, denies any abdominal pain at rest or after po intake. denies LE pain and swallowing. following commands.     ALLERGIES:  No Known Allergies      MEDICATIONS:  STANDING MEDICATIONS  atorvastatin 40 milliGRAM(s) Oral at bedtime, 04-26-24 @ 17:56  bisacodyl 5 milliGRAM(s) Oral at bedtime, 04-26-24 @ 17:56  doxazosin 2 milliGRAM(s) Oral at bedtime, 04-26-24 @ 17:56  enoxaparin Injectable 40 milliGRAM(s) SubCutaneous <User Schedule>, 04-26-24 @ 17:55  multivitamin/minerals/iron Oral Solution (CENTRUM) 15 milliLiter(s) Oral daily, 04-26-24 @ 17:56  pantoprazole   Suspension 40 milliGRAM(s) Oral before breakfast, 04-26-24 @ 19:13  polyethylene glycol 3350 17 Gram(s) Oral two times a day, 04-26-24 @ 17:56  senna Syrup 10 milliLiter(s) Oral at bedtime, 04-26-24 @ 17:56    PRN MEDICATIONS  acetaminophen   Oral Liquid .. 650 milliGRAM(s) Oral every 6 hours, 04-26-24 @ 19:14 PRN  petrolatum white Ointment 1 Application(s) Topical every 12 hours, 04-26-24 @ 17:55 PRN    Diet, Pureed:   Supplement Feeding Modality:  Oral  Ensure Plus High Protein Cans or Servings Per Day:  1       Frequency:  Three Times a day (05-02-24 @ 13:46) [Active]          Vital Signs Last 24 Hrs  T(C): 36.7 (02 May 2024 08:02), Max: 36.8 (01 May 2024 20:34)  T(F): 98.1 (02 May 2024 08:02), Max: 98.2 (01 May 2024 20:34)  HR: 99 (02 May 2024 08:02) (90 - 99)  BP: 130/74 (02 May 2024 08:02) (121/79 - 130/74)  RR: 15 (02 May 2024 08:02) (15 - 16)  SpO2: 95% (02 May 2024 08:02) (95% - 96%)    Parameters below as of 02 May 2024 08:02  Patient On (Oxygen Delivery Method): room air      I&Os:  05-01-24 @ 07:01  -  05-02-24 @ 07:00  --------------------------------------------------------  IN: 800 mL / OUT: 0 mL / NET: 800 mL        LABS:                        9.8    6.87  )-----------( 313      ( 02 May 2024 06:19 )             30.6     WBC trend: 6.87 <--, 6.50 <--  Hgb: 9.8 [05-02-24 @ 06:19]<--, 9.7 [04-29-24 @ 05:52]<--, 9.8 [04-27-24 @ 05:46]<--    05-02    141  |  104  |  12  ----------------------------<  100<H>  3.5   |  29  |  0.61    Ca    9.4      02 May 2024 06:19    TPro  6.9  /  Alb  2.6<L>  /  TBili  0.2  /  DBili  x   /  AST  34  /  ALT  47<H>  /  AlkPhos  158<H>  05-02    Creatinine trend: 0.61<--, 0.44<--, 0.50<--, 0.53<--, 0.47<--, 0.46<--, 0.46<--  SODIUM TREND: Sodium 141 [05-02 @ 06:19]<--, Sodium 141 [04-30 @ 05:33]<--, Sodium 142 [04-29 @ 05:52]<--, Sodium 144 [04-28 @ 06:38]<--      POC Glucose:       Urinalysis Basic - ( 02 May 2024 06:19 )    Color: x / Appearance: x / SG: x / pH: x  Gluc: 100 mg/dL / Ketone: x  / Bili: x / Urobili: x   Blood: x / Protein: x / Nitrite: x   Leuk Esterase: x / RBC: x / WBC x   Sq Epi: x / Non Sq Epi: x / Bacteria: x                                              -------------------------------------------------          RADIOLOGY & ADDITIONAL TESTS:    Care Discussed with Consultants/Other Providers: IDR team      PHYSICAL EXAM:  GENERAL: NAD, well-groomed, well-developed  HEAD: + right craniotomy site is c/d/i no pus, bleeding or erythema   EYES: EOMI, PERRL, conjunctiva and sclera clear  ENMT: Moist mucous membrane  NECK: Supple, No JVD  CHEST/LUNG: Clear to auscultation bilaterally, non-labored breathing, good air entry  HEART: RRR; S1/S2, No murmur  ABDOMEN: Soft, Nontender, Nondistended; Bowel sounds present  VASCULAR: Normal pulses, Normal capillary refill  EXTREMITIES: No cyanosis, No edema  NERVOUS SYSTEM:  minimally verbal, follows simple commands, nods head yes and no, moves right side more than L

## 2024-05-02 NOTE — PROGRESS NOTE ADULT - ASSESSMENT
57 y/o F with no known PMH, h/o fell one month prior to admission, who was transferred from [place] to [facility] on 3/26/24 with diffuse SAH w/ small IVH, 2/2 ruptured posterolaterally projecting 2.9 x 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 Patient s/p right frontal EVD and right pterional craniotomy with clipping of a ruptured right PCOM aneurysm on 3/26. Course complicated by EVD tract hemorrhage, new right basal ganglia/corona radiata infarct, hydrocephalus with ICP crisis, vasospasm requiring IA milrinone/verapimil, and new widespread ischemic strokes. Patient also found to have HFrEF (now recovered), bradycardia (resolved), b/l soleal DVT. s/p PEG placement initially complicated by gastric wall hematoma    # SAH s/p ruptured PCOM aneurysm with left hemiparesis, left esther inattention, dysphagia, cognitive impairment  - s/p right pterional craniotomy and clipping of a ruptured right PCOM aneurysm on 3/26  -  vasospasm s/p cerebral angiography and IA milrinone and verapamil  - Staple removed 4/30  - CTH (3/29): EVD tract acute hemorrhage and new focus of infarction with hemorrhage in the right basal ganglia/corona radiata  - MRI (4/8): Multifocal acute to subacute infarction. This extensively involves each MCA posterior distribution, the right basal ganglia, the anterior parasagittal vertex CINDY distribution and an additional smaller lesion in the right cerebellum  - Continue comprehensive rehab program of PT/OT/SLP - 3 hours a day, 5 days a week  - Precautions: Fall, Aspiration, Seizure, PEG, VTE bLE    # Bradycardia  - was on theophyline, dc'd  - resolved  -  HR 90-99 5/2    # HFrEF  - Initial Echo (3/26) with severe cardiomyopathy, EF 36%, regional wall motion abnormality  - Resolved on repeat TTE performed 4/9/24, normal EF 66% with no WMA.  - /79 - 130/74) 5/2    # HLD  -  lipitor 40 mg QHS    # B/l DVT   - Left soleal DVT first noted 3/28, right soleal DVT fist noted 4/3  - Duplex (4/23): persistent bilateral soleal vein DVT  - Continue Lovenox 40 mg QD  - Surveillance duplex 4/30: There is persistence of DVT identified within the left soleal vein. On the current evaluation DVT is also evident within the left gastrocnemius vein, new. Previously identified right soleal DVT is no longer visualized  - Discussed with hospitalist given new DVT gastroc vein; not candidate for full dose AC due to SAh. Continue PPX dose now, surveillance dopplers weekly  - NSGY: no full dose AC for at least 4 weeks from surgery  - Next doppler: 5/7    # Hypernatremia  - continue free water 200 cc Q6H  - Na 141 4/30.--> 141 5/2    # Gastric Hematoma post PEG placement  -  PEG placement attempted 4/19 for persistent dysphagia, but was aborted due to growing hematoma noted in gastric wall on endoscopy after trocar placement  - CT Abdomen and Pelvis w/ IV Cont showed gastric wall hematoma without intraluminal or extraluminal hemorrhage  - PEG placed 4/23  - Hgb 9.7 4/29--> 9.8 5/2  stable.   - CBC 5/6    # Pain  - Tylenol 650 mg Q6H PRN     # GI / Bowel  - Senna qHS  - Miralax BID  - bisacodyl 5 mg QHS  - GI ppx: protonix 40 mg QD (started on admission to rehab)    #  / Bladder  - Continue cardura 2 mg QHS   - bladder scans Q8 hours with straight cath for >400cc.    # Skin / Pressure injury  - Skin assessment on admission performed:  left toe scabs, right lateral foot callus   - podiatry consult appreciated 5/2. Callus shaved down, no additional treatment necessary    # Diet/Dysphagia:  - PEG placed on 4/23.   - Diet Consistency: Jevity tube feeds   - Supplements: MVI  - BUn/Cr 17/0.51 4/29--> 15/0.44 4/30--> 12/0.61 5/2  - MBS 5/2    # DVT prophylaxis:   - Lovenox 40 mg QD    # Case discussed in IDT rounds 5/1 (initial)  - incontinent, lives in elevator apt with ramp access, lives with son and other family close by. NPO with PEG, severe receptive and expressive language deficits with fluent aphasia, limited verbal output, unreliable yes/no and difficulty following 1 step commands, poor attention, max assist bADLs, left esther inattention, severe apraxia all tasks, bed mobility and transfers max assist-total assist,   - goals: 24 x 7 assist on dc, min-mod assist self care tasks and transfers, min assist transfers and ambulation  - family training  - target: 5/17 home with caregiver assist and home PT OT SLP    # LABS  MBS 5/2  CBC CMP 5/6      Outpatient Follow-up:  Perlita Varela  Neurosurgery  [address], [address]  [place] [address]  Phone: [phone]  Fax: [phone]  Follow Up Time:      Code Status/Emergency Contact:     59 y/o F with no known PMH, h/o fell one month prior to admission, who was transferred from [place] to [facility] on 3/26/24 with diffuse SAH w/ small IVH, 2/2 ruptured posterolaterally projecting 2.9 x 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 Patient s/p right frontal EVD and right pterional craniotomy with clipping of a ruptured right PCOM aneurysm on 3/26. Course complicated by EVD tract hemorrhage, new right basal ganglia/corona radiata infarct, hydrocephalus with ICP crisis, vasospasm requiring IA milrinone/verapimil, and new widespread ischemic strokes. Patient also found to have HFrEF (now recovered), bradycardia (resolved), b/l soleal DVT. s/p PEG placement initially complicated by gastric wall hematoma    # SAH s/p ruptured PCOM aneurysm with left hemiparesis, left esther inattention, dysphagia, cognitive impairment  - s/p right pterional craniotomy and clipping of a ruptured right PCOM aneurysm on 3/26  -  vasospasm s/p cerebral angiography and IA milrinone and verapamil  - Staple removed 4/30  - CTH (3/29): EVD tract acute hemorrhage and new focus of infarction with hemorrhage in the right basal ganglia/corona radiata  - MRI (4/8): Multifocal acute to subacute infarction. This extensively involves each MCA posterior distribution, the right basal ganglia, the anterior parasagittal vertex CINDY distribution and an additional smaller lesion in the right cerebellum  - Continue comprehensive rehab program of PT/OT/SLP - 3 hours a day, 5 days a week  - patient allowed patio pass in WC with staff and/or family present at all times  - Precautions: Fall, Aspiration, Seizure, PEG, VTE bLE    # Bradycardia  - was on theophyline, dc'd  - resolved  -  HR 90-99 5/2    # HFrEF  - Initial Echo (3/26) with severe cardiomyopathy, EF 36%, regional wall motion abnormality  - Resolved on repeat TTE performed 4/9/24, normal EF 66% with no WMA.  - /79 - 130/74) 5/2    # HLD  -  lipitor 40 mg QHS    # B/l DVT   - Left soleal DVT first noted 3/28, right soleal DVT fist noted 4/3  - Duplex (4/23): persistent bilateral soleal vein DVT  - Continue Lovenox 40 mg QD  - Surveillance duplex 4/30: There is persistence of DVT identified within the left soleal vein. On the current evaluation DVT is also evident within the left gastrocnemius vein, new. Previously identified right soleal DVT is no longer visualized  - Discussed with hospitalist given new DVT gastroc vein; not candidate for full dose AC due to SAh. Continue PPX dose now, surveillance dopplers weekly  - NSGY: no full dose AC for at least 4 weeks from surgery  - Next doppler: 5/7    # Hypernatremia  - continue free water 200 cc Q6H  - Na 141 4/30.--> 141 5/2    # Gastric Hematoma post PEG placement  -  PEG placement attempted 4/19 for persistent dysphagia, but was aborted due to growing hematoma noted in gastric wall on endoscopy after trocar placement  - CT Abdomen and Pelvis w/ IV Cont showed gastric wall hematoma without intraluminal or extraluminal hemorrhage  - PEG placed 4/23  - Hgb 9.7 4/29--> 9.8 5/2  stable.   - CBC 5/6    # Pain  - Tylenol 650 mg Q6H PRN     # GI / Bowel  - Senna qHS  - Miralax BID  - bisacodyl 5 mg QHS  - GI ppx: protonix 40 mg QD (started on admission to rehab)    #  / Bladder  - Continue cardura 2 mg QHS   - bladder scans Q8 hours with straight cath for >400cc.    # Skin / Pressure injury  - Skin assessment on admission performed:  left toe scabs, right lateral foot callus   - podiatry consult appreciated 5/2. Callus shaved down, no additional treatment necessary    # Diet/Dysphagia:  - PEG placed on 4/23.   - Diet Consistency: Jevity tube feeds   - Supplements: MVI  - BUn/Cr 17/0.51 4/29--> 15/0.44 4/30--> 12/0.61 5/2  - MBS 5/2    # DVT prophylaxis:   - Lovenox 40 mg QD    # Case discussed in IDT rounds 5/1 (initial)  - incontinent, lives in elevator apt with ramp access, lives with son and other family close by. NPO with PEG, severe receptive and expressive language deficits with fluent aphasia, limited verbal output, unreliable yes/no and difficulty following 1 step commands, poor attention, max assist bADLs, left esther inattention, severe apraxia all tasks, bed mobility and transfers max assist-total assist,   - goals: 24 x 7 assist on dc, min-mod assist self care tasks and transfers, min assist transfers and ambulation  - family training  - target: 5/17 home with caregiver assist and home PT OT SLP    # LABS  MBS 5/2  CBC CMP 5/6      Outpatient Follow-up:  Perlita Varela  Neurosurgery  [address] [place] [address], [address]  [place] [address]  Phone: [phone]  Fax: [phone]  Follow Up Time:      Code Status/Emergency Contact:

## 2024-05-03 PROCEDURE — 99232 SBSQ HOSP IP/OBS MODERATE 35: CPT

## 2024-05-03 RX ADMIN — ENOXAPARIN SODIUM 40 MILLIGRAM(S): 100 INJECTION SUBCUTANEOUS at 17:22

## 2024-05-03 RX ADMIN — Medication 2 MILLIGRAM(S): at 22:08

## 2024-05-03 RX ADMIN — Medication 15 MILLILITER(S): at 13:17

## 2024-05-03 RX ADMIN — ATORVASTATIN CALCIUM 40 MILLIGRAM(S): 80 TABLET, FILM COATED ORAL at 22:08

## 2024-05-03 RX ADMIN — PANTOPRAZOLE SODIUM 40 MILLIGRAM(S): 20 TABLET, DELAYED RELEASE ORAL at 06:17

## 2024-05-03 NOTE — PROGRESS NOTE ADULT - SUBJECTIVE AND OBJECTIVE BOX
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 Initially presented to [place] with headaches and became unresponsive, was intubated, and transferred to [facility]. On arrival to [facility] patient w/ PERRL, +cough/gag, 2/5 spontaneous movement in LUE, otherwise no movement. Given 1 unit platelets and R frontal EVD placed with high opening pressure. Patient underwent Right pterional craniotomy and clipping of a ruptured right PCOM aneurysm on 3/26.     On 3/29, EVD displaced and replaced in situ, complicated by EVD tract acute hemorrhage and new focus of infarction with hemorrhage in the right basal ganglia/corona radiata. Course further complicated by hydrocephalus with ICP crisis, vasospasm requiring cerebral angiography and IA milrinone and verapamil. MRI (4/8) with widespread ischemic stroke b/l, as per neuro IR no further intervention. Patient extubated on 4/3, re-intubated 4/8, and extubated again on 4/9. EVD removed on 4/15. CTH post removal stable.     Course also complicated by bradycardia treated with theophyline, now resolved and discontinued. Initial Echo (3/26) with severe cardiomyopathy, EF 36%, regional wall motion abnormality. Resolved on repeat TTE performed 4/9/24, normal EF 66% with no WMA. Patient first noted with left soleal DVT on Duplex 3/28. Duplex (4/10) with stable b/l soleal DVT with extension to right posterior tibial DVT.  Last LE duplex with stable bilateral soleal DVT, [facility] cardiology recommended repeat surveillance duplex on 4/30. On Lovenox ppx.     PEG placement attempted 4/19 for persistent dysphagia, but was aborted due to growing hematoma noted in gastric wall on endoscopy after trocar placement. CT Abdomen and Pelvis w/ IV Cont showed gastric wall hematoma without intraluminal or extraluminal hemorrhage. PEG successfully placed on 4/23.     Patient evaluated by PT/OT and was recommended for acute inpatient rehab. Patient is medically stable for discharge to [facility] on 4/26.  (26 Apr 2024 14:34)      PAST MEDICAL & SURGICAL HISTORY:      MEDICATIONS  (STANDING):  atorvastatin 40 milliGRAM(s) Oral at bedtime  bisacodyl 5 milliGRAM(s) Oral at bedtime  doxazosin 2 milliGRAM(s) Oral at bedtime  enoxaparin Injectable 40 milliGRAM(s) SubCutaneous <User Schedule>  multivitamin/minerals/iron Oral Solution (CENTRUM) 15 milliLiter(s) Oral daily  pantoprazole   Suspension 40 milliGRAM(s) Oral before breakfast  polyethylene glycol 3350 17 Gram(s) Oral two times a day  senna Syrup 10 milliLiter(s) Oral at bedtime    MEDICATIONS  (PRN):  acetaminophen   Oral Liquid .. 650 milliGRAM(s) Oral every 6 hours PRN Mild Pain (1 - 3)  petrolatum white Ointment 1 Application(s) Topical every 12 hours PRN dryness      Allergies    No Known Allergies    Intolerances          VITALS  58y  Vital Signs Last 24 Hrs  T(C): 36.7 (03 May 2024 08:18), Max: 36.9 (02 May 2024 19:45)  T(F): 98.1 (03 May 2024 08:18), Max: 98.4 (02 May 2024 19:45)  HR: 90 (03 May 2024 08:18) (86 - 90)  BP: 132/65 (03 May 2024 08:18) (129/76 - 132/65)  BP(mean): --  RR: 15 (03 May 2024 08:18) (15 - 16)  SpO2: 95% (03 May 2024 08:18) (95% - 96%)    Parameters below as of 03 May 2024 08:18  Patient On (Oxygen Delivery Method): room air      Daily     Daily         RECENT LABS:                          9.8    6.87  )-----------( 313      ( 02 May 2024 06:19 )             30.6     05-02    141  |  104  |  12  ----------------------------<  100<H>  3.5   |  29  |  0.61    Ca    9.4      02 May 2024 06:19    TPro  6.9  /  Alb  2.6<L>  /  TBili  0.2  /  DBili  x   /  AST  34  /  ALT  47<H>  /  AlkPhos  158<H>  05-02    LIVER FUNCTIONS - ( 02 May 2024 06:19 )  Alb: 2.6 g/dL / Pro: 6.9 g/dL / ALK PHOS: 158 U/L / ALT: 47 U/L / AST: 34 U/L / GGT: x             Urinalysis Basic - ( 02 May 2024 06:19 )    Color: x / Appearance: x / SG: x / pH: x  Gluc: 100 mg/dL / Ketone: x  / Bili: x / Urobili: x   Blood: x / Protein: x / Nitrite: x   Leuk Esterase: x / RBC: x / WBC x   Sq Epi: x / Non Sq Epi: x / Bacteria: x          CAPILLARY BLOOD GLUCOSE

## 2024-05-03 NOTE — PROGRESS NOTE ADULT - COMMENTS
Patient seen in bed, eating breakfast, with assistance of Mongolian language line  Rishi #[id number]    Patient denies H/A, dizziness. At first, complains of abdominal pain (when asked) directly, does not initiate. Denies N/V, no constipation or diarrhea. Exam benign, and when binder loosened (sitting up, PEG, eating breakfast with assist) reports pain resolved

## 2024-05-03 NOTE — PROGRESS NOTE ADULT - SUBJECTIVE AND OBJECTIVE BOX
Patient is a 58y old  Female who presents with a chief complaint of SAH (29 Apr 2024 15:08)      Patient seen and examined at bedside.  - no events overnight, sitting comfortably on the wheelchair. no n/v and denies any pain, denies any abdominal pain at rest or after po intake. denies LE pain and swallowing. following commands.   RN reports increased urinary frequency.    ALLERGIES:  No Known Allergies      MEDICATIONS:  STANDING MEDICATIONS  atorvastatin 40 milliGRAM(s) Oral at bedtime, 04-26-24 @ 17:56  bisacodyl 5 milliGRAM(s) Oral at bedtime, 04-26-24 @ 17:56  doxazosin 2 milliGRAM(s) Oral at bedtime, 04-26-24 @ 17:56  enoxaparin Injectable 40 milliGRAM(s) SubCutaneous <User Schedule>, 04-26-24 @ 17:55  multivitamin/minerals/iron Oral Solution (CENTRUM) 15 milliLiter(s) Oral daily, 04-26-24 @ 17:56  pantoprazole   Suspension 40 milliGRAM(s) Oral before breakfast, 04-26-24 @ 19:13  polyethylene glycol 3350 17 Gram(s) Oral two times a day, 04-26-24 @ 17:56  senna Syrup 10 milliLiter(s) Oral at bedtime, 04-26-24 @ 17:56    PRN MEDICATIONS  acetaminophen   Oral Liquid .. 650 milliGRAM(s) Oral every 6 hours, 04-26-24 @ 19:14 PRN  petrolatum white Ointment 1 Application(s) Topical every 12 hours, 04-26-24 @ 17:55 PRN    Diet, Pureed:   Supplement Feeding Modality:  Oral  Ensure Plus High Protein Cans or Servings Per Day:  1       Frequency:  Three Times a day (05-02-24 @ 13:46) [Active]      Vital Signs Last 24 Hrs  T(C): 36.7 (03 May 2024 08:18), Max: 36.9 (02 May 2024 19:45)  T(F): 98.1 (03 May 2024 08:18), Max: 98.4 (02 May 2024 19:45)  HR: 90 (03 May 2024 08:18) (86 - 90)  BP: 132/65 (03 May 2024 08:18) (129/76 - 132/65)  RR: 15 (03 May 2024 08:18) (15 - 16)  SpO2: 95% (03 May 2024 08:18) (95% - 96%)    Parameters below as of 03 May 2024 08:18  Patient On (Oxygen Delivery Method): room air      I&Os:    LABS:                        9.8    6.87  )-----------( 313      ( 02 May 2024 06:19 )             30.6     WBC trend: 6.87 <--  Hgb: 9.8 [05-02-24 @ 06:19]<--, 9.7 [04-29-24 @ 05:52]<--    05-02    141  |  104  |  12  ----------------------------<  100<H>  3.5   |  29  |  0.61    Ca    9.4      02 May 2024 06:19    TPro  6.9  /  Alb  2.6<L>  /  TBili  0.2  /  DBili  x   /  AST  34  /  ALT  47<H>  /  AlkPhos  158<H>  05-02    Creatinine trend: 0.61<--, 0.44<--, 0.50<--, 0.53<--, 0.47<--, 0.46<--, 0.46<--  SODIUM TREND: Sodium 141 [05-02 @ 06:19]<--, Sodium 141 [04-30 @ 05:33]<--, Sodium 142 [04-29 @ 05:52]<--      POC Glucose:         Urinalysis Basic - ( 02 May 2024 06:19 )    Color: x / Appearance: x / SG: x / pH: x  Gluc: 100 mg/dL / Ketone: x  / Bili: x / Urobili: x   Blood: x / Protein: x / Nitrite: x   Leuk Esterase: x / RBC: x / WBC x   Sq Epi: x / Non Sq Epi: x / Bacteria: x                                              -------------------------------------------------          RADIOLOGY & ADDITIONAL TESTS:    Care Discussed with Consultants/Other Providers: IDR team      PHYSICAL EXAM:  GENERAL: NAD, well-groomed, well-developed  HEAD: + right craniotomy site is c/d/i no pus, bleeding or erythema   EYES: EOMI, PERRL, conjunctiva and sclera clear  CHEST/LUNG: Clear to auscultation bilaterally, non-labored breathing, good air entry  HEART: RRR; S1/S2, No murmur  ABDOMEN: Soft, Nontender, Nondistended; Bowel sounds present  EXTREMITIES: No cyanosis, No edema  NERVOUS SYSTEM:  minimally verbal, follows simple commands, nods head yes and no, moves right side more than L

## 2024-05-03 NOTE — PROGRESS NOTE ADULT - ASSESSMENT
57 y/o F with no PMH, fell one month prior to admission, transferred from [place] to 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 Right frontal EVD placed and patient underwent right pterional craniotomy and clipping of a ruptured right PCOM aneurysm on 3/26 . 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 Patient found to have HFrEF (now recovered), bradycardia (placed on theophyline, now resolved), b/l soleal DVT (on Lovenox ppx). PEG placement initially complicated by gastric wall hematoma, subsequently successfully placed for persistent dysphagia. Patient now admitted for a multidisciplinary rehab program. \    # SAH s/p ruptured PCOM aneurysm  - R EVD placed 3/26 and removed 4/15  - s/p right pterional craniotomy and clipping of a ruptured right PCOM aneurysm on 3/26  - CTH (3/29): EVD tract acute hemorrhage and new focus of infarction with hemorrhage in the right basal ganglia/corona radiata  - complicated by vasospasm requiring cerebral angiography and IA milrinone and verapamil  - MRI (4/8): Multifocal acute to subacute infarction. This extensively involves each MCA posterior distribution, the right basal ganglia, the anterior parasagittal vertex CINDY distribution and an additional smaller lesion in the right cerebellum  - Start comprehensive rehab program of PT/OT/SLP  - Precautions: Fall, Aspiration, Seizure  - currently with aphasia, but does follow commands     # B/l DVT   - Left soleal DVT first noted 3/28, right soleal DVT fist noted 4/3  - Most recent Duplex (4/23): persistent bilateral soleal vein DVT  - vasc cardiology recommended repeat surveillance duplex on 4/30.  - repeat duplex 04/30:  persistent left soleal vein DVT + also left gastrocnemius vein DVT (new).   Right soleal vein DVT no longer visualized.  - Continue Lovenox 40 mg QD  - repeat duplex in 1-2 weeks. vasc cardio f/up prior to discharge or after next surveillance duplex.    # urinary frequency  - check UA    # HLD  - continue lipitor 40 mg QHS    # HFrEF  - Initial Echo (3/26) with severe cardiomyopathy, EF 36%, regional wall motion abnormality  - Resolved on repeat TTE performed 4/9/24, normal EF 66% with no WMA.    # Hypernatremia:  resolved  - was on free water 200 cc Q6H now increased to 250 q6h with stabilization of sodium   - monitor BMP as needed     # Gastric Hematoma  - PEG placement attempted 4/19 for persistent dysphagia, but was aborted due to growing hematoma noted in gastric wall on endoscopy after trocar placement  - CT Abdomen and Pelvis w/ IV Cont showed gastric wall hematoma without intraluminal or extraluminal hemorrhage  - monitor H/H, stable at 9.8-10s    #Elevated alk phos  - now with rising AST, and elevated GGT with worsening alk phos - obtained right upper quadrant ultrasound and this showed "Distended gallbladder with sludge and wall thickening"  - currently without any sxs of acute justina, afebrile, abd soft and nontender  - if develops fever, of if LFTs continue to rise would get HIDA scan  - alk phos and ALT stable. AST wnl  - AMA, CMP, acute hepatitis panel negative so far.     #ETC  - DVT ppx with Lovenox  - cardura for urinary retention       Total time spent to complete patient's bedside assessment, review medical chart, discuss medical plan of care with covering medical team was more than 35 minutes  with >50% of time spent face to face with patient, discussion with patient/family and/or coordination of care.

## 2024-05-04 PROCEDURE — 99231 SBSQ HOSP IP/OBS SF/LOW 25: CPT | Mod: GC

## 2024-05-04 RX ADMIN — ENOXAPARIN SODIUM 40 MILLIGRAM(S): 100 INJECTION SUBCUTANEOUS at 17:13

## 2024-05-04 RX ADMIN — PANTOPRAZOLE SODIUM 40 MILLIGRAM(S): 20 TABLET, DELAYED RELEASE ORAL at 05:25

## 2024-05-04 RX ADMIN — ATORVASTATIN CALCIUM 40 MILLIGRAM(S): 80 TABLET, FILM COATED ORAL at 21:28

## 2024-05-04 RX ADMIN — Medication 5 MILLIGRAM(S): at 21:30

## 2024-05-04 RX ADMIN — SENNA PLUS 10 MILLILITER(S): 8.6 TABLET ORAL at 21:29

## 2024-05-04 RX ADMIN — Medication 2 MILLIGRAM(S): at 21:29

## 2024-05-04 RX ADMIN — Medication 15 MILLILITER(S): at 11:20

## 2024-05-04 NOTE — PROGRESS NOTE ADULT - ASSESSMENT
Imp: Patient with diagnosis of SAH admitted for comprehensive acute rehabilitation.    Plan:  - Chart reviewed  - Continue comprehensive rehabilitation program. Patient requires active and ongoing therapeutic interventions of multiple disciplines and can tolerate 3h/d of therapies. Can actively participate and benefit from an intensive rehabilitation program. Requires supervision of a rehabilitation physician and a coordinated interdisciplinary approach to providing rehabilitation.   - DVT prophylaxis- Lovenox  - Skin- Turn q2h, check skin daily  - Continue current medications  - Medical issues:    * BLE DVT - not on full dose AC per NSGY. Cont ppx dose. Next doppler 5/7    * Hypoarousal - continue to monitor. Optimize sleep/wake cycles. VSS  - Discussed with resident on call and interdisciplinary team

## 2024-05-04 NOTE — PROGRESS NOTE ADULT - SUBJECTIVE AND OBJECTIVE BOX
Patient is a 58y old  Female who presents with a chief complaint of SAH (03 May 2024 16:42)    ---------------------------------------------------------------------  SUBJECTIVE:  Seen and evaluated at bedside this AM. No acute issues overnight. Appeared lethargic this AM and difficult to arouse but opens eyes to sternal rub and voice command in Amharic. Nursing states she was more awake later in the afternoon. Participated in speech therapy.    Other ROS:  Denies: headache, CP, SOB, pain, bowel or bladder issues  ----------------------------------------------------------------------  PHYSICAL EXAM:    Vital Signs Last 24 Hrs  T(C): 36.9 (04 May 2024 09:36), Max: 36.9 (04 May 2024 09:36)  T(F): 98.5 (04 May 2024 09:36), Max: 98.5 (04 May 2024 09:36)  HR: 74 (04 May 2024 16:32) (74 - 89)  BP: 127/79 (04 May 2024 16:32) (115/69 - 145/85)  BP(mean): --  RR: 16 (04 May 2024 16:32) (16 - 16)  SpO2: 94% (04 May 2024 16:32) (93% - 95%)    Parameters below as of 04 May 2024 16:32  Patient On (Oxygen Delivery Method): room air      Daily     Daily     PHYSICAL EXAM:    General - NAD, alert, poor command following  Heart- pulse regular  Lungs- breathing comfortably without respiratory distress  Abd- no visible abdominal distension ,+PEG  Ext- No calf pain, extremities well perfused  Neuro- sleepy, no agitation, opens eyes to voice in Amharic. Moves extremities spontaneously.   ----------------------------------------------------------------------  RECENT LABS:                CAPILLARY BLOOD GLUCOSE        ----------------------------------------------------------------------  RECENT IMAGING:    ***  ----------------------------------------------------------------------  MEDICATIONS:  MEDICATIONS  (STANDING):  atorvastatin 40 milliGRAM(s) Oral at bedtime  bisacodyl 5 milliGRAM(s) Oral at bedtime  doxazosin 2 milliGRAM(s) Oral at bedtime  enoxaparin Injectable 40 milliGRAM(s) SubCutaneous <User Schedule>  multivitamin/minerals/iron Oral Solution (CENTRUM) 15 milliLiter(s) Oral daily  pantoprazole   Suspension 40 milliGRAM(s) Oral before breakfast  polyethylene glycol 3350 17 Gram(s) Oral two times a day  senna Syrup 10 milliLiter(s) Oral at bedtime    MEDICATIONS  (PRN):  acetaminophen   Oral Liquid .. 650 milliGRAM(s) Oral every 6 hours PRN Mild Pain (1 - 3)  petrolatum white Ointment 1 Application(s) Topical every 12 hours PRN dryness    ----------------------------------------------------------------------

## 2024-05-05 PROCEDURE — 99232 SBSQ HOSP IP/OBS MODERATE 35: CPT

## 2024-05-05 PROCEDURE — 99231 SBSQ HOSP IP/OBS SF/LOW 25: CPT | Mod: GC

## 2024-05-05 RX ADMIN — Medication 2 MILLIGRAM(S): at 22:17

## 2024-05-05 RX ADMIN — PANTOPRAZOLE SODIUM 40 MILLIGRAM(S): 20 TABLET, DELAYED RELEASE ORAL at 05:03

## 2024-05-05 RX ADMIN — Medication 5 MILLIGRAM(S): at 22:17

## 2024-05-05 RX ADMIN — POLYETHYLENE GLYCOL 3350 17 GRAM(S): 17 POWDER, FOR SOLUTION ORAL at 17:11

## 2024-05-05 RX ADMIN — POLYETHYLENE GLYCOL 3350 17 GRAM(S): 17 POWDER, FOR SOLUTION ORAL at 05:02

## 2024-05-05 RX ADMIN — Medication 15 MILLILITER(S): at 11:34

## 2024-05-05 RX ADMIN — SENNA PLUS 10 MILLILITER(S): 8.6 TABLET ORAL at 22:17

## 2024-05-05 RX ADMIN — ENOXAPARIN SODIUM 40 MILLIGRAM(S): 100 INJECTION SUBCUTANEOUS at 17:12

## 2024-05-05 RX ADMIN — ATORVASTATIN CALCIUM 40 MILLIGRAM(S): 80 TABLET, FILM COATED ORAL at 22:17

## 2024-05-05 NOTE — PROGRESS NOTE ADULT - ASSESSMENT
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 Right frontal EVD placed and patient underwent right pterional craniotomy and clipping of a ruptured right PCOM aneurysm on 3/26 . 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 Patient found to have HFrEF (now recovered), bradycardia (placed on theophyline, now resolved), b/l soleal DVT (on Lovenox ppx). PEG placement initially complicated by gastric wall hematoma, subsequently successfully placed for persistent dysphagia. Patient now admitted for a multidisciplinary rehab program. \    # SAH s/p ruptured PCOM aneurysm  - R EVD placed 3/26 and removed 4/15  - s/p right pterional craniotomy and clipping of a ruptured right PCOM aneurysm on 3/26  - CTH (3/29): EVD tract acute hemorrhage and new focus of infarction with hemorrhage in the right basal ganglia/corona radiata  - complicated by vasospasm requiring cerebral angiography and IA milrinone and verapamil  - MRI (4/8): Multifocal acute to subacute infarction. This extensively involves each MCA posterior distribution, the right basal ganglia, the anterior parasagittal vertex CINDY distribution and an additional smaller lesion in the right cerebellum  - Start comprehensive rehab program of PT/OT/SLP  - Precautions: Fall, Aspiration, Seizure  - currently with aphasia, but does follow commands     # B/l DVT   - Left soleal DVT first noted 3/28, right soleal DVT fist noted 4/3  - Most recent Duplex (4/23): persistent bilateral soleal vein DVT  - vasc cardiology recommended repeat surveillance duplex on 4/30.  - repeat duplex 04/30:  persistent left soleal vein DVT + also left gastrocnemius vein DVT (new).   Right soleal vein DVT no longer visualized.  - Continue Lovenox 40 mg QD  - repeat duplex in 1-2 weeks. vasc cardio f/up prior to discharge or after next surveillance duplex.    # urinary frequency  - check UA    # HLD  - continue lipitor 40 mg QHS    # HFrEF  - Initial Echo (3/26) with severe cardiomyopathy, EF 36%, regional wall motion abnormality  - Resolved on repeat TTE performed 4/9/24, normal EF 66% with no WMA.    # Hypernatremia:  resolved  - was on free water 200 cc Q6H now increased to 250 q6h with stabilization of sodium   - monitor BMP as needed     # Gastric Hematoma  - PEG placement attempted 4/19 for persistent dysphagia, but was aborted due to growing hematoma noted in gastric wall on endoscopy after trocar placement  - CT Abdomen and Pelvis w/ IV Cont showed gastric wall hematoma without intraluminal or extraluminal hemorrhage  - monitor H/H, stable at 9.8-10s    #Elevated alk phos  - now with rising AST, and elevated GGT with worsening alk phos - obtained right upper quadrant ultrasound and this showed "Distended gallbladder with sludge and wall thickening"  - currently without any sxs of acute justina, afebrile, abd soft and nontender  - if develops fever, of if LFTs continue to rise would get HIDA scan  - alk phos and ALT stable. AST wnl  - AMA, CMP, acute hepatitis panel negative so far.     #ETC  - DVT ppx with Lovenox  - cardura for urinary retention

## 2024-05-05 NOTE — PROGRESS NOTE ADULT - SUBJECTIVE AND OBJECTIVE BOX
Patient is a 58y old  Female who presents with a chief complaint of SAH (04 May 2024 17:18)    ---------------------------------------------------------------------  SUBJECTIVE:  Seen and evaluated at bedside this AM. No acute issues overnight. Seen in hallway sitting in wheelchair.     Other ROS:  No reported: headache, CP, SOB, pain, bowel or bladder issues  ----------------------------------------------------------------------  PHYSICAL EXAM:    Vital Signs Last 24 Hrs  T(C): 36.7 (05 May 2024 07:38), Max: 36.7 (04 May 2024 19:41)  T(F): 98.1 (05 May 2024 07:38), Max: 98.1 (05 May 2024 07:38)  HR: 73 (05 May 2024 07:38) (73 - 99)  BP: 154/84 (05 May 2024 07:38) (127/79 - 154/84)  BP(mean): --  RR: 16 (05 May 2024 07:38) (16 - 17)  SpO2: 94% (05 May 2024 07:38) (94% - 95%)    Parameters below as of 05 May 2024 07:38  Patient On (Oxygen Delivery Method): room air      Daily     Daily       General - NAD, alert, poor command following, smiles and nods.  Heart- pulse regular  Lungs- breathing comfortably without respiratory distress  Abd- no visible abdominal distension ,+PEG  Ext- No calf pain, extremities well perfused  Neuro- moves extremities against gravity.   ----------------------------------------------------------------------  RECENT LABS:                CAPILLARY BLOOD GLUCOSE        ----------------------------------------------------------------------  RECENT IMAGING:    ***  ----------------------------------------------------------------------  MEDICATIONS:  MEDICATIONS  (STANDING):  atorvastatin 40 milliGRAM(s) Oral at bedtime  bisacodyl 5 milliGRAM(s) Oral at bedtime  doxazosin 2 milliGRAM(s) Oral at bedtime  enoxaparin Injectable 40 milliGRAM(s) SubCutaneous <User Schedule>  multivitamin/minerals/iron Oral Solution (CENTRUM) 15 milliLiter(s) Oral daily  pantoprazole   Suspension 40 milliGRAM(s) Oral before breakfast  polyethylene glycol 3350 17 Gram(s) Oral two times a day  senna Syrup 10 milliLiter(s) Oral at bedtime    MEDICATIONS  (PRN):  acetaminophen   Oral Liquid .. 650 milliGRAM(s) Oral every 6 hours PRN Mild Pain (1 - 3)  petrolatum white Ointment 1 Application(s) Topical every 12 hours PRN dryness    ----------------------------------------------------------------------

## 2024-05-05 NOTE — PROGRESS NOTE ADULT - SUBJECTIVE AND OBJECTIVE BOX
Hospitalist: Jnoo Doll DO    CHIEF COMPLAINT: Patient is a 58y old  female who presents with a chief complaint of SAH (05 May 2024 11:26)      SUBJECTIVE / OVERNIGHT EVENTS: Patient seen and examined. No acute events overnight. Pain well controlled and patient without any complaints.    MEDICATIONS  (STANDING):  atorvastatin 40 milliGRAM(s) Oral at bedtime  bisacodyl 5 milliGRAM(s) Oral at bedtime  doxazosin 2 milliGRAM(s) Oral at bedtime  enoxaparin Injectable 40 milliGRAM(s) SubCutaneous <User Schedule>  multivitamin/minerals/iron Oral Solution (CENTRUM) 15 milliLiter(s) Oral daily  pantoprazole   Suspension 40 milliGRAM(s) Oral before breakfast  polyethylene glycol 3350 17 Gram(s) Oral two times a day  senna Syrup 10 milliLiter(s) Oral at bedtime    MEDICATIONS  (PRN):  acetaminophen   Oral Liquid .. 650 milliGRAM(s) Oral every 6 hours PRN Mild Pain (1 - 3)  petrolatum white Ointment 1 Application(s) Topical every 12 hours PRN dryness      VITALS:  T(F): 98.1 (05-05-24 @ 07:38), Max: 98.1 (05-05-24 @ 07:38)  HR: 73 (05-05-24 @ 07:38) (73 - 99)  BP: 154/84 (05-05-24 @ 07:38) (127/79 - 154/84)  RR: 16 (05-05-24 @ 07:38) (16 - 17)  SpO2: 94% (05-05-24 @ 07:38)      REVIEW OF SYSTEMS:  For ROV please refer back to H&P     PHYSICAL EXAM:  GENERAL: NAD, well-groomed, well-developed  HEAD: + right craniotomy site is c/d/i no pus, bleeding or erythema   EYES: EOMI, PERRL, conjunctiva and sclera clear  CHEST/LUNG: Clear to auscultation bilaterally, non-labored breathing, good air entry  HEART: RRR; S1/S2, No murmur  ABDOMEN: Soft, Nontender, Nondistended; Bowel sounds present  EXTREMITIES: No cyanosis, No edema  NERVOUS SYSTEM:  minimally verbal, follows simple commands, nods head yes and no, moves right side more than L      RADIOLOGY & ADDITIONAL TESTS:    Imaging Personally Reviewed:    [X] Consultant(s) Notes Reviewed:  [X] Care Discussed with Consultants/Other Providers:

## 2024-05-05 NOTE — PROGRESS NOTE ADULT - ASSESSMENT
Imp: Patient with diagnosis of SAH admitted for comprehensive acute rehabilitation.    Plan:  - Chart reviewed  - Continue comprehensive rehabilitation program. Patient requires active and ongoing therapeutic interventions of multiple disciplines and can tolerate 3h/d of therapies. Can actively participate and benefit from an intensive rehabilitation program. Requires supervision of a rehabilitation physician and a coordinated interdisciplinary approach to providing rehabilitation.   - DVT prophylaxis- Lovenox  - Skin- Turn q2h, check skin daily  - Continue current medications  - Medical issues:    * BLE DVT - not on full dose AC per NSGY. Cont ppx dose. Next doppler 5/7    * Hypoarousal - continue to monitor. Optimize sleep/wake cycles. VSS. Improving.   - Discussed with resident on call and interdisciplinary team

## 2024-05-06 LAB
ALBUMIN SERPL ELPH-MCNC: 2.6 G/DL — LOW (ref 3.3–5)
ALP SERPL-CCNC: 126 U/L — HIGH (ref 40–120)
ALT FLD-CCNC: 59 U/L — HIGH (ref 10–45)
ANION GAP SERPL CALC-SCNC: 10 MMOL/L — SIGNIFICANT CHANGE UP (ref 5–17)
AST SERPL-CCNC: 39 U/L — SIGNIFICANT CHANGE UP (ref 10–40)
BASOPHILS # BLD AUTO: 0.03 K/UL — SIGNIFICANT CHANGE UP (ref 0–0.2)
BASOPHILS NFR BLD AUTO: 0.6 % — SIGNIFICANT CHANGE UP (ref 0–2)
BILIRUB SERPL-MCNC: 0.3 MG/DL — SIGNIFICANT CHANGE UP (ref 0.2–1.2)
BUN SERPL-MCNC: 9 MG/DL — SIGNIFICANT CHANGE UP (ref 7–23)
CALCIUM SERPL-MCNC: 9.4 MG/DL — SIGNIFICANT CHANGE UP (ref 8.4–10.5)
CHLORIDE SERPL-SCNC: 103 MMOL/L — SIGNIFICANT CHANGE UP (ref 96–108)
CO2 SERPL-SCNC: 28 MMOL/L — SIGNIFICANT CHANGE UP (ref 22–31)
CREAT SERPL-MCNC: 0.49 MG/DL — LOW (ref 0.5–1.3)
EGFR: 110 ML/MIN/1.73M2 — SIGNIFICANT CHANGE UP
EOSINOPHIL # BLD AUTO: 0.1 K/UL — SIGNIFICANT CHANGE UP (ref 0–0.5)
EOSINOPHIL NFR BLD AUTO: 2.1 % — SIGNIFICANT CHANGE UP (ref 0–6)
GLUCOSE SERPL-MCNC: 94 MG/DL — SIGNIFICANT CHANGE UP (ref 70–99)
HCT VFR BLD CALC: 30.2 % — LOW (ref 34.5–45)
HGB BLD-MCNC: 10.2 G/DL — LOW (ref 11.5–15.5)
IMM GRANULOCYTES NFR BLD AUTO: 0.6 % — SIGNIFICANT CHANGE UP (ref 0–0.9)
LYMPHOCYTES # BLD AUTO: 1.2 K/UL — SIGNIFICANT CHANGE UP (ref 1–3.3)
LYMPHOCYTES # BLD AUTO: 24.6 % — SIGNIFICANT CHANGE UP (ref 13–44)
MCHC RBC-ENTMCNC: 28.9 PG — SIGNIFICANT CHANGE UP (ref 27–34)
MCHC RBC-ENTMCNC: 33.8 GM/DL — SIGNIFICANT CHANGE UP (ref 32–36)
MCV RBC AUTO: 85.6 FL — SIGNIFICANT CHANGE UP (ref 80–100)
MONOCYTES # BLD AUTO: 0.53 K/UL — SIGNIFICANT CHANGE UP (ref 0–0.9)
MONOCYTES NFR BLD AUTO: 10.9 % — SIGNIFICANT CHANGE UP (ref 2–14)
NEUTROPHILS # BLD AUTO: 2.98 K/UL — SIGNIFICANT CHANGE UP (ref 1.8–7.4)
NEUTROPHILS NFR BLD AUTO: 61.2 % — SIGNIFICANT CHANGE UP (ref 43–77)
NRBC # BLD: 0 /100 WBCS — SIGNIFICANT CHANGE UP (ref 0–0)
PLATELET # BLD AUTO: 341 K/UL — SIGNIFICANT CHANGE UP (ref 150–400)
POTASSIUM SERPL-MCNC: 3.4 MMOL/L — LOW (ref 3.5–5.3)
POTASSIUM SERPL-SCNC: 3.4 MMOL/L — LOW (ref 3.5–5.3)
PROT SERPL-MCNC: 6.8 G/DL — SIGNIFICANT CHANGE UP (ref 6–8.3)
RBC # BLD: 3.53 M/UL — LOW (ref 3.8–5.2)
RBC # FLD: 14.5 % — SIGNIFICANT CHANGE UP (ref 10.3–14.5)
SODIUM SERPL-SCNC: 141 MMOL/L — SIGNIFICANT CHANGE UP (ref 135–145)
WBC # BLD: 4.87 K/UL — SIGNIFICANT CHANGE UP (ref 3.8–10.5)
WBC # FLD AUTO: 4.87 K/UL — SIGNIFICANT CHANGE UP (ref 3.8–10.5)

## 2024-05-06 PROCEDURE — 99232 SBSQ HOSP IP/OBS MODERATE 35: CPT

## 2024-05-06 RX ORDER — POTASSIUM CHLORIDE 20 MEQ
40 PACKET (EA) ORAL ONCE
Refills: 0 | Status: COMPLETED | OUTPATIENT
Start: 2024-05-06 | End: 2024-05-06

## 2024-05-06 RX ADMIN — ATORVASTATIN CALCIUM 40 MILLIGRAM(S): 80 TABLET, FILM COATED ORAL at 21:52

## 2024-05-06 RX ADMIN — POLYETHYLENE GLYCOL 3350 17 GRAM(S): 17 POWDER, FOR SOLUTION ORAL at 19:09

## 2024-05-06 RX ADMIN — SENNA PLUS 10 MILLILITER(S): 8.6 TABLET ORAL at 21:53

## 2024-05-06 RX ADMIN — Medication 2 MILLIGRAM(S): at 21:53

## 2024-05-06 RX ADMIN — ENOXAPARIN SODIUM 40 MILLIGRAM(S): 100 INJECTION SUBCUTANEOUS at 19:06

## 2024-05-06 RX ADMIN — Medication 15 MILLILITER(S): at 11:47

## 2024-05-06 RX ADMIN — PANTOPRAZOLE SODIUM 40 MILLIGRAM(S): 20 TABLET, DELAYED RELEASE ORAL at 05:37

## 2024-05-06 RX ADMIN — Medication 5 MILLIGRAM(S): at 21:52

## 2024-05-06 RX ADMIN — POLYETHYLENE GLYCOL 3350 17 GRAM(S): 17 POWDER, FOR SOLUTION ORAL at 05:37

## 2024-05-06 RX ADMIN — Medication 40 MILLIEQUIVALENT(S): at 19:10

## 2024-05-06 NOTE — PROGRESS NOTE ADULT - SUBJECTIVE AND OBJECTIVE BOX
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 Initially presented to [place] with headaches and became unresponsive, was intubated, and transferred to [facility]. On arrival to [facility] patient w/ PERRL, +cough/gag, 2/5 spontaneous movement in LUE, otherwise no movement. Given 1 unit platelets and R frontal EVD placed with high opening pressure. Patient underwent Right pterional craniotomy and clipping of a ruptured right PCOM aneurysm on 3/26.     On 3/29, EVD displaced and replaced in situ, complicated by EVD tract acute hemorrhage and new focus of infarction with hemorrhage in the right basal ganglia/corona radiata. Course further complicated by hydrocephalus with ICP crisis, vasospasm requiring cerebral angiography and IA milrinone and verapamil. MRI (4/8) with widespread ischemic stroke b/l, as per neuro IR no further intervention. Patient extubated on 4/3, re-intubated 4/8, and extubated again on 4/9. EVD removed on 4/15. CTH post removal stable.     Course also complicated by bradycardia treated with theophyline, now resolved and discontinued. Initial Echo (3/26) with severe cardiomyopathy, EF 36%, regional wall motion abnormality. Resolved on repeat TTE performed 4/9/24, normal EF 66% with no WMA. Patient first noted with left soleal DVT on Duplex 3/28. Duplex (4/10) with stable b/l soleal DVT with extension to right posterior tibial DVT.  Last LE duplex with stable bilateral soleal DVT, [facility] cardiology recommended repeat surveillance duplex on 4/30. On Lovenox ppx.     PEG placement attempted 4/19 for persistent dysphagia, but was aborted due to growing hematoma noted in gastric wall on endoscopy after trocar placement. CT Abdomen and Pelvis w/ IV Cont showed gastric wall hematoma without intraluminal or extraluminal hemorrhage. PEG successfully placed on 4/23.     Patient evaluated by PT/OT and was recommended for acute inpatient rehab. Patient is medically stable for discharge to [facility] on 4/26.  (26 Apr 2024 14:34)      PAST MEDICAL & SURGICAL HISTORY:      MEDICATIONS  (STANDING):  atorvastatin 40 milliGRAM(s) Oral at bedtime  bisacodyl 5 milliGRAM(s) Oral at bedtime  doxazosin 2 milliGRAM(s) Oral at bedtime  enoxaparin Injectable 40 milliGRAM(s) SubCutaneous <User Schedule>  multivitamin/minerals/iron Oral Solution (CENTRUM) 15 milliLiter(s) Oral daily  pantoprazole   Suspension 40 milliGRAM(s) Oral before breakfast  polyethylene glycol 3350 17 Gram(s) Oral two times a day  senna Syrup 10 milliLiter(s) Oral at bedtime    MEDICATIONS  (PRN):  acetaminophen   Oral Liquid .. 650 milliGRAM(s) Oral every 6 hours PRN Mild Pain (1 - 3)  petrolatum white Ointment 1 Application(s) Topical every 12 hours PRN dryness      Allergies    No Known Allergies    Intolerances          VITALS  58y  Vital Signs Last 24 Hrs  T(C): 36.6 (05 May 2024 19:53), Max: 36.6 (05 May 2024 19:53)  T(F): 97.8 (05 May 2024 19:53), Max: 97.8 (05 May 2024 19:53)  HR: 77 (05 May 2024 19:53) (77 - 77)  BP: 139/77 (05 May 2024 19:53) (139/77 - 139/77)  BP(mean): --  RR: 16 (05 May 2024 19:53) (16 - 16)  SpO2: 98% (05 May 2024 19:53) (98% - 98%)    Parameters below as of 05 May 2024 19:53  Patient On (Oxygen Delivery Method): room air      Daily     Daily         RECENT LABS:                          10.2   4.87  )-----------( 341      ( 06 May 2024 07:03 )             30.2     05-06    141  |  103  |  9   ----------------------------<  94  3.4<L>   |  28  |  0.49<L>    Ca    9.4      06 May 2024 07:03    TPro  6.8  /  Alb  2.6<L>  /  TBili  0.3  /  DBili  x   /  AST  39  /  ALT  59<H>  /  AlkPhos  126<H>  05-06    LIVER FUNCTIONS - ( 06 May 2024 07:03 )  Alb: 2.6 g/dL / Pro: 6.8 g/dL / ALK PHOS: 126 U/L / ALT: 59 U/L / AST: 39 U/L / GGT: x             Urinalysis Basic - ( 06 May 2024 07:03 )    Color: x / Appearance: x / SG: x / pH: x  Gluc: 94 mg/dL / Ketone: x  / Bili: x / Urobili: x   Blood: x / Protein: x / Nitrite: x   Leuk Esterase: x / RBC: x / WBC x   Sq Epi: x / Non Sq Epi: x / Bacteria: x          CAPILLARY BLOOD GLUCOSE                   Patient is a 58y old  Female who presents with a chief complaint of SAH (05 May 2024 11:51)      HPI:  57 y/o F with no PMH, fell one month prior to admission, transferred from [place] to 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 Initially presented to [place] with headaches and became unresponsive, was intubated, and transferred to [facility]. On arrival to [facility] patient w/ PERRL, +cough/gag, 2/5 spontaneous movement in LUE, otherwise no movement. Given 1 unit platelets and R frontal EVD placed with high opening pressure. Patient underwent Right pterional craniotomy and clipping of a ruptured right PCOM aneurysm on 3/26.     On 3/29, EVD displaced and replaced in situ, complicated by EVD tract acute hemorrhage and new focus of infarction with hemorrhage in the right basal ganglia/corona radiata. Course further complicated by hydrocephalus with ICP crisis, vasospasm requiring cerebral angiography and IA milrinone and verapamil. MRI (4/8) with widespread ischemic stroke b/l, as per neuro IR no further intervention. Patient extubated on 4/3, re-intubated 4/8, and extubated again on 4/9. EVD removed on 4/15. CTH post removal stable.     Course also complicated by bradycardia treated with theophyline, now resolved and discontinued. Initial Echo (3/26) with severe cardiomyopathy, EF 36%, regional wall motion abnormality. Resolved on repeat TTE performed 4/9/24, normal EF 66% with no WMA. Patient first noted with left soleal DVT on Duplex 3/28. Duplex (4/10) with stable b/l soleal DVT with extension to right posterior tibial DVT.  Last LE duplex with stable bilateral soleal DVT, [facility] cardiology recommended repeat surveillance duplex on 4/30. On Lovenox ppx.     PEG placement attempted 4/19 for persistent dysphagia, but was aborted due to growing hematoma noted in gastric wall on endoscopy after trocar placement. CT Abdomen and Pelvis w/ IV Cont showed gastric wall hematoma without intraluminal or extraluminal hemorrhage. PEG successfully placed on 4/23.     Patient evaluated by PT/OT and was recommended for acute inpatient rehab. Patient is medically stable for discharge to [facility] on 4/26.  (26 Apr 2024 14:34)      PAST MEDICAL & SURGICAL HISTORY:      MEDICATIONS  (STANDING):  atorvastatin 40 milliGRAM(s) Oral at bedtime  bisacodyl 5 milliGRAM(s) Oral at bedtime  doxazosin 2 milliGRAM(s) Oral at bedtime  enoxaparin Injectable 40 milliGRAM(s) SubCutaneous <User Schedule>  multivitamin/minerals/iron Oral Solution (CENTRUM) 15 milliLiter(s) Oral daily  pantoprazole   Suspension 40 milliGRAM(s) Oral before breakfast  polyethylene glycol 3350 17 Gram(s) Oral two times a day  senna Syrup 10 milliLiter(s) Oral at bedtime    MEDICATIONS  (PRN):  acetaminophen   Oral Liquid .. 650 milliGRAM(s) Oral every 6 hours PRN Mild Pain (1 - 3)  petrolatum white Ointment 1 Application(s) Topical every 12 hours PRN dryness      Allergies    No Known Allergies    Intolerances          VITALS  58y  Vital Signs Last 24 Hrs  T(C): 36.6 (05 May 2024 19:53), Max: 36.6 (05 May 2024 19:53)  T(F): 97.8 (05 May 2024 19:53), Max: 97.8 (05 May 2024 19:53)  HR: 77 (05 May 2024 19:53) (77 - 77)  BP: 139/77 (05 May 2024 19:53) (139/77 - 139/77)  BP(mean): --  RR: 16 (05 May 2024 19:53) (16 - 16)  SpO2: 98% (05 May 2024 19:53) (98% - 98%)    Parameters below as of 05 May 2024 19:53  Patient On (Oxygen Delivery Method): room air      Daily     Daily         RECENT LABS:                          10.2   4.87  )-----------( 341      ( 06 May 2024 07:03 )             30.2     05-06    141  |  103  |  9   ----------------------------<  94  3.4<L>   |  28  |  0.49<L>    Ca    9.4      06 May 2024 07:03    TPro  6.8  /  Alb  2.6<L>  /  TBili  0.3  /  DBili  x   /  AST  39  /  ALT  59<H>  /  AlkPhos  126<H>  05-06    LIVER FUNCTIONS - ( 06 May 2024 07:03 )  Alb: 2.6 g/dL / Pro: 6.8 g/dL / ALK PHOS: 126 U/L / ALT: 59 U/L / AST: 39 U/L / GGT: x             Urinalysis Basic - ( 06 May 2024 07:03 )    Color: x / Appearance: x / SG: x / pH: x  Gluc: 94 mg/dL / Ketone: x  / Bili: x / Urobili: x   Blood: x / Protein: x / Nitrite: x   Leuk Esterase: x / RBC: x / WBC x   Sq Epi: x / Non Sq Epi: x / Bacteria: x          CAPILLARY BLOOD GLUCOSE

## 2024-05-06 NOTE — PROGRESS NOTE ADULT - COMMENTS
Patient seen in hallway in wheelchair with assistance of . Patient denies any pain at this time. Denies N/V. No complaints of pain with urination or difficulty with bowel movements. Patient seen in hallway in wheelchair with assistance of .Cece #[id number].     Patient denies any pain at this time. Denies N/V. No complaints of pain with urination or difficulty with bowel movements.

## 2024-05-06 NOTE — PROGRESS NOTE ADULT - ASSESSMENT
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 Right frontal EVD placed and patient underwent right pterional craniotomy and clipping of a ruptured right PCOM aneurysm on 3/26 . 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 Patient found to have HFrEF (now recovered), bradycardia (placed on theophyline, now resolved), b/l soleal DVT (on Lovenox ppx). PEG placement initially complicated by gastric wall hematoma, subsequently successfully placed for persistent dysphagia. Patient now admitted for a multidisciplinary rehab program. \    # SAH s/p ruptured PCOM aneurysm  - R EVD placed 3/26 and removed 4/15  - s/p right pterional craniotomy and clipping of a ruptured right PCOM aneurysm on 3/26  - CTH (3/29): EVD tract acute hemorrhage and new focus of infarction with hemorrhage in the right basal ganglia/corona radiata  - complicated by vasospasm requiring cerebral angiography and IA milrinone and verapamil  - MRI (4/8): Multifocal acute to subacute infarction. This extensively involves each MCA posterior distribution, the right basal ganglia, the anterior parasagittal vertex CINDY distribution and an additional smaller lesion in the right cerebellum  - Start comprehensive rehab program of PT/OT/SLP  - Precautions: Fall, Aspiration, Seizure  - currently with aphasia, but does follow simple commands     # B/l DVT   - Left soleal DVT first noted 3/28, right soleal DVT fist noted 4/3  - Most recent Duplex (4/23): persistent bilateral soleal vein DVT  - vasc cardiology recommended repeat surveillance duplex on 4/30.  - repeat duplex 04/30:  persistent left soleal vein DVT + also left gastrocnemius vein DVT (new).   Right soleal vein DVT no longer visualized.  - Continue Lovenox 40 mg QD  - repeat duplex in 1-2 weeks. vasc cardio f/up prior to discharge or after next surveillance duplex.    # urinary frequency  - check UA    # HLD  - continue lipitor 40 mg QHS    # HFrEF  - Initial Echo (3/26) with severe cardiomyopathy, EF 36%, regional wall motion abnormality  - Resolved on repeat TTE performed 4/9/24, normal EF 66% with no WMA.    # Hypernatremia:  resolved  - was on free water 200 cc Q6H now increased to 250 q6h with stabilization of sodium   - monitor BMP as needed     # Hypokalemia- replaced    # Gastric Hematoma  - PEG placement attempted 4/19 for persistent dysphagia, but was aborted due to growing hematoma noted in gastric wall on endoscopy after trocar placement  - CT Abdomen and Pelvis w/ IV Cont showed gastric wall hematoma without intraluminal or extraluminal hemorrhage  - monitor H/H, stable at 9.8-10s    # transaminitis  - currently without any sxs of acute justina, afebrile, abd soft and nontender  - if develops fever, of if LFTs continue to rise would get HIDA scan  - alk phos and ALT stable. AST wnl  - AMA, CMP, acute hepatitis panel negative so far.     #ETC  - DVT ppx with Lovenox  - cardura for urinary retention

## 2024-05-06 NOTE — PROGRESS NOTE ADULT - ATTENDING COMMENTS
Progress note amended to include my discussions with patient, resident, hospitalist, RN, SW, PT and my findings    Patient seen with Turkmen language line  Cece #[id number]. She was distracted, mildly restless, although no agitation or impulsivity noted. She required repeated questions and redirection to attend to task. Did not appear uncomfortable, although she did report being cold; given blanket and said "thank you." Denies fever, pain, H/A. Can follow simple commands occasionally, but loses focus and attention quickly, impacting exam. Incision is healing well, dry, intact, no redness or swelling or ecchymoses    LUE movement seems improved from baseline, improving apraxia, left shoulder at least 3/5 elbow flexion at least 3+/5 grasp 4-/5/ with improved GMC right grasp at least 4/5, and moves RUE purposefully, but does not participate in exam for RUE. LE calves soft no TTP    Labs and vitals reviewed. hypokalemic, 40 meq x 1 given, will f/u in AM. Voiding well, dc bladder scans, no need for SC. Doppler ordered for AM for surveillance./    Continue program      RECENT LABS    Vital Signs Last 24 Hrs  T(C): 36.6 (06 May 2024 09:35), Max: 36.6 (05 May 2024 19:53)  T(F): 97.8 (06 May 2024 09:35), Max: 97.8 (05 May 2024 19:53)  HR: 88 (06 May 2024 09:35) (77 - 88)  BP: 120/71 (06 May 2024 09:35) (120/71 - 139/77)  BP(mean): --  RR: 16 (06 May 2024 09:35) (16 - 16)  SpO2: 95% (06 May 2024 09:35) (95% - 98%)    Parameters below as of 06 May 2024 09:35  Patient On (Oxygen Delivery Method): room air                              10.2   4.87  )-----------( 341      ( 06 May 2024 07:03 )             30.2     05-06    141  |  103  |  9   ----------------------------<  94  3.4<L>   |  28  |  0.49<L>    Ca    9.4      06 May 2024 07:03    TPro  6.8  /  Alb  2.6<L>  /  TBili  0.3  /  DBili  x   /  AST  39  /  ALT  59<H>  /  AlkPhos  126<H>  05-06      Urinalysis Basic - ( 06 May 2024 07:03 )    Color: x / Appearance: x / SG: x / pH: x  Gluc: 94 mg/dL / Ketone: x  / Bili: x / Urobili: x   Blood: x / Protein: x / Nitrite: x   Leuk Esterase: x / RBC: x / WBC x   Sq Epi: x / Non Sq Epi: x / Bacteria: x      CAPILLARY BLOOD GLUCOSE

## 2024-05-06 NOTE — PROGRESS NOTE ADULT - ASSESSMENT
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 Patient s/p right frontal EVD and right pterional craniotomy with clipping of a ruptured right PCOM aneurysm on 3/26. Course complicated by EVD tract hemorrhage, new right basal ganglia/corona radiata infarct, hydrocephalus with ICP crisis, vasospasm requiring IA milrinone/verapimil, and new widespread ischemic strokes. Patient also found to have HFrEF (now recovered), bradycardia (resolved), b/l soleal DVT. s/p PEG placement initially complicated by gastric wall hematoma    # SAH s/p ruptured PCOM aneurysm with left hemiparesis, left esther inattention, dysphagia, cognitive impairment  - s/p right pterional craniotomy and clipping of a ruptured right PCOM aneurysm on 3/26  -  vasospasm s/p cerebral angiography and IA milrinone and verapamil  - Staple removed 4/30  - MRI (4/8): Multifocal acute to subacute infarction. This extensively involves each MCA posterior distribution, the right basal ganglia, the anterior parasagittal vertex CINDY distribution and an additional smaller lesion in the right cerebellum  - Continue comprehensive rehab program of PT/OT/SLP - 3 hours a day, 5 days a week  - patio pass in WC with staff and/or family present at all times  - Precautions: Fall, Aspiration, Seizure, PEG, VTE bLE    # Bradycardia  - was on theophyline, dc'd  - resolved    # HFrEF  - Initial Echo (3/26) with severe cardiomyopathy, EF 36%, regional wall motion abnormality  - Repeat TTE 4/9/24, normal EF 66% with no WMA.  - BP (139/77 - 139/77) 5/6    # HLD  -  lipitor 40 mg QHS    # B/l DVT   - Left soleal DVT first noted 3/28, right soleal DVT fist noted 4/3  - Duplex (4/23): persistent bilateral soleal vein DVT  - Surveillance duplex 4/30: There is persistence of DVT identified within the left soleal vein. On the current evaluation DVT is also evident within the left gastrocnemius vein, new. Previously identified right soleal DVT is no longer visualized  - NSGY: no full dose AC for at least 4 weeks from surgery  - Discussed with hospitalist. Continue PPX dose now, surveillance dopplers weekly  - Continue Lovenox 40 mg QD  - Next doppler tomorrow: 5/7    # Hypernatremia  - PEG flushes free water 200 cc Q6H  - Na 141 4/30.--> 141 5/2--> 141 5/6    # hypokalemia  - K+ 3.4 Replete  - B MP in AM 4/7    # Gastric Hematoma post PEG placement  -  PEG placement attempted 4/19 for persistent dysphagia, but was aborted due to growing hematoma noted in gastric wall on endoscopy after trocar placement  - CT Abdomen and Pelvis w/ IV Cont showed gastric wall hematoma without intraluminal or extraluminal hemorrhage  - PEG placed 4/23  - Hgb 9.7 4/29--> 9.8 5/2 --> 10.2 5/6 improved  - CBC 5/9    # Pain  - Tylenol 650 mg Q6H PRN     # GI / Bowel  - Senna qHS  - Miralax BID  - bisacodyl 5 mg QHS  - GI ppx: protonix 40 mg QD    #  / Bladder  - Continue cardura 2 mg QHS     # Skin / Pressure injury  - left toe scabs, right lateral foot callus   - podiatry consult appreciated 5/2. Callus shaved down, no additional treatment necessary    # Diet/Dysphagia:  - PEG placed on 4/23.   -  advanced to pureed solids thin liquids with ensure plus supplementation 5/2 post  MBS  - Supplements: MVI    # DVT prophylaxis:   - Lovenox 40 mg QD    # Case discussed in IDT rounds 5/1 (initial)  - incontinent, lives in elevator apt with ramp access, lives with son and other family close by. NPO with PEG, severe receptive and expressive language deficits with fluent aphasia, limited verbal output, unreliable yes/no and difficulty following 1 step commands, poor attention, max assist bADLs, left esther inattention, severe apraxia all tasks, bed mobility and transfers max assist-total assist,   - goals: 24 x 7 assist on dc, min-mod assist self care tasks and transfers, min assist transfers and ambulation  - family training  - target: 5/17 home with caregiver assist and home PT OT SLP    # LABS  BMp 5/7  CBC CMP 5/9  doppler 5/7    Outpatient Follow-up:  Perlita Varela  Neurosurgery  [address], [address]  [place] [address]  Phone: [phone]  Fax: [phone]  Follow Up Time:      Code Status/Emergency Contact:     59 y/o F with no known PMH, h/o fell one month prior to admission, who was transferred from [place] to 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 Patient s/p right frontal EVD and right pterional craniotomy with clipping of a ruptured right PCOM aneurysm on 3/26. Course complicated by EVD tract hemorrhage, new right basal ganglia/corona radiata infarct, hydrocephalus with ICP crisis, vasospasm requiring IA milrinone/verapimil, and new widespread ischemic strokes. Patient also found to have HFrEF (now recovered), bradycardia (resolved), b/l soleal DVT. s/p PEG placement initially complicated by gastric wall hematoma    # SAH s/p ruptured PCOM aneurysm with left hemiparesis, left esther inattention, dysphagia, cognitive impairment  - s/p right pterional craniotomy and clipping of a ruptured right PCOM aneurysm on 3/26  -  vasospasm s/p cerebral angiography and IA milrinone and verapamil  - Staple removed 4/30  - MRI (4/8): Multifocal acute to subacute infarction. This extensively involves each MCA posterior distribution, the right basal ganglia, the anterior parasagittal vertex CINDY distribution and an additional smaller lesion in the right cerebellum  - Continue comprehensive rehab program of PT/OT/SLP - 3 hours a day, 5 days a week  - patio pass in WC with staff and/or family present at all times  - Precautions: Fall, Aspiration, Seizure, PEG, VTE bLE    # Bradycardia  - was on theophyline, dc'd  - resolved    # HFrEF  - Initial Echo (3/26) with severe cardiomyopathy, EF 36%, regional wall motion abnormality  - Repeat TTE 4/9/24, normal EF 66% with no WMA.  - BP (139/77 - 139/77) 5/6    # HLD  -  lipitor 40 mg QHS    # B/l DVT   - Left soleal DVT first noted 3/28, right soleal DVT fist noted 4/3  - Duplex (4/23): persistent bilateral soleal vein DVT  - Surveillance duplex 4/30: There is persistence of DVT identified within the left soleal vein. On the current evaluation DVT is also evident within the left gastrocnemius vein, new. Previously identified right soleal DVT is no longer visualized  - NSGY: no full dose AC for at least 4 weeks from surgery  - Discussed with hospitalist. Continue PPX dose now, surveillance dopplers weekly  - Continue Lovenox 40 mg QD  - Next doppler tomorrow: 5/7    # Hypernatremia  - PEG flushes free water 200 cc Q6H  - Na 141 4/30.--> 141 5/2--> 141 5/6    # hypokalemia  - K+ 3.4 Replete  - B MP in AM 5/7    # Gastric Hematoma post PEG placement  -  PEG placement attempted 4/19 for persistent dysphagia, but was aborted due to growing hematoma noted in gastric wall on endoscopy after trocar placement  - CT Abdomen and Pelvis w/ IV Cont showed gastric wall hematoma without intraluminal or extraluminal hemorrhage  - PEG placed 4/23  - Hgb 9.7 4/29--> 9.8 5/2 --> 10.2 5/6 improved  - CBC 5/9    # Pain  - Tylenol 650 mg Q6H PRN     # GI / Bowel  - Senna qHS  - Miralax BID  - bisacodyl 5 mg QHS  - GI ppx: protonix 40 mg QD    #  / Bladder  - Continue cardura 2 mg QHS     # Skin / Pressure injury  - left toe scabs, right lateral foot callus   - podiatry consult appreciated 5/2. Callus shaved down, no additional treatment necessary    # Diet/Dysphagia:  - PEG placed on 4/23.   -  advanced to pureed solids thin liquids with ensure plus supplementation 5/2 post  MBS  - Supplements: MVI    # DVT prophylaxis:   - Lovenox 40 mg QD    # Case discussed in IDT rounds 5/1 (initial)  - incontinent, lives in elevator apt with ramp access, lives with son and other family close by. NPO with PEG, severe receptive and expressive language deficits with fluent aphasia, limited verbal output, unreliable yes/no and difficulty following 1 step commands, poor attention, max assist bADLs, left esther inattention, severe apraxia all tasks, bed mobility and transfers max assist-total assist,   - goals: 24 x 7 assist on dc, min-mod assist self care tasks and transfers, min assist transfers and ambulation  - family training  - target: 5/17 home with caregiver assist and home PT OT SLP    # LABS  BMp 5/7  CBC CMP 5/9  doppler 5/7    Outpatient Follow-up:  Perlita Varela  Neurosurgery  [address], [address]  [place] [address]  Phone: [phone]  Fax: [phone]  Follow Up Time:      Code Status/Emergency Contact:     57 y/o F with no known PMH, h/o fell one month prior to admission, who was transferred from [place] to [facility] on 3/26/24 with diffuse SAH w/ small IVH, 2/2 ruptured posterolaterally projecting 2.9 x 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 Patient s/p right frontal EVD and right pterional craniotomy with clipping of a ruptured right PCOM aneurysm on 3/26. Course complicated by EVD tract hemorrhage, new right basal ganglia/corona radiata infarct, hydrocephalus with ICP crisis, vasospasm requiring IA milrinone/verapimil, and new widespread ischemic strokes. Patient also found to have HFrEF (now recovered), bradycardia (resolved), b/l soleal DVT. s/p PEG placement initially complicated by gastric wall hematoma    # SAH s/p ruptured PCOM aneurysm with left hemiparesis, left esther inattention, dysphagia, cognitive impairment  - s/p right pterional craniotomy and clipping of a ruptured right PCOM aneurysm on 3/26  -  vasospasm s/p cerebral angiography and IA milrinone and verapamil  - Staple removed 4/30  - MRI (4/8): Multifocal acute to subacute infarction. This extensively involves each MCA posterior distribution, the right basal ganglia, the anterior parasagittal vertex CINDY distribution and an additional smaller lesion in the right cerebellum  - Continue comprehensive rehab program of PT/OT/SLP - 3 hours a day, 5 days a week  - patio pass in WC with staff and/or family present at all times  - Precautions: Fall, Aspiration, Seizure, PEG, VTE bLE    # Bradycardia  - was on theophyline, dc'd  - resolved    # HFrEF  - Initial Echo (3/26) with severe cardiomyopathy, EF 36%, regional wall motion abnormality  - Repeat TTE 4/9/24, normal EF 66% with no WMA.  - BP (139/77 - 139/77) 5/6    # HLD  -  lipitor 40 mg QHS    # B/l DVT   - Left soleal DVT first noted 3/28, right soleal DVT fist noted 4/3  - Duplex (4/23): persistent bilateral soleal vein DVT  - Surveillance duplex 4/30: There is persistence of DVT identified within the left soleal vein. On the current evaluation DVT is also evident within the left gastrocnemius vein, new. Previously identified right soleal DVT is no longer visualized  - NSGY: no full dose AC for at least 4 weeks from surgery  - Discussed with hospitalist. Continue PPX dose now, surveillance dopplers weekly  - Continue Lovenox 40 mg QD  - Next doppler tomorrow: 5/7    # Hypernatremia  - PEG flushes free water 200 cc Q6H  - Na 141 4/30.--> 141 5/2--> 141 5/6    # hypokalemia  - K+ 3.4 Replete  - B MP in AM 5/7    # Gastric Hematoma post PEG placement  -  PEG placement attempted 4/19 for persistent dysphagia, but was aborted due to growing hematoma noted in gastric wall on endoscopy after trocar placement  - CT Abdomen and Pelvis w/ IV Cont showed gastric wall hematoma without intraluminal or extraluminal hemorrhage  - PEG placed 4/23  - Hgb 9.7 4/29--> 9.8 5/2 --> 10.2 5/6 improved  - CBC 5/9    # Pain  - Tylenol 650 mg Q6H PRN     # GI / Bowel  - Senna qHS  - Miralax BID  - bisacodyl 5 mg QHS  - GI ppx: protonix 40 mg QD    #  / Bladder  - Continue cardura 2 mg QHS   - d/c'd bladder scans on 5/6    # Skin / Pressure injury  - left toe scabs, right lateral foot callus   - podiatry consult appreciated 5/2. Callus shaved down, no additional treatment necessary    # Diet/Dysphagia:  - PEG placed on 4/23.   -  advanced to pureed solids thin liquids with ensure plus supplementation 5/2 post  MBS  - Supplements: MVI    # DVT prophylaxis:   - Lovenox 40 mg QD    # Case discussed in IDT rounds 5/1 (initial)  - incontinent, lives in elevator apt with ramp access, lives with son and other family close by. NPO with PEG, severe receptive and expressive language deficits with fluent aphasia, limited verbal output, unreliable yes/no and difficulty following 1 step commands, poor attention, max assist bADLs, left esther inattention, severe apraxia all tasks, bed mobility and transfers max assist-total assist,   - goals: 24 x 7 assist on dc, min-mod assist self care tasks and transfers, min assist transfers and ambulation  - family training  - target: 5/17 home with caregiver assist and home PT OT SLP    # LABS  BMp 5/7  CBC CMP 5/9  doppler 5/7    Outpatient Follow-up:  Perlita Varela  Neurosurgery  [address]  [place] [address]  Phone: [phone]  Fax: [phone]  Follow Up Time:      Code Status/Emergency Contact:     57 y/o F with no known PMH, h/o fell one month prior to admission, who was transferred from [place] to 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 Patient s/p right frontal EVD and right pterional craniotomy with clipping of a ruptured right PCOM aneurysm on 3/26. Course complicated by EVD tract hemorrhage, new right basal ganglia/corona radiata infarct, hydrocephalus with ICP crisis, vasospasm requiring IA milrinone/verapimil, and new widespread ischemic strokes. Patient also found to have HFrEF (now recovered), bradycardia (resolved), b/l soleal DVT. s/p PEG placement initially complicated by gastric wall hematoma    # SAH s/p ruptured PCOM aneurysm with left hemiparesis, left esther inattention, dysphagia, cognitive impairment  - s/p right pterional craniotomy and clipping of a ruptured right PCOM aneurysm on 3/26  -  vasospasm s/p cerebral angiography and IA milrinone and verapamil  - Staple removed 4/30  - MRI (4/8): Multifocal acute to subacute infarction. This extensively involves each MCA posterior distribution, the right basal ganglia, the anterior parasagittal vertex CINDY distribution and an additional smaller lesion in the right cerebellum  - Continue comprehensive rehab program of PT/OT/SLP - 3 hours a day, 5 days a week  - patio pass in WC with staff and/or family present at all times  - Precautions: Fall, Aspiration, Seizure, PEG, VTE bLE    # Bradycardia  - was on theophyline, dc'd  - resolved    # HFrEF  - Initial Echo (3/26) with severe cardiomyopathy, EF 36%, regional wall motion abnormality  - Repeat TTE 4/9/24, normal EF 66% with no WMA.  - BP (139/77 - 139/77) 5/6    # HLD  -  lipitor 40 mg QHS    # B/l DVT   - Left soleal DVT first noted 3/28, right soleal DVT fist noted 4/3  - Duplex (4/23): persistent bilateral soleal vein DVT  - Surveillance duplex 4/30: There is persistence of DVT identified within the left soleal vein. On the current evaluation DVT is also evident within the left gastrocnemius vein, new. Previously identified right soleal DVT is no longer visualized  - NSGY: no full dose AC for at least 4 weeks from surgery  - Discussed with hospitalist. Continue PPX dose now, surveillance dopplers weekly  - Continue Lovenox 40 mg QD  - Next doppler tomorrow: 5/7    # Hypernatremia  - PEG flushes free water 200 cc Q6H  - Na 141 4/30.--> 141 5/2--> 141 5/6    # hypokalemia  - K+ 3.4 Replete  - B MP in AM 5/7    # Gastric Hematoma post PEG placement  -  PEG placement attempted 4/19 for persistent dysphagia, but was aborted due to growing hematoma noted in gastric wall on endoscopy after trocar placement  - CT Abdomen and Pelvis w/ IV Cont showed gastric wall hematoma without intraluminal or extraluminal hemorrhage  - PEG placed 4/23  - Hgb 9.7 4/29--> 9.8 5/2 --> 10.2 5/6 improved  - CBC 5/9    # Pain  - Tylenol 650 mg Q6H PRN     # GI / Bowel  - Senna qHS  - Miralax BID  - bisacodyl 5 mg QHS  - GI ppx: protonix 40 mg QD    #  / Bladder  - Continue cardura 2 mg QHS   - d/c'd bladder scans on 5/6    # Skin / Pressure injury  - left toe scabs, right lateral foot callus   - podiatry consult appreciated 5/2. Callus shaved down, no additional treatment necessary    # Diet/Dysphagia:  - PEG placed on 4/23.   -  advanced to pureed solids thin liquids with ensure plus supplementation 5/2 post  MBS  - Supplements: MVI    # Case discussed in IDT rounds 5/1 (initial)  - incontinent, lives in elevator apt with ramp access, lives with son and other family close by. NPO with PEG, severe receptive and expressive language deficits with fluent aphasia, limited verbal output, unreliable yes/no and difficulty following 1 step commands, poor attention, max assist bADLs, left esther inattention, severe apraxia all tasks, bed mobility and transfers max assist-total assist,   - goals: 24 x 7 assist on dc, min-mod assist self care tasks and transfers, min assist transfers and ambulation  - family training  - target: 5/17 home with caregiver assist and home PT OT SLP    # LABS  BMp 5/7  CBC CMP 5/9  doppler 5/7    Outpatient Follow-up:  Perlita Varela  Neurosurgery  [address], [address]  [place] [address]  Phone: [phone]  Fax: [phone]  Follow Up Time:      Code Status/Emergency Contact:

## 2024-05-06 NOTE — PROGRESS NOTE ADULT - SUBJECTIVE AND OBJECTIVE BOX
Patient is a 58y old  female who presents with a chief complaint of SAH (05 May 2024 11:26)    No acute events overnight.    T(C): 36.6 (05-06-24 @ 09:35), Max: 36.6 (05-05-24 @ 19:53)  T(F): 97.8 (05-06-24 @ 09:35), Max: 97.8 (05-05-24 @ 19:53)  HR: 88 (05-06-24 @ 09:35) (77 - 88)  BP: 120/71 (05-06-24 @ 09:35) (120/71 - 139/77)  ABP: --  ABP(mean): --  RR: 16 (05-06-24 @ 09:35) (16 - 16)  SpO2: 95% (05-06-24 @ 09:35) (95% - 98%)       on exam seen sitting in wheel chair  alert, awake, follows few simple commands intermittently  both lungs clear  s1, s2, regular  abdomen- soft  no pedal edema  constant shaking of right leg +  passive movement of right arm+, holds it in air for few secs  moved her left arm and leg on her own    LABS:                        10.2   4.87  )-----------( 341      ( 06 May 2024 07:03 )             30.2     05-06    141  |  103  |  9   ----------------------------<  94  3.4<L>   |  28  |  0.49<L>    Ca    9.4      06 May 2024 07:03    TPro  6.8  /  Alb  2.6<L>  /  TBili  0.3  /  DBili  x   /  AST  39  /  ALT  59<H>  /  AlkPhos  126<H>  05-06      Urinalysis Basic - ( 06 May 2024 07:03 )    Color: x / Appearance: x / SG: x / pH: x  Gluc: 94 mg/dL / Ketone: x  / Bili: x / Urobili: x   Blood: x / Protein: x / Nitrite: x   Leuk Esterase: x / RBC: x / WBC x   Sq Epi: x / Non Sq Epi: x / Bacteria: x      Urinalysis Basic - ( 06 May 2024 07:03 )    Color: x / Appearance: x / SG: x / pH: x  Gluc: 94 mg/dL / Ketone: x  / Bili: x / Urobili: x   Blood: x / Protein: x / Nitrite: x   Leuk Esterase: x / RBC: x / WBC x   Sq Epi: x / Non Sq Epi: x / Bacteria: x    MEDICATIONS  (STANDING):  atorvastatin 40 milliGRAM(s) Oral at bedtime  bisacodyl 5 milliGRAM(s) Oral at bedtime  doxazosin 2 milliGRAM(s) Oral at bedtime  enoxaparin Injectable 40 milliGRAM(s) SubCutaneous <User Schedule>  multivitamin/minerals/iron Oral Solution (CENTRUM) 15 milliLiter(s) Oral daily  pantoprazole   Suspension 40 milliGRAM(s) Oral before breakfast  polyethylene glycol 3350 17 Gram(s) Oral two times a day  potassium chloride   Powder 40 milliEquivalent(s) Oral once  senna Syrup 10 milliLiter(s) Oral at bedtime    MEDICATIONS  (PRN):  acetaminophen   Oral Liquid .. 650 milliGRAM(s) Oral every 6 hours PRN Mild Pain (1 - 3)  petrolatum white Ointment 1 Application(s) Topical every 12 hours PRN dryness

## 2024-05-07 LAB
ANION GAP SERPL CALC-SCNC: 6 MMOL/L — SIGNIFICANT CHANGE UP (ref 5–17)
BUN SERPL-MCNC: 13 MG/DL — SIGNIFICANT CHANGE UP (ref 7–23)
CALCIUM SERPL-MCNC: 9.5 MG/DL — SIGNIFICANT CHANGE UP (ref 8.4–10.5)
CHLORIDE SERPL-SCNC: 104 MMOL/L — SIGNIFICANT CHANGE UP (ref 96–108)
CO2 SERPL-SCNC: 30 MMOL/L — SIGNIFICANT CHANGE UP (ref 22–31)
CREAT SERPL-MCNC: 0.51 MG/DL — SIGNIFICANT CHANGE UP (ref 0.5–1.3)
EGFR: 109 ML/MIN/1.73M2 — SIGNIFICANT CHANGE UP
GLUCOSE SERPL-MCNC: 95 MG/DL — SIGNIFICANT CHANGE UP (ref 70–99)
POTASSIUM SERPL-MCNC: 3.6 MMOL/L — SIGNIFICANT CHANGE UP (ref 3.5–5.3)
POTASSIUM SERPL-SCNC: 3.6 MMOL/L — SIGNIFICANT CHANGE UP (ref 3.5–5.3)
SODIUM SERPL-SCNC: 140 MMOL/L — SIGNIFICANT CHANGE UP (ref 135–145)

## 2024-05-07 PROCEDURE — 99232 SBSQ HOSP IP/OBS MODERATE 35: CPT

## 2024-05-07 PROCEDURE — 93970 EXTREMITY STUDY: CPT | Mod: 26

## 2024-05-07 RX ORDER — POTASSIUM CHLORIDE 20 MEQ
40 PACKET (EA) ORAL EVERY 4 HOURS
Refills: 0 | Status: DISCONTINUED | OUTPATIENT
Start: 2024-05-07 | End: 2024-05-07

## 2024-05-07 RX ADMIN — Medication 15 MILLILITER(S): at 12:57

## 2024-05-07 RX ADMIN — Medication 2 MILLIGRAM(S): at 22:54

## 2024-05-07 RX ADMIN — SENNA PLUS 10 MILLILITER(S): 8.6 TABLET ORAL at 22:55

## 2024-05-07 RX ADMIN — Medication 40 MILLIEQUIVALENT(S): at 11:00

## 2024-05-07 RX ADMIN — POLYETHYLENE GLYCOL 3350 17 GRAM(S): 17 POWDER, FOR SOLUTION ORAL at 05:27

## 2024-05-07 RX ADMIN — POLYETHYLENE GLYCOL 3350 17 GRAM(S): 17 POWDER, FOR SOLUTION ORAL at 17:23

## 2024-05-07 RX ADMIN — Medication 5 MILLIGRAM(S): at 22:54

## 2024-05-07 RX ADMIN — PANTOPRAZOLE SODIUM 40 MILLIGRAM(S): 20 TABLET, DELAYED RELEASE ORAL at 05:27

## 2024-05-07 RX ADMIN — ENOXAPARIN SODIUM 40 MILLIGRAM(S): 100 INJECTION SUBCUTANEOUS at 17:23

## 2024-05-07 RX ADMIN — ATORVASTATIN CALCIUM 40 MILLIGRAM(S): 80 TABLET, FILM COATED ORAL at 22:54

## 2024-05-07 NOTE — PROGRESS NOTE ADULT - SUBJECTIVE AND OBJECTIVE BOX
Patient is a 58y old  female who presents with a chief complaint of SAH (05 May 2024 11:26)    No acute events overnight. Sitting in hallway. Denies any concerns this morning.     MEDICATIONS  (STANDING):  atorvastatin 40 milliGRAM(s) Oral at bedtime  bisacodyl 5 milliGRAM(s) Oral at bedtime  doxazosin 2 milliGRAM(s) Oral at bedtime  enoxaparin Injectable 40 milliGRAM(s) SubCutaneous <User Schedule>  multivitamin/minerals/iron Oral Solution (CENTRUM) 15 milliLiter(s) Oral daily  pantoprazole   Suspension 40 milliGRAM(s) Oral before breakfast  polyethylene glycol 3350 17 Gram(s) Oral two times a day  potassium chloride   Powder 40 milliEquivalent(s) Oral once  senna Syrup 10 milliLiter(s) Oral at bedtime    MEDICATIONS  (PRN):  acetaminophen   Oral Liquid .. 650 milliGRAM(s) Oral every 6 hours PRN Mild Pain (1 - 3)  petrolatum white Ointment 1 Application(s) Topical every 12 hours PRN dryness    Vital Signs Last 24 Hrs  T(C): 36.7 (07 May 2024 09:03), Max: 36.9 (06 May 2024 20:15)  T(F): 98 (07 May 2024 09:03), Max: 98.5 (06 May 2024 20:15)  HR: 96 (07 May 2024 09:03) (80 - 96)  BP: 139/87 (07 May 2024 09:03) (136/90 - 139/87)  BP(mean): --  RR: 16 (07 May 2024 09:03) (16 - 16)  SpO2: 96% (07 May 2024 09:03) (96% - 97%)    Parameters below as of 07 May 2024 09:03  Patient On (Oxygen Delivery Method): room air    on exam seen sitting in wheel chair  alert, awake, follows few simple commands intermittently  both lungs clear  s1, s2, regular  abdomen- soft  no pedal edema  constant shaking of right leg +  passive movement of right arm+, holds it in air for few secs  moved her left arm and leg on her own    LABS:                        10.2   4.87  )-----------( 341      ( 06 May 2024 07:03 )             30.2     05-06    141  |  103  |  9   ----------------------------<  94  3.4<L>   |  28  |  0.49<L>    Ca    9.4      06 May 2024 07:03    TPro  6.8  /  Alb  2.6<L>  /  TBili  0.3  /  DBili  x   /  AST  39  /  ALT  59<H>  /  AlkPhos  126<H>  05-06      Urinalysis Basic - ( 06 May 2024 07:03 )    Color: x / Appearance: x / SG: x / pH: x  Gluc: 94 mg/dL / Ketone: x  / Bili: x / Urobili: x   Blood: x / Protein: x / Nitrite: x   Leuk Esterase: x / RBC: x / WBC x   Sq Epi: x / Non Sq Epi: x / Bacteria: x      Urinalysis Basic - ( 06 May 2024 07:03 )    Color: x / Appearance: x / SG: x / pH: x  Gluc: 94 mg/dL / Ketone: x  / Bili: x / Urobili: x   Blood: x / Protein: x / Nitrite: x   Leuk Esterase: x / RBC: x / WBC x   Sq Epi: x / Non Sq Epi: x / Bacteria: x

## 2024-05-07 NOTE — PROGRESS NOTE ADULT - COMMENTS
Patient seen with [place] language line  [facility] #[id number]. For majority of exam, patient declined to use  services, often answering in English questions that I had posed to  for translation. She is less restless than yesterday (seen in quiet room opposed to noisy hallway) and has improved attention and reliability responses. Denies H/A, dizziness, blurred vision, abdominal pain, extremity pain.

## 2024-05-07 NOTE — PROGRESS NOTE ADULT - SUBJECTIVE AND OBJECTIVE BOX
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 Initially presented to [place] [facility] with headaches and became unresponsive, was intubated, and transferred to [facility]. On arrival to [facility] patient w/ PERRL, +cough/gag, 2/5 spontaneous movement in LUE, otherwise no movement. Given 1 unit platelets and R frontal EVD placed with high opening pressure. Patient underwent Right pterional craniotomy and clipping of a ruptured right PCOM aneurysm on 3/26.     On 3/29, EVD displaced and replaced in situ, complicated by EVD tract acute hemorrhage and new focus of infarction with hemorrhage in the right basal ganglia/corona radiata. Course further complicated by hydrocephalus with ICP crisis, vasospasm requiring cerebral angiography and IA milrinone and verapamil. MRI (4/8) with widespread ischemic stroke b/l, as per neuro IR no further intervention. Patient extubated on 4/3, re-intubated 4/8, and extubated again on 4/9. EVD removed on 4/15. CTH post removal stable.     Course also complicated by bradycardia treated with theophyline, now resolved and discontinued. Initial Echo (3/26) with severe cardiomyopathy, EF 36%, regional wall motion abnormality. Resolved on repeat TTE performed 4/9/24, normal EF 66% with no WMA. Patient first noted with left soleal DVT on Duplex 3/28. Duplex (4/10) with stable b/l soleal DVT with extension to right posterior tibial DVT.  Last LE duplex with stable bilateral soleal DVT, [facility] cardiology recommended repeat surveillance duplex on 4/30. On Lovenox ppx.     PEG placement attempted 4/19 for persistent dysphagia, but was aborted due to growing hematoma noted in gastric wall on endoscopy after trocar placement. CT Abdomen and Pelvis w/ IV Cont showed gastric wall hematoma without intraluminal or extraluminal hemorrhage. PEG successfully placed on 4/23.     Patient evaluated by PT/OT and was recommended for acute inpatient rehab. Patient is medically stable for discharge to [facility] on 4/26.  (26 Apr 2024 14:34)      PAST MEDICAL & SURGICAL HISTORY:      MEDICATIONS  (STANDING):  atorvastatin 40 milliGRAM(s) Oral at bedtime  bisacodyl 5 milliGRAM(s) Oral at bedtime  doxazosin 2 milliGRAM(s) Oral at bedtime  enoxaparin Injectable 40 milliGRAM(s) SubCutaneous <User Schedule>  multivitamin/minerals/iron Oral Solution (CENTRUM) 15 milliLiter(s) Oral daily  pantoprazole   Suspension 40 milliGRAM(s) Oral before breakfast  polyethylene glycol 3350 17 Gram(s) Oral two times a day  senna Syrup 10 milliLiter(s) Oral at bedtime    MEDICATIONS  (PRN):  acetaminophen   Oral Liquid .. 650 milliGRAM(s) Oral every 6 hours PRN Mild Pain (1 - 3)  petrolatum white Ointment 1 Application(s) Topical every 12 hours PRN dryness      Allergies    No Known Allergies    Intolerances          VITALS  Vital Signs Last 24 Hrs  T(C): 36.9 (06 May 2024 20:15), Max: 36.9 (06 May 2024 20:15)  T(F): 98.5 (06 May 2024 20:15), Max: 98.5 (06 May 2024 20:15)  HR: 80 (06 May 2024 20:15) (80 - 88)  BP: 136/90 (06 May 2024 20:15) (120/71 - 136/90)  BP(mean): --  RR: 16 (06 May 2024 20:15) (16 - 16)  SpO2: 97% (06 May 2024 20:15) (95% - 97%)    Parameters below as of 06 May 2024 20:15  Patient On (Oxygen Delivery Method): room air      Daily     Daily         RECENT LABS:  05-07    140  |  104  |  13  ----------------------------<  95  3.6   |  30  |  0.51    Ca    9.5      07 May 2024 05:23    TPro  6.8  /  Alb  2.6<L>  /  TBili  0.3  /  DBili  x   /  AST  39  /  ALT  59<H>  /  AlkPhos  126<H>  05-06                          10.2   4.87  )-----------( 341      ( 06 May 2024 07:03 )             30.2     05-06    141  |  103  |  9   ----------------------------<  94  3.4<L>   |  28  |  0.49<L>    Ca    9.4      06 May 2024 07:03    TPro  6.8  /  Alb  2.6<L>  /  TBili  0.3  /  DBili  x   /  AST  39  /  ALT  59<H>  /  AlkPhos  126<H>  05-06    LIVER FUNCTIONS - ( 06 May 2024 07:03 )  Alb: 2.6 g/dL / Pro: 6.8 g/dL / ALK PHOS: 126 U/L / ALT: 59 U/L / AST: 39 U/L / GGT: x             Urinalysis Basic - ( 06 May 2024 07:03 )    Color: x / Appearance: x / SG: x / pH: x  Gluc: 94 mg/dL / Ketone: x  / Bili: x / Urobili: x   Blood: x / Protein: x / Nitrite: x   Leuk Esterase: x / RBC: x / WBC x   Sq Epi: x / Non Sq Epi: x / Bacteria: x          CAPILLARY BLOOD GLUCOSE

## 2024-05-07 NOTE — PROGRESS NOTE ADULT - SUBJECTIVE AND OBJECTIVE BOX
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 Initially presented to [place] with headaches and became unresponsive, was intubated, and transferred to [facility]. On arrival to [facility] patient w/ PERRL, +cough/gag, 2/5 spontaneous movement in LUE, otherwise no movement. Given 1 unit platelets and R frontal EVD placed with high opening pressure. Patient underwent Right pterional craniotomy and clipping of a ruptured right PCOM aneurysm on 3/26.     On 3/29, EVD displaced and replaced in situ, complicated by EVD tract acute hemorrhage and new focus of infarction with hemorrhage in the right basal ganglia/corona radiata. Course further complicated by hydrocephalus with ICP crisis, vasospasm requiring cerebral angiography and IA milrinone and verapamil. MRI (4/8) with widespread ischemic stroke b/l, as per neuro IR no further intervention. Patient extubated on 4/3, re-intubated 4/8, and extubated again on 4/9. EVD removed on 4/15. CTH post removal stable.     Course also complicated by bradycardia treated with theophyline, now resolved and discontinued. Initial Echo (3/26) with severe cardiomyopathy, EF 36%, regional wall motion abnormality. Resolved on repeat TTE performed 4/9/24, normal EF 66% with no WMA. Patient first noted with left soleal DVT on Duplex 3/28. Duplex (4/10) with stable b/l soleal DVT with extension to right posterior tibial DVT.  Last LE duplex with stable bilateral soleal DVT, [facility] cardiology recommended repeat surveillance duplex on 4/30. On Lovenox ppx.     PEG placement attempted 4/19 for persistent dysphagia, but was aborted due to growing hematoma noted in gastric wall on endoscopy after trocar placement. CT Abdomen and Pelvis w/ IV Cont showed gastric wall hematoma without intraluminal or extraluminal hemorrhage. PEG successfully placed on 4/23.     Patient evaluated by PT/OT and was recommended for acute inpatient rehab. Patient is medically stable for discharge to [facility] on 4/26.  (26 Apr 2024 14:34)      PAST MEDICAL & SURGICAL HISTORY:      MEDICATIONS  (STANDING):  atorvastatin 40 milliGRAM(s) Oral at bedtime  bisacodyl 5 milliGRAM(s) Oral at bedtime  doxazosin 2 milliGRAM(s) Oral at bedtime  enoxaparin Injectable 40 milliGRAM(s) SubCutaneous <User Schedule>  multivitamin/minerals/iron Oral Solution (CENTRUM) 15 milliLiter(s) Oral daily  pantoprazole   Suspension 40 milliGRAM(s) Oral before breakfast  polyethylene glycol 3350 17 Gram(s) Oral two times a day  potassium chloride   Powder 40 milliEquivalent(s) Oral every 4 hours  senna Syrup 10 milliLiter(s) Oral at bedtime    MEDICATIONS  (PRN):  acetaminophen   Oral Liquid .. 650 milliGRAM(s) Oral every 6 hours PRN Mild Pain (1 - 3)  petrolatum white Ointment 1 Application(s) Topical every 12 hours PRN dryness      Allergies    No Known Allergies    Intolerances          VITALS  58y  Vital Signs Last 24 Hrs  T(C): 36.7 (07 May 2024 09:03), Max: 36.9 (06 May 2024 20:15)  T(F): 98 (07 May 2024 09:03), Max: 98.5 (06 May 2024 20:15)  HR: 96 (07 May 2024 09:03) (80 - 96)  BP: 139/87 (07 May 2024 09:03) (136/90 - 139/87)  BP(mean): --  RR: 16 (07 May 2024 09:03) (16 - 16)  SpO2: 96% (07 May 2024 09:03) (96% - 97%)    Parameters below as of 07 May 2024 09:03  Patient On (Oxygen Delivery Method): room air      Daily     Daily         RECENT LABS:                          10.2   4.87  )-----------( 341      ( 06 May 2024 07:03 )             30.2     05-07    140  |  104  |  13  ----------------------------<  95  3.6   |  30  |  0.51    Ca    9.5      07 May 2024 05:23    TPro  6.8  /  Alb  2.6<L>  /  TBili  0.3  /  DBili  x   /  AST  39  /  ALT  59<H>  /  AlkPhos  126<H>  05-06    LIVER FUNCTIONS - ( 06 May 2024 07:03 )  Alb: 2.6 g/dL / Pro: 6.8 g/dL / ALK PHOS: 126 U/L / ALT: 59 U/L / AST: 39 U/L / GGT: x             Urinalysis Basic - ( 07 May 2024 05:23 )    Color: x / Appearance: x / SG: x / pH: x  Gluc: 95 mg/dL / Ketone: x  / Bili: x / Urobili: x   Blood: x / Protein: x / Nitrite: x   Leuk Esterase: x / RBC: x / WBC x   Sq Epi: x / Non Sq Epi: x / Bacteria: x          CAPILLARY BLOOD GLUCOSE                 Name band;

## 2024-05-07 NOTE — PROGRESS NOTE ADULT - COMMENTS
Patient seen in hallway in wheelchair. Denies pain, SOB, fever. Easily distracted throughout encounter. Turning head right and left to loud responses. Able to intermittently follow commands and answer questions. No new complaints.  used throughout encounter.

## 2024-05-07 NOTE — PROGRESS NOTE ADULT - ASSESSMENT
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 Patient s/p right frontal EVD and right pterional craniotomy with clipping of a ruptured right PCOM aneurysm on 3/26. Course complicated by EVD tract hemorrhage, new right basal ganglia/corona radiata infarct, hydrocephalus with ICP crisis, vasospasm requiring IA milrinone/verapimil, and new widespread ischemic strokes. Patient also found to have HFrEF (now recovered), bradycardia (resolved), b/l soleal DVT. s/p PEG placement initially complicated by gastric wall hematoma    # SAH s/p ruptured PCOM aneurysm with left hemiparesis, left esther inattention, dysphagia, cognitive impairment  - s/p right pterional craniotomy and clipping of a ruptured right PCOM aneurysm on 3/26  -  vasospasm s/p cerebral angiography and IA milrinone and verapamil  - Staple removed 4/30  - MRI (4/8): Multifocal acute to subacute infarction. This extensively involves each MCA posterior distribution, the right basal ganglia, the anterior parasagittal vertex CINDY distribution and an additional smaller lesion in the right cerebellum  - Continue comprehensive rehab program of PT/OT/SLP - 3 hours a day, 5 days a week  - patio pass in WC with staff and/or family present at all times  - Precautions: Fall, Aspiration, Seizure, PEG, VTE bLE    # Bradycardia  - was on theophyline, dc'd  - resolved    # HFrEF  - Initial Echo (3/26) with severe cardiomyopathy, EF 36%, regional wall motion abnormality  - Repeat TTE 4/9/24, normal EF 66% with no WMA.  - BP (136/90 - 139/87) 5/7    # HLD  -  lipitor 40 mg QHS    # B/l DVT   - Left soleal DVT first noted 3/28, right soleal DVT fist noted 4/3  - Duplex (4/23): persistent bilateral soleal vein DVT  - duplex 4/30: There is persistence of DVT identified within the left soleal vein. On the current evaluation DVT is also evident within the left gastrocnemius vein, new. Previously identified right soleal DVT is no longer visualized  - NSGY: no full dose AC for at least 4 weeks from surgery  - Discussed with hospitalist. Continue PPX dose now, surveillance dopplers weekly  - Continue Lovenox 40 mg QD  - Surveillance doppler today: 5/7    # Hypernatremia  - PEG flushes free water 200 cc Q6H  - Na 141 4/30.--> 141 5/2--> 141 5/6 -> 140 5/7    # hypokalemia  - K+ 3.4 5/6 repleted --> 3.6 on 5/7   - added supplementation 40 meq x 2 5/7  - BMP 5/9    # Gastric Hematoma post PEG placement  -  PEG placement attempted 4/19 for persistent dysphagia, but was aborted due to growing hematoma noted in gastric wall on endoscopy after trocar placement  - CT Abdomen and Pelvis w/ IV Cont showed gastric wall hematoma without intraluminal or extraluminal hemorrhage  - PEG placed 4/23  - Hgb 9.7 4/29--> 9.8 5/2 --> 10.2 5/6 improved  - CBC 5/9    # Pain  - Tylenol 650 mg Q6H PRN     # GI / Bowel  - Senna qHS  - Miralax BID  - bisacodyl 5 mg QHS  - GI ppx: protonix 40 mg QD    #  / Bladder  - Continue cardura 2 mg QHS   - d/c'd bladder scans on 5/6    # Skin / Pressure injury  - left toe scabs, right lateral foot callus   - podiatry consult appreciated 5/2. Callus shaved down, no additional treatment necessary    # Diet/Dysphagia:  - advanced to pureed solids thin liquids with ensure plus supplementation 5/2 post  MBS  - Supplements: MVI    # Case discussed in IDT rounds 5/1 (initial)  - incontinent, lives in elevator apt with ramp access, lives with son and other family close by. NPO with PEG, severe receptive and expressive language deficits with fluent aphasia, limited verbal output, unreliable yes/no and difficulty following 1 step commands, poor attention, max assist bADLs, left esther inattention, severe apraxia all tasks, bed mobility and transfers max assist-total assist,   - goals: 24 x 7 assist on dc, min-mod assist self care tasks and transfers, min assist transfers and ambulation  - family training  - target: 5/17 home with caregiver assist and home PT OT SLP    # LABS  CBC CMP 5/9  doppler 5/7    Outpatient Follow-up:  Perlita Varela  Neurosurgery  [address]  [place] [address]  Phone: [phone]  Fax: [phone]  Follow Up Time:      Code Status/Emergency Contact:  FULL CODE  Pierre Patrick - son [phone]

## 2024-05-07 NOTE — PROGRESS NOTE ADULT - ASSESSMENT
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 Right frontal EVD placed and patient underwent right pterional craniotomy and clipping of a ruptured right PCOM aneurysm on 3/26 . 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 Patient found to have HFrEF (now recovered), bradycardia (placed on theophyline, now resolved), b/l soleal DVT (on Lovenox ppx). PEG placement initially complicated by gastric wall hematoma, subsequently successfully placed for persistent dysphagia. Patient now admitted for a multidisciplinary rehab program.     # SAH s/p ruptured PCOM aneurysm  - R EVD placed 3/26 and removed 4/15  - s/p right pterional craniotomy and clipping of a ruptured right PCOM aneurysm on 3/26  - CTH (3/29): EVD tract acute hemorrhage and new focus of infarction with hemorrhage in the right basal ganglia/corona radiata  - complicated by vasospasm requiring cerebral angiography and IA milrinone and verapamil  - MRI (4/8): Multifocal acute to subacute infarction. This extensively involves each MCA posterior distribution, the right basal ganglia, the anterior parasagittal vertex CINDY distribution and an additional smaller lesion in the right cerebellum  - Start comprehensive rehab program of PT/OT/SLP  - Precautions: Fall, Aspiration, Seizure  - currently with aphasia, but does follow simple commands     # B/l DVT   - Left soleal DVT first noted 3/28, right soleal DVT fist noted 4/3  - Most recent Duplex (4/23): persistent bilateral soleal vein DVT  - vasc cardiology recommended repeat surveillance duplex on 4/30.  - repeat duplex 04/30:  persistent left soleal vein DVT + also left gastrocnemius vein DVT (new).   Right soleal vein DVT no longer visualized.  - Continue Lovenox 40 mg QD  - repeat duplex in 1-2 weeks. vasc cardio f/up prior to discharge or after next surveillance duplex.    # HLD  - continue lipitor 40 mg QHS    # HFrEF  - Initial Echo (3/26) with severe cardiomyopathy, EF 36%, regional wall motion abnormality  - Resolved on repeat TTE performed 4/9/24, normal EF 66% with no WMA.    # Gastric Hematoma  - PEG placement attempted 4/19 for persistent dysphagia, but was aborted due to growing hematoma noted in gastric wall on endoscopy after trocar placement  - CT Abdomen and Pelvis w/ IV Cont showed gastric wall hematoma without intraluminal or extraluminal hemorrhage  - monitor H/H, stable at 9.8-10s    # transaminitis  - currently without any sxs of acute justina, afebrile, abd soft and nontender  - if develops fever, of if LFTs continue to rise would get HIDA scan  - alk phos and ALT stable. AST wnl  - AMA, CMP, acute hepatitis panel negative so far.     #DVT ppx  - DVT ppx with Lovenox

## 2024-05-07 NOTE — PROGRESS NOTE ADULT - ASSESSMENT
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 Patient s/p right frontal EVD and right pterional craniotomy with clipping of a ruptured right PCOM aneurysm on 3/26. Course complicated by EVD tract hemorrhage, new right basal ganglia/corona radiata infarct, hydrocephalus with ICP crisis, vasospasm requiring IA milrinone/verapimil, and new widespread ischemic strokes. Patient also found to have HFrEF (now recovered), bradycardia (resolved), b/l soleal DVT. s/p PEG placement initially complicated by gastric wall hematoma    # SAH s/p ruptured PCOM aneurysm with left hemiparesis, left esther inattention, dysphagia, cognitive impairment  - s/p right pterional craniotomy and clipping of a ruptured right PCOM aneurysm on 3/26  -  vasospasm s/p cerebral angiography and IA milrinone and verapamil  - Staple removed 4/30  - MRI (4/8): Multifocal acute to subacute infarction. This extensively involves each MCA posterior distribution, the right basal ganglia, the anterior parasagittal vertex CINDY distribution and an additional smaller lesion in the right cerebellum  - Continue comprehensive rehab program of PT/OT/SLP - 3 hours a day, 5 days a week  - patio pass in WC with staff and/or family present at all times  - Precautions: Fall, Aspiration, Seizure, PEG, VTE bLE    # Bradycardia  - was on theophyline, dc'd  - resolved    # HFrEF  - Initial Echo (3/26) with severe cardiomyopathy, EF 36%, regional wall motion abnormality  - Repeat TTE 4/9/24, normal EF 66% with no WMA.  - 120/71 - 136/90 5/7    # HLD  -  lipitor 40 mg QHS    # B/l DVT   - Left soleal DVT first noted 3/28, right soleal DVT fist noted 4/3  - Duplex (4/23): persistent bilateral soleal vein DVT  - Surveillance duplex 4/30: There is persistence of DVT identified within the left soleal vein. On the current evaluation DVT is also evident within the left gastrocnemius vein, new. Previously identified right soleal DVT is no longer visualized  - NSGY: no full dose AC for at least 4 weeks from surgery  - Discussed with hospitalist. Continue PPX dose now, surveillance dopplers weekly  - Continue Lovenox 40 mg QD  - Next doppler today: 5/7    # Hypernatremia  - PEG flushes free water 200 cc Q6H  - Na 141 4/30.--> 141 5/2--> 141 5/6 -> 140 5/7    # hypokalemia  - K+ 3.6 on 5/7  - added supplementation and will f/u on 5/9    # Gastric Hematoma post PEG placement  -  PEG placement attempted 4/19 for persistent dysphagia, but was aborted due to growing hematoma noted in gastric wall on endoscopy after trocar placement  - CT Abdomen and Pelvis w/ IV Cont showed gastric wall hematoma without intraluminal or extraluminal hemorrhage  - PEG placed 4/23  - Hgb 9.7 4/29--> 9.8 5/2 --> 10.2 5/6 improved  - CBC 5/9    # Pain  - Tylenol 650 mg Q6H PRN     # GI / Bowel  - Senna qHS  - Miralax BID  - bisacodyl 5 mg QHS  - GI ppx: protonix 40 mg QD    #  / Bladder  - Continue cardura 2 mg QHS   - d/c'd bladder scans on 5/6    # Skin / Pressure injury  - left toe scabs, right lateral foot callus   - podiatry consult appreciated 5/2. Callus shaved down, no additional treatment necessary    # Diet/Dysphagia:  - PEG placed on 4/23.   -  advanced to pureed solids thin liquids with ensure plus supplementation 5/2 post  MBS  - Supplements: MVI    # Case discussed in IDT rounds 5/1 (initial)  - incontinent, lives in elevator apt with ramp access, lives with son and other family close by. NPO with PEG, severe receptive and expressive language deficits with fluent aphasia, limited verbal output, unreliable yes/no and difficulty following 1 step commands, poor attention, max assist bADLs, left esther inattention, severe apraxia all tasks, bed mobility and transfers max assist-total assist,   - goals: 24 x 7 assist on dc, min-mod assist self care tasks and transfers, min assist transfers and ambulation  - family training  - target: 5/17 home with caregiver assist and home PT OT SLP    # LABS  CBC CMP 5/9  doppler 5/7    Outpatient Follow-up:  Perlita Varela  Neurosurgery  [address]  [place] [address]  Phone: [phone]  Fax: [phone]  Follow Up Time:      Code Status/Emergency Contact:  FULL CODE  Pierre Patrick - son [phone]

## 2024-05-07 NOTE — CHART NOTE - NSCHARTNOTEFT_GEN_A_CORE
NUTRITION Follow Up Note    SOURCE: Patient [X]  Medical Record [X]  Nursing Staff [X]  Family Member []    DIET: Diet, Pureed:   Supplement Feeding Modality:  Oral  Ensure Plus High Protein Cans or Servings Per Day:  1       Frequency:  Three Times a day (05-02-24 @ 13:46) [Active]    Patient s/p PEG placement (4/23/24). MBS noted on (5/2/24) and patient was upgraded to PO diet. Noted to be consuming % of meals at this time. Continues witrh Ensure Plus High Protein 8oz PO TID (Provides 1,050kcal & 60grams of Protein) to enhance PO intakes and optimize nutritional status. MVI ordered. Electrolytes stable at this time.     EDEMA: no edema noted    LAST BM: 06-May-2024    SKIN: bruised (ecchymotic)    WEIGHT TRENDS: 62.6 kG (4/26/24)      PERTINENT MEDICATIONS: MEDICATIONS  (STANDING):  atorvastatin 40 milliGRAM(s) Oral at bedtime  bisacodyl 5 milliGRAM(s) Oral at bedtime  doxazosin 2 milliGRAM(s) Oral at bedtime  enoxaparin Injectable 40 milliGRAM(s) SubCutaneous <User Schedule>  multivitamin/minerals/iron Oral Solution (CENTRUM) 15 milliLiter(s) Oral daily  pantoprazole   Suspension 40 milliGRAM(s) Oral before breakfast  polyethylene glycol 3350 17 Gram(s) Oral two times a day  senna Syrup 10 milliLiter(s) Oral at bedtime    MEDICATIONS  (PRN):  acetaminophen   Oral Liquid .. 650 milliGRAM(s) Oral every 6 hours PRN Mild Pain (1 - 3)  petrolatum white Ointment 1 Application(s) Topical every 12 hours PRN dryness      PERTINENT LABS:  05-07 Na140 mmol/L Glu 95 mg/dL K+ 3.6 mmol/L Cr  0.51 mg/dL BUN 13 mg/dL 05-06 Alb 2.6 g/dL<L>        ESTIMATED NEEDS:   [X] No Change Since Previous Assessment    PREVIOUS NUTRITION DIAGNOSIS:  1. Severe Protein Calorie Malnutrition    NUTRITION DIAGNOSIS IS [X] Ongoing     NEW NUTRITION DIAGNOSIS: [X] Not Applicable    INTERVENTIONS:   1. Recommend continue current nutrition plan of care as tolerated   2. Honor patients daily food preferences as feasible with prescribed diet     MONITORING & EVALUATION:   1. Weights   2. PO intakes   3. Skin integrity   4. Tolerance to diet prescription   5. Labs & POCT  6. Follow up (per protocol)    Registered Dietitian/Nutritionist Remains Available.  Ubaldo Steve RD, CDN    Contact: [phone] or via [place] TEAMS

## 2024-05-08 PROCEDURE — 99232 SBSQ HOSP IP/OBS MODERATE 35: CPT

## 2024-05-08 RX ADMIN — POLYETHYLENE GLYCOL 3350 17 GRAM(S): 17 POWDER, FOR SOLUTION ORAL at 07:08

## 2024-05-08 RX ADMIN — PANTOPRAZOLE SODIUM 40 MILLIGRAM(S): 20 TABLET, DELAYED RELEASE ORAL at 07:07

## 2024-05-08 RX ADMIN — ATORVASTATIN CALCIUM 40 MILLIGRAM(S): 80 TABLET, FILM COATED ORAL at 21:47

## 2024-05-08 RX ADMIN — ENOXAPARIN SODIUM 40 MILLIGRAM(S): 100 INJECTION SUBCUTANEOUS at 17:19

## 2024-05-08 RX ADMIN — Medication 15 MILLILITER(S): at 12:57

## 2024-05-08 RX ADMIN — SENNA PLUS 10 MILLILITER(S): 8.6 TABLET ORAL at 21:47

## 2024-05-08 RX ADMIN — Medication 5 MILLIGRAM(S): at 21:47

## 2024-05-08 RX ADMIN — POLYETHYLENE GLYCOL 3350 17 GRAM(S): 17 POWDER, FOR SOLUTION ORAL at 17:29

## 2024-05-08 RX ADMIN — Medication 2 MILLIGRAM(S): at 21:47

## 2024-05-08 NOTE — PROGRESS NOTE ADULT - COMMENTS
Patient seen with Serbian language line  Greg #[id number]    Patient in chair. Today, outside room, increased stimuli with movement in hallways and music. She is highly distractible and requires multiple repetitions to attend, difficulty carrying out commands. Also some restlessness/akisthisia Luz, Denies H/A. Noted to guard right shoulder today on exam but denies pain in shoulder; discussed with therapy staff who have not noted issues during sessions. Patient then states some ? right flank, upper arm discomfort but this is not consistent

## 2024-05-08 NOTE — PROGRESS NOTE ADULT - SUBJECTIVE AND OBJECTIVE BOX
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 Initially presented to [place] with headaches and became unresponsive, was intubated, and transferred to [facility]. On arrival to [facility] patient w/ PERRL, +cough/gag, 2/5 spontaneous movement in LUE, otherwise no movement. Given 1 unit platelets and R frontal EVD placed with high opening pressure. Patient underwent Right pterional craniotomy and clipping of a ruptured right PCOM aneurysm on 3/26.     On 3/29, EVD displaced and replaced in situ, complicated by EVD tract acute hemorrhage and new focus of infarction with hemorrhage in the right basal ganglia/corona radiata. Course further complicated by hydrocephalus with ICP crisis, vasospasm requiring cerebral angiography and IA milrinone and verapamil. MRI (4/8) with widespread ischemic stroke b/l, as per neuro IR no further intervention. Patient extubated on 4/3, re-intubated 4/8, and extubated again on 4/9. EVD removed on 4/15. CTH post removal stable.     Course also complicated by bradycardia treated with theophyline, now resolved and discontinued. Initial Echo (3/26) with severe cardiomyopathy, EF 36%, regional wall motion abnormality. Resolved on repeat TTE performed 4/9/24, normal EF 66% with no WMA. Patient first noted with left soleal DVT on Duplex 3/28. Duplex (4/10) with stable b/l soleal DVT with extension to right posterior tibial DVT.  Last LE duplex with stable bilateral soleal DVT, [facility] cardiology recommended repeat surveillance duplex on 4/30. On Lovenox ppx.     PEG placement attempted 4/19 for persistent dysphagia, but was aborted due to growing hematoma noted in gastric wall on endoscopy after trocar placement. CT Abdomen and Pelvis w/ IV Cont showed gastric wall hematoma without intraluminal or extraluminal hemorrhage. PEG successfully placed on 4/23.     Patient evaluated by PT/OT and was recommended for acute inpatient rehab. Patient is medically stable for discharge to [facility] on 4/26.  (26 Apr 2024 14:34)      PAST MEDICAL & SURGICAL HISTORY:      MEDICATIONS  (STANDING):  atorvastatin 40 milliGRAM(s) Oral at bedtime  bisacodyl 5 milliGRAM(s) Oral at bedtime  doxazosin 2 milliGRAM(s) Oral at bedtime  enoxaparin Injectable 40 milliGRAM(s) SubCutaneous <User Schedule>  multivitamin/minerals/iron Oral Solution (CENTRUM) 15 milliLiter(s) Oral daily  pantoprazole   Suspension 40 milliGRAM(s) Oral before breakfast  polyethylene glycol 3350 17 Gram(s) Oral two times a day  senna Syrup 10 milliLiter(s) Oral at bedtime    MEDICATIONS  (PRN):  acetaminophen   Oral Liquid .. 650 milliGRAM(s) Oral every 6 hours PRN Mild Pain (1 - 3)  petrolatum white Ointment 1 Application(s) Topical every 12 hours PRN dryness      Allergies    No Known Allergies    Intolerances          VITALS  58y  Vital Signs Last 24 Hrs  T(C): 37 (08 May 2024 07:58), Max: 37 (08 May 2024 07:58)  T(F): 98.6 (08 May 2024 07:58), Max: 98.6 (08 May 2024 07:58)  HR: 76 (08 May 2024 07:58) (69 - 76)  BP: 133/75 (08 May 2024 07:58) (131/78 - 133/75)  BP(mean): --  RR: 16 (08 May 2024 07:58) (16 - 16)  SpO2: 93% (08 May 2024 07:58) (93% - 94%)    Parameters below as of 08 May 2024 07:58  Patient On (Oxygen Delivery Method): room air      Daily     Daily         RECENT LABS:      05-07    140  |  104  |  13  ----------------------------<  95  3.6   |  30  |  0.51    Ca    9.5      07 May 2024 05:23          Urinalysis Basic - ( 07 May 2024 05:23 )    Color: x / Appearance: x / SG: x / pH: x  Gluc: 95 mg/dL / Ketone: x  / Bili: x / Urobili: x   Blood: x / Protein: x / Nitrite: x   Leuk Esterase: x / RBC: x / WBC x   Sq Epi: x / Non Sq Epi: x / Bacteria: x        ACC: [id number] EXAM:  US DPLX LWR EXT VEINS COMPL BI   ORDERED BY: ALEXANDRA LAM     PROCEDURE DATE:  05/07/2024          INTERPRETATION:  CLINICAL INFORMATION: Follow-up of left soleal and left   gastrocnemius DVT.    COMPARISON: 4/30/2024    TECHNIQUE: Duplex sonography of the BILATERAL LOWER extremity veins with   color and spectral Doppler, with and without compression.    FINDINGS:    RIGHT:  Normal compressibility of the RIGHT common femoral, femoral and popliteal   veins.  Doppler examination shows normal spontaneous and phasic flow.  No RIGHT calf vein thrombosis is detected.    LEFT:  Normal compressibility of the LEFT common femoral, femoral and popliteal   veins.  Doppler examination shows normal spontaneous and phasic flow.  Persistence of DVT identified within the left soleal and gastrocnemius   veins.    IMPRESSION:  Persistence of DVT identified within the left soleal and gastrocnemius   veins.    --- End of Report ---            HOLDEN ALICEA MD; Attending Radiologist  This document has been electronically signed. May  7 2024  5:26PM  Abnormal.    CAPILLARY BLOOD GLUCOSE

## 2024-05-08 NOTE — PROGRESS NOTE ADULT - ASSESSMENT
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 Patient s/p right frontal EVD and right pterional craniotomy with clipping of a ruptured right PCOM aneurysm on 3/26. Course complicated by EVD tract hemorrhage, new right basal ganglia/corona radiata infarct, hydrocephalus with ICP crisis, vasospasm requiring IA milrinone/verapimil, and new widespread ischemic strokes. Patient also found to have HFrEF (now recovered), bradycardia (resolved), b/l soleal DVT. s/p PEG placement initially complicated by gastric wall hematoma    # SAH s/p ruptured PCOM aneurysm with left hemiparesis, left esther inattention, dysphagia, cognitive impairment  - s/p right pterional craniotomy and clipping of a ruptured right PCOM aneurysm on 3/26  -  vasospasm s/p cerebral angiography and IA milrinone and verapamil  - Staple removed 4/30  - MRI (4/8): Multifocal acute to subacute infarction. This extensively involves each MCA posterior distribution, the right basal ganglia, the anterior parasagittal vertex CINDY distribution and an additional smaller lesion in the right cerebellum  - Continue comprehensive rehab program of PT/OT/SLP - 3 hours a day, 5 days a week  - patio pass in WC with staff and/or family present at all times  - Precautions: Fall, Aspiration, Seizure, PEG, VTE bLE    # Bradycardia  - was on theophyline, dc'd  - resolved  - HR  (69 - 76) 5/8    # HFrEF  - Initial Echo (3/26) with severe cardiomyopathy, EF 36%, regional wall motion abnormality  - Repeat TTE 4/9/24, normal EF 66% with no WMA.  - BP (131/78 - 133/75) 5/8    # HLD  -  lipitor 40 mg QHS    # B/l DVT   - Left soleal DVT first noted 3/28, right soleal DVT fist noted 4/3  - Duplex (4/23): persistent bilateral soleal vein DVT  - duplex 4/30: There is persistence of DVT identified within the left soleal vein. On the current evaluation DVT is also evident within the left gastrocnemius vein, new. Previously identified right soleal DVT is no longer visualized  - NSGY: no full dose AC for at least 4 weeks from surgery  - Discussed with hospitalist. Continue PPX dose now, surveillance dopplers weekly  - Continue Lovenox 40 mg QD  - Surveillance doppler 5/7: Persistence of DVT identified within the left soleal and gastrocnemius veins.    # Hypernatremia  - PEG flushes free water 200 cc Q6H  - Na 141 4/30.--> 141 5/2--> 141 5/6 -> 140 5/7    # hypokalemia  - K+ 3.4 5/6 repleted --> 3.6 on 5/7   - added supplementation 40 meq x 2 5/7  - BMP 5/9    # Gastric Hematoma post PEG placement  -  PEG placement attempted 4/19 for persistent dysphagia, but was aborted due to growing hematoma noted in gastric wall on endoscopy after trocar placement  - CT Abdomen and Pelvis w/ IV Cont showed gastric wall hematoma without intraluminal or extraluminal hemorrhage  - PEG placed 4/23  - Hgb 9.7 4/29--> 9.8 5/2 --> 10.2 5/6 improved  - CBC 5/9    # Pain  - Tylenol 650 mg Q6H PRN     # GI / Bowel  - Senna qHS  - Miralax BID  - bisacodyl 5 mg QHS  - GI ppx: protonix 40 mg QD    #  / Bladder  - Continue cardura 2 mg QHS   - d/c'd bladder scans on 5/6    # Skin / Pressure injury  - left toe scabs, right lateral foot callus   - podiatry consult appreciated 5/2. Callus shaved down, no additional treatment necessary    # Diet/Dysphagia:  - advanced to pureed solids thin liquids with ensure plus supplementation 5/2 post  MBS  - Supplements: MVI    # Case discussed in IDT rounds 5/8 (follow up):  - pureed solid thin liquids, improved receptive language skills but reduced ability to follow commands, improved reliabilty simple yes/no questions, increased verbal output +left esther inattention requires max cues, distractable, max commode transfers, mod UB dressing and feeding, total toileting, max-total assist bed mobility and transfers, mod-max assist ambulation RW and WC follow 60 feet  - goals: 24 x 7 assist on dc, min-mod assist self care tasks and transfers, min assist transfers and ambulation  - family training  - adjusted target: 5/17 DAV     # LABS  CBC CMP 5/9    Outpatient Follow-up:  Perlita Varela  Neurosurgery  [address], [address]  [place] [address]  Phone: [phone]  Fax: [phone]  Follow Up Time:      Code Status/Emergency Contact:  FULL CODE  Pierre Patrick - son [phone]   57 y/o F with no known PMH, h/o fell one month prior to admission, who was transferred from [place] to 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 Patient s/p right frontal EVD and right pterional craniotomy with clipping of a ruptured right PCOM aneurysm on 3/26. Course complicated by EVD tract hemorrhage, new right basal ganglia/corona radiata infarct, hydrocephalus with ICP crisis, vasospasm requiring IA milrinone/verapimil, and new widespread ischemic strokes. Patient also found to have HFrEF (now recovered), bradycardia (resolved), b/l soleal DVT. s/p PEG placement initially complicated by gastric wall hematoma    # SAH s/p ruptured PCOM aneurysm s/p right pterional craniotomy and clipping 3/26  -  vasospasm s/p cerebral angiography and IA milrinone and verapamil  - MRI (4/8): Multifocal acute to subacute infarction. This extensively involves each MCA posterior distribution, the right basal ganglia, the anterior parasagittal vertex CINDY distribution and an additional smaller lesion in the right cerebellum  - Staple removed 4/30  - Continue comprehensive rehab program of PT/OT/SLP - 3 hours a day, 5 days a week  - patio pass in WC with staff and/or family present at all times  - Precautions: Fall, Aspiration, Seizure, PEG, VTE bLE    # Bradycardia: resolved  - was on theophyline, dc'd  - HR  (69 - 76) 5/8    # HFrEF  - Initial Echo (3/26) with severe cardiomyopathy, EF 36%, regional wall motion abnormality  - Repeat TTE 4/9/24, normal EF 66% with no WMA.  - BP (131/78 - 133/75) 5/8    # HLD  -  lipitor 40 mg QHS    # B/l DVT   - Left soleal DVT first noted 3/28, right soleal DVT fist noted 4/3  - Duplex (4/23): persistent bilateral soleal vein DVT  - duplex 4/30: There is persistence of DVT identified within the left soleal vein. On the current evaluation DVT is also evident within the left gastrocnemius vein, new. Previously identified right soleal DVT is no longer visualized  - NSGY: no full dose AC for at least 4 weeks from surgery  - Discussed with hospitalist. Continue PPX dose now, surveillance dopplers weekly  - Continue Lovenox 40 mg QD  - Surveillance doppler 5/7: Persistence of DVT identified within the left soleal and gastrocnemius veins. No propogation    # Hypernatremia  - PEG flushes free water 200 cc Q6H  - Na 141 4/30.--> 141 5/2--> 141 5/6 -> 140 5/7    # hypokalemia  - K+ 3.4 5/6 repleted --> 3.6 on 5/7   - added supplementation 40 meq x 2 5/7  - BMP 5/9    # Gastric Hematoma post PEG placement  -  PEG placement attempted 4/19 for persistent dysphagia, but was aborted due to growing hematoma noted in gastric wall on endoscopy after trocar placement  - CT Abdomen and Pelvis w/ IV Cont showed gastric wall hematoma without intraluminal or extraluminal hemorrhage  - PEG placed 4/23  - Hgb 9.7 4/29--> 9.8 5/2 --> 10.2 5/6 improved  - CBC 5/9    # Pain  - Tylenol 650 mg Q6H PRN     # GI / Bowel  - Senna qHS  - Miralax BID  - bisacodyl 5 mg QHS  - GI ppx: protonix 40 mg QD    #  / Bladder  - Continue cardura 2 mg QHS   - d/c'd bladder scans on 5/6    # Skin / Pressure injury  - left toe scabs, right lateral foot callus   - podiatry consult appreciated 5/2. Callus shaved down, no additional treatment necessary    # Diet/Dysphagia:  - advanced to pureed solids thin liquids with ensure plus supplementation 5/2 post  MBS  - Supplements: MVI    # Case discussed in IDT rounds 5/8 (follow up):  - pureed solid thin liquids, improved receptive language skills but reduced ability to follow commands, improved reliabilty simple yes/no questions, increased verbal output +left esther inattention requires max cues, distractable, max commode transfers, mod UB dressing and feeding, total toileting, max-total assist bed mobility and transfers, mod-max assist ambulation RW and WC follow 60 feet  - goals: 24 x 7 assist on dc, min-mod assist self care tasks and transfers, min assist transfers and ambulation  - family training  - adjusted target and dispo: 5/17 DEV DEMARCO has discussed with family who are agreeable    # LABS  CBC CMP 5/9    Outpatient Follow-up:  Perlita Varela  Neurosurgery  [address], [address]  [place] [address]  Phone: [phone]  Fax: [phone]  Follow Up Time:      Code Status/Emergency Contact:  FULL CODE  Pierre Patrick - son [phone]

## 2024-05-09 LAB
ALBUMIN SERPL ELPH-MCNC: 2.7 G/DL — LOW (ref 3.3–5)
ALP SERPL-CCNC: 116 U/L — SIGNIFICANT CHANGE UP (ref 40–120)
ALT FLD-CCNC: 45 U/L — SIGNIFICANT CHANGE UP (ref 10–45)
ANION GAP SERPL CALC-SCNC: 9 MMOL/L — SIGNIFICANT CHANGE UP (ref 5–17)
AST SERPL-CCNC: 34 U/L — SIGNIFICANT CHANGE UP (ref 10–40)
BILIRUB SERPL-MCNC: 0.3 MG/DL — SIGNIFICANT CHANGE UP (ref 0.2–1.2)
BUN SERPL-MCNC: 11 MG/DL — SIGNIFICANT CHANGE UP (ref 7–23)
CALCIUM SERPL-MCNC: 9.2 MG/DL — SIGNIFICANT CHANGE UP (ref 8.4–10.5)
CHLORIDE SERPL-SCNC: 105 MMOL/L — SIGNIFICANT CHANGE UP (ref 96–108)
CO2 SERPL-SCNC: 26 MMOL/L — SIGNIFICANT CHANGE UP (ref 22–31)
CREAT SERPL-MCNC: 0.52 MG/DL — SIGNIFICANT CHANGE UP (ref 0.5–1.3)
EGFR: 109 ML/MIN/1.73M2 — SIGNIFICANT CHANGE UP
GLUCOSE SERPL-MCNC: 91 MG/DL — SIGNIFICANT CHANGE UP (ref 70–99)
HCT VFR BLD CALC: 32.3 % — LOW (ref 34.5–45)
HGB BLD-MCNC: 10.7 G/DL — LOW (ref 11.5–15.5)
MCHC RBC-ENTMCNC: 29 PG — SIGNIFICANT CHANGE UP (ref 27–34)
MCHC RBC-ENTMCNC: 33.1 GM/DL — SIGNIFICANT CHANGE UP (ref 32–36)
MCV RBC AUTO: 87.5 FL — SIGNIFICANT CHANGE UP (ref 80–100)
NRBC # BLD: 0 /100 WBCS — SIGNIFICANT CHANGE UP (ref 0–0)
PLATELET # BLD AUTO: 299 K/UL — SIGNIFICANT CHANGE UP (ref 150–400)
POTASSIUM SERPL-MCNC: 3.6 MMOL/L — SIGNIFICANT CHANGE UP (ref 3.5–5.3)
POTASSIUM SERPL-SCNC: 3.6 MMOL/L — SIGNIFICANT CHANGE UP (ref 3.5–5.3)
PROT SERPL-MCNC: 6.8 G/DL — SIGNIFICANT CHANGE UP (ref 6–8.3)
RBC # BLD: 3.69 M/UL — LOW (ref 3.8–5.2)
RBC # FLD: 14.4 % — SIGNIFICANT CHANGE UP (ref 10.3–14.5)
SODIUM SERPL-SCNC: 140 MMOL/L — SIGNIFICANT CHANGE UP (ref 135–145)
WBC # BLD: 5.05 K/UL — SIGNIFICANT CHANGE UP (ref 3.8–10.5)
WBC # FLD AUTO: 5.05 K/UL — SIGNIFICANT CHANGE UP (ref 3.8–10.5)

## 2024-05-09 PROCEDURE — 90832 PSYTX W PT 30 MINUTES: CPT

## 2024-05-09 PROCEDURE — 99232 SBSQ HOSP IP/OBS MODERATE 35: CPT

## 2024-05-09 RX ORDER — LANOLIN ALCOHOL/MO/W.PET/CERES
3 CREAM (GRAM) TOPICAL AT BEDTIME
Refills: 0 | Status: DISCONTINUED | OUTPATIENT
Start: 2024-05-09 | End: 2024-05-18

## 2024-05-09 RX ADMIN — POLYETHYLENE GLYCOL 3350 17 GRAM(S): 17 POWDER, FOR SOLUTION ORAL at 18:15

## 2024-05-09 RX ADMIN — SENNA PLUS 10 MILLILITER(S): 8.6 TABLET ORAL at 21:01

## 2024-05-09 RX ADMIN — PANTOPRAZOLE SODIUM 40 MILLIGRAM(S): 20 TABLET, DELAYED RELEASE ORAL at 05:07

## 2024-05-09 RX ADMIN — Medication 5 MILLIGRAM(S): at 21:01

## 2024-05-09 RX ADMIN — Medication 2 MILLIGRAM(S): at 21:02

## 2024-05-09 RX ADMIN — ATORVASTATIN CALCIUM 40 MILLIGRAM(S): 80 TABLET, FILM COATED ORAL at 21:01

## 2024-05-09 RX ADMIN — ENOXAPARIN SODIUM 40 MILLIGRAM(S): 100 INJECTION SUBCUTANEOUS at 18:15

## 2024-05-09 RX ADMIN — POLYETHYLENE GLYCOL 3350 17 GRAM(S): 17 POWDER, FOR SOLUTION ORAL at 05:07

## 2024-05-09 RX ADMIN — Medication 15 MILLILITER(S): at 11:09

## 2024-05-09 NOTE — PROGRESS NOTE ADULT - ASSESSMENT
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 Patient s/p right frontal EVD and right pterional craniotomy with clipping of a ruptured right PCOM aneurysm on 3/26. Course complicated by EVD tract hemorrhage, new right basal ganglia/corona radiata infarct, hydrocephalus with ICP crisis, vasospasm requiring IA milrinone/verapimil, and new widespread ischemic strokes. Patient also found to have HFrEF (now recovered), bradycardia (resolved), b/l soleal DVT. s/p PEG placement initially complicated by gastric wall hematoma    # SAH s/p ruptured PCOM aneurysm s/p right pterional craniotomy and clipping 3/26  -  vasospasm s/p cerebral angiography and IA milrinone and verapamil  - MRI (4/8): Multifocal acute to subacute infarction. This extensively involves each MCA posterior distribution, the right basal ganglia, the anterior parasagittal vertex CINDY distribution and an additional smaller lesion in the right cerebellum  - Staple removed 4/30  - Continue comprehensive rehab program of PT/OT/SLP - 3 hours a day, 5 days a week  - patio pass in WC with staff and/or family present at all times  - Precautions: Fall, Aspiration, Seizure, PEG, VTE bLE    # Bradycardia: resolved  - was on theophyline, dc'd  - (09 May 2024 07:44) (65 - 84)    # Akasthesia/Restlessness  - considering initiating propranolol to aid with persistent symptom  - will discuss w/ hospitalist prior to initiation given past bradycardia     # HFrEF  - Initial Echo (3/26) with severe cardiomyopathy, EF 36%, regional wall motion abnormality  - Repeat TTE 4/9/24, normal EF 66% with no WMA.  - (09 May 2024 07:44) (65 - 84)    # HLD  -  lipitor 40 mg QHS    # B/l DVT   - Left soleal DVT first noted 3/28, right soleal DVT fist noted 4/3  - Duplex (4/23): persistent bilateral soleal vein DVT  - duplex 4/30: There is persistence of DVT identified within the left soleal vein. On the current evaluation DVT is also evident within the left gastrocnemius vein, new. Previously identified right soleal DVT is no longer visualized  - NSGY: no full dose AC for at least 4 weeks from surgery  - Discussed with hospitalist. Continue PPX dose now, surveillance dopplers weekly  - Continue Lovenox 40 mg QD  - Surveillance doppler 5/7: Persistence of DVT identified within the left soleal and gastrocnemius veins. No propogation    # Hypernatremia  - PEG flushes free water 200 cc Q6H  - Na 141 4/30.--> 141 5/2--> 141 5/6 -> 140 5/7 -> 140 5/9    # hypokalemia  - K+ 3.4 5/6 repleted --> 3.6 on 5/7 -> 3.6 5/9  - added supplementation 40 meq x 2 5/7  - BMP 5/13    # Gastric Hematoma post PEG placement  -  PEG placement attempted 4/19 for persistent dysphagia, but was aborted due to growing hematoma noted in gastric wall on endoscopy after trocar placement  - CT Abdomen and Pelvis w/ IV Cont showed gastric wall hematoma without intraluminal or extraluminal hemorrhage  - PEG placed 4/23  - Hgb 9.7 4/29--> 9.8 5/2 --> 10.2 5/6 improved ->10.7  - CBC 5/13    # Pain  - Tylenol 650 mg Q6H PRN     # GI / Bowel  - Senna qHS  - Miralax BID  - bisacodyl 5 mg QHS  - GI ppx: protonix 40 mg QD    #  / Bladder  - Continue cardura 2 mg QHS   - d/c'd bladder scans on 5/6    # Skin / Pressure injury  - left toe scabs, right lateral foot callus   - podiatry consult appreciated 5/2. Callus shaved down, no additional treatment necessary    # Diet/Dysphagia:  - advanced to pureed solids thin liquids with ensure plus supplementation 5/2 post  MBS  - Supplements: MVI    # Case discussed in IDT rounds 5/8 (follow up):  - pureed solid thin liquids, improved receptive language skills but reduced ability to follow commands, improved reliabilty simple yes/no questions, increased verbal output +left esther inattention requires max cues, distractable, max commode transfers, mod UB dressing and feeding, total toileting, max-total assist bed mobility and transfers, mod-max assist ambulation RW and WC follow 60 feet  - goals: 24 x 7 assist on dc, min-mod assist self care tasks and transfers, min assist transfers and ambulation  - family training  - adjusted target and dispo: 5/17 DEV DEMARCO has discussed with family who are agreeable    # LABS  CBC CMP 5/9    Outpatient Follow-up:  Perlita Varela  Neurosurgery  [address], [address]  [place] [address]  Phone: [phone]  Fax: [phone]  Follow Up Time:      Code Status/Emergency Contact:  FULL CODE  Pierre Patrick - son [id number]   59 y/o F with no known PMH, h/o fell one month prior to admission, who was transferred from [place] to 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 Patient s/p right frontal EVD and right pterional craniotomy with clipping of a ruptured right PCOM aneurysm on 3/26. Course complicated by EVD tract hemorrhage, new right basal ganglia/corona radiata infarct, hydrocephalus with ICP crisis, vasospasm requiring IA milrinone/verapimil, and new widespread ischemic strokes. Patient also found to have HFrEF (now recovered), bradycardia (resolved), b/l soleal DVT. s/p PEG placement initially complicated by gastric wall hematoma    # SAH s/p ruptured PCOM aneurysm s/p right pterional craniotomy and clipping 3/26  -  vasospasm s/p cerebral angiography and IA milrinone and verapamil  - MRI (4/8): Multifocal acute to subacute infarction. This extensively involves each MCA posterior distribution, the right basal ganglia, the anterior parasagittal vertex CINDY distribution and an additional smaller lesion in the right cerebellum  - Staple removed 4/30  - Continue comprehensive rehab program of PT/OT/SLP - 3 hours a day, 5 days a week  - patio pass in WC with staff and/or family present at all times  - Precautions: Fall, Aspiration, Seizure, PEG, VTE bLE    # Bradycardia: resolved  - was on theophyline, dc'd  - HR (65 - 84) 5/9    # Akasthesia/Restlessness  - considering initiating propranolol to aid with persistent symptom  - will discuss w/ hospitalist prior to initiation given past bradycardia   - neuropsychology    # HFrEF  - Initial Echo (3/26) with severe cardiomyopathy, EF 36%, regional wall motion abnormality  - Repeat TTE 4/9/24, normal EF 66% with no WMA.  - bP (118/80 - 141/75) 5/9    # HLD  -  lipitor 40 mg QHS    # B/l DVT   - Left soleal DVT first noted 3/28, right soleal DVT fist noted 4/3  - Duplex (4/23): persistent bilateral soleal vein DVT  - duplex 4/30: There is persistence of DVT identified within the left soleal vein. On the current evaluation DVT is also evident within the left gastrocnemius vein, new. Previously identified right soleal DVT is no longer visualized  - Surveillance doppler 5/7: Persistence of DVT identified within the left soleal and gastrocnemius veins. No propogation  - NSGY: no full dose AC for at least 4 weeks from surgery  - Discussed with hospitalist. Continue PPX dose now, surveillance dopplers weekly  - Continue Lovenox 40 mg QD  - Next doppler 5/14    # Hypernatremia  - PEG flushes free water 200 cc Q6H  - Na 141 4/30.--> 141 5/2--> 141 5/6 -> 140 5/7 -> 140 5/9    # hypokalemia  - K+ 3.4 5/6 repleted --> 3.6 on 5/7 -> 3.6 5/9  -  40 meq x 2 5/7  - BMP 5/13    # Gastric Hematoma post PEG placement  -  PEG placement attempted 4/19 for persistent dysphagia, but was aborted due to growing hematoma noted in gastric wall on endoscopy after trocar placement  - CT Abdomen and Pelvis w/ IV Cont showed gastric wall hematoma without intraluminal or extraluminal hemorrhage  - PEG placed 4/23  - Hgb 9.7 4/29--> 9.8 5/2 --> 10.2 5/6 ->10.7 5/9  - CBC 5/13    # Pain  - Tylenol 650 mg Q6H PRN     # GI / Bowel  - Senna qHS  - Miralax BID  - bisacodyl 5 mg QHS  - GI ppx: protonix 40 mg QD    #  / Bladder  - Continue cardura 2 mg QHS   - d/c'd bladder scans on 5/6    # Skin / Pressure injury  - left toe scabs, right lateral foot callus   - podiatry consult appreciated 5/2. Callus shaved down, no additional treatment necessary    # Diet/Dysphagia:  - advanced to pureed solids thin liquids with ensure plus supplementation 5/2 post  MBS  - Supplements: MVI    # Case discussed in IDT rounds 5/8 (follow up):  - pureed solid thin liquids, improved receptive language skills but reduced ability to follow commands, improved reliabilty simple yes/no questions, increased verbal output +left esther inattention requires max cues, distractable, max commode transfers, mod UB dressing and feeding, total toileting, max-total assist bed mobility and transfers, mod-max assist ambulation RW and WC follow 60 feet  - goals: 24 x 7 assist on dc, min-mod assist self care tasks and transfers, min assist transfers and ambulation  - family training  - adjusted target and dispo: 5/17 DEV DEMARCO has discussed with family who are agreeable    # LABS  CBC CMP 5/13    Outpatient Follow-up:  Perlita Varela  Neurosurgery  [address], [address]  [place] [address]  Phone: [phone]  Fax: [phone]  Follow Up Time:      Code Status/Emergency Contact:  FULL CODE  Pierre Patrick - son [phone]   59 y/o F with no known PMH, h/o fell one month prior to admission, who was transferred from [place] to [facility] on 3/26/24 with diffuse SAH w/ small IVH, 2/2 ruptured posterolaterally projecting 2.9 x 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 Patient s/p right frontal EVD and right pterional craniotomy with clipping of a ruptured right PCOM aneurysm on 3/26. Course complicated by EVD tract hemorrhage, new right basal ganglia/corona radiata infarct, hydrocephalus with ICP crisis, vasospasm requiring IA milrinone/verapimil, and new widespread ischemic strokes. Patient also found to have HFrEF (now recovered), bradycardia (resolved), b/l soleal DVT. s/p PEG placement initially complicated by gastric wall hematoma    # SAH s/p ruptured PCOM aneurysm s/p right pterional craniotomy and clipping 3/26  -  vasospasm s/p cerebral angiography and IA milrinone and verapamil  - MRI (4/8): Multifocal acute to subacute infarction. This extensively involves each MCA posterior distribution, the right basal ganglia, the anterior parasagittal vertex CINDY distribution and an additional smaller lesion in the right cerebellum  - Staple removed 4/30  - Continue comprehensive rehab program of PT/OT/SLP - 3 hours a day, 5 days a week  - patio pass in WC with staff and/or family present at all times  - Precautions: Fall, Aspiration, Seizure, PEG, VTE bLE    # Bradycardia: resolved  - was on theophyline, dc'd  - HR (65 - 84) 5/9    # Akasthesia/Restlessness  - considering initiating propranolol to aid with persistent symptom  - will discuss w/ hospitalist prior to initiation given past bradycardia   - neuropsychology    # HFrEF  - Initial Echo (3/26) with severe cardiomyopathy, EF 36%, regional wall motion abnormality  - Repeat TTE 4/9/24, normal EF 66% with no WMA.  - bP (118/80 - 141/75) 5/9    # HLD  -  lipitor 40 mg QHS    # B/l DVT   - Left soleal DVT first noted 3/28, right soleal DVT fist noted 4/3  - Duplex (4/23): persistent bilateral soleal vein DVT  - duplex 4/30: There is persistence of DVT identified within the left soleal vein. On the current evaluation DVT is also evident within the left gastrocnemius vein, new. Previously identified right soleal DVT is no longer visualized  - Surveillance doppler 5/7: Persistence of DVT identified within the left soleal and gastrocnemius veins. No propogation  - NSGY: no full dose AC for at least 4 weeks from surgery  - Discussed with hospitalist. Continue PPX dose now, surveillance dopplers weekly  - Continue Lovenox 40 mg QD  - Next doppler 5/14    # Hypernatremia  - PEG flushes free water 200 cc Q6H  - Na 141 4/30.--> 141 5/2--> 141 5/6 -> 140 5/7 -> 140 5/9    # hypokalemia  - K+ 3.4 5/6 repleted --> 3.6 on 5/7 -> 3.6 5/9  -  40 meq x 2 5/7  - BMP 5/13    # Gastric Hematoma post PEG placement  -  PEG placement attempted 4/19 for persistent dysphagia, but was aborted due to growing hematoma noted in gastric wall on endoscopy after trocar placement  - CT Abdomen and Pelvis w/ IV Cont showed gastric wall hematoma without intraluminal or extraluminal hemorrhage  - PEG placed 4/23  - Hgb 9.7 4/29--> 9.8 5/2 --> 10.2 5/6 ->10.7 5/9  - CBC 5/13    # Pain  - Tylenol 650 mg Q6H PRN     # GI / Bowel  - Senna qHS  - Miralax BID  - bisacodyl 5 mg QHS  - GI ppx: protonix 40 mg QD    #  / Bladder  - Continue cardura 2 mg QHS   - d/c'd bladder scans on 5/6    # Skin / Pressure injury  - left toe scabs, right lateral foot callus   - podiatry consult appreciated 5/2. Callus shaved down, no additional treatment necessary    # Diet/Dysphagia:  - advanced to pureed solids thin liquids with ensure plus supplementation 5/2 post  MBS  - Supplements: MVI    # Case discussed in IDT rounds 5/8 (follow up):  - pureed solid thin liquids, improved receptive language skills but reduced ability to follow commands, improved reliabilty simple yes/no questions, increased verbal output +left esther inattention requires max cues, distractable, max commode transfers, mod UB dressing and feeding, total toileting, max-total assist bed mobility and transfers, mod-max assist ambulation RW and WC follow 60 feet  - goals: 24 x 7 assist on dc, min-mod assist self care tasks and transfers, min assist transfers and ambulation  - family training  - adjusted target and dispo: 5/17 DEV DEMARCO has discussed with family who are agreeable    # LABS  CBC CMP 5/13    addendum 3:0-2 PM  - discussed with hospitalist, cleared for propranolol. will start 10 bid, parameters to hold for HR <60 and SBP < 100 added    Outpatient Follow-up:  Perlita Varela  Neurosurgery  [address], [address]  [place] [address]  Phone: [phone]  Fax: [phone]  Follow Up Time:      Code Status/Emergency Contact:  FULL CODE  Pierre Patrick - son [phone]

## 2024-05-09 NOTE — PROGRESS NOTE ADULT - CONSTITUTIONAL COMMENTS
+left esther inattention, reduced simple atttnetion and some mild restlessness, no agitation. Benefits from frequent redirection
alert, incision healing well, C/D/I no erythema. Alert, improved simple command following and sl improved simple attention +verbalizing responses, low volume, hypophonia
eyes spontaneously open. Answers simple yes/no questions with verbalization but low volume, dysphonic. appears comfortable
appears comfortable, NAD Afebrile, no cough
+left esther inattention, reduced simple atttnetion and some mild restlessness, no agitation. Benefits from frequent redirection
eyes open +verbalizing simple yes/no and following occ commands but not fully oriented) GCS=14  ([id number])
right cranial incision intact, dry, no redness. sustained eye opening. sl increased initiation and improvement overall arousal
Incision healing well, C/D/I no oozing or erythema
distracted. mood fair, compliant with exam. +restlessness noted in hallway and during exam derek TREVE and LE

## 2024-05-09 NOTE — PROGRESS NOTE ADULT - ATTENDING COMMENTS
Progress note amended to include my discussions with patient, resident, hospitalist, RN, SW, PT and my findings    Patient seen in restorative dining area. She is alert, seen with  Arik #[id number]. She continues to exhibit left esther inattention and distractibility, although slightly better this morning than yesterday. She reports difficulty sleeping due to ruminating thoughts, thinking about people; denies hallucinations, although she did dream of them. She has persistent motor restlessness/akasthisais, can improve with activity but resumes quickly once activity stops. No complaints of pain or H/A, staff reports stable overnight    incision healing well, cD/./I. she has less discomfort with ROM passively shoulder and AROM to  degrees, no drop arm , no guarding noted. motor 4/5 shoulder, elbow flexion 5-/5 gross grasp 5-/5.. bilateral calves soft no TTP/    - labs reviewed, stable including Na and K+  - will consider starting propranolol for restlessness. HR 65-84 but documented prior history of bradycardia so will discuss with hospitalist first. Other options such as benzos and anticholinergics not ideal given neuro status.   - continue program      RECENT LABS    Vital Signs Last 24 Hrs  T(C): 36.9 (09 May 2024 07:44), Max: 36.9 (09 May 2024 07:44)  T(F): 98.5 (09 May 2024 07:44), Max: 98.5 (09 May 2024 07:44)  HR: 65 (09 May 2024 07:44) (65 - 84)  BP: 124/70 (09 May 2024 07:44) (118/80 - 141/75)  BP(mean): --  RR: 16 (09 May 2024 07:44) (15 - 16)  SpO2: 95% (09 May 2024 07:44) (95% - 96%)    Parameters below as of 09 May 2024 07:44  Patient On (Oxygen Delivery Method): room air                              10.7   5.05  )-----------( 299      ( 09 May 2024 08:16 )             32.3     05-09    140  |  105  |  11  ----------------------------<  91  3.6   |  26  |  0.52    Ca    9.2      09 May 2024 05:38    TPro  6.8  /  Alb  2.7<L>  /  TBili  0.3  /  DBili  x   /  AST  34  /  ALT  45  /  AlkPhos  116  05-09      Urinalysis Basic - ( 09 May 2024 05:38 )    Color: x / Appearance: x / SG: x / pH: x  Gluc: 91 mg/dL / Ketone: x  / Bili: x / Urobili: x   Blood: x / Protein: x / Nitrite: x   Leuk Esterase: x / RBC: x / WBC x   Sq Epi: x / Non Sq Epi: x / Bacteria: x      CAPILLARY BLOOD GLUCOSE

## 2024-05-09 NOTE — PROGRESS NOTE ADULT - SUBJECTIVE AND OBJECTIVE BOX
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 Initially presented to [place] with headaches and became unresponsive, was intubated, and transferred to [facility]. On arrival to [facility] patient w/ PERRL, +cough/gag, 2/5 spontaneous movement in LUE, otherwise no movement. Given 1 unit platelets and R frontal EVD placed with high opening pressure. Patient underwent Right pterional craniotomy and clipping of a ruptured right PCOM aneurysm on 3/26.     On 3/29, EVD displaced and replaced in situ, complicated by EVD tract acute hemorrhage and new focus of infarction with hemorrhage in the right basal ganglia/corona radiata. Course further complicated by hydrocephalus with ICP crisis, vasospasm requiring cerebral angiography and IA milrinone and verapamil. MRI (4/8) with widespread ischemic stroke b/l, as per neuro IR no further intervention. Patient extubated on 4/3, re-intubated 4/8, and extubated again on 4/9. EVD removed on 4/15. CTH post removal stable.     Course also complicated by bradycardia treated with theophyline, now resolved and discontinued. Initial Echo (3/26) with severe cardiomyopathy, EF 36%, regional wall motion abnormality. Resolved on repeat TTE performed 4/9/24, normal EF 66% with no WMA. Patient first noted with left soleal DVT on Duplex 3/28. Duplex (4/10) with stable b/l soleal DVT with extension to right posterior tibial DVT.  Last LE duplex with stable bilateral soleal DVT, [facility] cardiology recommended repeat surveillance duplex on 4/30. On Lovenox ppx.     PEG placement attempted 4/19 for persistent dysphagia, but was aborted due to growing hematoma noted in gastric wall on endoscopy after trocar placement. CT Abdomen and Pelvis w/ IV Cont showed gastric wall hematoma without intraluminal or extraluminal hemorrhage. PEG successfully placed on 4/23.     Patient evaluated by PT/OT and was recommended for acute inpatient rehab. Patient is medically stable for discharge to [facility] on 4/26.  (26 Apr 2024 14:34)      PAST MEDICAL & SURGICAL HISTORY:      MEDICATIONS  (STANDING):  atorvastatin 40 milliGRAM(s) Oral at bedtime  bisacodyl 5 milliGRAM(s) Oral at bedtime  doxazosin 2 milliGRAM(s) Oral at bedtime  enoxaparin Injectable 40 milliGRAM(s) SubCutaneous <User Schedule>  multivitamin/minerals/iron Oral Solution (CENTRUM) 15 milliLiter(s) Oral daily  pantoprazole   Suspension 40 milliGRAM(s) Oral before breakfast  polyethylene glycol 3350 17 Gram(s) Oral two times a day  senna Syrup 10 milliLiter(s) Oral at bedtime    MEDICATIONS  (PRN):  acetaminophen   Oral Liquid .. 650 milliGRAM(s) Oral every 6 hours PRN Mild Pain (1 - 3)  petrolatum white Ointment 1 Application(s) Topical every 12 hours PRN dryness      Allergies    No Known Allergies    Intolerances          Vital Signs Last 24 Hrs  T(C): 36.9 (09 May 2024 07:44), Max: 36.9 (09 May 2024 07:44)  T(F): 98.5 (09 May 2024 07:44), Max: 98.5 (09 May 2024 07:44)  HR: 65 (09 May 2024 07:44) (65 - 84)  BP: 124/70 (09 May 2024 07:44) (118/80 - 141/75)  BP(mean): --  RR: 16 (09 May 2024 07:44) (15 - 16)  SpO2: 95% (09 May 2024 07:44) (95% - 96%)    Parameters below as of 09 May 2024 07:44  Patient On (Oxygen Delivery Method): room air        Daily     Daily         RECENT LABS:                          10.7   5.05  )-----------( 299      ( 09 May 2024 08:16 )             32.3     05-09    140  |  105  |  11  ----------------------------<  91  3.6   |  26  |  0.52    Ca    9.2      09 May 2024 05:38    TPro  6.8  /  Alb  2.7<L>  /  TBili  0.3  /  DBili  x   /  AST  34  /  ALT  45  /  AlkPhos  116  05-09    LIVER FUNCTIONS - ( 09 May 2024 05:38 )  Alb: 2.7 g/dL / Pro: 6.8 g/dL / ALK PHOS: 116 U/L / ALT: 45 U/L / AST: 34 U/L / GGT: x                 Urinalysis Basic - ( 09 May 2024 05:38 )    Color: x / Appearance: x / SG: x / pH: x  Gluc: 91 mg/dL / Ketone: x  / Bili: x / Urobili: x   Blood: x / Protein: x / Nitrite: x   Leuk Esterase: x / RBC: x / WBC x   Sq Epi: x / Non Sq Epi: x / Bacteria: x                ACC: [id number] EXAM:  US DPLX LWR EXT VEINS COMPL BI   ORDERED BY: ALEXANDRA LAM     PROCEDURE DATE:  05/07/2024          INTERPRETATION:  CLINICAL INFORMATION: Follow-up of left soleal and left   gastrocnemius DVT.    COMPARISON: 4/30/2024    TECHNIQUE: Duplex sonography of the BILATERAL LOWER extremity veins with   color and spectral Doppler, with and without compression.    FINDINGS:    RIGHT:  Normal compressibility of the RIGHT common femoral, femoral and popliteal   veins.  Doppler examination shows normal spontaneous and phasic flow.  No RIGHT calf vein thrombosis is detected.    LEFT:  Normal compressibility of the LEFT common femoral, femoral and popliteal   veins.  Doppler examination shows normal spontaneous and phasic flow.  Persistence of DVT identified within the left soleal and gastrocnemius   veins.    IMPRESSION:  Persistence of DVT identified within the left soleal and gastrocnemius   veins.    --- End of Report ---            HOLDEN ALICEA MD; Attending Radiologist  This document has been electronically signed. May  7 2024  5:26PM  Abnormal.                       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 Initially presented to [place] with headaches and became unresponsive, was intubated, and transferred to [facility]. On arrival to [facility] patient w/ PERRL, +cough/gag, 2/5 spontaneous movement in LUE, otherwise no movement. Given 1 unit platelets and R frontal EVD placed with high opening pressure. Patient underwent Right pterional craniotomy and clipping of a ruptured right PCOM aneurysm on 3/26.     On 3/29, EVD displaced and replaced in situ, complicated by EVD tract acute hemorrhage and new focus of infarction with hemorrhage in the right basal ganglia/corona radiata. Course further complicated by hydrocephalus with ICP crisis, vasospasm requiring cerebral angiography and IA milrinone and verapamil. MRI (4/8) with widespread ischemic stroke b/l, as per neuro IR no further intervention. Patient extubated on 4/3, re-intubated 4/8, and extubated again on 4/9. EVD removed on 4/15. CTH post removal stable.     Course also complicated by bradycardia treated with theophyline, now resolved and discontinued. Initial Echo (3/26) with severe cardiomyopathy, EF 36%, regional wall motion abnormality. Resolved on repeat TTE performed 4/9/24, normal EF 66% with no WMA. Patient first noted with left soleal DVT on Duplex 3/28. Duplex (4/10) with stable b/l soleal DVT with extension to right posterior tibial DVT.  Last LE duplex with stable bilateral soleal DVT, [place] [facility] cardiology recommended repeat surveillance duplex on 4/30. On Lovenox ppx.     PEG placement attempted 4/19 for persistent dysphagia, but was aborted due to growing hematoma noted in gastric wall on endoscopy after trocar placement. CT Abdomen and Pelvis w/ IV Cont showed gastric wall hematoma without intraluminal or extraluminal hemorrhage. PEG successfully placed on 4/23.     Patient evaluated by PT/OT and was recommended for acute inpatient rehab. Patient is medically stable for discharge to [facility] on 4/26.  (26 Apr 2024 14:34)      PAST MEDICAL & SURGICAL HISTORY:      MEDICATIONS  (STANDING):  atorvastatin 40 milliGRAM(s) Oral at bedtime  bisacodyl 5 milliGRAM(s) Oral at bedtime  doxazosin 2 milliGRAM(s) Oral at bedtime  enoxaparin Injectable 40 milliGRAM(s) SubCutaneous <User Schedule>  multivitamin/minerals/iron Oral Solution (CENTRUM) 15 milliLiter(s) Oral daily  pantoprazole   Suspension 40 milliGRAM(s) Oral before breakfast  polyethylene glycol 3350 17 Gram(s) Oral two times a day  senna Syrup 10 milliLiter(s) Oral at bedtime    MEDICATIONS  (PRN):  acetaminophen   Oral Liquid .. 650 milliGRAM(s) Oral every 6 hours PRN Mild Pain (1 - 3)  petrolatum white Ointment 1 Application(s) Topical every 12 hours PRN dryness      Allergies    No Known Allergies    Intolerances          Vital Signs Last 24 Hrs  T(C): 36.9 (09 May 2024 07:44), Max: 36.9 (09 May 2024 07:44)  T(F): 98.5 (09 May 2024 07:44), Max: 98.5 (09 May 2024 07:44)  HR: 65 (09 May 2024 07:44) (65 - 84)  BP: 124/70 (09 May 2024 07:44) (118/80 - 141/75)  BP(mean): --  RR: 16 (09 May 2024 07:44) (15 - 16)  SpO2: 95% (09 May 2024 07:44) (95% - 96%)    Parameters below as of 09 May 2024 07:44  Patient On (Oxygen Delivery Method): room air        Daily     Daily         RECENT LABS:                          10.7   5.05  )-----------( 299      ( 09 May 2024 08:16 )             32.3     05-09    140  |  105  |  11  ----------------------------<  91  3.6   |  26  |  0.52    Ca    9.2      09 May 2024 05:38    TPro  6.8  /  Alb  2.7<L>  /  TBili  0.3  /  DBili  x   /  AST  34  /  ALT  45  /  AlkPhos  116  05-09    LIVER FUNCTIONS - ( 09 May 2024 05:38 )  Alb: 2.7 g/dL / Pro: 6.8 g/dL / ALK PHOS: 116 U/L / ALT: 45 U/L / AST: 34 U/L / GGT: x                 Urinalysis Basic - ( 09 May 2024 05:38 )    Color: x / Appearance: x / SG: x / pH: x  Gluc: 91 mg/dL / Ketone: x  / Bili: x / Urobili: x   Blood: x / Protein: x / Nitrite: x   Leuk Esterase: x / RBC: x / WBC x   Sq Epi: x / Non Sq Epi: x / Bacteria: x                ACC: [id number] EXAM:  US DPLX LWR EXT VEINS COMPL BI   ORDERED BY: ALEXANDRA LAM     PROCEDURE DATE:  05/07/2024          INTERPRETATION:  CLINICAL INFORMATION: Follow-up of left soleal and left   gastrocnemius DVT.    COMPARISON: 4/30/2024    TECHNIQUE: Duplex sonography of the BILATERAL LOWER extremity veins with   color and spectral Doppler, with and without compression.    FINDINGS:    RIGHT:  Normal compressibility of the RIGHT common femoral, femoral and popliteal   veins.  Doppler examination shows normal spontaneous and phasic flow.  No RIGHT calf vein thrombosis is detected.    LEFT:  Normal compressibility of the LEFT common femoral, femoral and popliteal   veins.  Doppler examination shows normal spontaneous and phasic flow.  Persistence of DVT identified within the left soleal and gastrocnemius   veins.    IMPRESSION:  Persistence of DVT identified within the left soleal and gastrocnemius   veins.    --- End of Report ---            HOLEDN ALICEA MD; Attending Radiologist  This document has been electronically signed. May  7 2024  5:26PM  Abnormal.

## 2024-05-09 NOTE — PROGRESS NOTE ADULT - SUBJECTIVE AND OBJECTIVE BOX
Pt was seen for 25 minutes for supportive tx. Pt c/o anxiety. Reviewed chart, approached Pt for an initial consult, introduced the role of neuropsychology in the rehab team, and explained the nature and purpose of the consult. 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 Initially presented to [place] with headaches and became unresponsive, was intubated, and transferred to [facility]. On arrival to [facility] patient w/ PERRL, +cough/gag, 2/5 spontaneous movement in LUE, otherwise no movement. Given 1 unit platelets and R frontal EVD placed with high opening pressure. Patient underwent Right pterional craniotomy and clipping of a ruptured right PCOM aneurysm on 3/26. Pt agreed to participate; she was well related and candid in discussing her current medical condition and its emotional impact on her. Session focused on establishing rapport with Pt, gathering relevant biographical information, and assessing Pt's current emotional functioning. Pt provided significant information about his/her medical hx and social hx. Additionally, Pt answered all questions during the clinical interview in order to elicit emotional symptoms. Pt reported experiencing anxiety, worry,  low energy and fatigue. Support and encouragement were provided.

## 2024-05-09 NOTE — PROGRESS NOTE ADULT - ASSESSMENT
Pt alert, fairly attentive, intact expressive language, thought processes - goal-directed, thought contents - no abnormal ideation observed, depressed affect, mood, denied AH/VH, denied SI/HI/I/P, calm behavior. Plan: Continue the assessment process.

## 2024-05-09 NOTE — PROGRESS NOTE ADULT - CARDIOVASCULAR
normal/regular rate and rhythm/S1 S2 present/no gallops/no rub/no murmur
regular rate and rhythm
normal/regular rate and rhythm/S1 S2 present/no gallops/no rub/no murmur

## 2024-05-09 NOTE — PROGRESS NOTE ADULT - MOTOR
right shoulder: motor at least 4/5 but resists full PROM, no drop arm sign  elbow flexion 4/5 grasp 4+/5  right LE at least 3/5 HF (distracted)  calves soft no tTP no warmth
improved execution movement on left side today  left shoulder 3-/5 elbow 2/5 finger flexion 2/5  left HF 3-/5 quad 3-/5  calves soft no tTP
soft; nontender; nondistended  right grasp 4+/5 ankle PF and DF 5/5  left shoulder 3-/5 elbow 2/5 finger flexion 2/5  left HF 3-/5 quad 3-/5
right shoulder: motor at least 4/5 but resists full PROM, no drop arm sign  elbow flexion 4/5 grasp 4+/5  right LE at least 3/5 HF (distracted)  calves soft no tTP no warmth
left finger gross grasp 2/5  left LE: withdraws to pain HF 2/5 ham 2/5  right UE motor at least 3/5
right grasp 4+/5 ankle PF and DF 5/5  left shoulder 3-/5 elbow 2/5 finger flexion 2/5  left HF 3-/5 quad 3-/5
improved execution movement on left side today  left shoulder 3-/5 elbow 2/5 finger flexion 2/5  left HF 3-/5 quad 3-/5
left elbow 3-/5 gross grasp 3/5  right grasp 4+/5  calves soft no TTP
soft; nontender; nondistended  right grasp 4+/5 ankle PF and DF 5/5  left shoulder 3-/5 elbow 2/5 finger flexion 2/5  left HF 3-/5 quad 3-/5
soft; nontender; nondistended  right grasp 4+/5 ankle PF and DF 5/5  left shoulder 3-/5 elbow 2/5 finger flexion 2/5  left HF 3-/5 quad 3-/5

## 2024-05-09 NOTE — PROGRESS NOTE ADULT - CONSTITUTIONAL
incision healing well, no swelling or erythe,a +dry and intact
normal/well-groomed/no distress
+left esther inattention but can attend with cues

## 2024-05-09 NOTE — PROGRESS NOTE ADULT - RESPIRATORY
normal/clear to auscultation bilaterally/no wheezes/no rales/no rhonchi
fair inspiratoyr effort/clear to auscultation bilaterally/no respiratory distress
normal/clear to auscultation bilaterally/no wheezes/no rales/no rhonchi

## 2024-05-09 NOTE — PROGRESS NOTE ADULT - GASTROINTESTINAL
soft/nontender
soft/nontender/nondistended
normal/soft/nontender/nondistended/normal active bowel sounds
soft/nontender
soft/nontender
+PEG no induration or erythema, no garding +soft/soft/nontender
noft, no masses palpable, no R/G +PEG site clean, no induration or drainge/soft/nontender/normal active bowel sounds
soft/nontender
normal/soft/nontender/nondistended/normal active bowel sounds
soft/nontender

## 2024-05-10 PROCEDURE — 99232 SBSQ HOSP IP/OBS MODERATE 35: CPT

## 2024-05-10 RX ADMIN — ENOXAPARIN SODIUM 40 MILLIGRAM(S): 100 INJECTION SUBCUTANEOUS at 17:12

## 2024-05-10 RX ADMIN — Medication 15 MILLILITER(S): at 11:07

## 2024-05-10 RX ADMIN — PANTOPRAZOLE SODIUM 40 MILLIGRAM(S): 20 TABLET, DELAYED RELEASE ORAL at 05:06

## 2024-05-10 RX ADMIN — POLYETHYLENE GLYCOL 3350 17 GRAM(S): 17 POWDER, FOR SOLUTION ORAL at 17:12

## 2024-05-10 RX ADMIN — ATORVASTATIN CALCIUM 40 MILLIGRAM(S): 80 TABLET, FILM COATED ORAL at 21:17

## 2024-05-10 RX ADMIN — SENNA PLUS 10 MILLILITER(S): 8.6 TABLET ORAL at 21:17

## 2024-05-10 RX ADMIN — Medication 2 MILLIGRAM(S): at 21:17

## 2024-05-10 RX ADMIN — POLYETHYLENE GLYCOL 3350 17 GRAM(S): 17 POWDER, FOR SOLUTION ORAL at 05:05

## 2024-05-10 RX ADMIN — Medication 5 MILLIGRAM(S): at 21:17

## 2024-05-10 NOTE — PROGRESS NOTE ADULT - SUBJECTIVE AND OBJECTIVE BOX
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 Initially presented to [place] with headaches and became unresponsive, was intubated, and transferred to [facility]. On arrival to [facility] patient w/ PERRL, +cough/gag, 2/5 spontaneous movement in LUE, otherwise no movement. Given 1 unit platelets and R frontal EVD placed with high opening pressure. Patient underwent Right pterional craniotomy and clipping of a ruptured right PCOM aneurysm on 3/26.     On 3/29, EVD displaced and replaced in situ, complicated by EVD tract acute hemorrhage and new focus of infarction with hemorrhage in the right basal ganglia/corona radiata. Course further complicated by hydrocephalus with ICP crisis, vasospasm requiring cerebral angiography and IA milrinone and verapamil. MRI (4/8) with widespread ischemic stroke b/l, as per neuro IR no further intervention. Patient extubated on 4/3, re-intubated 4/8, and extubated again on 4/9. EVD removed on 4/15. CTH post removal stable.     Course also complicated by bradycardia treated with theophyline, now resolved and discontinued. Initial Echo (3/26) with severe cardiomyopathy, EF 36%, regional wall motion abnormality. Resolved on repeat TTE performed 4/9/24, normal EF 66% with no WMA. Patient first noted with left soleal DVT on Duplex 3/28. Duplex (4/10) with stable b/l soleal DVT with extension to right posterior tibial DVT.  Last LE duplex with stable bilateral soleal DVT, [facility] cardiology recommended repeat surveillance duplex on 4/30. On Lovenox ppx.     PEG placement attempted 4/19 for persistent dysphagia, but was aborted due to growing hematoma noted in gastric wall on endoscopy after trocar placement. CT Abdomen and Pelvis w/ IV Cont showed gastric wall hematoma without intraluminal or extraluminal hemorrhage. PEG successfully placed on 4/23.     Patient evaluated by PT/OT and was recommended for acute inpatient rehab. Patient is medically stable for discharge to [facility] on 4/26.  (26 Apr 2024 14:34)    ____    Today's Subjective & Objective Findings:  Patient seen and examined in the [place]zano in wheelchair. Pt reports sleeping ok but she is very sensitive to the noise and light from the outside window. Melatin and propanolol started for sleep/akathisia (5/9). Otherwise pt offers no other complaints. Last BM on 5/10. Denies headache, dizziness, visual changes, chest pain, SOB/HORVATH, abdominal pain, nausea, vomiting, diarrhea, dysuria, numbness or tingling.            LABS:                        10.7   5.05  )-----------( 299      ( 09 May 2024 08:16 )             32.3     05-09    140  |  105  |  11  ----------------------------<  91  3.6   |  26  |  0.52    Ca    9.2      09 May 2024 05:38    TPro  6.8  /  Alb  2.7<L>  /  TBili  0.3  /  DBili  x   /  AST  34  /  ALT  45  /  AlkPhos  116  05-09      Urinalysis Basic - ( 09 May 2024 05:38 )    Color: x / Appearance: x / SG: x / pH: x  Gluc: 91 mg/dL / Ketone: x  / Bili: x / Urobili: x   Blood: x / Protein: x / Nitrite: x   Leuk Esterase: x / RBC: x / WBC x   Sq Epi: x / Non Sq Epi: x / Bacteria: x      CAPILLARY BLOOD GLUCOSE        MEDICATIONS  (STANDING):  atorvastatin 40 milliGRAM(s) Oral at bedtime  bisacodyl 5 milliGRAM(s) Oral at bedtime  doxazosin 2 milliGRAM(s) Oral at bedtime  enoxaparin Injectable 40 milliGRAM(s) SubCutaneous <User Schedule>  multivitamin/minerals/iron Oral Solution (CENTRUM) 15 milliLiter(s) Oral daily  pantoprazole   Suspension 40 milliGRAM(s) Oral before breakfast  polyethylene glycol 3350 17 Gram(s) Oral two times a day  propranolol 10 milliGRAM(s) Oral every 12 hours  senna Syrup 10 milliLiter(s) Oral at bedtime    MEDICATIONS  (PRN):  acetaminophen   Oral Liquid .. 650 milliGRAM(s) Oral every 6 hours PRN Mild Pain (1 - 3)  melatonin 3 milliGRAM(s) Oral at bedtime PRN Insomnia  petrolatum white Ointment 1 Application(s) Topical every 12 hours PRN dryness    PAST MEDICAL & SURGICAL HISTORY:      MEDICATIONS  (STANDING):  atorvastatin 40 milliGRAM(s) Oral at bedtime  bisacodyl 5 milliGRAM(s) Oral at bedtime  doxazosin 2 milliGRAM(s) Oral at bedtime  enoxaparin Injectable 40 milliGRAM(s) SubCutaneous <User Schedule>  multivitamin/minerals/iron Oral Solution (CENTRUM) 15 milliLiter(s) Oral daily  pantoprazole   Suspension 40 milliGRAM(s) Oral before breakfast  polyethylene glycol 3350 17 Gram(s) Oral two times a day  senna Syrup 10 milliLiter(s) Oral at bedtime    MEDICATIONS  (PRN):  acetaminophen   Oral Liquid .. 650 milliGRAM(s) Oral every 6 hours PRN Mild Pain (1 - 3)  petrolatum white Ointment 1 Application(s) Topical every 12 hours PRN dryness      Allergies    No Known Allergies    Intolerances  PHYSICAL EXAM  VITALS  T(C): 36.6 (05-10-24 @ 07:46), Max: 36.8 (05-09-24 @ 19:31)  HR: 73 (05-10-24 @ 07:46) (68 - 86)  BP: 132/76 (05-10-24 @ 07:46) (120/75 - 139/-)  RR: 16 (05-10-24 @ 07:46) (14 - 16)  SpO2: 97% (05-10-24 @ 07:46) (95% - 97%)    Gen - NAD, Comfortable  HEENT - NCAT, EOMI, MMM  Neck - Supple, No limited ROM  Pulm - CTAB, No wheeze, No rhonchi, No crackles  Cardiovascular - RRR, S1S2, No murmurs  Chest - good chest expansion, good respiratory effort  Abdomen - Soft, NT/ND, +BS, BM 5/10  Extremities - No Cyanosis, no clubbing, no edema, no calf tenderness  Neuro-     Cognitive - awake, alert, oriented to person, place. Follows commands intermittently     Communication - hypophonic at times (  Cece [id number] used for Congolese translation ). Pt intermittently speaks and answers in english.      Attention:  lack attention and repetition at times to respond.       Cranial Nerves - left facial droop     Motor -                     LEFT    UE - ShAB 3/5, EF 4/5, EE 3+/5,  3/5                    RIGHT UE - ShAB 3/5, EF 4/5, EE 4/5,   4/5                     LEFT    LE/ RIGHT LE - pt did not follow command  but Moves extremities     Reflexes - DTR Intact, No primitive reflexive       Tone - unable to test properly pt unable to follow request to relax  Psychiatric - Mood stable, Affect WNL    Daily         RECENT LABS:                          10.7   5.05  )-----------( 299      ( 09 May 2024 08:16 )             32.3     05-09    140  |  105  |  11  ----------------------------<  91  3.6   |  26  |  0.52    Ca    9.2      09 May 2024 05:38    TPro  6.8  /  Alb  2.7<L>  /  TBili  0.3  /  DBili  x   /  AST  34  /  ALT  45  /  AlkPhos  116  05-09    LIVER FUNCTIONS - ( 09 May 2024 05:38 )  Alb: 2.7 g/dL / Pro: 6.8 g/dL / ALK PHOS: 116 U/L / ALT: 45 U/L / AST: 34 U/L / GGT: x                 Urinalysis Basic - ( 09 May 2024 05:38 )    Color: x / Appearance: x / SG: x / pH: x  Gluc: 91 mg/dL / Ketone: x  / Bili: x / Urobili: x   Blood: x / Protein: x / Nitrite: x   Leuk Esterase: x / RBC: x / WBC x   Sq Epi: x / Non Sq Epi: x / Bacteria: x                ACC: [id number] EXAM:  US DPLX LWR EXT VEINS COMPL BI   ORDERED BY: ALEXANDRA LAM     PROCEDURE DATE:  05/07/2024          INTERPRETATION:  CLINICAL INFORMATION: Follow-up of left soleal and left   gastrocnemius DVT.    COMPARISON: 4/30/2024    TECHNIQUE: Duplex sonography of the BILATERAL LOWER extremity veins with   color and spectral Doppler, with and without compression.    FINDINGS:    RIGHT:  Normal compressibility of the RIGHT common femoral, femoral and popliteal   veins.  Doppler examination shows normal spontaneous and phasic flow.  No RIGHT calf vein thrombosis is detected.    LEFT:  Normal compressibility of the LEFT common femoral, femoral and popliteal   veins.  Doppler examination shows normal spontaneous and phasic flow.  Persistence of DVT identified within the left soleal and gastrocnemius   veins.    IMPRESSION:  Persistence of DVT identified within the left soleal and gastrocnemius   veins.    --- End of Report ---            HOLDEN ALICEA MD; Attending Radiologist  This document has been electronically signed. May  7 2024  5:26PM  Abnormal.                       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 Initially presented to [place] with headaches and became unresponsive, was intubated, and transferred to [facility]. On arrival to [facility] patient w/ PERRL, +cough/gag, 2/5 spontaneous movement in LUE, otherwise no movement. Given 1 unit platelets and R frontal EVD placed with high opening pressure. Patient underwent Right pterional craniotomy and clipping of a ruptured right PCOM aneurysm on 3/26.     On 3/29, EVD displaced and replaced in situ, complicated by EVD tract acute hemorrhage and new focus of infarction with hemorrhage in the right basal ganglia/corona radiata. Course further complicated by hydrocephalus with ICP crisis, vasospasm requiring cerebral angiography and IA milrinone and verapamil. MRI (4/8) with widespread ischemic stroke b/l, as per neuro IR no further intervention. Patient extubated on 4/3, re-intubated 4/8, and extubated again on 4/9. EVD removed on 4/15. CTH post removal stable.     Course also complicated by bradycardia treated with theophyline, now resolved and discontinued. Initial Echo (3/26) with severe cardiomyopathy, EF 36%, regional wall motion abnormality. Resolved on repeat TTE performed 4/9/24, normal EF 66% with no WMA. Patient first noted with left soleal DVT on Duplex 3/28. Duplex (4/10) with stable b/l soleal DVT with extension to right posterior tibial DVT.  Last LE duplex with stable bilateral soleal DVT, [facility] cardiology recommended repeat surveillance duplex on 4/30. On Lovenox ppx.     PEG placement attempted 4/19 for persistent dysphagia, but was aborted due to growing hematoma noted in gastric wall on endoscopy after trocar placement. CT Abdomen and Pelvis w/ IV Cont showed gastric wall hematoma without intraluminal or extraluminal hemorrhage. PEG successfully placed on 4/23.     Patient evaluated by PT/OT and was recommended for acute inpatient rehab. Patient is medically stable for discharge to [facility] on 4/26.  (26 Apr 2024 14:34)    ____    Today's Subjective & Objective Findings:  Patient seen and examined in the [place] in wheelchair. Pt reports sleeping ok but she is very sensitive to the noise and light from the outside window. Melatin and propanolol started for sleep/akathisia (5/9). Otherwise pt offers no other complaints. Last BM on 5/10. Denies headache, dizziness, visual changes, chest pain, SOB/HORVATH, abdominal pain, nausea, vomiting, diarrhea, dysuria, numbness or tingling.            LABS:                        10.7   5.05  )-----------( 299      ( 09 May 2024 08:16 )             32.3     05-09    140  |  105  |  11  ----------------------------<  91  3.6   |  26  |  0.52    Ca    9.2      09 May 2024 05:38    TPro  6.8  /  Alb  2.7<L>  /  TBili  0.3  /  DBili  x   /  AST  34  /  ALT  45  /  AlkPhos  116  05-09      Urinalysis Basic - ( 09 May 2024 05:38 )    Color: x / Appearance: x / SG: x / pH: x  Gluc: 91 mg/dL / Ketone: x  / Bili: x / Urobili: x   Blood: x / Protein: x / Nitrite: x   Leuk Esterase: x / RBC: x / WBC x   Sq Epi: x / Non Sq Epi: x / Bacteria: x      CAPILLARY BLOOD GLUCOSE        MEDICATIONS  (STANDING):  atorvastatin 40 milliGRAM(s) Oral at bedtime  bisacodyl 5 milliGRAM(s) Oral at bedtime  doxazosin 2 milliGRAM(s) Oral at bedtime  enoxaparin Injectable 40 milliGRAM(s) SubCutaneous <User Schedule>  multivitamin/minerals/iron Oral Solution (CENTRUM) 15 milliLiter(s) Oral daily  pantoprazole   Suspension 40 milliGRAM(s) Oral before breakfast  polyethylene glycol 3350 17 Gram(s) Oral two times a day  propranolol 10 milliGRAM(s) Oral every 12 hours  senna Syrup 10 milliLiter(s) Oral at bedtime    MEDICATIONS  (PRN):  acetaminophen   Oral Liquid .. 650 milliGRAM(s) Oral every 6 hours PRN Mild Pain (1 - 3)  melatonin 3 milliGRAM(s) Oral at bedtime PRN Insomnia  petrolatum white Ointment 1 Application(s) Topical every 12 hours PRN dryness    PAST MEDICAL & SURGICAL HISTORY:      MEDICATIONS  (STANDING):  atorvastatin 40 milliGRAM(s) Oral at bedtime  bisacodyl 5 milliGRAM(s) Oral at bedtime  doxazosin 2 milliGRAM(s) Oral at bedtime  enoxaparin Injectable 40 milliGRAM(s) SubCutaneous <User Schedule>  multivitamin/minerals/iron Oral Solution (CENTRUM) 15 milliLiter(s) Oral daily  pantoprazole   Suspension 40 milliGRAM(s) Oral before breakfast  polyethylene glycol 3350 17 Gram(s) Oral two times a day  senna Syrup 10 milliLiter(s) Oral at bedtime    MEDICATIONS  (PRN):  acetaminophen   Oral Liquid .. 650 milliGRAM(s) Oral every 6 hours PRN Mild Pain (1 - 3)  petrolatum white Ointment 1 Application(s) Topical every 12 hours PRN dryness      Allergies    No Known Allergies    Intolerances  PHYSICAL EXAM  VITALS  T(C): 36.6 (05-10-24 @ 07:46), Max: 36.8 (05-09-24 @ 19:31)  HR: 73 (05-10-24 @ 07:46) (68 - 86)  BP: 132/76 (05-10-24 @ 07:46) (120/75 - 139/-)  RR: 16 (05-10-24 @ 07:46) (14 - 16)  SpO2: 97% (05-10-24 @ 07:46) (95% - 97%)    Gen - NAD, Comfortable. Still restless but appears improved  HEENT - incision healing well    Pulm - CTAB, No wheeze, No rhonchi, No crackles  Cardiovascular - RRR, S1S2, No murmurs  Chest - good chest expansion, good respiratory effort  Abdomen - Soft, NT/ND, +BS, BM 5/10  Extremities - No Cyanosis, no clubbing, no edema, no calf tenderness  Neuro-     Cognitive - awake, alert, oriented to person, place. Follows commands intermittently     Communication - hypophonic at times (  Cece [id number] used for Yoruba translation ). Pt intermittently speaks and answers in english.      Attention:  lack attention and repetition at times to respond.       Cranial Nerves - left facial droop     Motor -                     LEFT    UE - ShAB 3/5, EF 4/5, EE 3+/5,  3/5                    RIGHT UE - ShAB 3/5, EF 4/5, EE 4/5,   4/5                     LEFT    LE/ RIGHT LE - pt did not follow command  but Moves extremities     Reflexes - DTR Intact, No primitive reflexive       Tone - unable to test properly pt unable to follow request to relax  Psychiatric - Mood stable, Affect WNL    Daily         RECENT LABS:                          10.7   5.05  )-----------( 299      ( 09 May 2024 08:16 )             32.3     05-09    140  |  105  |  11  ----------------------------<  91  3.6   |  26  |  0.52    Ca    9.2      09 May 2024 05:38    TPro  6.8  /  Alb  2.7<L>  /  TBili  0.3  /  DBili  x   /  AST  34  /  ALT  45  /  AlkPhos  116  05-09    LIVER FUNCTIONS - ( 09 May 2024 05:38 )  Alb: 2.7 g/dL / Pro: 6.8 g/dL / ALK PHOS: 116 U/L / ALT: 45 U/L / AST: 34 U/L / GGT: x                 Urinalysis Basic - ( 09 May 2024 05:38 )    Color: x / Appearance: x / SG: x / pH: x  Gluc: 91 mg/dL / Ketone: x  / Bili: x / Urobili: x   Blood: x / Protein: x / Nitrite: x   Leuk Esterase: x / RBC: x / WBC x   Sq Epi: x / Non Sq Epi: x / Bacteria: x                ACC: [id number] EXAM:  US DPLX LWR EXT VEINS COMPL BI   ORDERED BY: ALEXANDRA LAM     PROCEDURE DATE:  05/07/2024          INTERPRETATION:  CLINICAL INFORMATION: Follow-up of left soleal and left   gastrocnemius DVT.    COMPARISON: 4/30/2024    TECHNIQUE: Duplex sonography of the BILATERAL LOWER extremity veins with   color and spectral Doppler, with and without compression.    FINDINGS:    RIGHT:  Normal compressibility of the RIGHT common femoral, femoral and popliteal   veins.  Doppler examination shows normal spontaneous and phasic flow.  No RIGHT calf vein thrombosis is detected.    LEFT:  Normal compressibility of the LEFT common femoral, femoral and popliteal   veins.  Doppler examination shows normal spontaneous and phasic flow.  Persistence of DVT identified within the left soleal and gastrocnemius   veins.    IMPRESSION:  Persistence of DVT identified within the left soleal and gastrocnemius   veins.    --- End of Report ---            HOLDEN ALICEA MD; Attending Radiologist  This document has been electronically signed. May  7 2024  5:26PM  Abnormal.

## 2024-05-10 NOTE — PROGRESS NOTE ADULT - NS ATTEND AMEND GEN_ALL_CORE FT
Progress note amended to include my discussions with the patient, NP, hospitalist, RN, SW, PT, and my findings.     Patient seen with assistance of Japanese language line  Cece #[id number].     PAtient is still restless but appears slightly improved from yesterday, sl more easy to redirect and although she has persistent akasthisia, not as severe. She reports sl improved sleep, not seeing/hearing people in her dreams. Melatonin low dose was also added yesterday. Tolerating propranolol at current dose, with HR and BP stable. Will see if need to titrate up next week.    She denies any H/A, no B/B complaints. Her incision is healing well, no subcutaneous swelling, no erythema +_dry and intact. She continues to have left esther inattention and apraxia, but does have active movement in the left side with repetition and cues, at least antigravity 3/5 shoulder, elbow, gross grasp. RUE motor 4/5. She does not have any grimacing/pain with ROM shoulder on right today. Calves are soft, no erythema or warmth, no TTP. Will need repeat surveillance doppler 5/14.    continue current program. Working for [facility] transfer next week.    RECENT LABS    Vital Signs Last 24 Hrs  T(C): 36.6 (10 May 2024 07:46), Max: 36.8 (09 May 2024 19:31)  T(F): 97.9 (10 May 2024 07:46), Max: 98.3 (09 May 2024 19:31)  HR: 73 (10 May 2024 07:46) (68 - 86)  BP: 132/76 (10 May 2024 07:46) (120/75 - 139/-)  BP(mean): --  RR: 16 (10 May 2024 07:46) (14 - 16)  SpO2: 97% (10 May 2024 07:46) (95% - 97%)    Parameters below as of 10 May 2024 07:46  Patient On (Oxygen Delivery Method): room air                              10.7   5.05  )-----------( 299      ( 09 May 2024 08:16 )             32.3     05-09    140  |  105  |  11  ----------------------------<  91  3.6   |  26  |  0.52    Ca    9.2      09 May 2024 05:38    TPro  6.8  /  Alb  2.7<L>  /  TBili  0.3  /  DBili  x   /  AST  34  /  ALT  45  /  AlkPhos  116  05-09      Urinalysis Basic - ( 09 May 2024 05:38 )    Color: x / Appearance: x / SG: x / pH: x  Gluc: 91 mg/dL / Ketone: x  / Bili: x / Urobili: x   Blood: x / Protein: x / Nitrite: x   Leuk Esterase: x / RBC: x / WBC x   Sq Epi: x / Non Sq Epi: x / Bacteria: x      CAPILLARY BLOOD GLUCOSE

## 2024-05-10 NOTE — PROGRESS NOTE ADULT - SUBJECTIVE AND OBJECTIVE BOX
Patient is a 58y old  female who presents with a chief complaint of SAH     seen at the bedside, No acute events overnight.   no n/v, no sob, no cp    Vital Signs Last 24 Hrs  T(C): 36.6 (10 May 2024 07:46), Max: 36.8 (09 May 2024 19:31)  T(F): 97.9 (10 May 2024 07:46), Max: 98.3 (09 May 2024 19:31)  HR: 73 (10 May 2024 07:46) (68 - 86)  BP: 132/76 (10 May 2024 07:46) (120/75 - 139/-)  BP(mean): --  RR: 16 (10 May 2024 07:46) (14 - 16)  SpO2: 97% (10 May 2024 07:46) (95% - 97%)    Parameters below as of 10 May 2024 07:46  Patient On (Oxygen Delivery Method): room air    GENERAL- NAD  EAR/NOSE/MOUTH/THROAT-  MMM  EYES- IGOR, conjunctiva and Sclera clear  NECK- supple  RESPIRATORY-  clear to auscultation bilaterally, non laboured breathing  CARDIOVASCULAR - SIS2, RRR  GI - soft NT BS present  EXTREMITIES- no pedal edema  NEUROLOGY- responsive, attempts to follow verbal commands,  does not follow commands completely moves legs against gravity, hypophonic, appears sleepy                  10.7                 x    | x    | x            5.05  >-----------< 299     ------------------------< x                     32.3                 x    | x    | x                                            Ca x     Mg x     Ph x        < from: US Duplex Venous Lower Ext Complete, Bilateral (05.07.24 @ 12:10) >    IMPRESSION:  Persistence of DVT identified within the left soleal and gastrocnemius   veins.    < end of copied text >      MEDICATIONS  (STANDING):  atorvastatin 40 milliGRAM(s) Oral at bedtime  bisacodyl 5 milliGRAM(s) Oral at bedtime  doxazosin 2 milliGRAM(s) Oral at bedtime  enoxaparin Injectable 40 milliGRAM(s) SubCutaneous <User Schedule>  multivitamin/minerals/iron Oral Solution (CENTRUM) 15 milliLiter(s) Oral daily  pantoprazole   Suspension 40 milliGRAM(s) Oral before breakfast  polyethylene glycol 3350 17 Gram(s) Oral two times a day  propranolol 10 milliGRAM(s) Oral every 12 hours  senna Syrup 10 milliLiter(s) Oral at bedtime    MEDICATIONS  (PRN):  acetaminophen   Oral Liquid .. 650 milliGRAM(s) Oral every 6 hours PRN Mild Pain (1 - 3)  melatonin 3 milliGRAM(s) Oral at bedtime PRN Insomnia  petrolatum white Ointment 1 Application(s) Topical every 12 hours PRN dryness   Patient is a 58y old  female who presents with a chief complaint of SAH     seen at the bedside, No acute events overnight.   no n/v, no sob, no cp    Vital Signs Last 24 Hrs  T(C): 36.6 (10 May 2024 07:46), Max: 36.8 (09 May 2024 19:31)  T(F): 97.9 (10 May 2024 07:46), Max: 98.3 (09 May 2024 19:31)  HR: 73 (10 May 2024 07:46) (68 - 86)  BP: 132/76 (10 May 2024 07:46) (120/75 - 139/-)  BP(mean): --  RR: 16 (10 May 2024 07:46) (14 - 16)  SpO2: 97% (10 May 2024 07:46) (95% - 97%)    Parameters below as of 10 May 2024 07:46  Patient On (Oxygen Delivery Method): room air    GENERAL- NAD  EAR/NOSE/MOUTH/THROAT-  MMM  EYES- IGOR, conjunctiva and Sclera clear  NECK- supple  RESPIRATORY-  clear to auscultation bilaterally, non laboured breathing  CARDIOVASCULAR - SIS2, RRR  GI - soft NT BS present  EXTREMITIES- no pedal edema                    10.7                 x    | x    | x            5.05  >-----------< 299     ------------------------< x                     32.3                 x    | x    | x                                            Ca x     Mg x     Ph x        < from: US Duplex Venous Lower Ext Complete, Bilateral (05.07.24 @ 12:10) >    IMPRESSION:  Persistence of DVT identified within the left soleal and gastrocnemius   veins.    < end of copied text >      MEDICATIONS  (STANDING):  atorvastatin 40 milliGRAM(s) Oral at bedtime  bisacodyl 5 milliGRAM(s) Oral at bedtime  doxazosin 2 milliGRAM(s) Oral at bedtime  enoxaparin Injectable 40 milliGRAM(s) SubCutaneous <User Schedule>  multivitamin/minerals/iron Oral Solution (CENTRUM) 15 milliLiter(s) Oral daily  pantoprazole   Suspension 40 milliGRAM(s) Oral before breakfast  polyethylene glycol 3350 17 Gram(s) Oral two times a day  propranolol 10 milliGRAM(s) Oral every 12 hours  senna Syrup 10 milliLiter(s) Oral at bedtime    MEDICATIONS  (PRN):  acetaminophen   Oral Liquid .. 650 milliGRAM(s) Oral every 6 hours PRN Mild Pain (1 - 3)  melatonin 3 milliGRAM(s) Oral at bedtime PRN Insomnia  petrolatum white Ointment 1 Application(s) Topical every 12 hours PRN dryness   Patient is a 58y old  female who presents with a chief complaint of SAH     seen at the bedside, No acute events overnight.   no n/v, no sob, no cp    Vital Signs Last 24 Hrs  T(C): 36.6 (10 May 2024 07:46), Max: 36.8 (09 May 2024 19:31)  T(F): 97.9 (10 May 2024 07:46), Max: 98.3 (09 May 2024 19:31)  HR: 73 (10 May 2024 07:46) (68 - 86)  BP: 132/76 (10 May 2024 07:46) (120/75 - 139/-)  BP(mean): --  RR: 16 (10 May 2024 07:46) (14 - 16)  SpO2: 97% (10 May 2024 07:46) (95% - 97%)    Parameters below as of 10 May 2024 07:46  Patient On (Oxygen Delivery Method): room air    GENERAL- NAD  EAR/NOSE/MOUTH/THROAT-  MMM  EYES- IGOR, conjunctiva and Sclera clear  NECK- supple  RESPIRATORY-  clear to auscultation bilaterally, non laboured breathing  CARDIOVASCULAR - SIS2, RRR  GI - soft NT BS present  EXTREMITIES- no pedal edema  neuro- Awake  co operative, aao x 2, moves all extremities, scalp incision clean                   10.7                 x    | x    | x            5.05  >-----------< 299     ------------------------< x                     32.3                 x    | x    | x                                            Ca x     Mg x     Ph x        < from: US Duplex Venous Lower Ext Complete, Bilateral (05.07.24 @ 12:10) >    IMPRESSION:  Persistence of DVT identified within the left soleal and gastrocnemius   veins.    < end of copied text >      MEDICATIONS  (STANDING):  atorvastatin 40 milliGRAM(s) Oral at bedtime  bisacodyl 5 milliGRAM(s) Oral at bedtime  doxazosin 2 milliGRAM(s) Oral at bedtime  enoxaparin Injectable 40 milliGRAM(s) SubCutaneous <User Schedule>  multivitamin/minerals/iron Oral Solution (CENTRUM) 15 milliLiter(s) Oral daily  pantoprazole   Suspension 40 milliGRAM(s) Oral before breakfast  polyethylene glycol 3350 17 Gram(s) Oral two times a day  propranolol 10 milliGRAM(s) Oral every 12 hours  senna Syrup 10 milliLiter(s) Oral at bedtime    MEDICATIONS  (PRN):  acetaminophen   Oral Liquid .. 650 milliGRAM(s) Oral every 6 hours PRN Mild Pain (1 - 3)  melatonin 3 milliGRAM(s) Oral at bedtime PRN Insomnia  petrolatum white Ointment 1 Application(s) Topical every 12 hours PRN dryness

## 2024-05-10 NOTE — PROGRESS NOTE ADULT - ASSESSMENT
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 Right frontal EVD placed and patient underwent right pterional craniotomy and clipping of a ruptured right PCOM aneurysm on 3/26 . 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 Patient found to have HFrEF (now recovered), bradycardia (placed on theophyline, now resolved), b/l soleal DVT (on Lovenox ppx). PEG placement initially complicated by gastric wall hematoma, subsequently successfully placed for persistent dysphagia. Patient now admitted for a multidisciplinary rehab program- pt/ot/dvt ppx    # SAH s/p ruptured PCOM aneurysm  - R EVD placed 3/26 and removed 4/15  - s/p right pterional craniotomy and clipping of a ruptured right PCOM aneurysm on 3/26  - CTH (3/29): EVD tract acute hemorrhage and new focus of infarction with hemorrhage in the right basal ganglia/corona radiata  - complicated by vasospasm requiring cerebral angiography and IA milrinone and verapamil  - MRI (4/8): Multifocal acute to subacute infarction. This extensively involves each MCA posterior distribution, the right basal ganglia, the anterior parasagittal vertex CINDY distribution and an additional smaller lesion in the right cerebellum      # B/l DVT   - Left soleal DVT first noted 3/28, right soleal DVT fist noted 4/3  - Most recent Duplex (4/23): persistent bilateral soleal vein DVT  - [facility] cardiology recommended repeat surveillance duplex on 4/30.  - repeat duplex 04/30:  persistent left soleal vein DVT + also left gastrocnemius vein DVT (new).   Right soleal vein DVT no longer visualized.  - Continue Lovenox 40   - repeat duplex in 1-2 weeks. vasc cardio f/up prior to discharge or after next surveillance duplex.    # HLD  - continue Lipitor     # HFrEF  - Initial Echo (3/26) with severe cardiomyopathy, EF 36%, regional wall motion abnormality  - Resolved on repeat TTE performed 4/9/24, normal EF 66% with no WMA.    # Gastric Hematoma  - PEG placement attempted 4/19 for persistent dysphagia, but was aborted due to growing hematoma noted in gastric wall on endoscopy after trocar placement  - CT Abdomen and Pelvis w/ IV Cont showed gastric wall hematoma without intraluminal or extraluminal hemorrhage  - monitor H/H, stable at 10.7    # transaminitis- improved   - monitor    #DVT ppx  -Lovenox    will follow  d/w rehab team

## 2024-05-10 NOTE — PROGRESS NOTE ADULT - ASSESSMENT
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 Patient s/p right frontal EVD and right pterional craniotomy with clipping of a ruptured right PCOM aneurysm on 3/26. Course complicated by EVD tract hemorrhage, new right basal ganglia/corona radiata infarct, hydrocephalus with ICP crisis, vasospasm requiring IA milrinone/verapimil, and new widespread ischemic strokes. Patient also found to have HFrEF (now recovered), bradycardia (resolved), b/l soleal DVT. s/p PEG placement initially complicated by gastric wall hematoma    # SAH s/p ruptured PCOM aneurysm s/p right pterional craniotomy and clipping 3/26  -  vasospasm s/p cerebral angiography and IA milrinone and verapamil  - MRI (4/8): Multifocal acute to subacute infarction. This extensively involves each MCA posterior distribution, the right basal ganglia, the anterior parasagittal vertex CINDY distribution and an additional smaller lesion in the right cerebellum  - Staple removed 4/30  - Continue comprehensive rehab program of PT/OT/SLP - 3 hours a day, 5 days a week  - patio pass in WC with staff and/or family present at all times  - Precautions: Fall, Aspiration, Seizure, PEG, VTE bLE    # Bradycardia: resolved  - was on theophyline, dc'd  - HR (65 - 84) 5/9    # Akasthesia/Restlessness  - 5/10 Propranolol 10 mg started q 12 hrs w/ parameters for hr/bp (started 5/9 cleared with hospitalist)  - neuropsychology    # Insomnia  -  Melatonin 3mg q hs started 5/9       # HFrEF  - Initial Echo (3/26) with severe cardiomyopathy, EF 36%, regional wall motion abnormality  - Repeat TTE 4/9/24, normal EF 66% with no WMA.  - bP (118/80 - 141/75) 5/9    # HLD  -  lipitor 40 mg QHS    # B/l DVT   - Left soleal DVT first noted 3/28, right soleal DVT fist noted 4/3  - Duplex (4/23): persistent bilateral soleal vein DVT  - duplex 4/30: There is persistence of DVT identified within the left soleal vein. On the current evaluation DVT is also evident within the left gastrocnemius vein, new. Previously identified right soleal DVT is no longer visualized  - Surveillance doppler 5/7: Persistence of DVT identified within the left soleal and gastrocnemius veins. No propogation  - NSGY: no full dose AC for at least 4 weeks from surgery  - Discussed with hospitalist. Continue PPX dose now, surveillance dopplers weekly  - Continue Lovenox 40 mg QD  - Next doppler 5/14    # Hypernatremia resolving   - PEG flushes free water 200 cc Q6H  - Na 141 4/30.--> 141 5/2--> 141 5/6 -> 140 5/7 -> 140 5/9    # hypokalemia  - K+ 3.4 5/6 repleted --> 3.6 on 5/7 -> 3.6 5/9  -  40 meq x 2 5/7  - BMP 5/13    # Gastric Hematoma post PEG placement  -  PEG placement attempted 4/19 for persistent dysphagia, but was aborted due to growing hematoma noted in gastric wall on endoscopy after trocar placement  - CT Abdomen and Pelvis w/ IV Cont showed gastric wall hematoma without intraluminal or extraluminal hemorrhage  - PEG placed 4/23  - Hgb 9.7 4/29--> 9.8 5/2 --> 10.2 5/6 ->10.7 5/9  - CBC 5/13    # Pain  - Tylenol 650 mg Q6H PRN     # GI / Bowel  - Senna qHS  - Miralax BID  - bisacodyl 5 mg QHS  - GI ppx: protonix 40 mg QD    #  / Bladder  - Continue cardura 2 mg QHS   - d/c'd bladder scans on 5/6    # Skin / Pressure injury  - left toe scabs, right lateral foot callus   - podiatry consult appreciated 5/2. Callus shaved down, no additional treatment necessary    # Diet/Dysphagia:  - advanced to pureed solids thin liquids with ensure plus supplementation 5/2 post  MBS  - Supplements: MVI    # Case discussed in IDT rounds 5/8 (follow up):  - pureed solid thin liquids, improved receptive language skills but reduced ability to follow commands, improved reliabilty simple yes/no questions, increased verbal output +left esther inattention requires max cues, distractable, max commode transfers, mod UB dressing and feeding, total toileting, max-total assist bed mobility and transfers, mod-max assist ambulation RW and WC follow 60 feet  - goals: 24 x 7 assist on dc, min-mod assist self care tasks and transfers, min assist transfers and ambulation  - family training  - adjusted target and dispo: 5/17 DAV, DEV has discussed with family who are agreeable    # LABS  CBC CMP 5/13    Outpatient Follow-up:  Perlita Varela  Neurosurgery  [address] [place] [address], [address]  [place] [address]  Phone: [phone]  Fax: [phone]  Follow Up Time:      Code Status/Emergency Contact:  FULL CODE  Pierre Patrick - son [phone]   57 y/o F with no known PMH, h/o fell one month prior to admission, who was transferred from [place] to [facility] on 3/26/24 with diffuse SAH w/ small IVH, 2/2 ruptured posterolaterally projecting 2.9 x 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 Patient s/p right frontal EVD and right pterional craniotomy with clipping of a ruptured right PCOM aneurysm on 3/26. Course complicated by EVD tract hemorrhage, new right basal ganglia/corona radiata infarct, hydrocephalus with ICP crisis, vasospasm requiring IA milrinone/verapimil, and new widespread ischemic strokes. Patient also found to have HFrEF (now recovered), bradycardia (resolved), b/l soleal DVT. s/p PEG placement initially complicated by gastric wall hematoma    # SAH s/p ruptured PCOM aneurysm s/p right pterional craniotomy and clipping 3/26  -  vasospasm s/p cerebral angiography and IA milrinone and verapamil  - MRI (4/8): Multifocal acute to subacute infarction. This extensively involves each MCA posterior distribution, the right basal ganglia, the anterior parasagittal vertex CINDY distribution and an additional smaller lesion in the right cerebellum  - Staple removed 4/30  - Continue comprehensive rehab program of PT/OT/SLP - 3 hours a day, 5 days a week  - patio pass in WC with staff and/or family present at all times  - Precautions: Fall, Aspiration, Seizure, PEG, VTE bLE    # Bradycardia: resolved  - was on theophyline, dc'd  # Akasthesia/Restlessness  - Propranolol 10 mg started q 12 hrs w/ parameters 5/9  - neuropsychology support  - BP and HR stable, tolerating 5/10    # Insomnia  -  Melatonin 3mg q hs started 5/9   - propranolol 5/9    # HFrEF  - Initial Echo (3/26) with severe cardiomyopathy, EF 36%, regional wall motion abnormality  - Repeat TTE 4/9/24, normal EF 66% with no WMA.  - /75  5/10    # HLD  -  lipitor 40 mg QHS    # B/l DVT   - Left soleal DVT first noted 3/28, right soleal DVT fist noted 4/3  - Duplex (4/23): persistent bilateral soleal vein DVT  - duplex 4/30: There is persistence of DVT identified within the left soleal vein. On the current evaluation DVT is also evident within the left gastrocnemius vein, new. Previously identified right soleal DVT is no longer visualized  - Surveillance doppler 5/7: Persistence of DVT identified within the left soleal and gastrocnemius veins. No propogation  - NSGY: no full dose AC for at least 4 weeks from surgery  - Discussed with hospitalist. Continue PPX dose now, surveillance dopplers weekly  - Continue Lovenox 40 mg QD  - Next doppler Tuesday 5/14    # Hypernatremia: resolved  - PEG flushes free water 200 cc Q6H  - Na 140 5/9  - bMP 5/13    # hypokalemia  -  40 meq x 2 5/7  - K+ 3.6 5/9  - BMP 5/13    # Gastric Hematoma post PEG placement  -  PEG placement attempted 4/19 for persistent dysphagia, but was aborted due to growing hematoma noted in gastric wall on endoscopy after trocar placement  - CT Abdomen and Pelvis w/ IV Cont showed gastric wall hematoma without intraluminal or extraluminal hemorrhage  - PEG placed 4/23  - Hgb 9.7 4/29--> 9.8 5/2 --> 10.2 5/6 ->10.7 5/9  - CBC 5/13    # Pain  - Tylenol 650 mg Q6H PRN     # GI / Bowel  - Senna qHS  - Miralax BID  - bisacodyl 5 mg QHS  - GI ppx: protonix 40 mg QD    #  / Bladder  - Continue cardura 2 mg QHS   - d/c'd bladder scans on 5/6    # Skin / Pressure injury  - left toe scabs, right lateral foot callus   - podiatry consult appreciated 5/2. Callus shaved down, no additional treatment necessary    # Diet/Dysphagia:  - advanced to pureed solids thin liquids with ensure plus supplementation 5/2 post  MBS  - Supplements: MVI    # Case discussed in IDT rounds 5/8 (follow up):  - pureed solid thin liquids, improved receptive language skills but reduced ability to follow commands, improved reliabilty simple yes/no questions, increased verbal output +left esther inattention requires max cues, distractable, max commode transfers, mod UB dressing and feeding, total toileting, max-total assist bed mobility and transfers, mod-max assist ambulation RW and WC follow 60 feet  - goals: 24 x 7 assist on dc, min-mod assist self care tasks and transfers, min assist transfers and ambulation  - family training  - adjusted target and dispo: 5/17 DEV DEMARCO has discussed with family who are agreeable    # LABS  CBC CMP 5/13  Doppler 5/14    Outpatient Follow-up:  Perlita Varela  Neurosurgery  [address], [address]  [place] [address]  Phone: [phone]  Fax: [phone]  Follow Up Time:      Code Status/Emergency Contact:  FULL CODE  Pierre Patrick - son [phone]

## 2024-05-11 DIAGNOSIS — I60.9 NONTRAUMATIC SUBARACHNOID HEMORRHAGE, UNSPECIFIED: ICD-10-CM

## 2024-05-11 DIAGNOSIS — I50.22 CHRONIC SYSTOLIC (CONGESTIVE) HEART FAILURE: ICD-10-CM

## 2024-05-11 DIAGNOSIS — I82.409 ACUTE EMBOLISM AND THROMBOSIS OF UNSPECIFIED DEEP VEINS OF UNSPECIFIED LOWER EXTREMITY: ICD-10-CM

## 2024-05-11 DIAGNOSIS — E78.5 HYPERLIPIDEMIA, UNSPECIFIED: ICD-10-CM

## 2024-05-11 DIAGNOSIS — I60.7 NONTRAUMATIC SUBARACHNOID HEMORRHAGE FROM UNSPECIFIED INTRACRANIAL ARTERY: ICD-10-CM

## 2024-05-11 DIAGNOSIS — R74.01 ELEVATION OF LEVELS OF LIVER TRANSAMINASE LEVELS: ICD-10-CM

## 2024-05-11 PROCEDURE — 99232 SBSQ HOSP IP/OBS MODERATE 35: CPT

## 2024-05-11 RX ADMIN — ATORVASTATIN CALCIUM 40 MILLIGRAM(S): 80 TABLET, FILM COATED ORAL at 21:03

## 2024-05-11 RX ADMIN — Medication 2 MILLIGRAM(S): at 21:03

## 2024-05-11 RX ADMIN — ENOXAPARIN SODIUM 40 MILLIGRAM(S): 100 INJECTION SUBCUTANEOUS at 17:50

## 2024-05-11 RX ADMIN — POLYETHYLENE GLYCOL 3350 17 GRAM(S): 17 POWDER, FOR SOLUTION ORAL at 05:22

## 2024-05-11 RX ADMIN — Medication 15 MILLILITER(S): at 11:52

## 2024-05-11 RX ADMIN — PANTOPRAZOLE SODIUM 40 MILLIGRAM(S): 20 TABLET, DELAYED RELEASE ORAL at 05:22

## 2024-05-11 NOTE — PROGRESS NOTE ADULT - ASSESSMENT
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 Right frontal EVD placed and patient underwent right pterional craniotomy and clipping of a ruptured right PCOM aneurysm on 3/26 . 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 Patient found to have HFrEF (now recovered), bradycardia (placed on theophyline, now resolved), b/l soleal DVT (on Lovenox ppx). PEG placement initially complicated by gastric wall hematoma, subsequently successfully placed for persistent dysphagia. Patient now admitted for a multidisciplinary rehab program    # SAH s/p ruptured PCOM aneurysm  - R EVD placed 3/26 and removed 4/15  - s/p right pterional craniotomy and clipping of a ruptured right PCOM aneurysm on 3/26  - CTH (3/29): EVD tract acute hemorrhage and new focus of infarction with hemorrhage in the right basal ganglia/corona radiata  - complicated by vasospasm requiring cerebral angiography and IA milrinone and verapamil  - MRI (4/8): Multifocal acute to subacute infarction. This extensively involves each MCA posterior distribution, the right basal ganglia, the anterior parasagittal vertex CINDY distribution and an additional smaller lesion in the right cerebellum      # B/l DVT   - Left soleal DVT first noted 3/28, right soleal DVT fist noted 4/3  - Most recent Duplex (4/23): persistent bilateral soleal vein DVT  - vas cardiology recommended repeat surveillance duplex on 4/30.  - repeat duplex 04/30:  persistent left soleal vein DVT + also left gastrocnemius vein DVT (new).   Right soleal vein DVT no longer visualized.  - Continue Lovenox 40   - repeat duplex in 1-2 weeks. vasc cardio f/up prior to discharge or after next surveillance duplex.    # HLD  - continue Lipitor     # HFrEF  - Initial Echo (3/26) with severe cardiomyopathy, EF 36%, regional wall motion abnormality  - Resolved on repeat TTE performed 4/9/24, normal EF 66% with no WMA.    # Gastric Hematoma  - PEG placement attempted 4/19 for persistent dysphagia, but was aborted due to growing hematoma noted in gastric wall on endoscopy after trocar placement  - CT Abdomen and Pelvis w/ IV Cont showed gastric wall hematoma without intraluminal or extraluminal hemorrhage  - monitor H/H, stable at 10.7    # transaminitis- improved   - monitor    #DVT ppx  -Lovenox

## 2024-05-11 NOTE — PROGRESS NOTE ADULT - SUBJECTIVE AND OBJECTIVE BOX
Cc: Gait dysfunction    HPI:  Patient seen and examined, no acute overnight events. Slept well   Pain controlled, no chest pain, no N/V, no Fevers/Chills. No other new ROS  Has been tolerating rehabilitation program.    acetaminophen   Oral Liquid .. 650 milliGRAM(s) Oral every 6 hours PRN  atorvastatin 40 milliGRAM(s) Oral at bedtime  bisacodyl 5 milliGRAM(s) Oral at bedtime  doxazosin 2 milliGRAM(s) Oral at bedtime  enoxaparin Injectable 40 milliGRAM(s) SubCutaneous <User Schedule>  melatonin 3 milliGRAM(s) Oral at bedtime PRN  multivitamin/minerals/iron Oral Solution (CENTRUM) 15 milliLiter(s) Oral daily  pantoprazole   Suspension 40 milliGRAM(s) Oral before breakfast  petrolatum white Ointment 1 Application(s) Topical every 12 hours PRN  polyethylene glycol 3350 17 Gram(s) Oral two times a day  propranolol 10 milliGRAM(s) Oral every 12 hours  senna Syrup 10 milliLiter(s) Oral at bedtime      T(C): 36.9 (05-11-24 @ 07:16), Max: 36.9 (05-10-24 @ 19:15)  HR: 70 (05-11-24 @ 07:16) (70 - 79)  BP: 134/80 (05-11-24 @ 07:16) (126/70 - 134/80)  RR: 14 (05-11-24 @ 07:16) (14 - 16)  SpO2: 98% (05-11-24 @ 07:16) (95% - 98%)    In NAD  HEENT- +R crani site, skin well approximated, no surrounding erythema or swelling.   Heart- RRR, S1S2  Lungs- CTA bl.  Abd- + BS, NT, +PEG C/D/I  Ext- No calf pain  Neuro- Exam unchanged          Imp: Patient with diagnosis of   SAH 2/2 ruptured pcomm aneurysm       admitted for comprehensive acute rehabilitation.    Plan:  - SAH s/p ruptured PCOM aneurysm s/p right pterional craniotomy and clipping 3/26 - Continue PT/OT/SLP  - Bradycardia, HFrEF - 5/11 HR 70-79, /70 - 134/80. cont propranolol   - DVT prophylaxis - +BL soleal DVT, cont Lovenox 40mg qd, Repeat doppler 5/14/24.   - Hypernatremia - 5/9 Na 140, BMP 5/13  - hypokalemia - 5/9 K 3.6 BMP 5/13  - dysphagia - +PEG, hx of gastric hematoma, CBC 5/13.   - Skin- Turn q2h, check skin daily  - Continue current medications; patient medically stable.   -Active issues-   - Patient is stable to continue current rehabilitation program.

## 2024-05-11 NOTE — PROGRESS NOTE ADULT - SUBJECTIVE AND OBJECTIVE BOX
Patient is a 58y old  Female who presents with a chief complaint of SAH (10 May 2024 11:47)      History, interim events and clinically pertinent issues reviewed; patient interviewed and examined.   She was resting but arousable, no distress  No acute medical issues reported        ALLERGIES:  No Known Allergies    MEDICATIONS  (STANDING):  atorvastatin 40 milliGRAM(s) Oral at bedtime  bisacodyl 5 milliGRAM(s) Oral at bedtime  doxazosin 2 milliGRAM(s) Oral at bedtime  enoxaparin Injectable 40 milliGRAM(s) SubCutaneous <User Schedule>  multivitamin/minerals/iron Oral Solution (CENTRUM) 15 milliLiter(s) Oral daily  pantoprazole   Suspension 40 milliGRAM(s) Oral before breakfast  polyethylene glycol 3350 17 Gram(s) Oral two times a day  propranolol 10 milliGRAM(s) Oral every 12 hours  senna Syrup 10 milliLiter(s) Oral at bedtime    MEDICATIONS  (PRN):  acetaminophen   Oral Liquid .. 650 milliGRAM(s) Oral every 6 hours PRN Mild Pain (1 - 3)  melatonin 3 milliGRAM(s) Oral at bedtime PRN Insomnia  petrolatum white Ointment 1 Application(s) Topical every 12 hours PRN dryness    Vital Signs Last 24 Hrs  T(F): 98.5 (11 May 2024 07:16), Max: 98.5 (10 May 2024 19:15)  HR: 70 (11 May 2024 07:16) (70 - 79)  BP: 134/80 (11 May 2024 07:16) (126/70 - 134/80)  RR: 14 (11 May 2024 07:16) (14 - 16)  SpO2: 98% (11 May 2024 07:16) (95% - 98%)  I&O's Summary              PHYSICAL EXAM:  General: NAD, resting but arousable  ENT: MMM scalp inc c/d/i  Neck: Supple, No JVD  Lungs: Clear to auscultation bilaterally  Cardio: RRR, S1/S2, No murmurs  Abdomen: Soft, Nontender, Nondistended; Bowel sounds present  Extremities: No calf tenderness, No pitting edema  Neuro: awake, responsive moving all extremities    LABS:                        10.7   5.05  )-----------( 299      ( 09 May 2024 08:16 )             32.3       05-09    140  |  105  |  11  ----------------------------<  91  3.6   |  26  |  0.52    Ca    9.2      09 May 2024 05:38    TPro  6.8  /  Alb  2.7  /  TBili  0.3  /  DBili  x   /  AST  34  /  ALT  45  /  AlkPhos  116  05-09 03-26 Chol 174 mg/dL LDL -- HDL 53 mg/dL Trig 82 mg/dL            Urinalysis Basic - ( 09 May 2024 05:38 )    Color: x / Appearance: x / SG: x / pH: x  Gluc: 91 mg/dL / Ketone: x  / Bili: x / Urobili: x   Blood: x / Protein: x / Nitrite: x   Leuk Esterase: x / RBC: x / WBC x   Sq Epi: x / Non Sq Epi: x / Bacteria: x

## 2024-05-12 PROCEDURE — 99232 SBSQ HOSP IP/OBS MODERATE 35: CPT

## 2024-05-12 RX ORDER — POLYETHYLENE GLYCOL 3350 17 G/17G
17 POWDER, FOR SOLUTION ORAL DAILY
Refills: 0 | Status: DISCONTINUED | OUTPATIENT
Start: 2024-05-12 | End: 2024-05-15

## 2024-05-12 RX ADMIN — ATORVASTATIN CALCIUM 40 MILLIGRAM(S): 80 TABLET, FILM COATED ORAL at 22:08

## 2024-05-12 RX ADMIN — Medication 2 MILLIGRAM(S): at 22:08

## 2024-05-12 RX ADMIN — ENOXAPARIN SODIUM 40 MILLIGRAM(S): 100 INJECTION SUBCUTANEOUS at 17:48

## 2024-05-12 RX ADMIN — PANTOPRAZOLE SODIUM 40 MILLIGRAM(S): 20 TABLET, DELAYED RELEASE ORAL at 05:16

## 2024-05-12 NOTE — PROGRESS NOTE ADULT - SUBJECTIVE AND OBJECTIVE BOX
Cc: Gait dysfunction    HPI:  Patient seen and examined, no acute overnight events. Slept well with fewer episodes of sleep akathisia, had a loose BM this morning.   Pain controlled, no chest pain, no N/V, no Fevers/Chills. No other new ROS  Has been tolerating rehabilitation program.      acetaminophen   Oral Liquid .. 650 milliGRAM(s) Oral every 6 hours PRN  atorvastatin 40 milliGRAM(s) Oral at bedtime  bisacodyl 5 milliGRAM(s) Oral at bedtime  doxazosin 2 milliGRAM(s) Oral at bedtime  enoxaparin Injectable 40 milliGRAM(s) SubCutaneous <User Schedule>  melatonin 3 milliGRAM(s) Oral at bedtime PRN  multivitamin/minerals/iron Oral Solution (CENTRUM) 15 milliLiter(s) Oral daily  pantoprazole   Suspension 40 milliGRAM(s) Oral before breakfast  petrolatum white Ointment 1 Application(s) Topical every 12 hours PRN  polyethylene glycol 3350 17 Gram(s) Oral two times a day  propranolol 10 milliGRAM(s) Oral every 12 hours  senna Syrup 10 milliLiter(s) Oral at bedtime          T(C): 36.8 (05-12-24 @ 08:04), Max: 37 (05-11-24 @ 20:53)  T(F): 98.2 (05-12-24 @ 08:04), Max: 98.6 (05-11-24 @ 20:53)  HR: 66 (05-12-24 @ 08:04) (66 - 87)  BP: 106/69 (05-12-24 @ 08:04) (106/69 - 150/79)  RR: 15 (05-12-24 @ 08:04) (14 - 15)  SpO2: 95% (05-12-24 @ 08:04) (95% - 99%)          In NAD  HEENT- +R crani site, skin well approximated, no surrounding erythema or swelling.   Heart- RRR, S1S2  Lungs- CTA bl.  Abd- + BS, NT, +PEG C/D/I  Ext- No calf pain  Neuro- Exam unchanged          Imp: Patient with diagnosis of   SAH 2/2 ruptured pcomm aneurysm       admitted for comprehensive acute rehabilitation.    Plan:  - SAH s/p ruptured PCOM aneurysm s/p right pterional craniotomy and clipping 3/26 - Continue PT/OT/SLP  - Bradycardia, HFrEF - 5/12 HR 66-87, /69 - 150/79. cont propranolol   - DVT prophylaxis - +BL soleal DVT, cont Lovenox 40mg qd, Repeat doppler 5/14/24.   - Hypernatremia - 5/9 Na 140, BMP 5/13  - hypokalemia - 5/9 K 3.6 BMP 5/13  - dysphagia - +PEG, hx of gastric hematoma, CBC 5/13.   - bowel - +loose stool 5/12, will change bisacodyl to PRN, hold senna and miralax today, cont to monitor   - Skin- Turn q2h, check skin daily  - Continue current medications; patient medically stable.   - Patient is stable to continue current rehabilitation program.

## 2024-05-12 NOTE — PROGRESS NOTE ADULT - NSPROGADDITIONALINFOA_GEN_ALL_CORE
I have personally interviewed and examined this patient, reviewed pertinent clinical information, and performed the evaluation and management services provided at today's visit for inpatient medical follow up    I am available to discuss any issues related to the medical care of this patient on the unit this morning, through [employer] Teams Chat or by phone at [phone]    Will discuss with multidisciplinary team at IDR's today
I have personally interviewed and examined this patient, reviewed pertinent clinical information, and performed the evaluation and management services provided at today's visit for inpatient medical follow up    I am available to discuss any issues related to the medical care of this patient on the unit this morning, through [employer] Teams Chat or by phone at [phone]    Will discuss with multidisciplinary team at IDR's today

## 2024-05-12 NOTE — PROGRESS NOTE ADULT - ASSESSMENT
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 Right frontal EVD placed and patient underwent right pterional craniotomy and clipping of a ruptured right PCOM aneurysm on 3/26 . 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 Patient found to have HFrEF (now recovered), bradycardia (placed on theophyline, now resolved), b/l soleal DVT (on Lovenox ppx). PEG placement initially complicated by gastric wall hematoma, subsequently successfully placed for persistent dysphagia. Patient now admitted for a multidisciplinary rehab program    # SAH s/p ruptured PCOM aneurysm  - R EVD placed 3/26 and removed 4/15  - s/p right pterional craniotomy and clipping of a ruptured right PCOM aneurysm on 3/26  - CTH (3/29): EVD tract acute hemorrhage and new focus of infarction with hemorrhage in the right basal ganglia/corona radiata  - complicated by vasospasm requiring cerebral angiography and IA milrinone and verapamil  - MRI (4/8): Multifocal acute to subacute infarction. This extensively involves each MCA posterior distribution, the right basal ganglia, the anterior parasagittal vertex CINDY distribution and an additional smaller lesion in the right cerebellum      # B/l DVT   - Left soleal DVT first noted 3/28, right soleal DVT fist noted 4/3  - Most recent Duplex (4/23): persistent bilateral soleal vein DVT  - vas cardiology recommended repeat surveillance duplex on 4/30.  - repeat duplex 04/30:  persistent left soleal vein DVT + also left gastrocnemius vein DVT (new).   Right soleal vein DVT no longer visualized.  - Continue Lovenox 40   - repeat duplex in 1-2 weeks. vasc cardio f/up prior to discharge or after next surveillance duplex.    # HLD  - continue Lipitor     # HFrEF  - Initial Echo (3/26) with severe cardiomyopathy, EF 36%, regional wall motion abnormality  - Resolved on repeat TTE performed 4/9/24, normal EF 66% with no WMA.    # Gastric Hematoma  - PEG placement attempted 4/19 for persistent dysphagia, but was aborted due to growing hematoma noted in gastric wall on endoscopy after trocar placement  - CT Abdomen and Pelvis w/ IV Cont showed gastric wall hematoma without intraluminal or extraluminal hemorrhage  - monitor H/H, stable at 10.7    # transaminitis- improved   - monitor    #DVT ppx  -Lovenox

## 2024-05-12 NOTE — PROGRESS NOTE ADULT - SUBJECTIVE AND OBJECTIVE BOX
Patient is a 58y old  Female who presents with a chief complaint of SAH (11 May 2024 10:28)      Patient examined and interviewed. There were no acute medical events yesterday or overnight  She slept well and  is without complaints this morning.    REVIEW OF SYSTEMS:  CONSTITUTIONAL: No fever, weight loss, or fatigue  EYES: No eye pain, visual disturbances, or discharge  ENMT:  No difficulty hearing, tinnitus, vertigo; No sinus or throat pain  NECK: No pain or stiffness  BREASTS: No pain, masses, or nipple discharge  RESPIRATORY: No cough, wheezing, chills or hemoptysis; No shortness of breath  CARDIOVASCULAR: No chest pain, palpitations, dizziness, or leg swelling  GASTROINTESTINAL: No abdominal or epigastric pain. No nausea, vomiting, or hematemesis; No diarrhea or constipation. No melena or hematochezia.  GENITOURINARY: No dysuria, frequency, hematuria, or incontinence  NEUROLOGICAL: No headaches, memory loss, loss of strength, numbness, or tremors  SKIN: No itching, burning, rashes, or lesions   LYMPH NODES: No enlarged glands  ENDOCRINE: No heat or cold intolerance; No hair loss  MUSCULOSKELETAL: No joint pain or swelling; No muscle, back, or extremity pain  PSYCHIATRIC: No depression, anxiety, mood swings, or difficulty sleeping  HEME/LYMPH: No easy bruising, or bleeding gums  ALLERY AND IMMUNOLOGIC: No hives or eczema    ALL ROS REVIEWED AND NORMAL EXCEPT AS STATED ABOVE    ALLERGIES:  No Known Allergies    MEDICATIONS  (STANDING):  atorvastatin 40 milliGRAM(s) Oral at bedtime  bisacodyl 5 milliGRAM(s) Oral at bedtime  doxazosin 2 milliGRAM(s) Oral at bedtime  enoxaparin Injectable 40 milliGRAM(s) SubCutaneous <User Schedule>  multivitamin/minerals/iron Oral Solution (CENTRUM) 15 milliLiter(s) Oral daily  pantoprazole   Suspension 40 milliGRAM(s) Oral before breakfast  polyethylene glycol 3350 17 Gram(s) Oral two times a day  propranolol 10 milliGRAM(s) Oral every 12 hours  senna Syrup 10 milliLiter(s) Oral at bedtime    MEDICATIONS  (PRN):  acetaminophen   Oral Liquid .. 650 milliGRAM(s) Oral every 6 hours PRN Mild Pain (1 - 3)  melatonin 3 milliGRAM(s) Oral at bedtime PRN Insomnia  petrolatum white Ointment 1 Application(s) Topical every 12 hours PRN dryness    Vital Signs Last 24 Hrs  T(F): 98.2 (12 May 2024 08:04), Max: 98.6 (11 May 2024 20:53)  HR: 66 (12 May 2024 08:04) (66 - 87)  BP: 106/69 (12 May 2024 08:04) (106/69 - 150/79)  RR: 15 (12 May 2024 08:04) (14 - 15)  SpO2: 95% (12 May 2024 08:04) (95% - 99%)  I&O's Summary              PHYSICAL EXAM:  General: NAD  ENT: MMM  Neck: Supple, No JVD  Lungs: Clear to auscultation bilaterally  Cardio: RRR, S1/S2, No murmurs  Abdomen: Soft, Nontender, Nondistended; Bowel sounds present  Extremities: No calf tenderness, No pitting edema  Neuro: no new deficits    LABS: There are no new laboratory or radiologic studies resulted at the time this progress note was authored                          03-26 Chol 174 mg/dL LDL -- HDL 53 mg/dL Trig 82 mg/dL

## 2024-05-13 ENCOUNTER — TRANSCRIPTION ENCOUNTER (OUTPATIENT)
Age: 59
End: 2024-05-13

## 2024-05-13 LAB
ALBUMIN SERPL ELPH-MCNC: 2.8 G/DL — LOW (ref 3.3–5)
ALP SERPL-CCNC: 122 U/L — HIGH (ref 40–120)
ALT FLD-CCNC: 35 U/L — SIGNIFICANT CHANGE UP (ref 10–45)
ANION GAP SERPL CALC-SCNC: 9 MMOL/L — SIGNIFICANT CHANGE UP (ref 5–17)
AST SERPL-CCNC: 23 U/L — SIGNIFICANT CHANGE UP (ref 10–40)
BASOPHILS # BLD AUTO: 0.02 K/UL — SIGNIFICANT CHANGE UP (ref 0–0.2)
BASOPHILS NFR BLD AUTO: 0.4 % — SIGNIFICANT CHANGE UP (ref 0–2)
BILIRUB SERPL-MCNC: 0.3 MG/DL — SIGNIFICANT CHANGE UP (ref 0.2–1.2)
BUN SERPL-MCNC: 7 MG/DL — SIGNIFICANT CHANGE UP (ref 7–23)
CALCIUM SERPL-MCNC: 9.3 MG/DL — SIGNIFICANT CHANGE UP (ref 8.4–10.5)
CHLORIDE SERPL-SCNC: 110 MMOL/L — HIGH (ref 96–108)
CO2 SERPL-SCNC: 27 MMOL/L — SIGNIFICANT CHANGE UP (ref 22–31)
CREAT SERPL-MCNC: 0.51 MG/DL — SIGNIFICANT CHANGE UP (ref 0.5–1.3)
EGFR: 109 ML/MIN/1.73M2 — SIGNIFICANT CHANGE UP
EOSINOPHIL # BLD AUTO: 0.14 K/UL — SIGNIFICANT CHANGE UP (ref 0–0.5)
EOSINOPHIL NFR BLD AUTO: 2.7 % — SIGNIFICANT CHANGE UP (ref 0–6)
GLUCOSE SERPL-MCNC: 94 MG/DL — SIGNIFICANT CHANGE UP (ref 70–99)
HCT VFR BLD CALC: 32.6 % — LOW (ref 34.5–45)
HGB BLD-MCNC: 10.4 G/DL — LOW (ref 11.5–15.5)
IMM GRANULOCYTES NFR BLD AUTO: 0.2 % — SIGNIFICANT CHANGE UP (ref 0–0.9)
LYMPHOCYTES # BLD AUTO: 1.39 K/UL — SIGNIFICANT CHANGE UP (ref 1–3.3)
LYMPHOCYTES # BLD AUTO: 26.4 % — SIGNIFICANT CHANGE UP (ref 13–44)
MCHC RBC-ENTMCNC: 28.3 PG — SIGNIFICANT CHANGE UP (ref 27–34)
MCHC RBC-ENTMCNC: 31.9 GM/DL — LOW (ref 32–36)
MCV RBC AUTO: 88.6 FL — SIGNIFICANT CHANGE UP (ref 80–100)
MONOCYTES # BLD AUTO: 0.43 K/UL — SIGNIFICANT CHANGE UP (ref 0–0.9)
MONOCYTES NFR BLD AUTO: 8.2 % — SIGNIFICANT CHANGE UP (ref 2–14)
NEUTROPHILS # BLD AUTO: 3.27 K/UL — SIGNIFICANT CHANGE UP (ref 1.8–7.4)
NEUTROPHILS NFR BLD AUTO: 62.1 % — SIGNIFICANT CHANGE UP (ref 43–77)
NRBC # BLD: 0 /100 WBCS — SIGNIFICANT CHANGE UP (ref 0–0)
PLATELET # BLD AUTO: 248 K/UL — SIGNIFICANT CHANGE UP (ref 150–400)
POTASSIUM SERPL-MCNC: 3.3 MMOL/L — LOW (ref 3.5–5.3)
POTASSIUM SERPL-SCNC: 3.3 MMOL/L — LOW (ref 3.5–5.3)
PROT SERPL-MCNC: 6.7 G/DL — SIGNIFICANT CHANGE UP (ref 6–8.3)
RBC # BLD: 3.68 M/UL — LOW (ref 3.8–5.2)
RBC # FLD: 14.2 % — SIGNIFICANT CHANGE UP (ref 10.3–14.5)
SODIUM SERPL-SCNC: 146 MMOL/L — HIGH (ref 135–145)
WBC # BLD: 5.26 K/UL — SIGNIFICANT CHANGE UP (ref 3.8–10.5)
WBC # FLD AUTO: 5.26 K/UL — SIGNIFICANT CHANGE UP (ref 3.8–10.5)

## 2024-05-13 PROCEDURE — 99232 SBSQ HOSP IP/OBS MODERATE 35: CPT

## 2024-05-13 PROCEDURE — 99233 SBSQ HOSP IP/OBS HIGH 50: CPT | Mod: GC

## 2024-05-13 RX ORDER — MULTIVIT-MIN/FERROUS GLUCONATE 9 MG/15 ML
15 LIQUID (ML) ORAL
Qty: 0 | Refills: 0 | DISCHARGE
Start: 2024-05-13

## 2024-05-13 RX ORDER — DOXAZOSIN MESYLATE 4 MG
1 TABLET ORAL
Qty: 0 | Refills: 0 | DISCHARGE
Start: 2024-05-13

## 2024-05-13 RX ORDER — SENNA PLUS 8.6 MG/1
10 TABLET ORAL
Qty: 0 | Refills: 0 | DISCHARGE
Start: 2024-05-13

## 2024-05-13 RX ORDER — PROPRANOLOL HCL 160 MG
1 CAPSULE, EXTENDED RELEASE 24HR ORAL
Qty: 0 | Refills: 0 | DISCHARGE
Start: 2024-05-13

## 2024-05-13 RX ORDER — POTASSIUM CHLORIDE 20 MEQ
40 PACKET (EA) ORAL EVERY 4 HOURS
Refills: 0 | Status: DISCONTINUED | OUTPATIENT
Start: 2024-05-13 | End: 2024-05-13

## 2024-05-13 RX ORDER — LANOLIN ALCOHOL/MO/W.PET/CERES
1 CREAM (GRAM) TOPICAL
Qty: 0 | Refills: 0 | DISCHARGE
Start: 2024-05-13

## 2024-05-13 RX ORDER — ACETAMINOPHEN 500 MG
20.31 TABLET ORAL
Qty: 0 | Refills: 0 | DISCHARGE
Start: 2024-05-13

## 2024-05-13 RX ORDER — POLYETHYLENE GLYCOL 3350 17 G/17G
17 POWDER, FOR SOLUTION ORAL
Qty: 0 | Refills: 0 | DISCHARGE
Start: 2024-05-13

## 2024-05-13 RX ORDER — POTASSIUM CHLORIDE 20 MEQ
40 PACKET (EA) ORAL
Refills: 0 | Status: DISCONTINUED | OUTPATIENT
Start: 2024-05-13 | End: 2024-05-13

## 2024-05-13 RX ORDER — ATORVASTATIN CALCIUM 80 MG/1
1 TABLET, FILM COATED ORAL
Qty: 0 | Refills: 0 | DISCHARGE
Start: 2024-05-13

## 2024-05-13 RX ORDER — PANTOPRAZOLE SODIUM 20 MG/1
40 TABLET, DELAYED RELEASE ORAL
Qty: 0 | Refills: 0 | DISCHARGE
Start: 2024-05-13

## 2024-05-13 RX ORDER — POTASSIUM CHLORIDE 20 MEQ
40 PACKET (EA) ORAL EVERY 4 HOURS
Refills: 0 | Status: COMPLETED | OUTPATIENT
Start: 2024-05-13 | End: 2024-05-13

## 2024-05-13 RX ADMIN — ATORVASTATIN CALCIUM 40 MILLIGRAM(S): 80 TABLET, FILM COATED ORAL at 21:28

## 2024-05-13 RX ADMIN — ENOXAPARIN SODIUM 40 MILLIGRAM(S): 100 INJECTION SUBCUTANEOUS at 17:10

## 2024-05-13 RX ADMIN — Medication 15 MILLILITER(S): at 11:40

## 2024-05-13 RX ADMIN — Medication 2 MILLIGRAM(S): at 21:28

## 2024-05-13 RX ADMIN — SENNA PLUS 10 MILLILITER(S): 8.6 TABLET ORAL at 21:28

## 2024-05-13 RX ADMIN — PANTOPRAZOLE SODIUM 40 MILLIGRAM(S): 20 TABLET, DELAYED RELEASE ORAL at 05:35

## 2024-05-13 RX ADMIN — Medication 40 MILLIEQUIVALENT(S): at 16:08

## 2024-05-13 RX ADMIN — Medication 40 MILLIEQUIVALENT(S): at 11:39

## 2024-05-13 NOTE — DISCHARGE NOTE PROVIDER - CARE PROVIDER_API CALL
Edel Villa  Physical/Rehab Medicine  [address] [place] [address]  Phone: [phone]  Fax: [phone]  Follow Up Time: 1 month    Perlita Varela  Neurosurgery  [address]  [place] [address]  Phone: [phone]  Fax: [phone]  Follow Up Time: 1 week   Edel Villa  Physical/Rehab Medicine  [address] [place] [address]  Phone: [phone]  Fax: [phone]  Follow Up Time: 1 month    Perlita Varela  Neurosurgery  [address]  [place] [address]  Phone: [phone]  Fax: [phone]  Follow Up Time: 1 week    Shantanu Thomas)  Gastroenterology  [address] [place] [address]  Phone: [phone]  Fax: [phone]  Follow Up Time: 1 week

## 2024-05-13 NOTE — PROGRESS NOTE ADULT - SUBJECTIVE AND OBJECTIVE BOX
Patient is a 58y old  Female who presents with a chief complaint of SAH (07 May 2024 11:22)    HPI:  59 y/o F with no PMH, fell one month prior to admission, transferred from [place] to 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 Initially presented to [place] with headaches and became unresponsive, was intubated, and transferred to [facility]. On arrival to [facility] patient w/ PERRL, +cough/gag, 2/5 spontaneous movement in LUE, otherwise no movement. Given 1 unit platelets and R frontal EVD placed with high opening pressure. Patient underwent Right pterional craniotomy and clipping of a ruptured right PCOM aneurysm on 3/26.     On 3/29, EVD displaced and replaced in situ, complicated by EVD tract acute hemorrhage and new focus of infarction with hemorrhage in the right basal ganglia/corona radiata. Course further complicated by hydrocephalus with ICP crisis, vasospasm requiring cerebral angiography and IA milrinone and verapamil. MRI (4/8) with widespread ischemic stroke b/l, as per neuro IR no further intervention. Patient extubated on 4/3, re-intubated 4/8, and extubated again on 4/9. EVD removed on 4/15. CTH post removal stable.     Course also complicated by bradycardia treated with theophyline, now resolved and discontinued. Initial Echo (3/26) with severe cardiomyopathy, EF 36%, regional wall motion abnormality. Resolved on repeat TTE performed 4/9/24, normal EF 66% with no WMA. Patient first noted with left soleal DVT on Duplex 3/28. Duplex (4/10) with stable b/l soleal DVT with extension to right posterior tibial DVT.  Last LE duplex with stable bilateral soleal DVT, [facility] cardiology recommended repeat surveillance duplex on 4/30. On Lovenox ppx.     PEG placement attempted 4/19 for persistent dysphagia, but was aborted due to growing hematoma noted in gastric wall on endoscopy after trocar placement. CT Abdomen and Pelvis w/ IV Cont showed gastric wall hematoma without intraluminal or extraluminal hemorrhage. PEG successfully placed on 4/23.     Patient evaluated by PT/OT and was recommended for acute inpatient rehab. Patient is medically stable for discharge to [facility] on 4/26.  (26 Apr 2024 14:34)    ____    SUBJECTIVE            LABS:                               10.4   5.26  )-----------( 248      ( 13 May 2024 05:27 )             32.6     05-13    146<H>  |  110<H>  |  7   ----------------------------<  94  3.3<L>   |  27  |  0.51    Ca    9.3      13 May 2024 05:27    TPro  6.7  /  Alb  2.8<L>  /  TBili  0.3  /  DBili  x   /  AST  23  /  ALT  35  /  AlkPhos  122<H>  05-13    LIVER FUNCTIONS - ( 13 May 2024 05:27 )  Alb: 2.8 g/dL / Pro: 6.7 g/dL / ALK PHOS: 122 U/L / ALT: 35 U/L / AST: 23 U/L / GGT: x                 Urinalysis Basic - ( 13 May 2024 05:27 )    Color: x / Appearance: x / SG: x / pH: x  Gluc: 94 mg/dL / Ketone: x  / Bili: x / Urobili: x   Blood: x / Protein: x / Nitrite: x   Leuk Esterase: x / RBC: x / WBC x   Sq Epi: x / Non Sq Epi: x / Bacteria: x            MEDICATIONS  (STANDING):  atorvastatin 40 milliGRAM(s) Oral at bedtime  doxazosin 2 milliGRAM(s) Oral at bedtime  enoxaparin Injectable 40 milliGRAM(s) SubCutaneous <User Schedule>  multivitamin/minerals/iron Oral Solution (CENTRUM) 15 milliLiter(s) Oral daily  pantoprazole   Suspension 40 milliGRAM(s) Oral before breakfast  polyethylene glycol 3350 17 Gram(s) Oral daily  potassium chloride   Powder 40 milliEquivalent(s) Oral every 4 hours  propranolol 10 milliGRAM(s) Oral every 12 hours  senna Syrup 10 milliLiter(s) Oral at bedtime    MEDICATIONS  (PRN):  acetaminophen   Oral Liquid .. 650 milliGRAM(s) Oral every 6 hours PRN Mild Pain (1 - 3)  melatonin 3 milliGRAM(s) Oral at bedtime PRN Insomnia  petrolatum white Ointment 1 Application(s) Topical every 12 hours PRN dryness    PAST MEDICAL & SURGICAL HISTORY:  No known      Allergies    No Known Allergies    Intolerances    PHYSICAL EXAM  Vital Signs Last 24 Hrs  T(C): 36.5 (13 May 2024 05:38), Max: 36.5 (13 May 2024 05:38)  T(F): 97.7 (13 May 2024 05:38), Max: 97.7 (13 May 2024 05:38)  HR: 64 (13 May 2024 05:38) (64 - 64)  BP: 135/65 (13 May 2024 05:38) (135/65 - 135/65)  BP(mean): --  RR: 14 (13 May 2024 05:38) (14 - 14)  SpO2: 96% (13 May 2024 05:38) (96% - 96%)    Parameters below as of 13 May 2024 05:38  Patient On (Oxygen Delivery Method): room air        Gen - NAD, Comfortable. Still restless but appears improved  HEENT - incision healing well    Pulm - CTAB, No wheeze, No rhonchi, No crackles  Cardiovascular - RRR, S1S2, No murmurs  Chest - good chest expansion, good respiratory effort  Abdomen - Soft, NT/ND, +BS, BM 5/10  Extremities - No Cyanosis, no clubbing, no edema, no calf tenderness  Neuro-     Cognitive - awake, alert, oriented to person, place. Follows commands intermittently     Communication - hypophonic at times (  Cece [id number] used for [place]ian translation ). Pt intermittently speaks and answers in english.      Attention:  lack attention and repetition at times to respond.       Cranial Nerves - left facial droop     Motor -                     LEFT    UE - ShAB 3/5, EF 4/5, EE 3+/5,  3/5                    RIGHT UE - ShAB 3/5, EF 4/5, EE 4/5,   4/5                     LEFT    LE/ RIGHT LE - pt did not follow command  but Moves extremities     Reflexes - DTR Intact, No primitive reflexive       Tone - unable to test properly pt unable to follow request to relax  Psychiatric - Mood stable, Affect WNL                       Patient is a 58y old  Female who presents with a chief complaint of SAH (07 May 2024 11:22)    HPI:  57 y/o F with no PMH, fell one month prior to admission, transferred from [place] to [facility] on 3/26/24 for diffuse SAH w/ small IVH, 2/2 ruptured posterolaterally projecting 2.9 x 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 Initially presented to [place] with headaches and became unresponsive, was intubated, and transferred to [facility]. On arrival to [facility] patient w/ PERRL, +cough/gag, 2/5 spontaneous movement in LUE, otherwise no movement. Given 1 unit platelets and R frontal EVD placed with high opening pressure. Patient underwent Right pterional craniotomy and clipping of a ruptured right PCOM aneurysm on 3/26.     On 3/29, EVD displaced and replaced in situ, complicated by EVD tract acute hemorrhage and new focus of infarction with hemorrhage in the right basal ganglia/corona radiata. Course further complicated by hydrocephalus with ICP crisis, vasospasm requiring cerebral angiography and IA milrinone and verapamil. MRI (4/8) with widespread ischemic stroke b/l, as per neuro IR no further intervention. Patient extubated on 4/3, re-intubated 4/8, and extubated again on 4/9. EVD removed on 4/15. CTH post removal stable.     Course also complicated by bradycardia treated with theophyline, now resolved and discontinued. Initial Echo (3/26) with severe cardiomyopathy, EF 36%, regional wall motion abnormality. Resolved on repeat TTE performed 4/9/24, normal EF 66% with no WMA. Patient first noted with left soleal DVT on Duplex 3/28. Duplex (4/10) with stable b/l soleal DVT with extension to right posterior tibial DVT.  Last LE duplex with stable bilateral soleal DVT, [facility] cardiology recommended repeat surveillance duplex on 4/30. On Lovenox ppx.     PEG placement attempted 4/19 for persistent dysphagia, but was aborted due to growing hematoma noted in gastric wall on endoscopy after trocar placement. CT Abdomen and Pelvis w/ IV Cont showed gastric wall hematoma without intraluminal or extraluminal hemorrhage. PEG successfully placed on 4/23.     Patient evaluated by PT/OT and was recommended for acute inpatient rehab. Patient is medically stable for discharge to [facility] on 4/26.  (26 Apr 2024 14:34)      SUBJECTIVE/OBJECTIVE- Patient seen and examined while sitting in the wheelchair. States she slept. Denies pain    ROS  Constitutional: No fever, + fatigue  Cardio: No chest pain, No palpitations  Resp: No SOB, no respiratory distress   Neuro: No headaches +apraxia           LABS:                               10.4   5.26  )-----------( 248      ( 13 May 2024 05:27 )             32.6     05-13    146<H>  |  110<H>  |  7   ----------------------------<  94  3.3<L>   |  27  |  0.51    Ca    9.3      13 May 2024 05:27    TPro  6.7  /  Alb  2.8<L>  /  TBili  0.3  /  DBili  x   /  AST  23  /  ALT  35  /  AlkPhos  122<H>  05-13    LIVER FUNCTIONS - ( 13 May 2024 05:27 )  Alb: 2.8 g/dL / Pro: 6.7 g/dL / ALK PHOS: 122 U/L / ALT: 35 U/L / AST: 23 U/L / GGT: x                 Urinalysis Basic - ( 13 May 2024 05:27 )    Color: x / Appearance: x / SG: x / pH: x  Gluc: 94 mg/dL / Ketone: x  / Bili: x / Urobili: x   Blood: x / Protein: x / Nitrite: x   Leuk Esterase: x / RBC: x / WBC x   Sq Epi: x / Non Sq Epi: x / Bacteria: x            MEDICATIONS  (STANDING):  atorvastatin 40 milliGRAM(s) Oral at bedtime  doxazosin 2 milliGRAM(s) Oral at bedtime  enoxaparin Injectable 40 milliGRAM(s) SubCutaneous <User Schedule>  multivitamin/minerals/iron Oral Solution (CENTRUM) 15 milliLiter(s) Oral daily  pantoprazole   Suspension 40 milliGRAM(s) Oral before breakfast  polyethylene glycol 3350 17 Gram(s) Oral daily  potassium chloride   Powder 40 milliEquivalent(s) Oral every 4 hours  propranolol 10 milliGRAM(s) Oral every 12 hours  senna Syrup 10 milliLiter(s) Oral at bedtime    MEDICATIONS  (PRN):  acetaminophen   Oral Liquid .. 650 milliGRAM(s) Oral every 6 hours PRN Mild Pain (1 - 3)  melatonin 3 milliGRAM(s) Oral at bedtime PRN Insomnia  petrolatum white Ointment 1 Application(s) Topical every 12 hours PRN dryness    PAST MEDICAL & SURGICAL HISTORY:  No known      Allergies    No Known Allergies    Intolerances    PHYSICAL EXAM  Vital Signs Last 24 Hrs  T(C): 36.5 (13 May 2024 05:38), Max: 36.5 (13 May 2024 05:38)  T(F): 97.7 (13 May 2024 05:38), Max: 97.7 (13 May 2024 05:38)  HR: 64 (13 May 2024 05:38) (64 - 64)  BP: 135/65 (13 May 2024 05:38) (135/65 - 135/65)  BP(mean): --  RR: 14 (13 May 2024 05:38) (14 - 14)  SpO2: 96% (13 May 2024 05:38) (96% - 96%)    Parameters below as of 13 May 2024 05:38  Patient On (Oxygen Delivery Method): room air        Gen - NAD, Comfortable. Still restless but appears improved  HEENT - incision healing well  Pulm - CTAB, No wheeze, No rhonchi, No crackles  Cardiovascular - RRR, S1S2, No murmurs  Chest - good chest expansion, good respiratory effort  Abdomen - Soft, NT/ND, +BS  Extremities - No Cyanosis, no clubbing, no edema, no calf tenderness  Neuro-     Cognitive - awake, alert, oriented to person, place, year. Follows commands intermittently     Communication - hypophonic at times, Pt intermittently speaks and answers in english.      Attention:  impaired       Cranial Nerves - left facial droop     Motor -                     LEFT    UE - ShAB 3/5, EF 4/5, EE 3+/5,  3/5                    RIGHT UE - ShAB 3/5, EF 4/5, EE 4/5,   4/5                     LEFT    LE/ RIGHT LE - pt did not follow command  but Moves extremities     Reflexes - DTR Intact, No primitive reflexive       Tone - unable to test properly pt unable to follow request to relax  Psychiatric - Mood stable, Affect WNL

## 2024-05-13 NOTE — DISCHARGE NOTE PROVIDER - CARE PROVIDERS DIRECT ADDRESSES
,DirectAddress_Unknown,[email or web address] ,[email or web address]

## 2024-05-13 NOTE — DISCHARGE NOTE PROVIDER - ATTENDING DISCHARGE PHYSICAL EXAMINATION:
Gen - NAD, Comfortable, sitting in the wheelchair   HEENT - incision healing well  Pulm - CTAB, No wheeze, No rhonchi, No crackles  Cardiovascular - RRR, S1S2, No murmurs  Chest - good chest expansion, good respiratory effort  Abdomen - Soft, NT/ND, +BS, PEG c/d/i  Extremities - No Cyanosis, no clubbing, no edema, no calf tenderness  Neuro-     Cognitive - awake, alert, oriented to person, place, year with choices.     Communication - hypophonic at times      Attention:  impaired       Cranial Nerves - left facial droop     Motor -                     LEFT    UE - ShAB 3/5, EF 4/5, EE 3+/5,  3/5                    RIGHT UE - ShAB 3/5, EF 4/5, EE 4/5,   4/5                     LEFT    LE/ RIGHT LE - pt did not follow command  but Moves extremities     Reflexes - DTR Intact, No primitive reflexive       Tone - unable to test properly pt unable to follow request to relax  Psychiatric - Mood stable

## 2024-05-13 NOTE — DISCHARGE NOTE PROVIDER - DETAILS OF MALNUTRITION DIAGNOSIS/DIAGNOSES
This patient has been assessed with a concern for Malnutrition and was treated during this hospitalization for the following Nutrition diagnosis/diagnoses:     -  04/27/2024: Severe protein-calorie malnutrition

## 2024-05-13 NOTE — DISCHARGE NOTE PROVIDER - PROVIDER TOKENS
PROVIDER:[TOKEN:[[id number]],FOLLOWUP:[1 month]],PROVIDER:[TOKEN:[[id number]],FOLLOWUP:[1 week]] PROVIDER:[TOKEN:[[id number]],FOLLOWUP:[1 month]],PROVIDER:[TOKEN:[[id number]],FOLLOWUP:[1 week]],PROVIDER:[TOKEN:[[id number]],FOLLOWUP:[1 week]]

## 2024-05-13 NOTE — DISCHARGE NOTE PROVIDER - NSDCQMANTICOAGCONTRA_GEN_A_CORE
Patient does not have atrial fibrillation/flutter Intracranial Hemorrhage Patient does not have atrial fibrillation/flutter/Intracranial Hemorrhage

## 2024-05-13 NOTE — DISCHARGE NOTE PROVIDER - NSDCCPCAREPLAN_GEN_ALL_CORE_FT
PRINCIPAL DISCHARGE DIAGNOSIS  Diagnosis: Cerebral aneurysm rupture  Assessment and Plan of Treatment: You were originally admitted to [facility] after a rupture of an aneurysm in one of the blood vessels in your brain. An aneurysm is a bulging of the blood vessel itself which can make it more susceptible to bleeding. This resulted in a brain bleed where you received a surgery to stop the bleed and help with the pressure. You received a feeding tube as well that goes from the skin into the stomach as a result of the bleed. You were transitioned to [facility] where you made good funcitonal gains. You were discharged home with support at home on 5/17.     PRINCIPAL DISCHARGE DIAGNOSIS  Diagnosis: Cerebral aneurysm rupture  Assessment and Plan of Treatment: You were originally admitted to [facility] after a rupture of an aneurysm in one of the blood vessels in your brain. An aneurysm is a bulging of the blood vessel itself which can make it more susceptible to bleeding. This resulted in a brain bleed where you received a surgery to stop the bleed and help with the pressure. You received a feeding tube as well that goes from the skin into the stomach as a result of the bleed. You were transitioned to [facility] where you made good funcitonal gains. You were discharged home with support at home on 5/17.      SECONDARY DISCHARGE DIAGNOSES  Diagnosis: DVT, lower extremity  Assessment and Plan of Treatment: Doppler LE- gastocnemjustin CANSECO DVT seen on doppler LE 5/14  - Continue with lovenox 40 mg sq daily  - Repeat doppler in 1 week to assess for propagation     PRINCIPAL DISCHARGE DIAGNOSIS  Diagnosis: Cerebral aneurysm rupture  Assessment and Plan of Treatment: You were originally admitted to [facility] after a rupture of an aneurysm in one of the blood vessels in your brain. An aneurysm is a bulging of the blood vessel itself which can make it more susceptible to bleeding. This resulted in a brain bleed where you received a surgery to stop the bleed and help with the pressure. You received a feeding tube as well that goes from the skin into the stomach as a result of the bleed. You were transitioned to [facility] where you made good funcitonal gains.      SECONDARY DISCHARGE DIAGNOSES  Diagnosis: DVT, lower extremity  Assessment and Plan of Treatment: Doppler LE- gastocnemius LLE DVT seen on doppler LE 5/14  - Continue with lovenox 40 mg sq daily  - Repeat doppler in 1 week to assess for propagation

## 2024-05-13 NOTE — PROGRESS NOTE ADULT - ASSESSMENT
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 Right frontal EVD placed and patient underwent right pterional craniotomy and clipping of a ruptured right PCOM aneurysm on 3/26 . 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 Patient found to have HFrEF (now recovered), bradycardia (placed on theophyline, now resolved), b/l soleal DVT (on Lovenox ppx). PEG placement initially complicated by gastric wall hematoma, subsequently successfully placed for persistent dysphagia. Patient now admitted for a multidisciplinary rehab program    # SAH s/p ruptured PCOM aneurysm  - R EVD placed 3/26 and removed 4/15  - s/p right pterional craniotomy and clipping of a ruptured right PCOM aneurysm on 3/26  - CTH (3/29): EVD tract acute hemorrhage and new focus of infarction with hemorrhage in the right basal ganglia/corona radiata  - complicated by vasospasm requiring cerebral angiography and IA milrinone and verapamil  - MRI (4/8): Multifocal acute to subacute infarction. This extensively involves each MCA posterior distribution, the right basal ganglia, the anterior parasagittal vertex CINDY distribution and an additional smaller lesion in the right cerebellum  - labs reviewed 5/13 -- stable.      # B/l DVT   - Left soleal DVT first noted 3/28, right soleal DVT fist noted 4/3  - Most recent Duplex (4/23): persistent bilateral soleal vein DVT  - vas cardiology recommended repeat surveillance duplex on 4/30.  - repeat duplex 04/30:  persistent left soleal vein DVT + also left gastrocnemius vein DVT (new).   Right soleal vein DVT no longer visualized.  - Continue Lovenox 40   - repeat duplex this week . vasc cardio f/up prior to discharge or after next surveillance duplex.    # HLD  - continue Lipitor     # HFrEF  - Initial Echo (3/26) with severe cardiomyopathy, EF 36%, regional wall motion abnormality  - Resolved on repeat TTE performed 4/9/24, normal EF 66% with no WMA.    # Gastric Hematoma  - PEG placement attempted 4/19 for persistent dysphagia, but was aborted due to growing hematoma noted in gastric wall on endoscopy after trocar placement  - CT Abdomen and Pelvis w/ IV Cont showed gastric wall hematoma without intraluminal or extraluminal hemorrhage  - monitor H/H, stable at 10.7    # transaminitis- improved   - monitor    #DVT ppx  -Lovenox      discussed with primary team.

## 2024-05-13 NOTE — DISCHARGE NOTE PROVIDER - NSDCMRMEDTOKEN_GEN_ALL_CORE_FT
acetaminophen 160 mg/5 mL oral suspension: 20.31 milliliter(s) orally every 6 hours as needed for Mild Pain (1 - 3)  atorvastatin 40 mg oral tablet: 1 tab(s) orally once a day (at bedtime)  doxazosin 2 mg oral tablet: 1 tab(s) orally once a day (at bedtime)  melatonin 3 mg oral tablet: 1 tab(s) orally once a day (at bedtime) As needed Insomnia  Multiple Vitamins with Minerals oral liquid: 15 milliliter(s) orally once a day  pantoprazole 40 mg oral granule, delayed release: 40 milligram(s) orally once a day  polyethylene glycol 3350 oral powder for reconstitution: 17 gram(s) orally once a day  propranolol 10 mg oral tablet: 1 tab(s) orally every 12 hours  senna (sennosides) 8.8 mg/5 mL oral syrup: 10 milliliter(s) orally once a day (at bedtime)   acetaminophen 160 mg/5 mL oral suspension: 20.31 milliliter(s) orally every 6 hours as needed for Mild Pain (1 - 3)  atorvastatin 40 mg oral tablet: 1 tab(s) orally once a day (at bedtime)  doxazosin 2 mg oral tablet: 1 tab(s) orally once a day (at bedtime)  melatonin 3 mg oral tablet: 1 tab(s) orally once a day (at bedtime) As needed Insomnia  Multiple Vitamins with Minerals oral liquid: 15 milliliter(s) orally once a day  pantoprazole 40 mg oral granule, delayed release: 40 milligram(s) orally once a day  propranolol 10 mg oral tablet: 1 tab(s) orally every 12 hours   acetaminophen 160 mg/5 mL oral suspension: 20.31 milliliter(s) orally every 6 hours as needed for Mild Pain (1 - 3)  atorvastatin 40 mg oral tablet: 1 tab(s) orally once a day (at bedtime)  doxazosin 2 mg oral tablet: 1 tab(s) orally once a day (at bedtime)  enoxaparin: 40 milligram(s) subcutaneous once a day  melatonin 3 mg oral tablet: 1 tab(s) orally once a day (at bedtime) As needed Insomnia  Multiple Vitamins with Minerals oral liquid: 15 milliliter(s) orally once a day  pantoprazole 40 mg oral granule, delayed release: 40 milligram(s) orally once a day  propranolol 10 mg oral tablet: 1 tab(s) orally every 12 hours

## 2024-05-13 NOTE — DISCHARGE NOTE PROVIDER - HOSPITAL COURSE
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 Initially presented to [place] with headaches and became unresponsive, was intubated, and transferred to [facility]. On arrival to [facility] patient w/ PERRL, +cough/gag, 2/5 spontaneous movement in LUE, otherwise no movement. Given 1 unit platelets and R frontal EVD placed with high opening pressure. Patient underwent Right pterional craniotomy and clipping of a ruptured right PCOM aneurysm on 3/26.     On 3/29, EVD displaced and replaced in situ, complicated by EVD tract acute hemorrhage and new focus of infarction with hemorrhage in the right basal ganglia/corona radiata. Course further complicated by hydrocephalus with ICP crisis, vasospasm requiring cerebral angiography and IA milrinone and verapamil. MRI (4/8) with widespread ischemic stroke b/l, as per neuro IR no further intervention. Patient extubated on 4/3, re-intubated 4/8, and extubated again on 4/9. EVD removed on 4/15. CTH post removal stable.     Course also complicated by bradycardia treated with theophyline, now resolved and discontinued. Initial Echo (3/26) with severe cardiomyopathy, EF 36%, regional wall motion abnormality. Resolved on repeat TTE performed 4/9/24, normal EF 66% with no WMA. Patient first noted with left soleal DVT on Duplex 3/28. Duplex (4/10) with stable b/l soleal DVT with extension to right posterior tibial DVT.  Last LE duplex with stable bilateral soleal DVT, [facility] cardiology recommended repeat surveillance duplex on 4/30. On Lovenox ppx.     PEG placement attempted 4/19 for persistent dysphagia, but was aborted due to growing hematoma noted in gastric wall on endoscopy after trocar placement. CT Abdomen and Pelvis w/ IV Cont showed gastric wall hematoma without intraluminal or extraluminal hemorrhage. PEG successfully placed on 4/23.     Patient evaluated by PT/OT and was recommended for acute inpatient rehab. Patient is medically stable for discharge to [facility] on 4/26.  (26 Apr 2024 14:34)    Patient participated in daily therapies and made good functional gains.  Rehab course notable for patient's scalp staples being removed. She had an increase in free water flushes for hypernatremia that has resolved. Patient with routine monitoring of known LLE DVT below the level of the knee without progression. Anticoagulation was continued to be deferred in the post operative period after her ruptured PCOM aneurysm. Patient was seen by neuropsychology for restlessness and anxiety. Initiated patient on propranolol 10mg BID which provided improvement in restlessness as well as scheduled melatonin to help with late night anxiety.       Patient tolerated course of inpatient PT/OT/SLP rehab with significant functional improvements. Patient seen and examined on day of discharge.  Medications, medication side effects, and discharge instructions were reviewed with the patient, who expressed understanding of all information.  Patient was medically and functionally optimized and cleared for discharge. 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 Initially presented to [place] with headaches and became unresponsive, was intubated, and transferred to [facility]. On arrival to [facility] patient w/ PERRL, +cough/gag, 2/5 spontaneous movement in LUE, otherwise no movement. Given 1 unit platelets and R frontal EVD placed with high opening pressure. Patient underwent Right pterional craniotomy and clipping of a ruptured right PCOM aneurysm on 3/26.     On 3/29, EVD displaced and replaced in situ, complicated by EVD tract acute hemorrhage and new focus of infarction with hemorrhage in the right basal ganglia/corona radiata. Course further complicated by hydrocephalus with ICP crisis, vasospasm requiring cerebral angiography and IA milrinone and verapamil. MRI (4/8) with widespread ischemic stroke b/l, as per neuro IR no further intervention. Patient extubated on 4/3, re-intubated 4/8, and extubated again on 4/9. EVD removed on 4/15. CTH post removal stable.     Course also complicated by bradycardia treated with theophyline, now resolved and discontinued. Initial Echo (3/26) with severe cardiomyopathy, EF 36%, regional wall motion abnormality. Resolved on repeat TTE performed 4/9/24, normal EF 66% with no WMA. Patient first noted with left soleal DVT on Duplex 3/28. Duplex (4/10) with stable b/l soleal DVT with extension to right posterior tibial DVT.  Last LE duplex with stable bilateral soleal DVT, [facility] cardiology recommended repeat surveillance duplex on 4/30. On Lovenox ppx.     PEG placement attempted 4/19 for persistent dysphagia, but was aborted due to growing hematoma noted in gastric wall on endoscopy after trocar placement. CT Abdomen and Pelvis w/ IV Cont showed gastric wall hematoma without intraluminal or extraluminal hemorrhage. PEG successfully placed on 4/23.     Patient evaluated by PT/OT and was recommended for acute inpatient rehab. Patient is medically stable for discharge to [facility] on 4/26.  (26 Apr 2024 14:34)    Patient participated in daily therapies and made good functional gains.  Rehab course notable for patient's scalp staples being removed. She had an increase in free water flushes for hypernatremia that has resolved. Patient with routine monitoring of known LLE DVT below the level of the knee without progression. Anticoagulation was continued to be deferred in the post operative period after her ruptured PCOM aneurysm. Patient was seen by neuropsychology for restlessness and anxiety. Initiated patient on propranolol 10mg BID which provided improvement in restlessness as well as scheduled melatonin to help with late night anxiety. Bowel regimen d/c'd prior to discharge as patient was having loose stools.     Patient tolerated course of inpatient PT/OT/SLP rehab with significant functional improvements. Patient seen and examined on day of discharge.  Medications, medication side effects, and discharge instructions were reviewed with the patient, who expressed understanding of all information.  Patient was medically and functionally optimized and cleared for discharge. 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 Initially presented to [place] with headaches and became unresponsive, was intubated, and transferred to [facility]. On arrival to [facility] patient w/ PERRL, +cough/gag, 2/5 spontaneous movement in LUE, otherwise no movement. Given 1 unit platelets and R frontal EVD placed with high opening pressure. Patient underwent Right pterional craniotomy and clipping of a ruptured right PCOM aneurysm on 3/26.     On 3/29, EVD displaced and replaced in situ, complicated by EVD tract acute hemorrhage and new focus of infarction with hemorrhage in the right basal ganglia/corona radiata. Course further complicated by hydrocephalus with ICP crisis, vasospasm requiring cerebral angiography and IA milrinone and verapamil. MRI (4/8) with widespread ischemic stroke b/l, as per neuro IR no further intervention. Patient extubated on 4/3, re-intubated 4/8, and extubated again on 4/9. EVD removed on 4/15. CTH post removal stable.     Course also complicated by bradycardia treated with theophyline, now resolved and discontinued. Initial Echo (3/26) with severe cardiomyopathy, EF 36%, regional wall motion abnormality. Resolved on repeat TTE performed 4/9/24, normal EF 66% with no WMA. Patient first noted with left soleal DVT on Duplex 3/28. Duplex (4/10) with stable b/l soleal DVT with extension to right posterior tibial DVT.  Last LE duplex with stable bilateral soleal DVT, [facility] cardiology recommended repeat surveillance duplex on 4/30. On Lovenox ppx.     PEG placement attempted 4/19 for persistent dysphagia, but was aborted due to growing hematoma noted in gastric wall on endoscopy after trocar placement. CT Abdomen and Pelvis w/ IV Cont showed gastric wall hematoma without intraluminal or extraluminal hemorrhage. PEG successfully placed on 4/23.     Patient evaluated by PT/OT and was recommended for acute inpatient rehab. Patient is medically stable for discharge to [facility] on 4/26.  (26 Apr 2024 14:34)    Patient participated in daily therapies and made good functional gains.  Rehab course notable for patient's scalp staples being removed. She had an increase in free water flushes for hypernatremia that has resolved. Patient with routine monitoring of known LLE DVT below the level of the knee without progression. Anticoagulation was continued to be deferred in the post operative period after her ruptured PCOM aneurysm. Patient was seen by neuropsychology for restlessness and anxiety. Initiated patient on propranolol 10mg BID which provided improvement in restlessness as well as scheduled melatonin to help with late night anxiety. Bowel regimen d/c'd prior to discharge as patient was having loose stools. Patient upgraded to a minced and moist diet. Patient will need GI follow up regarding PEG tube removal given original complication with hematoma near PEG site.    Patient tolerated course of inpatient PT/OT/SLP rehab with significant functional improvements. Patient seen and examined on day of discharge.  Medications, medication side effects, and discharge instructions were reviewed with the patient, who expressed understanding of all information.  Patient was medically and functionally optimized and cleared for discharge. 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 Initially presented to [place] with headaches and became unresponsive, was intubated, and transferred to [facility]. On arrival to [facility] patient w/ PERRL, +cough/gag, 2/5 spontaneous movement in LUE, otherwise no movement. Given 1 unit platelets and R frontal EVD placed with high opening pressure. Patient underwent Right pterional craniotomy and clipping of a ruptured right PCOM aneurysm on 3/26.     On 3/29, EVD displaced and replaced in situ, complicated by EVD tract acute hemorrhage and new focus of infarction with hemorrhage in the right basal ganglia/corona radiata. Course further complicated by hydrocephalus with ICP crisis, vasospasm requiring cerebral angiography and IA milrinone and verapamil. MRI (4/8) with widespread ischemic stroke b/l, as per neuro IR no further intervention. Patient extubated on 4/3, re-intubated 4/8, and extubated again on 4/9. EVD removed on 4/15. CTH post removal stable.     Course also complicated by bradycardia treated with theophyline, now resolved and discontinued. Initial Echo (3/26) with severe cardiomyopathy, EF 36%, regional wall motion abnormality. Resolved on repeat TTE performed 4/9/24, normal EF 66% with no WMA. Patient first noted with left soleal DVT on Duplex 3/28. Duplex (4/10) with stable b/l soleal DVT with extension to right posterior tibial DVT.  Last LE duplex with stable bilateral soleal DVT, [facility] cardiology recommended repeat surveillance duplex on 4/30. On Lovenox ppx.     PEG placement attempted 4/19 for persistent dysphagia, but was aborted due to growing hematoma noted in gastric wall on endoscopy after trocar placement. CT Abdomen and Pelvis w/ IV Cont showed gastric wall hematoma without intraluminal or extraluminal hemorrhage. PEG successfully placed on 4/23.     Patient evaluated by PT/OT and was recommended for acute inpatient rehab. Patient is medically stable for discharge to [facility] on 4/26.  (26 Apr 2024 14:34)    Patient participated in daily therapies and made good functional gains.  Rehab course notable for patient's scalp staples being removed. She had an increase in free water flushes for hypernatremia that has resolved. Patient with routine monitoring of known LLE DVT below the level of the knee without progression. Anticoagulation was continued to be deferred in the post operative period after her ruptured PCOM aneurysm. Patient was seen by neuropsychology for restlessness and anxiety. Initiated patient on propranolol 10mg BID which provided improvement in restlessness as well as scheduled melatonin to help with late night anxiety. Bowel regimen d/c'd prior to discharge as patient was having loose stools. Patient upgraded to a minced and moist diet. Patient will need GI follow up regarding PEG tube removal given original complication with hematoma near PEG site.    Patient tolerated course of inpatient PT/OT/SLP rehab with significant functional improvements. Patient seen and examined on day of discharge.  Medications, medication side effects, and discharge instructions were reviewed with the patient, who expressed understanding of all information.  Patient was medically and functionally optimized and cleared for discharge.      Doppler 5/14 with persistent LLE gastrocnemius DVT, continue with Lovenox 40 mg sq daily, repeat doppler in 1 week to assess for propagation and stability

## 2024-05-13 NOTE — PROGRESS NOTE ADULT - ATTENDING COMMENTS
Patient seen earlier while sitting in the wheelchair. Appears restless but reported to be improved compared to last week. Vitals reviewed.  [facility] with multiple choice, year and month with choices  States she slept. Exam limited by apraxia and impaired command following  CT head 4/16 reviewed- Status post right pterional craniotomy for distal right ICA aneurysm clipping. Status post removal of right frontal EVD. No hydrocephalus. Trace intraventricular air.  Labs- CBC/CMP reviewed  HgB 10.4  Hypernatremia- Na 146  K: 3.3, Potassium chloride powder 40 mEq ordered x2  Discussed medical updates and plan with nursing and hospitalist on team rounds  Patient requires acute inpatient hospitalization for ongoing management of medical comorbidities, pending repeat Doppler, monitoring of labs for electrolyte abnormalities- hypokalemia, hypernatremia, which are limiting functional abilities and optimization of functional independence for discharge

## 2024-05-13 NOTE — PROGRESS NOTE ADULT - SUBJECTIVE AND OBJECTIVE BOX
no acute events overnight      PHYSICAL EXAM:    Vital Signs Last 24 Hrs  T(F): 97.9 (13 May 2024 09:12), Max: 97.9 (13 May 2024 09:12)  HR: 78 (13 May 2024 09:12) (64 - 78)  BP: 112/76 (13 May 2024 09:12) (112/76 - 135/65)  RR: 15 (13 May 2024 09:12) (14 - 15)  SpO2: 98% (13 May 2024 09:12) (96% - 98%)  I&O's Summary      GENERAL: NAD  HEENT: +R crani site C/D/I  CHEST/LUNG: No increased WOB, Clear to percussion bilaterally; No rales, rhonchi, wheezing  HEART: Regular rate and rhythm; No murmurs  ABDOMEN: +PEG Soft, Nontender, Nondistended; Bowel sounds present  MUSCULOSKELETAL/EXTREMITIES:  2+ Peripheral Pulses, No LE edema  PSYCH: Appropriate affect, Alert & Oriented    LABS:                        10.4   5.26  )-----------( 248      ( 13 May 2024 05:27 )             32.6       05-13    146  |  110  |  7   ----------------------------<  94  3.3   |  27  |  0.51    Ca    9.3      13 May 2024 05:27    TPro  6.7  /  Alb  2.8  /  TBili  0.3  /  DBili  x   /  AST  23  /  ALT  35  /  AlkPhos  122  05-13                03-26 Chol 174 mg/dL LDL -- HDL 53 mg/dL Trig 82 mg/dL                  Urinalysis Basic - ( 13 May 2024 05:27 )    Color: x / Appearance: x / SG: x / pH: x  Gluc: 94 mg/dL / Ketone: x  / Bili: x / Urobili: x   Blood: x / Protein: x / Nitrite: x   Leuk Esterase: x / RBC: x / WBC x   Sq Epi: x / Non Sq Epi: x / Bacteria: x            MEDICATIONS  (STANDING):  atorvastatin 40 milliGRAM(s) Oral at bedtime  doxazosin 2 milliGRAM(s) Oral at bedtime  enoxaparin Injectable 40 milliGRAM(s) SubCutaneous <User Schedule>  multivitamin/minerals/iron Oral Solution (CENTRUM) 15 milliLiter(s) Oral daily  pantoprazole   Suspension 40 milliGRAM(s) Oral before breakfast  polyethylene glycol 3350 17 Gram(s) Oral daily  potassium chloride   Powder 40 milliEquivalent(s) Oral every 4 hours  propranolol 10 milliGRAM(s) Oral every 12 hours  senna Syrup 10 milliLiter(s) Oral at bedtime    MEDICATIONS  (PRN):  acetaminophen   Oral Liquid .. 650 milliGRAM(s) Oral every 6 hours PRN Mild Pain (1 - 3)  melatonin 3 milliGRAM(s) Oral at bedtime PRN Insomnia  petrolatum white Ointment 1 Application(s) Topical every 12 hours PRN dryness      Care Discussed with Consultants/Other Providers: Yes

## 2024-05-13 NOTE — CHART NOTE - NSCHARTNOTEFT_GEN_A_CORE
NUTRITION Follow Up Note    SOURCE: Patient [X]  Medical Record [X]  Nursing Staff [X]  Family Member []    DIET: Diet, Pureed:   Supplement Feeding Modality:  Oral  Ensure Plus High Protein Cans or Servings Per Day:  1       Frequency:  Three Times a day (05-02-24 @ 13:46) [Active]    Patient s/p PEG. Passed MBS on 5/2/24 and was advanced to PO diet (pureed with thin liquids). Patient noted with fairly good appetite, consuming % of meals per nursing flowsheets. Continues with Ensure Plus High Protein 8oz PO TID (Provides 1,050kcal & 60grams of Protein) to enhance PO intakes and optimize nutritional status.     EDEMA: no edema noted    LAST BM: 5/12/24    SKIN: no pressure injuries noted    WEIGHT TRENDS: 62.6 kG (4/26/24)      PERTINENT MEDICATIONS: MEDICATIONS  (STANDING):  atorvastatin 40 milliGRAM(s) Oral at bedtime  doxazosin 2 milliGRAM(s) Oral at bedtime  enoxaparin Injectable 40 milliGRAM(s) SubCutaneous <User Schedule>  multivitamin/minerals/iron Oral Solution (CENTRUM) 15 milliLiter(s) Oral daily  pantoprazole   Suspension 40 milliGRAM(s) Oral before breakfast  polyethylene glycol 3350 17 Gram(s) Oral daily  potassium chloride   Powder 40 milliEquivalent(s) Oral every 4 hours  propranolol 10 milliGRAM(s) Oral every 12 hours  senna Syrup 10 milliLiter(s) Oral at bedtime    MEDICATIONS  (PRN):  acetaminophen   Oral Liquid .. 650 milliGRAM(s) Oral every 6 hours PRN Mild Pain (1 - 3)  melatonin 3 milliGRAM(s) Oral at bedtime PRN Insomnia  petrolatum white Ointment 1 Application(s) Topical every 12 hours PRN dryness      PERTINENT LABS:  05-13 Na146 mmol/L<H> Glu 94 mg/dL K+ 3.3 mmol/L<L> Cr  0.51 mg/dL BUN 7 mg/dL 05-13 Alb 2.8 g/dL<L>        ESTIMATED NEEDS:   [X] No Change Since Previous Assessment    PREVIOUS NUTRITION DIAGNOSIS:  1. Severe Protein Calorie Malnutrition    NUTRITION DIAGNOSIS IS [X] Ongoing    NEW NUTRITION DIAGNOSIS: [X] Not Applicable    INTERVENTIONS:   1. Recommend continue current nutrition plan of care as tolerated     MONITORING & EVALUATION:   1. Weights   2. PO intakes   3. Skin integrity   4. Tolerance to diet prescription   5. Labs & POCT  6. Follow up (per protocol)    Registered Dietitian/Nutritionist Remains Available.  Ubaldo Steve RD, CDN    Contact: [phone] or via MS TEAMS

## 2024-05-13 NOTE — PROGRESS NOTE ADULT - ASSESSMENT
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 Patient s/p right frontal EVD and right pterional craniotomy with clipping of a ruptured right PCOM aneurysm on 3/26. Course complicated by EVD tract hemorrhage, new right basal ganglia/corona radiata infarct, hydrocephalus with ICP crisis, vasospasm requiring IA milrinone/verapimil, and new widespread ischemic strokes. Patient also found to have HFrEF (now recovered), bradycardia (resolved), b/l soleal DVT. s/p PEG placement initially complicated by gastric wall hematoma    # SAH s/p ruptured PCOM aneurysm s/p right pterional craniotomy and clipping 3/26  -  vasospasm s/p cerebral angiography and IA milrinone and verapamil  - MRI (4/8): Multifocal acute to subacute infarction. This extensively involves each MCA posterior distribution, the right basal ganglia, the anterior parasagittal vertex CINDY distribution and an additional smaller lesion in the right cerebellum  - Staple removed 4/30  - Continue comprehensive rehab program of PT/OT/SLP - 3 hours a day, 5 days a week  - patio pass in WC with staff and/or family present at all times  - Precautions: Fall, Aspiration, Seizure, PEG, VTE bLE    # Bradycardia: resolved  - was on theophyline, dc'd  # Akasthesia/Restlessness  - Propranolol 10 mg started q 12 hrs w/ parameters 5/9  - neuropsychology support  - BP and HR stable, tolerating 5/13    # Insomnia  -  Melatonin 3mg q hs started 5/9   - propranolol 5/9    # HFrEF  - Initial Echo (3/26) with severe cardiomyopathy, EF 36%, regional wall motion abnormality  - Repeat TTE 4/9/24, normal EF 66% with no WMA.    # HLD  -  lipitor 40 mg QHS    # B/l DVT   - Left soleal DVT first noted 3/28, right soleal DVT fist noted 4/3  - Duplex (4/23): persistent bilateral soleal vein DVT  - duplex 4/30: There is persistence of DVT identified within the left soleal vein. On the current evaluation DVT is also evident within the left gastrocnemius vein, new. Previously identified right soleal DVT is no longer visualized  - Surveillance doppler 5/7: Persistence of DVT identified within the left soleal and gastrocnemius veins. No propogation  - NSGY: no full dose AC for at least 4 weeks from surgery  - Discussed with hospitalist. Continue PPX dose now, surveillance dopplers weekly  - Continue Lovenox 40 mg QD  - Next doppler Tuesday 5/14    # Hypernatremia: resolved  - PEG flushes free water 200 cc Q6H  - Na 146 5/13  - bMP 5/16    # hypokalemia  -  3.3 K -> supplemented with 40meq of K po x2 on 5/13  - BMP 5/16    # Gastric Hematoma post PEG placement  -  PEG placement attempted 4/19 for persistent dysphagia, but was aborted due to growing hematoma noted in gastric wall on endoscopy after trocar placement  - CT Abdomen and Pelvis w/ IV Cont showed gastric wall hematoma without intraluminal or extraluminal hemorrhage  - PEG placed 4/23  - Hgb 9.7 4/29--> 9.8 5/2 --> 10.2 5/6 ->10.7 5/9 -> 10.4 5/13  - CBC 5/16    # Pain  - Tylenol 650 mg Q6H PRN     # GI / Bowel  - Senna qHS  - Miralax BID  - bisacodyl 5 mg QHS  - GI ppx: protonix 40 mg QD    #  / Bladder  - Continue cardura 2 mg QHS   - d/c'd bladder scans on 5/6    # Skin / Pressure injury  - left toe scabs, right lateral foot callus   - podiatry consult appreciated 5/2. Callus shaved down, no additional treatment necessary    # Diet/Dysphagia:  - advanced to pureed solids thin liquids with ensure plus supplementation 5/2 post  MBS  - Supplements: MVI    # Case discussed in IDT rounds 5/8 (follow up):  - pureed solid thin liquids, improved receptive language skills but reduced ability to follow commands, improved reliabilty simple yes/no questions, increased verbal output +left esther inattention requires max cues, distractable, max commode transfers, mod UB dressing and feeding, total toileting, max-total assist bed mobility and transfers, mod-max assist ambulation RW and WC follow 60 feet  - goals: 24 x 7 assist on dc, min-mod assist self care tasks and transfers, min assist transfers and ambulation  - family training  - adjusted target and dispo: 5/17 DEV DEMARCO has discussed with family who are agreeable    # LABS  CBC CMP 5/16  Doppler 5/14 ordered    Outpatient Follow-up:  Perlita Varela  Neurosurgery  [address], [address]  [place] [address]  Phone: [phone]  Fax: [phone]  Follow Up Time:      Code Status/Emergency Contact:  FULL CODE  Pierre Patrick - son [phone]   59 y/o F with no known PMH, h/o fell one month prior to admission, who was transferred from [place] to [facility] on 3/26/24 with diffuse SAH w/ small IVH, 2/2 ruptured posterolaterally projecting 2.9 x 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 Patient s/p right frontal EVD and right pterional craniotomy with clipping of a ruptured right PCOM aneurysm on 3/26. Course complicated by EVD tract hemorrhage, new right basal ganglia/corona radiata infarct, hydrocephalus with ICP crisis, vasospasm requiring IA milrinone/verapimil, and new widespread ischemic strokes. Patient also found to have HFrEF (now recovered), bradycardia (resolved), b/l soleal DVT. s/p PEG placement initially complicated by gastric wall hematoma    # SAH s/p ruptured PCOM aneurysm s/p right pterional craniotomy and clipping 3/26  -  vasospasm s/p cerebral angiography and IA milrinone and verapamil  - MRI (4/8): Multifocal acute to subacute infarction. This extensively involves each MCA posterior distribution, the right basal ganglia, the anterior parasagittal vertex CINDY distribution and an additional smaller lesion in the right cerebellum  - Staple removed 4/30  - Continue comprehensive rehab program of PT/OT/SLP - 3 hours a day, 5 days a week  - patio pass in WC with staff and/or family present at all times  - Precautions: Fall, Aspiration, Seizure, PEG, VTE bLE    # Bradycardia: resolved  - was on theophyline, dc'd    # Akasthesia/Restlessness  - Propranolol 10 mg started q 12 hrs w/ parameters 5/9-- improved 5/13  - neuropsychology support  - BP (135/65 this AM) and HR stable (64-78), tolerating 5/13    # Insomnia  -  Melatonin 3mg prn  - propranolol 5/9    # HFrEF  - Initial Echo (3/26) with severe cardiomyopathy, EF 36%, regional wall motion abnormality  - Repeat TTE 4/9/24, normal EF 66% with no WMA.    # HLD  -  lipitor 40 mg QHS    # B/l DVT   - Left soleal DVT first noted 3/28, right soleal DVT fist noted 4/3  - Duplex (4/23): persistent bilateral soleal vein DVT  - duplex 4/30: There is persistence of DVT identified within the left soleal vein. On the current evaluation DVT is also evident within the left gastrocnemius vein, new. Previously identified right soleal DVT is no longer visualized  - Surveillance doppler 5/7: Persistence of DVT identified within the left soleal and gastrocnemius veins. No propogation  - NSGY: no full dose AC for at least 4 weeks from surgery  - Discussed with hospitalist. Continue PPX dose now, surveillance dopplers weekly  - Continue Lovenox 40 mg QD  - Next doppler Tuesday 5/14    # Hypernatremia: resolved  - PEG flushes free water 200 cc Q6H  - Na 146 5/13  - BMP 5/16    # hypokalemia  -  3.3 K -> supplemented with 40meq of K po x2 on 5/13  - BMP 5/16    # Gastric Hematoma post PEG placement  -  PEG placement attempted 4/19 for persistent dysphagia, but was aborted due to growing hematoma noted in gastric wall on endoscopy after trocar placement  - CT Abdomen and Pelvis w/ IV Cont showed gastric wall hematoma without intraluminal or extraluminal hemorrhage  - PEG placed 4/23  - Hgb 9.7 4/29--> 9.8 5/2 --> 10.2 5/6 ->10.7 5/9 -> 10.4 5/13  - CBC 5/16    # Pain  - Tylenol 650 mg Q6H PRN     # GI / Bowel  - Senna qHS  - Miralax BID  - bisacodyl 5 mg QHS  - GI ppx: protonix 40 mg QD    #  / Bladder  - Continue cardura 2 mg QHS   - d/c'd bladder scans on 5/6    # Skin / Pressure injury  - left toe scabs, right lateral foot callus   - podiatry consult appreciated 5/2. Callus shaved down, no additional treatment necessary    # Diet/Dysphagia:  - advanced to pureed solids thin liquids with ensure plus supplementation 5/2 post  MBS  - Supplements: MVI    Outpatient Follow-up:  Perlita Varela  Neurosurgery  [address] [place] [address], [address]  [place] [address]  Phone: [phone]  Fax: [phone]  Follow Up Time:    Code Status/Emergency Contact:  FULL CODE  Pierre Patrick - son [phone]

## 2024-05-14 PROCEDURE — 99232 SBSQ HOSP IP/OBS MODERATE 35: CPT | Mod: GC

## 2024-05-14 PROCEDURE — 93970 EXTREMITY STUDY: CPT | Mod: 26

## 2024-05-14 PROCEDURE — 99232 SBSQ HOSP IP/OBS MODERATE 35: CPT

## 2024-05-14 RX ADMIN — ATORVASTATIN CALCIUM 40 MILLIGRAM(S): 80 TABLET, FILM COATED ORAL at 21:11

## 2024-05-14 RX ADMIN — ENOXAPARIN SODIUM 40 MILLIGRAM(S): 100 INJECTION SUBCUTANEOUS at 17:48

## 2024-05-14 RX ADMIN — SENNA PLUS 10 MILLILITER(S): 8.6 TABLET ORAL at 21:12

## 2024-05-14 RX ADMIN — PANTOPRAZOLE SODIUM 40 MILLIGRAM(S): 20 TABLET, DELAYED RELEASE ORAL at 05:31

## 2024-05-14 RX ADMIN — POLYETHYLENE GLYCOL 3350 17 GRAM(S): 17 POWDER, FOR SOLUTION ORAL at 11:51

## 2024-05-14 RX ADMIN — Medication 15 MILLILITER(S): at 11:51

## 2024-05-14 RX ADMIN — Medication 2 MILLIGRAM(S): at 21:11

## 2024-05-14 NOTE — PROGRESS NOTE ADULT - ATTENDING COMMENTS
Vitals reviewed, stable. Patient seen while sitting in the wheelchair, states she slept. Denies pain. Intermittent command following, patient is apraxic.  Surveillance doppler 5/7: Persistence of DVT identified within the left soleal and gastrocnemius veins. No propogation, pending repeat doppler today.   Restlessness improved with propanolol

## 2024-05-14 NOTE — PROGRESS NOTE ADULT - SUBJECTIVE AND OBJECTIVE BOX
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 Initially presented to [place] with headaches and became unresponsive, was intubated, and transferred to [facility]. On arrival to [facility] patient w/ PERRL, +cough/gag, 2/5 spontaneous movement in LUE, otherwise no movement. Given 1 unit platelets and R frontal EVD placed with high opening pressure. Patient underwent Right pterional craniotomy and clipping of a ruptured right PCOM aneurysm on 3/26.     On 3/29, EVD displaced and replaced in situ, complicated by EVD tract acute hemorrhage and new focus of infarction with hemorrhage in the right basal ganglia/corona radiata. Course further complicated by hydrocephalus with ICP crisis, vasospasm requiring cerebral angiography and IA milrinone and verapamil. MRI (4/8) with widespread ischemic stroke b/l, as per neuro IR no further intervention. Patient extubated on 4/3, re-intubated 4/8, and extubated again on 4/9. EVD removed on 4/15. CTH post removal stable.     Course also complicated by bradycardia treated with theophyline, now resolved and discontinued. Initial Echo (3/26) with severe cardiomyopathy, EF 36%, regional wall motion abnormality. Resolved on repeat TTE performed 4/9/24, normal EF 66% with no WMA. Patient first noted with left soleal DVT on Duplex 3/28. Duplex (4/10) with stable b/l soleal DVT with extension to right posterior tibial DVT.  Last LE duplex with stable bilateral soleal DVT, [facility] cardiology recommended repeat surveillance duplex on 4/30. On Lovenox ppx.     PEG placement attempted 4/19 for persistent dysphagia, but was aborted due to growing hematoma noted in gastric wall on endoscopy after trocar placement. CT Abdomen and Pelvis w/ IV Cont showed gastric wall hematoma without intraluminal or extraluminal hemorrhage. PEG successfully placed on 4/23.     Patient evaluated by PT/OT and was recommended for acute inpatient rehab. Patient is medically stable for discharge to [facility] on 4/26.  (26 Apr 2024 14:34)      SUBJECTIVE: No acute overnight events. Patient seen and examined in the AM by bedside. No complaints of SOB/CP/NV. No new numbness tingling or weakness. Feels cold, but otherwise doing well. Working hard in therapies. Repeat duplex ordered for today (LLE) monitor DVT. Patient on chemoppx. VSS. Afebrile.     ROS  Negative except for the above mentioned.       LABS:                               10.4   5.26  )-----------( 248      ( 13 May 2024 05:27 )             32.6     05-13    146<H>  |  110<H>  |  7   ----------------------------<  94  3.3<L>   |  27  |  0.51    Ca    9.3      13 May 2024 05:27    TPro  6.7  /  Alb  2.8<L>  /  TBili  0.3  /  DBili  x   /  AST  23  /  ALT  35  /  AlkPhos  122<H>  05-13    LIVER FUNCTIONS - ( 13 May 2024 05:27 )  Alb: 2.8 g/dL / Pro: 6.7 g/dL / ALK PHOS: 122 U/L / ALT: 35 U/L / AST: 23 U/L / GGT: x           Urinalysis Basic - ( 13 May 2024 05:27 )    Color: x / Appearance: x / SG: x / pH: x  Gluc: 94 mg/dL / Ketone: x  / Bili: x / Urobili: x   Blood: x / Protein: x / Nitrite: x   Leuk Esterase: x / RBC: x / WBC x   Sq Epi: x / Non Sq Epi: x / Bacteria: x    MEDICATIONS  (STANDING):  atorvastatin 40 milliGRAM(s) Oral at bedtime  doxazosin 2 milliGRAM(s) Oral at bedtime  enoxaparin Injectable 40 milliGRAM(s) SubCutaneous <User Schedule>  multivitamin/minerals/iron Oral Solution (CENTRUM) 15 milliLiter(s) Oral daily  pantoprazole   Suspension 40 milliGRAM(s) Oral before breakfast  polyethylene glycol 3350 17 Gram(s) Oral daily  potassium chloride   Powder 40 milliEquivalent(s) Oral every 4 hours  propranolol 10 milliGRAM(s) Oral every 12 hours  senna Syrup 10 milliLiter(s) Oral at bedtime    MEDICATIONS  (PRN):  acetaminophen   Oral Liquid .. 650 milliGRAM(s) Oral every 6 hours PRN Mild Pain (1 - 3)  melatonin 3 milliGRAM(s) Oral at bedtime PRN Insomnia  petrolatum white Ointment 1 Application(s) Topical every 12 hours PRN dryness    PAST MEDICAL & SURGICAL HISTORY:  No known    Allergie  No Known Allergies  Intolerances    PHYSICAL EXAM  General: No acute distress  CV: RRR  Lungs: Breathing comfortably on RA   Abd: Soft, mildly distended   Ext:  No peripheral edema   Neuro:            Mental status: A&O to person/place/time             CN: II-VII intact             Strength: 5/5 Right and Left UE/LE             Sensation: Intact throughout            Gait: In bed     ASSESSMENT/PLAN:   57 y/o F with no known PMH, h/o fell one month prior to admission, who was transferred from [place] to [facility] on 3/26/24 with diffuse SAH w/ small IVH, 2/2 ruptured posterolaterally projecting 2.9 x 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 Patient s/p right frontal EVD and right pterional craniotomy with clipping of a ruptured right PCOM aneurysm on 3/26. Course complicated by EVD tract hemorrhage, new right basal ganglia/corona radiata infarct, hydrocephalus with ICP crisis, vasospasm requiring IA milrinone/verapimil, and new widespread ischemic strokes. Patient also found to have HFrEF (now recovered), bradycardia (resolved), b/l soleal DVT. s/p PEG placement initially complicated by gastric wall hematoma    # SAH s/p ruptured PCOM aneurysm s/p right pterional craniotomy and clipping 3/26  -  vasospasm s/p cerebral angiography and IA milrinone and verapamil  - MRI (4/8): Multifocal acute to subacute infarction. This extensively involves each MCA posterior distribution, the right basal ganglia, the anterior parasagittal vertex CINDY distribution and an additional smaller lesion in the right cerebellum  - Staple removed 4/30  - patio pass in WC with staff and/or family present at all times  -5/14: No new neurological deficits     # Bradycardia: resolved  - was on theophyline, dc'd    # Akasthesia/Restlessness  - Propranolol 10 mg started q 12 hrs w/ parameters 5/9-- improved 5/13  - BP (135/65 this AM) and HR stable (64-78), tolerating 5/13    # Insomnia  -  Melatonin 3mg prn    # HFrEF  - Initial Echo (3/26) with severe cardiomyopathy, EF 36%, regional wall motion abnormality  - Repeat TTE 4/9/24, normal EF 66% with no WMA.    # HLD  -  lipitor 40 mg QHS    # B/l DVT   - Left soleal DVT first noted 3/28, right soleal DVT fist noted 4/3  - Duplex (4/23): persistent bilateral soleal vein DVT  - duplex 4/30: There is persistence of DVT identified within the left soleal vein. On the current evaluation DVT is also evident within the left gastrocnemius vein, new. Previously identified right soleal DVT is no longer visualized  - Surveillance doppler 5/7: Persistence of DVT identified within the left soleal and gastrocnemius veins. No propogation  - NSGY: no full dose AC for at least 4 weeks from surgery  - Discussed with hospitalist. Continue PPX dose now, surveillance dopplers weekly  - Continue Lovenox 40 mg QD  - Next doppler Tuesday 5/14, will follow up results     # Hypernatremia: resolved  - PEG flushes free water 200 cc Q6H  - Na 146 5/13  - BMP 5/16    # hypokalemia  -  3.3 K -> supplemented with 40meq of K po x2 on 5/13  - 5/14: Will follow up BMP on 5/16     # Gastric Hematoma post PEG placement  -  PEG placement attempted 4/19 for persistent dysphagia, but was aborted due to growing hematoma noted in gastric wall on endoscopy after trocar placement  - CT Abdomen and Pelvis w/ IV Cont showed gastric wall hematoma without intraluminal or extraluminal hemorrhage  - PEG placed 4/23  - Hgb 9.7 4/29--> 9.8 5/2 --> 10.2 5/6 ->10.7 5/9 -> 10.4 5/13  - 5/14: Will follow up CBC on 5/16    # Pain  - Tylenol 650mg q6h PRN     # GI / Bowel  - Senna qHS  - Miralax BID  - GI ppx: protonix 40 mg QD    #  / Bladder  - Continue cardura 2 mg QHS   - d/c'd bladder scans on 5/6    # Skin / Pressure injury  - left toe scabs, right lateral foot callus   - podiatry consult appreciated 5/2. Callus shaved down, no additional treatment necessary    # Diet/Dysphagia  - Diet: pureed solids thin liquids with ensure plus supplementation 5/2 post  MBS  - Supplements: MVI    Outpatient Follow-up:  Perlita Varela  Neurosurgery  [address] [facility] [address], [address]  [place] [address]  Phone: [phone]  Fax: [phone]  Follow Up Time:    Code Status/Emergency Contact:  FULL CODE  Pierre Patrick - son [phone]                     Patient is a 58y old  Female who presents with a chief complaint of SAH (07 May 2024 11:22)    HPI:  57 y/o F with no PMH, fell one month prior to admission, transferred from [place] to [facility] on 3/26/24 for diffuse SAH w/ small IVH, 2/2 ruptured posterolaterally projecting 2.9 x 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 Initially presented to [place] with headaches and became unresponsive, was intubated, and transferred to [facility]. On arrival to [facility] patient w/ PERRL, +cough/gag, 2/5 spontaneous movement in LUE, otherwise no movement. Given 1 unit platelets and R frontal EVD placed with high opening pressure. Patient underwent Right pterional craniotomy and clipping of a ruptured right PCOM aneurysm on 3/26.     On 3/29, EVD displaced and replaced in situ, complicated by EVD tract acute hemorrhage and new focus of infarction with hemorrhage in the right basal ganglia/corona radiata. Course further complicated by hydrocephalus with ICP crisis, vasospasm requiring cerebral angiography and IA milrinone and verapamil. MRI (4/8) with widespread ischemic stroke b/l, as per neuro IR no further intervention. Patient extubated on 4/3, re-intubated 4/8, and extubated again on 4/9. EVD removed on 4/15. CTH post removal stable.     Course also complicated by bradycardia treated with theophyline, now resolved and discontinued. Initial Echo (3/26) with severe cardiomyopathy, EF 36%, regional wall motion abnormality. Resolved on repeat TTE performed 4/9/24, normal EF 66% with no WMA. Patient first noted with left soleal DVT on Duplex 3/28. Duplex (4/10) with stable b/l soleal DVT with extension to right posterior tibial DVT.  Last LE duplex with stable bilateral soleal DVT, [facility] cardiology recommended repeat surveillance duplex on 4/30. On Lovenox ppx.     PEG placement attempted 4/19 for persistent dysphagia, but was aborted due to growing hematoma noted in gastric wall on endoscopy after trocar placement. CT Abdomen and Pelvis w/ IV Cont showed gastric wall hematoma without intraluminal or extraluminal hemorrhage. PEG successfully placed on 4/23.     Patient evaluated by PT/OT and was recommended for acute inpatient rehab. Patient is medically stable for discharge to [facility] on 4/26.  (26 Apr 2024 14:34)      SUBJECTIVE: No acute overnight events. Patient seen and examined in the AM by bedside. No complaints of SOB/CP/NV. No new numbness tingling or weakness. Feels cold, but otherwise doing well. Working hard in therapies. Repeat duplex ordered for today (LLE) monitor DVT. Patient on chemoppx. VSS. Afebrile.     ROS  Negative except for the above mentioned.       LABS:                               10.4   5.26  )-----------( 248      ( 13 May 2024 05:27 )             32.6     05-13    146<H>  |  110<H>  |  7   ----------------------------<  94  3.3<L>   |  27  |  0.51    Ca    9.3      13 May 2024 05:27    TPro  6.7  /  Alb  2.8<L>  /  TBili  0.3  /  DBili  x   /  AST  23  /  ALT  35  /  AlkPhos  122<H>  05-13    LIVER FUNCTIONS - ( 13 May 2024 05:27 )  Alb: 2.8 g/dL / Pro: 6.7 g/dL / ALK PHOS: 122 U/L / ALT: 35 U/L / AST: 23 U/L / GGT: x           Urinalysis Basic - ( 13 May 2024 05:27 )    Color: x / Appearance: x / SG: x / pH: x  Gluc: 94 mg/dL / Ketone: x  / Bili: x / Urobili: x   Blood: x / Protein: x / Nitrite: x   Leuk Esterase: x / RBC: x / WBC x   Sq Epi: x / Non Sq Epi: x / Bacteria: x    MEDICATIONS  (STANDING):  atorvastatin 40 milliGRAM(s) Oral at bedtime  doxazosin 2 milliGRAM(s) Oral at bedtime  enoxaparin Injectable 40 milliGRAM(s) SubCutaneous <User Schedule>  multivitamin/minerals/iron Oral Solution (CENTRUM) 15 milliLiter(s) Oral daily  pantoprazole   Suspension 40 milliGRAM(s) Oral before breakfast  polyethylene glycol 3350 17 Gram(s) Oral daily  potassium chloride   Powder 40 milliEquivalent(s) Oral every 4 hours  propranolol 10 milliGRAM(s) Oral every 12 hours  senna Syrup 10 milliLiter(s) Oral at bedtime    MEDICATIONS  (PRN):  acetaminophen   Oral Liquid .. 650 milliGRAM(s) Oral every 6 hours PRN Mild Pain (1 - 3)  melatonin 3 milliGRAM(s) Oral at bedtime PRN Insomnia  petrolatum white Ointment 1 Application(s) Topical every 12 hours PRN dryness    PAST MEDICAL & SURGICAL HISTORY:  No known    Allergie  No Known Allergies  Intolerances    PHYSICAL EXAM  General: No acute distress  CV: RRR  Lungs: Breathing comfortably on RA   Abd: Soft, mildly distended   Ext:  No peripheral edema   Neuro:            Mental status: A&O to person/place/time             CN: II-VII intact             Strength: 5/5 Right and Left UE/LE             Sensation: Intact throughout            Gait: In bed

## 2024-05-14 NOTE — PROGRESS NOTE ADULT - ASSESSMENT
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 Patient s/p right frontal EVD and right pterional craniotomy with clipping of a ruptured right PCOM aneurysm on 3/26. Course complicated by EVD tract hemorrhage, new right basal ganglia/corona radiata infarct, hydrocephalus with ICP crisis, vasospasm requiring IA milrinone/verapimil, and new widespread ischemic strokes. Patient also found to have HFrEF (now recovered), bradycardia (resolved), b/l soleal DVT. s/p PEG placement initially complicated by gastric wall hematoma    # SAH s/p ruptured PCOM aneurysm s/p right pterional craniotomy and clipping 3/26  -  vasospasm s/p cerebral angiography and IA milrinone and verapamil  - MRI (4/8): Multifocal acute to subacute infarction. This extensively involves each MCA posterior distribution, the right basal ganglia, the anterior parasagittal vertex CINDY distribution and an additional smaller lesion in the right cerebellum  - Staple removed 4/30  - patio pass in WC with staff and/or family present at all times  -5/14: No new neurological deficits     # Bradycardia: resolved  - was on theophyline, dc'd- HR stable    # Akasthesia/Restlessness  - Propranolol 10 mg started q 12 hrs w/ parameters 5/9-- improved 5/13  - BP (122/78 this AM) and HR stable (68-79), tolerating    # Insomnia  -  Melatonin 3mg prn    # HFrEF  - Initial Echo (3/26) with severe cardiomyopathy, EF 36%, regional wall motion abnormality  - Repeat TTE 4/9/24, normal EF 66% with no WMA.    # HLD  -  lipitor 40 mg QHS    # B/l DVT   - Left soleal DVT first noted 3/28, right soleal DVT fist noted 4/3  - Duplex (4/23): persistent bilateral soleal vein DVT  - duplex 4/30: There is persistence of DVT identified within the left soleal vein. On the current evaluation DVT is also evident within the left gastrocnemius vein, new. Previously identified right soleal DVT is no longer visualized  - Surveillance doppler 5/7: Persistence of DVT identified within the left soleal and gastrocnemius veins. No propogation  - NSGY: no full dose AC for at least 4 weeks from surgery  - Discussed with hospitalist. Continue PPX dose now, surveillance dopplers weekly  - Continue Lovenox 40 mg QD  - Next doppler today, pending    # Hypernatremia: resolved  - PEG flushes free water 200 cc Q6H  - Na 146 5/13  - BMP 5/16    # hypokalemia  -  3.3 K -> supplemented with 40meq of K po x2 on 5/13  - 5/14: Will follow up BMP on 5/16     # Gastric Hematoma post PEG placement  -  PEG placement attempted 4/19 for persistent dysphagia, but was aborted due to growing hematoma noted in gastric wall on endoscopy after trocar placement  - CT Abdomen and Pelvis w/ IV Cont showed gastric wall hematoma without intraluminal or extraluminal hemorrhage  - PEG placed 4/23  - Hgb 9.7 4/29--> 9.8 5/2 --> 10.2 5/6 ->10.7 5/9 -> 10.4 5/13  - CBC on 5/16    # Pain  - Tylenol 650mg q6h PRN     # GI / Bowel  - Senna qHS  - Miralax BID  - GI ppx: protonix 40 mg QD    #  / Bladder  - Continue cardura 2 mg QHS   - d/c'd bladder scans on 5/6    # Skin / Pressure injury  - left toe scabs, right lateral foot callus   - podiatry consult appreciated 5/2. Callus shaved down, no additional treatment necessary    # Diet/Dysphagia  - Diet: pureed solids thin liquids with ensure plus supplementation 5/2 post MBS  - Supplements: MVI    Outpatient Follow-up:  Perlita Varela  Neurosurgery  [address], [address]  [place] [address]  Phone: [phone]  Fax: [phone]  Follow Up Time:    Code Status/Emergency Contact:  FULL CODE  Pierre Patrick - son [phone]

## 2024-05-14 NOTE — PROGRESS NOTE ADULT - SUBJECTIVE AND OBJECTIVE BOX
no acute events overnight      PHYSICAL EXAM:    Vital Signs Last 24 Hrs  T(F): 97.9 (14 May 2024 08:21), Max: 98.2 (13 May 2024 19:58)  HR: 79 (14 May 2024 08:21) (68 - 79)  BP: 125/72 (14 May 2024 08:21) (122/78 - 140/80)  RR: 15 (14 May 2024 08:21) (14 - 15)  SpO2: 95% (14 May 2024 08:21) (95% - 97%)  I&O's Summary      GENERAL: NAD  HEENT: NCAT  CHEST/LUNG: No increased WOB, Clear to percussion bilaterally; No rales, rhonchi, wheezing  HEART: Regular rate and rhythm; No murmurs  ABDOMEN: Soft, Nontender, Nondistended; Bowel sounds present  MUSCULOSKELETAL/EXTREMITIES:  2+ Peripheral Pulses, No LE edema  PSYCH: Appropriate affect, Alert & Oriented    LABS:                        10.4   5.26  )-----------( 248      ( 13 May 2024 05:27 )             32.6       05-13    146  |  110  |  7   ----------------------------<  94  3.3   |  27  |  0.51    Ca    9.3      13 May 2024 05:27    TPro  6.7  /  Alb  2.8  /  TBili  0.3  /  DBili  x   /  AST  23  /  ALT  35  /  AlkPhos  122  05-13                03-26 Chol 174 mg/dL LDL -- HDL 53 mg/dL Trig 82 mg/dL                  Urinalysis Basic - ( 13 May 2024 05:27 )    Color: x / Appearance: x / SG: x / pH: x  Gluc: 94 mg/dL / Ketone: x  / Bili: x / Urobili: x   Blood: x / Protein: x / Nitrite: x   Leuk Esterase: x / RBC: x / WBC x   Sq Epi: x / Non Sq Epi: x / Bacteria: x            MEDICATIONS  (STANDING):  atorvastatin 40 milliGRAM(s) Oral at bedtime  doxazosin 2 milliGRAM(s) Oral at bedtime  enoxaparin Injectable 40 milliGRAM(s) SubCutaneous <User Schedule>  multivitamin/minerals/iron Oral Solution (CENTRUM) 15 milliLiter(s) Oral daily  pantoprazole   Suspension 40 milliGRAM(s) Oral before breakfast  polyethylene glycol 3350 17 Gram(s) Oral daily  propranolol 10 milliGRAM(s) Oral every 12 hours  senna Syrup 10 milliLiter(s) Oral at bedtime    MEDICATIONS  (PRN):  acetaminophen   Oral Liquid .. 650 milliGRAM(s) Oral every 6 hours PRN Mild Pain (1 - 3)  melatonin 3 milliGRAM(s) Oral at bedtime PRN Insomnia  petrolatum white Ointment 1 Application(s) Topical every 12 hours PRN dryness      Care Discussed with Consultants/Other Providers: Yes

## 2024-05-14 NOTE — PROGRESS NOTE ADULT - ASSESSMENT
59 y/o F with no PMH, fell one month prior to admission, transferred from [place] to [facility] on 3/26/24 for diffuse SAH w/ small IVH, 2/2 ruptured aneurysm s/p EVD and craniotomy + clipping of R PCOM aneurysm on 3/26. 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 Patient found to have HFrEF (now recovered), bradycardia (placed on theophyline, now resolved), b/l soleal DVT (on Lovenox ppx). PEG placement initially complicated by gastric wall hematoma, subsequently successfully placed for persistent dysphagia. Patient now admitted for a multidisciplinary rehab program    # SAH s/p ruptured PCOM aneurysm  - R EVD placed 3/26 and removed 4/15  - s/p right pterional craniotomy and clipping of a ruptured right PCOM aneurysm on 3/26  - CTH (3/29): EVD tract acute hemorrhage and new focus of infarction with hemorrhage in the right basal ganglia/corona radiata  - complicated by vasospasm requiring cerebral angiography and IA milrinone and verapamil  - MRI (4/8): Multifocal acute to subacute infarction. This extensively involves each MCA posterior distribution, the right basal ganglia, the anterior parasagittal vertex CINDY distribution and an additional smaller lesion in the right cerebellum  - labs reviewed 5/13 -- stable.      # B/l DVT   - Left soleal DVT first noted 3/28, right soleal DVT fist noted 4/3  - Most recent Duplex (4/23): persistent bilateral soleal vein DVT  - vasc cardiology recommended repeat surveillance duplex on 4/30.  - repeat duplex 04/30:  persistent left soleal vein DVT + also left gastrocnemius vein DVT (new).   Right soleal vein DVT no longer visualized.  - Continue Lovenox 40   - repeat duplex this week . vasc cardio f/up prior to discharge or after next surveillance duplex.    # HLD  - continue Lipitor     # HFrEF  - Initial Echo (3/26) with severe cardiomyopathy, EF 36%, regional wall motion abnormality  - Resolved on repeat TTE performed 4/9/24, normal EF 66% with no WMA.    # Gastric Hematoma  - PEG placement attempted 4/19 for persistent dysphagia, but was aborted due to growing hematoma noted in gastric wall on endoscopy after trocar placement  - CT Abdomen and Pelvis w/ IV Cont showed gastric wall hematoma without intraluminal or extraluminal hemorrhage  - monitor H/H, stable at 10.7    # transaminitis- improved   - monitor    #DVT ppx  -Lovenox      discussed with primary team.

## 2024-05-15 PROCEDURE — 96116 NUBHVL XM PHYS/QHP 1ST HR: CPT

## 2024-05-15 PROCEDURE — 99233 SBSQ HOSP IP/OBS HIGH 50: CPT | Mod: GC

## 2024-05-15 PROCEDURE — 99232 SBSQ HOSP IP/OBS MODERATE 35: CPT

## 2024-05-15 RX ORDER — SENNA PLUS 8.6 MG/1
2 TABLET ORAL AT BEDTIME
Refills: 0 | Status: DISCONTINUED | OUTPATIENT
Start: 2024-05-15 | End: 2024-05-18

## 2024-05-15 RX ORDER — POLYETHYLENE GLYCOL 3350 17 G/17G
17 POWDER, FOR SOLUTION ORAL DAILY
Refills: 0 | Status: DISCONTINUED | OUTPATIENT
Start: 2024-05-15 | End: 2024-05-15

## 2024-05-15 RX ADMIN — ENOXAPARIN SODIUM 40 MILLIGRAM(S): 100 INJECTION SUBCUTANEOUS at 17:58

## 2024-05-15 RX ADMIN — ATORVASTATIN CALCIUM 40 MILLIGRAM(S): 80 TABLET, FILM COATED ORAL at 21:43

## 2024-05-15 RX ADMIN — Medication 2 MILLIGRAM(S): at 21:43

## 2024-05-15 RX ADMIN — Medication 15 MILLILITER(S): at 12:37

## 2024-05-15 RX ADMIN — PANTOPRAZOLE SODIUM 40 MILLIGRAM(S): 20 TABLET, DELAYED RELEASE ORAL at 06:22

## 2024-05-15 NOTE — CHART NOTE - NSCHARTNOTEFT_GEN_A_CORE
Brief Nutrition Note:     Per RN, patient with diarrhea. Will D/C Ensure Plus High Protein as patient may not be tolerating supplements. Recommend adding probiotic smoothie BID + mashed banana daily at breakfast.    RD to remain available and follow up per protocol:  Ubaldo Steve RD  Ext: [phone] or via [place] TEAMS

## 2024-05-15 NOTE — PROGRESS NOTE ADULT - ASSESSMENT
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 Patient s/p right frontal EVD and right pterional craniotomy with clipping of a ruptured right PCOM aneurysm on 3/26. Course complicated by EVD tract hemorrhage, new right basal ganglia/corona radiata infarct, hydrocephalus with ICP crisis, vasospasm requiring IA milrinone/verapimil, and new widespread ischemic strokes. Patient also found to have HFrEF (now recovered), bradycardia (resolved), b/l soleal DVT. s/p PEG placement initially complicated by gastric wall hematoma    # SAH s/p ruptured PCOM aneurysm s/p right pterional craniotomy and clipping 3/26  -  vasospasm s/p cerebral angiography and IA milrinone and verapamil  - MRI (4/8): Multifocal acute to subacute infarction. This extensively involves each MCA posterior distribution, the right basal ganglia, the anterior parasagittal vertex CINDY distribution and an additional smaller lesion in the right cerebellum  - Staple removed 4/30  - patio pass in WC with staff and/or family present at all times    # Bradycardia: resolved  - was on theophyline, dc'd- HR stable-- 63-74 (5/15)    # Akasthesia/Restlessness  - Propranolol 10 mg started q 12 hrs w/ parameters 5/9-- improved 5/13  - BP (119/73) this AM    # Insomnia  -  Melatonin 3mg prn    # HFrEF  - Initial Echo (3/26) with severe cardiomyopathy, EF 36%, regional wall motion abnormality  - Repeat TTE 4/9/24, normal EF 66% with no WMA.    # HLD  -  lipitor 40 mg QHS    # B/l DVT   - Left soleal DVT first noted 3/28, right soleal DVT fist noted 4/3  - Duplex (4/23): persistent bilateral soleal vein DVT  - duplex 4/30: There is persistence of DVT identified within the left soleal vein. On the current evaluation DVT is also evident within the left gastrocnemius vein, new. Previously identified right soleal DVT is no longer visualized  - Surveillance doppler 5/7: Persistence of DVT identified within the left soleal and gastrocnemius veins. No propogation  - NSGY: no full dose AC for at least 4 weeks from surgery  - Discussed with hospitalist. Continue PPX dose now, surveillance dopplers weekly  - Continue Lovenox 40 mg QD  - Doppler 5/14 preformed, reviewed- Persistent gastrocnemius DVT in the left lower extremity.Interval resolution of previously visualized left soleal vein DVT. No right-sided DVT.    # Hypernatremia: resolved  - PEG flushes free water 200 cc Q6H  - Na 146 5/13  - BMP 5/16    # hypokalemia  -  3.3 K -> supplemented with 40meq of K po x2 on 5/13  -follow up BMP on 5/16     # Gastric Hematoma post PEG placement  -  PEG placement attempted 4/19 for persistent dysphagia, but was aborted due to growing hematoma noted in gastric wall on endoscopy after trocar placement  - CT Abdomen and Pelvis w/ IV Cont showed gastric wall hematoma without intraluminal or extraluminal hemorrhage  - PEG placed 4/23  - Hgb 9.7 4/29--> 9.8 5/2 --> 10.2 5/6 ->10.7 5/9 -> 10.4 5/13  - CBC on 5/16    # Pain  - Tylenol 650mg q6h PRN     # GI / Bowel  - Senna qHS  - Miralax BID  - GI ppx: protonix 40 mg QD    #  / Bladder  - Continue cardura 2 mg QHS   - d/c'd bladder scans on 5/6    # Skin / Pressure injury  - left toe scabs, right lateral foot callus   - podiatry consult appreciated 5/2. Callus shaved down, no additional treatment necessary    # Diet/Dysphagia  - Diet: pureed solids thin liquids with ensure plus supplementation 5/2 post MBS  - Supplements: MVI    Outpatient Follow-up:  Perlita Varela  Neurosurgery  [address] [place] [address], [address]  [place] [address]  Phone: [phone]  Fax: [phone]  Follow Up Time:    Code Status/Emergency Contact:  FULL CODE  Pierre Patrick - son [phone]      Interdisciplinary team meeting today with rehab team- doctor, speech therapist, occupational therapist, physical therapist, social work and nursing where medical updates provided, progress with therapies and goals discussed.              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 Patient s/p right frontal EVD and right pterional craniotomy with clipping of a ruptured right PCOM aneurysm on 3/26. Course complicated by EVD tract hemorrhage, new right basal ganglia/corona radiata infarct, hydrocephalus with ICP crisis, vasospasm requiring IA milrinone/verapimil, and new widespread ischemic strokes. Patient also found to have HFrEF (now recovered), bradycardia (resolved), b/l soleal DVT. s/p PEG placement initially complicated by gastric wall hematoma    # SAH s/p ruptured PCOM aneurysm s/p right pterional craniotomy and clipping 3/26  -  vasospasm s/p cerebral angiography and IA milrinone and verapamil  - MRI (4/8): Multifocal acute to subacute infarction. This extensively involves each MCA posterior distribution, the right basal ganglia, the anterior parasagittal vertex CINDY distribution and an additional smaller lesion in the right cerebellum  - Staple removed 4/30  - patio pass in WC with staff and/or family present at all times    # Bradycardia: resolved  - was on theophyline, dc'd- HR stable-- 63-74 (5/15)    # Akasthesia/Restlessness  - Propranolol 10 mg started q 12 hrs w/ parameters 5/9-- improved 5/13  - BP (119/73) this AM    # Insomnia  -  Melatonin 3mg prn    # HFrEF  - Initial Echo (3/26) with severe cardiomyopathy, EF 36%, regional wall motion abnormality  - Repeat TTE 4/9/24, normal EF 66% with no WMA.    # HLD  -  lipitor 40 mg QHS    # B/l DVT   - Left soleal DVT first noted 3/28, right soleal DVT fist noted 4/3  - Duplex (4/23): persistent bilateral soleal vein DVT  - duplex 4/30: There is persistence of DVT identified within the left soleal vein. On the current evaluation DVT is also evident within the left gastrocnemius vein, new. Previously identified right soleal DVT is no longer visualized  - Surveillance doppler 5/7: Persistence of DVT identified within the left soleal and gastrocnemius veins. No propogation  - NSGY: no full dose AC for at least 4 weeks from surgery  - Discussed with hospitalist. Continue PPX dose now, surveillance dopplers weekly  - Continue Lovenox 40 mg QD  - Doppler 5/14 preformed, reviewed- Persistent gastrocnemius DVT in the left lower extremity.Interval resolution of previously visualized left soleal vein DVT. No right-sided DVT.    # Hypernatremia: resolved  - PEG flushes free water 200 cc Q6H  - Na 146 5/13  - BMP 5/16    # hypokalemia  -  3.3 K -> supplemented with 40meq of K po x2 on 5/13  -follow up BMP on 5/16     # Gastric Hematoma post PEG placement  -  PEG placement attempted 4/19 for persistent dysphagia, but was aborted due to growing hematoma noted in gastric wall on endoscopy after trocar placement  - CT Abdomen and Pelvis w/ IV Cont showed gastric wall hematoma without intraluminal or extraluminal hemorrhage  - PEG placed 4/23  - Hgb 9.7 4/29--> 9.8 5/2 --> 10.2 5/6 ->10.7 5/9 -> 10.4 5/13  - CBC on 5/16    # Pain  - Tylenol 650mg q6h PRN     # GI / Bowel  - Senna qHS  - Miralax BID  - GI ppx: protonix 40 mg QD    #  / Bladder  - Continue cardura 2 mg QHS   - d/c'd bladder scans on 5/6    # Skin / Pressure injury  - left toe scabs, right lateral foot callus   - podiatry consult appreciated 5/2. Callus shaved down, no additional treatment necessary    # Diet/Dysphagia  - Diet: pureed solids thin liquids with ensure plus supplementation 5/2 post MBS  - Supplements: MVI    Outpatient Follow-up:  Perlita Varela  Neurosurgery  [address] [place] [address], [address]  [place] [address]  Phone: [phone]  Fax: [phone]  Follow Up Time:    Code Status/Emergency Contact:  FULL CODE  Pierre Patrick - son [phone]      Interdisciplinary team meeting today with rehab team- doctor, speech therapist, occupational therapist, physical therapist, social work and nursing where medical updates provided, progress with therapies and goals discussed.   Mobility Discussion  PT - Bed Mobility (Mobility) Open - Barrier  Current Status: 5/14: Min/ModA  5/8: max to total assist (depending on Pt's willingness to participate)  Long Term Goals  04/27/2024 11:37 AM - Active  3 weeks: CG/Roberto for all bed mobility  PT - Bed/Chair/Wheelchair (Mobility) Open - Barrier  Current Status: 5/14: min/mod assist (inconsistent)  Long Term Goals  04/27/2024 11:39 AM - Active  3 weeks:  CG/Roberto for all transfers with or without RW  PT - Walk (Mobility) Open - Barrier  Current Status: 5/14: Pt ambulated 100 feet with hand held assist on left UE and  moderate assist.  5/13: 100 feet with RW and moderate assist  Long Term Goals  04/27/2024 11:40 AM - Active  3 weeks:  100 feet with RW and Roberto  PT - Stairs (Mobility) Open - Barrier  Current Status: 5/14: not performed  5/10: Pt negotiated 3, 4" steps with mod assist of 1, min assist of another for  safety due to high distractability, impaired judgement and safety awareness  Long Term Goals  04/27/2024 11:41 AM - Active  3 weeks:  4 steps with 2 rails Roberto  OT - Toilet Transfers (Mobility)  Current Status: 5/13- mod assist- continue to recommend 2 person for safety 2/2  motor restlessness  Long Term Goals  04/27/2024 02:51 PM - Active  Within 2 weeks, pt. will complete toilet transfer with min A with DME.  Self Care Discussion    OT - Dressing (Lower) (Self Care)  Current Status: 5/10- max assist  Long Term Goals  04/27/2024 02:51 PM - Active  Within 2 weeks, pt. will complete LB dressing with min A using esther-tecnhique  OT - Dressing (Upper) (Self Care)  Current Status: 5/10- supervision  Long Term Goals  04/27/2024 02:51 PM - Met  Within 2 weeks, pt. will complete UB dressing with min A using esther-technique.  Mobility Discussion    OT - Tub/Shower Transfers (Mobility)  Current Status: 5/7- unsafe at this time  Long Term Goals  04/27/2024 02:51 PM - Active  Within 2 weeks, pt. will complete toilet transfer with mod A with DME.  Cognition Discussion    SLP - Attention (Cognition)  Current Status: 5/10 attending to left with good results, overall  sustained/focused attention reuced  5/8 reduced overall sustained attention and reduced attention to left visual  field.  refocusing redirectioning required throughout.  5/7 unchanged from last session    Long Term Goals  04/27/2024 12:58 PM - Active  Improve attention to participate in FADLs with moderate-maximum assistance.  Communication Discussion    SLP - Comprehension (Communication)  Current Status: 5/14: 1 step 70%, simple yes/no 60%  5/13 1-step 80%, simple y/n 60%  5/9 Simple y/m 80% moderate y/n 90%  5/7 Simple safety y/n 50% accuracy. 1-step 50%, Personally relevant y/n 100%,  simple y/n 90%, moderarte y/n 90%  Long Term Goals  04/27/2024 01:51 PM - Active  Patient will improve receptive language skills for functional communication with  moderate-maximum assistance.  SLP - Expression (Communication)  Current Status: 5/14: Responsive naming 40%, increasing to 70% given binary  choices, named 3 items in a category  5/13 oriented to name, place and month but not situation or year., responsive  naming 85%, answer WH-questions 68%  5/10 divergent categorization of 5 items -50%  5/9 oriented to name, place situation and year but not month, auto sequences  1-21, Mon-friday and jan-august, conv. phrase completion 100%, antonyms 30%,  conv. 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 Patient s/p right frontal EVD and right pterional craniotomy with clipping of a ruptured right PCOM aneurysm on 3/26. Course complicated by EVD tract hemorrhage, new right basal ganglia/corona radiata infarct, hydrocephalus with ICP crisis, vasospasm requiring IA milrinone/verapimil, and new widespread ischemic strokes. Patient also found to have HFrEF (now recovered), bradycardia (resolved), b/l soleal DVT. s/p PEG placement initially complicated by gastric wall hematoma    # SAH s/p ruptured PCOM aneurysm s/p right pterional craniotomy and clipping 3/26  -  vasospasm s/p cerebral angiography and IA milrinone and verapamil  - MRI (4/8): Multifocal acute to subacute infarction. This extensively involves each MCA posterior distribution, the right basal ganglia, the anterior parasagittal vertex CINDY distribution and an additional smaller lesion in the right cerebellum  - Staple removed 4/30  - patio pass in WC with staff and/or family present at all times    # Bradycardia: resolved  - was on theophyline, dc'd- HR stable-- (63 - 74) 5/16    # Akasthesia/Restlessness  - Propranolol 10 mg started q 12 hrs w/ parameters 5/9-- improved 5/13  - (119/73 - 129/70) past 24 hrs    # Insomnia  -  Melatonin 3mg prn    # HFrEF  - Initial Echo (3/26) with severe cardiomyopathy, EF 36%, regional wall motion abnormality  - Repeat TTE 4/9/24, normal EF 66% with no WMA.    # HLD  -  lipitor 40 mg QHS    # B/l DVT   - Left soleal DVT first noted 3/28, right soleal DVT fist noted 4/3  - Duplex (4/23): persistent bilateral soleal vein DVT  - duplex 4/30: There is persistence of DVT identified within the left soleal vein. On the current evaluation DVT is also evident within the left gastrocnemius vein, new. Previously identified right soleal DVT is no longer visualized  - Surveillance doppler 5/7: Persistence of DVT identified within the left soleal and gastrocnemius veins. No propogation  - NSGY: no full dose AC for at least 4 weeks from surgery  - Discussed with hospitalist. Continue PPX dose now, surveillance dopplers weekly  - Continue Lovenox 40 mg QD  - Doppler 5/14 preformed, reviewed- Persistent gastrocnemius DVT in the left lower extremity.Interval resolution of previously visualized left soleal vein DVT. No right-sided DVT.    # Hypernatremia: resolved  - PEG flushes free water 200 cc Q6H  - Na 146 5/13 -> 144 5/16    # hypokalemia  -  3.3 K -> supplemented with 40meq of K po x2 on 5/13 -> supplemented 5/16 at 3.4    # Gastric Hematoma post PEG placement  -  PEG placement attempted 4/19 for persistent dysphagia, but was aborted due to growing hematoma noted in gastric wall on endoscopy after trocar placement  - CT Abdomen and Pelvis w/ IV Cont showed gastric wall hematoma without intraluminal or extraluminal hemorrhage  - PEG placed 4/23  - Hgb 9.7 4/29--> 9.8 5/2 --> 10.2 5/6 ->10.7 5/9 -> 10.4 5/13 -> 10.1 5/16    # Pain  - Tylenol 650mg q6h PRN     # GI / Bowel  - Senna qHS  - Miralax BID  - GI ppx: protonix 40 mg QD    #  / Bladder  - Continue cardura 2 mg QHS   - d/c'd bladder scans on 5/6    # Skin / Pressure injury  - left toe scabs, right lateral foot callus   - podiatry consult appreciated 5/2. Callus shaved down, no additional treatment necessary    # Diet/Dysphagia  - Diet: pureed solids thin liquids with ensure plus supplementation 5/2 post MBS  - Supplements: MVI    Outpatient Follow-up:  Perlita Varela  Neurosurgery  [address] [place] [address], [address]  [place] [address]  Phone: [phone]  Fax: [phone]  Follow Up Time:    Code Status/Emergency Contact:  FULL CODE  Pierre Patrick - son [phone]      Interdisciplinary team meeting 5/15 with rehab team- doctor, speech therapist, occupational therapist, physical therapist, social work and nursing where medical updates provided, progress with therapies and goals discussed. - d/c DAV on 5/17  Mobility Discussion  PT - Bed Mobility (Mobility) Open - Barrier  Current Status: 5/14: Min/ModA  5/8: max to total assist (depending on Pt's willingness to participate)  Long Term Goals  04/27/2024 11:37 AM - Active  3 weeks: CG/Roberto for all bed mobility  PT - Bed/Chair/Wheelchair (Mobility) Open - Barrier  Current Status: 5/14: min/mod assist (inconsistent)  Long Term Goals  04/27/2024 11:39 AM - Active  3 weeks:  CG/Roberto for all transfers with or without RW  PT - Walk (Mobility) Open - Barrier  Current Status: 5/14: Pt ambulated 100 feet with hand held assist on left UE and  moderate assist.  5/13: 100 feet with RW and moderate assist  Long Term Goals  04/27/2024 11:40 AM - Active  3 weeks:  100 feet with RW and Roberto  PT - Stairs (Mobility) Open - Barrier  Current Status: 5/14: not performed  5/10: Pt negotiated 3, 4" steps with mod assist of 1, min assist of another for  safety due to high distractability, impaired judgement and safety awareness  Long Term Goals  04/27/2024 11:41 AM - Active  3 weeks:  4 steps with 2 rails Roberto  OT - Toilet Transfers (Mobility)  Current Status: 5/13- mod assist- continue to recommend 2 person for safety 2/2  motor restlessness  Long Term Goals  04/27/2024 02:51 PM - Active  Within 2 weeks, pt. will complete toilet transfer with min A with DME.  Self Care Discussion    OT - Dressing (Lower) (Self Care)  Current Status: 5/10- max assist  Long Term Goals  04/27/2024 02:51 PM - Active  Within 2 weeks, pt. will complete LB dressing with min A using esther-tecnhique  OT - Dressing (Upper) (Self Care)  Current Status: 5/10- supervision  Long Term Goals  04/27/2024 02:51 PM - Met  Within 2 weeks, pt. will complete UB dressing with min A using esther-technique.  Mobility Discussion    OT - Tub/Shower Transfers (Mobility)  Current Status: 5/7- unsafe at this time  Long Term Goals  04/27/2024 02:51 PM - Active  Within 2 weeks, pt. will complete toilet transfer with mod A with DME.  Cognition Discussion    SLP - Attention (Cognition)  Current Status: 5/10 attending to left with good results, overall  sustained/focused attention reuced  5/8 reduced overall sustained attention and reduced attention to left visual  field.  refocusing redirectioning required throughout.  5/7 unchanged from last session    Long Term Goals  04/27/2024 12:58 PM - Active  Improve attention to participate in FADLs with moderate-maximum assistance.  Communication Discussion    SLP - Comprehension (Communication)  Current Status: 5/14: 1 step 70%, simple yes/no 60%  5/13 1-step 80%, simple y/n 60%  5/9 Simple y/m 80% moderate y/n 90%  5/7 Simple safety y/n 50% accuracy. 1-step 50%, Personally relevant y/n 100%,  simple y/n 90%, moderarte y/n 90%  Long Term Goals  04/27/2024 01:51 PM - Active  Patient will improve receptive language skills for functional communication with  moderate-maximum assistance.  SLP - Expression (Communication)  Current Status: 5/14: Responsive naming 40%, increasing to 70% given binary  choices, named 3 items in a category  5/13 oriented to name, place and month but not situation or year., responsive  naming 85%, answer WH-questions 68%  5/10 divergent categorization of 5 items -50%  5/9 oriented to name, place situation and year but not month, auto sequences  1-21, Mon-friday and jan-august, conv. phrase completion 100%, antonyms 30%,  conv. categorization 65%           ASSESSMENT/PLAN:   59 y/o F with no known PMH, h/o fell one month prior to admission, who was transferred from [place] to [facility] on 3/26/24 with diffuse SAH w/ small IVH, 2/2 ruptured posterolaterally projecting 2.9 x 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 Patient s/p right frontal EVD and right pterional craniotomy with clipping of a ruptured right PCOM aneurysm on 3/26. Course complicated by EVD tract hemorrhage, new right basal ganglia/corona radiata infarct, hydrocephalus with ICP crisis, vasospasm requiring IA milrinone/verapimil, and new widespread ischemic strokes. Patient also found to have HFrEF (now recovered), bradycardia (resolved), b/l soleal DVT. s/p PEG placement initially complicated by gastric wall hematoma    # SAH s/p ruptured PCOM aneurysm s/p right pterional craniotomy and clipping 3/26  -  vasospasm s/p cerebral angiography and IA milrinone and verapamil  - MRI (4/8): Multifocal acute to subacute infarction. This extensively involves each MCA posterior distribution, the right basal ganglia, the anterior parasagittal vertex CINDY distribution and an additional smaller lesion in the right cerebellum  - Staple removed 4/30  - patio pass in WC with staff and/or family present at all times    # Bradycardia: resolved  - was on theophyline, dc'd- HR stable    # Akasthesia/Restlessness  - Propranolol 10 mg started q 12 hrs w/ parameters 5/9-- improved 5/13  - BP stable    # Insomnia  -  Melatonin 3mg prn    # HFrEF  - Initial Echo (3/26) with severe cardiomyopathy, EF 36%, regional wall motion abnormality  - Repeat TTE 4/9/24, normal EF 66% with no WMA.    # HLD  -  lipitor 40 mg QHS    # B/l DVT   - Left soleal DVT first noted 3/28, right soleal DVT fist noted 4/3  - Duplex (4/23): persistent bilateral soleal vein DVT  - duplex 4/30: There is persistence of DVT identified within the left soleal vein. On the current evaluation DVT is also evident within the left gastrocnemius vein, new. Previously identified right soleal DVT is no longer visualized  - Surveillance doppler 5/7: Persistence of DVT identified within the left soleal and gastrocnemius veins. No propogation  - NSGY: no full dose AC for at least 4 weeks from surgery  - Discussed with hospitalist. Continue PPX dose now, surveillance dopplers weekly  - Continue Lovenox 40 mg QD  - Doppler 5/14 preformed, reviewed- Persistent gastrocnemius DVT in the left lower extremity.Interval resolution of previously visualized left soleal vein DVT. No right-sided DVT.    # Hypernatremia: resolved  - PEG flushes free water 200 cc Q6H  - Na 146 5/13 -> 144 5/16    # hypokalemia  -  3.3 K -> supplemented with 40meq of K po x2 on 5/13 -> supplemented 5/16 at 3.4    # Gastric Hematoma post PEG placement  -  PEG placement attempted 4/19 for persistent dysphagia, but was aborted due to growing hematoma noted in gastric wall on endoscopy after trocar placement  - CT Abdomen and Pelvis w/ IV Cont showed gastric wall hematoma without intraluminal or extraluminal hemorrhage  - PEG placed 4/23  - Hgb 9.7 4/29--> 9.8 5/2 --> 10.2 5/6 ->10.7 5/9 -> 10.4 5/13 -> 10.1 5/16    # Pain  - Tylenol 650mg q6h PRN     # GI / Bowel  - Senna qHS  - Miralax BID  - GI ppx: protonix 40 mg QD    #  / Bladder  - Continue cardura 2 mg QHS   - d/c'd bladder scans on 5/6    # Skin / Pressure injury  - left toe scabs, right lateral foot callus   - podiatry consult appreciated 5/2. Callus shaved down, no additional treatment necessary    # Diet/Dysphagia  - Diet: pureed solids thin liquids with ensure plus supplementation 5/2 post MBS  - Supplements: MVI    Outpatient Follow-up:  Perlita Varela  Neurosurgery  [address] [facility], [address]  [place] [address]  Phone: [phone]  Fax: [phone]  Follow Up Time:    Code Status/Emergency Contact:  FULL CODE  Pierre Patrick - son [phone]      Interdisciplinary team meeting 5/15 with rehab team- doctor, speech therapist, occupational therapist, physical therapist, social work and nursing where medical updates provided, progress with therapies and goals discussed. - d/c DAV on 5/17  Mobility Discussion  PT - Bed Mobility (Mobility) Open - Barrier  Current Status: 5/14: Min/ModA  5/8: max to total assist (depending on Pt's willingness to participate)  Long Term Goals  04/27/2024 11:37 AM - Active  3 weeks: CG/Roberto for all bed mobility  PT - Bed/Chair/Wheelchair (Mobility) Open - Barrier  Current Status: 5/14: min/mod assist (inconsistent)  Long Term Goals  04/27/2024 11:39 AM - Active  3 weeks:  CG/Roberto for all transfers with or without RW  PT - Walk (Mobility) Open - Barrier  Current Status: 5/14: Pt ambulated 100 feet with hand held assist on left UE and  moderate assist.  5/13: 100 feet with RW and moderate assist  Long Term Goals  04/27/2024 11:40 AM - Active  3 weeks:  100 feet with RW and Roberto  PT - Stairs (Mobility) Open - Barrier  Current Status: 5/14: not performed  5/10: Pt negotiated 3, 4" steps with mod assist of 1, min assist of another for  safety due to high distractability, impaired judgement and safety awareness  Long Term Goals  04/27/2024 11:41 AM - Active  3 weeks:  4 steps with 2 rails Roberto  OT - Toilet Transfers (Mobility)  Current Status: 5/13- mod assist- continue to recommend 2 person for safety 2/2  motor restlessness  Long Term Goals  04/27/2024 02:51 PM - Active  Within 2 weeks, pt. will complete toilet transfer with min A with DME.  Self Care Discussion    OT - Dressing (Lower) (Self Care)  Current Status: 5/10- max assist  Long Term Goals  04/27/2024 02:51 PM - Active  Within 2 weeks, pt. will complete LB dressing with min A using esther-tecnhique  OT - Dressing (Upper) (Self Care)  Current Status: 5/10- supervision  Long Term Goals  04/27/2024 02:51 PM - Met  Within 2 weeks, pt. will complete UB dressing with min A using esther-technique.  Mobility Discussion    OT - Tub/Shower Transfers (Mobility)  Current Status: 5/7- unsafe at this time  Long Term Goals  04/27/2024 02:51 PM - Active  Within 2 weeks, pt. will complete toilet transfer with mod A with DME.  Cognition Discussion    SLP - Attention (Cognition)  Current Status: 5/10 attending to left with good results, overall  sustained/focused attention reuced  5/8 reduced overall sustained attention and reduced attention to left visual  field.  refocusing redirectioning required throughout.  5/7 unchanged from last session    Long Term Goals  04/27/2024 12:58 PM - Active  Improve attention to participate in FADLs with moderate-maximum assistance.  Communication Discussion    SLP - Comprehension (Communication)  Current Status: 5/14: 1 step 70%, simple yes/no 60%  5/13 1-step 80%, simple y/n 60%  5/9 Simple y/m 80% moderate y/n 90%  5/7 Simple safety y/n 50% accuracy. 1-step 50%, Personally relevant y/n 100%,  simple y/n 90%, moderarte y/n 90%  Long Term Goals  04/27/2024 01:51 PM - Active  Patient will improve receptive language skills for functional communication with  moderate-maximum assistance.  SLP - Expression (Communication)  Current Status: 5/14: Responsive naming 40%, increasing to 70% given binary  choices, named 3 items in a category  5/13 oriented to name, place and month but not situation or year., responsive  naming 85%, answer WH-questions 68%  5/10 divergent categorization of 5 items -50%  5/9 oriented to name, place situation and year but not month, auto sequences  1-21, Mon-friday and jan-august, conv. phrase completion 100%, antonyms 30%,  conv. categorization 65%

## 2024-05-15 NOTE — PROGRESS NOTE ADULT - ASSESSMENT
59 y/o F with no PMH, fell one month prior to admission, transferred from [place] to [facility] on 3/26/24 for diffuse SAH w/ small IVH, 2/2 ruptured aneurysm s/p EVD and craniotomy + clipping of R PCOM aneurysm on 3/26. 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 Patient found to have HFrEF (now recovered), bradycardia (placed on theophyline, now resolved), b/l soleal DVT (on Lovenox ppx). PEG placement initially complicated by gastric wall hematoma, subsequently successfully placed for persistent dysphagia. Patient now admitted for a multidisciplinary rehab program    # SAH s/p ruptured PCOM aneurysm  - R EVD placed 3/26 and removed 4/15  - s/p right pterional craniotomy and clipping of a ruptured right PCOM aneurysm on 3/26  - CTH (3/29): EVD tract acute hemorrhage and new focus of infarction with hemorrhage in the right basal ganglia/corona radiata  - complicated by vasospasm requiring cerebral angiography and IA milrinone and verapamil  - MRI (4/8): Multifocal acute to subacute infarction. This extensively involves each MCA posterior distribution, the right basal ganglia, the anterior parasagittal vertex CINDY distribution and an additional smaller lesion in the right cerebellum  - labs reviewed 5/13 -- stable.      # B/l DVT   - Left soleal DVT first noted 3/28, right soleal DVT fist noted 4/3  - Most recent Duplex (4/23): persistent bilateral soleal vein DVT  - vasc cardiology recommended repeat surveillance duplex on 4/30.  - repeat duplex 04/30:  persistent left soleal vein DVT + also left gastrocnemius vein DVT (new).   Right soleal vein DVT no longer visualized.  - Continue Lovenox 40   - repeat duplex 5/14 reviewed     # HLD  - continue Lipitor     # HFrEF  - Initial Echo (3/26) with severe cardiomyopathy, EF 36%, regional wall motion abnormality  - Resolved on repeat TTE performed 4/9/24, normal EF 66% with no WMA.    # Gastric Hematoma  - PEG placement attempted 4/19 for persistent dysphagia, but was aborted due to growing hematoma noted in gastric wall on endoscopy after trocar placement  - CT Abdomen and Pelvis w/ IV Cont showed gastric wall hematoma without intraluminal or extraluminal hemorrhage  - monitor H/H, stable at 10.7    # transaminitis- improved   - monitor    #DVT ppx  -Lovenox

## 2024-05-15 NOTE — CONSULT NOTE ADULT - ASSESSMENT
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 Right frontal EVD placed and patient underwent right pterional craniotomy and clipping of a ruptured right PCOM aneurysm on 3/26 . 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 Patient found to have HFrEF (now recovered), bradycardia (placed on theophyline, now resolved), b/l soleal DVT (on Lovenox ppx). PEG placement initially complicated by gastric wall hematoma, subsequently successfully placed for persistent dysphagia. Patient now admitted for a multidisciplinary rehab program. \    # SAH s/p ruptured PCOM aneurysm  - R EVD placed 3/26 and removed 4/15  - s/p right pterional craniotomy and clipping of a ruptured right PCOM aneurysm on 3/26  - CTH (3/29): EVD tract acute hemorrhage and new focus of infarction with hemorrhage in the right basal ganglia/corona radiata  - complicated by vasospasm requiring cerebral angiography and IA milrinone and verapamil  - MRI (4/8): Multifocal acute to subacute infarction. This extensively involves each MCA posterior distribution, the right basal ganglia, the anterior parasagittal vertex CINDY distribution and an additional smaller lesion in the right cerebellum  - Start comprehensive rehab program of PT/OT/SLP - 3 hours a day, 5 days a week. P&O as needed   - Precautions: Fall, Aspiration, Seizure  - currently with aphasia, but does follow commands     # B/l DVT   - Left soleal DVT first noted 3/28, right soleal DVT fist noted 4/3  - Most recent Duplex (4/23): persistent bilateral soleal vein DVT  - Continue Lovenox 40 mg QD  - [facility] cardiology recommended repeat surveillance duplex on 4/30.    # HLD  - continue lipitor 40 mg QHS    # HFrEF  - Initial Echo (3/26) with severe cardiomyopathy, EF 36%, regional wall motion abnormality  - Resolved on repeat TTE performed 4/9/24, normal EF 66% with no WMA.  # Hypernatremia  - was on free water 200 cc Q6H now increased to 250 q6h  - if Na worsening on 4/28 will trial IVF    # Gastric Hematoma  -  PEG placement attempted 4/19 for persistent dysphagia, but was aborted due to growing hematoma noted in gastric wall on endoscopy after trocar placement  - CT Abdomen and Pelvis w/ IV Cont showed gastric wall hematoma without intraluminal or extraluminal hemorrhage  - monitor H/H, stable at 9.8-10s    #Elevated alk phos  - unclear etiology, will repeat in AM and will also repeat GGT  - LFTs otherwise wnl, no abdominal pain on exam    #ETC  - DVT ppx with Lovenox  - cardura for urinary retention 
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 Pt exhibits difficulties with attention, concentration/ working memory, language (including naming, writing, and fluency), visuospatial skills (poor visuomotor integration, and left inattention), and aspects of executive functions (including organizational skills, abstract reasoning, initiation and problem solving). Her short-term and long-term memory skills appear to be relatively intact; however, visual memory appears impaired. Pt's affect is blunted to depressed, and she reports just a few adjustment symptoms in response to her current medical condition. She appears to have some degree of impaired self-awareness. FIM scores: Social Interaction 5; Problem Solving 4; Memory 6.    Plan: Individual supportive psychotherapy to monitor cognition, affect/mood, and behavior. Cognitive remediation during speech therapy and occupational therapy sessions is strongly recommended. Participation in recreation/art therapy in order to have pleasure and mastery experiences and regain/reestablish a sense of routine. Pt will continue from continued rehabilitation therapies upon d/c, and supervision at home. Pt will benefit from a full neuropsychological assessment in 6 months to assess the effects of rehab txs, and obtain an updated baseline of her cognitive and emotional functioning.    Time spent with Pt, 30 minutes.

## 2024-05-15 NOTE — PROGRESS NOTE ADULT - SUBJECTIVE AND OBJECTIVE BOX
Patient is a 58y old  Female who presents with a chief complaint of SAH (15 May 2024 11:24)      HPI:  59 y/o F with no PMH, fell one month prior to admission, transferred from [place] to 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 Initially presented to [place] with headaches and became unresponsive, was intubated, and transferred to [facility]. On arrival to [facility] patient w/ PERRL, +cough/gag, 2/5 spontaneous movement in LUE, otherwise no movement. Given 1 unit platelets and R frontal EVD placed with high opening pressure. Patient underwent Right pterional craniotomy and clipping of a ruptured right PCOM aneurysm on 3/26.     On 3/29, EVD displaced and replaced in situ, complicated by EVD tract acute hemorrhage and new focus of infarction with hemorrhage in the right basal ganglia/corona radiata. Course further complicated by hydrocephalus with ICP crisis, vasospasm requiring cerebral angiography and IA milrinone and verapamil. MRI (4/8) with widespread ischemic stroke b/l, as per neuro IR no further intervention. Patient extubated on 4/3, re-intubated 4/8, and extubated again on 4/9. EVD removed on 4/15. CTH post removal stable.     Course also complicated by bradycardia treated with theophyline, now resolved and discontinued. Initial Echo (3/26) with severe cardiomyopathy, EF 36%, regional wall motion abnormality. Resolved on repeat TTE performed 4/9/24, normal EF 66% with no WMA. Patient first noted with left soleal DVT on Duplex 3/28. Duplex (4/10) with stable b/l soleal DVT with extension to right posterior tibial DVT.  Last LE duplex with stable bilateral soleal DVT, [facility] cardiology recommended repeat surveillance duplex on 4/30. On Lovenox ppx.     PEG placement attempted 4/19 for persistent dysphagia, but was aborted due to growing hematoma noted in gastric wall on endoscopy after trocar placement. CT Abdomen and Pelvis w/ IV Cont showed gastric wall hematoma without intraluminal or extraluminal hemorrhage. PEG successfully placed on 4/23.     Patient evaluated by PT/OT and was recommended for acute inpatient rehab. Patient is medically stable for discharge to [facility] on 4/26.  (26 Apr 2024 14:34)        SUBJECTIVE: Patient seen and examined. No acute overnight events, slept well.     REVIEW OF SYSTEMS  Constitutional: No fever, + fatigue  Cardio: No chest pain, No palpitations  Resp: No SOB, no respiratory distress   Neuro: No headaches +weakness      VITALS  58y  Vital Signs Last 24 Hrs  T(C): 36.7 (15 May 2024 07:54), Max: 36.7 (15 May 2024 07:54)  T(F): 98 (15 May 2024 07:54), Max: 98 (15 May 2024 07:54)  HR: 74 (15 May 2024 07:54) (63 - 74)  BP: 129/70 (15 May 2024 07:54) (119/73 - 129/70)  RR: 16 (15 May 2024 07:54) (15 - 16)  SpO2: 98% (15 May 2024 07:54) (96% - 98%)    Parameters below as of 15 May 2024 07:54  Patient On (Oxygen Delivery Method): room air      Daily     Daily     PHYSICAL EXAM:   Gen - NAD, Comfortable  HEENT - incision healing well  Pulm - CTAB, No wheeze, No rhonchi, No crackles  Cardiovascular - RRR, S1S2, No murmurs  Chest - good chest expansion, good respiratory effort  Abdomen - Soft, NT/ND, +BS  Extremities - No Cyanosis, no clubbing, no edema, no calf tenderness  Neuro-     Cognitive - awake, alert, oriented to person, place, year with choices.     Communication - hypophonic at times      Attention:  impaired       Cranial Nerves - left facial droop     Motor -                     LEFT    UE - ShAB 3/5, EF 4/5, EE 3+/5,  3/5                    RIGHT UE - ShAB 3/5, EF 4/5, EE 4/5,   4/5                     LEFT    LE/ RIGHT LE - pt did not follow command  but Moves extremities     Reflexes - DTR Intact, No primitive reflexive       Tone - unable to test properly pt unable to follow request to relax  Psychiatric - Mood stable    RECENT LABS:                    CAPILLARY BLOOD GLUCOSE            MEDICATIONS:  MEDICATIONS  (STANDING):  atorvastatin 40 milliGRAM(s) Oral at bedtime  doxazosin 2 milliGRAM(s) Oral at bedtime  enoxaparin Injectable 40 milliGRAM(s) SubCutaneous <User Schedule>  multivitamin/minerals/iron Oral Solution (CENTRUM) 15 milliLiter(s) Oral daily  pantoprazole   Suspension 40 milliGRAM(s) Oral before breakfast  propranolol 10 milliGRAM(s) Oral every 12 hours    MEDICATIONS  (PRN):  acetaminophen   Oral Liquid .. 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 Initially presented to [place] with headaches and became unresponsive, was intubated, and transferred to [facility]. On arrival to [facility] patient w/ PERRL, +cough/gag, 2/5 spontaneous movement in LUE, otherwise no movement. Given 1 unit platelets and R frontal EVD placed with high opening pressure. Patient underwent Right pterional craniotomy and clipping of a ruptured right PCOM aneurysm on 3/26.     On 3/29, EVD displaced and replaced in situ, complicated by EVD tract acute hemorrhage and new focus of infarction with hemorrhage in the right basal ganglia/corona radiata. Course further complicated by hydrocephalus with ICP crisis, vasospasm requiring cerebral angiography and IA milrinone and verapamil. MRI (4/8) with widespread ischemic stroke b/l, as per neuro IR no further intervention. Patient extubated on 4/3, re-intubated 4/8, and extubated again on 4/9. EVD removed on 4/15. CTH post removal stable.     Course also complicated by bradycardia treated with theophyline, now resolved and discontinued. Initial Echo (3/26) with severe cardiomyopathy, EF 36%, regional wall motion abnormality. Resolved on repeat TTE performed 4/9/24, normal EF 66% with no WMA. Patient first noted with left soleal DVT on Duplex 3/28. Duplex (4/10) with stable b/l soleal DVT with extension to right posterior tibial DVT.  Last LE duplex with stable bilateral soleal DVT, [facility] cardiology recommended repeat surveillance duplex on 4/30. On Lovenox ppx.     PEG placement attempted 4/19 for persistent dysphagia, but was aborted due to growing hematoma noted in gastric wall on endoscopy after trocar placement. CT Abdomen and Pelvis w/ IV Cont showed gastric wall hematoma without intraluminal or extraluminal hemorrhage. PEG successfully placed on 4/23.     Patient evaluated by PT/OT and was recommended for acute inpatient rehab. Patient is medically stable for discharge to [facility] on 4/26.  (26 Apr 2024 14:34)        SUBJECTIVE: Patient seen and examined. No acute overnight events, slept well. No pain at this time. Patient denies loose stools w/ providers.     REVIEW OF SYSTEMS  Constitutional: No fever, + fatigue  Cardio: No chest pain, No palpitations  Resp: No SOB, no respiratory distress   Neuro: No headaches +weakness      VITALS  58y  Vital Signs Last 24 Hrs  T(C): 36.7 (15 May 2024 07:54), Max: 36.7 (15 May 2024 07:54)  T(F): 98 (15 May 2024 07:54), Max: 98 (15 May 2024 07:54)  HR: 74 (15 May 2024 07:54) (63 - 74)  BP: 129/70 (15 May 2024 07:54) (119/73 - 129/70)  RR: 16 (15 May 2024 07:54) (15 - 16)  SpO2: 98% (15 May 2024 07:54) (96% - 98%)    Parameters below as of 15 May 2024 07:54  Patient On (Oxygen Delivery Method): room air      Daily     Daily     PHYSICAL EXAM:   Gen - NAD, Comfortable  HEENT - incision healing well  Pulm - CTAB, No wheeze, No rhonchi, No crackles  Cardiovascular - RRR, S1S2, No murmurs  Chest - good chest expansion, good respiratory effort  Abdomen - Soft, NT/ND, +BS, PEG c/d/i  Extremities - No Cyanosis, no clubbing, no edema, no calf tenderness  Neuro-     Cognitive - awake, alert, oriented to person, place, year with choices.     Communication - hypophonic at times      Attention:  impaired       Cranial Nerves - left facial droop     Motor -                     LEFT    UE - ShAB 3/5, EF 4/5, EE 3+/5,  3/5                    RIGHT UE - ShAB 3/5, EF 4/5, EE 4/5,   4/5                     LEFT    LE/ RIGHT LE - pt did not follow command  but Moves extremities     Reflexes - DTR Intact, No primitive reflexive       Tone - unable to test properly pt unable to follow request to relax  Psychiatric - Mood stable    RECENT LABS:                          10.1   4.85  )-----------( 228      ( 16 May 2024 05:49 )             31.5     05-16    144  |  108  |  11  ----------------------------<  104<H>  3.4<L>   |  28  |  0.56    Ca    9.1      16 May 2024 05:49    TPro  6.6  /  Alb  2.8<L>  /  TBili  0.3  /  DBili  x   /  AST  16  /  ALT  29  /  AlkPhos  117  05-16    LIVER FUNCTIONS - ( 16 May 2024 05:49 )  Alb: 2.8 g/dL / Pro: 6.6 g/dL / ALK PHOS: 117 U/L / ALT: 29 U/L / AST: 16 U/L / GGT: x                 Urinalysis Basic - ( 16 May 2024 05:49 )    Color: x / Appearance: x / SG: x / pH: x  Gluc: 104 mg/dL / Ketone: x  / Bili: x / Urobili: x   Blood: x / Protein: x / Nitrite: x   Leuk Esterase: x / RBC: x / WBC x   Sq Epi: x / Non Sq Epi: x / Bacteria: x        MEDICATIONS  (STANDING):  atorvastatin 40 milliGRAM(s) Oral at bedtime  doxazosin 2 milliGRAM(s) Oral at bedtime  enoxaparin Injectable 40 milliGRAM(s) SubCutaneous <User Schedule>  multivitamin/minerals/iron Oral Solution (CENTRUM) 15 milliLiter(s) Oral daily  pantoprazole   Suspension 40 milliGRAM(s) Oral before breakfast  potassium chloride   Powder 40 milliEquivalent(s) Oral two times a day  propranolol 10 milliGRAM(s) Oral every 12 hours    MEDICATIONS  (PRN):  acetaminophen   Oral Liquid .. 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 Initially presented to [place] with headaches and became unresponsive, was intubated, and transferred to [facility]. On arrival to [facility] patient w/ PERRL, +cough/gag, 2/5 spontaneous movement in LUE, otherwise no movement. Given 1 unit platelets and R frontal EVD placed with high opening pressure. Patient underwent Right pterional craniotomy and clipping of a ruptured right PCOM aneurysm on 3/26.     On 3/29, EVD displaced and replaced in situ, complicated by EVD tract acute hemorrhage and new focus of infarction with hemorrhage in the right basal ganglia/corona radiata. Course further complicated by hydrocephalus with ICP crisis, vasospasm requiring cerebral angiography and IA milrinone and verapamil. MRI (4/8) with widespread ischemic stroke b/l, as per neuro IR no further intervention. Patient extubated on 4/3, re-intubated 4/8, and extubated again on 4/9. EVD removed on 4/15. CTH post removal stable.     Course also complicated by bradycardia treated with theophyline, now resolved and discontinued. Initial Echo (3/26) with severe cardiomyopathy, EF 36%, regional wall motion abnormality. Resolved on repeat TTE performed 4/9/24, normal EF 66% with no WMA. Patient first noted with left soleal DVT on Duplex 3/28. Duplex (4/10) with stable b/l soleal DVT with extension to right posterior tibial DVT.  Last LE duplex with stable bilateral soleal DVT, [facility] cardiology recommended repeat surveillance duplex on 4/30. On Lovenox ppx.     PEG placement attempted 4/19 for persistent dysphagia, but was aborted due to growing hematoma noted in gastric wall on endoscopy after trocar placement. CT Abdomen and Pelvis w/ IV Cont showed gastric wall hematoma without intraluminal or extraluminal hemorrhage. PEG successfully placed on 4/23.     Patient evaluated by PT/OT and was recommended for acute inpatient rehab. Patient is medically stable for discharge to [facility] on 4/26.  (26 Apr 2024 14:34)        SUBJECTIVE: Patient seen and examined. No acute overnight events, slept well. No pain at this time.     REVIEW OF SYSTEMS  Constitutional: No fever, + fatigue  Cardio: No chest pain, No palpitations  Resp: No SOB, no respiratory distress   Neuro: No headaches +weakness      VITALS  58y  Vital Signs Last 24 Hrs  T(C): 36.7 (15 May 2024 07:54), Max: 36.7 (15 May 2024 07:54)  T(F): 98 (15 May 2024 07:54), Max: 98 (15 May 2024 07:54)  HR: 74 (15 May 2024 07:54) (63 - 74)  BP: 129/70 (15 May 2024 07:54) (119/73 - 129/70)  RR: 16 (15 May 2024 07:54) (15 - 16)  SpO2: 98% (15 May 2024 07:54) (96% - 98%)    Parameters below as of 15 May 2024 07:54  Patient On (Oxygen Delivery Method): room air      Daily     Daily     PHYSICAL EXAM:   Gen - NAD, Comfortable  HEENT - incision healing well  Pulm - CTAB, No wheeze, No rhonchi, No crackles  Cardiovascular - RRR, S1S2, No murmurs  Chest - good chest expansion, good respiratory effort  Abdomen - Soft, NT/ND, +BS, PEG c/d/i  Extremities - No Cyanosis, no clubbing, no edema, no calf tenderness  Neuro-     Cognitive - awake, alert, oriented to person, place, year with choices.     Communication - hypophonic at times      Attention:  impaired       Cranial Nerves - left facial droop     Motor -                     LEFT    UE - ShAB 3/5, EF 4/5, EE 3+/5,  3/5                    RIGHT UE - ShAB 3/5, EF 4/5, EE 4/5,   4/5                     LEFT    LE/ RIGHT LE - pt did not follow command  but Moves extremities     Reflexes - DTR Intact, No primitive reflexive       Tone - unable to test properly pt unable to follow request to relax  Psychiatric - Mood stable    RECENT LABS:                          10.1   4.85  )-----------( 228      ( 16 May 2024 05:49 )             31.5     05-16    144  |  108  |  11  ----------------------------<  104<H>  3.4<L>   |  28  |  0.56    Ca    9.1      16 May 2024 05:49    TPro  6.6  /  Alb  2.8<L>  /  TBili  0.3  /  DBili  x   /  AST  16  /  ALT  29  /  AlkPhos  117  05-16    LIVER FUNCTIONS - ( 16 May 2024 05:49 )  Alb: 2.8 g/dL / Pro: 6.6 g/dL / ALK PHOS: 117 U/L / ALT: 29 U/L / AST: 16 U/L / GGT: x                 Urinalysis Basic - ( 16 May 2024 05:49 )    Color: x / Appearance: x / SG: x / pH: x  Gluc: 104 mg/dL / Ketone: x  / Bili: x / Urobili: x   Blood: x / Protein: x / Nitrite: x   Leuk Esterase: x / RBC: x / WBC x   Sq Epi: x / Non Sq Epi: x / Bacteria: x        MEDICATIONS  (STANDING):  atorvastatin 40 milliGRAM(s) Oral at bedtime  doxazosin 2 milliGRAM(s) Oral at bedtime  enoxaparin Injectable 40 milliGRAM(s) SubCutaneous <User Schedule>  multivitamin/minerals/iron Oral Solution (CENTRUM) 15 milliLiter(s) Oral daily  pantoprazole   Suspension 40 milliGRAM(s) Oral before breakfast  potassium chloride   Powder 40 milliEquivalent(s) Oral two times a day  propranolol 10 milliGRAM(s) Oral every 12 hours    MEDICATIONS  (PRN):  acetaminophen   Oral Liquid .. 650 milliGRAM(s) Oral every 6 hours PRN Mild Pain (1 - 3)  melatonin 3 milliGRAM(s) Oral at bedtime PRN Insomnia  petrolatum white Ointment 1 Application(s) Topical every 12 hours PRN dryness  senna 2 Tablet(s) Oral at bedtime PRN Constipation

## 2024-05-15 NOTE — CONSULT NOTE ADULT - SUBJECTIVE AND OBJECTIVE BOX
Pt is a 59 y/o right-handed female with no PMHx who fell one month prior to admission, was transferred from [place] to 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 Initially presented to [place] with headaches and became unresponsive, was intubated, and transferred to [facility]. On arrival to [facility] Ptt w/ PERRL, +cough/gag, 2/5 spontaneous movement in LUE, otherwise no movement. Given 1 unit platelets and R frontal EVD placed with high opening pressure. Pt underwent Right pterional craniotomy and clipping of a ruptured right PCOM aneurysm on 3/26. On 3/29, EVD displaced and replaced in situ, complicated by EVD tract acute hemorrhage and new focus of infarction with hemorrhage in the right basal ganglia/corona radiata. Course further complicated by hydrocephalus with ICP crisis, vasospasm requiring cerebral angiography and IA milrinone and verapamil. MRI (4/8) with widespread ischemic stroke b/l, as per neuro IR no further intervention. Pt extubated on 4/3, re-intubated 4/8, and extubated again on 4/9. EVD removed on 4/15. CTH post removal stable. Course also complicated by bradycardia treated with theophyline, now resolved and discontinued. Initial Echo (3/26) with severe cardiomyopathy, EF 36%, regional wall motion abnormality. Resolved on repeat TTE performed 4/9/24, normal EF 66% with no WMA. Pt first noted with left soleal DVT on Duplex 3/28. Duplex (4/10) with stable b/l soleal DVT with extension to right posterior tibial DVT.  Last LE duplex with stable bilateral soleal DVT, [facility] cardiology recommended repeat surveillance duplex on 4/30. On Lovenox ppx. PEG placement attempted 4/19 for persistent dysphagia, but was aborted due to growing hematoma noted in gastric wall on endoscopy after trocar placement. CT Abdomen and Pelvis w/ IV Cont showed gastric wall hematoma without intraluminal or extraluminal hemorrhage. PEG successfully placed on 4/23. Pt evaluated by PT/OT and was recommended for acute inpatient rehab. Pt is medically stable for discharge to [facility] on 4/26. PMHx:   . Current meds: Please see list in Pt’s chart. Social Hx: Pt is  and has three adult sons. 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 Pt lacks hx of mental illness and substance abuse. Pt is L[employer] day. Pt reports enjoying helping others out.      Findings: Pt was seen for an initial assessment of her cognitive and emotional functioning. The Modified MMSE (3MS) was administered; Pt’s results (76/100) were in the Moderately Impaired range. Her scores in standardized mood measures suggested minimal to mild levels of anxiety and depression (Anxiety, GAD7 = 5/21; Depression, PHQ9 = 4/27 Pt was alert,  Ox3, and her attitude was cooperative. Attn/Conc: Simple auditory attention - .  Concentration/Working memory for reversed counting and spelling – (/7). Language: Pt’s speech was of  . Naming - . Sentence repetition - . Auditory Comprehension - . Reading - . Writing - . Memory: Encoding of 3 words was (/3); short-delayed verbal recall – (/3, improving to /3 with cueing); long-delayed verbal recall – (/3, improving to /3 with cueing). LTM was for US presidents (/4, improving to /4 with cueing). Visual memory –. Visuospatial: Visuomotor integration – for copy of a 2D figure, was noted. Figure-ground discrimination was (/4) for the Poppelreuter figure. Executive Functions: Motor Planning - . Organizational skills – for clock drawing on command. Abstract reasoning – for similarities. Initiation/Phonemic Fluency – (/10 four-legged animals in 30 sec). Verbal problem solving – .   Emotional functioning: Affect - 	. Mood - ; Pt reported experiencing . On mood measures s/he additionally reported .  Thought processes were.  No abnormal thought contents were observed.  Pt denied any AH/VH. Pt also denied SI/HI/I/P. Insight - WFL. Judgment - fair.    Assessment:    FIM scores: Social Interaction ; Problem Solving ; Memory .    Plan: Individual supportive psychotherapy to monitor cognition, affect/mood, and behavior. Cognitive remediation during speech therapy sessions. Participation in recreation/art therapy in order to have pleasure and mastery experiences and regain/reestablish a sense of routine.  Time spent with Pt,  minutes.   Pt is a 57 y/o right-handed female with no PMHx who fell one month prior to admission, was transferred from [place] to 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 Initially presented to [place] with headaches and became unresponsive, was intubated, and transferred to [facility]. On arrival to [facility] Ptt w/ PERRL, +cough/gag, 2/5 spontaneous movement in LUE, otherwise no movement. Given 1 unit platelets and R frontal EVD placed with high opening pressure. Pt underwent right pterional craniotomy and clipping of a ruptured right PCOM aneurysm on 3/26. On 3/29, EVD displaced and replaced in situ, complicated by EVD tract acute hemorrhage and new focus of infarction with hemorrhage in the right basal ganglia/corona radiata. Course further complicated by hydrocephalus with ICP crisis, vasospasm requiring cerebral angiography and IA milrinone and verapamil. MRI (4/8) with widespread ischemic stroke b/l, as per neuro IR no further intervention. Pt extubated on 4/3, re-intubated 4/8, and extubated again on 4/9. EVD removed on 4/15. CTH post removal stable. Course also complicated by bradycardia treated with theophyline, now resolved and discontinued. Initial Echo (3/26) with severe cardiomyopathy, EF 36%, regional wall motion abnormality. Resolved on repeat TTE performed 4/9/24, normal EF 66% with no WMA. Pt first noted with left soleal DVT on Duplex 3/28. Duplex (4/10) with stable b/l soleal DVT with extension to right posterior tibial DVT.  Last LE duplex with stable bilateral soleal DVT, [facility] cardiology recommended repeat surveillance duplex on 4/30. On Lovenox ppx. PEG placement attempted 4/19 for persistent dysphagia, but was aborted due to growing hematoma noted in gastric wall on endoscopy after trocar placement. CT Abdomen and Pelvis w/ IV Cont showed gastric wall hematoma without intraluminal or extraluminal hemorrhage. PEG successfully placed on 4/23. Pt evaluated by PT/OT and was recommended for acute inpatient rehab. Pt is medically stable for discharge to [facility] on 4/26. PMHx:   . Current meds: Please see list in Pt’s chart. Social Hx: Pt is  and has three adult sons. 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 Pt lacks hx of mental illness and substance abuse. Pt is [employer]. Pt reports enjoying helping others out.      Findings: Pt was seen for an initial assessment of her cognitive and emotional functioning. The Modified MMSE (3MS) was administered; Pt’s results (76/100) were in the Moderately Impaired range. Her scores in standardized mood measures suggested minimal to mild levels of anxiety and depression (Anxiety, GAD7 = 5/21; Depression, PHQ9 = 4/27 Pt was alert, partly Ox3 (she was disoriented to the season,  exact date, day of the week, county and city), and her attitude was cooperative. Attn/Conc: Simple auditory attention - impaired. Concentration/Working memory for reversed counting and spelling – mildly impaired (5/7). Language: Pt’s speech was of normal volume, pitch and pace, with paraphasias noted. Naming - mildly impaired (3/5 body parts correctly named). Sentence repetition - intact. Auditory Comprehension - mildly impaired (2/3 parts of a verbal command correctly executed). Reading - intact. Writing - moderately impaired (2/5 words correctly written and legible). Memory: Encoding of 3 words was intact (3/3 after 3 learning trials); short- and long-delayed verbal recall – both intact (3/3). LTM was intact for US presidents (4/4). Visual memory – impaired. Visuospatial: Visuomotor integration – mildly impaired for copy of a 2D figure (8/10), distortion and left sided inattention were noted. Executive Functions: Motor Planning - mildly impaired (2/3 motor commands correctly executed, perseveration noted in 1/3 steps). Organizational skills – moderately impaired for clock drawing on command (only anchor numbers placed - one of them incorrect, tic marks and hands incorrectly placed). Abstract reasoning – mildly impaired for similarities (4/6). Initiation/Phonemic Fluency – moderately impaired (4/10 four-legged animals in 30 sec, one set-loss error noted). Verbal problem solving – moderately impaired. Emotional functioning: Affect - blunted to depressed. Mood - euthymic ("good"); Pt reported experiencing anxiety, worry, low energy and fatigue. On mood measures she additionally reported low self-esteem, psychomotor retardation, anxiety, worry and irritability.  Thought processes were goal-directed. No abnormal thought contents were observed.  Pt denied any AH/VH. Pt also denied SI/HI/I/P. Insight - WFL. Judgment - fair.

## 2024-05-15 NOTE — PROGRESS NOTE ADULT - SUBJECTIVE AND OBJECTIVE BOX
Patient is a 58y old  Female who presents with a chief complaint of SAH (14 May 2024 13:08)    Patient seen and examined at bedside. No acute overnight events. No complaints at time of evaluation.     ALLERGIES:  No Known Allergies    MEDICATIONS  (STANDING):  atorvastatin 40 milliGRAM(s) Oral at bedtime  doxazosin 2 milliGRAM(s) Oral at bedtime  enoxaparin Injectable 40 milliGRAM(s) SubCutaneous <User Schedule>  multivitamin/minerals/iron Oral Solution (CENTRUM) 15 milliLiter(s) Oral daily  pantoprazole   Suspension 40 milliGRAM(s) Oral before breakfast  polyethylene glycol 3350 17 Gram(s) Oral daily  propranolol 10 milliGRAM(s) Oral every 12 hours  senna Syrup 10 milliLiter(s) Oral at bedtime    MEDICATIONS  (PRN):  acetaminophen   Oral Liquid .. 650 milliGRAM(s) Oral every 6 hours PRN Mild Pain (1 - 3)  melatonin 3 milliGRAM(s) Oral at bedtime PRN Insomnia  petrolatum white Ointment 1 Application(s) Topical every 12 hours PRN dryness    Vital Signs Last 24 Hrs  T(F): 98 (15 May 2024 07:54), Max: 98 (15 May 2024 07:54)  HR: 74 (15 May 2024 07:54) (63 - 74)  BP: 129/70 (15 May 2024 07:54) (119/73 - 129/70)  RR: 16 (15 May 2024 07:54) (15 - 16)  SpO2: 98% (15 May 2024 07:54) (96% - 98%)  I&O's Summary    14 May 2024 07:01  -  15 May 2024 07:00  --------------------------------------------------------  IN: 0 mL / OUT: 1 mL / NET: -1 mL    PHYSICAL EXAM:  General: NAD, A/O x 3  ENT: MMM, no tonsilar exudate  Neck: Supple, No JVD  Lungs: Clear to auscultation bilaterally, no wheezes. Good air entry bilaterally   Cardio: RRR, S1/S2, No murmurs  Abdomen: Soft, Nontender, Nondistended; Bowel sounds present  Extremities: No calf tenderness, No pitting edema    LABS:                        10.4   5.26  )-----------( 248      ( 13 May 2024 05:27 )             32.6       05-13    146  |  110  |  7   ----------------------------<  94  3.3   |  27  |  0.51    Ca    9.3      13 May 2024 05:27    TPro  6.7  /  Alb  2.8  /  TBili  0.3  /  DBili  x   /  AST  23  /  ALT  35  /  AlkPhos  122  05-13     03-26 Chol 174 mg/dL LDL -- HDL 53 mg/dL Trig 82 mg/dL    Urinalysis Basic - ( 13 May 2024 05:27 )    Color: x / Appearance: x / SG: x / pH: x  Gluc: 94 mg/dL / Ketone: x  / Bili: x / Urobili: x   Blood: x / Protein: x / Nitrite: x   Leuk Esterase: x / RBC: x / WBC x   Sq Epi: x / Non Sq Epi: x / Bacteria: x    RADIOLOGY & ADDITIONAL TESTS:     Care Discussed with Consultants/Other Providers:

## 2024-05-16 LAB
ALBUMIN SERPL ELPH-MCNC: 2.8 G/DL — LOW (ref 3.3–5)
ALP SERPL-CCNC: 117 U/L — SIGNIFICANT CHANGE UP (ref 40–120)
ALT FLD-CCNC: 29 U/L — SIGNIFICANT CHANGE UP (ref 10–45)
ANION GAP SERPL CALC-SCNC: 8 MMOL/L — SIGNIFICANT CHANGE UP (ref 5–17)
AST SERPL-CCNC: 16 U/L — SIGNIFICANT CHANGE UP (ref 10–40)
BASOPHILS # BLD AUTO: 0.03 K/UL — SIGNIFICANT CHANGE UP (ref 0–0.2)
BASOPHILS NFR BLD AUTO: 0.6 % — SIGNIFICANT CHANGE UP (ref 0–2)
BILIRUB SERPL-MCNC: 0.3 MG/DL — SIGNIFICANT CHANGE UP (ref 0.2–1.2)
BUN SERPL-MCNC: 11 MG/DL — SIGNIFICANT CHANGE UP (ref 7–23)
CALCIUM SERPL-MCNC: 9.1 MG/DL — SIGNIFICANT CHANGE UP (ref 8.4–10.5)
CHLORIDE SERPL-SCNC: 108 MMOL/L — SIGNIFICANT CHANGE UP (ref 96–108)
CO2 SERPL-SCNC: 28 MMOL/L — SIGNIFICANT CHANGE UP (ref 22–31)
CREAT SERPL-MCNC: 0.56 MG/DL — SIGNIFICANT CHANGE UP (ref 0.5–1.3)
EGFR: 106 ML/MIN/1.73M2 — SIGNIFICANT CHANGE UP
EOSINOPHIL # BLD AUTO: 0.13 K/UL — SIGNIFICANT CHANGE UP (ref 0–0.5)
EOSINOPHIL NFR BLD AUTO: 2.7 % — SIGNIFICANT CHANGE UP (ref 0–6)
GLUCOSE SERPL-MCNC: 104 MG/DL — HIGH (ref 70–99)
HCT VFR BLD CALC: 31.5 % — LOW (ref 34.5–45)
HGB BLD-MCNC: 10.1 G/DL — LOW (ref 11.5–15.5)
IMM GRANULOCYTES NFR BLD AUTO: 0.4 % — SIGNIFICANT CHANGE UP (ref 0–0.9)
LYMPHOCYTES # BLD AUTO: 1.45 K/UL — SIGNIFICANT CHANGE UP (ref 1–3.3)
LYMPHOCYTES # BLD AUTO: 29.9 % — SIGNIFICANT CHANGE UP (ref 13–44)
MCHC RBC-ENTMCNC: 28.3 PG — SIGNIFICANT CHANGE UP (ref 27–34)
MCHC RBC-ENTMCNC: 32.1 GM/DL — SIGNIFICANT CHANGE UP (ref 32–36)
MCV RBC AUTO: 88.2 FL — SIGNIFICANT CHANGE UP (ref 80–100)
MONOCYTES # BLD AUTO: 0.41 K/UL — SIGNIFICANT CHANGE UP (ref 0–0.9)
MONOCYTES NFR BLD AUTO: 8.5 % — SIGNIFICANT CHANGE UP (ref 2–14)
NEUTROPHILS # BLD AUTO: 2.81 K/UL — SIGNIFICANT CHANGE UP (ref 1.8–7.4)
NEUTROPHILS NFR BLD AUTO: 57.9 % — SIGNIFICANT CHANGE UP (ref 43–77)
NRBC # BLD: 0 /100 WBCS — SIGNIFICANT CHANGE UP (ref 0–0)
PLATELET # BLD AUTO: 228 K/UL — SIGNIFICANT CHANGE UP (ref 150–400)
POTASSIUM SERPL-MCNC: 3.4 MMOL/L — LOW (ref 3.5–5.3)
POTASSIUM SERPL-SCNC: 3.4 MMOL/L — LOW (ref 3.5–5.3)
PROT SERPL-MCNC: 6.6 G/DL — SIGNIFICANT CHANGE UP (ref 6–8.3)
RBC # BLD: 3.57 M/UL — LOW (ref 3.8–5.2)
RBC # FLD: 14.3 % — SIGNIFICANT CHANGE UP (ref 10.3–14.5)
SODIUM SERPL-SCNC: 144 MMOL/L — SIGNIFICANT CHANGE UP (ref 135–145)
WBC # BLD: 4.85 K/UL — SIGNIFICANT CHANGE UP (ref 3.8–10.5)
WBC # FLD AUTO: 4.85 K/UL — SIGNIFICANT CHANGE UP (ref 3.8–10.5)

## 2024-05-16 PROCEDURE — 99233 SBSQ HOSP IP/OBS HIGH 50: CPT | Mod: GC

## 2024-05-16 RX ORDER — POTASSIUM CHLORIDE 20 MEQ
40 PACKET (EA) ORAL
Refills: 0 | Status: COMPLETED | OUTPATIENT
Start: 2024-05-16 | End: 2024-05-17

## 2024-05-16 RX ADMIN — ATORVASTATIN CALCIUM 40 MILLIGRAM(S): 80 TABLET, FILM COATED ORAL at 21:11

## 2024-05-16 RX ADMIN — Medication 40 MILLIEQUIVALENT(S): at 17:14

## 2024-05-16 RX ADMIN — Medication 15 MILLILITER(S): at 11:03

## 2024-05-16 RX ADMIN — PANTOPRAZOLE SODIUM 40 MILLIGRAM(S): 20 TABLET, DELAYED RELEASE ORAL at 05:11

## 2024-05-16 RX ADMIN — Medication 2 MILLIGRAM(S): at 21:11

## 2024-05-16 RX ADMIN — ENOXAPARIN SODIUM 40 MILLIGRAM(S): 100 INJECTION SUBCUTANEOUS at 17:14

## 2024-05-16 NOTE — PROGRESS NOTE ADULT - SUBJECTIVE AND OBJECTIVE BOX
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 Initially presented to [place] with headaches and became unresponsive, was intubated, and transferred to [facility]. On arrival to [facility] patient w/ PERRL, +cough/gag, 2/5 spontaneous movement in LUE, otherwise no movement. Given 1 unit platelets and R frontal EVD placed with high opening pressure. Patient underwent Right pterional craniotomy and clipping of a ruptured right PCOM aneurysm on 3/26.     On 3/29, EVD displaced and replaced in situ, complicated by EVD tract acute hemorrhage and new focus of infarction with hemorrhage in the right basal ganglia/corona radiata. Course further complicated by hydrocephalus with ICP crisis, vasospasm requiring cerebral angiography and IA milrinone and verapamil. MRI (4/8) with widespread ischemic stroke b/l, as per neuro IR no further intervention. Patient extubated on 4/3, re-intubated 4/8, and extubated again on 4/9. EVD removed on 4/15. CTH post removal stable.     Course also complicated by bradycardia treated with theophyline, now resolved and discontinued. Initial Echo (3/26) with severe cardiomyopathy, EF 36%, regional wall motion abnormality. Resolved on repeat TTE performed 4/9/24, normal EF 66% with no WMA. Patient first noted with left soleal DVT on Duplex 3/28. Duplex (4/10) with stable b/l soleal DVT with extension to right posterior tibial DVT.  Last LE duplex with stable bilateral soleal DVT, [facility] cardiology recommended repeat surveillance duplex on 4/30. On Lovenox ppx.     PEG placement attempted 4/19 for persistent dysphagia, but was aborted due to growing hematoma noted in gastric wall on endoscopy after trocar placement. CT Abdomen and Pelvis w/ IV Cont showed gastric wall hematoma without intraluminal or extraluminal hemorrhage. PEG successfully placed on 4/23.     Patient evaluated by PT/OT and was recommended for acute inpatient rehab. Patient is medically stable for discharge to [facility] on 4/26.  (26 Apr 2024 14:34)        SUBJECTIVE: Patient seen and examined. No acute overnight events, slept well. No pain at this time. Patient denies loose stools w/ providers.     REVIEW OF SYSTEMS  Constitutional: No fever, + fatigue  Cardio: No chest pain, No palpitations  Resp: No SOB, no respiratory distress   Neuro: No headaches +weakness      VITALS  58y  Vital Signs Last 24 Hrs  T(C): 36.7 (15 May 2024 07:54), Max: 36.7 (15 May 2024 07:54)  T(F): 98 (15 May 2024 07:54), Max: 98 (15 May 2024 07:54)  HR: 74 (15 May 2024 07:54) (63 - 74)  BP: 129/70 (15 May 2024 07:54) (119/73 - 129/70)  RR: 16 (15 May 2024 07:54) (15 - 16)  SpO2: 98% (15 May 2024 07:54) (96% - 98%)    Parameters below as of 15 May 2024 07:54  Patient On (Oxygen Delivery Method): room air      Daily     Daily     PHYSICAL EXAM:   Gen - NAD, Comfortable  HEENT - incision healing well  Pulm - CTAB, No wheeze, No rhonchi, No crackles  Cardiovascular - RRR, S1S2, No murmurs  Chest - good chest expansion, good respiratory effort  Abdomen - Soft, NT/ND, +BS, PEG c/d/i  Extremities - No Cyanosis, no clubbing, no edema, no calf tenderness  Neuro-     Cognitive - awake, alert, oriented to person, place, year with choices.     Communication - hypophonic at times      Attention:  impaired       Cranial Nerves - left facial droop     Motor -                     LEFT    UE - ShAB 3/5, EF 4/5, EE 3+/5,  3/5                    RIGHT UE - ShAB 3/5, EF 4/5, EE 4/5,   4/5                     LEFT    LE/ RIGHT LE - pt did not follow command  but Moves extremities     Reflexes - DTR Intact, No primitive reflexive       Tone - unable to test properly pt unable to follow request to relax  Psychiatric - Mood stable    RECENT LABS:                          10.1   4.85  )-----------( 228      ( 16 May 2024 05:49 )             31.5     05-16    144  |  108  |  11  ----------------------------<  104<H>  3.4<L>   |  28  |  0.56    Ca    9.1      16 May 2024 05:49    TPro  6.6  /  Alb  2.8<L>  /  TBili  0.3  /  DBili  x   /  AST  16  /  ALT  29  /  AlkPhos  117  05-16    LIVER FUNCTIONS - ( 16 May 2024 05:49 )  Alb: 2.8 g/dL / Pro: 6.6 g/dL / ALK PHOS: 117 U/L / ALT: 29 U/L / AST: 16 U/L / GGT: x                 Urinalysis Basic - ( 16 May 2024 05:49 )    Color: x / Appearance: x / SG: x / pH: x  Gluc: 104 mg/dL / Ketone: x  / Bili: x / Urobili: x   Blood: x / Protein: x / Nitrite: x   Leuk Esterase: x / RBC: x / WBC x   Sq Epi: x / Non Sq Epi: x / Bacteria: x        MEDICATIONS  (STANDING):  atorvastatin 40 milliGRAM(s) Oral at bedtime  doxazosin 2 milliGRAM(s) Oral at bedtime  enoxaparin Injectable 40 milliGRAM(s) SubCutaneous <User Schedule>  multivitamin/minerals/iron Oral Solution (CENTRUM) 15 milliLiter(s) Oral daily  pantoprazole   Suspension 40 milliGRAM(s) Oral before breakfast  potassium chloride   Powder 40 milliEquivalent(s) Oral two times a day  propranolol 10 milliGRAM(s) Oral every 12 hours    MEDICATIONS  (PRN):  acetaminophen   Oral Liquid .. 650 milliGRAM(s) Oral every 6 hours PRN Mild Pain (1 - 3)  melatonin 3 milliGRAM(s) Oral at bedtime PRN Insomnia  petrolatum white Ointment 1 Application(s) Topical every 12 hours PRN dryness  senna 2 Tablet(s) Oral at bedtime PRN Constipation

## 2024-05-16 NOTE — PROGRESS NOTE ADULT - ASSESSMENT
ASSESSMENT/PLAN:   57 y/o F with no known PMH, h/o fell one month prior to admission, who was transferred from [place] to [facility] on 3/26/24 with diffuse SAH w/ small IVH, 2/2 ruptured posterolaterally projecting 2.9 x 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 Patient s/p right frontal EVD and right pterional craniotomy with clipping of a ruptured right PCOM aneurysm on 3/26. Course complicated by EVD tract hemorrhage, new right basal ganglia/corona radiata infarct, hydrocephalus with ICP crisis, vasospasm requiring IA milrinone/verapimil, and new widespread ischemic strokes. Patient also found to have HFrEF (now recovered), bradycardia (resolved), b/l soleal DVT. s/p PEG placement initially complicated by gastric wall hematoma    # SAH s/p ruptured PCOM aneurysm s/p right pterional craniotomy and clipping 3/26  -  vasospasm s/p cerebral angiography and IA milrinone and verapamil  - MRI (4/8): Multifocal acute to subacute infarction. This extensively involves each MCA posterior distribution, the right basal ganglia, the anterior parasagittal vertex CINDY distribution and an additional smaller lesion in the right cerebellum  - Staple removed 4/30  - patio pass in WC with staff and/or family present at all times    # Bradycardia: resolved  - was on theophyline, dc'd- HR stable-- (63 - 74) 5/16    # Akasthesia/Restlessness  - Propranolol 10 mg started q 12 hrs w/ parameters 5/9-- improved 5/13  - (119/73 - 129/70) past 24 hrs    # Insomnia  -  Melatonin 3mg prn    # HFrEF  - Initial Echo (3/26) with severe cardiomyopathy, EF 36%, regional wall motion abnormality  - Repeat TTE 4/9/24, normal EF 66% with no WMA.    # HLD  -  lipitor 40 mg QHS    # B/l DVT   - Left soleal DVT first noted 3/28, right soleal DVT fist noted 4/3  - Duplex (4/23): persistent bilateral soleal vein DVT  - duplex 4/30: There is persistence of DVT identified within the left soleal vein. On the current evaluation DVT is also evident within the left gastrocnemius vein, new. Previously identified right soleal DVT is no longer visualized  - Surveillance doppler 5/7: Persistence of DVT identified within the left soleal and gastrocnemius veins. No propogation  - NSGY: no full dose AC for at least 4 weeks from surgery  - Discussed with hospitalist. Continue PPX dose now, surveillance dopplers weekly  - Continue Lovenox 40 mg QD  - Doppler 5/14 preformed, reviewed- Persistent gastrocnemius DVT in the left lower extremity.Interval resolution of previously visualized left soleal vein DVT. No right-sided DVT.    # Hypernatremia: resolved  - PEG flushes free water 200 cc Q6H  - Na 146 5/13 -> 144 5/16    # hypokalemia  -  3.3 K -> supplemented with 40meq of K po x2 on 5/13 -> supplemented 5/16 at 3.4    # Gastric Hematoma post PEG placement  -  PEG placement attempted 4/19 for persistent dysphagia, but was aborted due to growing hematoma noted in gastric wall on endoscopy after trocar placement  - CT Abdomen and Pelvis w/ IV Cont showed gastric wall hematoma without intraluminal or extraluminal hemorrhage  - PEG placed 4/23  - Hgb 9.7 4/29--> 9.8 5/2 --> 10.2 5/6 ->10.7 5/9 -> 10.4 5/13 -> 10.1 5/16    # Pain  - Tylenol 650mg q6h PRN     # GI / Bowel  - Senna qHS  - Miralax BID  - GI ppx: protonix 40 mg QD    #  / Bladder  - Continue cardura 2 mg QHS   - d/c'd bladder scans on 5/6    # Skin / Pressure injury  - left toe scabs, right lateral foot callus   - podiatry consult appreciated 5/2. Callus shaved down, no additional treatment necessary    # Diet/Dysphagia  - Diet: pureed solids thin liquids with ensure plus supplementation 5/2 post MBS  - Supplements: MVI    Outpatient Follow-up:  Perlita Varela  Neurosurgery  [address] [place] [address], [address]  [place] [address]  Phone: [phone]  Fax: [phone]  Follow Up Time:    Code Status/Emergency Contact:  FULL CODE  Pierre Patrick - son [phone]      Interdisciplinary team meeting 5/15 with rehab team- doctor, speech therapist, occupational therapist, physical therapist, social work and nursing where medical updates provided, progress with therapies and goals discussed. - d/c DAV on 5/17  Mobility Discussion  PT - Bed Mobility (Mobility) Open - Barrier  Current Status: 5/14: Min/ModA  5/8: max to total assist (depending on Pt's willingness to participate)  Long Term Goals  04/27/2024 11:37 AM - Active  3 weeks: CG/Roberto for all bed mobility  PT - Bed/Chair/Wheelchair (Mobility) Open - Barrier  Current Status: 5/14: min/mod assist (inconsistent)  Long Term Goals  04/27/2024 11:39 AM - Active  3 weeks:  CG/Roberto for all transfers with or without RW  PT - Walk (Mobility) Open - Barrier  Current Status: 5/14: Pt ambulated 100 feet with hand held assist on left UE and  moderate assist.  5/13: 100 feet with RW and moderate assist  Long Term Goals  04/27/2024 11:40 AM - Active  3 weeks:  100 feet with RW and Roberto  PT - Stairs (Mobility) Open - Barrier  Current Status: 5/14: not performed  5/10: Pt negotiated 3, 4" steps with mod assist of 1, min assist of another for  safety due to high distractability, impaired judgement and safety awareness  Long Term Goals  04/27/2024 11:41 AM - Active  3 weeks:  4 steps with 2 rails Roberto  OT - Toilet Transfers (Mobility)  Current Status: 5/13- mod assist- continue to recommend 2 person for safety 2/2  motor restlessness  Long Term Goals  04/27/2024 02:51 PM - Active  Within 2 weeks, pt. will complete toilet transfer with min A with DME.  Self Care Discussion    OT - Dressing (Lower) (Self Care)  Current Status: 5/10- max assist  Long Term Goals  04/27/2024 02:51 PM - Active  Within 2 weeks, pt. will complete LB dressing with min A using esther-tecnhique  OT - Dressing (Upper) (Self Care)  Current Status: 5/10- supervision  Long Term Goals  04/27/2024 02:51 PM - Met  Within 2 weeks, pt. will complete UB dressing with min A using esther-technique.  Mobility Discussion    OT - Tub/Shower Transfers (Mobility)  Current Status: 5/7- unsafe at this time  Long Term Goals  04/27/2024 02:51 PM - Active  Within 2 weeks, pt. will complete toilet transfer with mod A with DME.  Cognition Discussion    SLP - Attention (Cognition)  Current Status: 5/10 attending to left with good results, overall  sustained/focused attention reuced  5/8 reduced overall sustained attention and reduced attention to left visual  field.  refocusing redirectioning required throughout.  5/7 unchanged from last session    Long Term Goals  04/27/2024 12:58 PM - Active  Improve attention to participate in FADLs with moderate-maximum assistance.  Communication Discussion    SLP - Comprehension (Communication)  Current Status: 5/14: 1 step 70%, simple yes/no 60%  5/13 1-step 80%, simple y/n 60%  5/9 Simple y/m 80% moderate y/n 90%  5/7 Simple safety y/n 50% accuracy. 1-step 50%, Personally relevant y/n 100%,  simple y/n 90%, moderarte y/n 90%  Long Term Goals  04/27/2024 01:51 PM - Active  Patient will improve receptive language skills for functional communication with  moderate-maximum assistance.  SLP - Expression (Communication)  Current Status: 5/14: Responsive naming 40%, increasing to 70% given binary  choices, named 3 items in a category  5/13 oriented to name, place and month but not situation or year., responsive  naming 85%, answer WH-questions 68%  5/10 divergent categorization of 5 items -50%  5/9 oriented to name, place situation and year but not month, auto sequences  1-21, Mon-friday and jan-august, conv. phrase completion 100%, antonyms 30%,  conv. categorization 65%           ASSESSMENT/PLAN:   57 y/o F with no known PMH, h/o fell one month prior to admission, who was transferred from [place] to [facility] on 3/26/24 with diffuse SAH w/ small IVH, 2/2 ruptured posterolaterally projecting 2.9 x 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 Patient s/p right frontal EVD and right pterional craniotomy with clipping of a ruptured right PCOM aneurysm on 3/26. Course complicated by EVD tract hemorrhage, new right basal ganglia/corona radiata infarct, hydrocephalus with ICP crisis, vasospasm requiring IA milrinone/verapimil, and new widespread ischemic strokes. Patient also found to have HFrEF (now recovered), bradycardia (resolved), b/l soleal DVT. s/p PEG placement initially complicated by gastric wall hematoma    # SAH s/p ruptured PCOM aneurysm s/p right pterional craniotomy and clipping 3/26  -  vasospasm s/p cerebral angiography and IA milrinone and verapamil  - MRI (4/8): Multifocal acute to subacute infarction. This extensively involves each MCA posterior distribution, the right basal ganglia, the anterior parasagittal vertex CINDY distribution and an additional smaller lesion in the right cerebellum  - Staple removed 4/30  - patio pass in WC with staff and/or family present at all times    # Bradycardia: resolved  - was on theophyline, dc'd- HR stable-- (63 - 74) 5/16    # Akasthesia/Restlessness  - Propranolol 10 mg started q 12 hrs w/ parameters 5/9-- improved 5/13  - (119/73 - 129/70) past 24 hrs    # Insomnia  -  Melatonin 3mg prn    # HFrEF  - Initial Echo (3/26) with severe cardiomyopathy, EF 36%, regional wall motion abnormality  - Repeat TTE 4/9/24, normal EF 66% with no WMA.    # HLD  -  lipitor 40 mg QHS    # B/l DVT   - Left soleal DVT first noted 3/28, right soleal DVT fist noted 4/3  - Duplex (4/23): persistent bilateral soleal vein DVT  - duplex 4/30: There is persistence of DVT identified within the left soleal vein. On the current evaluation DVT is also evident within the left gastrocnemius vein, new. Previously identified right soleal DVT is no longer visualized  - Surveillance doppler 5/7: Persistence of DVT identified within the left soleal and gastrocnemius veins. No propogation  - NSGY: no full dose AC for at least 4 weeks from surgery  - Discussed with hospitalist. Continue PPX dose now, surveillance dopplers weekly  - Continue Lovenox 40 mg QD  - Doppler 5/14 preformed, reviewed- Persistent gastrocnemius DVT in the left lower extremity. Interval resolution of previously visualized left soleal vein DVT. No right-sided DVT.  -discuss duration of anticoagulation with hospitalist as patient going to [facility]-- per discussion, c/w lovenox and repeat doppler in 1 week.    # Hypernatremia: resolved  - PEG flushes free water 200 cc Q6H  - Na 146 5/13 -> 144 5/16    # hypokalemia  -  3.3 K -> supplemented with 40meq of K po x2 on 5/13 ->   - K 3.4 5/16--- supplemented 40 mEQ today    # Gastric Hematoma post PEG placement  -  PEG placement attempted 4/19 for persistent dysphagia, but was aborted due to growing hematoma noted in gastric wall on endoscopy after trocar placement  - CT Abdomen and Pelvis w/ IV Cont showed gastric wall hematoma without intraluminal or extraluminal hemorrhage  - PEG placed 4/23  - Hgb 9.7 4/29--> 9.8 5/2 --> 10.2 5/6 ->10.7 5/9 -> 10.4 5/13 -> 10.1 5/16    # Pain  - Tylenol 650mg q6h PRN     # GI / Bowel  - Senna qHS  - Miralax BID  - GI ppx: protonix 40 mg QD    #  / Bladder  - Continue cardura 2 mg QHS   - d/c'd bladder scans on 5/6    # Skin / Pressure injury  - left toe scabs, right lateral foot callus   - podiatry consult appreciated 5/2. Callus shaved down, no additional treatment necessary    # Diet/Dysphagia  - Diet: pureed solids thin liquids with ensure plus supplementation 5/2 post MBS  - Supplements: MVI    Outpatient Follow-up:  Perlita Varela  Neurosurgery  [address] [place] [address], [address]  [place] [address]  Phone: [phone]  Fax: [phone]  Follow Up Time:    Code Status/Emergency Contact:  FULL CODE  Pierre rojas [phone]

## 2024-05-16 NOTE — PROGRESS NOTE ADULT - ATTENDING COMMENTS
Patient seen and evaluated while sitting in the wheelchair. States she slept. No loose BM reported.  Motor- antigravity in all extremities, limited by motor apraxia and command following  Reviewed labs- cbc/cmp- HgB stable at 10.1  Hypokalemia-K 3.4, supplement 40 mEq X2   Hypernatremia- improved- 144, previously 146.  discussed going to [facility] tomorrow.  Patient with known LLE gastroc DVT, per discussion with hospitalist, c/w lovenox, repeat doppler LE in 1 week

## 2024-05-17 ENCOUNTER — TRANSCRIPTION ENCOUNTER (OUTPATIENT)
Age: 59
End: 2024-05-17

## 2024-05-17 PROCEDURE — 99232 SBSQ HOSP IP/OBS MODERATE 35: CPT

## 2024-05-17 PROCEDURE — 99233 SBSQ HOSP IP/OBS HIGH 50: CPT | Mod: GC

## 2024-05-17 RX ORDER — ENOXAPARIN SODIUM 100 MG/ML
40 INJECTION SUBCUTANEOUS
Qty: 1 | Refills: 0
Start: 2024-05-17 | End: 2024-06-15

## 2024-05-17 RX ORDER — ENOXAPARIN SODIUM 100 MG/ML
40 INJECTION SUBCUTANEOUS
Qty: 0 | Refills: 0 | DISCHARGE
Start: 2024-05-17

## 2024-05-17 RX ADMIN — ENOXAPARIN SODIUM 40 MILLIGRAM(S): 100 INJECTION SUBCUTANEOUS at 17:28

## 2024-05-17 RX ADMIN — PANTOPRAZOLE SODIUM 40 MILLIGRAM(S): 20 TABLET, DELAYED RELEASE ORAL at 05:40

## 2024-05-17 RX ADMIN — Medication 40 MILLIEQUIVALENT(S): at 05:40

## 2024-05-17 RX ADMIN — Medication 15 MILLILITER(S): at 11:20

## 2024-05-17 RX ADMIN — Medication 2 MILLIGRAM(S): at 21:21

## 2024-05-17 RX ADMIN — ATORVASTATIN CALCIUM 40 MILLIGRAM(S): 80 TABLET, FILM COATED ORAL at 21:21

## 2024-05-17 NOTE — DISCHARGE NOTE NURSING/CASE MANAGEMENT/SOCIAL WORK - NSDCPEFALRISK_GEN_ALL_CORE
For information on Fall & Injury Prevention, visit: [email or web address] OR  [email or web address] OR  https://www.cdc.gov/steadi/patient.html

## 2024-05-17 NOTE — PROGRESS NOTE ADULT - SUBJECTIVE AND OBJECTIVE BOX
Patient is a 58y old  Female who presents with a chief complaint of SAH (17 May 2024 10:31)    Patient seen and examined at bedside. No acute overnight events. Denies pain/discomfort. No complaints at time of evaluation.     ALLERGIES:  No Known Allergies    MEDICATIONS  (STANDING):  atorvastatin 40 milliGRAM(s) Oral at bedtime  doxazosin 2 milliGRAM(s) Oral at bedtime  enoxaparin Injectable 40 milliGRAM(s) SubCutaneous <User Schedule>  multivitamin/minerals/iron Oral Solution (CENTRUM) 15 milliLiter(s) Oral daily  pantoprazole   Suspension 40 milliGRAM(s) Oral before breakfast  propranolol 10 milliGRAM(s) Oral every 12 hours    MEDICATIONS  (PRN):  acetaminophen   Oral Liquid .. 650 milliGRAM(s) Oral every 6 hours PRN Mild Pain (1 - 3)  melatonin 3 milliGRAM(s) Oral at bedtime PRN Insomnia  petrolatum white Ointment 1 Application(s) Topical every 12 hours PRN dryness  senna 2 Tablet(s) Oral at bedtime PRN Constipation    Vital Signs Last 24 Hrs  T(F): 98.1 (17 May 2024 08:09), Max: 98.2 (16 May 2024 20:18)  HR: 76 (17 May 2024 08:09) (74 - 76)  BP: 128/70 (17 May 2024 08:09) (103/71 - 128/70)  RR: 16 (17 May 2024 08:09) (16 - 16)  SpO2: 97% (17 May 2024 08:09) (97% - 97%)  I&O's Summary    16 May 2024 07:01  -  17 May 2024 07:00  --------------------------------------------------------  IN: 2580 mL / OUT: 0 mL / NET: 2580 mL    PHYSICAL EXAM:  General: NAD, awake, alert   ENT: MMM, no tonsilar exudate  Neck: Supple, No JVD  Lungs: Clear to auscultation bilaterally, no wheezes. Good air entry bilaterally   Cardio: RRR, S1/S2, No murmurs  Abdomen: Soft, Nontender, Nondistended; Bowel sounds present  Extremities: No calf tenderness, No pitting edema    LABS:                        10.1   4.85  )-----------( 228      ( 16 May 2024 05:49 )             31.5       05-16    144  |  108  |  11  ----------------------------<  104  3.4   |  28  |  0.56    Ca    9.1      16 May 2024 05:49    TPro  6.6  /  Alb  2.8  /  TBili  0.3  /  DBili  x   /  AST  16  /  ALT  29  /  AlkPhos  117  05-16     03-26 Chol 174 mg/dL LDL -- HDL 53 mg/dL Trig 82 mg/dL    Urinalysis Basic - ( 16 May 2024 05:49 )    Color: x / Appearance: x / SG: x / pH: x  Gluc: 104 mg/dL / Ketone: x  / Bili: x / Urobili: x   Blood: x / Protein: x / Nitrite: x   Leuk Esterase: x / RBC: x / WBC x   Sq Epi: x / Non Sq Epi: x / Bacteria: x    RADIOLOGY & ADDITIONAL TESTS:     Care Discussed with Consultants/Other Providers:

## 2024-05-17 NOTE — PROGRESS NOTE ADULT - ASSESSMENT
ASSESSMENT/PLAN:   59 y/o F with no known PMH, h/o fell one month prior to admission, who was transferred from [place] to [facility] on 3/26/24 with diffuse SAH w/ small IVH, 2/2 ruptured posterolaterally projecting 2.9 x 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 Patient s/p right frontal EVD and right pterional craniotomy with clipping of a ruptured right PCOM aneurysm on 3/26. Course complicated by EVD tract hemorrhage, new right basal ganglia/corona radiata infarct, hydrocephalus with ICP crisis, vasospasm requiring IA milrinone/verapimil, and new widespread ischemic strokes. Patient also found to have HFrEF (now recovered), bradycardia (resolved), b/l soleal DVT. s/p PEG placement initially complicated by gastric wall hematoma    # SAH s/p ruptured PCOM aneurysm s/p right pterional craniotomy and clipping 3/26  -  vasospasm s/p cerebral angiography and IA milrinone and verapamil  - MRI (4/8): Multifocal acute to subacute infarction. This extensively involves each MCA posterior distribution, the right basal ganglia, the anterior parasagittal vertex CINDY distribution and an additional smaller lesion in the right cerebellum  - Staple removed 4/30  - patio pass in WC with staff and/or family present at all times    # Bradycardia: resolved  - was on theophyline, dc'd- HR stable-- (63 - 74) 5/16    # Akasthesia/Restlessness  - Propranolol 10 mg started q 12 hrs w/ parameters 5/9-- improved 5/13  - (74 - 76) past 24 hrs    # Insomnia  -  Melatonin 3mg prn    # HFrEF  - Initial Echo (3/26) with severe cardiomyopathy, EF 36%, regional wall motion abnormality  - Repeat TTE 4/9/24, normal EF 66% with no WMA.    # HLD  -  lipitor 40 mg QHS    # B/l DVT   - Left soleal DVT first noted 3/28, right soleal DVT fist noted 4/3  - Duplex (4/23): persistent bilateral soleal vein DVT  - duplex 4/30: There is persistence of DVT identified within the left soleal vein. On the current evaluation DVT is also evident within the left gastrocnemius vein, new. Previously identified right soleal DVT is no longer visualized  - Surveillance doppler 5/7: Persistence of DVT identified within the left soleal and gastrocnemius veins. No propogation  - NSGY: no full dose AC for at least 4 weeks from surgery  - Discussed with hospitalist. Continue PPX dose now, surveillance dopplers weekly  - Continue Lovenox 40 mg QD  - Doppler 5/14 preformed, reviewed- Persistent gastrocnemius DVT in the left lower extremity. Interval resolution of previously visualized left soleal vein DVT. No right-sided DVT.  -discuss duration of anticoagulation with hospitalist as patient going to [facility]-- per discussion, c/w lovenox and repeat doppler in 1 week.    # Hypernatremia: resolved  - PEG flushes free water 200 cc Q6H  - Na 146 5/13 -> 144 5/16    # hypokalemia  -  3.3 K -> supplemented with 40meq of K po x2 on 5/13 ->   - K 3.4 5/16--- supplemented 40 mEQ yesterday    # Gastric Hematoma post PEG placement  -  PEG placement attempted 4/19 for persistent dysphagia, but was aborted due to growing hematoma noted in gastric wall on endoscopy after trocar placement  - CT Abdomen and Pelvis w/ IV Cont showed gastric wall hematoma without intraluminal or extraluminal hemorrhage  - PEG placed 4/23  - Hgb 9.7 4/29--> 9.8 5/2 --> 10.2 5/6 ->10.7 5/9 -> 10.4 5/13 -> 10.1 5/16    # Pain  - Tylenol 650mg q6h PRN     # GI / Bowel  - Senna qHS  - Miralax BID  - GI ppx: protonix 40 mg QD    #  / Bladder  - Continue cardura 2 mg QHS   - d/c'd bladder scans on 5/6    # Skin / Pressure injury  - left toe scabs, right lateral foot callus   - podiatry consult appreciated 5/2. Callus shaved down, no additional treatment necessary    # Diet/Dysphagia  - Diet: pureed solids thin liquids with ensure plus supplementation 5/2 post MBS  - Supplements: MVI    Outpatient Follow-up:  Perlita Varela  Neurosurgery  [address] [place] [address], [address]  [place] [address]  Phone: [phone]  Fax: [phone]  Follow Up Time:    Code Status/Emergency Contact:  FULL CODE  Pierre Patrick - son [phone]

## 2024-05-17 NOTE — PROGRESS NOTE ADULT - TIME BILLING
- Ordering, reviewing, and interpreting labs, testing, and imaging.  - Independently obtaining a review of systems and performing a physical exam  - Reviewing consultant documentation/recommendations in addition to discussing plan of care with consultants.  - Counselling and educating patient and family regarding interpretation of aforementioned items and plan of care.
This includes reviewing results/imaging and discussions with specialists, nursing, case management/social work. Further tests, medications, and procedures have been ordered as indicated. Results and the plan of care were communicated to the patient and/or their family member. Supporting documentation was completed and added to the patient's chart.
- Ordering, reviewing, and interpreting labs, testing, and imaging.  - Independently obtaining a review of systems and performing a physical exam  - Reviewing consultant documentation/recommendations in addition to discussing plan of care with consultants.  - Counselling and educating patient and family regarding interpretation of aforementioned items and plan of care.
This includes reviewing results/imaging and discussions with specialists, nursing, case management/social work. Further tests, medications, and procedures have been ordered as indicated. Results and the plan of care were communicated to the patient and/or their family member. Supporting documentation was completed and added to the patient's chart.
face to face time encounter and counseling the patient, meeting with hospitalist, nursing staff, therapists and social work to discuss medical updates and management, care coordination, reviewing chart and data-including but not limited to labs and imaging
face to face time encounter and counseling the patient, meeting with hospitalist, nursing staff, therapists and social work to discuss medical updates and management, care coordination, reviewing chart and data
face to face time encounter and counseling the patient, meeting with hospitalist, nursing staff, therapists and social work to discuss medical updates and management, care coordination, reviewing chart and data
face to face time encounter and counseling the patient, meeting with hospitalist, nursing staff, therapists and social work to discuss medical updates and management, care coordination for discharge, reviewing chart and data

## 2024-05-17 NOTE — DISCHARGE NOTE NURSING/CASE MANAGEMENT/SOCIAL WORK - PATIENT PORTAL LINK FT
You can access the FollowMyHealth Patient Portal offered by [facility] by registering at the following website: [email or web address]. By joining [employer]’s FollowMyHealth portal, you will also be able to view your health information using other applications (apps) compatible with our system.

## 2024-05-17 NOTE — PROGRESS NOTE ADULT - ASSESSMENT
57 y/o F with no PMH, fell one month prior to admission, transferred from [place] to [facility] on 3/26/24 for diffuse SAH w/ small IVH, 2/2 ruptured aneurysm s/p EVD and craniotomy + clipping of R PCOM aneurysm on 3/26. 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 Patient found to have HFrEF (now recovered), bradycardia (placed on theophyline, now resolved), b/l soleal DVT (on Lovenox ppx). PEG placement initially complicated by gastric wall hematoma, subsequently successfully placed for persistent dysphagia. Patient now admitted for a multidisciplinary rehab program    # SAH s/p ruptured PCOM aneurysm  - R EVD placed 3/26 and removed 4/15  - s/p right pterional craniotomy and clipping of a ruptured right PCOM aneurysm on 3/26  - CTH (3/29): EVD tract acute hemorrhage and new focus of infarction with hemorrhage in the right basal ganglia/corona radiata  - complicated by vasospasm requiring cerebral angiography and IA milrinone and verapamil  - MRI (4/8): Multifocal acute to subacute infarction. This extensively involves each MCA posterior distribution, the right basal ganglia, the anterior parasagittal vertex CINDY distribution and an additional smaller lesion in the right cerebellum  - labs reviewed 5/13 -- stable.      # B/l DVT   - Left soleal DVT first noted 3/28, right soleal DVT fist noted 4/3  - Most recent Duplex (4/23): persistent bilateral soleal vein DVT  - vasc cardiology recommended repeat surveillance duplex on 4/30.  - repeat duplex 04/30:  persistent left soleal vein DVT + also left gastrocnemius vein DVT (new).   Right soleal vein DVT no longer visualized.  - Continue Lovenox 40   - repeat duplex 5/14 reviewed     # HLD  - continue Lipitor     # HFrEF  - Initial Echo (3/26) with severe cardiomyopathy, EF 36%, regional wall motion abnormality  - Resolved on repeat TTE performed 4/9/24, normal EF 66% with no WMA.    # Gastric Hematoma  - PEG placement attempted 4/19 for persistent dysphagia, but was aborted due to growing hematoma noted in gastric wall on endoscopy after trocar placement  - CT Abdomen and Pelvis w/ IV Cont showed gastric wall hematoma without intraluminal or extraluminal hemorrhage  - monitor H/H, stable at 10.7    # transaminitis- improved   - monitor    #DVT ppx  -Lovenox

## 2024-05-17 NOTE — PROGRESS NOTE ADULT - SUBJECTIVE AND OBJECTIVE BOX
Patient is a 58y old  Female who presents with a chief complaint of SAH (15 May 2024 11:24)      HPI:  59 y/o F with no PMH, fell one month prior to admission, transferred from [place] to 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 Initially presented to [place] with headaches and became unresponsive, was intubated, and transferred to [facility]. On arrival to [facility] patient w/ PERRL, +cough/gag, 2/5 spontaneous movement in LUE, otherwise no movement. Given 1 unit platelets and R frontal EVD placed with high opening pressure. Patient underwent Right pterional craniotomy and clipping of a ruptured right PCOM aneurysm on 3/26.     On 3/29, EVD displaced and replaced in situ, complicated by EVD tract acute hemorrhage and new focus of infarction with hemorrhage in the right basal ganglia/corona radiata. Course further complicated by hydrocephalus with ICP crisis, vasospasm requiring cerebral angiography and IA milrinone and verapamil. MRI (4/8) with widespread ischemic stroke b/l, as per neuro IR no further intervention. Patient extubated on 4/3, re-intubated 4/8, and extubated again on 4/9. EVD removed on 4/15. CTH post removal stable.     Course also complicated by bradycardia treated with theophyline, now resolved and discontinued. Initial Echo (3/26) with severe cardiomyopathy, EF 36%, regional wall motion abnormality. Resolved on repeat TTE performed 4/9/24, normal EF 66% with no WMA. Patient first noted with left soleal DVT on Duplex 3/28. Duplex (4/10) with stable b/l soleal DVT with extension to right posterior tibial DVT.  Last LE duplex with stable bilateral soleal DVT, [facility] cardiology recommended repeat surveillance duplex on 4/30. On Lovenox ppx.     PEG placement attempted 4/19 for persistent dysphagia, but was aborted due to growing hematoma noted in gastric wall on endoscopy after trocar placement. CT Abdomen and Pelvis w/ IV Cont showed gastric wall hematoma without intraluminal or extraluminal hemorrhage. PEG successfully placed on 4/23.     Patient evaluated by PT/OT and was recommended for acute inpatient rehab. Patient is medically stable for discharge to [facility] on 4/26.  (26 Apr 2024 14:34)        SUBJECTIVE: Patient seen and examined at communal breakfast. No acute overnight events, slept well. Patient more alert and less distractible today. Sentences and conversation lasting longer. Patient aware that she is discharging to a new facility today closer to her son who she spoke to yesterday. Patient very much enjoys the music played at breakfast. No pain at this time. Patient denies loose stools w/ providers.     REVIEW OF SYSTEMS  Constitutional: No fever, + fatigue  Cardio: No chest pain, No palpitations  Resp: No SOB, no respiratory distress   Neuro: No headaches +weakness      VITALS  Vital Signs Last 24 Hrs  T(C): 36.7 (17 May 2024 08:09), Max: 36.8 (16 May 2024 20:18)  T(F): 98.1 (17 May 2024 08:09), Max: 98.2 (16 May 2024 20:18)  HR: 76 (17 May 2024 08:09) (74 - 76)  BP: 128/70 (17 May 2024 08:09) (103/71 - 128/70)  BP(mean): --  RR: 16 (17 May 2024 08:09) (16 - 16)  SpO2: 97% (17 May 2024 08:09) (97% - 97%)    Parameters below as of 17 May 2024 08:09  Patient On (Oxygen Delivery Method): room air        Daily     Daily     PHYSICAL EXAM:   Gen - NAD, Comfortable  HEENT - incision healing well  Pulm - CTAB, No wheeze, No rhonchi, No crackles  Cardiovascular - RRR, S1S2, No murmurs  Chest - good chest expansion, good respiratory effort  Abdomen - Soft, NT/ND, +BS, PEG c/d/i  Extremities - No Cyanosis, no clubbing, no edema, no calf tenderness  Neuro-     Cognitive - awake, alert, oriented to person, place, year with choices.     Communication - hypophonic at times      Attention:  impaired       Cranial Nerves - left facial droop     Motor -                     LEFT    UE - ShAB 3/5, EF 4/5, EE 3+/5,  3/5                    RIGHT UE - ShAB 3/5, EF 4/5, EE 4/5,   4/5                     LEFT    LE/ RIGHT LE - pt did not follow command  but Moves extremities     Reflexes - DTR Intact, No primitive reflexive       Tone - unable to test properly pt unable to follow request to relax  Psychiatric - Mood stable    RECENT LABS:                          10.1   4.85  )-----------( 228      ( 16 May 2024 05:49 )             31.5     05-16    144  |  108  |  11  ----------------------------<  104<H>  3.4<L>   |  28  |  0.56    Ca    9.1      16 May 2024 05:49    TPro  6.6  /  Alb  2.8<L>  /  TBili  0.3  /  DBili  x   /  AST  16  /  ALT  29  /  AlkPhos  117  05-16    LIVER FUNCTIONS - ( 16 May 2024 05:49 )  Alb: 2.8 g/dL / Pro: 6.6 g/dL / ALK PHOS: 117 U/L / ALT: 29 U/L / AST: 16 U/L / GGT: x                 Urinalysis Basic - ( 16 May 2024 05:49 )    Color: x / Appearance: x / SG: x / pH: x  Gluc: 104 mg/dL / Ketone: x  / Bili: x / Urobili: x   Blood: x / Protein: x / Nitrite: x   Leuk Esterase: x / RBC: x / WBC x   Sq Epi: x / Non Sq Epi: x / Bacteria: x      MEDICATIONS  (STANDING):  atorvastatin 40 milliGRAM(s) Oral at bedtime  doxazosin 2 milliGRAM(s) Oral at bedtime  enoxaparin Injectable 40 milliGRAM(s) SubCutaneous <User Schedule>  multivitamin/minerals/iron Oral Solution (CENTRUM) 15 milliLiter(s) Oral daily  pantoprazole   Suspension 40 milliGRAM(s) Oral before breakfast  propranolol 10 milliGRAM(s) Oral every 12 hours    MEDICATIONS  (PRN):  acetaminophen   Oral Liquid .. 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 Initially presented to [place] with headaches and became unresponsive, was intubated, and transferred to [facility]. On arrival to [facility] patient w/ PERRL, +cough/gag, 2/5 spontaneous movement in LUE, otherwise no movement. Given 1 unit platelets and R frontal EVD placed with high opening pressure. Patient underwent Right pterional craniotomy and clipping of a ruptured right PCOM aneurysm on 3/26.     On 3/29, EVD displaced and replaced in situ, complicated by EVD tract acute hemorrhage and new focus of infarction with hemorrhage in the right basal ganglia/corona radiata. Course further complicated by hydrocephalus with ICP crisis, vasospasm requiring cerebral angiography and IA milrinone and verapamil. MRI (4/8) with widespread ischemic stroke b/l, as per neuro IR no further intervention. Patient extubated on 4/3, re-intubated 4/8, and extubated again on 4/9. EVD removed on 4/15. CTH post removal stable.     Course also complicated by bradycardia treated with theophyline, now resolved and discontinued. Initial Echo (3/26) with severe cardiomyopathy, EF 36%, regional wall motion abnormality. Resolved on repeat TTE performed 4/9/24, normal EF 66% with no WMA. Patient first noted with left soleal DVT on Duplex 3/28. Duplex (4/10) with stable b/l soleal DVT with extension to right posterior tibial DVT.  Last LE duplex with stable bilateral soleal DVT, [facility] cardiology recommended repeat surveillance duplex on 4/30. On Lovenox ppx.     PEG placement attempted 4/19 for persistent dysphagia, but was aborted due to growing hematoma noted in gastric wall on endoscopy after trocar placement. CT Abdomen and Pelvis w/ IV Cont showed gastric wall hematoma without intraluminal or extraluminal hemorrhage. PEG successfully placed on 4/23.     Patient evaluated by PT/OT and was recommended for acute inpatient rehab. Patient is medically stable for discharge to [facility] on 4/26.  (26 Apr 2024 14:34)      SUBJECTIVE: Patient seen and examined at communal breakfast. No acute overnight events, slept well. Patient more alert and less distractible today. Sentences and conversation lasting longer. Patient very much enjoys the music played at breakfast. No pain at this time. Patient denies loose stools w/ providers.     REVIEW OF SYSTEMS  Constitutional: No fever, + fatigue  Cardio: No chest pain, No palpitations  Resp: No SOB, no respiratory distress   Neuro: No headaches +weakness      VITALS  Vital Signs Last 24 Hrs  T(C): 36.7 (17 May 2024 08:09), Max: 36.8 (16 May 2024 20:18)  T(F): 98.1 (17 May 2024 08:09), Max: 98.2 (16 May 2024 20:18)  HR: 76 (17 May 2024 08:09) (74 - 76)  BP: 128/70 (17 May 2024 08:09) (103/71 - 128/70)  BP(mean): --  RR: 16 (17 May 2024 08:09) (16 - 16)  SpO2: 97% (17 May 2024 08:09) (97% - 97%)    Parameters below as of 17 May 2024 08:09  Patient On (Oxygen Delivery Method): room air        Daily     Daily     PHYSICAL EXAM:   Gen - NAD, Comfortable  HEENT - incision healing well  Pulm - CTAB, No wheeze, No rhonchi, No crackles  Cardiovascular - RRR, S1S2, No murmurs  Chest - good chest expansion, good respiratory effort  Abdomen - Soft, NT/ND, +BS, PEG c/d/i  Extremities - No Cyanosis, no clubbing, no edema, no calf tenderness  Neuro-     Cognitive - awake, alert, oriented to person, place, year with choices.     Communication - hypophonic at times      Attention:  impaired       Cranial Nerves - left facial droop     Motor -                     LEFT    UE - ShAB 3/5, EF 4/5, EE 3+/5,  3/5                    RIGHT UE - ShAB 3/5, EF 4/5, EE 4/5,   4/5                     LEFT    LE/ RIGHT LE - pt did not follow command  but Moves extremities     Reflexes - DTR Intact, No primitive reflexive       Tone - unable to test properly pt unable to follow request to relax  Psychiatric - Mood stable    RECENT LABS:                          10.1   4.85  )-----------( 228      ( 16 May 2024 05:49 )             31.5     05-16    144  |  108  |  11  ----------------------------<  104<H>  3.4<L>   |  28  |  0.56    Ca    9.1      16 May 2024 05:49    TPro  6.6  /  Alb  2.8<L>  /  TBili  0.3  /  DBili  x   /  AST  16  /  ALT  29  /  AlkPhos  117  05-16    LIVER FUNCTIONS - ( 16 May 2024 05:49 )  Alb: 2.8 g/dL / Pro: 6.6 g/dL / ALK PHOS: 117 U/L / ALT: 29 U/L / AST: 16 U/L / GGT: x                 Urinalysis Basic - ( 16 May 2024 05:49 )    Color: x / Appearance: x / SG: x / pH: x  Gluc: 104 mg/dL / Ketone: x  / Bili: x / Urobili: x   Blood: x / Protein: x / Nitrite: x   Leuk Esterase: x / RBC: x / WBC x   Sq Epi: x / Non Sq Epi: x / Bacteria: x      MEDICATIONS  (STANDING):  atorvastatin 40 milliGRAM(s) Oral at bedtime  doxazosin 2 milliGRAM(s) Oral at bedtime  enoxaparin Injectable 40 milliGRAM(s) SubCutaneous <User Schedule>  multivitamin/minerals/iron Oral Solution (CENTRUM) 15 milliLiter(s) Oral daily  pantoprazole   Suspension 40 milliGRAM(s) Oral before breakfast  propranolol 10 milliGRAM(s) Oral every 12 hours    MEDICATIONS  (PRN):  acetaminophen   Oral Liquid .. 650 milliGRAM(s) Oral every 6 hours PRN Mild Pain (1 - 3)  melatonin 3 milliGRAM(s) Oral at bedtime PRN Insomnia  petrolatum white Ointment 1 Application(s) Topical every 12 hours PRN dryness  senna 2 Tablet(s) Oral at bedtime PRN Constipation

## 2024-05-17 NOTE — PROGRESS NOTE ADULT - ATTENDING COMMENTS
Patient seen while sitting in the wheelchair. Denies acute complaints  Pending DC to [facility] today Patient seen while sitting in the wheelchair. Denies acute complaints  Pending DC to [facility] Patient seen while sitting in the wheelchair. Denies acute complaints  Pending DC to [facility]-- supposed to go today, informed patient will be going tomorrow instead.

## 2024-05-18 VITALS
TEMPERATURE: 98 F | DIASTOLIC BLOOD PRESSURE: 71 MMHG | OXYGEN SATURATION: 98 % | RESPIRATION RATE: 16 BRPM | SYSTOLIC BLOOD PRESSURE: 129 MMHG | HEART RATE: 69 BPM

## 2024-05-18 PROCEDURE — 92523 SPEECH SOUND LANG COMPREHEN: CPT | Mod: GN

## 2024-05-18 PROCEDURE — 93970 EXTREMITY STUDY: CPT

## 2024-05-18 PROCEDURE — 85025 COMPLETE CBC W/AUTO DIFF WBC: CPT

## 2024-05-18 PROCEDURE — 80048 BASIC METABOLIC PNL TOTAL CA: CPT

## 2024-05-18 PROCEDURE — 97530 THERAPEUTIC ACTIVITIES: CPT | Mod: GO

## 2024-05-18 PROCEDURE — 86381 MITOCHONDRIAL ANTIBODY EACH: CPT

## 2024-05-18 PROCEDURE — 92526 ORAL FUNCTION THERAPY: CPT | Mod: GN

## 2024-05-18 PROCEDURE — 87637 SARSCOV2&INF A&B&RSV AMP PRB: CPT

## 2024-05-18 PROCEDURE — 92507 TX SP LANG VOICE COMM INDIV: CPT | Mod: GN

## 2024-05-18 PROCEDURE — 99238 HOSP IP/OBS DSCHRG MGMT 30/<: CPT

## 2024-05-18 PROCEDURE — 97112 NEUROMUSCULAR REEDUCATION: CPT | Mod: GP

## 2024-05-18 PROCEDURE — 76705 ECHO EXAM OF ABDOMEN: CPT

## 2024-05-18 PROCEDURE — 85027 COMPLETE CBC AUTOMATED: CPT

## 2024-05-18 PROCEDURE — 83735 ASSAY OF MAGNESIUM: CPT

## 2024-05-18 PROCEDURE — 97110 THERAPEUTIC EXERCISES: CPT | Mod: GP

## 2024-05-18 PROCEDURE — 97161 PT EVAL LOW COMPLEX 20 MIN: CPT | Mod: GP

## 2024-05-18 PROCEDURE — 99233 SBSQ HOSP IP/OBS HIGH 50: CPT

## 2024-05-18 PROCEDURE — 92610 EVALUATE SWALLOWING FUNCTION: CPT | Mod: GN

## 2024-05-18 PROCEDURE — 84100 ASSAY OF PHOSPHORUS: CPT

## 2024-05-18 PROCEDURE — 97165 OT EVAL LOW COMPLEX 30 MIN: CPT | Mod: GO

## 2024-05-18 PROCEDURE — 92611 MOTION FLUOROSCOPY/SWALLOW: CPT | Mod: GN

## 2024-05-18 PROCEDURE — 82977 ASSAY OF GGT: CPT

## 2024-05-18 PROCEDURE — 97535 SELF CARE MNGMENT TRAINING: CPT | Mod: GO

## 2024-05-18 PROCEDURE — 36415 COLL VENOUS BLD VENIPUNCTURE: CPT

## 2024-05-18 PROCEDURE — 80053 COMPREHEN METABOLIC PANEL: CPT

## 2024-05-18 PROCEDURE — 80074 ACUTE HEPATITIS PANEL: CPT

## 2024-05-18 PROCEDURE — 97542 WHEELCHAIR MNGMENT TRAINING: CPT | Mod: GO

## 2024-05-18 PROCEDURE — 97116 GAIT TRAINING THERAPY: CPT | Mod: GP

## 2024-05-18 PROCEDURE — 74230 X-RAY XM SWLNG FUNCJ C+: CPT

## 2024-05-18 RX ADMIN — PANTOPRAZOLE SODIUM 40 MILLIGRAM(S): 20 TABLET, DELAYED RELEASE ORAL at 05:32

## 2024-05-18 RX ADMIN — ENOXAPARIN SODIUM 40 MILLIGRAM(S): 100 INJECTION SUBCUTANEOUS at 16:31

## 2024-05-18 NOTE — PROGRESS NOTE ADULT - SUBJECTIVE AND OBJECTIVE BOX
Patient is tired.   Does nod head yes/no.  Pain is controlled.   No other new ROS.  DC to [facility] today.     VITALS  T(C): 36.7 (05-17-24 @ 20:09), Max: 36.7 (05-17-24 @ 08:09)  HR: 64 (05-18-24 @ 07:55) (64 - 76)  BP: 126/77 (05-18-24 @ 07:55) (122/78 - 135/76)  RR: 15 (05-18-24 @ 07:55) (15 - 16)  SpO2: 98% (05-18-24 @ 07:55) (97% - 98%)  Wt(kg): --     MEDICATIONS   acetaminophen   Oral Liquid .. 650 milliGRAM(s) every 6 hours PRN  atorvastatin 40 milliGRAM(s) at bedtime  doxazosin 2 milliGRAM(s) at bedtime  enoxaparin Injectable 40 milliGRAM(s) <User Schedule>  melatonin 3 milliGRAM(s) at bedtime PRN  multivitamin/minerals/iron Oral Solution (CENTRUM) 15 milliLiter(s) daily  pantoprazole   Suspension 40 milliGRAM(s) before breakfast  petrolatum white Ointment 1 Application(s) every 12 hours PRN  propranolol 10 milliGRAM(s) every 12 hours  senna 2 Tablet(s) at bedtime PRN      RECENT LABS/IMAGING                     ------------------------------------------  PHYSICAL EXAM  Constitutional - NAD, Comfortable  Pulm - Breathing comfortably, No wheezing  Abd - Nondistended  Extremities - No edema  Neurologic Exam - Awake, Alert  Psychiatric - Mood WNL     ASSESSMENT/PLAN  58y Female with impairments in mobility and ADLs   - DC to [facility]

## 2024-05-18 NOTE — PROGRESS NOTE ADULT - NUTRITIONAL ASSESSMENT
This patient has been assessed with a concern for Malnutrition and has been determined to have a diagnosis/diagnoses of Severe protein-calorie malnutrition.    This patient is being managed with:   Diet Pureed-  Entered: May  2 2024 11:21AM  
This patient has been assessed with a concern for Malnutrition and has been determined to have a diagnosis/diagnoses of Severe protein-calorie malnutrition.    This patient is being managed with:   Diet Pureed-  Supplement Feeding Modality:  Oral  Ensure Plus High Protein Cans or Servings Per Day:  1       Frequency:  Three Times a day  Entered: May  2 2024  1:45PM  
This patient has been assessed with a concern for Malnutrition and has been determined to have a diagnosis/diagnoses of Severe protein-calorie malnutrition.    This patient is being managed with:   Diet NPO with Tube Feed-  Tube Feeding Modality: Gastrostomy  Jevity 1.5 Les  Total Volume for 24 Hours (mL): 1200  Bolus  Total Volume of Bolus (mL):  300  Total # of Feeds: 4  Tube Feed Frequency: Every 6 hours   Tube Feed Start Time: 08:00  Bolus Feed Rate (mL per Hour): 300   Bolus Feed Duration (in Hours): 0.5  Bolus Feed Instructions:  Please give bolus TF @ 8a 12p 4p 8p daily via PEG  Free Water Flush  Bolus   Total Volume per Flush (mL): 250   Frequency: Every 6 Hours  Free Water Flush Instructions:  Please give free H2O at least 1 hr apart from each feeding  No Carb Prosource TF     Qty per Day:  qd  Entered: Apr 27 2024  2:21PM  
This patient has been assessed with a concern for Malnutrition and has been determined to have a diagnosis/diagnoses of Severe protein-calorie malnutrition.    This patient is being managed with:   Diet Pureed-  Supplement Feeding Modality:  Oral  Ensure Plus High Protein Cans or Servings Per Day:  1       Frequency:  Three Times a day  Entered: May  2 2024  1:45PM  
This patient has been assessed with a concern for Malnutrition and has been determined to have a diagnosis/diagnoses of Severe protein-calorie malnutrition.    This patient is being managed with:   Diet NPO with Tube Feed-  Tube Feeding Modality: Gastrostomy  Jevity 1.5 Les  Total Volume for 24 Hours (mL): 1200  Bolus  Total Volume of Bolus (mL):  300  Total # of Feeds: 4  Tube Feed Frequency: Every 6 hours   Tube Feed Start Time: 08:00  Bolus Feed Rate (mL per Hour): 300   Bolus Feed Duration (in Hours): 0.5  Bolus Feed Instructions:  Please give bolus TF @ 8a 12p 4p 8p daily via PEG  Free Water Flush  Bolus   Total Volume per Flush (mL): 250   Frequency: Every 6 Hours  Free Water Flush Instructions:  Please give free H2O at least 1 hr apart from each feeding  No Carb Prosource TF     Qty per Day:  qd  Entered: Apr 27 2024  2:21PM  
This patient has been assessed with a concern for Malnutrition and has been determined to have a diagnosis/diagnoses of Severe protein-calorie malnutrition.    This patient is being managed with:   Diet Pureed-  Supplement Feeding Modality:  Oral  Ensure Plus High Protein Cans or Servings Per Day:  1       Frequency:  Three Times a day  Entered: May  2 2024  1:45PM  
This patient has been assessed with a concern for Malnutrition and has been determined to have a diagnosis/diagnoses of Severe protein-calorie malnutrition.    This patient is being managed with:   Diet Minced and Moist-  No Mixed Consistencies  Entered: May 16 2024  4:22PM  
This patient has been assessed with a concern for Malnutrition and has been determined to have a diagnosis/diagnoses of Severe protein-calorie malnutrition.    This patient is being managed with:   Diet NPO with Tube Feed-  Tube Feeding Modality: Gastrostomy  Jevity 1.5 Les  Total Volume for 24 Hours (mL): 1200  Bolus  Total Volume of Bolus (mL):  300  Total # of Feeds: 4  Tube Feed Frequency: Every 6 hours   Tube Feed Start Time: 08:00  Bolus Feed Rate (mL per Hour): 300   Bolus Feed Duration (in Hours): 0.5  Bolus Feed Instructions:  Please give bolus TF @ 8a 12p 4p 8p daily via PEG  Free Water Flush  Bolus   Total Volume per Flush (mL): 250   Frequency: Every 6 Hours  Free Water Flush Instructions:  Please give free H2O at least 1 hr apart from each feeding  No Carb Prosource TF     Qty per Day:  qd  Entered: Apr 27 2024  2:21PM  
This patient has been assessed with a concern for Malnutrition and has been determined to have a diagnosis/diagnoses of Severe protein-calorie malnutrition.    This patient is being managed with:   Diet Pureed-  Supplement Feeding Modality:  Oral  Ensure Plus High Protein Cans or Servings Per Day:  1       Frequency:  Three Times a day  Entered: May  2 2024  1:45PM  
This patient has been assessed with a concern for Malnutrition and has been determined to have a diagnosis/diagnoses of Severe protein-calorie malnutrition.    This patient is being managed with:   Diet Pureed-  Supplement Feeding Modality:  Oral  Probiotic Yogurt/Smoothie Cans or Servings Per Day:  1       Frequency:  Two Times a day  Entered: May 15 2024  1:45PM  
This patient has been assessed with a concern for Malnutrition and has been determined to have a diagnosis/diagnoses of Severe protein-calorie malnutrition.    This patient is being managed with:   Diet Pureed-  Supplement Feeding Modality:  Oral  Ensure Plus High Protein Cans or Servings Per Day:  1       Frequency:  Three Times a day  Entered: May  2 2024  1:45PM  
This patient has been assessed with a concern for Malnutrition and has been determined to have a diagnosis/diagnoses of Severe protein-calorie malnutrition.    This patient is being managed with:   Diet NPO with Tube Feed-  Tube Feeding Modality: Gastrostomy  Jevity 1.5 Les  Total Volume for 24 Hours (mL): 1200  Bolus  Total Volume of Bolus (mL):  300  Total # of Feeds: 4  Tube Feed Frequency: Every 6 hours   Tube Feed Start Time: 08:00  Bolus Feed Rate (mL per Hour): 300   Bolus Feed Duration (in Hours): 0.5  Bolus Feed Instructions:  Please give bolus TF @ 8a 12p 4p 8p daily via PEG  Free Water Flush  Bolus   Total Volume per Flush (mL): 250   Frequency: Every 6 Hours  Free Water Flush Instructions:  Please give free H2O at least 1 hr apart from each feeding  No Carb Prosource TF     Qty per Day:  qd  Entered: Apr 27 2024  2:21PM

## 2024-05-18 NOTE — PROGRESS NOTE ADULT - SUBJECTIVE AND OBJECTIVE BOX
Medicine Progress Note    Patient is a 58y old  Female who presents with a chief complaint of SAH (17 May 2024 11:03)      SUBJECTIVE / OVERNIGHT EVENTS:  seen and examined  Chart reviewed  No overnight events  Limb weakness improving with therapy  BM+  reported sciatica pain related to bed. for DC to [facility] today    ADDITIONAL REVIEW OF SYSTEMS:  denied fever/chills/CP/SOB/cough/palpitation/dizziness/abdominal pian/nausea/vomiting/diarrhoea/constipation/dysuria/leg or calf pain/headaches.all other ROS neg    MEDICATIONS  (STANDING):  atorvastatin 40 milliGRAM(s) Oral at bedtime  doxazosin 2 milliGRAM(s) Oral at bedtime  enoxaparin Injectable 40 milliGRAM(s) SubCutaneous <User Schedule>  multivitamin/minerals/iron Oral Solution (CENTRUM) 15 milliLiter(s) Oral daily  pantoprazole   Suspension 40 milliGRAM(s) Oral before breakfast  propranolol 10 milliGRAM(s) Oral every 12 hours    MEDICATIONS  (PRN):  acetaminophen   Oral Liquid .. 650 milliGRAM(s) Oral every 6 hours PRN Mild Pain (1 - 3)  melatonin 3 milliGRAM(s) Oral at bedtime PRN Insomnia  petrolatum white Ointment 1 Application(s) Topical every 12 hours PRN dryness  senna 2 Tablet(s) Oral at bedtime PRN Constipation    CAPILLARY BLOOD GLUCOSE        I&O's Summary      PHYSICAL EXAM:  Vital Signs Last 24 Hrs  T(C): 36.7 (17 May 2024 20:09), Max: 36.7 (17 May 2024 20:09)  T(F): 98.1 (17 May 2024 20:09), Max: 98.1 (17 May 2024 20:09)  HR: 64 (18 May 2024 07:55) (64 - 76)  BP: 126/77 (18 May 2024 07:55) (122/78 - 135/76)  BP(mean): --  RR: 15 (18 May 2024 07:55) (15 - 16)  SpO2: 98% (18 May 2024 07:55) (97% - 98%)    Parameters below as of 18 May 2024 07:55  Patient On (Oxygen Delivery Method): room air    GENERAL: Not in distress. Alert    HEENT: clear conjuctiva, MMM. no pallor or icterus  CARDIOVASCULAR: RRR S1, S2. No murmur/rubs/gallop  LUNGS: BLAE+, no rales, no wheezing, no rhonchi.    ABDOMEN: ND. Soft,  NT, no guarding / rebound / rigidity. BS normoactive  BACK: No spine tenderness.  EXTREMITIES: no edema. no leg or calf TP.  SKIN: warm and dry  PSYCHIATRIC: Calm.  No agitation.  CNS: AAO. moves limbs, follows commands    LABS:                        RADIOLOGY & ADDITIONAL TESTS:  Imaging from Last 24 Hours:    Electrocardiogram/QTc Interval:    COORDINATION OF CARE:  Care Discussed with Consultants/Other Providers:   Medicine Progress Note    Patient is a 58y old  Female who presents with a chief complaint of SAH (17 May 2024 11:03)      SUBJECTIVE / OVERNIGHT EVENTS:  seen and examined  Chart reviewed  No overnight events  Limb weakness improving with therapy  BM+  no pain   for DC to [facility] today    ADDITIONAL REVIEW OF SYSTEMS:  denied fever/chills/CP/SOB/cough/palpitation/dizziness/abdominal pian/nausea/vomiting/diarrhoea/constipation/dysuria/leg or calf pain/headaches.all other ROS neg    MEDICATIONS  (STANDING):  atorvastatin 40 milliGRAM(s) Oral at bedtime  doxazosin 2 milliGRAM(s) Oral at bedtime  enoxaparin Injectable 40 milliGRAM(s) SubCutaneous <User Schedule>  multivitamin/minerals/iron Oral Solution (CENTRUM) 15 milliLiter(s) Oral daily  pantoprazole   Suspension 40 milliGRAM(s) Oral before breakfast  propranolol 10 milliGRAM(s) Oral every 12 hours    MEDICATIONS  (PRN):  acetaminophen   Oral Liquid .. 650 milliGRAM(s) Oral every 6 hours PRN Mild Pain (1 - 3)  melatonin 3 milliGRAM(s) Oral at bedtime PRN Insomnia  petrolatum white Ointment 1 Application(s) Topical every 12 hours PRN dryness  senna 2 Tablet(s) Oral at bedtime PRN Constipation    CAPILLARY BLOOD GLUCOSE        I&O's Summary      PHYSICAL EXAM:  Vital Signs Last 24 Hrs  T(C): 36.7 (17 May 2024 20:09), Max: 36.7 (17 May 2024 20:09)  T(F): 98.1 (17 May 2024 20:09), Max: 98.1 (17 May 2024 20:09)  HR: 64 (18 May 2024 07:55) (64 - 76)  BP: 126/77 (18 May 2024 07:55) (122/78 - 135/76)  BP(mean): --  RR: 15 (18 May 2024 07:55) (15 - 16)  SpO2: 98% (18 May 2024 07:55) (97% - 98%)    Parameters below as of 18 May 2024 07:55  Patient On (Oxygen Delivery Method): room air    GENERAL: Not in distress. Alert    HEENT: clear conjuctiva, MMM. no pallor or icterus  CARDIOVASCULAR: RRR S1, S2. No murmur/rubs/gallop  LUNGS: BLAE+, no rales, no wheezing, no rhonchi.    ABDOMEN: ND. Soft,  NT, no guarding / rebound / rigidity. BS normoactive  BACK: No spine tenderness.  EXTREMITIES: no edema. no leg or calf TP.  SKIN: warm and dry  PSYCHIATRIC: Calm.  No agitation.  CNS: AAO. moves limbs, follows commands    LABS:                        RADIOLOGY & ADDITIONAL TESTS:  Imaging from Last 24 Hours:    Electrocardiogram/QTc Interval:    COORDINATION OF CARE:  Care Discussed with Consultants/Other Providers:

## 2024-05-18 NOTE — PROGRESS NOTE ADULT - ASSESSMENT
57 y/o F with no PMH, fell one month prior to admission, transferred from [place] to [facility] on 3/26/24 for diffuse SAH w/ small IVH, 2/2 ruptured aneurysm s/p EVD and craniotomy + clipping of R PCOM aneurysm on 3/26. 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 Patient found to have HFrEF (now recovered), bradycardia (placed on theophyline, now resolved), b/l soleal DVT (on Lovenox ppx). PEG placement initially complicated by gastric wall hematoma, subsequently successfully placed for persistent dysphagia. Patient now admitted for a multidisciplinary rehab program    # back pain  - pain controlled per rehab team    # SAH s/p ruptured PCOM aneurysm  - R EVD placed 3/26 and removed 4/15  - s/p right pterional craniotomy and clipping of a ruptured right PCOM aneurysm on 3/26  - CTH (3/29): EVD tract acute hemorrhage and new focus of infarction with hemorrhage in the right basal ganglia/corona radiata  - complicated by vasospasm requiring cerebral angiography and IA milrinone and verapamil  - MRI (4/8): Multifocal acute to subacute infarction. This extensively involves each MCA posterior distribution, the right basal ganglia, the anterior parasagittal vertex CINDY distribution and an additional smaller lesion in the right cerebellum  - labs stable.      # B/l DVT   - Left soleal DVT first noted 3/28, right soleal DVT fist noted 4/3  - Most recent Duplex (4/23): persistent bilateral soleal vein DVT  - vasc cardiology recommended repeat surveillance duplex on 4/30.  - repeat duplex 04/30:  persistent left soleal vein DVT + also left gastrocnemius vein DVT (new).   Right soleal vein DVT no longer visualized.  - Continue Lovenox 40   - repeat duplex 5/14 reviewed . repeat in 1 week in [facility]    # HLD  - continue Lipitor     # HFrEF  - Initial Echo (3/26) with severe cardiomyopathy, EF 36%, regional wall motion abnormality  - Resolved on repeat TTE performed 4/9/24, normal EF 66% with no WMA.    # Gastric Hematoma  - PEG placement attempted 4/19 for persistent dysphagia, but was aborted due to growing hematoma noted in gastric wall on endoscopy after trocar placement  - CT Abdomen and Pelvis w/ IV Cont showed gastric wall hematoma without intraluminal or extraluminal hemorrhage  - monitor H/H, stable at 10    # transaminitis- improved   - monitor    #DVT ppx  -Lovenox    stable for DC to [facility]  d/w rehab team at [facility] 59 y/o F with no PMH, fell one month prior to admission, transferred from [place] to [facility] on 3/26/24 for diffuse SAH w/ small IVH, 2/2 ruptured aneurysm s/p EVD and craniotomy + clipping of R PCOM aneurysm on 3/26. 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 Patient found to have HFrEF (now recovered), bradycardia (placed on theophyline, now resolved), b/l soleal DVT (on Lovenox ppx). PEG placement initially complicated by gastric wall hematoma, subsequently successfully placed for persistent dysphagia. Patient now admitted for a multidisciplinary rehab program    # SAH s/p ruptured PCOM aneurysm  - R EVD placed 3/26 and removed 4/15  - s/p right pterional craniotomy and clipping of a ruptured right PCOM aneurysm on 3/26  - CTH (3/29): EVD tract acute hemorrhage and new focus of infarction with hemorrhage in the right basal ganglia/corona radiata  - complicated by vasospasm requiring cerebral angiography and IA milrinone and verapamil  - MRI (4/8): Multifocal acute to subacute infarction. This extensively involves each MCA posterior distribution, the right basal ganglia, the anterior parasagittal vertex CINDY distribution and an additional smaller lesion in the right cerebellum  - labs stable.      # B/l DVT   - Left soleal DVT first noted 3/28, right soleal DVT fist noted 4/3  - Most recent Duplex (4/23): persistent bilateral soleal vein DVT  - vasc cardiology recommended repeat surveillance duplex on 4/30.  - repeat duplex 04/30:  persistent left soleal vein DVT + also left gastrocnemius vein DVT (new).   Right soleal vein DVT no longer visualized.  - Continue Lovenox 40   - repeat duplex 5/14 reviewed . repeat in 1 week in [facility]    # HLD  - continue Lipitor     # HFrEF  - Initial Echo (3/26) with severe cardiomyopathy, EF 36%, regional wall motion abnormality  - Resolved on repeat TTE performed 4/9/24, normal EF 66% with no WMA.    # Gastric Hematoma  - PEG placement attempted 4/19 for persistent dysphagia, but was aborted due to growing hematoma noted in gastric wall on endoscopy after trocar placement  - CT Abdomen and Pelvis w/ IV Cont showed gastric wall hematoma without intraluminal or extraluminal hemorrhage  - monitor H/H, stable at 10    # transaminitis- improved   - monitor    #DVT ppx  -Lovenox    stable for DC to [facility]  d/w rehab team at [facility]

## 2024-05-22 ENCOUNTER — NON-APPOINTMENT (OUTPATIENT)
Age: 59
End: 2024-05-22

## 2024-06-04 ENCOUNTER — APPOINTMENT (OUTPATIENT)
Dept: GASTROENTEROLOGY | Facility: CLINIC | Age: 59
End: 2024-06-04

## 2024-06-10 NOTE — ASU PREOP CHECKLIST - LATEX ALLERGY
----- Message from Christina Lee MD sent at 6/9/2024  1:46 PM CDT -----  Mild elevation of TPO antibodies.  Graves specific antibody TSI is normal. No active Graves disease.  Thanks  Christina Lee MD      
E advice sent.   
no

## 2024-06-20 ENCOUNTER — NON-APPOINTMENT (OUTPATIENT)
Age: 59
End: 2024-06-20

## 2024-06-21 ENCOUNTER — APPOINTMENT (OUTPATIENT)
Dept: NEUROSURGERY | Facility: CLINIC | Age: 59
End: 2024-06-21
Payer: COMMERCIAL

## 2024-06-21 VITALS
WEIGHT: 140 LBS | DIASTOLIC BLOOD PRESSURE: 115 MMHG | HEIGHT: 60 IN | OXYGEN SATURATION: 98 % | BODY MASS INDEX: 27.48 KG/M2 | HEART RATE: 98 BPM | SYSTOLIC BLOOD PRESSURE: 190 MMHG

## 2024-06-21 DIAGNOSIS — I60.7 NONTRAUMATIC SUBARACHNOID HEMORRHAGE FROM UNSPECIFIED INTRACRANIAL ARTERY: ICD-10-CM

## 2024-06-21 PROCEDURE — 99024 POSTOP FOLLOW-UP VISIT: CPT

## 2024-07-02 NOTE — ASSESSMENT
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 Initially presented to [place] with headaches and became unresponsive, was intubated, and transferred to [facility]. On arrival to [facility] patient w/ PERRL, +cough/gag, 2/5 spontaneous movement in LUE, otherwise no movement. Given 1 unit platelets and R frontal EVD placed with high opening pressure. Patient underwent Right pterional craniotomy and clipping of a ruptured right PCOM aneurysm on 3/26/24.  PLAN: CTA head 1 year post op - 3/2025

## 2024-07-02 NOTE — HISTORY OF PRESENT ILLNESS
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 Initially presented to [place] with headaches and became unresponsive, was intubated, and transferred to [facility]. On arrival to [facility] patient w/ PERRL, +cough/gag, 2/5 spontaneous movement in LUE, otherwise no movement. Given 1 unit platelets and R frontal EVD placed with high opening pressure. Patient underwent Right pterional craniotomy and clipping of a ruptured right PCOM aneurysm on 3/26.  On 3/29, EVD displaced and replaced in situ, complicated by EVD tract acute hemorrhage and new focus of infarction with hemorrhage in the right basal ganglia/corona radiata. Course further complicated by hydrocephalus with ICP crisis, vasospasm requiring cerebral angiography and IA milrinone and verapamil. MRI (4/8) with widespread ischemic stroke b/l, as per neuro IR no further intervention. Patient extubated on 4/3, re-intubated 4/8, and extubated again on 4/9. EVD removed on 4/15. CTH post removal stable.  Course also complicated by bradycardia treated with theophyline, now resolved and discontinued. Initial Echo (3/26) with severe cardiomyopathy, EF 36%, regional wall motion abnormality. Resolved on repeat TTE performed 4/9/24, normal EF 66% with no WMA. Patient first noted with left soleal DVT on Duplex 3/28. Duplex (4/10) with stable b/l soleal DVT with extension to right posterior tibial DVT.  Last LE duplex with stable bilateral soleal DVT, [facility] cardiology recommended repeat surveillance duplex on 4/30. On Lovenox ppx. PEG placement attempted 4/19 for persistent dysphagia, but was aborted due to growing hematoma noted in gastric wall on endoscopy after trocar placement. CT Abdomen and Pelvis w/ IV Cont showed gastric wall hematoma without intraluminal or extraluminal hemorrhage. PEG successfully placed on 4/23. Patient evaluated by PT/OT and was recommended for acute inpatient rehab. Patient is medically stable for discharge to [facility] on 4/26.  (26 Apr 2024 14:34)  Pt is currently at [facility] and is scheduled for s/p 3/26/24 right pterional craniotomy and clipping of complex ruptured posterior communicating artery aneurysm.  Surgical incision is healed.

## 2024-07-02 NOTE — PHYSICAL EXAM
[General Appearance - Alert] : alert [] : no respiratory distress [Respiration, Rhythm And Depth] : normal respiratory rhythm and effort [Exaggerated Use Of Accessory Muscles For Inspiration] : no accessory muscle use [Heart Rate And Rhythm] : heart rate was normal and rhythm regular

## 2025-06-20 ENCOUNTER — APPOINTMENT (OUTPATIENT)
Dept: NEUROSURGERY | Facility: CLINIC | Age: 60
End: 2025-06-20

## (undated) DEVICE — TUBING SUCTION 20FT

## (undated) DEVICE — ELCTR BIPOLAR CORD AESCULAP 12FT DISP

## (undated) DEVICE — ELCTR SUBDERMAL NDL CLASSIC 1.5M X 59" (6 COLOR)

## (undated) DEVICE — SUT NUROLON 4-0 8-18" TF (POP-OFF)

## (undated) DEVICE — BITE BLOCK ADULT 20 X 27MM (GREEN)

## (undated) DEVICE — ELCTR PEDICLE SCREW PROBE 3MM BALL 1.8MM X 100MM

## (undated) DEVICE — CATH IV SAFE BC 20G X 1.16" (PINK)

## (undated) DEVICE — SOL IRR POUR NS 0.9% 500ML

## (undated) DEVICE — WARMING BLANKET LOWER ADULT

## (undated) DEVICE — GLV 7 PROTEXIS (WHITE)

## (undated) DEVICE — SOL IRR POUR H2O 250ML

## (undated) DEVICE — Device

## (undated) DEVICE — TUBING SUCTION CONN 6FT STERILE

## (undated) DEVICE — VENODYNE/SCD SLEEVE CALF MEDIUM

## (undated) DEVICE — MIDAS REX MR8 TAPERED SM BORE 2.3MM X 7CM FOOTED

## (undated) DEVICE — PACK IV START WITH CHG

## (undated) DEVICE — GLV 7.5 PROTEXIS (WHITE)

## (undated) DEVICE — MARKING PEN W RULER

## (undated) DEVICE — DRSG STOCKINETTE IMPERVIOUS XL

## (undated) DEVICE — ELCTR SUBDERMAL NDL 27G X 1/2" WITH TWISTED PAIR

## (undated) DEVICE — CATH IV SAFE BC 22G X 1" (BLUE)

## (undated) DEVICE — GLV 6.5 PROTEXIS (WHITE)

## (undated) DEVICE — ELCTR SUBDERMAL CORKSCREW NDL 1.2MM

## (undated) DEVICE — DRAIN JACKSON PRATT 7MM FLAT FULL NO TROCAR

## (undated) DEVICE — TUBING IV SET GRAVITY 3Y 100" MACRO

## (undated) DEVICE — SENSOR O2 FINGER ADULT

## (undated) DEVICE — MINI DOPPLER PROBE

## (undated) DEVICE — ELCTR BIPOLAR PROBE

## (undated) DEVICE — SUT VICRYL 3-0 18" X-1 (POP-OFF)

## (undated) DEVICE — MIDAS REX MR8 BALL FLUTED SM BORE 3MM X 10CM

## (undated) DEVICE — DRSG TELFA 3 X 8

## (undated) DEVICE — DRSG XEROFORM 1 X 8"

## (undated) DEVICE — FOLEY HOLDER STATLOCK 2 WAY ADULT

## (undated) DEVICE — LONE STAR ELASTIC STAY HOOK 12MM BLUNT

## (undated) DEVICE — AESCULAP SCALPFIX 10 CLIPS

## (undated) DEVICE — DRAIN RESERVOIR FOR JACKSON PRATT 100CC CARDINAL

## (undated) DEVICE — ELCTR 4-DISC 20MM 49" (RED, BLUE, GREEN, BLACK)

## (undated) DEVICE — GLV 8 PROTEXIS (WHITE)

## (undated) DEVICE — SYR ALLIANCE II INFLATION 60ML

## (undated) DEVICE — CODMAN PERFORATOR 14MM (BLUE)

## (undated) DEVICE — DRAPE 1/2 SHEET 40X57"

## (undated) DEVICE — DRAPE TOWEL BLUE 17" X 24"

## (undated) DEVICE — POSITIONER FOAM EGG CRATE ULNAR 2PCS (PINK)

## (undated) DEVICE — BIPOLAR FORCEP STRYKER STANDARD 8" X 0.5MM (YELLOW)

## (undated) DEVICE — BALLOON US ENDO

## (undated) DEVICE — DRAIN LIMITORR DRAIN SYSTEM 30ML

## (undated) DEVICE — PREP CHLORAPREP HI-LITE ORANGE 10.5ML

## (undated) DEVICE — DRSG TAPE HYPAFIX 4"

## (undated) DEVICE — STAPLER SKIN VISI-STAT 35 WIDE

## (undated) DEVICE — SUT VICRYL 2-0 18" CP-2 UNDYED (POP-OFF)

## (undated) DEVICE — ELCTR BOVIE TIP BLADE INSULATED 2.75" EDGE WITH SAFETY

## (undated) DEVICE — SPECIMEN CONTAINER 100ML

## (undated) DEVICE — FOLEY TRAY 16FR 5CC LTX UMETER CLOSED

## (undated) DEVICE — ELCTR MONOPOLAR STIMULATOR PROBE FLUSH-TIP

## (undated) DEVICE — SYR TB 1CC 25G X 5/8 (BLUE)

## (undated) DEVICE — DRAPE 3/4 SHEET W REINFORCEMENT 56X77"

## (undated) DEVICE — SOL INJ NS 0.9% 500ML 2 PORT

## (undated) DEVICE — GOWN TRIMAX LG

## (undated) DEVICE — SUCTION YANKAUER NO CONTROL VENT

## (undated) DEVICE — WOUND IRR SURGIPHOR

## (undated) DEVICE — GLV 8.5 PROTEXIS (WHITE)